# Patient Record
Sex: FEMALE | Race: WHITE | NOT HISPANIC OR LATINO | Employment: OTHER | URBAN - METROPOLITAN AREA
[De-identification: names, ages, dates, MRNs, and addresses within clinical notes are randomized per-mention and may not be internally consistent; named-entity substitution may affect disease eponyms.]

---

## 2017-01-02 ENCOUNTER — OFFICE VISIT (OUTPATIENT)
Dept: PALLIATIVE MEDICINE | Facility: CLINIC | Age: 62
End: 2017-01-02
Payer: COMMERCIAL

## 2017-01-02 DIAGNOSIS — G89.29 CHRONIC PAIN: Primary | ICD-10-CM

## 2017-01-02 DIAGNOSIS — M54.2 CERVICALGIA: ICD-10-CM

## 2017-01-02 DIAGNOSIS — M54.50 LUMBAGO: ICD-10-CM

## 2017-01-02 PROCEDURE — 97112 NEUROMUSCULAR REEDUCATION: CPT | Mod: GP | Performed by: PHYSICAL THERAPIST

## 2017-01-02 PROCEDURE — 97110 THERAPEUTIC EXERCISES: CPT | Mod: GP | Performed by: PHYSICAL THERAPIST

## 2017-01-02 NOTE — PROGRESS NOTES
"Patient Name: Brandi Stern      YOB: 1955     Medical Record Number: [id number]  Diagnosis:    Chronic pain  Cervicalgia  Lumbago  Visit Info Length of Visit: 45  Arrived: On Time   Exercise/Activity Education Instruction:  Postural Training/Anatomy  Pacing/Energy Conservation  Home Program  Self Care  Activity:  Therapeutic Exercise 25':  Aerobic Conditioning (*see \"Strength/Functional Actitivities Record for details of exercises performed)  Bike x 10'  [place] walk x 7' - cues for postural correction, soft heel strike, mindful walking  Stretching:  Upper Ext  bilateral, Lower Ext  Bilateral  Added stretches for KTC, thor ext  Postural Training:  standing, sitting    Neuro re-ed 15': ed on neutral spine  Diaphragm breathing ed in supine  Ed on sequence of 'breathe, stabilize, move' concept  Supine decompression  HR ed with relation to pain, nervous system  Self care ed   Notes: Tolerated session well.  TPI helped.  Slowly progressing toward goals.   Demonstrates/Verbalizes Technique: 3 (1= poor technique-difficulty performing exercises,significant cues required; 5= good technique-performs exercises without cues)  Body Awareness: 3 (1=low awareness; 5=high awareness)  Posture/Stability: 3 (1= poor posture, stability; 5= good posture, stability)   Motivational Level:   Ask questions, Eager to learn and Cooperative Participation:  Full   Patient Satisfaction:  satisfied   Response to Teaching:   cooperative  Factors that affect learning: None   Plan of Care  Cont PT to support reactivation and integration of self regulation pain management skills.   Therapist: Jeffrey Alanis PT                 Date: 1/2/2017                                                                                               "

## 2017-01-12 ENCOUNTER — RADIANT APPOINTMENT (OUTPATIENT)
Dept: GENERAL RADIOLOGY | Facility: CLINIC | Age: 62
End: 2017-01-12
Attending: FAMILY MEDICINE
Payer: COMMERCIAL

## 2017-01-12 ENCOUNTER — OFFICE VISIT (OUTPATIENT)
Dept: URGENT CARE | Facility: URGENT CARE | Age: 62
End: 2017-01-12
Payer: COMMERCIAL

## 2017-01-12 VITALS
WEIGHT: 231 LBS | DIASTOLIC BLOOD PRESSURE: 90 MMHG | BODY MASS INDEX: 39.63 KG/M2 | HEART RATE: 86 BPM | OXYGEN SATURATION: 97 % | TEMPERATURE: 98 F | SYSTOLIC BLOOD PRESSURE: 133 MMHG

## 2017-01-12 DIAGNOSIS — K21.9 GASTROESOPHAGEAL REFLUX DISEASE WITHOUT ESOPHAGITIS: Primary | ICD-10-CM
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
## 2017-01-12 DIAGNOSIS — S13.9XXA SPRAIN OF NECK, INITIAL ENCOUNTER: ICD-10-CM

## 2017-01-12 PROCEDURE — 99213 OFFICE O/P EST LOW 20 MIN: CPT | Performed by: FAMILY MEDICINE

## 2017-01-12 PROCEDURE — 72040 X-RAY EXAM NECK SPINE 2-3 VW: CPT

## 2017-01-12 RX ORDER — NICOTINE POLACRILEX 4 MG/1
20 GUM, CHEWING ORAL 2 TIMES DAILY
Qty: 180 TABLET | Refills: 1 | Status: SHIPPED | OUTPATIENT
Start: 2017-01-12 | End: 2017-01-24 | Stop reason: ALTCHOICE

## 2017-01-12 NOTE — TELEPHONE ENCOUNTER
OMEPRAZOLE 20 MG      Last Written Prescription Date: 7-29-16  Last Fill Quantity: 180,  # refills: PRN   Last Office Visit with [facility], P or [facility] prescribing provider: 12-9-16                                         Next 5 appointments (look out 90 days)     Jan 20, 2017  9:30 AM   Return Visit with Jeffrey Alanis, PT   [facility] ([facility])    [address] [address] [phone]

## 2017-01-13 NOTE — TELEPHONE ENCOUNTER
Prescription approved per [facility] Protocol.  GONZALES Brooke, BSN, RN
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
## 2017-01-20 ENCOUNTER — OFFICE VISIT (OUTPATIENT)
Dept: PALLIATIVE MEDICINE | Facility: CLINIC | Age: 62
End: 2017-01-20
Payer: COMMERCIAL

## 2017-01-20 DIAGNOSIS — G89.29 CHRONIC PAIN: Primary | ICD-10-CM

## 2017-01-20 DIAGNOSIS — M54.50 LUMBAGO: ICD-10-CM

## 2017-01-20 DIAGNOSIS — M54.2 CERVICALGIA: ICD-10-CM

## 2017-01-20 PROCEDURE — 97112 NEUROMUSCULAR REEDUCATION: CPT | Mod: GP | Performed by: PHYSICAL THERAPIST

## 2017-01-20 PROCEDURE — 97110 THERAPEUTIC EXERCISES: CPT | Mod: GP | Performed by: PHYSICAL THERAPIST

## 2017-01-24 ENCOUNTER — TELEPHONE (OUTPATIENT)
Dept: FAMILY MEDICINE | Facility: CLINIC | Age: 62
End: 2017-01-24

## 2017-01-24 DIAGNOSIS — K21.9 GASTROESOPHAGEAL REFLUX DISEASE WITHOUT ESOPHAGITIS: Primary | ICD-10-CM

## 2017-01-24 NOTE — TELEPHONE ENCOUNTER
Jameel called from [facility] pharmacy requesting ok to switch Omeprazole tabs to capsules?    Please advise

## 2017-02-03 ENCOUNTER — OFFICE VISIT (OUTPATIENT)
Dept: PALLIATIVE MEDICINE | Facility: CLINIC | Age: 62
End: 2017-02-03
Payer: COMMERCIAL

## 2017-02-03 VITALS
WEIGHT: 231 LBS | HEART RATE: 90 BPM | BODY MASS INDEX: 39.44 KG/M2 | SYSTOLIC BLOOD PRESSURE: 131 MMHG | DIASTOLIC BLOOD PRESSURE: 89 MMHG | RESPIRATION RATE: 18 BRPM | OXYGEN SATURATION: 97 % | HEIGHT: 64 IN

## 2017-02-03 DIAGNOSIS — M50.30 DDD (DEGENERATIVE DISC DISEASE), CERVICAL: ICD-10-CM

## 2017-02-03 DIAGNOSIS — F32.1 MODERATE SINGLE CURRENT EPISODE OF MAJOR DEPRESSIVE DISORDER (H): Primary | ICD-10-CM

## 2017-02-03 DIAGNOSIS — M51.369 DDD (DEGENERATIVE DISC DISEASE), LUMBAR: ICD-10-CM

## 2017-02-03 PROCEDURE — 99214 OFFICE O/P EST MOD 30 MIN: CPT | Performed by: NURSE PRACTITIONER

## 2017-02-03 RX ORDER — DICLOFENAC SODIUM 100 MG/1
100 TABLET, FILM COATED, EXTENDED RELEASE ORAL DAILY
Qty: 30 TABLET | Refills: 3 | Status: SHIPPED | OUTPATIENT
Start: 2017-02-03 | End: 2017-06-22

## 2017-02-03 ASSESSMENT — PAIN SCALES - GENERAL: PAINLEVEL: MILD PAIN (3)

## 2017-02-03 NOTE — PATIENT INSTRUCTIONS
After Visit Instructions:     Thank you for coming to [facility] for your care. It is my goal to partner with you to help you reach your optimal state of health.     I am recommending multidisciplinary care at this time.  The focus of care will be to continue gradual rehabilitation and pain management with medication adjustments as needed.    Continue daily self-care, identifying contributing factors, and monitoring variations in pain level. Continue to integrate self-care into your life.      1. Behavioral health will contact you about scheduling.   2. Schedule physical therapy visits  3. Schedule follow-up with ELISE Persaud NP-C in 4-8 weeks  4. Medication recommendations: No changes to current pain medication.     ELISE Zhu NP-C  [facility]    Contact information: [facility]    Please call if any side effects, questions, or concerns arise.    Nurse Triage line:  [phone]   Call this number with any questions or concerns. You may leave a detailed message anytime. Calls are typically returned Monday through Friday between 8 AM and 4:30 PM. We usually get back to you within 2 business days depending on the issue/request.    Scheduling number: [phone].  Call this number to schedule or change appointments.          Medication Refills Policy:    For non-narcotic medications, please your pharmacy directly to request a refill and the pharmacy will call the [facility] for authorization. Please allow 3-4 days for these refills.    For narcotic refills, call the nurse triage line and leave a message requesting your refill along with the name of the pharmacy that you use. Narcotic prescriptions will be mailed to your pharmacy or you may pick them up at the clinic.  Please call 7-10 days before your refill is due  The above policy allows adequate time so that you do not run out of medication.    No Show  - Late Cancellation - Late Arrival Policy  We believe regular attendance is key to your success in our program.    Any time you are unable to keep your appointment we ask that you call us at  least 24 hours in advance to let us know. This will allow us to offer the appointment time to another patient. The following is our policy for missed appointments. This also applies to appointments cancelled with less than 24 hours notice.    You will receive a letter after missing your 1st and 2nd appointments without contacting the clinic before your scheduled appointment time.     After missing 3 appointments without calling first, we will cancel all of your future appointments at [facility].    At that point, you will not be able to resume services unless approved by your care team  We understand that unforseen circumstances arise, however, out of respect for all concerned and to provide this appointment to another patient, this policy will be enforced.    Please note that most follow up appointments are 30 minutes long. If you arrive late, your provider may not be able to see you for the entire 30 minutes. Please also note that if you arrive more than 15 minutes for any appointment, it may be rescheduled.

## 2017-02-03 NOTE — NURSING NOTE
"Chief Complaint   Patient presents with     Pain       Initial /89 mmHg  Pulse 90  Resp 18  Ht 1.626 m (5' 4\")  Wt 104.781 kg (231 lb)  BMI 39.63 kg/m2  SpO2 97% Estimated body mass index is 39.63 kg/(m^2) as calculated from the following:    Height as of this encounter: 1.626 m (5' 4\").    Weight as of this encounter: 104.781 kg (231 lb).  BP completed using cuff size: regular    Estuardo Martines MA  [facility]      "
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
## 2017-02-03 NOTE — MR AVS SNAPSHOT
After Visit Summary   2/3/2017    Brandi Stern    MRN: [id number]           Patient Information     Date Of Birth          1955        Visit Information        Provider Department      2/3/2017 9:30 AM Gia Stroud APRN CNP [facility]        Today's Diagnoses     Moderate single current episode of major depressive disorder (H)    -  1       Care Instructions    After Visit Instructions:     Thank you for coming to [facility] for your care. It is my goal to partner with you to help you reach your optimal state of health.     I am recommending multidisciplinary care at this time.  The focus of care will be to continue gradual rehabilitation and pain management with medication adjustments as needed.    Continue daily self-care, identifying contributing factors, and monitoring variations in pain level. Continue to integrate self-care into your life.      1. Behavioral health will contact you about scheduling.   2. Schedule physical therapy visits  3. Schedule follow-up with ELISE Persaud NP-C in 4-8 weeks  4. Medication recommendations: No changes to current pain medication.     ELISE Zhu, NP-C  [facility]    Contact information: [facility]    Please call if any side effects, questions, or concerns arise.    Nurse Triage line:  [phone]   Call this number with any questions or concerns. You may leave a detailed message anytime. Calls are typically returned Monday through Friday between 8 AM and 4:30 PM. We usually get back to you within 2 business days depending on the issue/request.    Scheduling number: [phone].  Call this number to schedule or change appointments.          Medication Refills Policy:    For non-narcotic medications, please your pharmacy directly to request a refill and the pharmacy will call the [facility] for authorization. Please  allow 3-4 days for these refills.    For narcotic refills, call the nurse triage line and leave a message requesting your refill along with the name of the pharmacy that you use. Narcotic prescriptions will be mailed to your pharmacy or you may pick them up at the clinic.  Please call 7-10 days before your refill is due  The above policy allows adequate time so that you do not run out of medication.    No Show - Late Cancellation - Late Arrival Policy  We believe regular attendance is key to your success in our program.    Any time you are unable to keep your appointment we ask that you call us at  least 24 hours in advance to let us know. This will allow us to offer the appointment time to another patient. The following is our policy for missed appointments. This also applies to appointments cancelled with less than 24 hours notice.    You will receive a letter after missing your 1st and 2nd appointments without contacting the clinic before your scheduled appointment time.     After missing 3 appointments without calling first, we will cancel all of your future appointments at [facility].    At that point, you will not be able to resume services unless approved by your care team  We understand that unforseen circumstances arise, however, out of respect for all concerned and to provide this appointment to another patient, this policy will be enforced.    Please note that most follow up appointments are 30 minutes long. If you arrive late, your provider may not be able to see you for the entire 30 minutes. Please also note that if you arrive more than 15 minutes for any appointment, it may be rescheduled.                                   Follow-ups after your visit        Additional Services     MENTAL HEALTH REFERRAL       Your provider has referred you to: Behavioral Healthcare Providers Intake Scheduling [phone]   [email or web address]    All scheduling is subject to the client's  "specific insurance plan & benefits, provider/location availability, and provider clinical specialities.  Please arrive 15 minutes early for your first appointment and bring your completed paperwork.    Please be aware that coverage of these services is subject to the terms and limitations of your health insurance plan.  Call member services at your health plan with any benefit or coverage questions.                  Who to contact     If you have questions or need follow up information about today's clinic visit or your schedule please contact [facility] directly at [phone].  Normal or non-critical lab and imaging results will be communicated to you by Vixlohart, letter or phone within 4 business days after the clinic has received the results. If you do not hear from us within 7 days, please contact the clinic through 4tiitoot or phone. If you have a critical or abnormal lab result, we will notify you by phone as soon as possible.  Submit refill requests through Wibki or call your pharmacy and they will forward the refill request to us. Please allow 3 business days for your refill to be completed.          Additional Information About Your Visit        Wibki Information     W[employer]ki gives you secure access to your electronic health record. If you see a primary care provider, you can also send messages to your care team and make appointments. If you have questions, please call your primary care clinic.  If you do not have a primary care provider, please call [phone] and they will assist you.        Care EveryWhere ID     This is your Care EveryWhere ID. This could be used by other organizations to access your [facility] medical records  [id number]        Your Vitals Were     Pulse Respirations Height BMI (Body Mass Index) Pulse Oximetry       90 18 1.626 m (5' 4\") 39.63 kg/m2 97%        Blood Pressure from Last 3 Encounters:   02/03/17 131/89   01/12/17 133/90   12/28/16 141/90    " Weight from Last 3 Encounters:   02/03/17 104.781 kg (231 lb)   01/12/17 104.781 kg (231 lb)   12/19/16 105.688 kg (233 lb)              We Performed the Following     MENTAL HEALTH REFERRAL        Primary Care Provider Office Phone # Fax #    Cherie KARRI Brennan [phone]       [facility] [address] [place]  [facility] [address]        Thank you!     Thank you for choosing [facility]  for your care. Our goal is always to provide you with excellent care. Hearing back from our patients is one way we can continue to improve our services. Please take a few minutes to complete the written survey that you may receive in the mail after your visit with us. Thank you!             Your Updated Medication List - Protect others around you: Learn how to safely use, store and throw away your medicines at www.disposemymeds.org.          This list is accurate as of: 2/3/17  9:50 AM.  Always use your most recent med list.                   Brand Name Dispense Instructions for use    ACETAMINOPHEN EXTRA STRENGTH PO      None Entered       * albuterol (2.5 MG/3ML) 0.083% neb solution     3 mL    Take  by nebulization. take 3 mL by nebulization 4 times daily as needed       * albuterol 108 (90 BASE) MCG/ACT Inhaler   Generic drug:  albuterol     1 Inhaler    Inhale 1-2 puffs into the lungs every 6 hours as needed for shortness of breath / dyspnea.       atorvastatin 10 MG tablet    LIPITOR    90 tablet    Take 1 tablet (10 mg) by mouth daily       CALCIUM 500 PO      Take 1 tablet by mouth daily       Capsaicin 0.1 % Crea     1 Tube    Externally apply topically 3 times daily       CLARITIN 10 MG tablet   Generic drug:  loratadine     0    1 TAB PO QD (Once per day) as needed for ALLERGY SYMPTOMS       CO Q 10 PO      Take 200 mg by mouth daily       cyclobenzaprine 5 MG tablet    FLEXERIL    180 tablet    Take 1-2 tablets (5-10 mg) by mouth 3 times daily as needed for muscle spasms        diclofenac 1 % Gel topical gel    VOLTAREN    100 g    Apply 4 grams to knees or 2 grams to feet four times daily using enclosed dosing card.       diclofenac 100 MG 24 hr tablet    VOLTAREN-XR    30 tablet    Take 1 tablet (100 mg) by mouth daily       escitalopram 20 MG tablet    LEXAPRO    90 tablet    Take 1 tablet (20 mg) by mouth daily       fenofibrate 145 MG tablet     90 tablet    Take 1 tablet (145 mg) by mouth daily       ferrous sulfate 325 (65 FE) MG tablet    IRON    30 tablet    Take 1 tablet (325 mg) by mouth daily (with breakfast)       FIBER PO      Twice daily       ipratropium 0.02 % neb solution    ATROVENT    225 mL    Take 2.5 mLs (0.5 mg) by nebulization 4 times daily       ipratropium 17 MCG/ACT Inhaler    ATROVENT HFA    1 Inhaler    Inhale 2 puffs into the lungs 4 times daily as needed for wheezing       levothyroxine 75 MCG tablet    SYNTHROID    90 tablet    Take 1 tablet (75 mcg) by mouth every morning Overdue for labs       Lutein 10 MG Tabs      Take 10 mg by mouth daily       MELATONIN PO      Take 5 mg by mouth nightly as needed       MIRAPEX 0.125 MG tablet   Generic drug:  pramipexole      Take two tabs at dinner and one at hs       omeprazole 20 MG CR capsule    priLOSEC    180 capsule    Take 1 capsule (20 mg) by mouth 2 times daily       ranitidine 300 MG tablet    ZANTAC    60 tablet    Take 1 tablet (300 mg) by mouth 2 times daily       SYMBICORT 160-4.5 MCG/ACT Inhaler   Generic drug:  budesonide-formoterol      Inhale 1 puff into the lungs 2 times daily       triamcinolone 55 MCG/ACT nasal inhaler    NASACORT AQ    1 Inhaler    Inhale 2 sprays in both nostrils every day as needed       UNABLE TO FIND      MEDICATION NAME: Allergy shots       VITAMIN C PO      Take 1,000 mg by mouth daily       zolpidem 10 MG tablet    AMBIEN    30 tablet    Take 1 tablet (10 mg) by mouth nightly as needed for sleep       * Notice:  This list has 2 medication(s) that are the same as  other medications prescribed for you. Read the directions carefully, and ask your doctor or other care provider to review them with you.

## 2017-02-08 ENCOUNTER — OFFICE VISIT (OUTPATIENT)
Dept: PSYCHOLOGY | Facility: CLINIC | Age: 62
End: 2017-02-08
Attending: NURSE PRACTITIONER
Payer: COMMERCIAL

## 2017-02-08 ENCOUNTER — FCC EXTENDED DOCUMENTATION (OUTPATIENT)
Dept: PSYCHOLOGY | Facility: CLINIC | Age: 62
End: 2017-02-08

## 2017-02-08 DIAGNOSIS — F33.9 MAJOR DEPRESSIVE DISORDER, RECURRENT EPISODE WITH ANXIOUS DISTRESS (H): Primary | ICD-10-CM

## 2017-02-08 DIAGNOSIS — F33.9 MAJOR DEPRESSIVE DISORDER, RECURRENT (H): Primary | ICD-10-CM

## 2017-02-08 PROCEDURE — 90837 PSYTX W PT 60 MINUTES: CPT | Performed by: COUNSELOR

## 2017-02-08 ASSESSMENT — ANXIETY QUESTIONNAIRES
3. WORRYING TOO MUCH ABOUT DIFFERENT THINGS: NOT AT ALL
2. NOT BEING ABLE TO STOP OR CONTROL WORRYING: MORE THAN HALF THE DAYS
5. BEING SO RESTLESS THAT IT IS HARD TO SIT STILL: NOT AT ALL
IF YOU CHECKED OFF ANY PROBLEMS ON THIS QUESTIONNAIRE, HOW DIFFICULT HAVE THESE PROBLEMS MADE IT FOR YOU TO DO YOUR WORK, TAKE CARE OF THINGS AT HOME, OR GET ALONG WITH OTHER PEOPLE: SOMEWHAT DIFFICULT
1. FEELING NERVOUS, ANXIOUS, OR ON EDGE: SEVERAL DAYS
7. FEELING AFRAID AS IF SOMETHING AWFUL MIGHT HAPPEN: NOT AT ALL
GAD7 TOTAL SCORE: 6
6. BECOMING EASILY ANNOYED OR IRRITABLE: MORE THAN HALF THE DAYS

## 2017-02-08 ASSESSMENT — PATIENT HEALTH QUESTIONNAIRE - PHQ9: 5. POOR APPETITE OR OVEREATING: SEVERAL DAYS

## 2017-02-08 NOTE — PROGRESS NOTES
"               Progress Note - Initial Session    Client Name:  Brandi Stern Date: 2/8/2017         Service Type: Individual      Session Start Time: 2:00p  Session End Time: 2:55p      Session Length: 53 - 60      Session #: 1     Attendees: Client attended alone         Diagnostic Assessment in progress.  Unable to complete documentation at the conclusion of the first session due to addressing high scores on PHQ9 and assessing safety.       Mental Status Assessment:  Appearance:   Appropriate   Eye Contact:   Good   Psychomotor Behavior: Normal   Attitude:   Cooperative   Orientation:   All  Speech   Rate / Production: Normal    Volume:  Normal   Mood:    Normal Sad Tearful  Affect:    Appropriate   Thought Content:  Clear   Thought Form:  Coherent  Logical  Circumstantial  Insight:    Good       Safety Issues and Plan for Safety and Risk Management:  Client has had a history of suicidal ideation: passive thoughts of dealth, \"Is there pain in the afterlife?\", 1 suicide attempt: in college after breakup with boyfriend, was drinking and ingested pills, no hospitalization, would never really hurt self  Client denies current fears or concerns for personal safety.  Client reports the following current or recent suicidal ideation or behaviors: passive thoughts of dealth, \"Is there pain in the afterlife?\". Would never really hurt self.  Client denies current or recent homicidal ideation or behaviors.  Client denies current or recent self injurious behavior or ideation.  Client denies other safety concerns.  Client reports there are no firearms in the house.  A safety and risk management plan has not been developed at this time, however client was given the after-hours number / 911 should there be a change in any of these risk factors.      Diagnostic Criteria:  A) Recurrent episode(s) - symptoms have been present during the same 2-week period and represent a change from previous functioning 5 or more symptoms (required " for diagnosis)   - Depressed mood. Note: In children and adolescents, can be irritable mood.     - Diminished interest or pleasure in all, or almost all, activities.    - Increased sleep.    - Fatigue or loss of energy.    - Feelings of worthlessness or excessive guilt.    - Recurrent thoughts of death (not just fear of dying), recurrent suicidal ideation without a specific plan, or a suicide attempt or a specific plan for committing suicide.   B) The symptoms cause clinically significant distress or impairment in social, occupational, or other important areas of functioning  C) The episode is not attributable to the physiological effects of a substance or to another medical condition  D) The occurence of major depressive episode is not better explained by other thought / psychotic disorders  E) There has never been a manic episode or hypomanic episode        DSM5 Diagnoses: (Sustained by DSM5 Criteria Listed Above)  Diagnoses: 296.32 Major Depressive Disorder, Recurrent Episode, Moderate _  Psychosocial & Contextual Factors: Some financial stress, strained relationship with mother and some siblings, work stress, chronic pain  WHODAS 2.0 (12 item)            This questionnaire asks about difficulties due to health conditions. Health conditions  include  disease or illnesses, other health problems that may be short or long lasting,  injuries, mental health or emotional problems, and problems with alcohol or drugs.                     Think back over the past 30 days and answer these questions, thinking about how much  difficulty you had doing the following activities. For each question, please [place] only  one response.    S1 Standing for long periods such as 30 minutes? Severe =       4   S2 Taking care of household responsibilities? Moderate =   3   S3 Learning a new task, for example, learning how to get to a new place? None =         1   S4 How much of a problem do you have joining community activities (for  example, festivals, Confucianism or other activities) in the same way as anyone else can? Moderate =   3   S5 How much have you been emotionally affected by your health problems? Severe =       4     In the past 30 days, how much difficulty did you have in:   S6 Concentrating on doing something for ten minutes? Mild =           2   S7 Walking a long distance such as a kilometer (or equivalent)? Moderate =   3   S8 Washing your whole body? Moderate =   3   S9 Getting dressed? None =         1   S10 Dealing with people you do not know? None =         1   S11 Maintaining a friendship? None =         1   S12 Your day to day work? Moderate =   3     H1 Overall, in the past 30 days, how many days were these difficulties present? Record number of days: eveyday   H2 In the past 30 days, for how many days were you totally unable to carry out your usual activities or work because of any health condition? Record number of days: 4, on the weekends   H3 In the past 30 days, not counting the days that you were totally unable, for how many days did you cut back or reduce your usual activities or work because of any health condition? Record number of days: half, 15 days         Collateral Reports Completed:  Routed note to PCP      PLAN: (Homework, other):  Client stated that she may follow up for ongoing services with [facility].  Continue to assess and address depression symptoms, complete DA, and start treatment plan.      Ronda Lackey [facility]
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
## 2017-02-08 NOTE — Clinical Note
Hi, Patient completed first intake appointment for therapy. She also scheduled follow up appointment for next week. She meets criteria for major depressive disorder and we will be developing treatment plan within the next couple of sessions. Please message me with any questions or concerns.  Thank you, Ronda Lackey MA, [facility]

## 2017-02-08 NOTE — Clinical Note
Trev Crespo, Patient completed intake for therapy. She meets criteria for major depressive disorder, recurrent, with anxious distress. Please contact me with any questions or concerns.  Thank you, Ronda Lackey MA, [facility]

## 2017-02-09 ASSESSMENT — ANXIETY QUESTIONNAIRES: GAD7 TOTAL SCORE: 6

## 2017-02-09 ASSESSMENT — PATIENT HEALTH QUESTIONNAIRE - PHQ9: SUM OF ALL RESPONSES TO PHQ QUESTIONS 1-9: 19

## 2017-02-17 ENCOUNTER — OFFICE VISIT (OUTPATIENT)
Dept: PSYCHOLOGY | Facility: CLINIC | Age: 62
End: 2017-02-17
Payer: COMMERCIAL

## 2017-02-17 DIAGNOSIS — F33.9 MAJOR DEPRESSIVE DISORDER, RECURRENT EPISODE WITH ANXIOUS DISTRESS (H): Primary | ICD-10-CM

## 2017-02-17 PROCEDURE — 90791 PSYCH DIAGNOSTIC EVALUATION: CPT | Performed by: COUNSELOR

## 2017-02-17 NOTE — Clinical Note
Trev Crespo, Patient completed intake for therapy. She meets criteria for major depressive disorder, recurrent, with anxious distress. Please contact me with any questions or concerns.  Thank you, Ronda Lackey MA, [facility]
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
## 2017-02-17 NOTE — MR AVS SNAPSHOT
MRN:[id number]                      After Visit Summary   2/17/2017    Brandi Stern    MRN: [id number]           Visit Information        Provider Department      2/17/2017 2:30 PM Ronda Lackey [facility]      Your next 10 appointments already scheduled     Feb 21, 2017  1:45 PM CST   Office Visit with Cherie Brennan NP   [facility] ([facility])    [address] [place] [id number] [phone]           Bring a current list of meds and any records pertaining to this visit.  For Physicals, please bring immunization records and any forms needing to be filled out.  Please arrive 10 minutes early to complete paperwork.            Feb 24, 2017 11:00 AM CST   Return Visit with Ronda Lackey [facility] ([facility]  [address] [place], [place] [id number]  [facility] [id number] [phone]            Mar 03, 2017 11:00 AM CST   Return Visit with Ronda Lackey [facility] ([facility])    [facility]  [address] [place], [place] [id number]  [facility] [phone] [phone]              MyChart Information     [facility] gives you secure access to your electronic health record. If you see a primary care provider, you can also send messages to your care team and make appointments. If you have questions, please call your primary care clinic.  If you do not have a primary care provider, please call [phone] and they will assist you.        Care EveryWhere ID     This is your Care EveryWhere ID. This could be used by other organizations to access your [facility] medical records  [id number]

## 2017-02-21 ENCOUNTER — OFFICE VISIT (OUTPATIENT)
Dept: FAMILY MEDICINE | Facility: CLINIC | Age: 62
End: 2017-02-21
Payer: COMMERCIAL

## 2017-02-21 ENCOUNTER — ALLIED HEALTH/NURSE VISIT (OUTPATIENT)
Dept: NURSING | Facility: CLINIC | Age: 62
End: 2017-02-21
Payer: COMMERCIAL

## 2017-02-21 VITALS
TEMPERATURE: 99.5 F | BODY MASS INDEX: 38.93 KG/M2 | SYSTOLIC BLOOD PRESSURE: 118 MMHG | DIASTOLIC BLOOD PRESSURE: 82 MMHG | RESPIRATION RATE: 16 BRPM | WEIGHT: 228 LBS | OXYGEN SATURATION: 97 % | HEART RATE: 89 BPM | HEIGHT: 64 IN

## 2017-02-21 DIAGNOSIS — G47.00 INSOMNIA, UNSPECIFIED TYPE: Primary | ICD-10-CM

## 2017-02-21 DIAGNOSIS — M15.9 OSTEOARTHRITIS OF MULTIPLE JOINTS, UNSPECIFIED OSTEOARTHRITIS TYPE: ICD-10-CM

## 2017-02-21 DIAGNOSIS — F33.1 MAJOR DEPRESSIVE DISORDER, RECURRENT EPISODE, MODERATE (H): ICD-10-CM

## 2017-02-21 DIAGNOSIS — F33.41 DEPRESSION, MAJOR, RECURRENT, IN PARTIAL REMISSION (H): Primary | ICD-10-CM

## 2017-02-21 PROCEDURE — 99214 OFFICE O/P EST MOD 30 MIN: CPT | Performed by: NURSE PRACTITIONER

## 2017-02-21 PROCEDURE — 99207 ZZC NO CHARGE NURSE ONLY: CPT

## 2017-02-21 RX ORDER — TRAZODONE HYDROCHLORIDE 50 MG/1
50-100 TABLET, FILM COATED ORAL
Qty: 60 TABLET | Refills: 5 | Status: SHIPPED | OUTPATIENT
Start: 2017-02-21 | End: 2017-03-08

## 2017-02-21 NOTE — PROGRESS NOTES
"  SUBJECTIVE:                                                    Brandi Stern is a 61 year old female who presents to clinic today for the following health issues:      Medication Followup of escitalopram and ambien    Taking Medication as prescribed: yes    Side Effects:  none    Medication Helping Symptoms:  NO- pt would like to discuss both mediations   She has been having significant fatigue, decreased energy, and more depression.  She has started seeing a therapist.      She feels that Lexapro has been helpful, but not as much currently.  We did try adding Wellbutrin but this was stopped after 6 months when it was not effective.  She has also been on Cymbalta and Celexa in the past.    She stopped Ambien and would like to try a different medication for sleep.  She will not fall asleep until 1:00 and then is very fatigued during the day.  She did have some fogginess in the morning on Ambien, but also wonders if this is related to her fibromyalgia.    Currently her fibromyalgia is under better control.  Most of her current pain is due to OA and she is on Relafen which is helping.          Problem list and histories reviewed & adjusted, as indicated.  Additional history: as documented    Problem list, Medication list, Allergies, and Medical/Social/Surgical histories reviewed in [facility] and updated as appropriate.    ROS:  C: NEGATIVE for fever, chills, change in weight  E/M: NEGATIVE for ear, mouth and throat problems  R: NEGATIVE for significant cough or SOB  CV: NEGATIVE for chest pain, palpitations or peripheral edema  GI: NEGATIVE for nausea, abdominal pain, heartburn, or change in bowel habits  MUSCULOSKELETAL: see HPI  NEURO: NEGATIVE for weakness, dizziness or paresthesias  PSYCHIATRIC: see HPI    OBJECTIVE:                                                    /82  Pulse 89  Temp 99.5  F (37.5  C) (Oral)  Resp 16  Ht 5' 4\" (1.626 m)  Wt 228 lb (103.4 kg)  LMP  (LMP Unknown)  SpO2 97%  Breastfeeding? No  " BMI 39.14 kg/m2  Body mass index is 39.14 kg/(m^2).  GENERAL: healthy, alert and no distress  PSYCH: mentation appears normal, affect slightly flattened; PHQ-9 score of 19         ASSESSMENT/PLAN:                                                            1. Insomnia, unspecified type  Discussed treatment options.  We will try Trazodone and see if her sleep improves if this affects her depression.   - traZODone (DESYREL) 50 MG tablet; Take 1-2 tablets ( mg) by mouth nightly as needed for sleep  Dispense: 60 tablet; Refill: 5    2. Major depressive disorder, recurrent episode, moderate (H)  Will do Gene Sight testing and follow up once the report is available to discuss a change of medication.     3. Osteoarthritis of multiple joints, unspecified osteoarthritis type  Will do Gene Sight testing for analgesics.            Cherie Brennan, UZIEL  [facility]

## 2017-02-21 NOTE — NURSING NOTE
"Chief Complaint   Patient presents with     Recheck Medication       Initial BP (!) 132/92 (BP Location: Left arm, Patient Position: Chair, Cuff Size: Adult Large)  Pulse 89  Temp 99.5  F (37.5  C) (Oral)  Resp 16  Ht 5' 4\" (1.626 m)  Wt 228 lb (103.4 kg)  LMP  (LMP Unknown)  SpO2 97%  Breastfeeding? No  BMI 39.14 kg/m2 Estimated body mass index is 39.14 kg/(m^2) as calculated from the following:    Height as of this encounter: 5' 4\" (1.626 m).    Weight as of this encounter: 228 lb (103.4 kg).  Medication Reconciliation: complete     Augusta Carranza MA      "

## 2017-02-21 NOTE — MR AVS SNAPSHOT
After Visit Summary   2/21/2017    Brandi Stern    MRN: [id number]           Patient Information     Date Of Birth          1955        Visit Information        Provider Department      2/21/2017 2:30 PM HP RN/TRIAGE NURSE ONLY [facility]        Today's Diagnoses     Depression, major, recurrent, in partial remission (H)    -  1       Follow-ups after your visit        Your next 10 appointments already scheduled     Feb 24, 2017 11:00 AM CST   Return Visit with Ronda Lackey [facility] ([facility]  [address] [facility]  [facility] [id number] [phone]            Mar 03, 2017 11:00 AM CST   Return Visit with Ronda Lackey [facility] ([facility]  [address] [facility]  [facility] [id number] [phone]              Who to contact     If you have questions or need follow up information about today's clinic visit or your schedule please contact [facility] directly at [phone].  Normal or non-critical lab and imaging results will be communicated to you by CollegeFanzhart, letter or phone within 4 business days after the clinic has received the results. If you do not hear from us within 7 days, please contact the clinic through Inbiomotiont or phone. If you have a critical or abnormal lab result, we will notify you by phone as soon as possible.  Submit refill requests through [facility] ([facility]) or call your pharmacy and they will forward the refill request to us. Please allow 3 business days for your refill to be completed.          Additional Information About Your Visit        CollegeFanzharLumiant Information     [facility] ([facility]) gives you secure access to your electronic health record. If you see a primary care provider, you can also send messages to your care team and make appointments. If you have questions, please call your primary care clinic.   If you do not have a primary care provider, please call [phone] and they will assist you.        Care EveryWhere ID     This is your Care EveryWhere ID. This could be used by other organizations to access your [facility] medical records  [id number]        Your Vitals Were     Last Period                   (LMP Unknown)            Blood Pressure from Last 3 Encounters:   02/21/17 118/82   02/03/17 131/89   01/12/17 133/90    Weight from Last 3 Encounters:   02/21/17 228 lb (103.4 kg)   02/03/17 231 lb (104.8 kg)   01/12/17 231 lb (104.8 kg)              Today, you had the following     No orders found for display         Today's Medication Changes          These changes are accurate as of: 2/21/17  3:22 PM.  If you have any questions, ask your nurse or doctor.               Start taking these medicines.        Dose/Directions    traZODone 50 MG tablet   Commonly known as:  DESYREL   Used for:  Insomnia, unspecified type   Started by:  Cherie Brennan NP        Dose:   mg   Take 1-2 tablets ( mg) by mouth nightly as needed for sleep   Quantity:  60 tablet   Refills:  5            Where to get your medicines      These medications were sent to [facility]/pharmacy #[id number] - [place], [place] - [address] [facility]  [address], [place] [address]     Phone:  [phone]     traZODone 50 MG tablet                Primary Care Provider Office Phone # Fax #    Cherie Brennan [phone]       [facility] [address]  [facility] [address]        Thank you!     Thank you for choosing [facility]  for your care. Our goal is always to provide you with excellent care. Hearing back from our patients is one way we can continue to improve our services. Please take a few minutes to complete the written survey that you may receive in the mail after your visit with us. Thank you!             Your Updated Medication List - Protect others around you: Learn how to  safely use, store and throw away your medicines at www.disposemymeds.org.          This list is accurate as of: 2/21/17  3:22 PM.  Always use your most recent med list.                   Brand Name Dispense Instructions for use    ACETAMINOPHEN EXTRA STRENGTH PO      None Entered       * albuterol (2.5 MG/3ML) 0.083% neb solution     3 mL    Take  by nebulization. take 3 mL by nebulization 4 times daily as needed       * albuterol 108 (90 BASE) MCG/ACT Inhaler   Generic drug:  albuterol     1 Inhaler    Inhale 1-2 puffs into the lungs every 6 hours as needed for shortness of breath / dyspnea.       atorvastatin 10 MG tablet    LIPITOR    90 tablet    Take 1 tablet (10 mg) by mouth daily       CALCIUM 500 PO      Take 1 tablet by mouth daily       Capsaicin 0.1 % Crea     1 Tube    Externally apply topically 3 times daily       CLARITIN 10 MG tablet   Generic drug:  loratadine     0    1 TAB PO QD (Once per day) as needed for ALLERGY SYMPTOMS       CO Q 10 PO      Take 200 mg by mouth daily       cyclobenzaprine 5 MG tablet    FLEXERIL    180 tablet    Take 1-2 tablets (5-10 mg) by mouth 3 times daily as needed for muscle spasms       diclofenac 1 % Gel topical gel    VOLTAREN    100 g    Apply 4 grams to knees or 2 grams to feet four times daily using enclosed dosing card.       diclofenac 100 MG 24 hr tablet    VOLTAREN-XR    30 tablet    Take 1 tablet (100 mg) by mouth daily       escitalopram 20 MG tablet    LEXAPRO    90 tablet    Take 1 tablet (20 mg) by mouth daily       fenofibrate 145 MG tablet     90 tablet    Take 1 tablet (145 mg) by mouth daily       ferrous sulfate 325 (65 FE) MG tablet    IRON    30 tablet    Take 1 tablet (325 mg) by mouth daily (with breakfast)       FIBER PO      Twice daily       ipratropium 0.02 % neb solution    ATROVENT    225 mL    Take 2.5 mLs (0.5 mg) by nebulization 4 times daily       ipratropium 17 MCG/ACT Inhaler    ATROVENT HFA    1 Inhaler    Inhale 2 puffs into the  lungs 4 times daily as needed for wheezing       levothyroxine 75 MCG tablet    SYNTHROID    90 tablet    Take 1 tablet (75 mcg) by mouth every morning Overdue for labs       Lutein 10 MG Tabs      Take 10 mg by mouth daily       MELATONIN PO      Take 5 mg by mouth nightly as needed       MIRAPEX 0.125 MG tablet   Generic drug:  pramipexole      Take two tabs at dinner and one at hs       omeprazole 20 MG CR capsule    priLOSEC    180 capsule    Take 1 capsule (20 mg) by mouth 2 times daily       ranitidine 300 MG tablet    ZANTAC    60 tablet    Take 1 tablet (300 mg) by mouth 2 times daily       SYMBICORT 160-4.5 MCG/ACT Inhaler   Generic drug:  budesonide-formoterol      Inhale 1 puff into the lungs 2 times daily       traZODone 50 MG tablet    DESYREL    60 tablet    Take 1-2 tablets ( mg) by mouth nightly as needed for sleep       triamcinolone 55 MCG/ACT nasal inhaler    NASACORT AQ    1 Inhaler    Inhale 2 sprays in both nostrils every day as needed       UNABLE TO FIND      MEDICATION NAME: Allergy shots       VITAMIN C PO      Take 1,000 mg by mouth daily       zolpidem 10 MG tablet    AMBIEN    30 tablet    Take 1 tablet (10 mg) by mouth nightly as needed for sleep       * Notice:  This list has 2 medication(s) that are the same as other medications prescribed for you. Read the directions carefully, and ask your doctor or other care provider to review them with you.

## 2017-02-21 NOTE — NURSING NOTE
Genesight swab done and reviewed paper work.. endy gave consent and signed forms.  Unique Chaudhry RN

## 2017-02-21 NOTE — MR AVS SNAPSHOT
After Visit Summary   2/21/2017    Brandi Stern    MRN: [id number]           Patient Information     Date Of Birth          1955        Visit Information        Provider Department      2/21/2017 1:45 PM Cherie Brennan NP [facility]        Today's Diagnoses     Insomnia, unspecified type    -  1    Major depressive disorder, recurrent episode, moderate (H)        Osteoarthritis of multiple joints, unspecified osteoarthritis type           Follow-ups after your visit        Your next 10 appointments already scheduled     Feb 24, 2017 11:00 AM CST   Return Visit with Ronda Lackey [facility] ([facility]  [address]  [facility] [id number] [phone]            Mar 03, 2017 11:00 AM CST   Return Visit with Ronda Lackey [facility] ([facility]  [address]  [facility] [id number] [phone]              Who to contact     If you have questions or need follow up information about today's clinic visit or your schedule please contact [facility] directly at [phone].  Normal or non-critical lab and imaging results will be communicated to you by MyChart, letter or phone within 4 business days after the clinic has received the results. If you do not hear from us within 7 days, please contact the clinic through Cometahart or phone. If you have a critical or abnormal lab result, we will notify you by phone as soon as possible.  Submit refill requests through Germin[employer] or call your pharmacy and they will forward the refill request to us. Please allow 3 business days for your refill to be completed.          Additional Information About Your Visit        MyChart Information     [employer] gives you secure access to your electronic health record. If you see a primary care provider, you can also send  "messages to your care team and make appointments. If you have questions, please call your primary care clinic.  If you do not have a primary care provider, please call [phone] and they will assist you.        Care EveryWhere ID     This is your Care EveryWhere ID. This could be used by other organizations to access your [facility] medical records  [id number]        Your Vitals Were     Pulse Temperature Respirations Height Last Period Pulse Oximetry    89 99.5  F (37.5  C) (Oral) 16 5' 4\" (1.626 m) (LMP Unknown) 97%    Breastfeeding? BMI (Body Mass Index)                No 39.14 kg/m2           Blood Pressure from Last 3 Encounters:   02/21/17 118/82   02/03/17 131/89   01/12/17 133/90    Weight from Last 3 Encounters:   02/21/17 228 lb (103.4 kg)   02/03/17 231 lb (104.8 kg)   01/12/17 231 lb (104.8 kg)              Today, you had the following     No orders found for display         Today's Medication Changes          These changes are accurate as of: 2/21/17  3:18 PM.  If you have any questions, ask your nurse or doctor.               Start taking these medicines.        Dose/Directions    traZODone 50 MG tablet   Commonly known as:  DESYREL   Used for:  Insomnia, unspecified type   Started by:  Cherie Brennan NP        Dose:   mg   Take 1-2 tablets ( mg) by mouth nightly as needed for sleep   Quantity:  60 tablet   Refills:  5            Where to get your medicines      These medications were sent to [facility]/pharmacy #[id number] - [place], [place] - [address] [facility]  [address], [place] [address]     Phone:  [phone]     traZODone 50 MG tablet                Primary Care Provider Office Phone # Fax #    Cherie Brennan [phone]       [facility] [address] [place]  [facility] [address]        Thank you!     Thank you for choosing [facility]  for your care. Our goal is always to provide you with excellent care. Hearing back from our " patients is one way we can continue to improve our services. Please take a few minutes to complete the written survey that you may receive in the mail after your visit with us. Thank you!             Your Updated Medication List - Protect others around you: Learn how to safely use, store and throw away your medicines at www.disposemymeds.org.          This list is accurate as of: 2/21/17  3:18 PM.  Always use your most recent med list.                   Brand Name Dispense Instructions for use    ACETAMINOPHEN EXTRA STRENGTH PO      None Entered       * albuterol (2.5 MG/3ML) 0.083% neb solution     3 mL    Take  by nebulization. take 3 mL by nebulization 4 times daily as needed       * albuterol 108 (90 BASE) MCG/ACT Inhaler   Generic drug:  albuterol     1 Inhaler    Inhale 1-2 puffs into the lungs every 6 hours as needed for shortness of breath / dyspnea.       atorvastatin 10 MG tablet    LIPITOR    90 tablet    Take 1 tablet (10 mg) by mouth daily       CALCIUM 500 PO      Take 1 tablet by mouth daily       Capsaicin 0.1 % Crea     1 Tube    Externally apply topically 3 times daily       CLARITIN 10 MG tablet   Generic drug:  loratadine     0    1 TAB PO QD (Once per day) as needed for ALLERGY SYMPTOMS       CO Q 10 PO      Take 200 mg by mouth daily       cyclobenzaprine 5 MG tablet    FLEXERIL    180 tablet    Take 1-2 tablets (5-10 mg) by mouth 3 times daily as needed for muscle spasms       diclofenac 1 % Gel topical gel    VOLTAREN    100 g    Apply 4 grams to knees or 2 grams to feet four times daily using enclosed dosing card.       diclofenac 100 MG 24 hr tablet    VOLTAREN-XR    30 tablet    Take 1 tablet (100 mg) by mouth daily       escitalopram 20 MG tablet    LEXAPRO    90 tablet    Take 1 tablet (20 mg) by mouth daily       fenofibrate 145 MG tablet     90 tablet    Take 1 tablet (145 mg) by mouth daily       ferrous sulfate 325 (65 FE) MG tablet    IRON    30 tablet    Take 1 tablet (325 mg) by  mouth daily (with breakfast)       FIBER PO      Twice daily       ipratropium 0.02 % neb solution    ATROVENT    225 mL    Take 2.5 mLs (0.5 mg) by nebulization 4 times daily       ipratropium 17 MCG/ACT Inhaler    ATROVENT HFA    1 Inhaler    Inhale 2 puffs into the lungs 4 times daily as needed for wheezing       levothyroxine 75 MCG tablet    SYNTHROID    90 tablet    Take 1 tablet (75 mcg) by mouth every morning Overdue for labs       Lutein 10 MG Tabs      Take 10 mg by mouth daily       MELATONIN PO      Take 5 mg by mouth nightly as needed       MIRAPEX 0.125 MG tablet   Generic drug:  pramipexole      Take two tabs at dinner and one at hs       omeprazole 20 MG CR capsule    priLOSEC    180 capsule    Take 1 capsule (20 mg) by mouth 2 times daily       ranitidine 300 MG tablet    ZANTAC    60 tablet    Take 1 tablet (300 mg) by mouth 2 times daily       SYMBICORT 160-4.5 MCG/ACT Inhaler   Generic drug:  budesonide-formoterol      Inhale 1 puff into the lungs 2 times daily       traZODone 50 MG tablet    DESYREL    60 tablet    Take 1-2 tablets ( mg) by mouth nightly as needed for sleep       triamcinolone 55 MCG/ACT nasal inhaler    NASACORT AQ    1 Inhaler    Inhale 2 sprays in both nostrils every day as needed       UNABLE TO FIND      MEDICATION NAME: Allergy shots       VITAMIN C PO      Take 1,000 mg by mouth daily       zolpidem 10 MG tablet    AMBIEN    30 tablet    Take 1 tablet (10 mg) by mouth nightly as needed for sleep       * Notice:  This list has 2 medication(s) that are the same as other medications prescribed for you. Read the directions carefully, and ask your doctor or other care provider to review them with you.

## 2017-02-22 DIAGNOSIS — K21.9 GASTROESOPHAGEAL REFLUX DISEASE WITHOUT ESOPHAGITIS: ICD-10-CM

## 2017-02-22 NOTE — TELEPHONE ENCOUNTER
RANITIDINE 300 MG      Last Written Prescription Date: 1-28-16  Last Fill Quantity: 60,  # refills: PRN   Last Office Visit with [facility], P or [facility] prescribing provider: 2-21-17                                         Next 5 appointments (look out 90 days)     Feb 24, 2017 11:00 AM CST   Return Visit with Ronda Lackey [facility] ([facility]  [address] [place], [place] [address]  [facility] [address]   [phone]            Mar 03, 2017 11:00 AM CST   Return Visit with Ronda Lackey [facility] ([facility]  [address] [facility]  [facility] [address]   [phone]

## 2017-02-23 NOTE — TELEPHONE ENCOUNTER
it's popping up exceeds daily dosage  Significance: Exceeds Daily     Ordered daily dose: 600 mg (300 mg 2 TIMES DAILY)  Maximum daily dose: 450 mg  Exceeds maximum by: 34% (150 mg)

## 2017-02-24 ENCOUNTER — TRANSFERRED RECORDS (OUTPATIENT)
Dept: HEALTH INFORMATION MANAGEMENT | Facility: CLINIC | Age: 62
End: 2017-02-24

## 2017-02-24 ENCOUNTER — OFFICE VISIT (OUTPATIENT)
Dept: PSYCHOLOGY | Facility: CLINIC | Age: 62
End: 2017-02-24
Payer: COMMERCIAL

## 2017-02-24 DIAGNOSIS — F33.9 MAJOR DEPRESSIVE DISORDER, RECURRENT EPISODE WITH ANXIOUS DISTRESS (H): Primary | ICD-10-CM

## 2017-02-24 PROCEDURE — 90834 PSYTX W PT 45 MINUTES: CPT | Performed by: COUNSELOR

## 2017-02-24 NOTE — MR AVS SNAPSHOT
MRN:[id number]                      After Visit Summary   2/24/2017    Brandi Stern    MRN: [id number]           Visit Information        Provider Department      2/24/2017 11:00 AM [facility]onda Lackey [facility]      Your next 10 appointments already scheduled     Mar 03, 2017 11:00 AM CST   Return Visit with Ronda Lackey [facility] ([facility])    [place] [facility]  [address]  [address]  [facility] [id number] [phone]              MyChart Information     [employer] gives you secure access to your electronic health record. If you see a primary care provider, you can also send messages to your care team and make appointments. If you have questions, please call your primary care clinic.  If you do not have a primary care provider, please call [phone] and they will assist you.        Care EveryWhere ID     This is your Care EveryWhere ID. This could be used by other organizations to access your [facility] medical records  [id number]

## 2017-02-24 NOTE — PROGRESS NOTES
"                                           Progress Note    Client Name: rBandi Stern  Date: 2/24/2017         Service Type: Individual      Session Start Time: 11:00a  Session End Time: 11:45a      Session Length: 45 minutes     Session #: 3     Attendees: Client attended alone    Treatment Plan Last Reviewed: In progress  PHQ-9: 12     DATA      Progress Since Last Session (Related to Symptoms / Goals / Homework):   Symptoms: Stable, tired, but more energy than last week, depressed mood, little interest in doing things, troubling sleeping/staying asleep, trouble concentrating    Homework: None given      Episode of Care Goals: Minimal progress (treatment in progress) - CONTEMPLATION (Considering change and yet undecided); Intervened by assessing the negative and positive thinking (ambivalence) about behavior change     Current / Ongoing Stressors and Concerns:   Ongoing work stress; 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      Treatment Objective(s) Addressed in This Session:   In progress.     Intervention:   Motivational Interviewing: assessing ambivalence towards change and desire and readiness to change; identify treatment goals        ASSESSMENT: Current Emotional / Mental Status (status of significant symptoms):   Client has had a history of suicidal ideation: passive thoughts of dealth, \"Is there pain in the afterlife?\", 1 suicide attempt: in college after breakup with boyfriend,  was drinking and ingested pills, no hospitalization, would never really hurt self   Client denies current fears or concerns for personal " "safety.   Client reports the following current or recent suicidal ideation or behaviors: passive thoughts of dealth, \"Is there pain in the afterlife?\". Would never really hurt self.   Client denies current or recent homicidal ideation or behaviors.   Client denies current or recent self injurious behavior or ideation.   Client denies other safety concerns.   Client reports there are no firearms in the house.   A safety and risk management plan has not been developed at this time, however client was given the after-hours number / 911 should there be a change in any of  these risk factors.     Appearance:   Appropriate    Eye Contact:   Fair    Psychomotor Behavior: Normal    Attitude:   Cooperative    Orientation:   All   Speech    Rate / Production: Normal     Volume:  Normal    Mood:    Normal   Affect:    Appropriate  Restricted    Thought Content:  Clear    Thought Form:  Coherent  Goal Directed  Logical    Insight:    Fair      Medication Review:   Changes to psychiatric medications. Stopped Ambien. Will start trazodone.      Medication Compliance:   Yes     Changes in Health Issues:   None reported     Chemical Use Review:   Substance Use: Chemical use reviewed, no active concerns identified     Tobacco Use: No change in amount of tobacco use since last session; patient plans to quit next week     Collateral Reports Completed:   Not Applicable    PLAN: (Client Tasks / Therapist Tasks / Other)  Start to address issues identified on treatment plan.        Ronda Lackey [facility]                                                       ___________________________________________________________________    Treatment Plan    Client's Name: Brandi Stern  YOB: 1955    Date: 2/24/2017    DSM-V Diagnoses: 296.32 Major Depressive Disorder, Recurrent Episode, Moderate _ and With anxious distress  Psychosocial / Contextual Factors: Work stress, health concerns, family stress  WHODAS: 29    Referral / " Collaboration:  Referral to another professional/service is not indicated at this time.    Anticipated number of session or this episode of care: 12      MeasurableTreatment Goal(s) related to diagnosis / functional impairment(s)  Goal 1: Client will improve depressed mood as evidenced by decreased score on PHQ 9 from 19 (moderately severe) to score range of 10-14 (moderate).    I will know I've met my goal when I feel more accomplished, in control, house looks better, and I would not be smoking.      Objective #A (Client Action)    Client will set limits with work by saying no and taking less patients within the next month.  Status: New - Date: 2/24/2017     Intervention(s)  Therapist will role-play assertiveness and conflict management skills.  ...teach about healthy boundaries.    Objective #B  Client will organize/clean her home within 12 sessions.   Status: New - Date: 2/24/2017     Intervention(s)  Therapist will teach emotional regulation skills and developing routine..    Objective #C  Client will learn 3 new skills to cope with stress other smoking.   Status: New - Date: 2/24/2017     Intervention(s)  Therapist will assign homework of practicing skills at home.  ...teach self care, distraction, and distress tolerance skills.      Client has reviewed and agreed to the above plan.      ELISE Sheikh  February 24, 2017

## 2017-02-25 ASSESSMENT — PATIENT HEALTH QUESTIONNAIRE - PHQ9: SUM OF ALL RESPONSES TO PHQ QUESTIONS 1-9: 12

## 2017-03-03 ENCOUNTER — OFFICE VISIT (OUTPATIENT)
Dept: PSYCHOLOGY | Facility: CLINIC | Age: 62
End: 2017-03-03
Payer: COMMERCIAL

## 2017-03-03 DIAGNOSIS — F33.9 MAJOR DEPRESSIVE DISORDER, RECURRENT EPISODE WITH ANXIOUS DISTRESS (H): Primary | ICD-10-CM

## 2017-03-03 PROCEDURE — 90834 PSYTX W PT 45 MINUTES: CPT | Performed by: COUNSELOR

## 2017-03-03 NOTE — PROGRESS NOTES
"                                           Progress Note    Client Name: Brandi Stern  Date: 3/3/2017         Service Type: Individual      Session Start Time: 11:00a  Session End Time: 11:45a      Session Length: 45 minutes     Session #: 4     Attendees: Client attended alone    Treatment Plan Last Reviewed: 3/3/2017  PHQ-9: 12     DATA      Progress Since Last Session (Related to Symptoms / Goals / Homework):   Symptoms: Stable, tired, poor sleep, depressed mood, low motivation    Homework: None given      Episode of Care Goals: Minimal progress - CONTEMPLATION (Considering change and yet undecided); Intervened by assessing the negative and positive thinking (ambivalence) about behavior change     Current / Ongoing Stressors and Concerns:   Reported improvements at work in the past week and was able to set limits and not take on more clients. Reported \"things are coming along\". Client described she is in the process of planning change around the house, but has yet to set plans into actions. Expressed some financial concerns, but at the same time recently made expensive purchases and said she is able to manage financial priorities. When discrepancies in thoughts were reflected to client, she declined having financial concerns. Client was able to follow through with book club last week and identified feeling energized. She continued to make plans to travel or at least to leave the state, but has yet to finalize any details.        Treatment Objective(s) Addressed in This Session:     Client will set limits with work by saying no and taking less patients within the next month; client will organize/clean her home within 12 sessions.      Intervention:   Motivational Interviewing: assessing ambivalence towards change, evoking desire and readiness to change, pointing out discrepancies; CBT: prompting client to identify mental health needs and maladaptive behaviors that she would like to change        ASSESSMENT: " "Current Emotional / Mental Status (status of significant symptoms):   Client has had a history of suicidal ideation: passive thoughts of dealth, \"Is there pain in the afterlife?\", 1 suicide attempt: in college after breakup with boyfriend,  was drinking and ingested pills, no hospitalization, would never really hurt self   Client denies current fears or concerns for personal safety.   Client reports the following current or recent suicidal ideation or behaviors: passive thoughts of dealth, \"Is there pain in the afterlife?\". Would never really hurt self.   Client denies current or recent homicidal ideation or behaviors.   Client denies current or recent self injurious behavior or ideation.   Client denies other safety concerns.   Client reports there are no firearms in the house.   A safety and risk management plan has not been developed at this time, however client was given the after-hours number / 911 should there be a change in any of  these risk factors.     Appearance:   Appropriate    Eye Contact:   Fair    Psychomotor Behavior: Normal    Attitude:   Cooperative    Orientation:   All   Speech    Rate / Production: Normal     Volume:  Normal    Mood:    Normal   Affect:    Appropriate     Thought Content:  Clear    Thought Form:  Coherent  Circumstantial Tangential at times   Insight:    Fair      Medication Review:   Tried Trazodone, up for 3 days, poor sleep     Medication Compliance:   No - Trazodone not working as expected      Changes in Health Issues:   None reported     Chemical Use Review:   Substance Use: Chemical use reviewed, no active concerns identified     Tobacco Use: No change in amount of tobacco use since last session; patient plans to quit next week     Collateral Reports Completed:   Not Applicable    PLAN: (Client Tasks / Therapist Tasks / Other)  Start to address issues identified on treatment plan.        Ronda Lackey [facility]                                                     "   ___________________________________________________________________    Treatment Plan    Client's Name: Brandi Stern  YOB: 1955    Date: 2/24/2017    DSM-V Diagnoses: 296.32 Major Depressive Disorder, Recurrent Episode, Moderate _ and With anxious distress  Psychosocial / Contextual Factors: Work stress, health concerns, family stress  WHODAS: 29    Referral / Collaboration:  Referral to another professional/service is not indicated at this time.    Anticipated number of session or this episode of care: 12      MeasurableTreatment Goal(s) related to diagnosis / functional impairment(s)  Goal 1: Client will improve depressed mood as evidenced by decreased score on PHQ 9 from 19 (moderately severe) to score range of 10-14 (moderate).    I will know I've met my goal when I feel more accomplished, in control, house looks better, and I would not be smoking.      Objective #A (Client Action)    Client will set limits with work by saying no and taking less patients within the next month.  Status: New - Date: 2/24/2017     Intervention(s)  Therapist will role-play assertiveness and conflict management skills.  ...teach about healthy boundaries.    Objective #B  Client will organize/clean her home within 12 sessions.   Status: New - Date: 2/24/2017     Intervention(s)  Therapist will teach emotional regulation skills and developing routine.    Objective #C  Client will learn 3 new skills to cope with stress other smoking.   Status: New - Date: 2/24/2017     Intervention(s)  Therapist will assign homework of practicing skills at home.  ...teach self care, distraction, and distress tolerance skills.      Client has reviewed and agreed to the above plan.      ELISE Sheikh  February 24, 2017

## 2017-03-03 NOTE — Clinical Note
"Trev Crespo, Patient reported she tried taking trazodone and \"was up for 3 days\". I asked her to follow up with you at next appt about this.  Thank you, Ronda Lackey MA, [facility]"

## 2017-03-03 NOTE — MR AVS SNAPSHOT
MRN:[id number]                      After Visit Summary   3/3/2017    Brandi Stern    MRN: [id number]           Visit Information        Provider Department      3/3/2017 11:00 AM Ronda Lackey [facility] Generic      Your next 10 appointments already scheduled     Mar 08, 2017 12:45 PM CST   Office Visit with Cherie Brennan NP   [facility]    [facility] [facility] [id number] [phone]           Bring a current list of meds and any records pertaining to this visit.  For Physicals, please bring immunization records and any forms needing to be filled out.  Please arrive 10 minutes early to complete paperwork.            Mar 17, 2017 11:00 AM CDT   Return Visit with ELISE Sheikh   [facility] ([facility])    [facility]  [address]  [facility] [id number] [phone]              MyChart Information     [employer] gives you secure access to your electronic health record. If you see a primary care provider, you can also send messages to your care team and make appointments. If you have questions, please call your primary care clinic.  If you do not have a primary care provider, please call [phone] and they will assist you.        Care EveryWhere ID     This is your Care EveryWhere ID. This could be used by other organizations to access your [facility] medical records  [id number]

## 2017-03-08 ENCOUNTER — OFFICE VISIT (OUTPATIENT)
Dept: FAMILY MEDICINE | Facility: CLINIC | Age: 62
End: 2017-03-08
Payer: COMMERCIAL

## 2017-03-08 VITALS
HEART RATE: 88 BPM | WEIGHT: 225.38 LBS | RESPIRATION RATE: 18 BRPM | DIASTOLIC BLOOD PRESSURE: 82 MMHG | OXYGEN SATURATION: 96 % | SYSTOLIC BLOOD PRESSURE: 122 MMHG | TEMPERATURE: 99.1 F | BODY MASS INDEX: 38.69 KG/M2

## 2017-03-08 DIAGNOSIS — G47.00 INSOMNIA, UNSPECIFIED TYPE: ICD-10-CM

## 2017-03-08 DIAGNOSIS — R53.83 FATIGUE, UNSPECIFIED TYPE: ICD-10-CM

## 2017-03-08 DIAGNOSIS — F33.1 MAJOR DEPRESSIVE DISORDER, RECURRENT EPISODE, MODERATE (H): Primary | ICD-10-CM

## 2017-03-08 DIAGNOSIS — M15.9 OSTEOARTHRITIS OF MULTIPLE JOINTS, UNSPECIFIED OSTEOARTHRITIS TYPE: ICD-10-CM

## 2017-03-08 LAB — HGB BLD-MCNC: 15.4 G/DL (ref 11.7–15.7)

## 2017-03-08 PROCEDURE — 36415 COLL VENOUS BLD VENIPUNCTURE: CPT | Performed by: NURSE PRACTITIONER

## 2017-03-08 PROCEDURE — 85018 HEMOGLOBIN: CPT | Performed by: NURSE PRACTITIONER

## 2017-03-08 PROCEDURE — 99214 OFFICE O/P EST MOD 30 MIN: CPT | Performed by: NURSE PRACTITIONER

## 2017-03-08 RX ORDER — DESVENLAFAXINE 50 MG/1
50 TABLET, FILM COATED, EXTENDED RELEASE ORAL DAILY
Qty: 30 TABLET | Refills: 5 | Status: SHIPPED | OUTPATIENT
Start: 2017-03-08 | End: 2017-08-24

## 2017-03-08 NOTE — PROGRESS NOTES
SUBJECTIVE:  Brandi Stern, a 61 year old female scheduled an appointment to discuss the following issues:     Major depressive disorder, recurrent episode, moderate (H)  She had Gene Sight testing done and is here to review results.  She is currently on Lexapro and this is not working well enough for her depression.  She does feel weepy and has very low energy levels and some anhedonia.  She has tried Cymbalta and Celexa in the past.    Fatigue, unspecified type  Insomnia, unspecified type  She tried Trazodone recently which actually caused worsening insomnia.  She does do well on Ambien.    OA  She currently is taking Diclofenac for her joint pain.     Medical, social, surgical, and family histories reviewed.    ROS:  C: NEGATIVE for fever, chills  R: NEGATIVE for significant cough or SOB  CV: NEGATIVE for chest pain, palpitations   GI: NEGATIVE for nausea, abdominal pain, heartburn, or change in bowel habits  MUSCULOSKELETAL:see HPI  N: NEGATIVE for weakness, dizziness or paresthesias or headache  PSYCHIATRIC: see HPI    OBJECTIVE:  /82  Pulse 88  Temp 99.1  F (37.3  C) (Oral)  Resp 18  Wt 225 lb 6 oz (102.2 kg)  LMP  (LMP Unknown)  SpO2 96%  BMI 38.69 kg/m2  EXAM:  GENERAL APPEARANCE: healthy, alert and no distress  RESP: lungs clear to auscultation - no rales, rhonchi or wheezes  CV: regular rates and rhythm, normal S1 S2, no S3 or S4 and no murmur, click or rub -  PSYCH: mentation appears normal and affect flattened    ASSESSMENT/PLAN:  (F33.1) Major depressive disorder, recurrent episode, moderate (H)  (primary encounter diagnosis)  Comment:   Plan: desvenlafaxine succinate ER (PRISTIQ) 50 MG 24         hr tablet        Reviewed Gene Sight testing which shows that Pristiq or Fetzima have no gene-drug interactions.  Patient Instructions   Decrease Lexapro to 1/2 tablet daily and start Pristiq 1/2 tablet daily for one week, then stop Lexapro and increase Pristiq to one tablet daily.    Follow up in one  month.        (R53.83) Fatigue, unspecified type  Comment:   Plan: Hemoglobin        She would like to check her hemoglobin.  Fatigue is likely related to her depression     (G47.00) Insomnia, unspecified type  Comment:   Plan: Continue with Ambien.     Osteoarthritis  Plan: Will change to Naproxen based on Gene Sight results.

## 2017-03-08 NOTE — MR AVS SNAPSHOT
After Visit Summary   3/8/2017    Brandi Stern    MRN: [id number]           Patient Information     Date Of Birth          1955        Visit Information        Provider Department      3/8/2017 12:45 PM Cherie Brennan NP [facility]        Today's Diagnoses     Major depressive disorder, recurrent episode, moderate (H)    -  1    Fatigue, unspecified type          Care Instructions    Decrease Lexapro to 1/2 tablet daily and start Pristiq 1/2 tablet daily for one week, then stop Lexapro and increase Pristiq to one tablet daily.    Follow up in one month.         Follow-ups after your visit        Your next 10 appointments already scheduled     Mar 17, 2017 11:00 AM CDT   Return Visit with Ronda Lackey [facility] ([facility]    [facility]  [address]  [facility] [address] [phone]              Who to contact     If you have questions or need follow up information about today's clinic visit or your schedule please contact [facility] directly at [phone].  Normal or non-critical lab and imaging results will be communicated to you by Drop Messageshart, letter or phone within 4 business days after the clinic has received the results. If you do not hear from us within 7 days, please contact the clinic through [employer] Mart[employer] or phone. If you have a critical or abnormal lab result, we will notify you by phone as soon as possible.  Submit refill requests through Private.Me or call your pharmacy and they will forward the refill request to us. Please allow 3 business days for your refill to be completed.          Additional Information About Your Visit        MyChart Information     Private[employer] gives you secure access to your electronic health record. If you see a primary care provider, you can also send messages to your care team and make appointments. If you have questions, please call your primary care  clinic.  If you do not have a primary care provider, please call [phone] and they will assist you.        Care EveryWhere ID     This is your Care EveryWhere ID. This could be used by other organizations to access your [facility] medical records  [id number]        Your Vitals Were     Pulse Temperature Respirations Last Period Pulse Oximetry BMI (Body Mass Index)    88 99.1  F (37.3  C) (Oral) 18 (LMP Unknown) 96% 38.69 kg/m2       Blood Pressure from Last 3 Encounters:   03/08/17 122/82   02/21/17 118/82   02/03/17 131/89    Weight from Last 3 Encounters:   03/08/17 225 lb 6 oz (102.2 kg)   02/21/17 228 lb (103.4 kg)   02/03/17 231 lb (104.8 kg)              We Performed the Following     Hemoglobin          Today's Medication Changes          These changes are accurate as of: 3/8/17  1:26 PM.  If you have any questions, ask your nurse or doctor.               Start taking these medicines.        Dose/Directions    desvenlafaxine succinate ER 50 MG 24 hr tablet   Commonly known as:  PRISTIQ   Used for:  Major depressive disorder, recurrent episode, moderate (H)   Started by:  Cherie Brennan NP        Dose:  50 mg   Take 1 tablet (50 mg) by mouth daily   Quantity:  30 tablet   Refills:  5            Where to get your medicines      These medications were sent to [facility]pharm[facility] #[id number] - [place], [place] - [address] [facility]  [address], [place] [address]     Phone:  [phone]     desvenlafaxine succinate ER 50 MG 24 hr tablet                Primary Care Provider Office Phone # Fax #    Cherie Brennan [phone]       [facility] [address]  [facility] [address]        Thank you!     Thank you for choosing [facility]  for your care. Our goal is always to provide you with excellent care. Hearing back from our patients is one way we can continue to improve our services. Please take a few minutes to complete the written survey that you may  receive in the mail after your visit with us. Thank you!             Your Updated Medication List - Protect others around you: Learn how to safely use, store and throw away your medicines at www.disposemymeds.org.          This list is accurate as of: 3/8/17  1:26 PM.  Always use your most recent med list.                   Brand Name Dispense Instructions for use    ACETAMINOPHEN EXTRA STRENGTH PO      None Entered       * albuterol (2.5 MG/3ML) 0.083% neb solution     3 mL    Take  by nebulization. take 3 mL by nebulization 4 times daily as needed       * albuterol 108 (90 BASE) MCG/ACT Inhaler   Generic drug:  albuterol     1 Inhaler    Inhale 1-2 puffs into the lungs every 6 hours as needed for shortness of breath / dyspnea.       atorvastatin 10 MG tablet    LIPITOR    90 tablet    Take 1 tablet (10 mg) by mouth daily       CALCIUM 500 PO      Take 1 tablet by mouth daily       Capsaicin 0.1 % Crea     1 Tube    Externally apply topically 3 times daily       CLARITIN 10 MG tablet   Generic drug:  loratadine     0    1 TAB PO QD (Once per day) as needed for ALLERGY SYMPTOMS       CO Q 10 PO      Take 200 mg by mouth daily       cyclobenzaprine 5 MG tablet    FLEXERIL    180 tablet    Take 1-2 tablets (5-10 mg) by mouth 3 times daily as needed for muscle spasms       desvenlafaxine succinate ER 50 MG 24 hr tablet    PRISTIQ    30 tablet    Take 1 tablet (50 mg) by mouth daily       diclofenac 1 % Gel topical gel    VOLTAREN    100 g    Apply 4 grams to knees or 2 grams to feet four times daily using enclosed dosing card.       diclofenac 100 MG 24 hr tablet    VOLTAREN-XR    30 tablet    Take 1 tablet (100 mg) by mouth daily       escitalopram 20 MG tablet    LEXAPRO    90 tablet    Take 1 tablet (20 mg) by mouth daily       fenofibrate 145 MG tablet     90 tablet    Take 1 tablet (145 mg) by mouth daily       ferrous sulfate 325 (65 FE) MG tablet    IRON    30 tablet    Take 1 tablet (325 mg) by mouth daily  (with breakfast)       FIBER PO      Twice daily       ipratropium 0.02 % neb solution    ATROVENT    225 mL    Take 2.5 mLs (0.5 mg) by nebulization 4 times daily       ipratropium 17 MCG/ACT Inhaler    ATROVENT HFA    1 Inhaler    Inhale 2 puffs into the lungs 4 times daily as needed for wheezing       levothyroxine 75 MCG tablet    SYNTHROID    90 tablet    Take 1 tablet (75 mcg) by mouth every morning Overdue for labs       Lutein 10 MG Tabs      Take 10 mg by mouth daily       MELATONIN PO      Take 5 mg by mouth nightly as needed       MIRAPEX 0.125 MG tablet   Generic drug:  pramipexole      Take two tabs at dinner and one at hs       omeprazole 20 MG CR capsule    priLOSEC    180 capsule    Take 1 capsule (20 mg) by mouth 2 times daily       ranitidine 300 MG tablet    ZANTAC    60 tablet    Take 1 tablet (300 mg) by mouth 2 times daily       SYMBICORT 160-4.5 MCG/ACT Inhaler   Generic drug:  budesonide-formoterol      Inhale 1 puff into the lungs 2 times daily       triamcinolone 55 MCG/ACT nasal inhaler    NASACORT AQ    1 Inhaler    Inhale 2 sprays in both nostrils every day as needed       UNABLE TO FIND      MEDICATION NAME: Allergy shots       VITAMIN C PO      Take 1,000 mg by mouth daily       zolpidem 10 MG tablet    AMBIEN    30 tablet    Take 0.5-1 tablets (5-10 mg) by mouth nightly as needed for sleep       * Notice:  This list has 2 medication(s) that are the same as other medications prescribed for you. Read the directions carefully, and ask your doctor or other care provider to review them with you.

## 2017-03-08 NOTE — NURSING NOTE
"Chief Complaint   Patient presents with     Results       Initial /82  Pulse 88  Temp 99.1  F (37.3  C) (Oral)  Resp 18  Wt 225 lb 6 oz (102.2 kg)  LMP  (LMP Unknown)  SpO2 96%  BMI 38.69 kg/m2 Estimated body mass index is 38.69 kg/(m^2) as calculated from the following:    Height as of 2/21/17: 5' 4\" (1.626 m).    Weight as of this encounter: 225 lb 6 oz (102.2 kg).  Medication Reconciliation: complete     Qing Jackson MA    "

## 2017-03-08 NOTE — PROGRESS NOTES
"  SUBJECTIVE:                                                    Brandi Stern is a 61 year old female who presents to clinic today for the following health issues:      {Superlists:952025}    {additional problems for provider to add:716503}    Problem list and histories reviewed & adjusted, as indicated.  Additional history: {NONE - AS DOCUMENTED:459757::\"as documented\"}    {HIST REVIEW/ LINKS 2:678443}    Reviewed and updated as needed this visit by clinical staff  Tobacco  Allergies  Meds  Med Hx  Surg Hx  Fam Hx  Soc Hx      Reviewed and updated as needed this visit by Provider         {PROVIDER CHARTING PREFERENCE:344507}    "

## 2017-03-08 NOTE — PATIENT INSTRUCTIONS
Decrease Lexapro to 1/2 tablet daily and start Pristiq 1/2 tablet daily for one week, then stop Lexapro and increase Pristiq to one tablet daily.    Follow up in one month.

## 2017-03-09 ENCOUNTER — TELEPHONE (OUTPATIENT)
Dept: FAMILY MEDICINE | Facility: CLINIC | Age: 62
End: 2017-03-09

## 2017-03-09 ASSESSMENT — ASTHMA QUESTIONNAIRES: ACT_TOTALSCORE: 20

## 2017-03-09 NOTE — TELEPHONE ENCOUNTER
Generic therapeutic alternatives for Pristiq are available   Venlafaxine HCl ER   DULoxetine HCl   Escitalopram Oxalate   Sertraline HCl   BuPROPion HCl ER (SR)   BuPROPion HCl ER (XL)    Received PA request from [address]  Group #: [id number]  BIN: [id number]  PCN: [id number]  Cardholder ID: [id number]    I started PA using CMM for desvenlafaxine succinate ER (PRISTIQ) 50 MG 24 hr tablet  Take 1 tablet (50 mg) by mouth daily  Key: [id number]    [employer] is processing your PA request and will respond shortly with next steps. You are currently using the fastest method to process this prior authorization. Please do not fax or call [employer] to resubmit this request. To check for an update later, open this request again from your dashboard.  If you need assistance, please chat with CoverMyMeds or call us at [phone].      Qing Jackson MA

## 2017-03-14 NOTE — TELEPHONE ENCOUNTER
Received response with additional information.    PCP: Is the pt able to try formulary alternative Venlafaxine ER capsules? Please specify and explain.    Routing to PCP to advise. I also placed form that needs signature in PCP's signature folder in provider office.    Qing Jackson MA

## 2017-03-14 NOTE — TELEPHONE ENCOUNTER
Received PA response from [facility].    [We've reviewed the information that you provided in support of your patient's request to obtain Desvenlafaxine Succinate Er Tab Er 24 H. As we informed your patient, this request has been approved from 3/12/2017 until 3/14/2018.]    I placed approval letter in abstraction.    Qing Jackson MA

## 2017-03-17 ENCOUNTER — OFFICE VISIT (OUTPATIENT)
Dept: PSYCHOLOGY | Facility: CLINIC | Age: 62
End: 2017-03-17
Payer: COMMERCIAL

## 2017-03-17 DIAGNOSIS — F33.9 MAJOR DEPRESSIVE DISORDER, RECURRENT EPISODE WITH ANXIOUS DISTRESS (H): Primary | ICD-10-CM

## 2017-03-17 PROCEDURE — 90834 PSYTX W PT 45 MINUTES: CPT | Performed by: COUNSELOR

## 2017-03-17 NOTE — PROGRESS NOTES
Progress Note    Client Name: Brandi Stern  Date: 3/17/2017         Service Type: Individual      Session Start Time: 11:03a  Session End Time: 11:45a      Session Length: 42 minutes     Session #: 5     Attendees: Client attended alone    Treatment Plan Last Reviewed: 3/17/2017  PHQ-9: 12     DATA      Progress Since Last Session (Related to Symptoms / Goals / Homework):   Symptoms: Stable, tired everyday, poor sleep, depressed mood, low motivation, little interest in doing things, some feelings of guilt    Homework: None given      Episode of Care Goals: Minimal progress - CONTEMPLATION (Considering change and yet undecided); Intervened by assessing the negative and positive thinking (ambivalence) about behavior change     Current / Ongoing Stressors and Concerns:   Reported ongoing work stress with some difficult patient concerns, but expressed for the most part being be to manage and set limits with work and patients. Reported feeling very tired this week. She completed a gene study with PCP and will be trying new medications in the next couple of weeks. Acknowledged looking forward to see if medications will help improve mood and energy. Reported some progress - cleaning, got a new book, paid off some debt, but described difficulties in other areas of her life - continues to smoke, limited physical and social activities, travel plans are still incomplete. Despite these ongoing challenges, client expressed feeling satisfied with her progress. Reported some interpersonal issues with mother, but was able to identify a plan to set limits with mother and to rest at home instead.     Treatment Objective(s) Addressed in This Session:     Client will set limits with work by saying no and taking less patients within the next month; client will organize/clean her home within 12 sessions. Client will learn 3 new skills to cope with stress other smoking.  "     Intervention:   Motivational Interviewing: assessing ambivalence towards change, evoking desire and readiness to change, pointing out discrepancies; CBT: prompting client to identify mental health needs and maladaptive behaviors that she would like to change        ASSESSMENT: Current Emotional / Mental Status (status of significant symptoms):   Client has had a history of suicidal ideation: passive thoughts of dealth, \"Is there pain in the afterlife?\", 1 suicide attempt: in college after breakup with boyfriend,  was drinking and ingested pills, no hospitalization, would never really hurt self   Client denies current fears or concerns for personal safety.   Client reports the following current or recent suicidal ideation or behaviors: passive thoughts of dealth, \"Is there pain in the afterlife?\". Would never really hurt self.   Client denies current or recent homicidal ideation or behaviors.   Client denies current or recent self injurious behavior or ideation.   Client denies other safety concerns.   Client reports there are no firearms in the house.   A safety and risk management plan has not been developed at this time, however client was given the after-hours number / 911 should there be a change in any of  these risk factors.     Appearance:   Appropriate    Eye Contact:   Fair    Psychomotor Behavior: Normal    Attitude:   Cooperative    Orientation:   All   Speech    Rate / Production: Normal     Volume:  Normal    Mood:    Normal   Affect:    Appropriate     Thought Content:  Clear    Thought Form:  Coherent  Circumstantial Tangential at times   Insight:    Fair      Medication Review:   Will be trying new medications prescribed by PCP     Medication Compliance:   Yes     Changes in Health Issues:   None reported     Chemical Use Review:   Substance Use: Chemical use reviewed, no active concerns identified     Tobacco Use: Unable to quit, client is smoking cigarettes again     Collateral Reports " Completed:   Not Applicable    PLAN: (Client Tasks / Therapist Tasks / Other)  Client will set limits with work by saying no and taking less patients within the next month; client will organize/clean her home within 12 sessions. Client will learn 3 new skills to cope with stress other smoking. Therapist will continue to use motivational interviewing to evoke change talk and CBT strategies to engage client in proactive problem solving and to practice distress tolerance as it relates to work and interpersonal relationships.         Susanaizaiah Lackey, [facility]                                                       ___________________________________________________________________    Treatment Plan    Client's Name: Brandi Stern  YOB: 1955    Date: 2/24/2017    DSM-V Diagnoses: 296.32 Major Depressive Disorder, Recurrent Episode, Moderate _ and With anxious distress  Psychosocial / Contextual Factors: Work stress, health concerns, family stress  WHODAS: 29    Referral / Collaboration:  Referral to another professional/service is not indicated at this time.    Anticipated number of session or this episode of care: 12      MeasurableTreatment Goal(s) related to diagnosis / functional impairment(s)  Goal 1: Client will improve depressed mood as evidenced by decreased score on PHQ 9 from 19 (moderately severe) to score range of 10-14 (moderate).    I will know I've met my goal when I feel more accomplished, in control, house looks better, and I would not be smoking.      Objective #A (Client Action)    Client will set limits with work by saying no and taking less patients within the next month.  Status: New - Date: 2/24/2017     Intervention(s)  Therapist will role-play assertiveness and conflict management skills.  ...teach about healthy boundaries.    Objective #B  Client will organize/clean her home within 12 sessions.   Status: New - Date: 2/24/2017     Intervention(s)  Therapist will teach emotional regulation  skills and developing routine.    Objective #C  Client will learn 3 new skills to cope with stress other smoking.   Status: New - Date: 2/24/2017     Intervention(s)  Therapist will assign homework of practicing skills at home.  ...teach self care, distraction, and distress tolerance skills.      Client has reviewed and agreed to the above plan.      ELISE Sheikh  February 24, 2017

## 2017-03-17 NOTE — PROGRESS NOTES
"                                           Progress Note    Client Name: Brandi Stern  Date: 3/17/2017         Service Type: Individual      Session Start Time: 11:03a  Session End Time: 11:45a      Session Length: 42 minutes     Session #: 5     Attendees: Client attended alone    Treatment Plan Last Reviewed: 3/17/2017  PHQ-9: 12     DATA      Progress Since Last Session (Related to Symptoms / Goals / Homework):   Symptoms: Stable, tired, poor sleep, depressed mood, low motivation    Homework: None given      Episode of Care Goals: Minimal progress - CONTEMPLATION (Considering change and yet undecided); Intervened by assessing the negative and positive thinking (ambivalence) about behavior change     Current / Ongoing Stressors and Concerns:   Reported improvements at work in the past week and was able to set limits and not take on more clients. Reported \"things are coming along\". Client described she is in the process of planning change around the house, but has yet to set plans into actions. Expressed some financial concerns, but at the same time recently made expensive purchases and said she is able to manage financial priorities. When discrepancies in thoughts were reflected to client, she declined having financial concerns. Client was able to follow through with book club last week and identified feeling energized. She continued to make plans to travel or at least to leave the state, but has yet to finalize any details.        Treatment Objective(s) Addressed in This Session:     Client will set limits with work by saying no and taking less patients within the next month; client will organize/clean her home within 12 sessions.      Intervention:   Motivational Interviewing: assessing ambivalence towards change, evoking desire and readiness to change, pointing out discrepancies; CBT: prompting client to identify mental health needs and maladaptive behaviors that she would like to change        ASSESSMENT: " "Current Emotional / Mental Status (status of significant symptoms):   Client has had a history of suicidal ideation: passive thoughts of dealth, \"Is there pain in the afterlife?\", 1 suicide attempt: in college after breakup with boyfriend,  was drinking and ingested pills, no hospitalization, would never really hurt self   Client denies current fears or concerns for personal safety.   Client reports the following current or recent suicidal ideation or behaviors: passive thoughts of dealth, \"Is there pain in the afterlife?\". Would never really hurt self.   Client denies current or recent homicidal ideation or behaviors.   Client denies current or recent self injurious behavior or ideation.   Client denies other safety concerns.   Client reports there are no firearms in the house.   A safety and risk management plan has not been developed at this time, however client was given the after-hours number / 911 should there be a change in any of  these risk factors.     Appearance:   Appropriate    Eye Contact:   Fair    Psychomotor Behavior: Normal    Attitude:   Cooperative    Orientation:   All   Speech    Rate / Production: Normal     Volume:  Normal    Mood:    Normal   Affect:    Appropriate     Thought Content:  Clear    Thought Form:  Coherent  Circumstantial Tangential at times   Insight:    Fair      Medication Review:   Tried Trazodone, up for 3 days, poor sleep     Medication Compliance:   No - Trazodone not working as expected      Changes in Health Issues:   None reported     Chemical Use Review:   Substance Use: Chemical use reviewed, no active concerns identified     Tobacco Use: No change in amount of tobacco use since last session; patient plans to quit next week     Collateral Reports Completed:   Not Applicable    PLAN: (Client Tasks / Therapist Tasks / Other)  Start to address issues identified on treatment plan.        Ronda Lackey [facility]                                                     "   ___________________________________________________________________    Treatment Plan    Client's Name: Brandi Stern  YOB: 1955    Date: 2/24/2017    DSM-V Diagnoses: 296.32 Major Depressive Disorder, Recurrent Episode, Moderate _ and With anxious distress  Psychosocial / Contextual Factors: Work stress, health concerns, family stress  WHODAS: 29    Referral / Collaboration:  Referral to another professional/service is not indicated at this time.    Anticipated number of session or this episode of care: 12      MeasurableTreatment Goal(s) related to diagnosis / functional impairment(s)  Goal 1: Client will improve depressed mood as evidenced by decreased score on PHQ 9 from 19 (moderately severe) to score range of 10-14 (moderate).    I will know I've met my goal when I feel more accomplished, in control, house looks better, and I would not be smoking.      Objective #A (Client Action)    Client will set limits with work by saying no and taking less patients within the next month.  Status: New - Date: 2/24/2017     Intervention(s)  Therapist will role-play assertiveness and conflict management skills.  ...teach about healthy boundaries.    Objective #B  Client will organize/clean her home within 12 sessions.   Status: New - Date: 2/24/2017     Intervention(s)  Therapist will teach emotional regulation skills and developing routine.    Objective #C  Client will learn 3 new skills to cope with stress other smoking.   Status: New - Date: 2/24/2017     Intervention(s)  Therapist will assign homework of practicing skills at home.  ...teach self care, distraction, and distress tolerance skills.      Client has reviewed and agreed to the above plan.      ELISE Sheikh  February 24, 2017

## 2017-03-17 NOTE — MR AVS SNAPSHOT
MRN:[id number]                      After Visit Summary   3/17/2017    Brandi Stern    MRN: [id number]           Visit Information        Provider Department      3/17/2017 11:00 AM [facility]      Your next 10 appointments already scheduled     Apr 07, 2017 12:00 PM CDT   Return Visit with Ronda Lackey [facility] ([facility])    [facility]  [address]  [address]  [facility] [id number] [phone]              MyChart Information     [employer] gives you secure access to your electronic health record. If you see a primary care provider, you can also send messages to your care team and make appointments. If you have questions, please call your primary care clinic.  If you do not have a primary care provider, please call [phone] and they will assist you.        Care EveryWhere ID     This is your Care EveryWhere ID. This could be used by other organizations to access your [place] medical records  [id number]

## 2017-03-18 ASSESSMENT — PATIENT HEALTH QUESTIONNAIRE - PHQ9: SUM OF ALL RESPONSES TO PHQ QUESTIONS 1-9: 12

## 2017-03-27 ENCOUNTER — OFFICE VISIT (OUTPATIENT)
Dept: ORTHOPEDICS | Facility: CLINIC | Age: 62
End: 2017-03-27

## 2017-03-27 DIAGNOSIS — G56.21 ULNAR NEURITIS, RIGHT: Primary | ICD-10-CM

## 2017-03-27 DIAGNOSIS — G47.00 INSOMNIA, UNSPECIFIED: ICD-10-CM

## 2017-03-27 NOTE — LETTER
3/27/2017    RE: Brandi Stern  [address] [place] [address]      Subjective:   Brandi Stern is a right handed 61 year old female who is here for right scapula pain and right arm numbness and tingling x 1 month. She notes that anywhere from about 2-4 weeks ago she noticed that she was having some right palmar fourth and fifth digit paresthesias.  These were worse when she was working on a computer and worse when she was mousing.  She thought this was related to her right elbow and so she would be palpating her ulnar nerve at the cubital tunnel and would notice she would have occasional sharp pains radiating up and down her arm.  She then noticed after this onset of some right cervical discomfort with some discomfort into the right periscapular region.  She denies any other upper extremity symptoms.  She has had no trauma.       Background:   Date of injury: none       PAST MEDICAL, SOCIAL, SURGICAL AND FAMILY HISTORY: She  has a past medical history of Allergic rhinitis due to other allergen; Apneas, obstructive sleep; Chronic lymphocytic thyroiditis; COPD (chronic obstructive pulmonary disease) (H); Depressive disorder, not elsewhere classified; Disorder of bone and cartilage, unspecified; Esophageal reflux; Essential hypertension, benign; Generalized osteoarthrosis, unspecified site; HASHIMOTO'S THYROIDITIS (11/15/2004); HASHIMOTO'S THYROIDITIS; Headache above the eye region; Hiatal hernia; Insomnia, unspecified; Moderate persistent asthma; Nasal congestion; Pure hyperglyceridemia; Scoliosis; Snoring; and Tobacco use disorder. She also has no past medical history of Cerebral infarction (H); Complication of anesthesia; Congestive heart failure, unspecified; Coronary artery disease; CVA (cerebral infarction); Diabetes (H); Heart disease; History of blood transfusion; or Malignant hyperthermia.  She  has a past surgical history that includes Gall bladder removal (2011); arthroscopy knee rt/lt  (2/07); TOTAL KNEE ARTHROPLASTY; Esophagoscopy, gastroscopy, duodenoscopy (EGD), combined (8/5/2014); Colonoscopy (8/5/2014); Abdomen surgery; biopsy; and Cholecystectomy.  Her family history includes Allergies in her father; Arthritis in her father; Asthma in her paternal grandmother; Blood Disease in her father; C.A.D. in her maternal grandfather and maternal grandmother; CANCER in her father and paternal grandmother; CEREBROVASCULAR DISEASE in her maternal grandmother and paternal grandfather; Cancer - colorectal in her paternal grandmother; Cardiovascular in her maternal grandfather; Circulatory in her maternal grandfather; Coronary Artery Disease in her mother; DIABETES in her paternal grandmother; Eye Disorder in her mother; Genitourinary Problems in her father; Hearing Loss in her maternal grandmother; Hypertension in her mother; Lipids in her mother; Neurologic Disorder in her mother; OSTEOPOROSIS in her mother; Respiratory in her maternal grandmother; Thyroid Disease in her sister. There is no history of Breast Cancer.  She reports that she quit smoking about 9 years ago. Her smoking use included Cigarettes. She started smoking about 45 years ago. She has a 10.00 pack-year smoking history. She has never used smokeless tobacco. She reports that she drinks alcohol. She reports that she does not use illicit drugs.    ALLERGIES: She is allergic to animal dander; fluconazole; seasonal allergies; suture; and vistaril [hydroxyzine hcl].    CURRENT MEDICATIONS: She has a current medication list which includes the following prescription(s): desvenlafaxine succinate er, zolpidem, ranitidine, atorvastatin, diclofenac, omeprazole, symbicort, cyclobenzaprine, levothyroxine, melatonin, escitalopram, diclofenac, UNABLE TO FIND, capsaicin, ferrous sulfate, fenofibrate, ascorbic acid, calcium-magnesium-vitamin d, lutein, ipratropium, ipratropium, triamcinolone, albuterol, albuterol, coenzyme q10, mirapex, acetaminophen,  fiber, and claritin.     REVIEW OF SYSTEMS: 10 point review of systems is negative except as noted above.     Exam:   LMP  (LMP Unknown)      CONSTITUTIONIAL: healthy, alert and no distress  HEAD: Normocephalic. No masses, lesions, tenderness or abnormalities  GAIT: broad based  NEUROLOGIC: Non-focal  PSYCHIATRIC: affect normal/bright and mentation appears normal.  Right wrist:  She has no deformity and no swelling.  She has full range of motion of the right wrist.  She has a negative Tinel's over [place] and over the carpal tunnel.  Motor function of the median, ulnar and radial nerves is intact.  She does have decreased subjective sensation to light touch over the palmar aspect of the fourth and fifth digit on the right hand.   Right elbow:  She is nontender, has full range of motion and has negative Tinel's over the cubital tunnel.      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      PLAN:  We discussed modification of her computer-based activities.  She was given a wrist splint for use, particularly at night.  She will follow up on a p.r.n. basis.       Terrance Buckner MD

## 2017-03-27 NOTE — TELEPHONE ENCOUNTER
Controlled Substance Refill Request for ZOLPIDEM 10 MG  Problem List Complete:  No     PROVIDER TO CONSIDER COMPLETION OF PROBLEM LIST AND OVERVIEW/CONTROLLED SUBSTANCE AGREEMENT    Medication Detail      Disp Refills Start End HAILEE   zolpidem (AMBIEN) 10 MG tablet 30 tablet 0 2/28/2017  No   Sig: Take 0.5-1 tablets (5-10 mg) by mouth nightly as needed for sleep         [place] [facility] [place] [facility] [place]: 3-8-17    Future Office visit:   Next 5 appointments (look out 90 days)     Apr 07, 2017 12:00 PM CDT   Return Visit with Ronda Lackey [facility] ([facility])    [facility]  [address]  [facility] [id number] [phone]                  Controlled substance agreement on file: No.     Processing:  ?   checked in past 6 months?  No, route to RN

## 2017-03-27 NOTE — MR AVS SNAPSHOT
After Visit Summary   3/27/2017    Brandi Stern    MRN: [id number]           Patient Information     Date Of Birth          1955        Visit Information        Provider Department      3/27/2017 11:30 AM Terrance Buckner MD [facility]        Today's Diagnoses     Ulnar neuritis, right    -  1       Follow-ups after your visit        Your next 10 appointments already scheduled     Apr 07, 2017 12:00 PM CDT   Return Visit with Ronda Lackey [facility] ([facility])    [place] Professional Bldg  [address]  [facility] [id number] [phone]              Who to contact     Please call your clinic at [phone] to:    Ask questions about your health    Make or cancel appointments    Discuss your medicines    Learn about your test results    Speak to your doctor   If you have compliments or concerns about an experience at your clinic, or if you wish to file a complaint, please contact [facility] Patient Relations at [phone] or email us at [email or web address]         Additional Information About Your Visit        MyChart Information     [employer] gives you secure access to your electronic health record. If you see a primary care provider, you can also send messages to your care team and make appointments. If you have questions, please call your primary care clinic.  If you do not have a primary care provider, please call [phone] and they will assist you.      [employer] is an electronic gateway that provides easy, online access to your medical records. With [employer], you can request a clinic appointment, read your test results, renew a prescription or communicate with your care team.     To access your existing account, please contact your [facility] or call [phone] for assistance.        Care EveryWhere ID     This is your Care EveryWhere ID.  This could be used by other organizations to access your [place] medical records  [id number]        Your Vitals Were     Last Period                   (LMP Unknown)            Blood Pressure from Last 3 Encounters:   03/08/17 122/82   02/21/17 118/82   02/03/17 131/89    Weight from Last 3 Encounters:   03/08/17 225 lb 6 oz (102.2 kg)   02/21/17 228 lb (103.4 kg)   02/03/17 231 lb (104.8 kg)              Today, you had the following     No orders found for display       Primary Care Provider Office Phone # Fax #    Cherie Brennan [phone]       [facility] [address] [place]  [facility] [address]        Thank you!     Thank you for choosing [facility]  for your care. Our goal is always to provide you with excellent care. Hearing back from our patients is one way we can continue to improve our services. Please take a few minutes to complete the written survey that you may receive in the mail after your visit with us. Thank you!             Your Updated Medication List - Protect others around you: Learn how to safely use, store and throw away your medicines at www.disposemymeds.org.          This list is accurate as of: 3/27/17  3:06 PM.  Always use your most recent med list.                   Brand Name Dispense Instructions for use    ACETAMINOPHEN EXTRA STRENGTH PO      None Entered       * albuterol (2.5 MG/3ML) 0.083% neb solution     3 mL    Take  by nebulization. take 3 mL by nebulization 4 times daily as needed       * albuterol 108 (90 BASE) MCG/ACT Inhaler   Generic drug:  albuterol     1 Inhaler    Inhale 1-2 puffs into the lungs every 6 hours as needed for shortness of breath / dyspnea.       atorvastatin 10 MG tablet    LIPITOR    90 tablet    Take 1 tablet (10 mg) by mouth daily       CALCIUM 500 PO      Take 1 tablet by mouth daily       Capsaicin 0.1 % Crea     1 Tube    Externally apply topically 3 times daily       CLARITIN 10 MG tablet   Generic  drug:  loratadine     0    1 TAB PO QD (Once per day) as needed for ALLERGY SYMPTOMS       CO Q 10 PO      Take 200 mg by mouth daily       cyclobenzaprine 5 MG tablet    FLEXERIL    180 tablet    Take 1-2 tablets (5-10 mg) by mouth 3 times daily as needed for muscle spasms       desvenlafaxine succinate ER 50 MG 24 hr tablet    PRISTIQ    30 tablet    Take 1 tablet (50 mg) by mouth daily       diclofenac 1 % Gel topical gel    VOLTAREN    100 g    Apply 4 grams to knees or 2 grams to feet four times daily using enclosed dosing card.       diclofenac 100 MG 24 hr tablet    VOLTAREN-XR    30 tablet    Take 1 tablet (100 mg) by mouth daily       escitalopram 20 MG tablet    LEXAPRO    90 tablet    Take 1 tablet (20 mg) by mouth daily       fenofibrate 145 MG tablet     90 tablet    Take 1 tablet (145 mg) by mouth daily       ferrous sulfate 325 (65 FE) MG tablet    IRON    30 tablet    Take 1 tablet (325 mg) by mouth daily (with breakfast)       FIBER PO      Twice daily       ipratropium 0.02 % neb solution    ATROVENT    225 mL    Take 2.5 mLs (0.5 mg) by nebulization 4 times daily       ipratropium 17 MCG/ACT Inhaler    ATROVENT HFA    1 Inhaler    Inhale 2 puffs into the lungs 4 times daily as needed for wheezing       levothyroxine 75 MCG tablet    SYNTHROID    90 tablet    Take 1 tablet (75 mcg) by mouth every morning Overdue for labs       Lutein 10 MG Tabs      Take 10 mg by mouth daily       MELATONIN PO      Take 5 mg by mouth nightly as needed       MIRAPEX 0.125 MG tablet   Generic drug:  pramipexole      Take two tabs at dinner and one at hs       omeprazole 20 MG CR capsule    priLOSEC    180 capsule    Take 1 capsule (20 mg) by mouth 2 times daily       ranitidine 300 MG tablet    ZANTAC    60 tablet    Take 1 tablet (300 mg) by mouth 2 times daily       SYMBICORT 160-4.5 MCG/ACT Inhaler   Generic drug:  budesonide-formoterol      Inhale 1 puff into the lungs 2 times daily       triamcinolone 55 MCG/ACT  nasal inhaler    NASACORT AQ    1 Inhaler    Inhale 2 sprays in both nostrils every day as needed       UNABLE TO FIND      MEDICATION NAME: Allergy shots       VITAMIN C PO      Take 1,000 mg by mouth daily       zolpidem 10 MG tablet    AMBIEN    30 tablet    Take 0.5-1 tablets (5-10 mg) by mouth nightly as needed for sleep       * Notice:  This list has 2 medication(s) that are the same as other medications prescribed for you. Read the directions carefully, and ask your doctor or other care provider to review them with you.

## 2017-03-27 NOTE — PROGRESS NOTES
Subjective:   Brandi Stern is a right handed 61 year old female who is here for right scapula pain and right arm numbness and tingling x 1 month. She notes that anywhere from about 2-4 weeks ago she noticed that she was having some right palmar fourth and fifth digit paresthesias.  These were worse when she was working on a computer and worse when she was mousing.  She thought this was related to her right elbow and so she would be palpating her ulnar nerve at the cubital tunnel and would notice she would have occasional sharp pains radiating up and down her arm.  She then noticed after this onset of some right cervical discomfort with some discomfort into the right periscapular region.  She denies any other upper extremity symptoms.  She has had no trauma.       Background:   Date of injury: none       PAST MEDICAL, SOCIAL, SURGICAL AND FAMILY HISTORY: She  has a past medical history of Allergic rhinitis due to other allergen; Apneas, obstructive sleep; Chronic lymphocytic thyroiditis; COPD (chronic obstructive pulmonary disease) (H); Depressive disorder, not elsewhere classified; Disorder of bone and cartilage, unspecified; Esophageal reflux; Essential hypertension, benign; Generalized osteoarthrosis, unspecified site; HASHIMOTO'S THYROIDITIS (11/15/2004); HASHIMOTO'S THYROIDITIS; Headache above the eye region; Hiatal hernia; Insomnia, unspecified; Moderate persistent asthma; Nasal congestion; Pure hyperglyceridemia; Scoliosis; Snoring; and Tobacco use disorder. She also has no past medical history of Cerebral infarction (H); Complication of anesthesia; Congestive heart failure, unspecified; Coronary artery disease; CVA (cerebral infarction); Diabetes (H); Heart disease; History of blood transfusion; or Malignant hyperthermia.  She  has a past surgical history that includes Gall bladder removal (2011); arthroscopy knee rt/lt (2/07); TOTAL KNEE ARTHROPLASTY; Esophagoscopy, gastroscopy, duodenoscopy (EGD),  combined (8/5/2014); Colonoscopy (8/5/2014); Abdomen surgery; biopsy; and Cholecystectomy.  Her family history includes Allergies in her father; Arthritis in her father; Asthma in her paternal grandmother; Blood Disease in her father; C.A.D. in her maternal grandfather and maternal grandmother; CANCER in her father and paternal grandmother; CEREBROVASCULAR DISEASE in her maternal grandmother and paternal grandfather; Cancer - colorectal in her paternal grandmother; Cardiovascular in her maternal grandfather; Circulatory in her maternal grandfather; Coronary Artery Disease in her mother; DIABETES in her paternal grandmother; Eye Disorder in her mother; Genitourinary Problems in her father; Hearing Loss in her maternal grandmother; Hypertension in her mother; Lipids in her mother; Neurologic Disorder in her mother; OSTEOPOROSIS in her mother; Respiratory in her maternal grandmother; Thyroid Disease in her sister. There is no history of Breast Cancer.  She reports that she quit smoking about 9 years ago. Her smoking use included Cigarettes. She started smoking about 45 years ago. She has a 10.00 pack-year smoking history. She has never used smokeless tobacco. She reports that she drinks alcohol. She reports that she does not use illicit drugs.    ALLERGIES: She is allergic to animal dander; fluconazole; seasonal allergies; suture; and vistaril [hydroxyzine hcl].    CURRENT MEDICATIONS: She has a current medication list which includes the following prescription(s): desvenlafaxine succinate er, zolpidem, ranitidine, atorvastatin, diclofenac, omeprazole, symbicort, cyclobenzaprine, levothyroxine, melatonin, escitalopram, diclofenac, UNABLE TO FIND, capsaicin, ferrous sulfate, fenofibrate, ascorbic acid, calcium-magnesium-vitamin d, lutein, ipratropium, ipratropium, triamcinolone, albuterol, albuterol, coenzyme q10, mirapex, acetaminophen, fiber, and claritin.     REVIEW OF SYSTEMS: 10 point review of systems is negative  except as noted above.     Exam:   LMP  (LMP Unknown)      CONSTITUTIONIAL: healthy, alert and no distress  HEAD: Normocephalic. No masses, lesions, tenderness or abnormalities  GAIT: broad based  NEUROLOGIC: Non-focal  PSYCHIATRIC: affect normal/bright and mentation appears normal.  Right wrist:  She has no deformity and no swelling.  She has full range of motion of the right wrist.  She has a negative Tinel's over [place] and over the carpal tunnel.  Motor function of the median, ulnar and radial nerves is intact.  She does have decreased subjective sensation to light touch over the palmar aspect of the fourth and fifth digit on the right hand.   Right elbow:  She is nontender, has full range of motion and has negative Tinel's over the cubital tunnel.      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      PLAN:  We discussed modification of her computer-based activities.  She was given a wrist splint for use, particularly at night.  She will follow up on a p.r.n. basis.

## 2017-03-28 RX ORDER — ZOLPIDEM TARTRATE 10 MG/1
5-10 TABLET ORAL
Qty: 30 TABLET | Refills: 0 | Status: SHIPPED | OUTPATIENT
Start: 2017-03-28 | End: 2017-04-19

## 2017-03-28 NOTE — TELEPHONE ENCOUNTER
Refilled. Usually we do not recommend dose of 5mg nightly. It looks like she is taking more than that and not obvious in the note that this has been discussed.     Please fax.    Magdy Wood

## 2017-04-07 ENCOUNTER — OFFICE VISIT (OUTPATIENT)
Dept: PSYCHOLOGY | Facility: CLINIC | Age: 62
End: 2017-04-07
Payer: COMMERCIAL

## 2017-04-07 DIAGNOSIS — F33.1 MAJOR DEPRESSIVE DISORDER, RECURRENT EPISODE, MODERATE WITH ANXIOUS DISTRESS (H): Primary | ICD-10-CM

## 2017-04-07 PROCEDURE — 90834 PSYTX W PT 45 MINUTES: CPT | Performed by: COUNSELOR

## 2017-04-07 NOTE — PROGRESS NOTES
Progress Note    Client Name: Brandi Stern  Date: 4/7/2017         Service Type: Individual      Session Start Time: 12:03  Session End Time: 12:45p      Session Length: 42 minutes     Session #: 6     Attendees: Client attended alone    Treatment Plan Last Reviewed: 4/7/2017  PHQ-9: 7     DATA      Progress Since Last Session (Related to Symptoms / Goals / Homework):   Symptoms: Improved, some depressed mood, less tired, better sleep    Homework: None given      Episode of Care Goals: Minimal progress - CONTEMPLATION (Considering change and yet undecided); Intervened by assessing the negative and positive thinking (ambivalence) about behavior change     Current / Ongoing Stressors and Concerns:   Reported improved mood in the past week - contributed improvement to new medications, slower work flow, and being more productive with priorities. Reported social leisure plans with mom and sister over the weekend and identified feeling optimistic that things will go well. Reported a history of interpersonal difficulties with younger brother - feeling like he slights her - but reported being intentional about engaging with him minimally. Became very tearful as she shared that her father has been on her mind lately. Acknowledged that this time of the year is difficult for her. Was somewhat resistant to assert for support regarding feelings of father's death, but was insightful as she recognized feeling more stable and having more energy to focus on unresolved grief.      Treatment Objective(s) Addressed in This Session:     Client will set limits with work by saying no and taking less patients within the next month; client will organize/clean her home within 12 sessions. Client will learn 3 new skills to cope with stress other than smoking.      Intervention:   Motivational Interviewing: assessing ambivalence towards change, evoking desire and readiness to change, pointing out  "discrepancies; CBT: prompting client to identify mental health needs and maladaptive behaviors that she would like to change, teaching assertiveness skills and emotion identification       ASSESSMENT: Current Emotional / Mental Status (status of significant symptoms):   Client has had a history of suicidal ideation: passive thoughts of dealth, \"Is there pain in the afterlife?\", 1 suicide attempt: in college after breakup with boyfriend,  was drinking and ingested pills, no hospitalization, would never really hurt self   Client denies current fears or concerns for personal safety.   Client reports the following current or recent suicidal ideation or behaviors: passive thoughts of dealth, \"Is there pain in the afterlife?\". Would never really hurt self.   Client denies current or recent homicidal ideation or behaviors.   Client denies current or recent self injurious behavior or ideation.   Client denies other safety concerns.   Client reports there are no firearms in the house.   A safety and risk management plan has not been developed at this time, however client was given the after-hours number / 911 should there be a change in any of  these risk factors.     Appearance:   Appropriate    Eye Contact:   Fair    Psychomotor Behavior: Normal    Attitude:   Cooperative    Orientation:   All   Speech    Rate / Production: Normal     Volume:  Normal    Mood:    Normal Depressed Tearful   Affect:    Appropriate     Thought Content:  Clear    Thought Form:  Coherent  Circumstantial Tangential   Insight:    Fair      Medication Review:   Started new medications and experiencing improved mood      Medication Compliance:   Yes     Changes in Health Issues:   None reported     Chemical Use Review:   Substance Use: Chemical use reviewed, no active concerns identified     Tobacco Use: Unable to quit, client is smoking cigarettes again     Collateral Reports Completed:   Not Applicable    PLAN: (Client Tasks / Therapist Tasks / " Other)  Client will set limits with work by saying no and taking less patients within the next month; client will organize/clean her home within 12 sessions. Client will learn 3 new skills to cope with stress other smoking. Therapist will continue to use motivational interviewing to evoke change talk and CBT strategies to engage client in proactive problem solving, practice distress tolerance as it relates to work and interpersonal relationships, and teach emotion identification.         Ronda Lackey, [facility]                                                       ___________________________________________________________________    Treatment Plan    Client's Name: Brandi Stern  YOB: 1955    Date: 2/24/2017    DSM-V Diagnoses: 296.32 Major Depressive Disorder, Recurrent Episode, Moderate _ and With anxious distress  Psychosocial / Contextual Factors: Work stress, health concerns, family stress  WHODAS: 29    Referral / Collaboration:  Referral to another professional/service is not indicated at this time.    Anticipated number of session or this episode of care: 12      MeasurableTreatment Goal(s) related to diagnosis / functional impairment(s)  Goal 1: Client will improve depressed mood as evidenced by decreased score on PHQ 9 from 19 (moderately severe) to score range of 10-14 (moderate).    I will know I've met my goal when I feel more accomplished, in control, house looks better, and I would not be smoking.      Objective #A (Client Action)    Client will set limits with work by saying no and taking less patients within the next month.  Status: New - Date: 2/24/2017     Intervention(s)  Therapist will role-play assertiveness and conflict management skills.  ...teach about healthy boundaries.    Objective #B  Client will organize/clean her home within 12 sessions.   Status: New - Date: 2/24/2017     Intervention(s)  Therapist will teach emotional regulation skills and developing routine.    Objective  #C  Client will learn 3 new skills to cope with stress other than smoking.   Status: New - Date: 2/24/2017     Intervention(s)  Therapist will assign homework of practicing skills at home.  ...teach self care, distraction, and distress tolerance skills.      Client has reviewed and agreed to the above plan.      ELISE Sheikh  February 24, 2017

## 2017-04-07 NOTE — MR AVS SNAPSHOT
MRN:[id number]                      After Visit Summary   4/7/2017    Brandi Stern    MRN: [id number]           Visit Information        Provider Department      4/7/2017 12:00 PM Ronda Lackey [facility] Generic      Your next 10 appointments already scheduled     Apr 21, 2017  9:00 AM CDT   Return Visit with Ronda Lackey [facility] ([facility] Professional Bldg  [address]  [facility] [id number] [phone]            May 05, 2017 11:00 AM CDT   Return Visit with Ronda Lackey [facility] ([facility])    [place] [facility]ld[facility]  [address]  [facility] [id number] [phone]            May 19, 2017 11:00 AM CDT   Return Visit with Ronda Lackey [facility] ([facility])    [place] [facility]  [address] [facility]  [facility] [id number] [phone]              MyChart Information     [employer] gives you secure access to your electronic health record. If you see a primary care provider, you can also send messages to your care team and make appointments. If you have questions, please call your primary care clinic.  If you do not have a primary care provider, please call [phone] and they will assist you.        Care EveryWhere ID     This is your Care EveryWhere ID. This could be used by other organizations to access your [facility] medical records  [id number]

## 2017-04-08 ASSESSMENT — PATIENT HEALTH QUESTIONNAIRE - PHQ9: SUM OF ALL RESPONSES TO PHQ QUESTIONS 1-9: 7

## 2017-04-19 DIAGNOSIS — G47.00 INSOMNIA, UNSPECIFIED: ICD-10-CM

## 2017-04-19 RX ORDER — ZOLPIDEM TARTRATE 10 MG/1
TABLET ORAL
Qty: 30 TABLET | Refills: 5 | Status: SHIPPED | OUTPATIENT
Start: 2017-04-19 | End: 2017-10-22

## 2017-04-19 NOTE — TELEPHONE ENCOUNTER
Controlled Substance Refill Request for zolpidem (AMBIEN) 10 MG tablet  Problem List Complete:  Yes    Last Written Prescription Date:  3/28/2017  Last Fill Quantity: 30,   # refills: 0    Last Office Visit with [facility] primary care provider: 3/8/2017    Clinic visit frequency required: unspecified     Future Office visit:   Next 5 appointments (look out 90 days)     Apr 21, 2017  9:00 AM CDT   Return Visit with Ronda Lackey [facility] ([facility] Professional [facility]ld[facility]  [address] [place] [id number]   [phone]            May 05, 2017 11:00 AM CDT   Return Visit with Ronda Lackey [facility] ([facility]ldg  [address] [place] [id number]   [phone]            May 19, 2017 11:00 AM CDT   Return Visit with Ronda Lackey [facility] ([facility]ldg  [address] [place] [id number]   [phone]                  Controlled substance agreement on file: No.     Processing:  May be called or faxed   checked in past 6 months?  No, route to RN Chronic pain not on problem list [place]  review not required    Thank you,  Phoenix Aviles RN

## 2017-04-21 ENCOUNTER — OFFICE VISIT (OUTPATIENT)
Dept: PSYCHOLOGY | Facility: CLINIC | Age: 62
End: 2017-04-21
Payer: COMMERCIAL

## 2017-04-21 DIAGNOSIS — E78.1 PURE HYPERGLYCERIDEMIA: ICD-10-CM

## 2017-04-21 DIAGNOSIS — F33.1 MAJOR DEPRESSIVE DISORDER, RECURRENT EPISODE, MODERATE WITH ANXIOUS DISTRESS (H): Primary | ICD-10-CM

## 2017-04-21 PROCEDURE — 90834 PSYTX W PT 45 MINUTES: CPT | Performed by: COUNSELOR

## 2017-04-21 NOTE — TELEPHONE ENCOUNTER
Medication Detail      Disp Refills Start End HAILEE   fenofibrate 145 MG tablet 90 tablet 2 12/15/2015  No   Sig: Take 1 tablet (145 mg) by mouth daily        Last Office Visit with [facility], P or [facility] prescribing provider:  3-8-17   Future Office Visit:    Next 5 appointments (look out 90 days)     Apr 21, 2017  9:00 AM CDT   Return Visit with Ronda Lackey [facility] ([facility])    [place] Professional Bldg  [address]  [facility] [id number] [phone]            May 05, 2017 11:00 AM CDT   Return Visit with Ronda Lackey [facility] ([facility])    [place] [facility]  [address]  [facility] [id number] [phone]            May 19, 2017 11:00 AM CDT   Return Visit with Ronda Lackey [facility] ([facility] Professional Bldg  [address]  [facility] [phone]                  Cholesterol   Date Value Ref Range Status   09/13/2016 264 (H) <200 mg/dL Final     Comment:     Desirable:       <200 mg/dl     HDL Cholesterol   Date Value Ref Range Status   09/13/2016 36 (L) >49 mg/dL Final     LDL Cholesterol Calculated   Date Value Ref Range Status   09/13/2016  <100 mg/dL Final    Cannot estimate LDL when triglyceride exceeds 400 mg/dL     LDL Cholesterol Direct   Date Value Ref Range Status   09/13/2016 112 (H) <100 mg/dL Final     Comment:     Above desirable:  100-129 mg/dl   Borderline High:  130-159 mg/dL   High:             160-189 mg/dL   Very high:       >189 mg/dl       Triglycerides   Date Value Ref Range Status   09/13/2016 794 (H) <150 mg/dL Final     Comment:     Borderline high:  150-199 mg/dl   High:             200-499 mg/dl   Very high:       >499 mg/dl       Cholesterol/HDL Ratio   Date Value Ref Range Status   10/09/2015 2.3 0.0 - 5.0 Final     ALT   Date Value Ref Range Status   09/13/2016 26 0 - 50  U/L Final

## 2017-04-21 NOTE — PROGRESS NOTES
"                                           Progress Note    Client Name: Brandi Stern  Date: 2017         Service Type: Individual      Session Start Time: 9:07  Session End Time: 9:50p      Session Length: 43 minutes     Session #: 7     Attendees: Client attended alone    Treatment Plan Last Reviewed: 2017  PHQ-9: 8     DATA      Progress Since Last Session (Related to Symptoms / Goals / Homework):   Symptoms: Stable, some depressed mood, tiredness, trouble sleeping, some hopelessness    Homework: None given - at next appt, client will journal/note vulnerabilities of feeling emotional/sensitive/teary eyed and make a decision about whether or not she will quit smoking      Episode of Care Goals: Minimal progress - CONTEMPLATION (Considering change and yet undecided); Intervened by assessing the negative and positive thinking (ambivalence) about behavior change     Current / Ongoing Stressors and Concerns:   Reported feeling more emotional/sensitive/teary eyed in the past 2 weeks; reported \"buzz\" from medications has  down and she contributed depressed mood to that; client cried for the first time at last session re: father's death and wishing he was here - when this writer brought father up as a potential reason for emotionality, she was avoidant and went on to discuss more superficial stressors, such as needing to clean her house and to change her dogs' collars; client also expressed feeling like she has invited vulnerability into her life since college and has accepted it; reported continued interpersonal difficulties with mother and patients at work - acknowledged being able to set emotional limits with them at times and at other times, described feeling dumbfounded.     Treatment Objective(s) Addressed in This Session:     Client will set limits with work by saying no and taking less patients within the next month; client will organize/clean her home within 12 sessions; client will learn 3 new " "skills to cope with stress other than smoking.      Intervention:   Motivational Interviewing: assessing ambivalence towards change, evoking desire and readiness to change, pointing out discrepancies; CBT: prompting client to identify mental health needs and maladaptive behaviors that she would like to change, teaching assertiveness skills and emotion identification, teach self-reflection to identify mental health needs and to help with decision making and effective coping; Interpersonal therapy: prompt client to engage in emotional deepening to address core issues      ASSESSMENT: Current Emotional / Mental Status (status of significant symptoms):   Client has had a history of suicidal ideation: passive thoughts of dealth, \"Is there pain in the afterlife?\", 1 suicide attempt: in college after breakup with boyfriend,  was drinking and ingested pills, no hospitalization, would never really hurt self   Client denies current fears or concerns for personal safety.   Client reports the following current or recent suicidal ideation or behaviors: passive thoughts of dealth, \"Is there pain in the afterlife?\". Would never really hurt self.   Client denies current or recent homicidal ideation or behaviors.   Client denies current or recent self injurious behavior or ideation.   Client denies other safety concerns.   Client reports there are no firearms in the house.   A safety and risk management plan has not been developed at this time, however client was given the after-hours number / 911 should there be a change in any of  these risk factors.     Appearance:   Appropriate    Eye Contact:   Fair    Psychomotor Behavior: Normal    Attitude:   Cooperative    Orientation:   All   Speech    Rate / Production: Normal     Volume:  Normal    Mood:    Normal Depressed Tearful   Affect:    Appropriate     Thought Content:  Clear    Thought Form:  Coherent  Circumstantial Tangential   Insight:    Fair      Medication " Review:   Started new medications, medications seemed to be effective 2 weeks ago, this week client reported more emotionality      Medication Compliance:   Yes     Changes in Health Issues:   None reported     Chemical Use Review:   Substance Use: Chemical use reviewed, no active concerns identified     Tobacco Use: Unable to quit, client is smoking cigarettes again     Collateral Reports Completed:   Not Applicable    PLAN: (Client Tasks / Therapist Tasks / Other)  Client will set limits with work by saying no and taking less patients within the next month; client will organize/clean her home within 12 sessions. Client will learn 3 new skills to cope with stress other smoking or decide if she wants to quit or not quit smoking; client will work on noting and attending to vulnerabilities of feeling emotional/sensative/teary eyed.Therapist will continue to use motivational interviewing to evoke change talk and CBT strategies to engage client in proactive problem solving, practice distress tolerance as it relates to work and interpersonal relationships, and teach emotion identification.         Ronda Lackey [facility]                                                       ___________________________________________________________________    Treatment Plan    Client's Name: Brandi Stern  YOB: 1955    Date: 2/24/2017    DSM-V Diagnoses: 296.32 Major Depressive Disorder, Recurrent Episode, Moderate _ and With anxious distress  Psychosocial / Contextual Factors: Work stress, health concerns, family stress  WHODAS: 29    Referral / Collaboration:  Referral to another professional/service is not indicated at this time.    Anticipated number of session or this episode of care: 12      MeasurableTreatment Goal(s) related to diagnosis / functional impairment(s)  Goal 1: Client will improve depressed mood as evidenced by decreased score on PHQ 9 from 19 (moderately severe) to score range of 10-14 (moderate).    I will  know I've met my goal when I feel more accomplished, in control, house looks better, and I would not be smoking.      Objective #A (Client Action)    Client will set limits with work by saying no and taking less patients within the next month.  Status: New - Date: 2/24/2017     Intervention(s)  Therapist will role-play assertiveness and conflict management skills.  ...teach about healthy boundaries.    Objective #B  Client will organize/clean her home within 12 sessions.   Status: New - Date: 2/24/2017     Intervention(s)  Therapist will teach emotional regulation skills and developing routine.    Objective #C  Client will learn 3 new skills to cope with stress other than smoking.   Status: New - Date: 2/24/2017     Intervention(s)  Therapist will assign homework of practicing skills at home.  ...teach self care, distraction, and distress tolerance skills.      Client has reviewed and agreed to the above plan.      [facility]onda Lackey [facility]  February 24, 2017

## 2017-04-21 NOTE — TELEPHONE ENCOUNTER
Routing refill request to provider for review/approval because:  A break in medication, last filled 12/2015

## 2017-04-21 NOTE — MR AVS SNAPSHOT
MRN:[id number]                      After Visit Summary   4/21/2017    Brandi Stern    MRN: [id number]           Visit Information        Provider Department      4/21/2017 9:00 AM Ronda Lackey [facility] Generic      Your next 10 appointments already scheduled     May 05, 2017 11:00 AM CDT   Return Visit with Ronda Lackey [facility] ([facility]  [address]  [facility] [id number] [phone]            May 19, 2017 11:00 AM CDT   Return Visit with Ronda Lackey [facility] ([facility])    [place] [facility]  [address]  [facility] [id number] [phone]              [email or web address] Information     [employer] gives you secure access to your electronic health record. If you see a primary care provider, you can also send messages to your care team and make appointments. If you have questions, please call your primary care clinic.  If you do not have a primary care provider, please call [phone] and they will assist you.        Care EveryWhere ID     This is your Care EveryWhere ID. This could be used by other organizations to access your [place] medical records  [id number]

## 2017-04-22 ASSESSMENT — PATIENT HEALTH QUESTIONNAIRE - PHQ9: SUM OF ALL RESPONSES TO PHQ QUESTIONS 1-9: 8

## 2017-04-24 RX ORDER — FENOFIBRATE 145 MG/1
145 TABLET, COATED ORAL DAILY
Qty: 90 TABLET | Refills: 2 | Status: SHIPPED | OUTPATIENT
Start: 2017-04-24 | End: 2018-02-06

## 2017-05-05 ENCOUNTER — OFFICE VISIT (OUTPATIENT)
Dept: PSYCHOLOGY | Facility: CLINIC | Age: 62
End: 2017-05-05
Payer: COMMERCIAL

## 2017-05-05 DIAGNOSIS — F33.1 MAJOR DEPRESSIVE DISORDER, RECURRENT EPISODE, MODERATE WITH ANXIOUS DISTRESS (H): Primary | ICD-10-CM

## 2017-05-05 PROCEDURE — 90834 PSYTX W PT 45 MINUTES: CPT | Performed by: COUNSELOR

## 2017-05-05 NOTE — PROGRESS NOTES
"                                           Progress Note    Client Name: Brandi Stern  Date: 5/5/2017         Service Type: Individual      Session Start Time: 11:05a  Session End Time: 11:50a      Session Length: 45 minutes     Session #: 8     Attendees: Client attended alone    Treatment Plan Last Reviewed: 5/5/2017  PHQ-9: 8     DATA      Progress Since Last Session (Related to Symptoms / Goals / Homework):   Symptoms: Stable, some depressed mood, tiredness, trouble sleeping, , trouble concentrating    Homework: Completed - client made the decision to quit smoking; at next appt, client will organize paper for to increase feelings of productivity and to preapre for book club meeting      Episode of Care Goals: Minimal progress - PREPARATION (Decided to change - considering how); Intervened by negotiating a change plan and determining options / strategies for behavior change, identifying triggers, exploring social supports, and working towards setting a date to begin behavior change     Current / Ongoing Stressors and Concerns:   Reported making the decision to quit smoking again - is using nicotine patch and reported vivid, \"traumatizing\" dreams; was able to make sense of her dreams and connected with being independent and a survivor; expressed feeling sober to know that it will take 10-15 years to reverse the affects of smoking and also feeling like it is hard to fathom; reported feeling like medications are still not working, but expressed a willingness to keep trying them and made plans to follow up with PCP in one month; expressed feeling positive overall - brainstormed plans to continue working on her house, to continue with quit plan, and to continue decreasing her debt; also expressed enjoying semi-MC[employer]; acknowledged that living alone is hard for motivation, but dogs and increasing social engagement with friends give her structure; reported interpersonal difficulties with mothers this week again, " "but being able to regulation her emotions and to set limits with mother.      Treatment Objective(s) Addressed in This Session:     Client will set limits with work by saying no and taking less patients within the next month; client will organize/clean her home within 12 sessions; client will learn 3 new skills to cope with stress other than smoking.      Intervention:   Motivational Interviewing: continue to evoke desire and readiness to change, pointing out discrepancies; CBT: prompting client to identify mental health needs and maladaptive behaviors that she would like to change, teaching assertiveness skills and emotion identification and regulation, teach self-reflection to identify mental health needs and to help with decision making and effective coping; Interpersonal therapy: continue to prompt client to engage in emotional deepening to address core issues      ASSESSMENT: Current Emotional / Mental Status (status of significant symptoms):   Client has had a history of suicidal ideation: passive thoughts of dealth, \"Is there pain in the afterlife?\", 1 suicide attempt: in college after breakup with boyfriend,  was drinking and ingested pills, no hospitalization, would never really hurt self   Client denies current fears or concerns for personal safety.   Client reports the following current or recent suicidal ideation or behaviors: passive thoughts of dealth, \"Is there pain in the afterlife?\". Would never really hurt self.   Client denies current or recent homicidal ideation or behaviors.   Client denies current or recent self injurious behavior or ideation.   Client denies other safety concerns.   Client reports there are no firearms in the house.   A safety and risk management plan has not been developed at this time, however client was given the after-hours number / 911 should there be a change in any of  these risk factors.     Appearance:   Appropriate    Eye Contact:   Fair    Psychomotor " Behavior: Normal    Attitude:   Cooperative    Orientation:   All   Speech    Rate / Production: Normal     Volume:  Normal    Mood:    Normal    Affect:    Appropriate     Thought Content:  Clear    Thought Form:  Coherent  Circumstantial Tangential   Insight:    Fair      Medication Review:   No current change     Medication Compliance:   Yes     Changes in Health Issues:   None reported     Chemical Use Review:   Substance Use: Chemical use reviewed, no active concerns identified     Tobacco Use: Quit smoking cigarettes April 25, 2017     Collateral Reports Completed:   Not Applicable    PLAN: (Client Tasks / Therapist Tasks / Other)  Client will set limits with work by saying no and taking less patients within the next month; client will organize/clean her home within 12 sessions. Client will learn 3 new skills to cope with stress other smoking or decide if she wants to quit or not quit smoking; client will work on noting and attending to vulnerabilities of feeling emotional/sensative/teary eyed.Therapist will continue to use motivational interviewing to evoke change talk and CBT strategies to engage client in proactive problem solving, practice distress tolerance as it relates to work and interpersonal relationships, and teach emotion identification and regulation. Therapist will also continue to build on client's strengths and areas of her life where she feels she is making progress.         [facility]                                                       ___________________________________________________________________    Treatment Plan    Client's Name: Brandi Stern  YOB: 1955    Date: 2/24/2017    DSM-V Diagnoses: 296.32 Major Depressive Disorder, Recurrent Episode, Moderate _ and With anxious distress  Psychosocial / Contextual Factors: Work stress, health concerns, family stress  WHODAS: 29    Referral / Collaboration:  Referral to another professional/service is not indicated at  this time.    Anticipated number of session or this episode of care: 12      MeasurableTreatment Goal(s) related to diagnosis / functional impairment(s)  Goal 1: Client will improve depressed mood as evidenced by decreased score on PHQ 9 from 19 (moderately severe) to score range of 10-14 (moderate).    I will know I've met my goal when I feel more accomplished, in control, house looks better, and I would not be smoking.      Objective #A (Client Action)    Client will set limits with work by saying no and taking less patients within the next month.  Status: New - Date: 2/24/2017     Intervention(s)  Therapist will role-play assertiveness and conflict management skills.  ...teach about healthy boundaries.    Objective #B  Client will organize/clean her home within 12 sessions.   Status: New - Date: 2/24/2017     Intervention(s)  Therapist will teach emotional regulation skills and developing routine.    Objective #C  Client will learn 3 new skills to cope with stress other than smoking.   Status: New - Date: 2/24/2017     Intervention(s)  Therapist will assign homework of practicing skills at home.  ...teach self care, distraction, and distress tolerance skills.      Client has reviewed and agreed to the above plan.      Ronda Lackey, [facility]  February 24, 2017

## 2017-05-05 NOTE — MR AVS SNAPSHOT
MRN:[id number]                      After Visit Summary   5/5/2017    Brandi Stern    MRN: [id number]           Visit Information        Provider Department      5/5/2017 11:00 AM [facility]      Your next 10 appointments already scheduled     May 19, 2017 11:00 AM CDT   [facility]   [facility] ([facility])    [place] [facility]  [address]  [facility] [id number] [phone]              MyChart Information     [employer] gives you secure access to your electronic health record. If you see a primary care provider, you can also send messages to your care team and make appointments. If you have questions, please call your primary care clinic.  If you do not have a primary care provider, please call [phone] and they will assist you.        Care EveryWhere ID     This is your Care EveryWhere ID. This could be used by other organizations to access your [place] medical records  [id number]

## 2017-05-06 ASSESSMENT — PATIENT HEALTH QUESTIONNAIRE - PHQ9: SUM OF ALL RESPONSES TO PHQ QUESTIONS 1-9: 11

## 2017-05-19 ENCOUNTER — OFFICE VISIT (OUTPATIENT)
Dept: PSYCHOLOGY | Facility: CLINIC | Age: 62
End: 2017-05-19
Payer: COMMERCIAL

## 2017-05-19 DIAGNOSIS — F33.1 MAJOR DEPRESSIVE DISORDER, RECURRENT EPISODE, MODERATE WITH ANXIOUS DISTRESS (H): Primary | ICD-10-CM

## 2017-05-19 PROCEDURE — 90834 PSYTX W PT 45 MINUTES: CPT | Performed by: COUNSELOR

## 2017-05-19 NOTE — MR AVS SNAPSHOT
MRN:[id number]                      After Visit Summary   5/19/2017    Brandi Stern    MRN: [id number]           Visit Information        Provider Department      5/19/2017 11:00 AM Ronda Lackey [facility] Generic      Your next 10 appointments already scheduled     Jun 16, 2017 11:00 AM CDT   Return Visit with Ronda Lackey [facility] ([facility]ld[facility]  [address]  [facility] [phone]            Jul 14, 2017 11:00 AM CDT   Return Visit with Ronda Lackey [facility] ([facility])    [place] [facility]ld[facility]  [address]  [facility] [id number] [phone]              [employer] Information     [employer] gives you secure access to your electronic health record. If you see a primary care provider, you can also send messages to your care team and make appointments. If you have questions, please call your primary care clinic.  If you do not have a primary care provider, please call [phone] and they will assist you.        Care EveryWhere ID     This is your Care EveryWhere ID. This could be used by other organizations to access your [place] medical records  [id number]

## 2017-05-19 NOTE — PROGRESS NOTES
Progress Note    Client Name: Brandi Stern  Date: 5/19/2017         Service Type: Individual      Session Start Time: 11:05a  Session End Time: 11:45a      Session Length: 40 minutes     Session #: 9     Attendees: Client attended alone    Treatment Plan Last Reviewed: 5/19/2017  PHQ-9: 2     DATA      Progress Since Last Session (Related to Symptoms / Goals / Homework):   Symptoms: Improved, more motivation, some issues with sleep, some tiredness    Homework: Completed - client made the decision to quit smoking and organized paper to increase feelings of productivity and to preapre for book club meeting      Episode of Care Goals: Satisfactory progress - PREPARATION (Decided to change - considering how); Intervened by negotiating a change plan and determining options / strategies for behavior change, identifying triggers, exploring social supports, and working towards setting a date to begin behavior change     Current / Ongoing Stressors and Concerns:   Reported improved motivation, mood, and sleep; contributed this to medications, better self-control, and engagement in yoga to help with energy flow; reported trying to be intentional about not using TV/phone screens before bedtime; described continued social engagement and feeling good about it; also reported continued progress with quitting smoking and organizing her home; expressed feeling achieved; reported some concerns about mother's cognitive functioning - observed mother saying things that did not make sense; expressed a plan with talk with siblings about keeping a watchful eye over her; expressed feeling like she is making progress and asserted for fewer sessions; this writer agreed.      Treatment Objective(s) Addressed in This Session:     Client will organize/clean her home within 12 sessions; client will learn 3 new skills to cope with stress other than smoking.      Intervention:   Motivational  "Interviewing: continue to evoke desire and readiness to change, pointing out discrepancies; CBT: prompting client to identify mental health needs and maladaptive behaviors that she would like to change, teaching assertiveness skills and emotion identification and regulation, teach self-reflection to identify mental health needs and to help with decision making and effective coping, identify intentional behavior changes that are contributing to improved mood; Interpersonal therapy: continue to prompt client to engage in emotional deepening to address core issues      ASSESSMENT: Current Emotional / Mental Status (status of significant symptoms):   Client has had a history of suicidal ideation: passive thoughts of dealth, \"Is there pain in the afterlife?\", 1 suicide attempt: in college after breakup with boyfriend,  was drinking and ingested pills, no hospitalization, would never really hurt self   Client denies current fears or concerns for personal safety.   Client reports the following current or recent suicidal ideation or behaviors: passive thoughts of dealth, \"Is there pain in the afterlife?\". Would never really hurt self.   Client denies current or recent homicidal ideation or behaviors.   Client denies current or recent self injurious behavior or ideation.   Client denies other safety concerns.   Client reports there are no firearms in the house.   A safety and risk management plan has not been developed at this time, however client was given the after-hours number / 911 should there be a change in any of  these risk factors.     Appearance:   Appropriate    Eye Contact:   Fair    Psychomotor Behavior: Normal    Attitude:   Cooperative    Orientation:   All   Speech    Rate / Production: Normal     Volume:  Normal    Mood:    Normal    Affect:    Appropriate     Thought Content:  Clear    Thought Form:  Coherent  Circumstantial Tangential   Insight:    Fair      Medication Review:   No current " change     Medication Compliance:   Yes     Changes in Health Issues:   None reported     Chemical Use Review:   Substance Use: Chemical use reviewed, no active concerns identified     Tobacco Use: Quit smoking cigarettes April 25, 2017     Collateral Reports Completed:   Not Applicable    PLAN: (Client Tasks / Therapist Tasks / Other)  Client will set limits with work by saying no and taking less patients within the next month; client will organize/clean her home within 12 sessions. Client will learn 3 new skills to cope with stress other smoking or decide if she wants to quit or not quit smoking; client will work on noting and attending to vulnerabilities of feeling emotional/sensative/teary eyed.Therapist will continue to use motivational interviewing to evoke change talk and CBT strategies to engage client in proactive problem solving, practice distress tolerance as it relates to work and interpersonal relationships, and teach emotion identification and regulation. Therapist will also continue to build on client's strengths and areas of her life where she feels she is making progress.         Ronda Lackey, [facility]                                                       ___________________________________________________________________    Treatment Plan    Client's Name: Brandi Stern  YOB: 1955    Date: 2/24/2017    DSM-V Diagnoses: 296.32 Major Depressive Disorder, Recurrent Episode, Moderate _ and With anxious distress  Psychosocial / Contextual Factors: Work stress, health concerns, family stress  WHODAS: 29    Referral / Collaboration:  Referral to another professional/service is not indicated at this time.    Anticipated number of session or this episode of care: 12      MeasurableTreatment Goal(s) related to diagnosis / functional impairment(s)  Goal 1: Client will improve depressed mood as evidenced by decreased score on PHQ 9 from 19 (moderately severe) to score range of 10-14 (moderate).    I  will know I've met my goal when I feel more accomplished, in control, house looks better, and I would not be smoking.      Objective #A (Client Action)    Client will set limits with work by saying no and taking less patients within the next month.  Status: New - Date: 2/24/2017     Intervention(s)  Therapist will role-play assertiveness and conflict management skills.  ...teach about healthy boundaries.    Objective #B  Client will organize/clean her home within 12 sessions.   Status: New - Date: 2/24/2017     Intervention(s)  Therapist will teach emotional regulation skills and developing routine.    Objective #C  Client will learn 3 new skills to cope with stress other than smoking.   Status: New - Date: 2/24/2017     Intervention(s)  Therapist will assign homework of practicing skills at home.  ...teach self care, distraction, and distress tolerance skills.      Client has reviewed and agreed to the above plan.      Ronda Lackey [facility]  February 24, 2017

## 2017-05-20 ASSESSMENT — PATIENT HEALTH QUESTIONNAIRE - PHQ9: SUM OF ALL RESPONSES TO PHQ QUESTIONS 1-9: 2

## 2017-06-12 ENCOUNTER — MYC MEDICAL ADVICE (OUTPATIENT)
Dept: PALLIATIVE MEDICINE | Facility: CLINIC | Age: 62
End: 2017-06-12

## 2017-06-13 NOTE — TELEPHONE ENCOUNTER
Brandi Elkins,     If this is a new issue or significantly different than pain you have had in the past, it should be evaluated by primary care first. They can determine if it is something that should go to Neurology, if imaging should be done, etc.     ELISE Zhu, NP-C  [facility]

## 2017-06-13 NOTE — TELEPHONE ENCOUNTER
From: Brandi Stern      Created: 6/12/2017 6:24 PM          Hi. Having a lot of pain right arm  I had a brace right wrist for ulnar neuritis and that resolved. Have been having nerve pain for some time R elbow. Shooting pain and sometimes numbness elbow. Hurts like heck to bend arm. Also probably separate problem with pain going down R arm when I turn head just so. I saw PMR for this in past and wrist improved but the other problems have gotten worse. Much worse this week. Should I see a neurologist?   Thanks. Hope all computer systems are up for you all tomorrow

## 2017-06-15 ENCOUNTER — OFFICE VISIT (OUTPATIENT)
Dept: FAMILY MEDICINE | Facility: CLINIC | Age: 62
End: 2017-06-15
Payer: COMMERCIAL

## 2017-06-15 VITALS
SYSTOLIC BLOOD PRESSURE: 118 MMHG | OXYGEN SATURATION: 95 % | TEMPERATURE: 98.4 F | WEIGHT: 225.38 LBS | RESPIRATION RATE: 18 BRPM | BODY MASS INDEX: 38.69 KG/M2 | HEART RATE: 94 BPM | DIASTOLIC BLOOD PRESSURE: 84 MMHG

## 2017-06-15 DIAGNOSIS — M25.521 RIGHT ELBOW PAIN: Primary | ICD-10-CM

## 2017-06-15 PROCEDURE — 99213 OFFICE O/P EST LOW 20 MIN: CPT | Performed by: NURSE PRACTITIONER

## 2017-06-15 NOTE — PROGRESS NOTES
SUBJECTIVE:                                                    Brandi Stern is a 61 year old female who presents to clinic today for the following health issues:      Elbow problem/pain      Duration: a couple months. Pt saw Dr. Buckner a few months ago. Pt was having a lot of pain and numbness on right hand 4th finger. She was given hand splint that she wore at night and during the day. She did tell Dr. Buckner that she was having pain in her elbow. Right hand is fine now.     Elbow pain better today, worse on Monday and Tuesday.     Description  Location: pinched nerve in neck and pain that radiates down right upper arm and into elbow.     Intensity:  moderate    Accompanying signs and symptoms: numbness and tingling, no weakness    History  Previous similar problem: YES  Previous evaluation:  Yes,     Precipitating or alleviating factors:  Trauma or overuse: no   Aggravating factors include: bending arm to brush teeth, computer work, turning neck, specific yoga move.   Better when lying down.  Difficult to get comfortable with arm in any position when she is sitting.     Therapies tried and outcome: She had some left over Gabapentin from the past, she has been taking 300 mg at night. May be listed as an allergy, pt states she cannot tolerate Gabapentin at a higher dosage. Ace bandage at night, she has tried lidocaine patch, and she has tried icing but without effectiveness and elbow exercises (neural flossing)    Started 300 mg Gabapentin at HS a few days ago.    Wore a wrist brace for a couple of weeks for ulnar neuritis back in March, which helped with the hand pain that was her main symptom at that time.     She also has some right upper arm pain that is triggered by movement of her neck, which seems separate from her elbow pain.  She does have degenerative disc disease.             Problem list and histories reviewed & adjusted, as indicated.  Additional history: as documented        Reviewed and updated as  needed this visit by clinical staff       Reviewed and updated as needed this visit by Provider         ROS:  C: NEGATIVE for fever, chills, change in weight  R: NEGATIVE for significant cough or SOB  CV: NEGATIVE for chest pain, palpitations or peripheral edema  MUSCULOSKELETAL: see HPI  NEURO: see HPI    OBJECTIVE:                                                    /84  Pulse 94  Temp 98.4  F (36.9  C) (Oral)  Resp 18  Wt 225 lb 6 oz (102.2 kg)  LMP  (LMP Unknown)  SpO2 95%  BMI 38.69 kg/m2  Body mass index is 38.69 kg/(m^2).  GENERAL: healthy, alert and no distress  MS: mild tenderness proximal to the antecubital fossa  SKIN: no suspicious lesions or rashes  NEURO: Normal strength and tone, mentation intact and speech normal         ASSESSMENT/PLAN:                                                            1. Right elbow pain  Will have her follow up with Dr. Buckner.    Continue with ice, try Voltaren gel, start using wrist brace again.   - diclofenac (VOLTAREN) 1 % GEL topical gel; Apply 4 grams to knees or 2 grams to feet four times daily using enclosed dosing card.  Dispense: 100 g; Refill: 3        Cherie Brennan NP  [facility]

## 2017-06-15 NOTE — MR AVS SNAPSHOT
After Visit Summary   6/15/2017    Brandi Stern    MRN: [id number]           Patient Information     Date Of Birth          1955        Visit Information        Provider Department      6/15/2017 11:30 AM Cherie Brennan NP [facility]        Today's Diagnoses     Right elbow pain    -  1       Follow-ups after your visit        Your next 10 appointments already scheduled     Jun 16, 2017 11:00 AM CDT   Return Visit with Ronda Lackey [facility]  [address]  [facility] [id number] [phone]            Jul 14, 2017 11:00 AM CDT   Return Visit with Ronda Lackey [facility]  [address] [facility]  [facility] [id number] [phone]              Who to contact     If you have questions or need follow up information about today's clinic visit or your schedule please contact [facility] directly at [phone].  Normal or non-critical lab and imaging results will be communicated to you by Netformxhart, letter or phone within 4 business days after the clinic has received the results. If you do not hear from us within 7 days, please contact the clinic through [employer] or phone. If you have a critical or abnormal lab result, we will notify you by phone as soon as possible.  Submit refill requests through Simple[employer] or call your pharmacy and they will forward the refill request to us. Please allow 3 business days for your refill to be completed.          Additional Information About Your Visit        MyChart Information     [employer] gives you secure access to your electronic health record. If you see a primary care provider, you can also send messages to your care team and make appointments. If you have questions, please call your primary care clinic.  If you do not have a primary care  provider, please call [phone] and they will assist you.        Care EveryWhere ID     This is your Care EveryWhere ID. This could be used by other organizations to access your [facility] medical records  [id number]        Your Vitals Were     Pulse Temperature Respirations Last Period Pulse Oximetry BMI (Body Mass Index)    94 98.4  F (36.9  C) (Oral) 18 (LMP Unknown) 95% 38.69 kg/m2       Blood Pressure from Last 3 Encounters:   06/15/17 118/84   03/08/17 122/82   02/21/17 118/82    Weight from Last 3 Encounters:   06/15/17 225 lb 6 oz (102.2 kg)   03/08/17 225 lb 6 oz (102.2 kg)   02/21/17 228 lb (103.4 kg)              Today, you had the following     No orders found for display         Where to get your medicines      These medications were sent to [facility]/pharmacy #[id number] - [place], [place] - [address]  [address], [place] [address]     Phone:  [phone]     diclofenac 1 % Gel topical gel          Primary Care Provider Office Phone # Fax #    Cherie Brennan [phone]       [facility] [address] [place]  [facility] [address]        Thank you!     Thank you for choosing [facility]  for your care. Our goal is always to provide you with excellent care. Hearing back from our patients is one way we can continue to improve our services. Please take a few minutes to complete the written survey that you may receive in the mail after your visit with us. Thank you!             Your Updated Medication List - Protect others around you: Learn how to safely use, store and throw away your medicines at www.disposemymeds.org.          This list is accurate as of: 6/15/17  1:38 PM.  Always use your most recent med list.                   Brand Name Dispense Instructions for use    ACETAMINOPHEN EXTRA STRENGTH PO      None Entered       * albuterol (2.5 MG/3ML) 0.083% neb solution     3 mL    Take  by nebulization. take 3 mL by nebulization 4 times daily as  needed       * albuterol 108 (90 BASE) MCG/ACT Inhaler   Generic drug:  albuterol     1 Inhaler    Inhale 1-2 puffs into the lungs every 6 hours as needed for shortness of breath / dyspnea.       atorvastatin 10 MG tablet    LIPITOR    90 tablet    Take 1 tablet (10 mg) by mouth daily       CALCIUM 500 PO      Take 1 tablet by mouth daily       Capsaicin 0.1 % cream     1 Tube    Externally apply topically 3 times daily       CLARITIN 10 MG tablet   Generic drug:  loratadine     0    1 TAB PO QD (Once per day) as needed for ALLERGY SYMPTOMS       CO Q 10 PO      Take 200 mg by mouth daily       cyclobenzaprine 5 MG tablet    FLEXERIL    180 tablet    Take 1-2 tablets (5-10 mg) by mouth 3 times daily as needed for muscle spasms       desvenlafaxine succinate 50 MG 24 hr tablet    PRISTIQ    30 tablet    Take 1 tablet (50 mg) by mouth daily       diclofenac 1 % Gel topical gel    VOLTAREN    100 g    Apply 4 grams to knees or 2 grams to feet four times daily using enclosed dosing card.       diclofenac 100 MG 24 hr tablet    VOLTAREN-XR    30 tablet    Take 1 tablet (100 mg) by mouth daily       fenofibrate 145 MG tablet     90 tablet    Take 1 tablet (145 mg) by mouth daily       ferrous sulfate 325 (65 FE) MG tablet    IRON    30 tablet    Take 1 tablet (325 mg) by mouth daily (with breakfast)       FIBER PO      Twice daily       ipratropium 0.02 % neb solution    ATROVENT    225 mL    Take 2.5 mLs (0.5 mg) by nebulization 4 times daily       ipratropium 17 MCG/ACT Inhaler    ATROVENT HFA    1 Inhaler    Inhale 2 puffs into the lungs 4 times daily as needed for wheezing       levothyroxine 75 MCG tablet    SYNTHROID    90 tablet    Take 1 tablet (75 mcg) by mouth every morning Overdue for labs       Lutein 10 MG Tabs      Take 10 mg by mouth daily       MELATONIN PO      Take 5 mg by mouth nightly as needed       MIRAPEX 0.125 MG tablet   Generic drug:  pramipexole      Take two tabs at dinner and one at hs        omeprazole 20 MG CR capsule    priLOSEC    180 capsule    Take 1 capsule (20 mg) by mouth 2 times daily       ranitidine 300 MG tablet    ZANTAC    60 tablet    Take 1 tablet (300 mg) by mouth 2 times daily       SYMBICORT 160-4.5 MCG/ACT Inhaler   Generic drug:  budesonide-formoterol      Inhale 1 puff into the lungs 2 times daily       triamcinolone 55 MCG/ACT nasal inhaler    NASACORT AQ    1 Inhaler    Inhale 2 sprays in both nostrils every day as needed       UNABLE TO FIND      MEDICATION NAME: Allergy shots       VITAMIN C PO      Take 1,000 mg by mouth daily       zolpidem 10 MG tablet    AMBIEN    30 tablet    0.5-1 tablet nightly as needed       * Notice:  This list has 2 medication(s) that are the same as other medications prescribed for you. Read the directions carefully, and ask your doctor or other care provider to review them with you.

## 2017-06-15 NOTE — NURSING NOTE
"Chief Complaint   Patient presents with     Elbow Pain       Initial BP (!) 133/92  Pulse 94  Temp 98.4  F (36.9  C) (Oral)  Resp 18  Wt 225 lb 6 oz (102.2 kg)  LMP  (LMP Unknown)  SpO2 95%  BMI 38.69 kg/m2 Estimated body mass index is 38.69 kg/(m^2) as calculated from the following:    Height as of 2/21/17: 5' 4\" (1.626 m).    Weight as of this encounter: 225 lb 6 oz (102.2 kg).  Medication Reconciliation: complete     Qing Jackson MA      "

## 2017-06-16 ENCOUNTER — OFFICE VISIT (OUTPATIENT)
Dept: PSYCHOLOGY | Facility: CLINIC | Age: 62
End: 2017-06-16
Payer: COMMERCIAL

## 2017-06-16 DIAGNOSIS — F33.1 MAJOR DEPRESSIVE DISORDER, RECURRENT EPISODE, MODERATE WITH ANXIOUS DISTRESS (H): Primary | ICD-10-CM

## 2017-06-16 PROCEDURE — 90834 PSYTX W PT 45 MINUTES: CPT | Performed by: COUNSELOR

## 2017-06-16 NOTE — PROGRESS NOTES
"                                           Progress Note    Client Name: Brandi Stern  Date: 6/16/2017         Service Type: Individual      Session Start Time: 11:05a  Session End Time: 11:50a      Session Length: 45 minutes     Session #: 10     Attendees: Client attended alone    Treatment Plan Last Reviewed: 6/16/2017  PHQ-9: 6     DATA      Progress Since Last Session (Related to Symptoms / Goals / Homework):   Symptoms: Stable, little interest, depressed mood, trouble sleeping, tired, low energy, poor appetite, guilt    Homework: By next appt, client will continue to abstain from smoking      Episode of Care Goals: Satisfactory progress - PREPARATION (Decided to change - considering how); Intervened by negotiating a change plan and determining options / strategies for behavior change, identifying triggers, exploring social supports, and working towards setting a date to begin behavior change     Current / Ongoing Stressors and Concerns:  Reported increased depression and emotionality due to father's day coming up, father's birthday on 6/6, and interpersonal difficulties with mother; mother's health has been deteriorating and client reported struggling to support her as she can be difficult and emotionally draining; acknowledged needing to set limits with mother, but feeling sad and guilty when she does not talk with her; expressed feeling vulnerable and bad for herself and missing father because father understood her better; reported \"falling off the wagon\" and smoked 2 packs of cigarettes and drank wine due to stress from mother and reminders of father; despite increased depression and emotionality, client reported managing well, declined needing more frequent appts; reported walking the dog, reading, following through with medical appts for elbow/neck pain, doing yoga, and continuing work and income management.      Treatment Objective(s) Addressed in This Session:     Client will learn 3 new skills to " "cope with stress other than smoking.      Intervention:   Motivational Interviewing: continue to evoke desire and readiness to change, pointing out discrepancies; CBT: prompting client to identify mental health needs and maladaptive behaviors that she would like to change, teaching assertiveness skills and emotion identification and regulation, teach self-reflection to identify mental health needs and to help with decision making and effective coping, identify intentional behavior changes that are contributing to improved mood; DBT: teach and reinforce consistent boundary setting; Interpersonal therapy: continue to prompt client to engage in emotional deepening to address core issues      ASSESSMENT: Current Emotional / Mental Status (status of significant symptoms):   Client has had a history of suicidal ideation: passive thoughts of dealth, \"Is there pain in the afterlife?\", 1 suicide attempt: in college after breakup with boyfriend,  was drinking and ingested pills, no hospitalization, would never really hurt self   Client denies current fears or concerns for personal safety.   Client reports the following current or recent suicidal ideation or behaviors: passive thoughts of dealth, \"Is there pain in the afterlife?\". Would never really hurt self.   Client denies current or recent homicidal ideation or behaviors.   Client denies current or recent self injurious behavior or ideation.   Client denies other safety concerns.   Client reports there are no firearms in the house.   A safety and risk management plan has not been developed at this time, however client was given the after-hours number / 911 should there be a change in any of  these risk factors.     Appearance:   Appropriate    Eye Contact:   Fair    Psychomotor Behavior: Normal    Attitude:   Cooperative    Orientation:   All   Speech    Rate / Production: Normal     Volume:  Normal    Mood:    Depressed Sad Tearful   Affect:    Appropriate     Thought " Content:  Clear    Thought Form:  Coherent  Circumstantial    Insight:    Fair      Medication Review:   No current changes     Medication Compliance:   Yes     Changes in Health Issues:   None reported     Chemical Use Review:   Substance Use: Chemical use reviewed, no active concerns identified     Tobacco Use: Smoked 2 pack of cigarettes, expressed plans to continue quitting      Collateral Reports Completed:   Not Applicable    PLAN: (Client Tasks / Therapist Tasks / Other)  Client will set limits with work by saying no and taking less patients within the next month; client will organize/clean her home within 12 sessions. Client will learn 3 new skills to cope with stress other smoking or decide if she wants to quit or not quit smoking; client will work on noting and attending to vulnerabilities of feeling emotional/sensative/teary eyed.Therapist will continue to use motivational interviewing to evoke change talk and CBT strategies to engage client in proactive problem solving, practice distress tolerance as it relates to work and interpersonal relationships, and teach emotion identification and regulation. Continue to reinforce boudanry setting. Therapist will also continue to build on client's strengths and areas of her life where she feels she is making progress.         Ronda Lackey [facility]                                                       ___________________________________________________________________    Treatment Plan    Client's Name: Brandi Stern  YOB: 1955    Date: 2/24/2017    DSM-V Diagnoses: 296.32 Major Depressive Disorder, Recurrent Episode, Moderate _ and With anxious distress  Psychosocial / Contextual Factors: Work stress, health concerns, family stress  WHODAS: 29    Referral / Collaboration:  Referral to another professional/service is not indicated at this time.    Anticipated number of session or this episode of care: 12      MeasurableTreatment Goal(s) related to  diagnosis / functional impairment(s)  Goal 1: Client will improve depressed mood as evidenced by decreased score on PHQ 9 from 19 (moderately severe) to score range of 10-14 (moderate).    I will know I've met my goal when I feel more accomplished, in control, house looks better, and I would not be smoking.      Objective #A (Client Action)    Client will set limits with work by saying no and taking less patients within the next month.  Status: New - Date: 2/24/2017     Intervention(s)  Therapist will role-play assertiveness and conflict management skills.  ...teach about healthy boundaries.    Objective #B  Client will organize/clean her home within 12 sessions.   Status: New - Date: 2/24/2017     Intervention(s)  Therapist will teach emotional regulation skills and developing routine.    Objective #C  Client will learn 3 new skills to cope with stress other than smoking.   Status: New - Date: 2/24/2017     Intervention(s)  Therapist will assign homework of practicing skills at home.  ...teach self care, distraction, and distress tolerance skills.      Client has reviewed and agreed to the above plan.      Ronda Lackey [facility]  February 24, 2017

## 2017-06-16 NOTE — MR AVS SNAPSHOT
MRN:[id number]                      After Visit Summary   6/16/2017    Brandi Stern    MRN: [id number]           Visit Information        Provider Department      6/16/2017 11:00 AM Ronda Lackey [facility]      Your next 10 appointments already scheduled     Jun 26, 2017  1:15 PM CDT   (Arrive by 1:00 PM)   Return Visit with Terrance Buckner MD   [facility] ([facility])    [address] [facility]  [address] [facility] [id number] [phone]            Jul 14, 2017 11:00 AM CDT   Return Visit with Ronda Lackey [facility] ([facility])    [facility]  [address] [id number] [phone]              MyChart Information     [employer] gives you secure access to your electronic health record. If you see a primary care provider, you can also send messages to your care team and make appointments. If you have questions, please call your primary care clinic.  If you do not have a primary care provider, please call [phone] and they will assist you.        Care EveryWhere ID     This is your Care EveryWhere ID. This could be used by other organizations to access your [place] medical records  [id number]

## 2017-06-17 ASSESSMENT — PATIENT HEALTH QUESTIONNAIRE - PHQ9: SUM OF ALL RESPONSES TO PHQ QUESTIONS 1-9: 6

## 2017-06-22 DIAGNOSIS — M51.369 DDD (DEGENERATIVE DISC DISEASE), LUMBAR: ICD-10-CM

## 2017-06-22 DIAGNOSIS — M50.30 DDD (DEGENERATIVE DISC DISEASE), CERVICAL: ICD-10-CM

## 2017-06-22 RX ORDER — DICLOFENAC SODIUM 100 MG/1
100 TABLET, FILM COATED, EXTENDED RELEASE ORAL DAILY
Qty: 30 TABLET | Refills: 3 | Status: SHIPPED | OUTPATIENT
Start: 2017-06-22 | End: 2017-11-01

## 2017-06-22 NOTE — TELEPHONE ENCOUNTER
Received fax request from [facility] pharmacy requesting refill(s) for diclofenac (VOLTAREN-XR) 100 MG 24 hr tablet    Last refilled on 5/22/17    Pt last seen on 2/3/17  Next appt scheduled for none    Estuardo Martines MA  [facility]      Will facilitate refill.

## 2017-06-26 ENCOUNTER — OFFICE VISIT (OUTPATIENT)
Dept: ORTHOPEDICS | Facility: CLINIC | Age: 62
End: 2017-06-26

## 2017-06-26 VITALS
WEIGHT: 225 LBS | HEIGHT: 64 IN | DIASTOLIC BLOOD PRESSURE: 84 MMHG | BODY MASS INDEX: 38.41 KG/M2 | SYSTOLIC BLOOD PRESSURE: 118 MMHG

## 2017-06-26 DIAGNOSIS — M79.601 RIGHT ARM PAIN: Primary | ICD-10-CM

## 2017-06-26 DIAGNOSIS — M54.10 RADICULITIS: ICD-10-CM

## 2017-06-26 NOTE — MR AVS SNAPSHOT
After Visit Summary   6/26/2017    Brandi Stern    MRN: [id number]           Patient Information     Date Of Birth          1955        Visit Information        Provider Department      6/26/2017 1:15 PM Terrance Buckner MD [facility]        Today's Diagnoses     Right arm pain    -  1    Radiculitis           Follow-ups after your visit        Your next 10 appointments already scheduled     Jul 14, 2017 11:00 AM CDT   Return Visit with Ronda Lackey [facility] ([facility])    [facility]  [address]  [facility] [id number] [phone]            Jul 20, 2017  3:15 PM CDT   (Arrive by 3:00 PM)   EMG with Pavan Quinn MD   [facility] ([facility])    [address] [place] [address]  [address] [facility] [address] [phone]           Do not use lotions or creams on the area to be tested. If you are on blood thinners (Warfarin, Coumadin, Lovenox, etc), please contact your primary care physician to check if it is safe to stop them 3 days prior to testing. If you have anxiety, please check with your referring physician to obtain anti-anxiety medication for the procedure.            Jul 24, 2017  2:00 PM CDT   (Arrive by 1:45 PM)   Return Visit with Terrance Buckner MD   [facility] ([facility])    [address] [place] [address]  [address] [facility] [address] [phone]              Future tests that were ordered for you today     Open Future Orders        Priority Expected Expires Ordered    EMG ([facility]) Routine  7/26/2017 6/26/2017            Who to contact     Please call your clinic at [phone] to:    Ask questions about your health    Make or cancel appointments    Discuss your medicines    Learn about your test results    Speak to your doctor   If you have compliments or concerns about an experience at your  "clinic, or if you wish to file a complaint, please contact [facility] Patient Relations at [phone] or email us at [email or web address]         Additional Information About Your Visit        [employer] Information     [employer] gives you secure access to your electronic health record. If you see a primary care provider, you can also send messages to your care team and make appointments. If you have questions, please call your primary care clinic.  If you do not have a primary care provider, please call [phone] and they will assist you.      [employer] is an electronic gateway that provides easy, online access to your medical records. With [employer], you can request a clinic appointment, read your test results, renew a prescription or communicate with your care team.     To access your existing account, please contact your [facility] Clinic or call [phone] for assistance.        Care EveryWhere ID     This is your Care EveryWhere ID. This could be used by other organizations to access your [place] medical records  [id number]        Your Vitals Were     Height Last Period BMI (Body Mass Index)             5' 4\" (1.626 m) (LMP Unknown) 38.62 kg/m2          Blood Pressure from Last 3 Encounters:   06/26/17 118/84   06/15/17 118/84   03/08/17 122/82    Weight from Last 3 Encounters:   06/26/17 225 lb (102.1 kg)   06/15/17 225 lb 6 oz (102.2 kg)   03/08/17 225 lb 6 oz (102.2 kg)               Primary Care Provider Office Phone # Fax #    Cherie Brennan [phone]       [facility] [address] [facility]  [facility] [address]        Equal Access to Services     LORNE HALEY AH: Hadii aad ku hadasho Soomaali, waaxda luqadaha, qaybta kaalmada adeegyada, adrián coonn hakeem michael lagisele perkins. So [facility] [phone].    ATENCIÓN: Si habla español, tiene a rizvi disposición servicios gratuitos de asistencia lingüística. Llame al " [phone].    We comply with applicable federal civil rights laws and [place] laws. We do not discriminate on the basis of race, color, national origin, age, disability sex, sexual orientation or gender identity.            Thank you!     Thank you for choosing [facility]  for your care. Our goal is always to provide you with excellent care. Hearing back from our patients is one way we can continue to improve our services. Please take a few minutes to complete the written survey that you may receive in the mail after your visit with us. Thank you!             Your Updated Medication List - Protect others around you: Learn how to safely use, store and throw away your medicines at www.disposemymeds.org.          This list is accurate as of: 6/26/17  1:39 PM.  Always use your most recent med list.                   Brand Name Dispense Instructions for use Diagnosis    ACETAMINOPHEN EXTRA STRENGTH PO      None Entered        * albuterol (2.5 MG/3ML) 0.083% neb solution     3 mL    Take  by nebulization. take 3 mL by nebulization 4 times daily as needed    Moderate persistent asthma       * albuterol 108 (90 BASE) MCG/ACT Inhaler   Generic drug:  albuterol     1 Inhaler    Inhale 1-2 puffs into the lungs every 6 hours as needed for shortness of breath / dyspnea.    Moderate persistent asthma with exacerbation       atorvastatin 10 MG tablet    LIPITOR    90 tablet    Take 1 tablet (10 mg) by mouth daily    Hyperlipidemia LDL goal <130       CALCIUM 500 PO      Take 1 tablet by mouth daily        Capsaicin 0.1 % cream     1 Tube    Externally apply topically 3 times daily    Primary osteoarthritis involving multiple joints       CLARITIN 10 MG tablet   Generic drug:  loratadine     0    1 TAB PO QD (Once per day) as needed for ALLERGY SYMPTOMS    Allergic rhinitis due to other allergen       CO Q 10 PO      Take 200 mg by mouth daily        cyclobenzaprine 5 MG tablet    FLEXERIL    180 tablet    Take  1-2 tablets (5-10 mg) by mouth 3 times daily as needed for muscle spasms    Spasm of back muscles       desvenlafaxine succinate 50 MG 24 hr tablet    PRISTIQ    30 tablet    Take 1 tablet (50 mg) by mouth daily    Major depressive disorder, recurrent episode, moderate (H)       diclofenac 1 % Gel topical gel    VOLTAREN    100 g    Apply 4 grams to knees or 2 grams to feet four times daily using enclosed dosing card.    Right elbow pain       diclofenac 100 MG 24 hr tablet    VOLTAREN-XR    30 tablet    Take 1 tablet (100 mg) by mouth daily    DDD (degenerative disc disease), cervical, DDD (degenerative disc disease), lumbar       fenofibrate 145 MG tablet     90 tablet    Take 1 tablet (145 mg) by mouth daily    Pure hyperglyceridemia       ferrous sulfate 325 (65 FE) MG tablet    IRON    30 tablet    Take 1 tablet (325 mg) by mouth daily (with breakfast)    Chronic fatigue       FIBER PO      Twice daily        ipratropium 0.02 % neb solution    ATROVENT    225 mL    Take 2.5 mLs (0.5 mg) by nebulization 4 times daily    Asthma exacerbation       ipratropium 17 MCG/ACT Inhaler    ATROVENT HFA    1 Inhaler    Inhale 2 puffs into the lungs 4 times daily as needed for wheezing    Bronchitis, acute, with bronchospasm       levothyroxine 75 MCG tablet    SYNTHROID    90 tablet    Take 1 tablet (75 mcg) by mouth every morning Overdue for labs    Chronic lymphocytic thyroiditis       Lutein 10 MG Tabs      Take 10 mg by mouth daily        MELATONIN PO      Take 5 mg by mouth nightly as needed        MIRAPEX 0.125 MG tablet   Generic drug:  pramipexole      Take two tabs at dinner and one at hs        omeprazole 20 MG CR capsule    priLOSEC    180 capsule    Take 1 capsule (20 mg) by mouth 2 times daily    Gastroesophageal reflux disease without esophagitis       ranitidine 300 MG tablet    ZANTAC    60 tablet    Take 1 tablet (300 mg) by mouth 2 times daily    Gastroesophageal reflux disease without esophagitis        SYMBICORT 160-4.5 MCG/ACT Inhaler   Generic drug:  budesonide-formoterol      Inhale 1 puff into the lungs 2 times daily    Moderate persistent asthma without complication       triamcinolone 55 MCG/ACT nasal inhaler    NASACORT AQ    1 Inhaler    Inhale 2 sprays in both nostrils every day as needed    Allergic rhinitis due to other allergen       UNABLE TO FIND      MEDICATION NAME: Allergy shots        VITAMIN C PO      Take 1,000 mg by mouth daily        zolpidem 10 MG tablet    AMBIEN    30 tablet    0.5-1 tablet nightly as needed    Insomnia, unspecified       * Notice:  This list has 2 medication(s) that are the same as other medications prescribed for you. Read the directions carefully, and ask your doctor or other care provider to review them with you.

## 2017-06-26 NOTE — PROGRESS NOTES
Subjective:   Brandi Stern is a 61 year old female who follows up with right arm and elbow pain. She notes that she has had complete resolution of her right fourth and fifth digit symptoms with use of the wrist splint at night.  The concern primarily was ulnar neuritis secondary to intermittent entrapment of Guyon canal.  She notes now that she is having more difficulties in the medial elbow on the right.  She describes this as both burning and paresthesias which can radiate up and down the arm.  She denies any history of overuse.  She states that when this occurs she has difficulty finding a comfortable position and oftentimes ends up letting her arm hang to her side but also notes that if she bends her neck to the left this can also potentially provide some relief.  She denies any motor symptoms.       Date of injury: None  Date last seen: 3/27/2017  Following Therapeutic Plan: Yes, splint  Pain: Worsening  Function: Worsening  Interval History:      PAST MEDICAL, SOCIAL, SURGICAL AND FAMILY HISTORY: She  has a past medical history of Allergic rhinitis due to other allergen; Apneas, obstructive sleep; Chronic lymphocytic thyroiditis; COPD (chronic obstructive pulmonary disease) (H); Depressive disorder, not elsewhere classified; Disorder of bone and cartilage, unspecified; Esophageal reflux; Essential hypertension, benign; Generalized osteoarthrosis, unspecified site; HASHIMOTO'S THYROIDITIS (11/15/2004); HASHIMOTO'S THYROIDITIS; Headache above the eye region; Hiatal hernia; Insomnia, unspecified; Moderate persistent asthma; Nasal congestion; Pure hyperglyceridemia; Scoliosis; Snoring; and Tobacco use disorder. She also has no past medical history of Cerebral infarction (H); Complication of anesthesia; Congestive heart failure, unspecified; Coronary artery disease; CVA (cerebral infarction); Diabetes (H); Heart disease; History of blood transfusion; or Malignant hyperthermia.  She  has a past surgical history  that includes Gall bladder removal (2011); arthroscopy knee rt/lt (2/07); TOTAL KNEE ARTHROPLASTY; Esophagoscopy, gastroscopy, duodenoscopy (EGD), combined (8/5/2014); Colonoscopy (8/5/2014); Abdomen surgery; biopsy; and Cholecystectomy.  Her family history includes Allergies in her father; Arthritis in her father; Asthma in her paternal grandmother; Blood Disease in her father; C.A.D. in her maternal grandfather and maternal grandmother; CANCER in her father and paternal grandmother; CEREBROVASCULAR DISEASE in her maternal grandmother and paternal grandfather; Cancer - colorectal in her paternal grandmother; Cardiovascular in her maternal grandfather; Circulatory in her maternal grandfather; Coronary Artery Disease in her mother; DIABETES in her paternal grandmother; Eye Disorder in her mother; Genitourinary Problems in her father; Hearing Loss in her maternal grandmother; Hypertension in her mother; Lipids in her mother; Neurologic Disorder in her mother; OSTEOPOROSIS in her mother; Respiratory in her maternal grandmother; Thyroid Disease in her sister. There is no history of Breast Cancer.  She reports that she quit smoking about 9 years ago. Her smoking use included Cigarettes. She started smoking about 45 years ago. She has a 10.00 pack-year smoking history. She has never used smokeless tobacco. She reports that she drinks alcohol. She reports that she does not use illicit drugs.    ALLERGIES: She is allergic to animal dander; fluconazole; liquid adhesive; seasonal allergies; suture; and vistaril [hydroxyzine hcl].    CURRENT MEDICATIONS: She has a current medication list which includes the following prescription(s): diclofenac, diclofenac, fenofibrate, zolpidem, desvenlafaxine succinate, ranitidine, atorvastatin, omeprazole, symbicort, cyclobenzaprine, levothyroxine, melatonin, UNABLE TO FIND, capsaicin, ferrous sulfate, ascorbic acid, calcium-magnesium-vitamin d, lutein, ipratropium, ipratropium,  "triamcinolone, albuterol, albuterol, coenzyme q10, mirapex, acetaminophen, fiber, and claritin.     REVIEW OF SYSTEMS: 12 point review of systems is negative except as noted above.     Exam:   /84  Ht 5' 4\" (1.626 m)  Wt 225 lb (102.1 kg)  LMP  (LMP Unknown)  BMI 38.62 kg/m2      CONSTITUTIONAL: healthy, alert and no distress  HEAD: Normocephalic. No masses, lesions, tenderness or abnormalities  SKIN: no suspicious lesions or rashes  GAIT: normal  NEUROLOGIC: Non-focal, Normal muscle tone and strength, reflexes normal, sensation grossly normal.  PSYCHIATRIC: affect normal/bright and mentation appears normal.     CERVICAL SPINE:  She has functional range of motion of the cervical spine but limited extension.  She has negative Spurling maneuver bilaterally.   RIGHT ELBOW:  She has full range of motion with no pain or discomfort with full flexion, extension, pronation or supination.  She is nontender over the medial epicondyle and the antecubital fossa.  She is nontender over the cubital tunnel.      ASSESSMENT:  Brandi is a 61-year-old female with right upper extremity symptoms.  She had a prior entrapment of the ulnar nerve at Guyon canal which was present clinically and this has resolved with her wrist splint but now it is unclear if she is having principally cervical radiculitis or ulnar nerve entrapment of the cubital tunnel.  The next step will be to proceed with electrodiagnostics to try and differentiate this.  If this appears to be most consistent with cervical entrapment, given the changes on her MRI of the cervical spine from 1 year ago, we would probably need to repeat her cervical MRI.  She will follow up with me pending this.      Thank you for allowing me to participate in her care.         "

## 2017-06-26 NOTE — LETTER
6/26/2017      RE: Brandi Stern  [address] [place] [address]        Subjective:   Brandi Stern is a 61 year old female who follows up with right arm and elbow pain. She notes that she has had complete resolution of her right fourth and fifth digit symptoms with use of the wrist splint at night.  The concern primarily was ulnar neuritis secondary to intermittent entrapment of Guyon canal.  She notes now that she is having more difficulties in the medial elbow on the right.  She describes this as both burning and paresthesias which can radiate up and down the arm.  She denies any history of overuse.  She states that when this occurs she has difficulty finding a comfortable position and oftentimes ends up letting her arm hang to her side but also notes that if she bends her neck to the left this can also potentially provide some relief.  She denies any motor symptoms.       Date of injury: None  Date last seen: 3/27/2017  Following Therapeutic Plan: Yes, splint  Pain: Worsening  Function: Worsening  Interval History:      PAST MEDICAL, SOCIAL, SURGICAL AND FAMILY HISTORY: She  has a past medical history of Allergic rhinitis due to other allergen; Apneas, obstructive sleep; Chronic lymphocytic thyroiditis; COPD (chronic obstructive pulmonary disease) (H); Depressive disorder, not elsewhere classified; Disorder of bone and cartilage, unspecified; Esophageal reflux; Essential hypertension, benign; Generalized osteoarthrosis, unspecified site; HASHIMOTO'S THYROIDITIS (11/15/2004); HASHIMOTO'S THYROIDITIS; Headache above the eye region; Hiatal hernia; Insomnia, unspecified; Moderate persistent asthma; Nasal congestion; Pure hyperglyceridemia; Scoliosis; Snoring; and Tobacco use disorder. She also has no past medical history of Cerebral infarction (H); Complication of anesthesia; Congestive heart failure, unspecified; Coronary artery disease; CVA (cerebral infarction); Diabetes (H); Heart disease;  History of blood transfusion; or Malignant hyperthermia.  She  has a past surgical history that includes Gall bladder removal (2011); arthroscopy knee rt/lt (2/07); TOTAL KNEE ARTHROPLASTY; Esophagoscopy, gastroscopy, duodenoscopy (EGD), combined (8/5/2014); Colonoscopy (8/5/2014); Abdomen surgery; biopsy; and Cholecystectomy.  Her family history includes Allergies in her father; Arthritis in her father; Asthma in her paternal grandmother; Blood Disease in her father; C.A.D. in her maternal grandfather and maternal grandmother; CANCER in her father and paternal grandmother; CEREBROVASCULAR DISEASE in her maternal grandmother and paternal grandfather; Cancer - colorectal in her paternal grandmother; Cardiovascular in her maternal grandfather; Circulatory in her maternal grandfather; Coronary Artery Disease in her mother; DIABETES in her paternal grandmother; Eye Disorder in her mother; Genitourinary Problems in her father; Hearing Loss in her maternal grandmother; Hypertension in her mother; Lipids in her mother; Neurologic Disorder in her mother; OSTEOPOROSIS in her mother; Respiratory in her maternal grandmother; Thyroid Disease in her sister. There is no history of Breast Cancer.  She reports that she quit smoking about 9 years ago. Her smoking use included Cigarettes. She started smoking about 45 years ago. She has a 10.00 pack-year smoking history. She has never used smokeless tobacco. She reports that she drinks alcohol. She reports that she does not use illicit drugs.    ALLERGIES: She is allergic to animal dander; fluconazole; liquid adhesive; seasonal allergies; suture; and vistaril [hydroxyzine hcl].    CURRENT MEDICATIONS: She has a current medication list which includes the following prescription(s): diclofenac, diclofenac, fenofibrate, zolpidem, desvenlafaxine succinate, ranitidine, atorvastatin, omeprazole, symbicort, cyclobenzaprine, levothyroxine, melatonin, UNABLE TO FIND, capsaicin, ferrous sulfate,  "ascorbic acid, calcium-magnesium-vitamin d, lutein, ipratropium, ipratropium, triamcinolone, albuterol, albuterol, coenzyme q10, mirapex, acetaminophen, fiber, and claritin.     REVIEW OF SYSTEMS: 12 point review of systems is negative except as noted above.     Exam:   /84  Ht 5' 4\" (1.626 m)  Wt 225 lb (102.1 kg)  LMP  (LMP Unknown)  BMI 38.62 kg/m2      CONSTITUTIONAL: healthy, alert and no distress  HEAD: Normocephalic. No masses, lesions, tenderness or abnormalities  SKIN: no suspicious lesions or rashes  GAIT: normal  NEUROLOGIC: Non-focal, Normal muscle tone and strength, reflexes normal, sensation grossly normal.  PSYCHIATRIC: affect normal/bright and mentation appears normal.     CERVICAL SPINE:  She has functional range of motion of the cervical spine but limited extension.  She has negative Spurling maneuver bilaterally.   RIGHT ELBOW:  She has full range of motion with no pain or discomfort with full flexion, extension, pronation or supination.  She is nontender over the medial epicondyle and the antecubital fossa.  She is nontender over the cubital tunnel.      ASSESSMENT:  Brandi is a 61-year-old female with right upper extremity symptoms.  She had a prior entrapment of the ulnar nerve at Guyon canal which was present clinically and this has resolved with her wrist splint but now it is unclear if she is having principally cervical radiculitis or ulnar nerve entrapment of the cubital tunnel.  The next step will be to proceed with electrodiagnostics to try and differentiate this.  If this appears to be most consistent with cervical entrapment, given the changes on her MRI of the cervical spine from 1 year ago, we would probably need to repeat her cervical MRI.  She will follow up with me pending this.      Thank you for allowing me to participate in her care.           Terrance Buckner MD    "

## 2017-07-07 ENCOUNTER — OFFICE VISIT (OUTPATIENT)
Dept: URGENT CARE | Facility: URGENT CARE | Age: 62
End: 2017-07-07
Payer: COMMERCIAL

## 2017-07-07 VITALS
SYSTOLIC BLOOD PRESSURE: 104 MMHG | BODY MASS INDEX: 38.41 KG/M2 | DIASTOLIC BLOOD PRESSURE: 70 MMHG | TEMPERATURE: 98.5 F | OXYGEN SATURATION: 96 % | WEIGHT: 225 LBS | RESPIRATION RATE: 20 BRPM | HEART RATE: 95 BPM | HEIGHT: 64 IN

## 2017-07-07 DIAGNOSIS — J20.9 ACUTE BRONCHITIS WITH SYMPTOMS > 10 DAYS: Primary | ICD-10-CM

## 2017-07-07 DIAGNOSIS — J45.41 MODERATE PERSISTENT ASTHMA WITH EXACERBATION: ICD-10-CM

## 2017-07-07 PROCEDURE — 99214 OFFICE O/P EST MOD 30 MIN: CPT | Performed by: NURSE PRACTITIONER

## 2017-07-07 RX ORDER — PREDNISONE 20 MG/1
40 TABLET ORAL DAILY
Qty: 10 TABLET | Refills: 0 | Status: SHIPPED | OUTPATIENT
Start: 2017-07-07 | End: 2017-07-12

## 2017-07-07 RX ORDER — ALBUTEROL SULFATE 90 UG/1
1-2 AEROSOL, METERED RESPIRATORY (INHALATION) EVERY 6 HOURS PRN
Qty: 1 INHALER | Refills: 1 | Status: SHIPPED | OUTPATIENT
Start: 2017-07-07

## 2017-07-07 ASSESSMENT — ENCOUNTER SYMPTOMS
EYES NEGATIVE: 1
SHORTNESS OF BREATH: 1
COUGH: 1
MYALGIAS: 1
WHEEZING: 1
CHILLS: 1
DIZZINESS: 0
HEADACHES: 1
FOCAL WEAKNESS: 0
SENSORY CHANGE: 0
SPUTUM PRODUCTION: 1
FEVER: 1

## 2017-07-07 NOTE — MR AVS SNAPSHOT
After Visit Summary   7/7/2017    Brandi Stern    MRN: [id number]           Patient Information     Date Of Birth          1955        Visit Information        Provider Department      7/7/2017 5:10 PM Liliana Segura APRN CNP [facility]        Today's Diagnoses     Acute bronchitis with symptoms > 10 days    -  1    Moderate persistent asthma with exacerbation          Care Instructions      Bronchitis, Antibiotic Treatment (Adult)    Bronchitis is an infection of the air passages (bronchial tubes) in your lungs. It often occurs when you have a cold. This illness is contagious during the first few days and is spread through the air by coughing and sneezing, or by direct contact (touching the sick person and then touching your own eyes, nose, or mouth).  Symptoms of bronchitis include cough with mucus (phlegm) and low-grade fever. Bronchitis usually lasts 7 to 14 days. Mild cases can be treated with simple home remedies. More severe infection is treated with an antibiotic.  Home care  Follow these guidelines when caring for yourself at home:    If your symptoms are severe, rest at home for the first 2 to 3 days. When you go back to your usual activities, don't let yourself get too tired.    Do not smoke. Also avoid being exposed to secondhand smoke.    You may use over-the-counter medicines to control fever or pain, unless another medicine was prescribed. (Note: If you have chronic liver or kidney disease or have ever had a stomach ulcer or gastrointestinal bleeding, talk with your healthcare provider before using these medicines. Also talk to your provider if you are taking medicine to prevent blood clots.) Aspirin should never be given to anyone younger than 18 years of age who is ill with a viral infection or fever. It may cause severe liver or brain damage.    Your appetite may be poor, so a light diet is fine. Avoid dehydration by drinking 6 to 8 glasses of  fluids per day (such as water, soft drinks, sports drinks, juices, tea, or soup). Extra fluids will help loosen secretions in the nose and lungs.    Over-the-counter cough, cold, and sore-throat medicines will not shorten the length of the illness, but they may be helpful to reduce symptoms. (Note: Do not use decongestants if you have high blood pressure.)    Finish all antibiotic medicine. Do this even if you are feeling better after only a few days.  Follow-up care  Follow up with your healthcare provider, or as advised. If you had an X-ray or ECG (electrocardiogram), a specialist will review it. You will be notified of any new findings that may affect your care.  Note: If you are age 65 or older, or if you have a chronic lung disease or condition that affects your immune system, or you smoke, talk to your healthcare provider about having pneumococcal vaccinations and a yearly influenza vaccination (flu shot).  When to seek medical advice  Call your healthcare provider right away if any of these occur:    Fever of 100.4 F (38 C) or higher    Coughing up increased amounts of colored sputum    Weakness, drowsiness, headache, facial pain, ear pain, or a stiff neck  Call 911, or get immediate medical care  Contact emergency services right away if any of these occur.    Coughing up blood    Worsening weakness, drowsiness, headache, or stiff neck    Trouble breathing, wheezing, or pain with breathing  Date Last Reviewed: 9/13/2015 2000-2017 [employer]. [address] [place] [address]. All rights reserved. This information is not intended as a substitute for professional medical care. Always follow your healthcare professional's instructions.                Follow-ups after your visit        Follow-up notes from your care team     Return if symptoms worsen or fail to improve, for Recheck with primary care provider.      Your next 10 appointments already scheduled     Jul 14, 2017 11:00 AM CDT    Return Visit with Ronda Lackey [facility] ([facility])    [facility]  [address]  [facility] [id number] [phone]            Jul 20, 2017  3:15 PM CDT   (Arrive by 3:00 PM)   EMG with aPvan Quinn MD   [facility] ([facility] [place])    [address]  [address] [facility] [address] [phone]           Do not use lotions or creams on the area to be tested. If you are on blood thinners (Warfarin, Coumadin, Lovenox, etc), please contact your primary care physician to check if it is safe to stop them 3 days prior to testing. If you have anxiety, please check with your referring physician to obtain anti-anxiety medication for the procedure.            Jul 24, 2017  2:00 PM CDT   (Arrive by 1:45 PM)   Return Visit with Terrance Buckner MD   [facility] ([facility])    [address] [place] [address]  [address] [facility] [address] [phone]              Who to contact     If you have questions or need follow up information about today's clinic visit or your schedule please contact [facility] directly at [phone].  Normal or non-critical lab and imaging results will be communicated to you by MyChart, letter or phone within 4 business days after the clinic has received the results. If you do not hear from us within 7 days, please contact the clinic through MyChart or phone. If you have a critical or abnormal lab result, we will notify you by phone as soon as possible.  Submit refill requests through [employer] or call your pharmacy and they will forward the refill request to us. Please allow 3 business days for your refill to be completed.          Additional Information About Your Visit        [employer] Information     [employer] gives you secure access to your electronic health record. If you see a primary care provider, you can  "also send messages to your care team and make appointments. If you have questions, please call your primary care clinic.  If you do not have a primary care provider, please call [phone] and they will assist you.        Care EveryWhere ID     This is your Care EveryWhere ID. This could be used by other organizations to access your [facility] medical records  [id number]        Your Vitals Were     Pulse Temperature Respirations Height Last Period Pulse Oximetry    95 98.5  F (36.9  C) (Oral) 20 5' 4\" (1.626 m) (LMP Unknown) 96%    BMI (Body Mass Index)                   38.62 kg/m2            Blood Pressure from Last 3 Encounters:   07/07/17 104/70   06/26/17 118/84   06/15/17 118/84    Weight from Last 3 Encounters:   07/07/17 225 lb (102.1 kg)   06/26/17 225 lb (102.1 kg)   06/15/17 225 lb 6 oz (102.2 kg)              Today, you had the following     No orders found for display         Today's Medication Changes          These changes are accurate as of: 7/7/17  6:03 PM.  If you have any questions, ask your nurse or doctor.               Start taking these medicines.        Dose/Directions    amoxicillin-clavulanate 875-125 MG per tablet   Commonly known as:  AUGMENTIN   Used for:  Acute bronchitis with symptoms > 10 days   Started by:  Liliana Segura APRN CNP        Dose:  1 tablet   Take 1 tablet by mouth 2 times daily   Quantity:  20 tablet   Refills:  0       predniSONE 20 MG tablet   Commonly known as:  DELTASONE   Used for:  Acute bronchitis with symptoms > 10 days   Started by:  Liliana Segura APRN CNP        Dose:  40 mg   Take 2 tablets (40 mg) by mouth daily for 5 days   Quantity:  10 tablet   Refills:  0            Where to get your medicines      These medications were sent to [facility]/pharmacy #[id number] - [place] - [address]  [address] [place] [address]     Phone:  [phone]     albuterol 108 (90 BASE) MCG/ACT Inhaler    amoxicillin-clavulanate 875-125 MG per " tablet    predniSONE 20 MG tablet                Primary Care Provider Office Phone # Fax #    Cherie Brennan, [phone]       [facility] [address] [place]  [facility] [address]        Equal Access to Services     LORNE HALEY : Hadii georgie ku hadjosephineo Soomaali, waaxda luqadaha, qaybta kaalmada adeegyada, waxay idiin hayaan adejuan luis kianmolly perkins. So [facility] [phone].    ATENCIÓN: Si habla español, tiene a rizvi disposición servicios gratuitos de asistencia lingüística. Llame al [phone].    We comply with applicable federal civil rights laws and [place] laws. We do not discriminate on the basis of race, color, national origin, age, disability sex, sexual orientation or gender identity.            Thank you!     Thank you for choosing [facility]  for your care. Our goal is always to provide you with excellent care. Hearing back from our patients is one way we can continue to improve our services. Please take a few minutes to complete the written survey that you may receive in the mail after your visit with us. Thank you!             Your Updated Medication List - Protect others around you: Learn how to safely use, store and throw away your medicines at www.disposemymeds.org.          This list is accurate as of: 7/7/17  6:03 PM.  Always use your most recent med list.                   Brand Name Dispense Instructions for use Diagnosis    ACETAMINOPHEN EXTRA STRENGTH PO      None Entered        * albuterol (2.5 MG/3ML) 0.083% neb solution     3 mL    Take  by nebulization. take 3 mL by nebulization 4 times daily as needed    Moderate persistent asthma       * albuterol 108 (90 BASE) MCG/ACT Inhaler    albuterol    1 Inhaler    Inhale 1-2 puffs into the lungs every 6 hours as needed for shortness of breath / dyspnea    Moderate persistent asthma with exacerbation       amoxicillin-clavulanate 875-125 MG per tablet    AUGMENTIN    20 tablet    Take 1 tablet  by mouth 2 times daily    Acute bronchitis with symptoms > 10 days       atorvastatin 10 MG tablet    LIPITOR    90 tablet    Take 1 tablet (10 mg) by mouth daily    Hyperlipidemia LDL goal <130       CALCIUM 500 PO      Take 1 tablet by mouth daily        Capsaicin 0.1 % cream     1 Tube    Externally apply topically 3 times daily    Primary osteoarthritis involving multiple joints       CLARITIN 10 MG tablet   Generic drug:  loratadine     0    1 TAB PO QD (Once per day) as needed for ALLERGY SYMPTOMS    Allergic rhinitis due to other allergen       CO Q 10 PO      Take 200 mg by mouth daily        cyclobenzaprine 5 MG tablet    FLEXERIL    180 tablet    Take 1-2 tablets (5-10 mg) by mouth 3 times daily as needed for muscle spasms    Spasm of back muscles       desvenlafaxine succinate 50 MG 24 hr tablet    PRISTIQ    30 tablet    Take 1 tablet (50 mg) by mouth daily    Major depressive disorder, recurrent episode, moderate (H)       diclofenac 1 % Gel topical gel    VOLTAREN    100 g    Apply 4 grams to knees or 2 grams to feet four times daily using enclosed dosing card.    Right elbow pain       diclofenac 100 MG 24 hr tablet    VOLTAREN-XR    30 tablet    Take 1 tablet (100 mg) by mouth daily    DDD (degenerative disc disease), cervical, DDD (degenerative disc disease), lumbar       fenofibrate 145 MG tablet     90 tablet    Take 1 tablet (145 mg) by mouth daily    Pure hyperglyceridemia       ferrous sulfate 325 (65 FE) MG tablet    IRON    30 tablet    Take 1 tablet (325 mg) by mouth daily (with breakfast)    Chronic fatigue       FIBER PO      Twice daily        ipratropium 0.02 % neb solution    ATROVENT    225 mL    Take 2.5 mLs (0.5 mg) by nebulization 4 times daily    Asthma exacerbation       ipratropium 17 MCG/ACT Inhaler    ATROVENT HFA    1 Inhaler    Inhale 2 puffs into the lungs 4 times daily as needed for wheezing    Bronchitis, acute, with bronchospasm       levothyroxine 75 MCG tablet     SYNTHROID    90 tablet    Take 1 tablet (75 mcg) by mouth every morning Overdue for labs    Chronic lymphocytic thyroiditis       Lutein 10 MG Tabs      Take 10 mg by mouth daily        MELATONIN PO      Take 5 mg by mouth nightly as needed        MIRAPEX 0.125 MG tablet   Generic drug:  pramipexole      Take two tabs at dinner and one at hs        omeprazole 20 MG CR capsule    priLOSEC    180 capsule    Take 1 capsule (20 mg) by mouth 2 times daily    Gastroesophageal reflux disease without esophagitis       predniSONE 20 MG tablet    DELTASONE    10 tablet    Take 2 tablets (40 mg) by mouth daily for 5 days    Acute bronchitis with symptoms > 10 days       ranitidine 300 MG tablet    ZANTAC    60 tablet    Take 1 tablet (300 mg) by mouth 2 times daily    Gastroesophageal reflux disease without esophagitis       SYMBICORT 160-4.5 MCG/ACT Inhaler   Generic drug:  budesonide-formoterol      Inhale 1 puff into the lungs 2 times daily    Moderate persistent asthma without complication       triamcinolone 55 MCG/ACT nasal inhaler    NASACORT AQ    1 Inhaler    Inhale 2 sprays in both nostrils every day as needed    Allergic rhinitis due to other allergen       UNABLE TO FIND      MEDICATION NAME: Allergy shots        VITAMIN C PO      Take 1,000 mg by mouth daily        zolpidem 10 MG tablet    AMBIEN    30 tablet    0.5-1 tablet nightly as needed    Insomnia, unspecified       * Notice:  This list has 2 medication(s) that are the same as other medications prescribed for you. Read the directions carefully, and ask your doctor or other care provider to review them with you.

## 2017-07-07 NOTE — PROGRESS NOTES
HPI  Brandi Stern 61 year old presents with a 3 week hx of URI that is now worsening. She endorses productive cough with green sputum, low-grade fever, exhaustion and mild exacerbation of her asthma symptoms. She has been performing self care and nebs but feels she has developed a bacterial component to the illness.     Past Medical History:   Diagnosis Date     Allergic rhinitis due to other allergen      Apneas, obstructive sleep      Chronic lymphocytic thyroiditis      COPD (chronic obstructive pulmonary disease) (H)      Depressive disorder, not elsewhere classified      Disorder of bone and cartilage, unspecified      Esophageal reflux      Essential hypertension, benign      Generalized osteoarthrosis, unspecified site     knees     HASHIMOTO'S THYROIDITIS 11/15/2004     HASHIMOTO'S THYROIDITIS      Headache above the eye region      Hiatal hernia      Insomnia, unspecified      Moderate persistent asthma      Nasal congestion      Pure hyperglyceridemia      Scoliosis      Snoring      Tobacco use disorder        Past Surgical History:   Procedure Laterality Date     ABDOMEN SURGERY      GB out     ARTHROSCOPY KNEE RT/LT  2/07    left knee     BIOPSY      Colon     C TOTAL KNEE ARTHROPLASTY      bilateral     CHOLECYSTECTOMY       COLONOSCOPY  8/5/2014    Procedure: COLONOSCOPY;  Surgeon: Mau De La Fuente MD;  Location:  GI     ESOPHAGOSCOPY, GASTROSCOPY, DUODENOSCOPY (EGD), COMBINED  8/5/2014    Procedure: COMBINED ESOPHAGOSCOPY, GASTROSCOPY, DUODENOSCOPY (EGD), BIOPSY SINGLE OR MULTIPLE;  Surgeon: Mau De La Fuente MD;  Location:  GI     Gall bladder removal  2011       Patient Active Problem List   Diagnosis     ALLERGIC RHINITIS      GERD     HASHIMOTO'S THYROIDITIS     Disorder of bone and cartilage     HYPERTRIGLYCERIDEMIA     Moderate persistent asthma     Insomnia     Osteoarthritis of multiple joints     Obstructive sleep apnea syndrome     Restless leg syndrome     Mild major  depression (H)     Hypertension goal BP (blood pressure) < 140/90     Hyperlipidemia LDL goal <130     Abnormal liver function     Advanced directives, counseling/discussion     Hiatal hernia     Patellar tendonitis     Patellofemoral arthritis, right     Vitamin D deficiency     Anemia     Iron deficiency anemia     Fibromyalgia     Headache above the eye region     Psychological factors affecting medical condition     Depression, major, recurrent, in partial remission (H)     Mixed hyperlipidemia     Major depressive disorder, recurrent episode, moderate (H)     Major depressive disorder, recurrent episode, moderate with anxious distress (H)     Allergies   Allergen Reactions     Animal Dander      Fluconazole      rash     Liquid Adhesive      Transdermal patch adhesive. Specifically to nicotine patch; not allergic to nicotine.      Seasonal Allergies      Suture      Non-healing suture line     Vistaril [Hydroxyzine Hcl]      Urinary retention     Current Outpatient Prescriptions   Medication     predniSONE (DELTASONE) 20 MG tablet     amoxicillin-clavulanate (AUGMENTIN) 875-125 MG per tablet     albuterol (ALBUTEROL) 108 (90 BASE) MCG/ACT Inhaler     diclofenac (VOLTAREN-XR) 100 MG 24 hr tablet     diclofenac (VOLTAREN) 1 % GEL topical gel     fenofibrate 145 MG tablet     desvenlafaxine succinate ER (PRISTIQ) 50 MG 24 hr tablet     ranitidine (ZANTAC) 300 MG tablet     atorvastatin (LIPITOR) 10 MG tablet     omeprazole (PRILOSEC) 20 MG CR capsule     SYMBICORT 160-4.5 MCG/ACT Inhaler     levothyroxine (SYNTHROID) 75 MCG tablet     MELATONIN PO     Capsaicin 0.1 % CREA     ferrous sulfate (IRON) 325 (65 FE) MG tablet     Ascorbic Acid (VITAMIN C PO)     Calcium-Magnesium-Vitamin D (CALCIUM 500 PO)     Lutein 10 MG TABS     ipratropium (ATROVENT HFA) 17 MCG/ACT inhaler     triamcinolone (NASACORT AQ) 55 MCG/ACT nasal inhaler     albuterol (2.5 MG/3ML) 0.083% nebulizer solution     Coenzyme Q10 (CO Q 10 PO)      "MIRAPEX 0.125 MG OR TABS     ACETAMINOPHEN EXTRA STRENGTH OR     FIBER OR     CLARITIN 10 MG OR TABS     zolpidem (AMBIEN) 10 MG tablet     cyclobenzaprine (FLEXERIL) 5 MG tablet     UNABLE TO FIND     ipratropium (ATROVENT) 0.02 % nebulizer solution     [DISCONTINUED] albuterol (PROAIR HFA) 108 (90 BASE) MCG/ACT inhaler     No current facility-administered medications for this visit.          Review of Systems   Constitutional: Positive for chills, fever and malaise/fatigue.   HENT: Positive for congestion.    Eyes: Negative.    Respiratory: Positive for cough, sputum production, shortness of breath and wheezing.    Genitourinary: Negative.    Musculoskeletal: Positive for myalgias.   Skin: Negative.    Neurological: Positive for headaches. Negative for dizziness, sensory change and focal weakness.   Endo/Heme/Allergies: Negative.          Physical Exam   Constitutional: She is well-developed, well-nourished, and in no distress. No distress.   Blood pressure 104/70, pulse 95, temperature 98.5  F (36.9  C), temperature source Oral, resp. rate 20, height 5' 4\" (1.626 m), weight 225 lb (102.1 kg), SpO2 96 %, not currently breastfeeding. No acute distress. No Active wheezing or respiratory distress.    HENT:   Head: Normocephalic.   Right Ear: External ear normal.   Left Ear: External ear normal.   Nose: Mucosal edema and rhinorrhea present.   Mouth/Throat: Oropharynx is clear and moist.   Eyes: Conjunctivae are normal.   Neck: Normal range of motion.   Cardiovascular: Normal rate, regular rhythm and normal heart sounds.    Pulmonary/Chest: Effort normal. No respiratory distress. She has wheezes. She has no rales. She exhibits no tenderness.   Abdominal: There is no tenderness.   Musculoskeletal: Normal range of motion.   Lymphadenopathy:     She has no cervical adenopathy.   Neurological: She is alert.   Skin: Skin is warm and dry.   Nursing note and vitals reviewed.    Assessment:  1. Acute bronchitis with symptoms > " 10 days    2. Moderate persistent asthma with exacerbation        Plan:  Orders Placed This Encounter     predniSONE (DELTASONE) 20 MG tablet     amoxicillin-clavulanate (AUGMENTIN) 875-125 MG per tablet     albuterol (ALBUTEROL) 108 (90 BASE) MCG/ACT Inhaler       Instructions regarding self-care of patient with bronchitis reviewed.   Written instructions provided in after visit summary and reviewed.  Patient instructed to see primary care provider for persistent symptoms.   If symptoms are emergent patient has been instructed to go to the closest emergency room for evaluation and treatment.   Please contact pharmacy for medication questions.  Patient instructed to take medications as directed on package.    Tylenol 1-2 tabs po q4h prn    Liliana Segura, APRN, CNP

## 2017-07-07 NOTE — NURSING NOTE
"Chief Complaint   Patient presents with     Urgent Care     URI     sick x 4 weeks, coughing up yellow green, blowing brown. Asthma flare up.        Initial /70  Pulse 95  Temp 98.5  F (36.9  C) (Oral)  Resp 20  Ht 5' 4\" (1.626 m)  Wt 225 lb (102.1 kg)  LMP  (LMP Unknown)  SpO2 96%  BMI 38.62 kg/m2 Estimated body mass index is 38.62 kg/(m^2) as calculated from the following:    Height as of this encounter: 5' 4\" (1.626 m).    Weight as of this encounter: 225 lb (102.1 kg).  Medication Reconciliation: complete  "

## 2017-07-07 NOTE — PATIENT INSTRUCTIONS
Bronchitis, Antibiotic Treatment (Adult)    Bronchitis is an infection of the air passages (bronchial tubes) in your lungs. It often occurs when you have a cold. This illness is contagious during the first few days and is spread through the air by coughing and sneezing, or by direct contact (touching the sick person and then touching your own eyes, nose, or mouth).  Symptoms of bronchitis include cough with mucus (phlegm) and low-grade fever. Bronchitis usually lasts 7 to 14 days. Mild cases can be treated with simple home remedies. More severe infection is treated with an antibiotic.  Home care  Follow these guidelines when caring for yourself at home:    If your symptoms are severe, rest at home for the first 2 to 3 days. When you go back to your usual activities, don't let yourself get too tired.    Do not smoke. Also avoid being exposed to secondhand smoke.    You may use over-the-counter medicines to control fever or pain, unless another medicine was prescribed. (Note: If you have chronic liver or kidney disease or have ever had a stomach ulcer or gastrointestinal bleeding, talk with your healthcare provider before using these medicines. Also talk to your provider if you are taking medicine to prevent blood clots.) Aspirin should never be given to anyone younger than 18 years of age who is ill with a viral infection or fever. It may cause severe liver or brain damage.    Your appetite may be poor, so a light diet is fine. Avoid dehydration by drinking 6 to 8 glasses of fluids per day (such as water, soft drinks, sports drinks, juices, tea, or soup). Extra fluids will help loosen secretions in the nose and lungs.    Over-the-counter cough, cold, and sore-throat medicines will not shorten the length of the illness, but they may be helpful to reduce symptoms. (Note: Do not use decongestants if you have high blood pressure.)    Finish all antibiotic medicine. Do this even if you are feeling better after only a  few days.  Follow-up care  Follow up with your healthcare provider, or as advised. If you had an X-ray or ECG (electrocardiogram), a specialist will review it. You will be notified of any new findings that may affect your care.  Note: If you are age 65 or older, or if you have a chronic lung disease or condition that affects your immune system, or you smoke, talk to your healthcare provider about having pneumococcal vaccinations and a yearly influenza vaccination (flu shot).  When to seek medical advice  Call your healthcare provider right away if any of these occur:    Fever of 100.4 F (38 C) or higher    Coughing up increased amounts of colored sputum    Weakness, drowsiness, headache, facial pain, ear pain, or a stiff neck  Call 911, or get immediate medical care  Contact emergency services right away if any of these occur.    Coughing up blood    Worsening weakness, drowsiness, headache, or stiff neck    Trouble breathing, wheezing, or pain with breathing  Date Last Reviewed: 9/13/2015 2000-2017 [employer] [address] [place] [address], [place] [address]. All rights reserved. This information is not intended as a substitute for professional medical care. Always follow your healthcare professional's instructions.

## 2017-07-10 ENCOUNTER — TELEPHONE (OUTPATIENT)
Dept: ORTHOPEDICS | Facility: CLINIC | Age: 62
End: 2017-07-10

## 2017-07-10 DIAGNOSIS — M79.601 RIGHT ARM PAIN: Primary | ICD-10-CM

## 2017-07-10 NOTE — TELEPHONE ENCOUNTER
Left voicemail for patient, informing her that a new order has been placed to extend the expiration date. She can now call back to schedule.

## 2017-07-10 NOTE — TELEPHONE ENCOUNTER
----- Message from Giselle Irby sent at 7/10/2017 11:42 AM CDT -----  Regarding: Need order extended past July 27th  Contact: [phone]  Pt is wanting to reschedule her EMG and the order needs to be extended past 07/27/2017.  Please call the pt back at this number [phone] when the order has been placed so that she can reschedule.   ThanksGiselle ctr  Please DO NOT send message and or reply back to sender. Call center Representatives DO NOT respond to Messages

## 2017-07-20 ENCOUNTER — TELEPHONE (OUTPATIENT)
Dept: ORTHOPEDICS | Facility: CLINIC | Age: 62
End: 2017-07-20

## 2017-07-20 DIAGNOSIS — M54.10 RADICULOPATHY, UNSPECIFIED SPINAL REGION: Primary | ICD-10-CM

## 2017-07-20 RX ORDER — GABAPENTIN 300 MG/1
CAPSULE ORAL
Qty: 30 CAPSULE | Refills: 0 | Status: SHIPPED | OUTPATIENT
Start: 2017-07-20 | End: 2017-08-24

## 2017-07-20 NOTE — TELEPHONE ENCOUNTER
Returned call to patient who complains of increased pain in the arm and scapula. She states that she has been taking Gabapentin to help her sleep at night and is requesting that Dr. Buckner fills this script as she is almost out.   Script filled and sent to [facility] on [facility] in [place].

## 2017-07-20 NOTE — TELEPHONE ENCOUNTER
Sports Medicine pt of Dr. Buckner last seen 6/26/2017 calls today c/o increased pain in her (R) arm. Will forward to Sports Med staff.

## 2017-07-20 NOTE — TELEPHONE ENCOUNTER
----- Message from Roopa Jurado RN sent at 7/20/2017  3:35 PM CDT -----  Regarding: Dr. Buckner pt with arm pain  Contact: [phone]  Brandi Loera calls today c/o increased (R) arm pain. She states she has EMG scheduled for next week. Please call pt when possible at [phone].  Thank you,  Roopa minor

## 2017-07-27 ENCOUNTER — OFFICE VISIT (OUTPATIENT)
Dept: NEUROLOGY | Facility: CLINIC | Age: 62
End: 2017-07-27

## 2017-07-27 DIAGNOSIS — M79.601 RIGHT ARM PAIN: ICD-10-CM

## 2017-07-27 DIAGNOSIS — M54.12 CERVICAL RADICULOPATHY: Primary | ICD-10-CM

## 2017-07-27 NOTE — MR AVS SNAPSHOT
After Visit Summary   7/27/2017    Brandi Stern    MRN: [id number]           Patient Information     Date Of Birth          1955        Visit Information        Provider Department      7/27/2017 9:15 AM William Christy MD [facility]        Today's Diagnoses     Cervical radiculopathy    -  1    Right arm pain           Follow-ups after your visit        Your next 10 appointments already scheduled     Aug 01, 2017  9:00 AM CDT   (Arrive by 8:45 AM)   Return Visit with Terrance Buckner MD   [facility] ([facility] [place])    [address] [place] [address]  [address] [facility] [address] [phone]              Who to contact     Please call your clinic at [phone] to:    Ask questions about your health    Make or cancel appointments    Discuss your medicines    Learn about your test results    Speak to your doctor   If you have compliments or concerns about an experience at your clinic, or if you wish to file a complaint, please contact [facility] Patient Relations at [phone] or email us at [email or web address]         Additional Information About Your Visit        MyChart Information     [employer] gives you secure access to your electronic health record. If you see a primary care provider, you can also send messages to your care team and make appointments. If you have questions, please call your primary care clinic.  If you do not have a primary care provider, please call [phone] and they will assist you.      [employer]oca is an electronic gateway that provides easy, online access to your medical records. With [employer], you can request a clinic appointment, read your test results, renew a prescription or communicate with your care team.     To access your existing account, please contact your [facility] or call [phone] for  assistance.        Care EveryWhere ID     This is your Care EveryWhere ID. This could be used by other organizations to access your [facility] medical records  [id number]        Your Vitals Were     Last Period                   (LMP Unknown)            Blood Pressure from Last 3 Encounters:   07/07/17 104/70   06/26/17 118/84   06/15/17 118/84    Weight from Last 3 Encounters:   07/07/17 225 lb (102.1 kg)   06/26/17 225 lb (102.1 kg)   06/15/17 225 lb 6 oz (102.2 kg)              We Performed the Following     EMG ([facility])     NCS Motor w or w/o F-Wave, 1 or 2 (20341)     Needle EMG - 1 Extremity  (5 or more muscles w/ 3or more nerves or 4 or more spinal levels)        Primary Care Provider Office Phone # Fax #    Cherie ZENG UZIEL Brennan [phone]       [facility] [address] [facility] [address]        Equal Access to Services     LORNE HALEY : Hadii aad ku hadasho Soomaali, waaxda luqadaha, qaybta kaalmada adeegyada, waxay idiin hayaan jamarcuseg alec rodriguez . So [facility] [place] [phone].    ATENCIÓN: Si habla español, tiene a rizvi disposición servicios gratuitos de asistencia lingüística. [facility] [phone].    We comply with applicable federal civil rights laws and [place] laws. We do not discriminate on the basis of race, color, national origin, age, disability sex, sexual orientation or gender identity.            Thank you!     Thank you for choosing [facility]  for your care. Our goal is always to provide you with excellent care. Hearing back from our patients is one way we can continue to improve our services. Please take a few minutes to complete the written survey that you may receive in the mail after your visit with us. Thank you!             Your Updated Medication List - Protect others around you: Learn how to safely use, store and throw away your medicines at www.disposemymeds.org.          This list is accurate  as of: 7/27/17 11:59 PM.  Always use your most recent med list.                   Brand Name Dispense Instructions for use Diagnosis    ACETAMINOPHEN EXTRA STRENGTH PO      None Entered        * albuterol (2.5 MG/3ML) 0.083% neb solution     3 mL    Take  by nebulization. take 3 mL by nebulization 4 times daily as needed    Moderate persistent asthma       * albuterol 108 (90 BASE) MCG/ACT Inhaler    albuterol    1 Inhaler    Inhale 1-2 puffs into the lungs every 6 hours as needed for shortness of breath / dyspnea    Moderate persistent asthma with exacerbation       amoxicillin-clavulanate 875-125 MG per tablet    AUGMENTIN    20 tablet    Take 1 tablet by mouth 2 times daily    Acute bronchitis with symptoms > 10 days       atorvastatin 10 MG tablet    LIPITOR    90 tablet    Take 1 tablet (10 mg) by mouth daily    Hyperlipidemia LDL goal <130       CALCIUM 500 PO      Take 1 tablet by mouth daily        Capsaicin 0.1 % cream     1 Tube    Externally apply topically 3 times daily    Primary osteoarthritis involving multiple joints       CLARITIN 10 MG tablet   Generic drug:  loratadine     0    1 TAB PO QD (Once per day) as needed for ALLERGY SYMPTOMS    Allergic rhinitis due to other allergen       CO Q 10 PO      Take 200 mg by mouth daily        cyclobenzaprine 5 MG tablet    FLEXERIL    180 tablet    Take 1-2 tablets (5-10 mg) by mouth 3 times daily as needed for muscle spasms    Spasm of back muscles       desvenlafaxine succinate 50 MG 24 hr tablet    PRISTIQ    30 tablet    Take 1 tablet (50 mg) by mouth daily    Major depressive disorder, recurrent episode, moderate (H)       diclofenac 1 % Gel topical gel    VOLTAREN    100 g    Apply 4 grams to knees or 2 grams to feet four times daily using enclosed dosing card.    Right elbow pain       diclofenac 100 MG 24 hr tablet    VOLTAREN-XR    30 tablet    Take 1 tablet (100 mg) by mouth daily    DDD (degenerative disc disease), cervical, DDD (degenerative disc  disease), lumbar       fenofibrate 145 MG tablet     90 tablet    Take 1 tablet (145 mg) by mouth daily    Pure hyperglyceridemia       ferrous sulfate 325 (65 FE) MG tablet    IRON    30 tablet    Take 1 tablet (325 mg) by mouth daily (with breakfast)    Chronic fatigue       FIBER PO      Twice daily        gabapentin 300 MG capsule    NEURONTIN    30 capsule    Take 1 tablet (300 mg) every night.    Radiculopathy, unspecified spinal region       ipratropium 0.02 % neb solution    ATROVENT    225 mL    Take 2.5 mLs (0.5 mg) by nebulization 4 times daily    Asthma exacerbation       ipratropium 17 MCG/ACT Inhaler    ATROVENT HFA    1 Inhaler    Inhale 2 puffs into the lungs 4 times daily as needed for wheezing    Bronchitis, acute, with bronchospasm       levothyroxine 75 MCG tablet    SYNTHROID    90 tablet    Take 1 tablet (75 mcg) by mouth every morning Overdue for labs    Chronic lymphocytic thyroiditis       Lutein 10 MG Tabs      Take 10 mg by mouth daily        MELATONIN PO      Take 5 mg by mouth nightly as needed        MIRAPEX 0.125 MG tablet   Generic drug:  pramipexole      Take two tabs at dinner and one at hs        omeprazole 20 MG CR capsule    priLOSEC    180 capsule    Take 1 capsule (20 mg) by mouth 2 times daily    Gastroesophageal reflux disease without esophagitis       ranitidine 300 MG tablet    ZANTAC    60 tablet    Take 1 tablet (300 mg) by mouth 2 times daily    Gastroesophageal reflux disease without esophagitis       SYMBICORT 160-4.5 MCG/ACT Inhaler   Generic drug:  budesonide-formoterol      Inhale 1 puff into the lungs 2 times daily    Moderate persistent asthma without complication       triamcinolone 55 MCG/ACT nasal inhaler    NASACORT AQ    1 Inhaler    Inhale 2 sprays in both nostrils every day as needed    Allergic rhinitis due to other allergen       UNABLE TO FIND      MEDICATION NAME: Allergy shots        VITAMIN C PO      Take 1,000 mg by mouth daily        zolpidem 10 MG  tablet    AMBIEN    30 tablet    0.5-1 tablet nightly as needed    Insomnia, unspecified       * Notice:  This list has 2 medication(s) that are the same as other medications prescribed for you. Read the directions carefully, and ask your doctor or other care provider to review them with you.

## 2017-07-27 NOTE — LETTER
2017       RE: Edwardo Mallory  [address] [place] [address]     Dear Colleague,    Thank you for referring your patient, Edwardo Mallory, to the [facility] at [facility]. Please see a copy of my visit note below.    RE: EDWARDO MALLORY : 1955   MRN: [id number] YUSEF: 2017     REFERRING:   Terrance Buckner MD     RIGHT UPPER EXTREMITY EMG EXAMINATION    RIGHT UPPER EXTREMITY      CONDUCTION   VELOCITIES STIM. LATENCY  MS AMPLITUDE   DISTANCE  CM   NCV NORMAL   Ulnar Nerve Above Elbow 8.1  9 mV     Record / ADM      32 AE-W 55 M/sec >50    F-Wave:27.1 msec Below Elbow 5.8  9 mV          20 BE-W 57 M/sec >50    Wrist 2.3  9 mV                 Sensory Median Nerve Wrist 3.1  50 micro               Sensory Ulnar Wrist 2.3  30 micro               NEEDLE ELECTRODE EXAMINATION (EMG)   MUSCLE     POS. POT.     FIBS.     FASCIC. MOTOR UNIT ACTION POTENTIALS   1st Dorsal Interosseous 0 0 0 Few increased size; decreased number   Pronator Teres 0 0 0 Few increased size   Extensor Digitorum Communis 0 0 0 Normal   Extensor Indicis 0 0 0 Few increased size   Biceps 0 0 0 Normal     Triceps   0   0   0 Several increased size; decrease number   Deltoid 0 0 0 Normal     CLINICAL NOTE:   Right upper extremity testing is requested for evaluation of right arm pain.  The patient reports pain radiating down the internal aspect of the right arm into the ulnar aspect of the right hand.  She can provoke this pain by her head and neck posture.  She also has pain around the elbow.  Clinical concerns are right ulnar neuropathy and cervical radiculopathy.    REPORT:     Right ulnar motor conduction velocity studies within normal limits.    Right ulnar sensory response is normal.     Right ulnar F wave latency is within normal limits.     Right median sensory response is normal.    Needle electrode examination demonstrates  some chronic neurogenic changes in right C7 and right C8 innervated muscles.      IMPRESSION:  1. Chronic changes of right C7 and C8 radiculopathies.  2. No findings of right ulnar neuropathy.      William Christy MD  [facility]  Department of Neurology    cc:  MD Cherie Melendrez, NP

## 2017-07-28 NOTE — PROGRESS NOTES
RE: EDWARDO MALLORY : 1955   MRN: [id number] YUSEF: 2017     REFERRING:   Terrance Buckner MD     RIGHT UPPER EXTREMITY EMG EXAMINATION    RIGHT UPPER EXTREMITY      CONDUCTION   VELOCITIES STIM. LATENCY  MS AMPLITUDE   DISTANCE  CM   NCV NORMAL   Ulnar Nerve Above Elbow 8.1  9 mV     Record / ADM      32 AE-W 55 M/sec >50    F-Wave:27.1 msec Below Elbow 5.8  9 mV          20 BE-W 57 M/sec >50    Wrist 2.3  9 mV                 Sensory Median Nerve Wrist 3.1  50 micro               Sensory Ulnar Wrist 2.3  30 micro               NEEDLE ELECTRODE EXAMINATION (EMG)   MUSCLE     POS. POT.     FIBS.     FASCIC. MOTOR UNIT ACTION POTENTIALS   1st Dorsal Interosseous 0 0 0 Few increased size; decreased number   Pronator Teres 0 0 0 Few increased size   Extensor Digitorum Communis 0 0 0 Normal   Extensor Indicis 0 0 0 Few increased size   Biceps 0 0 0 Normal     Triceps   0   0   0 Several increased size; decrease number   Deltoid 0 0 0 Normal     CLINICAL NOTE:   Right upper extremity testing is requested for evaluation of right arm pain.  The patient reports pain radiating down the internal aspect of the right arm into the ulnar aspect of the right hand.  She can provoke this pain by her head and neck posture.  She also has pain around the elbow.  Clinical concerns are right ulnar neuropathy and cervical radiculopathy.    REPORT:     Right ulnar motor conduction velocity studies within normal limits.    Right ulnar sensory response is normal.     Right ulnar F wave latency is within normal limits.     Right median sensory response is normal.    Needle electrode examination demonstrates some chronic neurogenic changes in right C7 and right C8 innervated muscles.      IMPRESSION:  1. Chronic changes of right C7 and C8 radiculopathies.  2. No findings of right ulnar neuropathy.      William Christy MD  [facility]  Department of Neurology    cc:  MD Cherie Melendrez  Bottom, NP

## 2017-08-01 ENCOUNTER — OFFICE VISIT (OUTPATIENT)
Dept: ORTHOPEDICS | Facility: CLINIC | Age: 62
End: 2017-08-01

## 2017-08-01 VITALS — WEIGHT: 225 LBS | RESPIRATION RATE: 16 BRPM | HEIGHT: 64 IN | BODY MASS INDEX: 38.41 KG/M2

## 2017-08-01 DIAGNOSIS — M54.12 CERVICAL RADICULOPATHY AT C8: Primary | ICD-10-CM

## 2017-08-01 NOTE — LETTER
8/1/2017      RE: Brandi Stern  [address] [place] [address]        Subjective:   Brandi Stern is a 61 year old female who is here following up on cervical radiculopathy. She's had onset of right arm symptoms since approximately February 27, 2017. Her right-sided ulnar symptoms of the hand improved with a wrist splint and this was consistent with some entrapment at Guyon's canal. With continued medial right elbow pain and we proceed with an EMG that demonstrates chronic right sided C7 and C8 radiculopathy. She has known changes on her MRI from slightly over a year ago. She describes an a.c. hot pain that radiates down into the medial elbow. She has difficulty getting comfortable but does note that she can be comfortable in a lying down position. She is taking gabapentin and tolerating 200 mg in the evening and 300 mg q.h.s. She's having no significant fatigue or disturbances with fall.    Date of injury:  NA  Date last seen: 7/20/2017  Following Therapeutic Plan: Yes EMG on 7/27/17  Pain: Worsening  Function: Unchanged  Interval History:      PAST MEDICAL, SOCIAL, SURGICAL AND FAMILY HISTORY: She  has a past medical history of Allergic rhinitis due to other allergen; Apneas, obstructive sleep; Chronic lymphocytic thyroiditis; COPD (chronic obstructive pulmonary disease) (H); Depressive disorder, not elsewhere classified; Disorder of bone and cartilage, unspecified; Esophageal reflux; Essential hypertension, benign; Generalized osteoarthrosis, unspecified site; HASHIMOTO'S THYROIDITIS (11/15/2004); HASHIMOTO'S THYROIDITIS; Headache above the eye region; Hiatal hernia; Insomnia, unspecified; Moderate persistent asthma; Nasal congestion; Pure hyperglyceridemia; Scoliosis; Snoring; and Tobacco use disorder. She also has no past medical history of Cerebral infarction (H); Complication of anesthesia; Congestive heart failure, unspecified; Coronary artery disease; CVA (cerebral infarction); Diabetes  (H); Heart disease; History of blood transfusion; or Malignant hyperthermia.  She  has a past surgical history that includes Gall bladder removal (2011); arthroscopy knee rt/lt (2/07); TOTAL KNEE ARTHROPLASTY; Esophagoscopy, gastroscopy, duodenoscopy (EGD), combined (8/5/2014); Colonoscopy (8/5/2014); Abdomen surgery; biopsy; and Cholecystectomy.  Her family history includes Allergies in her father; Arthritis in her father; Asthma in her paternal grandmother; Blood Disease in her father; C.A.D. in her maternal grandfather and maternal grandmother; CANCER in her father and paternal grandmother; CEREBROVASCULAR DISEASE in her maternal grandmother and paternal grandfather; Cancer - colorectal in her paternal grandmother; Cardiovascular in her maternal grandfather; Circulatory in her maternal grandfather; Coronary Artery Disease in her mother; DIABETES in her paternal grandmother; Eye Disorder in her mother; Genitourinary Problems in her father; Hearing Loss in her maternal grandmother; Hypertension in her mother; Lipids in her mother; Neurologic Disorder in her mother; OSTEOPOROSIS in her mother; Respiratory in her maternal grandmother; Thyroid Disease in her sister. There is no history of Breast Cancer.  She reports that she quit smoking about 10 years ago. Her smoking use included Cigarettes. She started smoking about 45 years ago. She has a 10.00 pack-year smoking history. She has never used smokeless tobacco. She reports that she drinks alcohol. She reports that she does not use illicit drugs.      ALLERGIES: She is allergic to animal dander; fluconazole; liquid adhesive; seasonal allergies; suture; and vistaril [hydroxyzine hcl].    CURRENT MEDICATIONS: She has a current medication list which includes the following prescription(s): gabapentin, amoxicillin-clavulanate, albuterol, diclofenac, diclofenac, fenofibrate, desvenlafaxine succinate, ranitidine, atorvastatin, omeprazole, symbicort, levothyroxine, melatonin,  "UNABLE TO FIND, capsaicin, ferrous sulfate, ascorbic acid, calcium-magnesium-vitamin d, lutein, ipratropium, ipratropium, triamcinolone, albuterol, coenzyme q10, mirapex, acetaminophen, fiber, claritin, zolpidem, and cyclobenzaprine.     REVIEW OF SYSTEMS: 12 point review of systems is negative except as noted above.     Exam:   Resp 16  Ht 5' 4\" (1.626 m)  Wt 225 lb (102.1 kg)  LMP  (LMP Unknown)  BMI 38.62 kg/m2      GAIT: normal  NEUROLOGIC: Non-focal, Normal muscle tone and strength, reflexes normal, sensation grossly normal (cervical nerve root exam)  PSYCHIATRIC: affect normal/bright and mentation appears normal.       Assessment/Plan:   (M54.12) Cervical radiculopathy at C8  (primary encounter diagnosis)  Comment: Will proceed with an MRI of the cervical spine. I suspect change in her C8 either at the level of the disc or the neuroforamina. We discussed increasing gabapentin to tid dosing. Also discussed a cervical epidural steroid injection and Brandi would like to proceed with a right transforaminal MEGHAN if indicated based upon her MRI. We will follow up by telephone after her MRI.    I spent 30 minutes in face to face time with the patient and greater than 25 minutes in counseling and coordination of care.    Terrance Buckner MD  "

## 2017-08-01 NOTE — MR AVS SNAPSHOT
After Visit Summary   8/1/2017    Brandi Stern    MRN: [id number]           Patient Information     Date Of Birth          1955        Visit Information        Provider Department      8/1/2017 9:00 AM Terrance Buckner MD [facility]        Today's Diagnoses     Cervical radiculopathy at C8    -  1       Follow-ups after your visit        Future tests that were ordered for you today     Open Future Orders        Priority Expected Expires Ordered    MR Cervical Spine w/o & w Contrast Routine  8/1/2018 8/1/2017            Who to contact     Please call your clinic at [phone] to:    Ask questions about your health    Make or cancel appointments    Discuss your medicines    Learn about your test results    Speak to your doctor   If you have compliments or concerns about an experience at your clinic, or if you wish to file a complaint, please contact [facility] Patient Relations at [phone] or email us at [email or web address]         Additional Information About Your Visit        MyChart Information     [employer] gives you secure access to your electronic health record. If you see a primary care provider, you can also send messages to your care team and make appointments. If you have questions, please call your primary care clinic.  If you do not have a primary care provider, please call [phone] and they will assist you.      [employer] is an electronic gateway that provides easy, online access to your medical records. With [employer], you can request a clinic appointment, read your test results, renew a prescription or communicate with your care team.     To access your existing account, please contact your [facility] or call [phone] for assistance.        Care EveryWhere ID     This is your Care EveryWhere ID. This could be used by other organizations to access your [facility]  "records  [id number]        Your Vitals Were     Respirations Height Last Period BMI (Body Mass Index)          16 5' 4\" (1.626 m) (LMP Unknown) 38.62 kg/m2         Blood Pressure from Last 3 Encounters:   07/07/17 104/70   06/26/17 118/84   06/15/17 118/84    Weight from Last 3 Encounters:   08/01/17 225 lb (102.1 kg)   07/07/17 225 lb (102.1 kg)   06/26/17 225 lb (102.1 kg)               Primary Care Provider Office Phone # Fax #    Cherie ZENG UZIEL Brennan [phone]       [facility] [address] [facility] [address]        Equal Access to Services     LORNE HALEY : Keyur holt Sohéctor, waaxda luqadaha, qaybta kaalmada adeegyada, adrián rodriguez . So [facility] [phone].    ATENCIÓN: Si habla español, tiene a rizvi disposición servicios gratuitos de asistencia lingüística. LlACMC [facility] [phone].    We comply with applicable federal civil rights laws and [place] laws. We do not discriminate on the basis of race, color, national origin, age, disability sex, sexual orientation or gender identity.            Thank you!     Thank you for choosing [facility]  for your care. Our goal is always to provide you with excellent care. Hearing back from our patients is one way we can continue to improve our services. Please take a few minutes to complete the written survey that you may receive in the mail after your visit with us. Thank you!             Your Updated Medication List - Protect others around you: Learn how to safely use, store and throw away your medicines at www.disposemymeds.org.          This list is accurate as of: 8/1/17  9:55 AM.  Always use your most recent med list.                   Brand Name Dispense Instructions for use Diagnosis    ACETAMINOPHEN EXTRA STRENGTH PO      None Entered        * albuterol (2.5 MG/3ML) 0.083% neb solution     3 mL    Take  by nebulization. take 3 mL by nebulization 4 times daily as needed    " Moderate persistent asthma       * albuterol 108 (90 BASE) MCG/ACT Inhaler    albuterol    1 Inhaler    Inhale 1-2 puffs into the lungs every 6 hours as needed for shortness of breath / dyspnea    Moderate persistent asthma with exacerbation       amoxicillin-clavulanate 875-125 MG per tablet    AUGMENTIN    20 tablet    Take 1 tablet by mouth 2 times daily    Acute bronchitis with symptoms > 10 days       atorvastatin 10 MG tablet    LIPITOR    90 tablet    Take 1 tablet (10 mg) by mouth daily    Hyperlipidemia LDL goal <130       CALCIUM 500 PO      Take 1 tablet by mouth daily        Capsaicin 0.1 % cream     1 Tube    Externally apply topically 3 times daily    Primary osteoarthritis involving multiple joints       CLARITIN 10 MG tablet   Generic drug:  loratadine     0    1 TAB PO QD (Once per day) as needed for ALLERGY SYMPTOMS    Allergic rhinitis due to other allergen       CO Q 10 PO      Take 200 mg by mouth daily        cyclobenzaprine 5 MG tablet    FLEXERIL    180 tablet    Take 1-2 tablets (5-10 mg) by mouth 3 times daily as needed for muscle spasms    Spasm of back muscles       desvenlafaxine succinate 50 MG 24 hr tablet    PRISTIQ    30 tablet    Take 1 tablet (50 mg) by mouth daily    Major depressive disorder, recurrent episode, moderate (H)       diclofenac 1 % Gel topical gel    VOLTAREN    100 g    Apply 4 grams to knees or 2 grams to feet four times daily using enclosed dosing card.    Right elbow pain       diclofenac 100 MG 24 hr tablet    VOLTAREN-XR    30 tablet    Take 1 tablet (100 mg) by mouth daily    DDD (degenerative disc disease), cervical, DDD (degenerative disc disease), lumbar       fenofibrate 145 MG tablet     90 tablet    Take 1 tablet (145 mg) by mouth daily    Pure hyperglyceridemia       ferrous sulfate 325 (65 FE) MG tablet    IRON    30 tablet    Take 1 tablet (325 mg) by mouth daily (with breakfast)    Chronic fatigue       FIBER PO      Twice daily        gabapentin 300  MG capsule    NEURONTIN    30 capsule    Take 1 tablet (300 mg) every night.    Radiculopathy, unspecified spinal region       ipratropium 0.02 % neb solution    ATROVENT    225 mL    Take 2.5 mLs (0.5 mg) by nebulization 4 times daily    Asthma exacerbation       ipratropium 17 MCG/ACT Inhaler    ATROVENT HFA    1 Inhaler    Inhale 2 puffs into the lungs 4 times daily as needed for wheezing    Bronchitis, acute, with bronchospasm       levothyroxine 75 MCG tablet    SYNTHROID    90 tablet    Take 1 tablet (75 mcg) by mouth every morning Overdue for labs    Chronic lymphocytic thyroiditis       Lutein 10 MG Tabs      Take 10 mg by mouth daily        MELATONIN PO      Take 5 mg by mouth nightly as needed        MIRAPEX 0.125 MG tablet   Generic drug:  pramipexole      Take two tabs at dinner and one at hs        omeprazole 20 MG CR capsule    priLOSEC    180 capsule    Take 1 capsule (20 mg) by mouth 2 times daily    Gastroesophageal reflux disease without esophagitis       ranitidine 300 MG tablet    ZANTAC    60 tablet    Take 1 tablet (300 mg) by mouth 2 times daily    Gastroesophageal reflux disease without esophagitis       SYMBICORT 160-4.5 MCG/ACT Inhaler   Generic drug:  budesonide-formoterol      Inhale 1 puff into the lungs 2 times daily    Moderate persistent asthma without complication       triamcinolone 55 MCG/ACT nasal inhaler    NASACORT AQ    1 Inhaler    Inhale 2 sprays in both nostrils every day as needed    Allergic rhinitis due to other allergen       UNABLE TO FIND      MEDICATION NAME: Allergy shots        VITAMIN C PO      Take 1,000 mg by mouth daily        zolpidem 10 MG tablet    AMBIEN    30 tablet    0.5-1 tablet nightly as needed    Insomnia, unspecified       * Notice:  This list has 2 medication(s) that are the same as other medications prescribed for you. Read the directions carefully, and ask your doctor or other care provider to review them with you.

## 2017-08-01 NOTE — PROGRESS NOTES
Subjective:   Brandi Stern is a 61 year old female who is here following up on cervical radiculopathy. She's had onset of right arm symptoms since approximately February 27, 2017. Her right-sided ulnar symptoms of the hand improved with a wrist splint and this was consistent with some entrapment at Guyon's canal. With continued medial right elbow pain and we proceed with an EMG that demonstrates chronic right sided C7 and C8 radiculopathy. She has known changes on her MRI from slightly over a year ago. She describes an a.c. hot pain that radiates down into the medial elbow. She has difficulty getting comfortable but does note that she can be comfortable in a lying down position. She is taking gabapentin and tolerating 200 mg in the evening and 300 mg q.h.s. She's having no significant fatigue or disturbances with fall.    Date of injury:  NA  Date last seen: 7/20/2017  Following Therapeutic Plan: Yes EMG on 7/27/17  Pain: Worsening  Function: Unchanged  Interval History:      PAST MEDICAL, SOCIAL, SURGICAL AND FAMILY HISTORY: She  has a past medical history of Allergic rhinitis due to other allergen; Apneas, obstructive sleep; Chronic lymphocytic thyroiditis; COPD (chronic obstructive pulmonary disease) (H); Depressive disorder, not elsewhere classified; Disorder of bone and cartilage, unspecified; Esophageal reflux; Essential hypertension, benign; Generalized osteoarthrosis, unspecified site; HASHIMOTO'S THYROIDITIS (11/15/2004); HASHIMOTO'S THYROIDITIS; Headache above the eye region; Hiatal hernia; Insomnia, unspecified; Moderate persistent asthma; Nasal congestion; Pure hyperglyceridemia; Scoliosis; Snoring; and Tobacco use disorder. She also has no past medical history of Cerebral infarction (H); Complication of anesthesia; Congestive heart failure, unspecified; Coronary artery disease; CVA (cerebral infarction); Diabetes (H); Heart disease; History of blood transfusion; or Malignant hyperthermia.  She  has a  past surgical history that includes Gall bladder removal (2011); arthroscopy knee rt/lt (2/07); TOTAL KNEE ARTHROPLASTY; Esophagoscopy, gastroscopy, duodenoscopy (EGD), combined (8/5/2014); Colonoscopy (8/5/2014); Abdomen surgery; biopsy; and Cholecystectomy.  Her family history includes Allergies in her father; Arthritis in her father; Asthma in her paternal grandmother; Blood Disease in her father; C.A.D. in her maternal grandfather and maternal grandmother; CANCER in her father and paternal grandmother; CEREBROVASCULAR DISEASE in her maternal grandmother and paternal grandfather; Cancer - colorectal in her paternal grandmother; Cardiovascular in her maternal grandfather; Circulatory in her maternal grandfather; Coronary Artery Disease in her mother; DIABETES in her paternal grandmother; Eye Disorder in her mother; Genitourinary Problems in her father; Hearing Loss in her maternal grandmother; Hypertension in her mother; Lipids in her mother; Neurologic Disorder in her mother; OSTEOPOROSIS in her mother; Respiratory in her maternal grandmother; Thyroid Disease in her sister. There is no history of Breast Cancer.  She reports that she quit smoking about 10 years ago. Her smoking use included Cigarettes. She started smoking about 45 years ago. She has a 10.00 pack-year smoking history. She has never used smokeless tobacco. She reports that she drinks alcohol. She reports that she does not use illicit drugs.      ALLERGIES: She is allergic to animal dander; fluconazole; liquid adhesive; seasonal allergies; suture; and vistaril [hydroxyzine hcl].    CURRENT MEDICATIONS: She has a current medication list which includes the following prescription(s): gabapentin, amoxicillin-clavulanate, albuterol, diclofenac, diclofenac, fenofibrate, desvenlafaxine succinate, ranitidine, atorvastatin, omeprazole, symbicort, levothyroxine, melatonin, UNABLE TO FIND, capsaicin, ferrous sulfate, ascorbic acid, calcium-magnesium-vitamin d,  "lutein, ipratropium, ipratropium, triamcinolone, albuterol, coenzyme q10, mirapex, acetaminophen, fiber, claritin, zolpidem, and cyclobenzaprine.     REVIEW OF SYSTEMS: 12 point review of systems is negative except as noted above.     Exam:   Resp 16  Ht 5' 4\" (1.626 m)  Wt 225 lb (102.1 kg)  LMP  (LMP Unknown)  BMI 38.62 kg/m2      GAIT: normal  NEUROLOGIC: Non-focal, Normal muscle tone and strength, reflexes normal, sensation grossly normal (cervical nerve root exam)  PSYCHIATRIC: affect normal/bright and mentation appears normal.       Assessment/Plan:   (M54.12) Cervical radiculopathy at C8  (primary encounter diagnosis)  Comment: Will proceed with an MRI of the cervical spine. I suspect change in her C8 either at the level of the disc or the neuroforamina. We discussed increasing gabapentin to tid dosing. Also discussed a cervical epidural steroid injection and Brandi would like to proceed with a right transforaminal MEGHAN if indicated based upon her MRI. We will follow up by telephone after her MRI.    I spent 30 minutes in face to face time with the patient and greater than 25 minutes in counseling and coordination of care.      "

## 2017-08-20 DIAGNOSIS — E78.5 HYPERLIPIDEMIA LDL GOAL <130: ICD-10-CM

## 2017-08-21 RX ORDER — ATORVASTATIN CALCIUM 10 MG/1
TABLET, FILM COATED ORAL
Qty: 90 TABLET | Refills: 0 | Status: SHIPPED | OUTPATIENT
Start: 2017-08-21 | End: 2017-09-26

## 2017-08-21 NOTE — TELEPHONE ENCOUNTER
Will refill for one months juan ramon.  Needs fasting  labs complete before future refills.      atorvastatin (LIPITOR) 10 MG tablet  Last Written Prescription Date: 2/22/17  Last Fill Quantity: 90, # refills: 1  Last Office Visit with [facility], [facility] or [facility] prescribing provider: 6/15/17       Lab Results   Component Value Date    CHOL 264 09/13/2016     Lab Results   Component Value Date    HDL 36 09/13/2016     Lab Results   Component Value Date    LDL  09/13/2016     Cannot estimate LDL when triglyceride exceeds 400 mg/dL     09/13/2016     Lab Results   Component Value Date    TRIG 794 09/13/2016     Lab Results   Component Value Date    CHOLHDLRATIO 2.3 10/09/2015       Kermit Carrillo RN

## 2017-08-24 ENCOUNTER — OFFICE VISIT (OUTPATIENT)
Dept: FAMILY MEDICINE | Facility: CLINIC | Age: 62
End: 2017-08-24
Payer: COMMERCIAL

## 2017-08-24 VITALS
RESPIRATION RATE: 20 BRPM | HEART RATE: 91 BPM | TEMPERATURE: 99.3 F | DIASTOLIC BLOOD PRESSURE: 83 MMHG | SYSTOLIC BLOOD PRESSURE: 115 MMHG | WEIGHT: 228 LBS | OXYGEN SATURATION: 95 % | BODY MASS INDEX: 39.14 KG/M2

## 2017-08-24 DIAGNOSIS — F33.1 MAJOR DEPRESSIVE DISORDER, RECURRENT EPISODE, MODERATE (H): Primary | ICD-10-CM

## 2017-08-24 DIAGNOSIS — J01.00 ACUTE NON-RECURRENT MAXILLARY SINUSITIS: ICD-10-CM

## 2017-08-24 PROCEDURE — 99214 OFFICE O/P EST MOD 30 MIN: CPT | Performed by: NURSE PRACTITIONER

## 2017-08-24 RX ORDER — CEFDINIR 300 MG/1
300 CAPSULE ORAL 2 TIMES DAILY
Qty: 20 CAPSULE | Refills: 0 | Status: SHIPPED | OUTPATIENT
Start: 2017-08-24 | End: 2017-09-08

## 2017-08-24 NOTE — PROGRESS NOTES
SUBJECTIVE:   Brandi Stern is a 62 year old female who presents to clinic today for the following health issues:  Provider please address medication reconciliation discrepancies--Pt stopped taking Gabapentin   Bronchitis treated at the beginning of the summer    Depression Followup    Status since last visit: Worse; Discuss changing or adding depression medications.    Pristiq seemed to help for a couple months    See PHQ-9 for current symptoms.  Other associated symptoms: None    Complicating factors:   Significant life event:  No, fatigued, chronic pain and fibromyalgia     Current substance abuse:  None  Anxiety or Panic symptoms:  No    PHQ-9  English  PHQ-9   Any Language      Amount of exercise or physical activity: trying to walk; arthritis in joints, suhail in lower back and neck. scheduled for steroid injection in C-7 in Septemebr    Problems taking medications regularly: No    Medication side effects: none  Diet: regular (no restrictions)    Pristiq seemed to help initially, but not as much now.  Chronic pain is affecting her mood.  She is being seen at the pain clinic.  She has been on Celexa, Lexapro, Cymbalta and Wellbutrin in the past.  [employer] testing - Pristiq has no interactions.     Left frontal sinus pressure for 2 months. Augmentin helped with bronchitis symptoms.  No fevers.        Problem list and histories reviewed & adjusted, as indicated.  Additional history: as documented        Reviewed and updated as needed this visit by clinical staff     Reviewed and updated as needed this visit by Provider         ROS:  C: NEGATIVE for fever, chills, change in weight  E/M: see HPI  R: NEGATIVE for significant cough or SOB  CV: NEGATIVE for chest pain, palpitations or peripheral edema  MUSCULOSKELETAL: see HPI  NEURO: NEGATIVE for weakness, dizziness or paresthesias  PSYCHIATRIC: see HPI    OBJECTIVE:     /83  Pulse 91  Temp 99.3  F (37.4  C) (Oral)  Resp 20  Wt 228 lb (103.4 kg)  LMP   (LMP Unknown)  SpO2 95%  BMI 39.14 kg/m2  Body mass index is 39.14 kg/(m^2).  GENERAL: healthy, alert and no distress  HENT: normal cephalic/atraumatic, ear canals and TM's normal, nose and mouth without ulcers or lesions, oropharynx clear and oral mucous membranes moist  NECK: no adenopathy, no asymmetry, masses, or scars and thyroid normal to palpation  RESP: lungs clear to auscultation - no rales, rhonchi or wheezes  CV: regular rate and rhythm, normal S1 S2, no S3 or S4, no murmur, click or rub, no peripheral edema and peripheral pulses strong  PSYCH: mentation appears normal, affect flattened        ASSESSMENT/PLAN:             1. Major depressive disorder, recurrent episode, moderate (H)  Worsening  Will increase Pristiq to 100 mg.  If PA is needed, please see note above for what else has been tried.  Consider adding low dose Wellbutrin in future if needed.  Follow up in one month.   - desvenlafaxine fumarate 100 MG 24 hr tablet; Take 1 tablet (100 mg) by mouth daily  Dispense: 30 tablet; Refill: PRN    2. Acute non-recurrent maxillary sinusitis  Discussed symptomatic treatment measures.   - cefdinir (OMNICEF) 300 MG capsule; Take 1 capsule (300 mg) by mouth 2 times daily  Dispense: 20 capsule; Refill: 0        Cherie Brennan NP  [facility]

## 2017-08-24 NOTE — NURSING NOTE
"Chief Complaint   Patient presents with     Depression       Initial /83  Pulse 91  Temp 99.3  F (37.4  C) (Oral)  Resp 20  Wt 228 lb (103.4 kg)  LMP  (LMP Unknown)  SpO2 95%  BMI 39.14 kg/m2 Estimated body mass index is 39.14 kg/(m^2) as calculated from the following:    Height as of 8/1/17: 5' 4\" (1.626 m).    Weight as of this encounter: 228 lb (103.4 kg).  Medication Reconciliation: complete     Qing Jackson MA      "

## 2017-08-24 NOTE — MR AVS SNAPSHOT
After Visit Summary   8/24/2017    Brandi Stern    MRN: [id number]           Patient Information     Date Of Birth          1955        Visit Information        Provider Department      8/24/2017 3:15 PM Cherie Brennan NP [facility]        Today's Diagnoses     Major depressive disorder, recurrent episode, moderate (H)    -  1    Acute non-recurrent maxillary sinusitis           Follow-ups after your visit        Your next 10 appointments already scheduled     Sep 06, 2017  2:00 PM CDT   XR CERVIC/THORACIC TRANSFORAMINAL INJECTION with UCXR3,  IMAGING NURSE,  NEURO RAD   [facility] ([facility])    [address]  [facility] [address] [phone]           For nerve root injection, please send or bring copies of any MRIs or other scans you have had.  Bring a list of your current medicines to your exam. (Include vitamins, minerals and over-the-counter medicines.) Leave your valuables at home.  Plan to have someone drive you home afterward.  Stop taking the following medicines (but talk to your doctor first):   If you take blood thinners, you may need to stop taking them a few days before treatment. Talk to your doctor before stopping these medicines.Stop taking Coumadin (warfarin) 3 days before treatment. Restart the day after treatment.   If you take Plavix, Ticlid, Pletal or Persantine, please ask your doctor if you should stop these medicines. You may need extra tests on the morning of your scan.   If you take Xarelto (Rivaroxaban), you may need to stop taking it 24 hours before treatment. Talk to your doctor before stopping this medicine. Restart the day after treatment.  You may take your other medicines as normal.  Stop all food and drink (including water) 3 hours before your test or treatment.  Please tell the doctor:   If you are allergic to X-ray dye (contrast fluid).   If you may be pregnant.   Injections take about 30 to 45 minutes. Most people spend up to 2 hours in the clinic or hospital.  Please call the Imaging Department at your exam site with any questions.            Sep 26, 2017  1:45 PM CDT   [facility]    [address] [facility] [address] [phone]           Bring a current list of meds and any records pertaining to this visit. For Physicals, please bring immunization records and any forms needing to be filled out. Please arrive 10 minutes early to complete paperwork.              Who to contact     If you have questions or need follow up information about today's clinic visit or your schedule please contact [facility] directly at [phone].  Normal or non-critical lab and imaging results will be communicated to you by [employer], letter or phone within 4 business days after the clinic has received the results. If you do not hear from us within 7 days, please contact the clinic through [employer] or phone. If you have a critical or abnormal lab result, we will notify you by phone as soon as possible.  Submit refill requests through [employer] or call your pharmacy and they will forward the refill request to us. Please allow 3 business days for your refill to be completed.          Additional Information About Your Visit        S B E Information     [employer] gives you secure access to your electronic health record. If you see a primary care provider, you can also send messages to your care team and make appointments. If you have questions, please call your primary care clinic.  If you do not have a primary care provider, please call [phone] and they will assist you.        Care EveryWhere ID     This is your Care EveryWhere ID. This could be used by other organizations to access your [facility] medical records  [id number]        Your Vitals Were     Pulse Temperature  Respirations Last Period Pulse Oximetry BMI (Body Mass Index)    91 99.3  F (37.4  C) (Oral) 20 (LMP Unknown) 95% 39.14 kg/m2       Blood Pressure from Last 3 Encounters:   08/24/17 115/83   07/07/17 104/70   06/26/17 118/84    Weight from Last 3 Encounters:   08/24/17 228 lb (103.4 kg)   08/01/17 225 lb (102.1 kg)   07/07/17 225 lb (102.1 kg)              Today, you had the following     No orders found for display         Today's Medication Changes          These changes are accurate as of: 8/24/17  6:22 PM.  If you have any questions, ask your nurse or doctor.               Start taking these medicines.        Dose/Directions    cefdinir 300 MG capsule   Commonly known as:  OMNICEF   Used for:  Acute non-recurrent maxillary sinusitis   Started by:  Cherie Brennan NP        Dose:  300 mg   Take 1 capsule (300 mg) by mouth 2 times daily   Quantity:  20 capsule   Refills:  0       desvenlafaxine fumarate 100 MG 24 hr tablet   Used for:  Major depressive disorder, recurrent episode, moderate (H)   Replaces:  desvenlafaxine succinate 50 MG 24 hr tablet   Started by:  Cherie Brennan NP        Dose:  100 mg   Take 1 tablet (100 mg) by mouth daily   Quantity:  30 tablet   Refills:  PRN         Stop taking these medicines if you haven't already. Please contact your care team if you have questions.     desvenlafaxine succinate 50 MG 24 hr tablet   Commonly known as:  PRISTIQ   Replaced by:  desvenlafaxine fumarate 100 MG 24 hr tablet   Stopped by:  Cherie Brennan NP                Where to get your medicines      These medications were sent to [facility]/pharmacy #[id number] - [place], [place] - [address] [facility]  [address], [place] [address]     Phone:  [phone]     cefdinir 300 MG capsule    desvenlafaxine fumarate 100 MG 24 hr tablet                Primary Care Provider Office Phone # Fax #    Cherie Brennan [phone] [address] [facility] [address]        Equal Access to  Services     [facility]: Hadii georgie cowan erondiane Freddieali, waaxda luqadaha, qaybta kaalmada kala, adrián rodriguez . [facility] [phone].    ATENCIÓN: Si qila jazmin, tiene a rizvi disposición servicios gratuitos de asistencia lingüística. Llame al [phone].    We comply with applicable federal civil rights laws and [place] laws. We do not discriminate on the basis of race, color, national origin, age, disability sex, sexual orientation or gender identity.            Thank you!     Thank you for choosing [facility]  for your care. Our goal is always to provide you with excellent care. Hearing back from our patients is one way we can continue to improve our services. Please take a few minutes to complete the written survey that you may receive in the mail after your visit with us. Thank you!             Your Updated Medication List - Protect others around you: Learn how to safely use, store and throw away your medicines at www.disposemymeds.org.          This list is accurate as of: 8/24/17  6:22 PM.  Always use your most recent med list.                   Brand Name Dispense Instructions for use Diagnosis    ACETAMINOPHEN EXTRA STRENGTH PO      None Entered        * albuterol (2.5 MG/3ML) 0.083% neb solution     3 mL    Take  by nebulization. take 3 mL by nebulization 4 times daily as needed    Moderate persistent asthma       * albuterol 108 (90 BASE) MCG/ACT Inhaler    PROAIR HFA    1 Inhaler    Inhale 1-2 puffs into the lungs every 6 hours as needed for shortness of breath / dyspnea    Moderate persistent asthma with exacerbation       atorvastatin 10 MG tablet    LIPITOR    90 tablet    TAKE 1 TABLET (10 MG) BY MOUTH DAILY    Hyperlipidemia LDL goal <130       CALCIUM 500 PO      Take 1 tablet by mouth daily        cefdinir 300 MG capsule    OMNICEF    20 capsule    Take 1 capsule (300 mg) by mouth 2 times daily    Acute non-recurrent maxillary sinusitis        CLARITIN 10 MG tablet   Generic drug:  loratadine     0    1 TAB PO QD (Once per day) as needed for ALLERGY SYMPTOMS    Allergic rhinitis due to other allergen       CO Q 10 PO      Take 200 mg by mouth daily        cyclobenzaprine 5 MG tablet    FLEXERIL    180 tablet    Take 1-2 tablets (5-10 mg) by mouth 3 times daily as needed for muscle spasms    Spasm of back muscles       desvenlafaxine fumarate 100 MG 24 hr tablet     30 tablet    Take 1 tablet (100 mg) by mouth daily    Major depressive disorder, recurrent episode, moderate (H)       diclofenac 1 % Gel topical gel    VOLTAREN    100 g    Apply 4 grams to knees or 2 grams to feet four times daily using enclosed dosing card.    Right elbow pain       diclofenac 100 MG 24 hr tablet    VOLTAREN-XR    30 tablet    Take 1 tablet (100 mg) by mouth daily    DDD (degenerative disc disease), cervical, DDD (degenerative disc disease), lumbar       fenofibrate 145 MG tablet     90 tablet    Take 1 tablet (145 mg) by mouth daily    Pure hyperglyceridemia       ferrous sulfate 325 (65 FE) MG tablet    IRON    30 tablet    Take 1 tablet (325 mg) by mouth daily (with breakfast)    Chronic fatigue       FIBER PO      Twice daily        levothyroxine 75 MCG tablet    SYNTHROID    90 tablet    Take 1 tablet (75 mcg) by mouth every morning Overdue for labs    Chronic lymphocytic thyroiditis       Lutein 10 MG Tabs      Take 10 mg by mouth daily        MELATONIN PO      Take 5 mg by mouth nightly as needed        MIRAPEX 0.125 MG tablet   Generic drug:  pramipexole      Take two tabs at dinner and one at hs        omeprazole 20 MG CR capsule    priLOSEC    180 capsule    Take 1 capsule (20 mg) by mouth 2 times daily    Gastroesophageal reflux disease without esophagitis       ranitidine 300 MG tablet    ZANTAC    60 tablet    Take 1 tablet (300 mg) by mouth 2 times daily    Gastroesophageal reflux disease without esophagitis       SYMBICORT 160-4.5 MCG/ACT Inhaler   Generic  drug:  budesonide-formoterol      Inhale 1 puff into the lungs 2 times daily    Moderate persistent asthma without complication       triamcinolone 55 MCG/ACT nasal inhaler    NASACORT AQ    1 Inhaler    Inhale 2 sprays in both nostrils every day as needed    Allergic rhinitis due to other allergen       UNABLE TO FIND      MEDICATION NAME: Allergy shots        VITAMIN C PO      Take 1,000 mg by mouth daily        zolpidem 10 MG tablet    AMBIEN    30 tablet    0.5-1 tablet nightly as needed    Insomnia, unspecified       * Notice:  This list has 2 medication(s) that are the same as other medications prescribed for you. Read the directions carefully, and ask your doctor or other care provider to review them with you.

## 2017-08-25 ASSESSMENT — ASTHMA QUESTIONNAIRES: ACT_TOTALSCORE: 17

## 2017-09-01 ENCOUNTER — MYC MEDICAL ADVICE (OUTPATIENT)
Dept: FAMILY MEDICINE | Facility: CLINIC | Age: 62
End: 2017-09-01

## 2017-09-01 NOTE — TELEPHONE ENCOUNTER
See other message sent 9/1/2017// closing encounter  Hi. I just got off [facility]. They will fill the Pristiq 100 mg tabs today. I am sorry if you tried the number in my previous message. [facility] gave me the wrong number for the prior auth line. No further action needed on the clinics part.   Thank you

## 2017-09-02 DIAGNOSIS — F33.1 MAJOR DEPRESSIVE DISORDER, RECURRENT EPISODE, MODERATE (H): ICD-10-CM

## 2017-09-02 NOTE — TELEPHONE ENCOUNTER
Medication Detail      Disp Refills Start End HAILEE   desvenlafaxine fumarate 100 MG 24 hr tablet 30 tablet PRN 8/24/2017  --   Sig: Take 1 tablet (100 mg) by mouth daily   Class: E-Prescribe   Route: Oral   Order: [id number]       Last Office Visit with [facility], [facility] or [facility] prescribing provider: 8/24/2017   Next 5 appointments (look out 90 days)     Sep 26, 2017  1:45 PM CDT   [facility]    [address] [address] [phone]                   BP Readings from Last 3 Encounters:   08/24/17 115/83   07/07/17 104/70   06/26/17 118/84     Pulse: (for Fetzima)  Creatinine   Date Value Ref Range Status   09/13/2016 0.67 0.52 - 1.04 mg/dL Final   ]    Last PHQ-9 score on record=   PHQ-9 SCORE 6/16/2017   Total Score -   Total Score MyChart -   Total Score 6   Some encounter information is confidential and restricted. Go to Review Flowsheets activity to see all data.

## 2017-09-05 RX ORDER — DESVENLAFAXINE 50 MG/1
TABLET, FILM COATED, EXTENDED RELEASE ORAL
Qty: 0.1 TABLET | Refills: 4 | OUTPATIENT
Start: 2017-09-05

## 2017-09-08 ENCOUNTER — OFFICE VISIT (OUTPATIENT)
Dept: FAMILY MEDICINE | Facility: CLINIC | Age: 62
End: 2017-09-08
Payer: COMMERCIAL

## 2017-09-08 VITALS
TEMPERATURE: 98.1 F | DIASTOLIC BLOOD PRESSURE: 89 MMHG | HEART RATE: 87 BPM | OXYGEN SATURATION: 97 % | RESPIRATION RATE: 16 BRPM | WEIGHT: 229 LBS | SYSTOLIC BLOOD PRESSURE: 138 MMHG | BODY MASS INDEX: 39.31 KG/M2

## 2017-09-08 DIAGNOSIS — E06.3 CHRONIC LYMPHOCYTIC THYROIDITIS: ICD-10-CM

## 2017-09-08 DIAGNOSIS — E55.9 VITAMIN D DEFICIENCY: ICD-10-CM

## 2017-09-08 DIAGNOSIS — R51.9 UNILATERAL HEADACHE: Primary | ICD-10-CM

## 2017-09-08 DIAGNOSIS — I10 HYPERTENSION GOAL BP (BLOOD PRESSURE) < 140/90: ICD-10-CM

## 2017-09-08 DIAGNOSIS — D50.9 IRON DEFICIENCY ANEMIA, UNSPECIFIED IRON DEFICIENCY ANEMIA TYPE: ICD-10-CM

## 2017-09-08 DIAGNOSIS — F32.0 MILD MAJOR DEPRESSION (H): ICD-10-CM

## 2017-09-08 LAB
CRP SERPL-MCNC: <2.9 MG/L (ref 0–8)
ERYTHROCYTE [DISTWIDTH] IN BLOOD BY AUTOMATED COUNT: 11.4 % (ref 10–15)
ERYTHROCYTE [SEDIMENTATION RATE] IN BLOOD BY WESTERGREN METHOD: 12 MM/H (ref 0–30)
HCT VFR BLD AUTO: 42.2 % (ref 35–47)
HGB BLD-MCNC: 14.1 G/DL (ref 11.7–15.7)
MCH RBC QN AUTO: 33.3 PG (ref 26.5–33)
MCHC RBC AUTO-ENTMCNC: 33.4 G/DL (ref 31.5–36.5)
MCV RBC AUTO: 100 FL (ref 78–100)
PLATELET # BLD AUTO: 284 10E9/L (ref 150–450)
RBC # BLD AUTO: 4.23 10E12/L (ref 3.8–5.2)
WBC # BLD AUTO: 4 10E9/L (ref 4–11)

## 2017-09-08 PROCEDURE — 99214 OFFICE O/P EST MOD 30 MIN: CPT | Performed by: FAMILY MEDICINE

## 2017-09-08 PROCEDURE — 82728 ASSAY OF FERRITIN: CPT | Performed by: FAMILY MEDICINE

## 2017-09-08 PROCEDURE — 80053 COMPREHEN METABOLIC PANEL: CPT | Performed by: FAMILY MEDICINE

## 2017-09-08 PROCEDURE — 82306 VITAMIN D 25 HYDROXY: CPT | Performed by: FAMILY MEDICINE

## 2017-09-08 PROCEDURE — 86140 C-REACTIVE PROTEIN: CPT | Performed by: FAMILY MEDICINE

## 2017-09-08 PROCEDURE — 84443 ASSAY THYROID STIM HORMONE: CPT | Performed by: FAMILY MEDICINE

## 2017-09-08 PROCEDURE — 85652 RBC SED RATE AUTOMATED: CPT | Performed by: FAMILY MEDICINE

## 2017-09-08 PROCEDURE — 36415 COLL VENOUS BLD VENIPUNCTURE: CPT | Performed by: FAMILY MEDICINE

## 2017-09-08 PROCEDURE — 85027 COMPLETE CBC AUTOMATED: CPT | Performed by: FAMILY MEDICINE

## 2017-09-08 NOTE — PROGRESS NOTES
SUBJECTIVE:   Brandi Stern is a 62 year old female who presents to clinic today for the following health issues:      RESPIRATORY SYMPTOMS      Duration: months     Description  fatigue/malaise and sinus pressure on the left side.    Severity: moderate    Accompanying signs and symptoms: pressure on left face. When patient lays down she feels she is cloudy.  Balance is off.     History (predisposing factors):  none    Precipitating or alleviating factors: None    Therapies tried and outcome:  Antibiotic, but not helping.    Twice this summer she was prescribed antibiotics for possible infection but the symptoms did not change.     2: due fro thyroid recheck. No thyroid symptoms.     3: vit d def-due for recheck.     The pain she gets is about her left maxillary sinus. Nonradiating. No jaw claudication. No visual symptoms. No other neuro symptoms wit this nor nausea or vomiting nor photophobia.     She does use a CPAP machine. No fever or sore throat.    med hx, family hx, and soc hx reviewed and updated     No other cv, resp nor neuro symptoms. Her depression symptoms are controlled    PHQ-9 score:    PHQ-9 SCORE 6/16/2017   Total Score -   Total Score MyChart -   Total Score 6   Some encounter information is confidential and restricted. Go to Review Flowsheets activity to see all data.       OBJECTIVE: /89 (BP Location: Right arm, Patient Position: Chair, Cuff Size: Adult Large)  Pulse 87  Temp 98.1  F (36.7  C) (Oral)  Resp 16  Wt 229 lb (103.9 kg)  LMP  (LMP Unknown)  SpO2 97%  BMI 39.31 kg/m2 Exam:  GENERAL APPEARANCE: healthy, alert and no distress  EYES: Eyes grossly normal to inspection  HENT: ear canals and TM's normal and nose and mouth without ulcers or lesions  NECK: no adenopathy, no asymmetry, masses, or scars and thyroid normal to palpation  RESP: lungs clear to auscultation - no rales, rhonchi or wheezes  CV: regular rates and rhythm, normal S1 S2, no S3 or S4 and no murmur, click or  rub -  ABDOMEN:  soft, nontender, no HSM or masses and bowel sounds normal  SKIN: no suspicious lesions or rashes  NEURO: NEURO: Normal strength and tone, sensory exam grossly normal, mentation intact and speech normal. Cranial nerve exam negative. Coordination normal. Romberg negative. Aron-halpike normal.   PSYCH: mentation appears normal and affect normal/bright         ICD-10-CM    1. Unilateral headache R51 CBC with platelets     ESR: Erythrocyte sedimentation rate     CRP, inflammation   2. Vitamin D deficiency E55.9 Vitamin D Deficiency   3. Mild major depression (H) F32.0    4. Hypertension goal BP (blood pressure) < 140/90 I10 Comprehensive metabolic panel   5. HASHIMOTO'S THYROIDITIS E06.3 TSH   6. Iron deficiency anemia, unspecified iron deficiency anemia type D50.9 CBC with platelets     Ferritin    headache doubtfully sinus infection given no better with 2 courses of antibiotics. Will consider CT of the sinuses v MRI brain for these new headaches. Change dosage based on results of TSH. Depression controlled. Labs pending for anemia. And vit d. htn borderline.

## 2017-09-08 NOTE — NURSING NOTE
"Chief Complaint   Patient presents with     URI     Headache       Initial /89 (BP Location: Right arm, Patient Position: Chair, Cuff Size: Adult Large)  Pulse 87  Temp 98.1  F (36.7  C) (Oral)  Resp 16  Wt 229 lb (103.9 kg)  LMP  (LMP Unknown)  SpO2 97%  BMI 39.31 kg/m2 Estimated body mass index is 39.31 kg/(m^2) as calculated from the following:    Height as of 8/1/17: 5' 4\" (1.626 m).    Weight as of this encounter: 229 lb (103.9 kg).  Medication Reconciliation: complete     Adelso Carranza MA      "

## 2017-09-08 NOTE — MR AVS SNAPSHOT
After Visit Summary   9/8/2017    Brandi Stern    MRN: [id number]           Patient Information     Date Of Birth          1955        Visit Information        Provider Department      9/8/2017 2:00 PM Magdy Wood MD [facility]        Today's Diagnoses     Unilateral headache    -  1    Vitamin D deficiency        Mild major depression (H)        Hypertension goal BP (blood pressure) < 140/90        HASHIMOTO'S THYROIDITIS        Iron deficiency anemia, unspecified iron deficiency anemia type           Follow-ups after your visit        Your next 10 appointments already scheduled     Sep 26, 2017  1:45 PM CDT   Office Visit with Cherie Brennan NP   [facility] ([facility])    [address] [place] [address] [phone]           Bring a current list of meds and any records pertaining to this visit. For Physicals, please bring immunization records and any forms needing to be filled out. Please arrive 10 minutes early to complete paperwork.              Who to contact     If you have questions or need follow up information about today's clinic visit or your schedule please contact [facility] directly at [phone].  Normal or non-critical lab and imaging results will be communicated to you by MyChart, letter or phone within 4 business days after the clinic has received the results. If you do not hear from us within 7 days, please contact the clinic through [employer] or phone. If you have a critical or abnormal lab result, we will notify you by phone as soon as possible.  Submit refill requests through Ark[employer] or call your pharmacy and they will forward the refill request to us. Please allow 3 business days for your refill to be completed.          Additional Information About Your Visit        MyChart Information     [employer] gives you secure access to your electronic health record. If you see a  primary care provider, you can also send messages to your care team and make appointments. If you have questions, please call your primary care clinic.  If you do not have a primary care provider, please call [phone] and they will assist you.        Care EveryWhere ID     This is your Care EveryWhere ID. This could be used by other organizations to access your [place] medical records  [id number]        Your Vitals Were     Pulse Temperature Respirations Last Period Pulse Oximetry BMI (Body Mass Index)    87 98.1  F (36.7  C) (Oral) 16 (LMP Unknown) 97% 39.31 kg/m2       Blood Pressure from Last 3 Encounters:   09/08/17 138/89   08/24/17 115/83   07/07/17 104/70    Weight from Last 3 Encounters:   09/08/17 229 lb (103.9 kg)   08/24/17 228 lb (103.4 kg)   08/01/17 225 lb (102.1 kg)              We Performed the Following     CBC with platelets     Comprehensive metabolic panel     CRP, inflammation     ESR: Erythrocyte sedimentation rate     Ferritin     TSH     Vitamin D Deficiency          Today's Medication Changes          These changes are accurate as of: 9/8/17 11:59 PM.  If you have any questions, ask your nurse or doctor.               Stop taking these medicines if you haven't already. Please contact your care team if you have questions.     cefdinir 300 MG capsule   Commonly known as:  OMNICEF   Stopped by:  Magdy Wood MD                    Primary Care Provider Office Phone # Fax #    Cherie KARRI Brennan [phone] [address] [facility] [address]        Equal Access to Services     [facility] AH: Hadii georgie ku hadasho Soomaali, waaxda luqadaha, qaybta kaalmada adeegyacharles, adrián rodriguez . So [facility] [phone].    ATENCIÓN: Si habla español, tiene a rizvi disposición servicios gratuitos de asistencia lingüística. Llame al [phone].    We comply with applicable federal civil rights laws and [place] laws. We do not discriminate on the  basis of race, color, national origin, age, disability sex, sexual orientation or gender identity.            Thank you!     Thank you for choosing [facility]  for your care. Our goal is always to provide you with excellent care. Hearing back from our patients is one way we can continue to improve our services. Please take a few minutes to complete the written survey that you may receive in the mail after your visit with us. Thank you!             Your Updated Medication List - Protect others around you: Learn how to safely use, store and throw away your medicines at www.disposemymeds.org.          This list is accurate as of: 9/8/17 11:59 PM.  Always use your most recent med list.                   Brand Name Dispense Instructions for use Diagnosis    ACETAMINOPHEN EXTRA STRENGTH PO      None Entered        * albuterol (2.5 MG/3ML) 0.083% neb solution     3 mL    Take  by nebulization. take 3 mL by nebulization 4 times daily as needed    Moderate persistent asthma       * albuterol 108 (90 BASE) MCG/ACT Inhaler    PROAIR HFA    1 Inhaler    Inhale 1-2 puffs into the lungs every 6 hours as needed for shortness of breath / dyspnea    Moderate persistent asthma with exacerbation       atorvastatin 10 MG tablet    LIPITOR    90 tablet    TAKE 1 TABLET (10 MG) BY MOUTH DAILY    Hyperlipidemia LDL goal <130       CALCIUM 500 PO      Take 1 tablet by mouth daily        CLARITIN 10 MG tablet   Generic drug:  loratadine     0    1 TAB PO QD (Once per day) as needed for ALLERGY SYMPTOMS    Allergic rhinitis due to other allergen       CO Q 10 PO      Take 200 mg by mouth daily        cyclobenzaprine 5 MG tablet    FLEXERIL    180 tablet    Take 1-2 tablets (5-10 mg) by mouth 3 times daily as needed for muscle spasms    Spasm of back muscles       desvenlafaxine fumarate 100 MG 24 hr tablet     30 tablet    Take 1 tablet (100 mg) by mouth daily    Major depressive disorder, recurrent episode, moderate (H)        diclofenac 1 % Gel topical gel    VOLTAREN    100 g    Apply 4 grams to knees or 2 grams to feet four times daily using enclosed dosing card.    Right elbow pain       diclofenac 100 MG 24 hr tablet    VOLTAREN-XR    30 tablet    Take 1 tablet (100 mg) by mouth daily    DDD (degenerative disc disease), cervical, DDD (degenerative disc disease), lumbar       fenofibrate 145 MG tablet     90 tablet    Take 1 tablet (145 mg) by mouth daily    Pure hyperglyceridemia       ferrous sulfate 325 (65 FE) MG tablet    IRON    30 tablet    Take 1 tablet (325 mg) by mouth daily (with breakfast)    Chronic fatigue       FIBER PO      Twice daily        levothyroxine 75 MCG tablet    SYNTHROID    90 tablet    Take 1 tablet (75 mcg) by mouth every morning Overdue for labs    Chronic lymphocytic thyroiditis       Lutein 10 MG Tabs      Take 10 mg by mouth daily        MELATONIN PO      Take 5 mg by mouth nightly as needed        MIRAPEX 0.125 MG tablet   Generic drug:  pramipexole      Take two tabs at dinner and one at hs        omeprazole 20 MG CR capsule    priLOSEC    180 capsule    Take 1 capsule (20 mg) by mouth 2 times daily    Gastroesophageal reflux disease without esophagitis       ranitidine 300 MG tablet    ZANTAC    60 tablet    Take 1 tablet (300 mg) by mouth 2 times daily    Gastroesophageal reflux disease without esophagitis       SYMBICORT 160-4.5 MCG/ACT Inhaler   Generic drug:  budesonide-formoterol      Inhale 1 puff into the lungs 2 times daily    Moderate persistent asthma without complication       triamcinolone 55 MCG/ACT nasal inhaler    NASACORT AQ    1 Inhaler    Inhale 2 sprays in both nostrils every day as needed    Allergic rhinitis due to other allergen       UNABLE TO FIND      MEDICATION NAME: Allergy shots        VITAMIN C PO      Take 1,000 mg by mouth daily        zolpidem 10 MG tablet    AMBIEN    30 tablet    0.5-1 tablet nightly as needed    Insomnia, unspecified       * Notice:  This  list has 2 medication(s) that are the same as other medications prescribed for you. Read the directions carefully, and ask your doctor or other care provider to review them with you.

## 2017-09-09 LAB
ALBUMIN SERPL-MCNC: 3.7 G/DL (ref 3.4–5)
ALP SERPL-CCNC: 48 U/L (ref 40–150)
ALT SERPL W P-5'-P-CCNC: 22 U/L (ref 0–50)
ANION GAP SERPL CALCULATED.3IONS-SCNC: 5 MMOL/L (ref 3–14)
AST SERPL W P-5'-P-CCNC: 21 U/L (ref 0–45)
BILIRUB SERPL-MCNC: 0.3 MG/DL (ref 0.2–1.3)
BUN SERPL-MCNC: 19 MG/DL (ref 7–30)
CALCIUM SERPL-MCNC: 9.1 MG/DL (ref 8.5–10.1)
CHLORIDE SERPL-SCNC: 106 MMOL/L (ref 94–109)
CO2 SERPL-SCNC: 28 MMOL/L (ref 20–32)
CREAT SERPL-MCNC: 0.84 MG/DL (ref 0.52–1.04)
FERRITIN SERPL-MCNC: 31 NG/ML (ref 8–252)
GFR SERPL CREATININE-BSD FRML MDRD: 69 ML/MIN/1.7M2
GLUCOSE SERPL-MCNC: 83 MG/DL (ref 70–99)
POTASSIUM SERPL-SCNC: 4.4 MMOL/L (ref 3.4–5.3)
PROT SERPL-MCNC: 7.1 G/DL (ref 6.8–8.8)
SODIUM SERPL-SCNC: 139 MMOL/L (ref 133–144)
TSH SERPL DL<=0.005 MIU/L-ACNC: 1.69 MU/L (ref 0.4–4)

## 2017-09-11 LAB — DEPRECATED CALCIDIOL+CALCIFEROL SERPL-MC: 45 UG/L (ref 20–75)

## 2017-09-13 ENCOUNTER — MYC MEDICAL ADVICE (OUTPATIENT)
Dept: FAMILY MEDICINE | Facility: CLINIC | Age: 62
End: 2017-09-13

## 2017-09-13 DIAGNOSIS — R51.9 NONINTRACTABLE EPISODIC HEADACHE, UNSPECIFIED HEADACHE TYPE: Primary | ICD-10-CM

## 2017-09-14 NOTE — TELEPHONE ENCOUNTER
"Dr. Wood saw  patient recently for some Headaches and the notes stated:    \"Will consider CT of the sinuses v MRI brain for these new headaches\".    Now patient is calling and asking about getting an MRI.      Cold these orders be placed?    Unique Chaudhry RN    "

## 2017-09-25 DIAGNOSIS — K21.9 GASTROESOPHAGEAL REFLUX DISEASE WITHOUT ESOPHAGITIS: ICD-10-CM

## 2017-09-25 NOTE — TELEPHONE ENCOUNTER
Prescription approved per [facility] Refill Protocol.  Unique Chaudhry RN

## 2017-09-26 ENCOUNTER — OFFICE VISIT (OUTPATIENT)
Dept: FAMILY MEDICINE | Facility: CLINIC | Age: 62
End: 2017-09-26
Payer: COMMERCIAL

## 2017-09-26 VITALS
TEMPERATURE: 99 F | SYSTOLIC BLOOD PRESSURE: 118 MMHG | DIASTOLIC BLOOD PRESSURE: 82 MMHG | OXYGEN SATURATION: 97 % | HEART RATE: 92 BPM | BODY MASS INDEX: 39.14 KG/M2 | WEIGHT: 228 LBS | RESPIRATION RATE: 18 BRPM

## 2017-09-26 DIAGNOSIS — E06.3 CHRONIC LYMPHOCYTIC THYROIDITIS: ICD-10-CM

## 2017-09-26 DIAGNOSIS — F33.1 MAJOR DEPRESSIVE DISORDER, RECURRENT EPISODE, MODERATE (H): Primary | ICD-10-CM

## 2017-09-26 DIAGNOSIS — R03.0 ELEVATED BLOOD PRESSURE READING WITHOUT DIAGNOSIS OF HYPERTENSION: ICD-10-CM

## 2017-09-26 DIAGNOSIS — E78.5 HYPERLIPIDEMIA LDL GOAL <130: ICD-10-CM

## 2017-09-26 PROCEDURE — 99214 OFFICE O/P EST MOD 30 MIN: CPT | Performed by: NURSE PRACTITIONER

## 2017-09-26 RX ORDER — ATORVASTATIN CALCIUM 10 MG/1
TABLET, FILM COATED ORAL
Qty: 90 TABLET | Status: SHIPPED | OUTPATIENT
Start: 2017-09-26 | End: 2018-03-09

## 2017-09-26 RX ORDER — BUPROPION HYDROCHLORIDE 150 MG/1
150 TABLET ORAL EVERY MORNING
Qty: 30 TABLET | Refills: 1 | Status: SHIPPED | OUTPATIENT
Start: 2017-09-26 | End: 2017-10-31

## 2017-09-26 RX ORDER — LEVOTHYROXINE SODIUM 75 UG/1
75 TABLET ORAL EVERY MORNING
Qty: 90 TABLET | Refills: 3 | Status: SHIPPED | OUTPATIENT
Start: 2017-09-26 | End: 2018-10-04

## 2017-09-26 NOTE — NURSING NOTE
"Chief Complaint   Patient presents with     Depression     Sinus Problem     Medication Question     Discuss iron dosage       Initial /90  Pulse 92  Temp 99  F (37.2  C) (Tympanic)  Resp 18  Wt 228 lb (103.4 kg)  LMP  (LMP Unknown)  SpO2 97%  BMI 39.14 kg/m2 Estimated body mass index is 39.14 kg/(m^2) as calculated from the following:    Height as of 8/1/17: 5' 4\" (1.626 m).    Weight as of this encounter: 228 lb (103.4 kg).  Medication Reconciliation: complete     Qing Jackson MA      "

## 2017-09-26 NOTE — PROGRESS NOTES
"  SUBJECTIVE:   Brandi Stern is a 62 year old female who presents to clinic today for the following health issues:      Depression Followup    Status since last visit: Worsened: last week pt notes she had 2 social activites to participate in and she states \"she didn't care\", wanted to stay home and read. Still working part time. Having decreased energy level     See PHQ-9 for current symptoms.  Other associated symptoms: None    Complicating factors:   Significant life event: None    Current substance abuse:  None  Anxiety or Panic symptoms:  No    Her dose of Pristiq was increased to 100 mg a month ago and she has not noticed any improvement in her depression.  She is still feeling fatigued, lack of energy, decreased motivation.      Sinus Problem Follow up    Review and discuss CT MAXILLOFACIAL W/O CONTRAST.           Problem list and histories reviewed & adjusted, as indicated.  Additional history: as documented        Reviewed and updated as needed this visit by clinical staff     Reviewed and updated as needed this visit by Provider         ROS:  C: NEGATIVE for fever, chills, change in weight  E/M: NEGATIVE for ear, mouth and throat problems  R: NEGATIVE for significant cough or SOB  CV: NEGATIVE for chest pain, palpitations or peripheral edema  GI: NEGATIVE for nausea, abdominal pain, heartburn, or change in bowel habits  PSYCHIATRIC: see HPI    OBJECTIVE:     /82  Pulse 92  Temp 99  F (37.2  C) (Tympanic)  Resp 18  Wt 228 lb (103.4 kg)  LMP  (LMP Unknown)  SpO2 97%  BMI 39.14 kg/m2  Body mass index is 39.14 kg/(m^2).  GENERAL: healthy, alert and no distress  PSYCH: mentation appears normal, affect flattened        ASSESSMENT/PLAN:             1. Major depressive disorder, recurrent episode, moderate (H)  Exacerbation, no improvement  Will add Wellbutrin  mg to her Pristiq.  Discussed the use and indication of this medication as well as potential side effects.   Follow up in one month. "   - buPROPion (WELLBUTRIN XL) 150 MG 24 hr tablet; Take 1 tablet (150 mg) by mouth every morning  Dispense: 30 tablet; Refill: 1    2. Elevated blood pressure reading without diagnosis of hypertension  Recheck is normal.  Will continue to monitor.     3. Hyperlipidemia LDL goal <130  Refills given.   - atorvastatin (LIPITOR) 10 MG tablet; TAKE 1 TABLET (10 MG) BY MOUTH DAILY  Dispense: 90 tablet; Refill: PRN    4. HASHIMOTO'S THYROIDITIS  Recent TSH was normal.   Refills given.   - levothyroxine (SYNTHROID) 75 MCG tablet; Take 1 tablet (75 mcg) by mouth every morning Overdue for labs  Dispense: 90 tablet; Refill: 3        Cherie Brennan NP  [facility]

## 2017-09-26 NOTE — MR AVS SNAPSHOT
After Visit Summary   9/26/2017    Brandi Stern    MRN: [id number]           Patient Information     Date Of Birth          1955        Visit Information        Provider Department      9/26/2017 1:45 PM Cherie Brennan NP [facility]        Today's Diagnoses     Major depressive disorder, recurrent episode, moderate (H)    -  1    Elevated blood pressure reading without diagnosis of hypertension        Hyperlipidemia LDL goal <130        HASHIMOTO'S THYROIDITIS           Follow-ups after your visit        Your next 10 appointments already scheduled     Oct 31, 2017  1:00 PM CDT   Office Visit with Cherie Brennan NP   [facility] ([facility])    [address] [facility] [address] [phone]           Bring a current list of meds and any records pertaining to this visit. For Physicals, please bring immunization records and any forms needing to be filled out. Please arrive 10 minutes early to complete paperwork.              Who to contact     If you have questions or need follow up information about today's clinic visit or your schedule please contact [facility] directly at [phone].  Normal or non-critical lab and imaging results will be communicated to you by MyChart, letter or phone within 4 business days after the clinic has received the results. If you do not hear from us within 7 days, please contact the clinic through [employer] or phone. If you have a critical or abnormal lab result, we will notify you by phone as soon as possible.  Submit refill requests through WHATT or call your pharmacy and they will forward the refill request to us. Please allow 3 business days for your refill to be completed.          Additional Information About Your Visit        MyChart Information     WHATT gives you secure access to your electronic health record. If you see a primary care provider, you can also  send messages to your care team and make appointments. If you have questions, please call your primary care clinic.  If you do not have a primary care provider, please call [phone] and they will assist you.        Care EveryWhere ID     This is your Care EveryWhere ID. This could be used by other organizations to access your [place] medical records  [id number]        Your Vitals Were     Pulse Temperature Respirations Last Period Pulse Oximetry BMI (Body Mass Index)    92 99  F (37.2  C) (Tympanic) 18 (LMP Unknown) 97% 39.14 kg/m2       Blood Pressure from Last 3 Encounters:   09/26/17 118/82   09/08/17 138/89   08/24/17 115/83    Weight from Last 3 Encounters:   09/26/17 228 lb (103.4 kg)   09/08/17 229 lb (103.9 kg)   08/24/17 228 lb (103.4 kg)              Today, you had the following     No orders found for display         Today's Medication Changes          These changes are accurate as of: 9/26/17  2:45 PM.  If you have any questions, ask your nurse or doctor.               Start taking these medicines.        Dose/Directions    buPROPion 150 MG 24 hr tablet   Commonly known as:  WELLBUTRIN XL   Used for:  Major depressive disorder, recurrent episode, moderate (H)   Started by:  Cherie Brennan NP        Dose:  150 mg   Take 1 tablet (150 mg) by mouth every morning   Quantity:  30 tablet   Refills:  1         These medicines have changed or have updated prescriptions.        Dose/Directions    atorvastatin 10 MG tablet   Commonly known as:  LIPITOR   This may have changed:  See the new instructions.   Used for:  Hyperlipidemia LDL goal <130   Changed by:  Cherie Brennan NP        TAKE 1 TABLET (10 MG) BY MOUTH DAILY   Quantity:  90 tablet   Refills:  PRN            Where to get your medicines      These medications were sent to [facility]/pharmacy #[id number] - [place], [place] - [address] [facility]  [address] [place] [address]     Phone:  [phone]     atorvastatin 10 MG tablet    buPROPion 150  MG 24 hr tablet    levothyroxine 75 MCG tablet                Primary Care Provider Office Phone # Fax #    Cherie Brennan, [phone] [address]  [facility] [address]        Equal Access to Services     LORNE HALEY : Hadii aad ku hadjosephineo Soomaali, waaxda luqadaha, qaybta kaalmada adeegyada, waxart idiin haycharlyn adejuan luis michael jennifer perkins. So [facility] [phone].    ATENCIÓN: Si habla español, tiene a rizvi disposición servicios gratuitos de asistencia lingüística. Llame al [phone].    We comply with applicable federal civil rights laws and [place] laws. We do not discriminate on the basis of race, color, national origin, age, disability sex, sexual orientation or gender identity.            Thank you!     Thank you for choosing [facility]  for your care. Our goal is always to provide you with excellent care. Hearing back from our patients is one way we can continue to improve our services. Please take a few minutes to complete the written survey that you may receive in the mail after your visit with us. Thank you!             Your Updated Medication List - Protect others around you: Learn how to safely use, store and throw away your medicines at www.disposemymeds.org.          This list is accurate as of: 9/26/17  2:45 PM.  Always use your most recent med list.                   Brand Name Dispense Instructions for use Diagnosis    ACETAMINOPHEN EXTRA STRENGTH PO      Pt reports taking 650 MG arthritis        * albuterol (2.5 MG/3ML) 0.083% neb solution     3 mL    Take  by nebulization. take 3 mL by nebulization 4 times daily as needed    Moderate persistent asthma       * albuterol 108 (90 BASE) MCG/ACT Inhaler    PROAIR HFA    1 Inhaler    Inhale 1-2 puffs into the lungs every 6 hours as needed for shortness of breath / dyspnea    Moderate persistent asthma with exacerbation       atorvastatin 10 MG tablet    LIPITOR    90 tablet    TAKE 1 TABLET (10 MG) BY MOUTH  DAILY    Hyperlipidemia LDL goal <130       buPROPion 150 MG 24 hr tablet    WELLBUTRIN XL    30 tablet    Take 1 tablet (150 mg) by mouth every morning    Major depressive disorder, recurrent episode, moderate (H)       CALCIUM 500 PO      Take 1 tablet by mouth daily        CLARITIN 10 MG tablet   Generic drug:  loratadine     0    1 TAB PO QD (Once per day) as needed for ALLERGY SYMPTOMS    Allergic rhinitis due to other allergen       CO Q 10 PO      Take 200 mg by mouth daily        cyclobenzaprine 5 MG tablet    FLEXERIL    180 tablet    Take 1-2 tablets (5-10 mg) by mouth 3 times daily as needed for muscle spasms    Spasm of back muscles       desvenlafaxine fumarate 100 MG 24 hr tablet     30 tablet    Take 1 tablet (100 mg) by mouth daily    Major depressive disorder, recurrent episode, moderate (H)       diclofenac 1 % Gel topical gel    VOLTAREN    100 g    Apply 4 grams to knees or 2 grams to feet four times daily using enclosed dosing card.    Right elbow pain       diclofenac 100 MG 24 hr tablet    VOLTAREN-XR    30 tablet    Take 1 tablet (100 mg) by mouth daily    DDD (degenerative disc disease), cervical, DDD (degenerative disc disease), lumbar       fenofibrate 145 MG tablet     90 tablet    Take 1 tablet (145 mg) by mouth daily    Pure hyperglyceridemia       ferrous sulfate 325 (65 FE) MG tablet    IRON    30 tablet    Take 1 tablet (325 mg) by mouth daily (with breakfast)    Chronic fatigue       FIBER PO      Twice daily        levothyroxine 75 MCG tablet    SYNTHROID    90 tablet    Take 1 tablet (75 mcg) by mouth every morning Overdue for labs    Chronic lymphocytic thyroiditis       Lutein 10 MG Tabs      Take 10 mg by mouth daily        MELATONIN PO      Take 5 mg by mouth nightly as needed        MIRAPEX 0.125 MG tablet   Generic drug:  pramipexole      Take two tabs at dinner and one at hs        omeprazole 20 MG CR capsule    priLOSEC    180 capsule    Take 1 capsule (20 mg) by mouth 2  times daily    Gastroesophageal reflux disease without esophagitis       ranitidine 300 MG tablet    ZANTAC    60 tablet    Take 1 tablet (300 mg) by mouth 2 times daily    Gastroesophageal reflux disease without esophagitis       SYMBICORT 160-4.5 MCG/ACT Inhaler   Generic drug:  budesonide-formoterol      Inhale 1 puff into the lungs 2 times daily    Moderate persistent asthma without complication       triamcinolone 55 MCG/ACT nasal inhaler    NASACORT AQ    1 Inhaler    Inhale 2 sprays in both nostrils every day as needed    Allergic rhinitis due to other allergen       UNABLE TO FIND      MEDICATION NAME: Allergy shots        VITAMIN C PO      Take 1,000 mg by mouth daily        zolpidem 10 MG tablet    AMBIEN    30 tablet    0.5-1 tablet nightly as needed    Insomnia, unspecified       * Notice:  This list has 2 medication(s) that are the same as other medications prescribed for you. Read the directions carefully, and ask your doctor or other care provider to review them with you.

## 2017-10-07 DIAGNOSIS — E06.3 CHRONIC LYMPHOCYTIC THYROIDITIS: ICD-10-CM

## 2017-10-09 RX ORDER — LEVOTHYROXINE SODIUM 75 UG/1
TABLET ORAL
Qty: 0.01 TABLET | Refills: 0 | OUTPATIENT
Start: 2017-10-09

## 2017-10-09 NOTE — TELEPHONE ENCOUNTER
levothyroxine (SYNTHROID) 75 MCG tablet     Last Written Prescription Date: 9/26/2017  Last Quantity: 90, # refills: 3  Last Office Visit with [facility] or [facility] prescribing provider: n/a   Next 5 appointments (look out 90 days)     Oct 31, 2017  1:00 PM CDT   Office Visit with Cherie Brennan NP   [facility] ([facility])    [address] [place] [id number] [phone]                   TSH   Date Value Ref Range Status   09/08/2017 1.69 0.40 - 4.00 mU/L Final     Duplicate    Refused Prescriptions:                       Disp   Refills    levothyroxine (SYNTHROID/LEVOTHROID) 75 MC*0.01 t*0        Sig: TAKE 1 TABLET (75 MCG) BY MOUTH EVERY MORNING.           OVERDUE FOR LABS  Refused By: THAO GOEL  Reason for Refusal: Duplicate      Closing encounter - no further actions needed at this time    Thao Goel RN

## 2017-10-10 ENCOUNTER — RADIANT APPOINTMENT (OUTPATIENT)
Dept: MAMMOGRAPHY | Facility: CLINIC | Age: 62
End: 2017-10-10
Payer: COMMERCIAL

## 2017-10-10 DIAGNOSIS — Z12.31 VISIT FOR SCREENING MAMMOGRAM: ICD-10-CM

## 2017-10-10 PROCEDURE — G0202 SCR MAMMO BI INCL CAD: HCPCS

## 2017-10-20 ENCOUNTER — OFFICE VISIT (OUTPATIENT)
Dept: PSYCHOLOGY | Facility: CLINIC | Age: 62
End: 2017-10-20
Payer: COMMERCIAL

## 2017-10-20 DIAGNOSIS — F33.1 MAJOR DEPRESSIVE DISORDER, RECURRENT EPISODE, MODERATE WITH ANXIOUS DISTRESS (H): Primary | ICD-10-CM

## 2017-10-20 PROCEDURE — 90834 PSYTX W PT 45 MINUTES: CPT | Performed by: COUNSELOR

## 2017-10-20 NOTE — MR AVS SNAPSHOT
MRN:[id number]                      After Visit Summary   10/20/2017    Brandi Stern    MRN: [id number]           Visit Information        Provider Department      10/20/2017 2:00 PM Ronda Lackey [facility] Generic      Your next 10 appointments already scheduled     Oct 31, 2017  1:00 PM CDT   Office Visit with Cherie Brennan NP   [facility]    [address] [place] [id number] [phone]           Bring a current list of meds and any records pertaining to this visit. For Physicals, please bring immunization records and any forms needing to be filled out. Please arrive 10 minutes early to complete paperwork.            Nov 10, 2017  2:00 PM CST   Return Visit with Ronda Lackey [facility] ([place] [address] [place] [phone]            Dec 01, 2017  2:00 PM CST   Return Visit with Ronda Lackey [facility] ([place] [address] [place] [id number] [phone]              MyChart Information     [employer] gives you secure access to your electronic health record. If you see a primary care provider, you can also send messages to your care team and make appointments. If you have questions, please call your primary care clinic.  If you do not have a primary care provider, please call [phone] and they will assist you.        Care EveryWhere ID     This is your Care EveryWhere ID. This could be used by other organizations to access your [place] medical records  [id number]        Equal Access to Services     LORNE HALEY : Keyur Zee, raquel whitfield, adrián shane. So [facility] [phone].    ATENCIÓN: Si kristy espjed, nichole cross rizvi  disposición servicios gratuitos de asistencia lingüística. Llame al [phone].    We comply with applicable federal civil rights laws and [place] laws. We do not discriminate on the basis of race, color, national origin, age, disability, sex, sexual orientation, or gender identity.

## 2017-10-20 NOTE — PROGRESS NOTES
"                                           Progress Note    Client Name: Brandi Stern  Date: 10/20/2017         Service Type: Individual      Session Start Time: 11:05a  Session End Time: 11:50a      Session Length: 45 minutes     Session #: 11     Attendees: Client attended alone    Treatment Plan Last Reviewed: 10/20/2017  PHQ-9: 6     DATA      Progress Since Last Session (Related to Symptoms / Goals / Homework):   Symptoms: Stable, little interest, depressed mood, tired, low energy, poor appetite    Homework: Client will complete research on medicare to reduce financial stress.       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; has since then added new medication and is feeling better; reported being more productive at home and is receiving some support from brother; reported variable success with quitting smoking, but feeling hopeful; expressed a desire to cut back on drinking wine as well since she usually smokes after drinking wine; struggled to identify triggers for depressed mood, \"I can't think of anything that contributed to my mood\"; also seemed avoidant talking about causes/triggers; expressed continued distress about political climate, but also acknowledged needing to distance from political debate, especially with family members.      Treatment Objective(s) Addressed in This Session:     Client will learn 3 new skills to cope with stress other than smoking. Client will organize/clean her home within 12 sessions.      Intervention:   Motivational Interviewing: continue to evoke desire and " "readiness to change, pointing out discrepancies; CBT: prompting client to identify mental health needs and maladaptive behaviors that she would like to change, teaching assertiveness skills and emotion identification and regulation, teach self-reflection to identify mental health needs and to help with decision making and effective coping, identify intentional behavior changes that are contributing to improved mood, teach identifying triggers or warning signs for increasing depression; DBT: teach and reinforce consistent boundary setting; Interpersonal therapy: continue to prompt client to engage in emotional deepening to address core issues      ASSESSMENT: Current Emotional / Mental Status (status of significant symptoms):   Client has had a history of suicidal ideation: passive thoughts of dealth, \"Is there pain in the afterlife?\", 1 suicide attempt: in college after breakup with boyfriend,  was drinking and ingested pills, no hospitalization, would never really hurt self   Client denies current fears or concerns for personal safety.   Client reports the following current or recent suicidal ideation or behaviors: passive thoughts of dealth, \"Is there pain in the afterlife?\". Would never really hurt self.   Client denies current or recent homicidal ideation or behaviors.   Client denies current or recent self injurious behavior or ideation.   Client denies other safety concerns.   Client reports there are no firearms in the house.   A safety and risk management plan has not been developed at this time, however client was given the after-hours number / 911 should there be a change in any of  these risk factors.     Appearance:   Appropriate    Eye Contact:   Fair    Psychomotor Behavior: Normal    Attitude:   Cooperative    Orientation:   All   Speech    Rate / Production: Normal     Volume:  Normal    Mood:    \"Better\"   Affect:    Appropriate     Thought Content:  Clear    Thought Form:  Coherent  Circumstantial " Tangential    Insight:    Limited      Medication Review:   Added Wellbutrin      Medication Compliance:   Yes     Changes in Health Issues:   None reported     Chemical Use Review:   Substance Use: Chemical use reviewed, no active concerns identified     Tobacco Use: Smoked 2 pack of cigarettes, expressed plans to continue quitting      Collateral Reports Completed:   Not Applicable    PLAN: (Client Tasks / Therapist Tasks / Other)  Client will set limits with work by saying no and taking less patients within the next month; client will organize/clean her home within 12 sessions. Client will learn 3 new skills to cope with stress other smoking or decide if she wants to quit or not quit smoking; client will work on noting and attending to vulnerabilities of feeling emotional/sensative/teary eyed.Therapist will continue to use motivational interviewing to evoke change talk and CBT strategies to engage client in proactive problem solving, practice distress tolerance as it relates to work and interpersonal relationships, and teach emotion identification and regulation. Continue to reinforce boudanry setting. Therapist will also continue to build on client's strengths and areas of her life where she feels she is making progress. Teach and reinforce identifying warning signs for increasing depression.         Ronda Lackey [facility]                                                       ___________________________________________________________________    Treatment Plan    Client's Name: Brandi Stern  YOB: 1955    Date: 10/20/2017    DSM-V Diagnoses: 296.32 Major Depressive Disorder, Recurrent Episode, Moderate _ and With anxious distress  Psychosocial / Contextual Factors: Work stress, health concerns, family stress  WHODAS: 29    Referral / Collaboration:  Referral to another professional/service is not indicated at this time.    Anticipated number of session or this episode of care:  12      MeasurableTreatment Goal(s) related to diagnosis / functional impairment(s)  Goal 1: Client will improve depressed mood as evidenced by decreased score on PHQ 9 from 19 (moderately severe) to score range of 10-14 (moderate).    I will know I've met my goal when I feel more accomplished, in control, house looks better, and I would not be smoking.      Objective #A (Client Action)    Client will set limits with work by saying no and taking less patients within the next month.  Status: Continued - Date: 10/20/2017     Intervention(s)  Therapist will role-play assertiveness and conflict management skills.  ...teach about healthy boundaries.    Objective #B  Client will organize/clean her home within 12 sessions.   Status: Continued - Date: 10/20/2017     Intervention(s)  Therapist will teach emotional regulation skills and developing routine.    Objective #C  Client will learn 3 new skills to cope with stress other than smoking.   Status: Continued - Date: 10/20/2017     Intervention(s)  Therapist will assign homework of practicing skills at home.  ...teach self care, distraction, and distress tolerance skills.      Client has reviewed and agreed to the above plan.      ELISE Sheikh  October 20, 2017

## 2017-10-22 DIAGNOSIS — G47.00 INSOMNIA, UNSPECIFIED TYPE: Primary | ICD-10-CM

## 2017-10-23 RX ORDER — ZOLPIDEM TARTRATE 10 MG/1
TABLET ORAL
Qty: 30 TABLET | Refills: 5 | Status: SHIPPED | OUTPATIENT
Start: 2017-10-23 | End: 2018-06-06

## 2017-10-23 NOTE — TELEPHONE ENCOUNTER
Controlled Substance Refill Request for zolpidem (AMBIEN) 10 MG tablet  Problem List Complete:  Yes    Last Written Prescription Date:  4/19/2017  Last Fill Quantity: 30,   # refills: 5    Last Office Visit with [facility] primary care provider: 9/26/2017    Clinic visit frequency required: unspecified     Future Office visit:   Next 5 appointments (look out 90 days)     Oct 31, 2017  1:00 PM CDT   Office Visit with Cherie Brennan NP   [facility] ([address] [address]   [phone]            Nov 10, 2017  2:00 PM CST   Return Visit with Ronda Lackey [facility] ([address] [address]   [phone]            Dec 01, 2017  2:00 PM CST   Return Visit with Ronda Lackey [facility] ([place] [address] [address]   [phone]                  Controlled substance agreement on file: No.     Processing:  may be called or faxed     checked in past 6 months?  No, route to RN no chronic pain    Thank you,  Phoenix Aviles RN

## 2017-10-24 NOTE — TELEPHONE ENCOUNTER
I faxed Rx for Zolpidem (Ambien) 10 MG tablet to [facility]  Start date: 10/23/2017  Fax #: [phone]    Qing Jackson MA

## 2017-10-31 ENCOUNTER — OFFICE VISIT (OUTPATIENT)
Dept: FAMILY MEDICINE | Facility: CLINIC | Age: 62
End: 2017-10-31
Payer: COMMERCIAL

## 2017-10-31 VITALS
HEART RATE: 80 BPM | TEMPERATURE: 99.4 F | BODY MASS INDEX: 39.14 KG/M2 | SYSTOLIC BLOOD PRESSURE: 124 MMHG | DIASTOLIC BLOOD PRESSURE: 84 MMHG | RESPIRATION RATE: 18 BRPM | WEIGHT: 228 LBS | OXYGEN SATURATION: 96 %

## 2017-10-31 DIAGNOSIS — F33.1 MAJOR DEPRESSIVE DISORDER, RECURRENT EPISODE, MODERATE (H): Primary | ICD-10-CM

## 2017-10-31 DIAGNOSIS — E66.01 MORBID OBESITY (H): ICD-10-CM

## 2017-10-31 PROCEDURE — 99214 OFFICE O/P EST MOD 30 MIN: CPT | Performed by: NURSE PRACTITIONER

## 2017-10-31 RX ORDER — BUPROPION HYDROCHLORIDE 300 MG/1
300 TABLET ORAL EVERY MORNING
Qty: 90 TABLET | Status: SHIPPED | OUTPATIENT
Start: 2017-10-31 | End: 2018-02-12

## 2017-10-31 ASSESSMENT — ANXIETY QUESTIONNAIRES
2. NOT BEING ABLE TO STOP OR CONTROL WORRYING: NOT AT ALL
3. WORRYING TOO MUCH ABOUT DIFFERENT THINGS: NOT AT ALL
7. FEELING AFRAID AS IF SOMETHING AWFUL MIGHT HAPPEN: NOT AT ALL
1. FEELING NERVOUS, ANXIOUS, OR ON EDGE: NOT AT ALL
GAD7 TOTAL SCORE: 0
6. BECOMING EASILY ANNOYED OR IRRITABLE: NOT AT ALL
5. BEING SO RESTLESS THAT IT IS HARD TO SIT STILL: NOT AT ALL

## 2017-10-31 ASSESSMENT — PATIENT HEALTH QUESTIONNAIRE - PHQ9
SUM OF ALL RESPONSES TO PHQ QUESTIONS 1-9: 9
5. POOR APPETITE OR OVEREATING: NOT AT ALL

## 2017-10-31 NOTE — PROGRESS NOTES
SUBJECTIVE:   Brandi Stern is a 62 year old female who presents to clinic today for the following health issues:      Depression Followup    Status since last visit: Better, improved    See PHQ-9 for current symptoms.  Other associated symptoms: still sleeping a lot    Complicating factors:   Significant life event:  No   Current substance abuse:  None  Anxiety or Panic symptoms:  No    PHQ-9 Score and MyChart F/U Questions 5/5/2017 5/19/2017 6/16/2017   Total Score 11 2 6   Q9: Suicide Ideation Several days Not at all Not at all   Some encounter information is confidential and restricted. Go to Review Flowsheets activity to see all data.       PHQ-9  English  PHQ-9   Any Language  Suicide Assessment Five-step Evaluation and Treatment (SAFE-T)      Amount of exercise or physical activity: walking the dogs a couple times per week    Problems taking medications regularly: No    Medication side effects: none    Diet: regular (no restrictions)      CT showed narrowing in jaw. When congestion appears pt will feel pressure  X-ray at dental office showed Sinus is sitting right on nerve        At her last visit a month ago, we added Wellbutrin  mg to her Pristiq.   She feels this has been helpful, not feeling as fatigued, but still having some fatigue and low mood at times.  She denies any SI.         Problem list and histories reviewed & adjusted, as indicated.  Additional history: as documented        Reviewed and updated as needed this visit by clinical staff     Reviewed and updated as needed this visit by Provider         ROS:  C: NEGATIVE for fever, chills, change in weight  E/M: NEGATIVE for ear, mouth and throat problems  R: NEGATIVE for significant cough or SOB  CV: NEGATIVE for chest pain, palpitations or peripheral edema  MUSCULOSKELETAL: chronic joint pain  NEURO: NEGATIVE for weakness, dizziness or paresthesias  PSYCHIATRIC: see HPI    OBJECTIVE:     /90  Pulse 80  Temp 99.4  F (37.4  C)  (Oral)  Resp 18  Wt 228 lb (103.4 kg)  LMP  (LMP Unknown)  SpO2 96%  BMI 39.14 kg/m2  Body mass index is 39.14 kg/(m^2).  GENERAL: healthy, alert and no distress  PSYCH: mentation appears normal, affect normal; PHQ-9 score of 9        ASSESSMENT/PLAN:             1. Major depressive disorder, recurrent episode, moderate (H)  Partial response  Increase Wellbutrin to 300 mg and follow up on MyChart in 3-4 weeks.    - buPROPion (WELLBUTRIN XL) 300 MG 24 hr tablet; Take 1 tablet (300 mg) by mouth every morning  Dispense: 90 tablet; Refill: PRN    2. BMI > 35  Discussed diet and exercise.           Cherie Brennan, UZIEL  [facility]

## 2017-10-31 NOTE — MR AVS SNAPSHOT
After Visit Summary   10/31/2017    Brandi Stern    MRN: [id number]           Patient Information     Date Of Birth          1955        Visit Information        Provider Department      10/31/2017 1:00 PM Cherie Brennan NP [facility]        Today's Diagnoses     Major depressive disorder, recurrent episode, moderate (H)    -  1    BMI > 35           Follow-ups after your visit        Your next 10 appointments already scheduled     Nov 10, 2017  2:00 PM CST   Return Visit with Ronda Lackey [address] [id number] [phone]            Dec 01, 2017  2:00 PM CST   Return Visit with Ronda Lackey [address] [id number] [phone]              Who to contact     If you have questions or need follow up information about today's clinic visit or your schedule please contact [facility] directly at [phone].  Normal or non-critical lab and imaging results will be communicated to you by [employer], letter or phone within 4 business days after the clinic has received the results. If you do not hear from us within 7 days, please contact the clinic through [employer] or phone. If you have a critical or abnormal lab result, we will notify you by phone as soon as possible.  Submit refill requests through [employer] or call your pharmacy and they will forward the refill request to us. Please allow 3 business days for your refill to be completed.          Additional Information About Your Visit        [employer] Information     [employer] gives you secure access to your electronic health record. If you see a primary care provider, you can also send messages to your care team and make appointments. If you have questions, please call your primary  care clinic.  If you do not have a primary care provider, please call [phone] and they will assist you.        Care EveryWhere ID     This is your Care EveryWhere ID. This could be used by other organizations to access your [facility] medical records  [id number]        Your Vitals Were     Pulse Temperature Respirations Last Period Pulse Oximetry BMI (Body Mass Index)    80 99.4  F (37.4  C) (Oral) 18 (LMP Unknown) 96% 39.14 kg/m2       Blood Pressure from Last 3 Encounters:   10/31/17 124/84   09/26/17 118/82   09/08/17 138/89    Weight from Last 3 Encounters:   10/31/17 228 lb (103.4 kg)   09/26/17 228 lb (103.4 kg)   09/08/17 229 lb (103.9 kg)              Today, you had the following     No orders found for display         Today's Medication Changes          These changes are accurate as of: 10/31/17  1:42 PM.  If you have any questions, ask your nurse or doctor.               These medicines have changed or have updated prescriptions.        Dose/Directions    buPROPion 300 MG 24 hr tablet   Commonly known as:  WELLBUTRIN XL   This may have changed:    - medication strength  - how much to take   Used for:  Major depressive disorder, recurrent episode, moderate (H)   Changed by:  Cherie Brennan NP        Dose:  300 mg   Take 1 tablet (300 mg) by mouth every morning   Quantity:  90 tablet   Refills:  PRN            Where to get your medicines      These medications were sent to [facility]/pharmacy #[id number] - [place], [place] - [address] [facility]  [address], [place] [address]     Phone:  [phone]     buPROPion 300 MG 24 hr tablet                Primary Care Provider Office Phone # Fax #    Cherie Brennan [phone] [address] [facility] [address]        Equal Access to Services     [facility] SUDHA AH: Keyur littlejohno Sohéctor, waaxda luqadaha, qaybta kaalmada adeegyada, waxay carmelo perkins. So [facility] [phone].    ATENCIÓN: Si nichole curran  a rizvi disposición servicios gratuitos de asistencia lingüística. Alia kruger [phone].    We comply with applicable federal civil rights laws and [place] laws. We do not discriminate on the basis of race, color, national origin, age, disability, sex, sexual orientation, or gender identity.            Thank you!     Thank you for choosing [facility]  for your care. Our goal is always to provide you with excellent care. Hearing back from our patients is one way we can continue to improve our services. Please take a few minutes to complete the written survey that you may receive in the mail after your visit with us. Thank you!             Your Updated Medication List - Protect others around you: Learn how to safely use, store and throw away your medicines at www.disposemymeds.org.          This list is accurate as of: 10/31/17  1:42 PM.  Always use your most recent med list.                   Brand Name Dispense Instructions for use Diagnosis    ACETAMINOPHEN EXTRA STRENGTH PO      Pt reports taking 650 MG arthritis        * albuterol (2.5 MG/3ML) 0.083% neb solution     3 mL    Take  by nebulization. take 3 mL by nebulization 4 times daily as needed    Moderate persistent asthma       * albuterol 108 (90 BASE) MCG/ACT Inhaler    PROAIR HFA    1 Inhaler    Inhale 1-2 puffs into the lungs every 6 hours as needed for shortness of breath / dyspnea    Moderate persistent asthma with exacerbation       atorvastatin 10 MG tablet    LIPITOR    90 tablet    TAKE 1 TABLET (10 MG) BY MOUTH DAILY    Hyperlipidemia LDL goal <130       buPROPion 300 MG 24 hr tablet    WELLBUTRIN XL    90 tablet    Take 1 tablet (300 mg) by mouth every morning    Major depressive disorder, recurrent episode, moderate (H)       CALCIUM 500 PO      Take 1 tablet by mouth daily        CLARITIN 10 MG tablet   Generic drug:  loratadine     0    1 TAB PO QD (Once per day) as needed for ALLERGY SYMPTOMS    Allergic rhinitis due to  other allergen       CO Q 10 PO      Take 200 mg by mouth daily        cyclobenzaprine 5 MG tablet    FLEXERIL    180 tablet    Take 1-2 tablets (5-10 mg) by mouth 3 times daily as needed for muscle spasms    Spasm of back muscles       desvenlafaxine fumarate 100 MG 24 hr tablet     30 tablet    Take 1 tablet (100 mg) by mouth daily    Major depressive disorder, recurrent episode, moderate (H)       diclofenac 1 % Gel topical gel    VOLTAREN    100 g    Apply 4 grams to knees or 2 grams to feet four times daily using enclosed dosing card.    Right elbow pain       diclofenac 100 MG 24 hr tablet    VOLTAREN-XR    30 tablet    Take 1 tablet (100 mg) by mouth daily    DDD (degenerative disc disease), cervical, DDD (degenerative disc disease), lumbar       fenofibrate 145 MG tablet     90 tablet    Take 1 tablet (145 mg) by mouth daily    Pure hyperglyceridemia       ferrous sulfate 325 (65 FE) MG tablet    IRON    30 tablet    Take 1 tablet (325 mg) by mouth daily (with breakfast)    Chronic fatigue       FIBER PO      Twice daily        levothyroxine 75 MCG tablet    SYNTHROID    90 tablet    Take 1 tablet (75 mcg) by mouth every morning Overdue for labs    Chronic lymphocytic thyroiditis       Lutein 10 MG Tabs      Take 10 mg by mouth daily        MELATONIN PO      Take 5 mg by mouth nightly as needed        MIRAPEX 0.125 MG tablet   Generic drug:  pramipexole      Take two tabs at dinner and one at hs        omeprazole 20 MG CR capsule    priLOSEC    180 capsule    Take 1 capsule (20 mg) by mouth 2 times daily    Gastroesophageal reflux disease without esophagitis       ranitidine 300 MG tablet    ZANTAC    60 tablet    Take 1 tablet (300 mg) by mouth 2 times daily    Gastroesophageal reflux disease without esophagitis       SYMBICORT 160-4.5 MCG/ACT Inhaler   Generic drug:  budesonide-formoterol      Inhale 1 puff into the lungs 2 times daily    Moderate persistent asthma without complication       triamcinolone  55 MCG/ACT nasal inhaler    NASACORT AQ    1 Inhaler    Inhale 2 sprays in both nostrils every day as needed    Allergic rhinitis due to other allergen       UNABLE TO FIND      MEDICATION NAME: Allergy shots        VITAMIN C PO      Take 1,000 mg by mouth daily        zolpidem 10 MG tablet    AMBIEN    30 tablet    TAKE 1/2 TO 1 TABLET BY MOUTH NIGHTLY AS NEEDED    Insomnia, unspecified type       * Notice:  This list has 2 medication(s) that are the same as other medications prescribed for you. Read the directions carefully, and ask your doctor or other care provider to review them with you.

## 2017-11-01 DIAGNOSIS — M50.30 DDD (DEGENERATIVE DISC DISEASE), CERVICAL: ICD-10-CM

## 2017-11-01 DIAGNOSIS — M51.369 DDD (DEGENERATIVE DISC DISEASE), LUMBAR: ICD-10-CM

## 2017-11-01 ASSESSMENT — ANXIETY QUESTIONNAIRES: GAD7 TOTAL SCORE: 0

## 2017-11-01 NOTE — TELEPHONE ENCOUNTER
Received fax request from [facility] pharmacy [facility][id number] requesting refill(s) for diclofenac (VOLTAREN-XR) 100 MG 24 hr tablet    Last refilled on 9/29/17    Pt last seen on 2/3/17  Next appt scheduled for n/a    Will facilitate refill.

## 2017-11-02 RX ORDER — DICLOFENAC SODIUM 100 MG/1
100 TABLET, FILM COATED, EXTENDED RELEASE ORAL DAILY
Qty: 30 TABLET | Refills: 3 | Status: SHIPPED | OUTPATIENT
Start: 2017-11-02 | End: 2018-03-21

## 2017-11-10 ENCOUNTER — OFFICE VISIT (OUTPATIENT)
Dept: PSYCHOLOGY | Facility: CLINIC | Age: 62
End: 2017-11-10
Payer: COMMERCIAL

## 2017-11-10 DIAGNOSIS — F33.1 MAJOR DEPRESSIVE DISORDER, RECURRENT EPISODE, MODERATE WITH ANXIOUS DISTRESS (H): Primary | ICD-10-CM

## 2017-11-10 PROCEDURE — 90834 PSYTX W PT 45 MINUTES: CPT | Performed by: COUNSELOR

## 2017-11-10 ASSESSMENT — PATIENT HEALTH QUESTIONNAIRE - PHQ9: SUM OF ALL RESPONSES TO PHQ QUESTIONS 1-9: 8
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
## 2017-11-10 NOTE — MR AVS SNAPSHOT
MRN:[id number]                      After Visit Summary   11/10/2017    Brandi Stern    MRN: [id number]           Visit Information        Provider Department      11/10/2017 2:00 PM [facility] Generic      Your next 10 appointments already scheduled     Dec 01, 2017  2:00 PM CST   [facility]   [facility] ([facility])    [facility]  [address]  [place] [id number] [phone]              MyChart Information     [employer] gives you secure access to your electronic health record. If you see a primary care provider, you can also send messages to your care team and make appointments. If you have questions, please call your primary care clinic.  If you do not have a primary care provider, please call [phone] and they will assist you.        Care EveryWhere ID     This is your Care EveryWhere ID. This could be used by other organizations to access your [place] medical records  [id number]        Equal Access to Services     LORNE HALEY : Hadii georgie cowan hadasho Sofarooqali, waaxda luqadaha, qaybta kaalmada adeegyada, adrián perkins. So [facility] [phone].    ATENCIÓN: Si habla español, tiene a rizvi disposición servicios gratuitos de asistencia lingüística. Llame al [phone].    We comply with applicable federal civil rights laws and [place] laws. We do not discriminate on the basis of race, color, national origin, age, disability, sex, sexual orientation, or gender identity.

## 2017-11-30 ENCOUNTER — TRANSFERRED RECORDS (OUTPATIENT)
Dept: HEALTH INFORMATION MANAGEMENT | Facility: CLINIC | Age: 62
End: 2017-11-30

## 2017-12-01 ENCOUNTER — OFFICE VISIT (OUTPATIENT)
Dept: PSYCHOLOGY | Facility: CLINIC | Age: 62
End: 2017-12-01
Payer: COMMERCIAL

## 2017-12-01 DIAGNOSIS — F33.1 MAJOR DEPRESSIVE DISORDER, RECURRENT EPISODE, MODERATE WITH ANXIOUS DISTRESS (H): Primary | ICD-10-CM

## 2017-12-01 PROCEDURE — 90834 PSYTX W PT 45 MINUTES: CPT | Performed by: COUNSELOR

## 2017-12-01 NOTE — PROGRESS NOTES
Progress Note    Client Name: Brandi Stern  Date: 12/1/2017         Service Type: Individual      Session Start Time: 2:20p  Session End Time: 3:00p      Session Length: 40 minutes     Session #: 13     Attendees: Client attended alone    Treatment Plan Last Reviewed: 12/1/2017  PHQ-9: 8     DATA      Progress Since Last Session (Related to Symptoms / Goals / Homework):   Symptoms: Stable, improved mood, tired, low energy, low motivation    Homework: By next appt, client will practice limiting media consumption.      Episode of Care Goals: Satisfactory progress - PREPARATION (Decided to change - considering how); Intervened by negotiating a change plan and determining options / strategies for behavior change, identifying triggers, exploring social supports, and working towards setting a date to begin behavior change     Current / Ongoing Stressors and Concerns:  Reported continued improved mood and continued low energy - however, she feels more productive this week and has been social; continues to work on minimizing tobacco and alcohol use - had 1 day where she smoked and drank wine, but was able to refocus on quitting the following day; described working on radical acceptance and keeping tings in perspective as it relates to politics although she also expressed a need to minimize news consumption - actively went into phone during session to turn off news notifications; reported some health anxiety re: to shortness of breath, but being skillful as she focused on the here and now of current health status; 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      Treatment Objective(s) Addressed in This Session:     Client will learn 3 new skills to cope with stress other than smoking. Client will set limits with work by saying no and taking less patients within the next month.     Intervention:   Motivational  "Interviewing: continue to evoke desire and readiness to change, pointing out discrepancies, support self-efficacy; CBT: prompting client to identify mental health needs and maladaptive behaviors that she would like to change, teaching assertiveness skills and emotion identification and regulation, teach self-reflection to identify mental health needs and to help with decision making and effective coping, identify intentional behavior changes that are contributing to improved mood, teach identifying triggers or warning signs for increasing depression; DBT: teach and reinforce emotion regulation, introduce radical acceptance; Interpersonal therapy: continue to prompt client to engage in emotional deepening to address core issues      ASSESSMENT: Current Emotional / Mental Status (status of significant symptoms):   Client has had a history of suicidal ideation: passive thoughts of dealth, \"Is there pain in the afterlife?\", 1 suicide attempt: in college after breakup with boyfriend,  was drinking and ingested pills, no hospitalization, would never really hurt self   Client denies current fears or concerns for personal safety.   Client reports the following current or recent suicidal ideation or behaviors: passive thoughts of dealth, \"Is there pain in the afterlife?\". Would never really hurt self.   Client denies current or recent homicidal ideation or behaviors.   Client denies current or recent self injurious behavior or ideation.   Client denies other safety concerns.   Client reports there are no firearms in the house.   A safety and risk management plan has not been developed at this time, however client was given the after-hours number / 911 should there be a change in any of  these risk factors.     Appearance:   Appropriate    Eye Contact:   Fair    Psychomotor Behavior: Normal    Attitude:   Cooperative    Orientation:   All   Speech    Rate / Production: Normal     Volume:  Normal    Mood:    Normal " "\"Tired\"   Affect:    Appropriate     Thought Content:  Clear    Thought Form:  Coherent  Circumstantial Tangential    Insight:    Fair      Medication Review:   No change      Medication Compliance:   Yes     Changes in Health Issues:   None reported     Chemical Use Review:   Substance Use: Chemical use reviewed, no active concerns identified     Tobacco Use: 1 pk last week, but working on quitting again     Collateral Reports Completed:   Not Applicable    PLAN: (Client Tasks / Therapist Tasks / Other)  Client will set limits with work by saying no and taking less patients within the next month; client will organize/clean her home within 12 sessions. Client will learn 3 new skills to cope with stress other smoking or decide if she wants to quit or not quit smoking; client will work on noting and attending to vulnerabilities of feeling emotional/sensative/teary eyed.Therapist will continue to use motivational interviewing to evoke change talk and CBT strategies to engage client in proactive problem solving, practice distress tolerance as it relates to work and interpersonal relationships, and teach emotion identification and regulation. Continue to reinforce boudanry setting. Therapist will also continue to build on client's strengths and areas of her life where she feels she is making progress. Teach and reinforce identifying warning signs for increasing depression and engagement in maladaptive behaviors. Reinforce emotion regulation and positive reframing/perspective taking.         Ronda Lackey [facility]                                                       ___________________________________________________________________    Treatment Plan    Client's Name: Brandi Stern  YOB: 1955    Date: 10/20/2017    DSM-V Diagnoses: 296.32 Major Depressive Disorder, Recurrent Episode, Moderate _ and With anxious distress  Psychosocial / Contextual Factors: Work stress, health concerns, family stress  WHODAS: " 29    Referral / Collaboration:  Referral to another professional/service is not indicated at this time.    Anticipated number of session or this episode of care: 12      MeasurableTreatment Goal(s) related to diagnosis / functional impairment(s)  Goal 1: Client will improve depressed mood as evidenced by decreased score on PHQ 9 from 19 (moderately severe) to score range of 10-14 (moderate).    I will know I've met my goal when I feel more accomplished, in control, house looks better, and I would not be smoking.      Objective #A (Client Action)    Client will set limits with work by saying no and taking less patients within the next month.  Status: Continued - Date: 10/20/2017     Intervention(s)  Therapist will role-play assertiveness and conflict management skills.  ...teach about healthy boundaries.    Objective #B  Client will organize/clean her home within 12 sessions.   Status: Continued - Date: 10/20/2017     Intervention(s)  Therapist will teach emotional regulation skills and developing routine.    Objective #C  Client will learn 3 new skills to cope with stress other than smoking.   Status: Continued - Date: 10/20/2017     Intervention(s)  Therapist will assign homework of practicing skills at home.  ...teach self care, distraction, and distress tolerance skills.      Client has reviewed and agreed to the above plan.      ELISE Sheikh  October 20, 2017

## 2017-12-01 NOTE — MR AVS SNAPSHOT
MRN:[id number]                      After Visit Summary   12/1/2017    Brandi Stern    MRN: [id number]           Visit Information        Provider Department      12/1/2017 2:00 PM Darya Ronda [facility] Generic      Your next 10 appointments already scheduled     Dec 15, 2017  2:00 PM CST   Return Visit with Ronda Lackey [facility] ([facility])    [facility]  [address]  [place] [place] [facility] [id number] [phone]            Jan 05, 2018  2:30 PM CST   Return Visit with Ronda Lackey [facility] ([facility])    [facility]  [address]  [facility] [id number] [phone]            Jan 19, 2018  2:30 PM CST   Return Visit with Ronda Lackey [facility] ([facility])    [facility]  [address] [address] [facility]  [facility] [id number] [phone]              MyChart Information     [employer] gives you secure access to your electronic health record. If you see a primary care provider, you can also send messages to your care team and make appointments. If you have questions, please call your primary care clinic.  If you do not have a primary care provider, please call [phone] and they will assist you.        Care EveryWhere ID     This is your Care EveryWhere ID. This could be used by other organizations to access your [place] medical records  [id number]        Equal Access to Services     LORNE HALEY AH: Hadii georgie littlejohno Sohéctor, waaxda luqadaha, qaybta kaalmada adeegyada, waxay carmelo bosch jamarcusjuan luis perkins. So [facility] [phone].    ATENCIÓN: Si habla español, tiene a rizvi disposición servicios gratuitos de asistencia lingüística. Llame al [phone].    We comply with applicable federal civil rights laws and [place] laws. We do not discriminate on the basis of race,  color, national origin, age, disability, sex, sexual orientation, or gender identity.

## 2017-12-12 ENCOUNTER — TRANSFERRED RECORDS (OUTPATIENT)
Dept: HEALTH INFORMATION MANAGEMENT | Facility: CLINIC | Age: 62
End: 2017-12-12

## 2017-12-14 ENCOUNTER — OFFICE VISIT (OUTPATIENT)
Dept: FAMILY MEDICINE | Facility: CLINIC | Age: 62
End: 2017-12-14
Payer: COMMERCIAL

## 2017-12-14 VITALS
RESPIRATION RATE: 18 BRPM | DIASTOLIC BLOOD PRESSURE: 76 MMHG | OXYGEN SATURATION: 94 % | SYSTOLIC BLOOD PRESSURE: 119 MMHG | TEMPERATURE: 97.7 F | BODY MASS INDEX: 39.16 KG/M2 | WEIGHT: 228.13 LBS | HEART RATE: 92 BPM

## 2017-12-14 DIAGNOSIS — R06.02 SHORTNESS OF BREATH: Primary | ICD-10-CM

## 2017-12-14 LAB
ALBUMIN SERPL-MCNC: 3.4 G/DL (ref 3.4–5)
ALP SERPL-CCNC: 55 U/L (ref 40–150)
ALT SERPL W P-5'-P-CCNC: 18 U/L (ref 0–50)
ANION GAP SERPL CALCULATED.3IONS-SCNC: 5 MMOL/L (ref 3–14)
AST SERPL W P-5'-P-CCNC: 16 U/L (ref 0–45)
BILIRUB SERPL-MCNC: 0.4 MG/DL (ref 0.2–1.3)
BUN SERPL-MCNC: 15 MG/DL (ref 7–30)
CALCIUM SERPL-MCNC: 8.9 MG/DL (ref 8.5–10.1)
CHLORIDE SERPL-SCNC: 109 MMOL/L (ref 94–109)
CO2 SERPL-SCNC: 25 MMOL/L (ref 20–32)
CREAT SERPL-MCNC: 0.95 MG/DL (ref 0.52–1.04)
ERYTHROCYTE [DISTWIDTH] IN BLOOD BY AUTOMATED COUNT: 12.3 % (ref 10–15)
GFR SERPL CREATININE-BSD FRML MDRD: 59 ML/MIN/1.7M2
GLUCOSE SERPL-MCNC: 100 MG/DL (ref 70–99)
HCT VFR BLD AUTO: 43.7 % (ref 35–47)
HGB BLD-MCNC: 14.2 G/DL (ref 11.7–15.7)
MCH RBC QN AUTO: 31.4 PG (ref 26.5–33)
MCHC RBC AUTO-ENTMCNC: 32.5 G/DL (ref 31.5–36.5)
MCV RBC AUTO: 97 FL (ref 78–100)
NT-PROBNP SERPL-MCNC: 53 PG/ML (ref 0–125)
PLATELET # BLD AUTO: 272 10E9/L (ref 150–450)
POTASSIUM SERPL-SCNC: 4.2 MMOL/L (ref 3.4–5.3)
PROT SERPL-MCNC: 7 G/DL (ref 6.8–8.8)
RBC # BLD AUTO: 4.52 10E12/L (ref 3.8–5.2)
SODIUM SERPL-SCNC: 139 MMOL/L (ref 133–144)
WBC # BLD AUTO: 3.9 10E9/L (ref 4–11)

## 2017-12-14 PROCEDURE — 36415 COLL VENOUS BLD VENIPUNCTURE: CPT | Performed by: NURSE PRACTITIONER

## 2017-12-14 PROCEDURE — 83880 ASSAY OF NATRIURETIC PEPTIDE: CPT | Performed by: NURSE PRACTITIONER

## 2017-12-14 PROCEDURE — 80053 COMPREHEN METABOLIC PANEL: CPT | Performed by: NURSE PRACTITIONER

## 2017-12-14 PROCEDURE — 85027 COMPLETE CBC AUTOMATED: CPT | Performed by: NURSE PRACTITIONER

## 2017-12-14 PROCEDURE — 99214 OFFICE O/P EST MOD 30 MIN: CPT | Performed by: NURSE PRACTITIONER

## 2017-12-14 PROCEDURE — 93000 ELECTROCARDIOGRAM COMPLETE: CPT | Performed by: NURSE PRACTITIONER

## 2017-12-14 NOTE — PROGRESS NOTES
SUBJECTIVE:   Brandi Stern is a 62 year old female who presents to clinic today for the following health issues:    Pt has become increasingly SOB with exertion  Allergy specialist notes that pt's PFT's are normal and asthma is under control  Related to cardiac or hiatal hernia    She has been noticing intermittent dyspnea with exertion since summer.  Symptoms have worsened in the past 2 weeks.  She will have shortness of breath with climbing stairs but now also with walking on level surfaces.    She denies any chest pain, cough, ankle edema, leg pain.  She does have some migratory aches in her arms, likely related to fibromyalgia.    She does have more belching, GERD, history of hiatal hernia.    She did follow up with sleep medicine, CPAP going well.   Allergist said her asthma was well-controlled, PFTs normal, recommended evaluation for cardiac causes.       Her depression is much better controlled.  She is currently on Pristiq and Wellbutrin.               Problem list and histories reviewed & adjusted, as indicated.  Additional history: as documented        Reviewed and updated as needed this visit by clinical staff       Reviewed and updated as needed this visit by Provider         ROS:  C: NEGATIVE for fever, chills, change in weight  E/M: NEGATIVE for ear, mouth and throat problems  RESP:see HPI  CV: NEGATIVE for chest pain, palpitations or peripheral edema  GI: see HPI  MUSCULOSKELETAL: see HPI  NEURO: NEGATIVE for weakness, dizziness or paresthesias  ENDOCRINE: NEGATIVE for temperature intolerance, skin/hair changes  PSYCHIATRIC: NEGATIVE for changes in mood or affect    OBJECTIVE:     /76  Pulse 92  Temp 97.7  F (36.5  C) (Axillary)  Resp 18  Wt 228 lb 2 oz (103.5 kg)  LMP  (LMP Unknown)  SpO2 94%  BMI 39.16 kg/m2  Body mass index is 39.16 kg/(m^2).  GENERAL: healthy, alert and no distress  NECK: no adenopathy, no asymmetry, masses, or scars and thyroid normal to palpation  RESP: lungs  clear to auscultation - no rales, rhonchi or wheezes  CV: regular rate and rhythm, normal S1 S2, no S3 or S4, no murmur, click or rub, no peripheral edema and peripheral pulses strong  ABDOMEN: soft, nontender, no hepatosplenomegaly, no masses and bowel sounds normal  PSYCH: mentation appears normal, affect normal/bright        ASSESSMENT/PLAN:             1. Shortness of breath  EKG is non-diagnostic.   Will refer for a stress echo.  Will check the following labs.   If evaluation is normal, will refer to GI.   - DEPRESSION ACTION PLAN (DAP)  - Asthma Action Plan (AAP)  - EKG 12-lead complete w/read - Clinics  - Exercise Stress Echocardiogram; Future  - CBC with platelets  - BNP-N terminal pro  - Comprehensive metabolic panel (BMP + Alb, Alk Phos, ALT, AST, Total. Bili, TP)        Cherie Brennan NP  [facility]

## 2017-12-14 NOTE — LETTER
My Asthma Action Plan  Name: Brandi Stern   YOB: 1955  Date: 12/14/2017   My doctor: Cherie Brennan NP   My clinic: [facility]        My Control Medicine: Budesonide + formoterol (Symbicort) -  160/4.5 mcg PRN  My Rescue Medicine: Albuterol (Proair/Ventolin/Proventil) inhaler PRN      albuterol (2.5 MG/3ML) 0.083% nebulizer solution My Asthma Severity: moderate persistent  Avoid your asthma triggers:                GREEN ZONE   Good Control    I feel good    No cough or wheeze    Can work, sleep and play without asthma symptoms       Take your asthma control medicine every day.     1. If exercise triggers your asthma, take your rescue medication    15 minutes before exercise or sports, and    During exercise if you have asthma symptoms  2. Spacer to use with inhaler: If you have a spacer, make sure to use it with your inhaler             YELLOW ZONE Getting Worse  I have ANY of these:    I do not feel good    Cough or wheeze    Chest feels tight    Wake up at night   1. Keep taking your Green Zone medications  2. Start taking your rescue medicine:    every 20 minutes for up to 1 hour. Then every 4 hours for 24-48 hours.  3. If you stay in the Yellow Zone for more than 12-24 hours, contact your doctor.  4. If you do not return to the Green Zone in 12-24 hours or you get worse, start taking your oral steroid medicine if prescribed by your provider.           RED ZONE Medical Alert - Get Help  I have ANY of these:    I feel awful    Medicine is not helping    Breathing getting harder    Trouble walking or talking    Nose opens wide to breathe       1. Take your rescue medicine NOW  2. If your provider has prescribed an oral steroid medicine, start taking it NOW  3. Call your doctor NOW  4. If you are still in the Red Zone after 20 minutes and you have not reached your doctor:    Take your rescue medicine again and    Call 911 or go to the emergency room right away    See your  regular doctor within 2 weeks of an Emergency Room or Urgent Care visit for follow-up treatment.        Electronically signed by: Qing Jackson, December 14, 2017    Annual Reminders:  Meet with Asthma Educator,  Flu Shot in the Fall, consider Pneumonia Vaccination for patients with asthma (aged 19 and older).    Pharmacy:    [facility]  [facility] [place] [address]  [id number] - [place], [place] - [address]  [facility]                    Asthma Triggers  How To Control Things That Make Your Asthma Worse    Triggers are things that make your asthma worse.  Look at the list below to help you find your triggers and what you can do about them.  You can help prevent asthma flare-ups by staying away from your triggers.      Trigger                                                          What you can do   Cigarette Smoke  Tobacco smoke can make asthma worse. Do not allow smoking in your home, car or around you.  Be sure no one smokes at a child s day care or school.  If you smoke, ask your health care provider for ways to help you quit.  Ask family members to quit too.  Ask your health care provider for a referral to Quit Plan to help you quit smoking, or call 8-914-438-PLAN.     Colds, Flu, Bronchitis  These are common triggers of asthma. Wash your hands often.  Don t touch your eyes, nose or mouth.  Get a flu shot every year.     Dust Mites  These are tiny bugs that live in cloth or carpet. They are too small to see. Wash sheets and blankets in hot water every week.   Encase pillows and mattress in dust mite proof covers.  Avoid having carpet if you can. If you have carpet, vacuum weekly.   Use a dust mask and HEPA vacuum.   Pollen and Outdoor Mold  Some people are allergic to trees, grass, or weed pollen, or molds. Try to keep your windows closed.  Limit time out doors when pollen count is high.   Ask you health care provider about taking medicine during allergy season.     Animal  Dander  Some people are allergic to skin flakes, urine or saliva from pets with fur or feathers. Keep pets with fur or feathers out of your home.    If you can t keep the pet outdoors, then keep the pet out of your bedroom.  Keep the bedroom door closed.  Keep pets off cloth furniture and away from stuffed toys.     Mice, Rats, and Cockroaches  Some people are allergic to the waste from these pests.   Cover food and garbage.  Clean up spills and food crumbs.  Store grease in the refrigerator.   Keep food out of the bedroom.   Indoor Mold  This can be a trigger if your home has high moisture. Fix leaking faucets, pipes, or other sources of water.   Clean moldy surfaces.  Dehumidify basement if it is damp and smelly.   Smoke, Strong Odors, and Sprays  These can reduce air quality. Stay away from strong odors and sprays, such as perfume, powder, hair spray, paints, smoke incense, paint, cleaning products, candles and new carpet.   Exercise or Sports  Some people with asthma have this trigger. Be active!  Ask your doctor about taking medicine before sports or exercise to prevent symptoms.    Warm up for 5-10 minutes before and after sports or exercise.     Other Triggers of Asthma  Cold air:  Cover your nose and mouth with a scarf.  Sometimes laughing or crying can be a trigger.  Some medicines and food can trigger asthma.

## 2017-12-14 NOTE — MR AVS SNAPSHOT
After Visit Summary   12/14/2017    Brandi Stern    MRN: [id number]           Patient Information     Date Of Birth          1955        Visit Information        Provider Department      12/14/2017 11:00 AM Cherie Brennan NP [facility]        Today's Diagnoses     Shortness of breath    -  1       Follow-ups after your visit        Your next 10 appointments already scheduled     Dec 15, 2017  2:00 PM CST   Return Visit with Ronda Lackey [facility]  [address] [place], [place] [address]  [facility] [address]   [phone]            Jan 05, 2018  2:30 PM CST   Return Visit with Ronda Lackey [facility]  [address]  [facility] [address]   [phone]            Jan 19, 2018  2:30 PM CST   Return Visit with Ronda Lackey [facility]  [address] [place], [place] [address]  [facility] [address]   [phone]              Future tests that were ordered for you today     Open Future Orders        Priority Expected Expires Ordered    Exercise Stress Echocardiogram Routine  12/14/2018 12/14/2017            Who to contact     If you have questions or need follow up information about today's clinic visit or your schedule please contact [facility] directly at [phone].  Normal or non-critical lab and imaging results will be communicated to you by MyChart, letter or phone within 4 business days after the clinic has received the results. If you do not hear from us within 7 days, please contact the clinic through MyChart or phone. If you have a critical or abnormal lab result, we will notify you by phone as soon as possible.  Submit refill requests through [employer] or call your pharmacy and they will forward the refill request to us.  Please allow 3 business days for your refill to be completed.          Additional Information About Your Visit        MyChart Information     [employer] gives you secure access to your electronic health record. If you see a primary care provider, you can also send messages to your care team and make appointments. If you have questions, please call your primary care clinic.  If you do not have a primary care provider, please call [phone] and they will assist you.        Care EveryWhere ID     This is your Care EveryWhere ID. This could be used by other organizations to access your [place] medical records  [id number]        Your Vitals Were     Pulse Temperature Respirations Last Period Pulse Oximetry BMI (Body Mass Index)    92 97.7  F (36.5  C) (Axillary) 18 (LMP Unknown) 94% 39.16 kg/m2       Blood Pressure from Last 3 Encounters:   12/14/17 119/76   10/31/17 124/84   09/26/17 118/82    Weight from Last 3 Encounters:   12/14/17 228 lb 2 oz (103.5 kg)   10/31/17 228 lb (103.4 kg)   09/26/17 228 lb (103.4 kg)              We Performed the Following     Asthma Action Plan (AAP)     BNP-N terminal pro     CBC with platelets     Comprehensive metabolic panel (BMP + Alb, Alk Phos, ALT, AST, Total. Bili, TP)     DEPRESSION ACTION PLAN (DAP)     EKG 12-lead complete w/read - Clinics        Primary Care Provider Office Phone # Fax #    Cherie KARRI Brennan [phone] [address] [facility] [address]        Equal Access to Services     FREDA HALEY : Hadii aad ku hadasho Soomaali, waaxda luqadaha, qaybta kaalmada adeegyada, adrián idiin hayaan hakeem rodriguez . So [facility] [phone].    ATENCIÓN: Si habla español, tiene a rizvi disposición servicios gratuitos de asistencia lingüística. Llame al [phone].    We comply with applicable federal civil rights laws and [place] laws. We do not discriminate on the basis of race, color, national origin, age, disability, sex, sexual orientation,  or gender identity.            Thank you!     Thank you for choosing [facility]  for your care. Our goal is always to provide you with excellent care. Hearing back from our patients is one way we can continue to improve our services. Please take a few minutes to complete the written survey that you may receive in the mail after your visit with us. Thank you!             Your Updated Medication List - Protect others around you: Learn how to safely use, store and throw away your medicines at www.disposemymeds.org.          This list is accurate as of: 12/14/17 11:56 AM.  Always use your most recent med list.                   Brand Name Dispense Instructions for use Diagnosis    ACETAMINOPHEN EXTRA STRENGTH PO      Pt reports taking 650 MG arthritis        * albuterol (2.5 MG/3ML) 0.083% neb solution     3 mL    Take  by nebulization. take 3 mL by nebulization 4 times daily as needed    Moderate persistent asthma       * albuterol 108 (90 BASE) MCG/ACT Inhaler    PROAIR HFA    1 Inhaler    Inhale 1-2 puffs into the lungs every 6 hours as needed for shortness of breath / dyspnea    Moderate persistent asthma with exacerbation       atorvastatin 10 MG tablet    LIPITOR    90 tablet    TAKE 1 TABLET (10 MG) BY MOUTH DAILY    Hyperlipidemia LDL goal <130       buPROPion 300 MG 24 hr tablet    WELLBUTRIN XL    90 tablet    Take 1 tablet (300 mg) by mouth every morning    Major depressive disorder, recurrent episode, moderate (H)       CALCIUM 500 PO      Take 1 tablet by mouth daily        CLARITIN 10 MG tablet   Generic drug:  loratadine     0    1 TAB PO QD (Once per day) as needed for ALLERGY SYMPTOMS    Allergic rhinitis due to other allergen       CO Q 10 PO      Take 200 mg by mouth daily        cyclobenzaprine 5 MG tablet    FLEXERIL    180 tablet    Take 1-2 tablets (5-10 mg) by mouth 3 times daily as needed for muscle spasms    Spasm of back muscles       desvenlafaxine fumarate 100 MG 24 hr  tablet     30 tablet    Take 1 tablet (100 mg) by mouth daily    Major depressive disorder, recurrent episode, moderate (H)       diclofenac 1 % Gel topical gel    VOLTAREN    100 g    Apply 4 grams to knees or 2 grams to feet four times daily using enclosed dosing card.    Right elbow pain       diclofenac 100 MG 24 hr tablet    VOLTAREN-XR    30 tablet    Take 1 tablet (100 mg) by mouth daily    DDD (degenerative disc disease), cervical, DDD (degenerative disc disease), lumbar       fenofibrate 145 MG tablet     90 tablet    Take 1 tablet (145 mg) by mouth daily    Pure hyperglyceridemia       ferrous sulfate 325 (65 FE) MG tablet    IRON    30 tablet    Take 1 tablet (325 mg) by mouth daily (with breakfast)    Chronic fatigue       FIBER PO      Twice daily        levothyroxine 75 MCG tablet    SYNTHROID    90 tablet    Take 1 tablet (75 mcg) by mouth every morning Overdue for labs    Chronic lymphocytic thyroiditis       Lutein 10 MG Tabs      Take 10 mg by mouth daily        MELATONIN PO      Take 5 mg by mouth nightly as needed        MIRAPEX 0.125 MG tablet   Generic drug:  pramipexole      Take two tabs at dinner and one at hs        omeprazole 20 MG CR capsule    priLOSEC    180 capsule    Take 1 capsule (20 mg) by mouth 2 times daily    Gastroesophageal reflux disease without esophagitis       ranitidine 300 MG tablet    ZANTAC    60 tablet    Take 1 tablet (300 mg) by mouth 2 times daily    Gastroesophageal reflux disease without esophagitis       SYMBICORT 160-4.5 MCG/ACT Inhaler   Generic drug:  budesonide-formoterol      Inhale 1 puff into the lungs 2 times daily    Moderate persistent asthma without complication       triamcinolone 55 MCG/ACT nasal inhaler    NASACORT AQ    1 Inhaler    Inhale 2 sprays in both nostrils every day as needed    Allergic rhinitis due to other allergen       UNABLE TO FIND      MEDICATION NAME: Allergy shots        VITAMIN C PO      Take 1,000 mg by mouth daily         zolpidem 10 MG tablet    AMBIEN    30 tablet    TAKE 1/2 TO 1 TABLET BY MOUTH NIGHTLY AS NEEDED    Insomnia, unspecified type       * Notice:  This list has 2 medication(s) that are the same as other medications prescribed for you. Read the directions carefully, and ask your doctor or other care provider to review them with you.

## 2017-12-14 NOTE — LETTER
My Depression Action Plan  Name: Brandi Stern   Date of Birth 1955  Date: 12/14/2017    My doctor: Cherie Brennan   My clinic: [address] [id number] [phone]          GREEN    ZONE   Good Control    What it looks like:     Things are going generally well. You have normal up s and down s. You may even feel depressed from time to time, but bad moods usually last less than a day.   What you need to do:  1. Continue to care for yourself (see self care plan)  2. Check your depression survival kit and update it as needed  3. Follow your physician s recommendations including any medication.  4. Do not stop taking medication unless you consult with your physician first.           YELLOW         ZONE Getting Worse    What it looks like:     Depression is starting to interfere with your life.     It may be hard to get out of bed; you may be starting to isolate yourself from others.    Symptoms of depression are starting to last most all day and this has happened for several days.     You may have suicidal thoughts but they are not constant.   What you need to do:     1. Call your care team, your response to treatment will improve if you keep your care team informed of your progress. Yellow periods are signs an adjustment may need to be made.     2. Continue your self-care, even if you have to fake it!    3. Talk to someone in your support network    4. Open up your depression survival kit           RED    ZONE Medical Alert - Get Help    What it looks like:     Depression is seriously interfering with your life.     You may experience these or other symptoms: You can t get out of bed most days, can t work or engage in other necessary activities, you have trouble taking care of basic hygiene, or basic responsibilities, thoughts of suicide or death that will not go away, self-injurious behavior.     What you need to do:  1. Call your care team and  request a same-day appointment. If they are not available (weekends or after hours) call your local crisis line, emergency room or 911.      Electronically signed by: Qing Jackson, December 14, 2017    Depression Self Care Plan / Survival Kit    Self-Care for Depression  Here s the deal. Your body and mind are really not as separate as most people think.  What you do and think affects how you feel and how you feel influences what you do and think. This means if you do things that people who feel good do, it will help you feel better.  Sometimes this is all it takes.  There is also a place for medication and therapy depending on how severe your depression is, so be sure to consult with your medical provider and/ or Behavioral Health Consultant if your symptoms are worsening or not improving.     In order to better manage my stress, I will:    Exercise  Get some form of exercise, every day. This will help reduce pain and release endorphins, the  feel good  chemicals in your brain. This is almost as good as taking antidepressants!  This is not the same as joining a gym and then never going! (they count on that by the way ) It can be as simple as just going for a walk or doing some gardening, anything that will get you moving.      Hygiene   Maintain good hygiene (Get out of bed in the morning, Make your bed, Brush your teeth, Take a shower, and Get dressed like you were going to work, even if you are unemployed).  If your clothes don't fit try to get ones that do.    Diet  I will strive to eat foods that are good for me, drink plenty of water, and avoid excessive sugar, caffeine, alcohol, and other mood-altering substances.  Some foods that are helpful in depression are: complex carbohydrates, B vitamins, flaxseed, fish or fish oil, fresh fruits and vegetables.    Psychotherapy  I agree to participate in Individual Therapy (if recommended).    Medication  If prescribed medications, I agree to take them.  Missing doses  can result in serious side effects.  I understand that drinking alcohol, or other illicit drug use, may cause potential side effects.  I will not stop my medication abruptly without first discussing it with my provider.    Staying Connected With Others  I will stay in touch with my friends, family members, and my primary care provider/team.    Use your imagination  Be creative.  We all have a creative side; it doesn t matter if it s oil painting, sand castles, or mud pies! This will also kick up the endorphins.    Witness Beauty  (AKA stop and smell the roses) Take a look outside, even in mid-winter. Notice colors, textures. Watch the squirrels and birds.     Service to others  Be of service to others.  There is always someone else in need.  By helping others we can  get out of ourselves  and remember the really important things.  This also provides opportunities for practicing all the other parts of the program.    Humor  Laugh and be silly!  Adjust your TV habits for less news and crime-drama and more comedy.    Control your stress  Try breathing deep, massage therapy, biofeedback, and meditation. Find time to relax each day.     My support system    Clinic Contact:  Phone number:    Contact 1:  Phone number:    Contact 2:  Phone number:    Faith/:  Phone number:    Therapist:  Phone number:    Local crisis center:    Phone number:    Other community support:  Phone number:

## 2017-12-15 ENCOUNTER — OFFICE VISIT (OUTPATIENT)
Dept: PSYCHOLOGY | Facility: CLINIC | Age: 62
End: 2017-12-15
Payer: COMMERCIAL

## 2017-12-15 DIAGNOSIS — F33.1 MAJOR DEPRESSIVE DISORDER, RECURRENT EPISODE, MODERATE WITH ANXIOUS DISTRESS (H): Primary | ICD-10-CM

## 2017-12-15 PROCEDURE — 90834 PSYTX W PT 45 MINUTES: CPT | Performed by: COUNSELOR

## 2017-12-15 ASSESSMENT — PATIENT HEALTH QUESTIONNAIRE - PHQ9: SUM OF ALL RESPONSES TO PHQ QUESTIONS 1-9: 7

## 2017-12-15 NOTE — MR AVS SNAPSHOT
MRN:[id number]                      After Visit Summary   12/15/2017    Brandi Stern    MRN: [id number]           Visit Information        Provider Department      12/15/2017 2:00 PM Ronda Lackey [facility]      Your next 10 appointments already scheduled     Jan 05, 2018  2:30 PM CST   Return Visit with Ronda Lackey [facility]    [facility]  [address]  [facility] [id number] [phone]            Jan 19, 2018  2:30 PM CST   Return Visit with Ronda Lackey [facility]    [facility]  [address]  [facility] [id number] [phone]              MyChart Information     [employer] gives you secure access to your electronic health record. If you see a primary care provider, you can also send messages to your care team and make appointments. If you have questions, please call your primary care clinic.  If you do not have a primary care provider, please call [phone] and they will assist you.        Care EveryWhere ID     This is your Care EveryWhere ID. This could be used by other organizations to access your [place] medical records  [id number]        Equal Access to Services     LORNE HALEY AH: Keyur holt Sohéctor, wahelenada lukassiadaha, qaybta kaalmada adestone, adrián perkins. So [facility] [phone].    ATENCIÓN: Si habla español, tiene a rizvi disposición servicios gratuitos de asistencia lingüística. Llame al [phone].    We comply with applicable federal civil rights laws and [place] laws. We do not discriminate on the basis of race, color, national origin, age, disability, sex, sexual orientation, or gender identity.

## 2017-12-15 NOTE — PROGRESS NOTES
"                                           Progress Note    Client Name: Brandi Stern  Date: 12/15/2017         Service Type: Individual      Session Start Time: 2:00p  Session End Time: 2:45p      Session Length: 45 minutes     Session #: 14     Attendees: Client attended alone    Treatment Plan Last Reviewed: 12/15/2017  PHQ-9: 8     DATA      Progress Since Last Session (Related to Symptoms / Goals / Homework):   Symptoms: Stable, some depressed mood, tired, low energy, low motivation, overeating, feeling bad about herself, poor sleep    Homework: COMPLETED - client will practice limiting media consumption. By next session, client will       Episode of Care Goals: Some progress - ACTION (Actively working towards change); Intervened by reinforcing change plan / affirming steps taken     Current / Ongoing Stressors and Concerns:  Reported continued fatigue and maladaptive behaviors (indulging more than she should, avoiding, escaping...); contributed fatigue to health conditions, but acknowledged low willpower as well; reported poor/inconsistent sleep routine; expressed a need to change/challenge her own thoughts and behaviors that prevent her from doing the things she wants (\"if I want to travel, I really need to work on my health\"); was passive/avoidant at times as she changed topics or went on loosely related tangents.     Treatment Objective(s) Addressed in This Session:     Client will learn 3 new skills to cope with stress other than smoking.      Intervention:   Motivational Interviewing: continue to evoke desire and readiness to change, pointing out discrepancies, support self-efficacy; CBT: prompting client to identify mental health needs and maladaptive behaviors that she would like to change, teaching assertiveness skills and emotion identification and regulation, teach self-reflection to identify mental health needs and to help with decision making and effective coping, identify intentional behavior " "changes that are contributing to improved mood, teach identifying triggers or warning signs for increasing depression, teach about energy activation (20 second strategy); DBT: teach and reinforce emotion regulation, introduce radical acceptance; Interpersonal therapy: continue to prompt client to engage in emotional deepening to address core issues      ASSESSMENT: Current Emotional / Mental Status (status of significant symptoms):   Client has had a history of suicidal ideation: passive thoughts of dealth, \"Is there pain in the afterlife?\", 1 suicide attempt: in college after breakup with boyfriend,  was drinking and ingested pills, no hospitalization, would never really hurt self   Client denies current fears or concerns for personal safety.   Client reports the following current or recent suicidal ideation or behaviors: passive thoughts of dealth, \"Is there pain in the afterlife?\". Would never really hurt self.   Client denies current or recent homicidal ideation or behaviors.   Client denies current or recent self injurious behavior or ideation.   Client denies other safety concerns.   Client reports there are no firearms in the house.   A safety and risk management plan has not been developed at this time, however client was given the after-hours number / 911 should there be a change in any of  these risk factors.     Appearance:   Appropriate    Eye Contact:   Fair    Psychomotor Behavior: Normal    Attitude:   Cooperative    Orientation:   All   Speech    Rate / Production: Normal     Volume:  Normal    Mood:    Normal \"Fatigue\"   Affect:    Appropriate     Thought Content:  Clear    Thought Form:  Coherent  Circumstantial Tangential    Insight:    Fair-Limited      Medication Review:   No change      Medication Compliance:   Yes     Changes in Health Issues:   None reported     Chemical Use Review:   Substance Use: Chemical use reviewed, no active concerns identified     Tobacco Use: 1 pk last week, but " working on quitting again     Collateral Reports Completed:   Not Applicable    PLAN: (Client Tasks / Therapist Tasks / Other)  Client will set limits with work by saying no and taking less patients within the next month; client will organize/clean her home within 12 sessions. Client will learn 3 new skills to cope with stress other smoking or decide if she wants to quit or not quit smoking; client will work on noting and attending to vulnerabilities of feeling emotional/sensative/teary eyed.Therapist will continue to use motivational interviewing to evoke change talk and CBT strategies to engage client in proactive problem solving, practice distress tolerance as it relates to work and interpersonal relationships, and teach emotion identification and regulation. Continue to reinforce boudanry setting. Therapist will also continue to build on client's strengths and areas of her life where she feels she is making progress. Teach and reinforce energy activation.         Ronda Lackey [facility]                                                       ___________________________________________________________________    Treatment Plan    Client's Name: Brandi Stern  YOB: 1955    Date: 10/20/2017    DSM-V Diagnoses: 296.32 Major Depressive Disorder, Recurrent Episode, Moderate _ and With anxious distress  Psychosocial / Contextual Factors: Work stress, health concerns, family stress  WHODAS: 29    Referral / Collaboration:  Referral to another professional/service is not indicated at this time.    Anticipated number of session or this episode of care: 12      MeasurableTreatment Goal(s) related to diagnosis / functional impairment(s)  Goal 1: Client will improve depressed mood as evidenced by decreased score on PHQ 9 from 19 (moderately severe) to score range of 10-14 (moderate).    I will know I've met my goal when I feel more accomplished, in control, house looks better, and I would not be smoking.       Objective #A (Client Action)    Client will set limits with work by saying no and taking less patients within the next month.  Status: Continued - Date: 10/20/2017     Intervention(s)  Therapist will role-play assertiveness and conflict management skills.  ...teach about healthy boundaries.    Objective #B  Client will organize/clean her home within 12 sessions.   Status: Continued - Date: 10/20/2017     Intervention(s)  Therapist will teach emotional regulation skills and developing routine.    Objective #C  Client will learn 3 new skills to cope with stress other than smoking.   Status: Continued - Date: 10/20/2017     Intervention(s)  Therapist will assign homework of practicing skills at home.  ...teach self care, distraction, and distress tolerance skills.      Client has reviewed and agreed to the above plan.      ELISE Sheikh  October 20, 2017

## 2017-12-21 DIAGNOSIS — K21.9 GASTROESOPHAGEAL REFLUX DISEASE WITHOUT ESOPHAGITIS: ICD-10-CM

## 2017-12-22 NOTE — TELEPHONE ENCOUNTER
Requested Prescriptions   Pending Prescriptions Disp Refills     omeprazole (PRILOSEC) 20 MG CR capsule [Pharmacy Med Name: OMEPRAZOLE DR 20 MG CAPSULE]  Last Written Prescription Date:  1/24/17  Last Fill Quantity: 180 capsule,  # refills: 1   Last Office Visit with [facility], [facility] or [facility] prescribing provider:  12/14/17   Future Office Visit:    Next 5 appointments (look out 90 days)     Jan 05, 2018  2:30 PM CST   Return Visit with Ronda Lackey [address] [address]   [phone]            Jan 19, 2018  2:30 PM CST   Return Visit with Ronda Lackey [address] [address]   [phone]                180 capsule 0     Sig: TAKE 1 TABLET (20 MG) BY MOUTH 2 TIMES DAILY TAKE 30-60 MINUTES BEFORE A MEAL.    PPI Protocol Passed    12/21/2017  1:10 AM       Passed - Not on Clopidogrel (unless Pantoprazole ordered)       Passed - No diagnosis of osteoporosis on record       Passed - Recent or future visit with authorizing provider's specialty    Patient had office visit in the last year or has a visit in the next 30 days with authorizing provider.  See chart review.          Passed - Patient is age 18 or older       Passed - No active pregnacy on record       Passed - No positive pregnancy test in past 12 months

## 2018-01-05 ENCOUNTER — OFFICE VISIT (OUTPATIENT)
Dept: PSYCHOLOGY | Facility: CLINIC | Age: 63
End: 2018-01-05
Payer: COMMERCIAL

## 2018-01-05 DIAGNOSIS — F33.0 MAJOR DEPRESSIVE DISORDER, RECURRENT EPISODE, MILD WITH ANXIOUS DISTRESS (H): Primary | ICD-10-CM

## 2018-01-05 PROCEDURE — 90834 PSYTX W PT 45 MINUTES: CPT | Performed by: COUNSELOR

## 2018-01-05 ASSESSMENT — PATIENT HEALTH QUESTIONNAIRE - PHQ9: SUM OF ALL RESPONSES TO PHQ QUESTIONS 1-9: 2

## 2018-01-05 NOTE — MR AVS SNAPSHOT
MRN:[id number]                      After Visit Summary   1/5/2018    Brandi Stern    MRN: [id number]           Visit Information        Provider Department      1/5/2018 2:30 PM Ronda Lackey [facility]      Your next 10 appointments already scheduled     Jan 08, 2018 10:00 AM CST   Ech Stress Test with JONO ALMEIDA STRESS ROOM   [facility] ([facility])    [address]  3rd Floor  [facility] [id number] [phone]           1. Please bring or wear a comfortable two-piece outfit and walking shoes. 2. Stop eating 3 hours before the test. You may drink water or juice. 3. Stop all caffeine 12 hours before the test. This includes coffee, tea, soda pop, chocolate and certain medicines (such as Anacin and Excederin). Also avoid decaf coffee and tea, as these contain small amounts of caffeine. 4. No alcohol, smoking or use of other tobacco products for 12 hours before the test. 5. Refer to your provider instructions to see if you need to stop any medications (such as beta-blockers or nitrates) for this test. 6. For patients with diabetes: - If you take insulin, call your diabetes care team. Ask if you should take a   dose the morning of your test. - If you take diabetes medicine by mouth, dont take it on the morning of your test. Bring it with you to take after the test. (If you have questions, call your diabetes care team) 7. When you arrive, please tell us if: - You have diabetes. - You have taken Viagra, Cialis or Levitra in the past 48 hours. 8. For any questions that cannot be answered, please contact the ordering physician            Jan 19, 2018  2:30 PM CST   Return Visit with Ronda Lackey [facility] ([facility])    [place] [address] [place], [place] [id number]  [facility] [id number] [phone]              MyChart Information     [facility] gives you secure access  to your electronic health record. If you see a primary care provider, you can also send messages to your care team and make appointments. If you have questions, please call your primary care clinic.  If you do not have a primary care provider, please call [phone] and they will assist you.        Care EveryWhere ID     This is your Care EveryWhere ID. This could be used by other organizations to access your [facility] medical records  [id number]        Equal Access to Services     LORNE HALEY : Keyur Zee, raquel whitfield, erasto baileyaljada armendariz, adrián perkins. So [facility] [phone].    ATENCIÓN: Si habla dwightañol, tiene a rizvi disposición servicios gratuitos de asistencia lingüística. Llame al [phone].    We comply with applicable federal civil rights laws and [place] laws. We do not discriminate on the basis of race, color, national origin, age, disability, sex, sexual orientation, or gender identity.
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
## 2018-01-06 NOTE — TELEPHONE ENCOUNTER
I faxed Rx for Zolpidem (Ambien) 10 MG tablet to [facility]-[address] [place] [address]  Fax #: [phone]  Start date: 04/19/2017    Qing Jackson MA     Handoff

## 2018-01-08 ENCOUNTER — RADIANT APPOINTMENT (OUTPATIENT)
Dept: CARDIOLOGY | Facility: CLINIC | Age: 63
End: 2018-01-08
Attending: NURSE PRACTITIONER
Payer: COMMERCIAL

## 2018-01-08 DIAGNOSIS — R06.02 SHORTNESS OF BREATH: ICD-10-CM

## 2018-01-08 RX ADMIN — Medication 5 ML: at 10:50

## 2018-01-19 ENCOUNTER — OFFICE VISIT (OUTPATIENT)
Dept: PSYCHOLOGY | Facility: CLINIC | Age: 63
End: 2018-01-19
Payer: COMMERCIAL

## 2018-01-19 DIAGNOSIS — F33.0 MAJOR DEPRESSIVE DISORDER, RECURRENT EPISODE, MILD WITH ANXIOUS DISTRESS (H): Primary | ICD-10-CM

## 2018-01-19 PROCEDURE — 90834 PSYTX W PT 45 MINUTES: CPT | Performed by: COUNSELOR

## 2018-01-19 ASSESSMENT — PATIENT HEALTH QUESTIONNAIRE - PHQ9: SUM OF ALL RESPONSES TO PHQ QUESTIONS 1-9: 2

## 2018-01-19 NOTE — MR AVS SNAPSHOT
MRN:[id number]                      After Visit Summary   1/19/2018    Brandi Stern    MRN: [id number]           Visit Information        Provider Department      1/19/2018 2:30 PM Ronda Lackey [facility]      Your next 10 appointments already scheduled     Feb 01, 2018  8:00 AM CST   (Arrive by 7:45 AM)   New Patient Visit with Antony Sheehan MD   [facility] ([facility])    [address] [place] [address]  4th [facility] [id number] [phone]            Feb 05, 2018  1:00 PM CST   Return Visit with Ronda Lackey [facility] ([facility]  [address] [place] [phone]            Feb 19, 2018  1:00 PM CST   Return Visit with Ronda Lackey [facility] ([place] [address] [place] [phone]              MyChart Information     [employer] gives you secure access to your electronic health record. If you see a primary care provider, you can also send messages to your care team and make appointments. If you have questions, please call your primary care clinic.  If you do not have a primary care provider, please call [phone] and they will assist you.        Care EveryWhere ID     This is your Care EveryWhere ID. This could be used by other organizations to access your [place] medical records  [id number]        Equal Access to Services     LORNE HALEY : Hadii aad ku hadasho Soomaali, waaxda luqadaha, qaybta kaalmada adeegyada, waxart carmelo perkins. So [facility] [phone].    ATENCIÓN: Si habla español, tiene a rizvi disposición servicios gratuitos de asistencia lingüística. Llame al [phone].    We comply with applicable federal civil rights laws and [place] laws. We do not discriminate  on the basis of race, color, national origin, age, disability, sex, sexual orientation, or gender identity.
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
## 2018-01-29 ENCOUNTER — CARE COORDINATION (OUTPATIENT)
Dept: SURGERY | Facility: CLINIC | Age: 63
End: 2018-01-29

## 2018-01-29 NOTE — PROGRESS NOTES
Called and spoke to patient. She has severe GERD, and has chronic cough due to reflux from Hiatal hernia.     She would like to discuss surgical vs non-surgical options. Would like to discuss with surgeon if weight loss is an option.

## 2018-02-01 ENCOUNTER — ALLIED HEALTH/NURSE VISIT (OUTPATIENT)
Dept: SURGERY | Facility: CLINIC | Age: 63
End: 2018-02-01
Payer: COMMERCIAL

## 2018-02-01 ENCOUNTER — OFFICE VISIT (OUTPATIENT)
Dept: SURGERY | Facility: CLINIC | Age: 63
End: 2018-02-01
Payer: COMMERCIAL

## 2018-02-01 VITALS
SYSTOLIC BLOOD PRESSURE: 118 MMHG | WEIGHT: 226.9 LBS | HEIGHT: 65 IN | TEMPERATURE: 98.7 F | DIASTOLIC BLOOD PRESSURE: 88 MMHG | BODY MASS INDEX: 37.8 KG/M2 | OXYGEN SATURATION: 95 % | HEART RATE: 89 BPM

## 2018-02-01 DIAGNOSIS — E66.01 MORBID OBESITY (H): Primary | ICD-10-CM

## 2018-02-01 RX ORDER — ESCITALOPRAM OXALATE 20 MG/1
20 TABLET ORAL
COMMUNITY
Start: 2015-09-03 | End: 2018-02-01

## 2018-02-01 ASSESSMENT — ENCOUNTER SYMPTOMS
MUSCLE CRAMPS: 0
HYPERTENSION: 0
VOMITING: 0
POLYPHAGIA: 0
CONSTIPATION: 0
NAIL CHANGES: 0
HOT FLASHES: 0
NUMBNESS: 0
COUGH DISTURBING SLEEP: 0
DIARRHEA: 0
NERVOUS/ANXIOUS: 0
DISTURBANCES IN COORDINATION: 0
DECREASED CONCENTRATION: 0
DOUBLE VISION: 0
HEMOPTYSIS: 0
FATIGUE: 0
EXTREMITY NUMBNESS: 0
NIGHT SWEATS: 0
DEPRESSION: 0
POOR WOUND HEALING: 0
SKIN CHANGES: 0
MYALGIAS: 1
BLOATING: 0
BREAST PAIN: 0
PARALYSIS: 0
SORE THROAT: 1
WEIGHT LOSS: 0
TROUBLE SWALLOWING: 0
TINGLING: 0
EYE IRRITATION: 0
TACHYCARDIA: 0
RECTAL PAIN: 0
NECK MASS: 0
HOARSE VOICE: 1
SEIZURES: 0
LEG SWELLING: 0
SMELL DISTURBANCE: 0
SLEEP DISTURBANCES DUE TO BREATHING: 0
JOINT SWELLING: 1
EYE REDNESS: 0
POSTURAL DYSPNEA: 0
FEVER: 0
STIFFNESS: 1
HEMATURIA: 0
DIZZINESS: 0
EYE WATERING: 0
POLYDIPSIA: 0
DYSPNEA ON EXERTION: 1
CLAUDICATION: 0
JAUNDICE: 0
BACK PAIN: 1
WEIGHT GAIN: 0
DYSURIA: 0
ORTHOPNEA: 0
MEMORY LOSS: 0
WHEEZING: 1
TASTE DISTURBANCE: 1
ARTHRALGIAS: 1
DIFFICULTY URINATING: 0
HYPOTENSION: 0
BLOOD IN STOOL: 0
COUGH: 1
LIGHT-HEADEDNESS: 0
BRUISES/BLEEDS EASILY: 0
SPEECH CHANGE: 0
TREMORS: 0
ALTERED TEMPERATURE REGULATION: 0
RECTAL BLEEDING: 0
PANIC: 0
SINUS PAIN: 1
LEG PAIN: 0
NAUSEA: 0
FLANK PAIN: 0
SINUS CONGESTION: 1
BOWEL INCONTINENCE: 0
ABDOMINAL PAIN: 0
HEADACHES: 0
SNORES LOUDLY: 1
NECK PAIN: 1
INCREASED ENERGY: 0
INSOMNIA: 0
SHORTNESS OF BREATH: 1
SWOLLEN GLANDS: 0
DECREASED APPETITE: 0
CHILLS: 0
SPUTUM PRODUCTION: 1
MUSCLE WEAKNESS: 0
WEAKNESS: 0
BREAST MASS: 0
HALLUCINATIONS: 0
EYE PAIN: 0
SYNCOPE: 0
DECREASED LIBIDO: 0
HEARTBURN: 0
EXERCISE INTOLERANCE: 0
LOSS OF CONSCIOUSNESS: 0
PALPITATIONS: 0

## 2018-02-01 ASSESSMENT — PAIN SCALES - GENERAL: PAINLEVEL: NO PAIN (0)

## 2018-02-01 NOTE — PATIENT INSTRUCTIONS
"Nutrition Goals  1) Start prepping meals ahead of time  2) Weight loss    Healthy Recipe Resources:  \"The Volumetrics Eating Plan\" by Trinidad Husain, Ph.D.  www.BitWine.com  www.hungrybasestonegirl.Backupify  www.oldPiqniqpt.org  Dash Diet Recipes [facility]    *Protein Shake Criteria: no more than 250 Calories, at least 20 grams of protein, and less than 10 grams of sugar     Meal Replacement Shake Options:   [facility] Meal Replacement (250 Calories, 35 g protein)   Premier Protein (160 Calories, 30 g protein)  Slim Fast Advanced Nutrition (180 Calories, 20 g protein)  Muscle Milk, lactose-free, 17 oz bottle (210 Calories, 30 g protein)  Integrated Supplements, no artificial sugars (110 Calories, 20 g protein)  Quest Protein Bars (190 Calories, 20 g protein)  No Cow Protein Bar, gluten, dairy, and soy free (200 Calories, 20 g protein)    Frozen Meal Replacements  Healthy Choice  Lean Cuisine  Atkins Meals  Smart     Norma Pimentel, ADDY, LD    If you need to schedule or reschedule with a dietitian please call [phone].  "

## 2018-02-01 NOTE — MR AVS SNAPSHOT
"              After Visit Summary   2/1/2018    Brandi Stern    MRN: [id number]           Patient Information     Date Of Birth          1955        Visit Information        Provider Department      2/1/2018 8:30 AM Norma Pimentel RD [facility]        Care Instructions    Nutrition Goals  1) Start prepping meals ahead of time  2) Weight loss    Healthy Recipe Resources:  \"The Volumetrics Eating Plan\" by Trinidad Husain, Ph.D.  www.[employer].com  www.hungryKnoticegirl.[employer]  www.Aurin Biotechpt.org  Dash Diet Recipes [facility]    *Protein Shake Criteria: no more than 250 Calories, at least 20 grams of protein, and less than 10 grams of sugar     Meal Replacement Shake Options:   [facility] Meal Replacement (250 Calories, 35 g protein)   Premier Protein (160 Calories, 30 g protein)  Slim Fast Advanced Nutrition (180 Calories, 20 g protein)  Muscle Milk, lactose-free, 17 oz bottle (210 Calories, 30 g protein)  Integrated Supplements, no artificial sugars (110 Calories, 20 g protein)  Quest Protein Bars (190 Calories, 20 g protein)  No Cow Protein Bar, gluten, dairy, and soy free (200 Calories, 20 g protein)    Frozen Meal Replacements  Healthy Choice  Lean Cuisine  Atkins Meals  Smart     Norma Pimentel RD, LD    If you need to schedule or reschedule with a dietitian please call [phone].          Follow-ups after your visit        Your next 10 appointments already scheduled     Feb 05, 2018  1:00 PM CST   Return Visit with Ronda Lackey [facility] ([place] [address] [address] [id number] [phone]            Feb 13, 2018 10:30 AM CST   (Arrive by 10:15 AM)   New Patient Visit with MD KARRI Weathers [facility] ([facility])    [address] [place] [address] [phone]            Feb 19, 2018  1:00 PM CST   Return " Visit with Ronda Lackey [facility] ([facility])    [facility]  [address]  [facility] [address] [phone]              Who to contact     Please call your clinic at [phone] to:    Ask questions about your health    Make or cancel appointments    Discuss your medicines    Learn about your test results    Speak to your doctor   If you have compliments or concerns about an experience at your clinic, or if you wish to file a complaint, please contact [facility] Patient Relations at [phone] or email us at [email or web address]         Additional Information About Your Visit        FunangaharClctin Information     Limonetik gives you secure access to your electronic health record. If you see a primary care provider, you can also send messages to your care team and make appointments. If you have questions, please call your primary care clinic.  If you do not have a primary care provider, please call [phone] and they will assist you.      Limonetik is an electronic gateway that provides easy, online access to your medical records. With Limonetik, you can request a clinic appointment, read your test results, renew a prescription or communicate with your care team.     To access your existing account, please contact your [facility] or call [phone] for assistance.        Care EveryWhere ID     This is your Care EveryWhere ID. This could be used by other organizations to access your [facility] medical records  [id number]        Your Vitals Were     Last Period                   (LMP Unknown)            Blood Pressure from Last 3 Encounters:   02/01/18 118/88   12/14/17 119/76   10/31/17 124/84    Weight from Last 3 Encounters:   02/01/18 102.9 kg (226 lb 14.4 oz)   12/14/17 103.5 kg (228 lb 2 oz)   10/31/17 103.4 kg (228 lb)              Today, you had the following     No orders found  for display         Today's Medication Changes          These changes are accurate as of 2/1/18  9:43 AM.  If you have any questions, ask your nurse or doctor.               These medicines have changed or have updated prescriptions.        Dose/Directions    omeprazole 20 MG CR capsule   Commonly known as:  priLOSEC   This may have changed:  Another medication with the same name was removed. Continue taking this medication, and follow the directions you see here.   Used for:  Gastroesophageal reflux disease without esophagitis   Changed by:  Antony Sheehan MD        Dose:  20 mg   Take 1 capsule (20 mg) by mouth 2 times daily   Quantity:  180 capsule   Refills:  1         Stop taking these medicines if you haven't already. Please contact your care team if you have questions.     escitalopram 20 MG tablet   Commonly known as:  LEXAPRO   Stopped by:  Antony Sheehan MD           ferrous sulfate 325 (65 FE) MG tablet   Commonly known as:  IRON   Stopped by:  Antony Sheehan MD           FIBER PO   Stopped by:  Antony Sheehan MD           MELATONIN PO   Stopped by:  Antony Sheehan MD                    Primary Care Provider Office Phone # Fax #    Cherie ZENG UZIEL Brennan [phone] [address] [facility] [address]        Equal Access to Services     FREDA HALEY AH: Hadii aad ku hadasho Soomaali, waaxda luqadaha, qaybta kaalmada adeegyada, waxay vernellin haycharlyn hakeem rodriguez . So [facility] [phone].    ATENCIÓN: Si habla español, tiene a rizvi disposición servicios gratuitos de asistencia lingüística. Llame al [phone].    We comply with applicable federal civil rights laws and [place] laws. We do not discriminate on the basis of race, color, national origin, age, disability, sex, sexual orientation, or gender identity.            Thank you!     Thank you for choosing [facility]  for your care. Our goal is always to provide you with  excellent care. Hearing back from our patients is one way we can continue to improve our services. Please take a few minutes to complete the written survey that you may receive in the mail after your visit with us. Thank you!             Your Updated Medication List - Protect others around you: Learn how to safely use, store and throw away your medicines at www.disposemymeds.org.          This list is accurate as of 2/1/18  9:43 AM.  Always use your most recent med list.                   Brand Name Dispense Instructions for use Diagnosis    ACETAMINOPHEN EXTRA STRENGTH PO      Pt reports taking 650 MG arthritis        * albuterol (2.5 MG/3ML) 0.083% neb solution     3 mL    Take  by nebulization. take 3 mL by nebulization 4 times daily as needed    Moderate persistent asthma       * albuterol 108 (90 BASE) MCG/ACT Inhaler    PROAIR HFA    1 Inhaler    Inhale 1-2 puffs into the lungs every 6 hours as needed for shortness of breath / dyspnea    Moderate persistent asthma with exacerbation       atorvastatin 10 MG tablet    LIPITOR    90 tablet    TAKE 1 TABLET (10 MG) BY MOUTH DAILY    Hyperlipidemia LDL goal <130       buPROPion 300 MG 24 hr tablet    WELLBUTRIN XL    90 tablet    Take 1 tablet (300 mg) by mouth every morning    Major depressive disorder, recurrent episode, moderate (H)       CALCIUM 500 PO      Take 1 tablet by mouth daily        CLARITIN 10 MG tablet   Generic drug:  loratadine     0    1 TAB PO QD (Once per day) as needed for ALLERGY SYMPTOMS    Allergic rhinitis due to other allergen       CO Q 10 PO      Take 200 mg by mouth daily        cyclobenzaprine 5 MG tablet    FLEXERIL    180 tablet    Take 1-2 tablets (5-10 mg) by mouth 3 times daily as needed for muscle spasms    Spasm of back muscles       desvenlafaxine fumarate 100 MG 24 hr tablet     30 tablet    Take 1 tablet (100 mg) by mouth daily    Major depressive disorder, recurrent episode, moderate (H)       diclofenac 1 % Gel topical gel     VOLTAREN    100 g    Apply 4 grams to knees or 2 grams to feet four times daily using enclosed dosing card.    Right elbow pain       diclofenac 100 MG 24 hr tablet    VOLTAREN-XR    30 tablet    Take 1 tablet (100 mg) by mouth daily    DDD (degenerative disc disease), cervical, DDD (degenerative disc disease), lumbar       fenofibrate 145 MG tablet     90 tablet    Take 1 tablet (145 mg) by mouth daily    Pure hyperglyceridemia       levothyroxine 75 MCG tablet    SYNTHROID    90 tablet    Take 1 tablet (75 mcg) by mouth every morning Overdue for labs    Chronic lymphocytic thyroiditis       Lutein 10 MG Tabs      Take 10 mg by mouth daily        MIRAPEX 0.125 MG tablet   Generic drug:  pramipexole      Take two tabs at dinner and one at hs        omeprazole 20 MG CR capsule    priLOSEC    180 capsule    Take 1 capsule (20 mg) by mouth 2 times daily    Gastroesophageal reflux disease without esophagitis       ranitidine 300 MG tablet    ZANTAC    60 tablet    Take 1 tablet (300 mg) by mouth 2 times daily    Gastroesophageal reflux disease without esophagitis       SYMBICORT 160-4.5 MCG/ACT Inhaler   Generic drug:  budesonide-formoterol      Inhale 1 puff into the lungs 2 times daily    Moderate persistent asthma without complication       triamcinolone 55 MCG/ACT nasal inhaler    NASACORT AQ    1 Inhaler    Inhale 2 sprays in both nostrils every day as needed    Allergic rhinitis due to other allergen       UNABLE TO FIND      MEDICATION NAME: Allergy shots        VITAMIN C PO      Take 1,000 mg by mouth daily        zolpidem 10 MG tablet    AMBIEN    30 tablet    TAKE 1/2 TO 1 TABLET BY MOUTH NIGHTLY AS NEEDED    Insomnia, unspecified type       * Notice:  This list has 2 medication(s) that are the same as other medications prescribed for you. Read the directions carefully, and ask your doctor or other care provider to review them with you.

## 2018-02-01 NOTE — PATIENT INSTRUCTIONS
It was a pleasure meeting with you today.     Thank you for allowing us the privilege of caring for you. We hope we provided you with the excellent service you deserve.     Please let us know if there is anything else we can do for you so that we can be sure you are leaving completely satisfied with your care experience.      You saw Dr Sheehan today.     Instructions per today's visit:     Please schedule clinic appointment with the Medical Weight Management Team today or call [phone], option #1.      To schedule appointments with our team, please call [phone] option #1    Please call during clinic hours Monday through Friday 8:00a - 4:00p if you have questions or you can contact us via [email or web address] at anytime.      Nurses: [phone] Option # 3 for nurse advice line.  Fax: [phone]  Surgery Scheduler: [phone]    Please call the hospital at [phone] to speak with our on call MDs if you have urgent needs after hours, during weekends, or holidays.

## 2018-02-01 NOTE — PROGRESS NOTES
"Brandi Stern is a 62 year old female presents today for new weight management nutrition consultation.  Patient referred by Dr Sheehan due to history of GERD and obesity(2/1/18).    Estimated body mass index is 37.76 kg/(m^2) as calculated from the following:    Height as of an earlier encounter on 2/1/18: 1.651 m (5' 5\").    Weight as of an earlier encounter on 2/1/18: 102.9 kg (226 lb 14.4 oz).    Patient reported weight gain over the past 10 years due to knee replacements and a more sedentary lifestyle due to chronic pain.    Nutrition history  See MD note for details.  Breakfast- two cups of coffee with milk(2%)  Lunch- pasta, chicken and cheese quesadilla   Dinner- chocolate croissant, cheese and crackers, smith  Snacks- ice cream up to 2 times per week  Patient reported she eats what is easy due to fibromyalgia/chronic pain/depression    Nutrition Prescription  GERD diet (per MD)    Nutrition Diagnosis  Food and nutrition related knowledge deficit r/t lack of prior exposure to nutrition education for GERD aeb pt reporting history of consuming high fat foods, alcohol, and fast eating pace.    Nutrition Intervention  Materials/education provided on GERD diet with portion control and healthy food choices (GERD nutrition therapy handout from nutrition care manual), the plate method, meal and snack planning and websites, sample meal plans    Patient Understanding: good  Expected Compliance: fair/good    Nutrition Goals  1) Start prepping meals ahead of time  2) Increase vegetables, reduced fat milk  3) Weight loss    Healthy Recipe Resources:  \"The Volumetrics Eating Plan\" by Trinidad Husain, Ph.D.  www.[employer]vanza.com  www.hungryAVI Web Solutions Pvt[employer].girl.com  www.oldwayspt.org  Dash Diet Recipes [facility]    *Protein Shake Criteria: no more than 250 Calories, at least 20 grams of protein, and less than 10 grams of sugar     Meal Replacement Shake Options:   [facility] Meal Replacement (250 Calories, 35 g protein)   Premier Protein " (160 Calories, 30 g protein)  Slim Fast Advanced Nutrition (180 Calories, 20 g protein)  Muscle Milk, lactose-free, 17 oz bottle (210 Calories, 30 g protein)  Integrated Supplements, no artificial sugars (110 Calories, 20 g protein)  Quest Protein Bars (190 Calories, 20 g protein)  No Cow Protein Bar, gluten, dairy, and soy free (200 Calories, 20 g protein)    Frozen Meal Replacements  Healthy Choice  Lean Cuisine  Atkins Meals  Smart Ones    Follow-Up:  PRN    Time spent with patient: 30 minutes.  Norma Pimentel, RD, LD

## 2018-02-01 NOTE — NURSING NOTE
"Chief Complaint   Patient presents with     Consult     Consult for hiatal hernia       Vitals:    02/01/18 0816   BP: 118/88   Pulse: 89   Temp: 98.7  F (37.1  C)   TempSrc: Oral   SpO2: 95%   Weight: 226 lb 14.4 oz   Height: 5' 5\"       Body mass index is 37.76 kg/(m^2).  Antonina Huerta CMA                       "

## 2018-02-01 NOTE — MR AVS SNAPSHOT
"                  MRN:[id number]                      After Visit Summary   2/1/2018    Brandi Stern    MRN: [id number]           Visit Information        Provider Department      2/1/2018 8:30 AM Norma Pimentel RD  Health Surgical Weight Management        Your next 10 appointments already scheduled     Feb 05, 2018  1:00 PM CST   Return Visit with [facility] ([facility]  [address]  [facility] [id number] [phone]            Feb 19, 2018  1:00 PM CST   Return Visit with Ronda [facility] ([facility]  [address]  [facility] [id number] [phone]              Care Instructions    Nutrition Goals  1) Start prepping meals ahead of time  2) Weight loss    Healthy Recipe Resources:  \"The Volumetrics Eating Plan\" by Trinidad Husain, Ph.D.  www.Highcon.com  [email or web address]  www.PlayScapept.org  Dash Diet Recipes [facility]    *Protein Shake Criteria: no more than 250 Calories, at least 20 grams of protein, and less than 10 grams of sugar     Meal Replacement Shake Options:   [facility] Meal Replacement (250 Calories, 35 g protein)   Premier Protein (160 Calories, 30 g protein)  Slim Fast Advanced Nutrition (180 Calories, 20 g protein)  Muscle Milk, lactose-free, 17 oz bottle (210 Calories, 30 g protein)  Integrated Supplements, no artificial sugars (110 Calories, 20 g protein)  Quest Protein Bars (190 Calories, 20 g protein)  No Cow Protein Bar, gluten, dairy, and soy free (200 Calories, 20 g protein)    Frozen Meal Replacements  Healthy Choice  Lean Cuisine  Atkins Meals  Smart     Norma Pimentel RD, LD    If you need to schedule or reschedule with a dietitian please call [phone].         [email or web address] Information     [email or web address] gives you secure access to your electronic health record. If you see a primary care provider, you " can also send messages to your care team and make appointments. If you have questions, please call your primary care clinic.  If you do not have a primary care provider, please call [phone] and they will assist you.      [employer] is an electronic gateway that provides easy, online access to your medical records. With [employer], you can request a clinic appointment, read your test results, renew a prescription or communicate with your care team.     To access your existing account, please contact your [facility] or call [phone] for assistance.        Care EveryWhere ID     This is your Care EveryWhere ID. This could be used by other organizations to access your [place] medical records  [id number]        Equal Access to Services     LORNE HALEY : Keyur Zee, raquel whitfield, erasto armendariz, adrián perkins. So [facility] [place] [facility] [phone].    ATENCIÓN: Si habla español, tiene a rizvi disposición servicios gratuitos de asistencia lingüística. Llame al [phone].    We comply with applicable federal civil rights laws and [place] laws. We do not discriminate on the basis of race, color, national origin, age, disability, sex, sexual orientation, or gender identity.

## 2018-02-01 NOTE — LETTER
2018       RE: Brandi Stern  [address] [place] [address]     Dear Colleague,    Thank you for referring your patient, Brandi Stern, to the [facility] at [facility]. Please see a copy of my visit note below.        New Medical Weight Management Consult    PATIENT:  Brandi Stern  MRN:         [id number]  :         1955  YUSEF:         2018    Dear Cherie Brennan,    I had the pleasure of seeing your patient, Brandi Stern. Ms Stern has had a hiatal hernia for years now. She has been managed medically with zantac and omeprazole. Unfortunately, she developed exercise intolerance, SOB, chest pressure and cough about 6 months ago prompting pulmonary and cardiac workups which have been negative. She presents today questioning if hiatal hernia is cause of symptoms. Reflux is controlled, no dysphagia, no odynophagia. Last EGD was in , no yuen's esophagus. Ph probe done in  had a DeMeester score of over 60.    Full intake/assessment done to determine barriers to weight loss success and develop a treatment plan.  Brandi Stern is a 62 year old female interested in treatment of medical problems associated with weight.  Her weight today is 226 lbs 14.4 oz, Body mass index is 37.76 kg/(m^2)., and she has the following co-morbidities:    Wt Readings from Last 4 Encounters:   18 102.9 kg (226 lb 14.4 oz)   17 103.5 kg (228 lb 2 oz)   10/31/17 103.4 kg (228 lb)   17 103.4 kg (228 lb)     ROS  ROS negative except her usual joint and muscle pains and as stated in HPI above.     PAST MEDICAL HISTORY:  Past Medical History:   Diagnosis Date     Allergic rhinitis due to other allergen      Apneas, obstructive sleep      Chronic lymphocytic thyroiditis      COPD (chronic obstructive pulmonary disease) (H)      Depressive disorder, not elsewhere classified      Disorder of bone and cartilage, unspecified       Esophageal reflux      Essential hypertension, benign      Generalized osteoarthrosis, unspecified site     knees     HASHIMOTO'S THYROIDITIS 11/15/2004     HASHIMOTO'S THYROIDITIS      Headache above the eye region      Hiatal hernia      Insomnia, unspecified      Moderate persistent asthma      Nasal congestion      Pure hyperglyceridemia      Scoliosis      Snoring      Tobacco use disorder      MEDICATIONS:   Current Outpatient Prescriptions   Medication Sig Dispense Refill     diclofenac (VOLTAREN-XR) 100 MG 24 hr tablet Take 1 tablet (100 mg) by mouth daily 30 tablet 3     buPROPion (WELLBUTRIN XL) 300 MG 24 hr tablet Take 1 tablet (300 mg) by mouth every morning 90 tablet PRN     zolpidem (AMBIEN) 10 MG tablet TAKE 1/2 TO 1 TABLET BY MOUTH NIGHTLY AS NEEDED 30 tablet 5     atorvastatin (LIPITOR) 10 MG tablet TAKE 1 TABLET (10 MG) BY MOUTH DAILY 90 tablet PRN     levothyroxine (SYNTHROID) 75 MCG tablet Take 1 tablet (75 mcg) by mouth every morning Overdue for labs 90 tablet 3     desvenlafaxine fumarate 100 MG 24 hr tablet Take 1 tablet (100 mg) by mouth daily 30 tablet PRN     albuterol (ALBUTEROL) 108 (90 BASE) MCG/ACT Inhaler Inhale 1-2 puffs into the lungs every 6 hours as needed for shortness of breath / dyspnea 1 Inhaler 1     diclofenac (VOLTAREN) 1 % GEL topical gel Apply 4 grams to knees or 2 grams to feet four times daily using enclosed dosing card. 100 g 3     fenofibrate 145 MG tablet Take 1 tablet (145 mg) by mouth daily 90 tablet 2     ranitidine (ZANTAC) 300 MG tablet Take 1 tablet (300 mg) by mouth 2 times daily 60 tablet prn     omeprazole (PRILOSEC) 20 MG CR capsule Take 1 capsule (20 mg) by mouth 2 times daily 180 capsule 1     SYMBICORT 160-4.5 MCG/ACT Inhaler Inhale 1 puff into the lungs 2 times daily       cyclobenzaprine (FLEXERIL) 5 MG tablet Take 1-2 tablets (5-10 mg) by mouth 3 times daily as needed for muscle spasms 180 tablet 1     UNABLE TO FIND MEDICATION NAME: Allergy shots    "    Ascorbic Acid (VITAMIN C PO) Take 1,000 mg by mouth daily       Calcium-Magnesium-Vitamin D (CALCIUM 500 PO) Take 1 tablet by mouth daily       Lutein 10 MG TABS Take 10 mg by mouth daily       triamcinolone (NASACORT AQ) 55 MCG/ACT nasal inhaler Inhale 2 sprays in both nostrils every day as needed 1 Inhaler 7     albuterol (2.5 MG/3ML) 0.083% nebulizer solution Take  by nebulization. take 3 mL by nebulization 4 times daily as needed 3 mL 12     Coenzyme Q10 (CO Q 10 PO) Take 200 mg by mouth daily        MIRAPEX 0.125 MG OR TABS Take two tabs at dinner and one at hs       ACETAMINOPHEN EXTRA STRENGTH OR Pt reports taking 650 MG arthritis       CLARITIN 10 MG OR TABS 1 TAB PO QD (Once per day) as needed for ALLERGY SYMPTOMS 0 0     ALLERGIES:   Allergies   Allergen Reactions     Animal Dander      Fluconazole      rash     Liquid Adhesive      Transdermal patch adhesive. Specifically to nicotine patch; not allergic to nicotine.      Seasonal Allergies      Suture      Non-healing suture line     Vistaril [Hydroxyzine Hcl]      Urinary retention     PHYSICAL EXAM:  /88  Pulse 89  Temp 98.7  F (37.1  C) (Oral)  Ht 1.651 m (5' 5\")  Wt 102.9 kg (226 lb 14.4 oz)  LMP  (LMP Unknown)  SpO2 95%  BMI 37.76 kg/m2   A & O x 3  HEENT: NCAT, mucous membranes moist  Respirations unlabored  Location of obesity: Mixed Obesity, mostly central     ASSESSMENT:  Brandi is a patient with what seems to be foregut related symptoms of chest pressure. Medical mgmt seems to be adequate for her in regards to control of GERD. Importantly, cardiac and pulmonary issues have been ruled out which is encouraging. I do however think weight loss will significantly improve symptoms. For this reason, she has been referred to medical weight management and has been seen by the dietician today. She can come back and see me in about 6 months or earlier if issues arise. She will also need an EGD, but that could be scheduled at a later time. "     Her ability to lose weight is impacted by physical impairment and lack of education on nutrition and dietary needs.    PLAN:    Decrease portion sizes  Dietician visit of education    Programmatic/Healthy Living  Medication:  The patient will begin medication in pursuit of improved medical status as influenced by body weight. There is a mutual understanding of the goals and risks of this therapy. The patient is in agreement. She will be educated on dosage regimen and possible side effects at her visit.    RTC:    6 months.    Sincerely,    Antony Sheehan MD  Surgery  [phone] (hospital )  [phone] (clinic nurses)                    New Medical Weight Management Consult    PATIENT:  Brandi Stern  MRN:         [id number]  :         1955  YUSEF:         2018    Dear Ms. Brennan,    I had the pleasure of seeing your patient, Brandi Stern.      Brandi came to clinic today to discuss her problems with shortness of breath on exertion possibly related to hiatal hernia.    Brandi has had evaluation previously by the GI team at [facility].    She has a known hiatal hernia, medium-size.    She has a history of reflux/heartburn, but this is controlled with ppi and H2 blocker.    She has some occasional dysphagia/belching.    Her problems are associated with excess weight and she has a diagnosis of class II obesity.    She reports readiness in clinic today to address these issues and is open to discussing weight management options with our medical weight management team.      Ms Barros full intake/assessment done to determine barriers to weight loss success and develop a treatment plan.  Brandi Stern is a 62 year old female interested in treatment of medical problems associated with weight.  Her weight today is 226 lbs 14.4 oz, Body mass index is 37.76 kg/(m^2).      Wt Readings from Last 4 Encounters:   18 102.9 kg (226 lb 14.4 oz)   17 103.5 kg (228 lb 2 oz)   10/31/17 103.4 kg  (228 lb)   09/26/17 103.4 kg (228 lb)       Review of Systems     Constitutional:  Negative for fever, chills, weight loss, weight gain, fatigue, decreased appetite, night sweats, recent stressors, height gain, height loss, post-operative complications, incisional pain, hallucinations, increased energy, hyperactivity and confused.   HENT:  Positive for hoarse voice, sore throat, taste disturbance, dry mouth, sinus pain and sinus congestion. Negative for ear pain, hearing loss, tinnitus, nosebleeds, trouble swallowing, mouth sores, ear discharge, tooth pain, gum tenderness, smell disturbance, hearing aid, bleeding gums and neck mass.    Eyes:  Negative for double vision, pain, redness, eye pain, decreased vision, eye watering, eye bulging, eye dryness, flashing lights, spots, floaters, strabismus, tunnel vision, jaundice and eye irritation.   Respiratory:   Positive for cough, sputum production, shortness of breath, wheezing, snores loudly and dyspnea on exertion. Negative for hemoptysis, sleep disturbances due to breathing, cough disturbing sleep and postural dyspnea.    Cardiovascular:  Positive for dyspnea on exertion. Negative for chest pain, palpitations, orthopnea, claudication, leg swelling, fingers/toes turn blue, hypertension, hypotension, syncope, history of heart murmur, chest pain on exertion, chest pain at rest, pacemaker, few scattered varicosities, leg pain, sleep disturbances due to breathing, tachycardia, light-headedness, exercise intolerance and edema.   Gastrointestinal:  Negative for heartburn, nausea, vomiting, abdominal pain, diarrhea, constipation, blood in stool, melena, rectal pain, bloating, hemorrhoids, bowel incontinence, jaundice, rectal bleeding, coffee ground emesis and change in stool.   Genitourinary:  Negative for bladder incontinence, dysuria, urgency, hematuria, flank pain, vaginal discharge, difficulty urinating, genital sores, dyspareunia, decreased libido, nocturia, voiding  less frequently, arousal difficulty, abnormal vaginal bleeding, excessive menstruation, menstrual changes, hot flashes, vaginal dryness and postmenopausal bleeding.   Musculoskeletal:  Positive for myalgias, back pain, joint swelling, arthralgias, stiffness, neck pain and bone pain. Negative for muscle cramps, muscle weakness and fracture.   Skin:  Negative for nail changes, itching, poor wound healing, rash, hair changes, skin changes, acne, warts, poor wound healing, scarring, flaky skin, Raynaud's phenomenon, sensitivity to sunlight and skin thickening.   Neurological:  Negative for dizziness, tingling, tremors, speech change, seizures, loss of consciousness, weakness, light-headedness, numbness, headaches, disturbances in coordination, extremity numbness, memory loss, difficulty walking and paralysis.   Endo/Heme:  Negative for anemia, swollen glands and bruises/bleeds easily.   Psychiatric/Behavioral:  Negative for depression, hallucinations, memory loss, decreased concentration, mood swings and panic attacks.    Breast:  Negative for breast discharge, breast mass, breast pain and nipple retraction.   Endocrine:  Negative for altered temperature regulation, polyphagia, polydipsia, unwanted hair growth and change in facial hair.      PAST MEDICAL HISTORY:  Past Medical History:   Diagnosis Date     Allergic rhinitis due to other allergen      Apneas, obstructive sleep      Chronic lymphocytic thyroiditis      COPD (chronic obstructive pulmonary disease) (H)      Depressive disorder, not elsewhere classified      Disorder of bone and cartilage, unspecified      Esophageal reflux      Essential hypertension, benign      Generalized osteoarthrosis, unspecified site     knees     HASHIMOTO'S THYROIDITIS 11/15/2004     HASHIMOTO'S THYROIDITIS      Headache above the eye region      Hiatal hernia      Insomnia, unspecified      Moderate persistent asthma      Nasal congestion      Pure hyperglyceridemia      Scoliosis       Snoring      Tobacco use disorder        No flowsheet data found.    No flowsheet data found.    No flowsheet data found.    MEDICATIONS:   Current Outpatient Prescriptions   Medication Sig Dispense Refill     diclofenac (VOLTAREN-XR) 100 MG 24 hr tablet Take 1 tablet (100 mg) by mouth daily 30 tablet 3     buPROPion (WELLBUTRIN XL) 300 MG 24 hr tablet Take 1 tablet (300 mg) by mouth every morning 90 tablet PRN     zolpidem (AMBIEN) 10 MG tablet TAKE 1/2 TO 1 TABLET BY MOUTH NIGHTLY AS NEEDED 30 tablet 5     atorvastatin (LIPITOR) 10 MG tablet TAKE 1 TABLET (10 MG) BY MOUTH DAILY 90 tablet PRN     levothyroxine (SYNTHROID) 75 MCG tablet Take 1 tablet (75 mcg) by mouth every morning Overdue for labs 90 tablet 3     desvenlafaxine fumarate 100 MG 24 hr tablet Take 1 tablet (100 mg) by mouth daily 30 tablet PRN     albuterol (ALBUTEROL) 108 (90 BASE) MCG/ACT Inhaler Inhale 1-2 puffs into the lungs every 6 hours as needed for shortness of breath / dyspnea 1 Inhaler 1     diclofenac (VOLTAREN) 1 % GEL topical gel Apply 4 grams to knees or 2 grams to feet four times daily using enclosed dosing card. 100 g 3     fenofibrate 145 MG tablet Take 1 tablet (145 mg) by mouth daily 90 tablet 2     ranitidine (ZANTAC) 300 MG tablet Take 1 tablet (300 mg) by mouth 2 times daily 60 tablet prn     omeprazole (PRILOSEC) 20 MG CR capsule Take 1 capsule (20 mg) by mouth 2 times daily 180 capsule 1     SYMBICORT 160-4.5 MCG/ACT Inhaler Inhale 1 puff into the lungs 2 times daily       cyclobenzaprine (FLEXERIL) 5 MG tablet Take 1-2 tablets (5-10 mg) by mouth 3 times daily as needed for muscle spasms 180 tablet 1     UNABLE TO FIND MEDICATION NAME: Allergy shots       Ascorbic Acid (VITAMIN C PO) Take 1,000 mg by mouth daily       Calcium-Magnesium-Vitamin D (CALCIUM 500 PO) Take 1 tablet by mouth daily       Lutein 10 MG TABS Take 10 mg by mouth daily       triamcinolone (NASACORT AQ) 55 MCG/ACT nasal inhaler Inhale 2 sprays in both  "nostrils every day as needed 1 Inhaler 7     albuterol (2.5 MG/3ML) 0.083% nebulizer solution Take  by nebulization. take 3 mL by nebulization 4 times daily as needed 3 mL 12     Coenzyme Q10 (CO Q 10 PO) Take 200 mg by mouth daily        MIRAPEX 0.125 MG OR TABS Take two tabs at dinner and one at hs       ACETAMINOPHEN EXTRA STRENGTH OR Pt reports taking 650 MG arthritis       CLARITIN 10 MG OR TABS 1 TAB PO QD (Once per day) as needed for ALLERGY SYMPTOMS 0 0       ALLERGIES:   Allergies   Allergen Reactions     Animal Dander      Fluconazole      rash     Liquid Adhesive      Transdermal patch adhesive. Specifically to nicotine patch; not allergic to nicotine.      Seasonal Allergies      Suture      Non-healing suture line     Vistaril [Hydroxyzine Hcl]      Urinary retention       PHYSICAL EXAM:  /88  Pulse 89  Temp 98.7  F (37.1  C) (Oral)  Ht 1.651 m (5' 5\")  Wt 102.9 kg (226 lb 14.4 oz)  LMP  (LMP Unknown)  SpO2 95%  BMI 37.76 kg/m2   A & O x 3  HEENT: NCAT, mucous membranes moist  Respirations unlabored  Location of obesity: Central Obesity    ASSESSMENT:  Brandi is a patient with mature onset class II obesity with significant element of familial/genetic influence and with current health consequences. She does need aggressive weight loss plan due to hiatal hernia.    Improvement of symptoms from and treatment of hiatal hernia both start with weight loss.  Brandi understands this.          PLAN:    Adult medical weight management.        Orders Placed This Encounter   Procedures     BARIATRIC ADULT REFERRAL     RTC:    prn.    Sincerely,    Antony Sheehan MD      "

## 2018-02-01 NOTE — PROGRESS NOTES
New Medical Weight Management Consult    PATIENT:  Brandi Stern  MRN:         [id number]  :         1955  YUSEF:         2018    Dear Ms. Brennan,    I had the pleasure of seeing your patient, Brandi Stern.      Brandi came to clinic today to discuss her problems with shortness of breath on exertion possibly related to hiatal hernia.    Brandi has had evaluation previously by the GI team at [facility].    She has a known hiatal hernia, medium-size.    She has a history of reflux/heartburn, but this is controlled with ppi and H2 blocker.    She has some occasional dysphagia/belching.    Her problems are associated with excess weight and she has a diagnosis of class II obesity.    She reports readiness in clinic today to address these issues and is open to discussing weight management options with our medical weight management team.      Ms Barros full intake/assessment done to determine barriers to weight loss success and develop a treatment plan.  Brandi Stern is a 62 year old female interested in treatment of medical problems associated with weight.  Her weight today is 226 lbs 14.4 oz, Body mass index is 37.76 kg/(m^2).      Wt Readings from Last 4 Encounters:   18 102.9 kg (226 lb 14.4 oz)   17 103.5 kg (228 lb 2 oz)   10/31/17 103.4 kg (228 lb)   17 103.4 kg (228 lb)       Review of Systems     Constitutional:  Negative for fever, chills, weight loss, weight gain, fatigue, decreased appetite, night sweats, recent stressors, height gain, height loss, post-operative complications, incisional pain, hallucinations, increased energy, hyperactivity and confused.   HENT:  Positive for hoarse voice, sore throat, taste disturbance, dry mouth, sinus pain and sinus congestion. Negative for ear pain, hearing loss, tinnitus, nosebleeds, trouble swallowing, mouth sores, ear discharge, tooth pain, gum tenderness, smell disturbance, hearing aid, bleeding gums and neck mass.    Eyes:  Negative  for double vision, pain, redness, eye pain, decreased vision, eye watering, eye bulging, eye dryness, flashing lights, spots, floaters, strabismus, tunnel vision, jaundice and eye irritation.   Respiratory:   Positive for cough, sputum production, shortness of breath, wheezing, snores loudly and dyspnea on exertion. Negative for hemoptysis, sleep disturbances due to breathing, cough disturbing sleep and postural dyspnea.    Cardiovascular:  Positive for dyspnea on exertion. Negative for chest pain, palpitations, orthopnea, claudication, leg swelling, fingers/toes turn blue, hypertension, hypotension, syncope, history of heart murmur, chest pain on exertion, chest pain at rest, pacemaker, few scattered varicosities, leg pain, sleep disturbances due to breathing, tachycardia, light-headedness, exercise intolerance and edema.   Gastrointestinal:  Negative for heartburn, nausea, vomiting, abdominal pain, diarrhea, constipation, blood in stool, melena, rectal pain, bloating, hemorrhoids, bowel incontinence, jaundice, rectal bleeding, coffee ground emesis and change in stool.   Genitourinary:  Negative for bladder incontinence, dysuria, urgency, hematuria, flank pain, vaginal discharge, difficulty urinating, genital sores, dyspareunia, decreased libido, nocturia, voiding less frequently, arousal difficulty, abnormal vaginal bleeding, excessive menstruation, menstrual changes, hot flashes, vaginal dryness and postmenopausal bleeding.   Musculoskeletal:  Positive for myalgias, back pain, joint swelling, arthralgias, stiffness, neck pain and bone pain. Negative for muscle cramps, muscle weakness and fracture.   Skin:  Negative for nail changes, itching, poor wound healing, rash, hair changes, skin changes, acne, warts, poor wound healing, scarring, flaky skin, Raynaud's phenomenon, sensitivity to sunlight and skin thickening.   Neurological:  Negative for dizziness, tingling, tremors, speech change, seizures, loss of  consciousness, weakness, light-headedness, numbness, headaches, disturbances in coordination, extremity numbness, memory loss, difficulty walking and paralysis.   Endo/Heme:  Negative for anemia, swollen glands and bruises/bleeds easily.   Psychiatric/Behavioral:  Negative for depression, hallucinations, memory loss, decreased concentration, mood swings and panic attacks.    Breast:  Negative for breast discharge, breast mass, breast pain and nipple retraction.   Endocrine:  Negative for altered temperature regulation, polyphagia, polydipsia, unwanted hair growth and change in facial hair.      PAST MEDICAL HISTORY:  Past Medical History:   Diagnosis Date     Allergic rhinitis due to other allergen      Apneas, obstructive sleep      Chronic lymphocytic thyroiditis      COPD (chronic obstructive pulmonary disease) (H)      Depressive disorder, not elsewhere classified      Disorder of bone and cartilage, unspecified      Esophageal reflux      Essential hypertension, benign      Generalized osteoarthrosis, unspecified site     knees     HASHIMOTO'S THYROIDITIS 11/15/2004     HASHIMOTO'S THYROIDITIS      Headache above the eye region      Hiatal hernia      Insomnia, unspecified      Moderate persistent asthma      Nasal congestion      Pure hyperglyceridemia      Scoliosis      Snoring      Tobacco use disorder        No flowsheet data found.    No flowsheet data found.    No flowsheet data found.    MEDICATIONS:   Current Outpatient Prescriptions   Medication Sig Dispense Refill     diclofenac (VOLTAREN-XR) 100 MG 24 hr tablet Take 1 tablet (100 mg) by mouth daily 30 tablet 3     buPROPion (WELLBUTRIN XL) 300 MG 24 hr tablet Take 1 tablet (300 mg) by mouth every morning 90 tablet PRN     zolpidem (AMBIEN) 10 MG tablet TAKE 1/2 TO 1 TABLET BY MOUTH NIGHTLY AS NEEDED 30 tablet 5     atorvastatin (LIPITOR) 10 MG tablet TAKE 1 TABLET (10 MG) BY MOUTH DAILY 90 tablet PRN     levothyroxine (SYNTHROID) 75 MCG tablet Take  1 tablet (75 mcg) by mouth every morning Overdue for labs 90 tablet 3     desvenlafaxine fumarate 100 MG 24 hr tablet Take 1 tablet (100 mg) by mouth daily 30 tablet PRN     albuterol (ALBUTEROL) 108 (90 BASE) MCG/ACT Inhaler Inhale 1-2 puffs into the lungs every 6 hours as needed for shortness of breath / dyspnea 1 Inhaler 1     diclofenac (VOLTAREN) 1 % GEL topical gel Apply 4 grams to knees or 2 grams to feet four times daily using enclosed dosing card. 100 g 3     fenofibrate 145 MG tablet Take 1 tablet (145 mg) by mouth daily 90 tablet 2     ranitidine (ZANTAC) 300 MG tablet Take 1 tablet (300 mg) by mouth 2 times daily 60 tablet prn     omeprazole (PRILOSEC) 20 MG CR capsule Take 1 capsule (20 mg) by mouth 2 times daily 180 capsule 1     SYMBICORT 160-4.5 MCG/ACT Inhaler Inhale 1 puff into the lungs 2 times daily       cyclobenzaprine (FLEXERIL) 5 MG tablet Take 1-2 tablets (5-10 mg) by mouth 3 times daily as needed for muscle spasms 180 tablet 1     UNABLE TO FIND MEDICATION NAME: Allergy shots       Ascorbic Acid (VITAMIN C PO) Take 1,000 mg by mouth daily       Calcium-Magnesium-Vitamin D (CALCIUM 500 PO) Take 1 tablet by mouth daily       Lutein 10 MG TABS Take 10 mg by mouth daily       triamcinolone (NASACORT AQ) 55 MCG/ACT nasal inhaler Inhale 2 sprays in both nostrils every day as needed 1 Inhaler 7     albuterol (2.5 MG/3ML) 0.083% nebulizer solution Take  by nebulization. take 3 mL by nebulization 4 times daily as needed 3 mL 12     Coenzyme Q10 (CO Q 10 PO) Take 200 mg by mouth daily        MIRAPEX 0.125 MG OR TABS Take two tabs at dinner and one at hs       ACETAMINOPHEN EXTRA STRENGTH OR Pt reports taking 650 MG arthritis       CLARITIN 10 MG OR TABS 1 TAB PO QD (Once per day) as needed for ALLERGY SYMPTOMS 0 0       ALLERGIES:   Allergies   Allergen Reactions     Animal Dander      Fluconazole      rash     Liquid Adhesive      Transdermal patch adhesive. Specifically to nicotine patch; not  "allergic to nicotine.      Seasonal Allergies      Suture      Non-healing suture line     Vistaril [Hydroxyzine Hcl]      Urinary retention       PHYSICAL EXAM:  /88  Pulse 89  Temp 98.7  F (37.1  C) (Oral)  Ht 1.651 m (5' 5\")  Wt 102.9 kg (226 lb 14.4 oz)  LMP  (LMP Unknown)  SpO2 95%  BMI 37.76 kg/m2   A & O x 3  HEENT: NCAT, mucous membranes moist  Respirations unlabored  Location of obesity: Central Obesity    ASSESSMENT:  Brandi is a patient with mature onset class II obesity with significant element of familial/genetic influence and with current health consequences. She does need aggressive weight loss plan due to hiatal hernia.    Improvement of symptoms from and treatment of hiatal hernia both start with weight loss.  Brandi understands this.          PLAN:    Adult medical weight management.        Orders Placed This Encounter   Procedures     BARIATRIC ADULT REFERRAL       RTC:    prn.    Sincerely,    Antony Sheehan MD  Surgery  [phone] (hospital )  [phone] (clinic nurses)                "

## 2018-02-01 NOTE — MR AVS SNAPSHOT
After Visit Summary   2/1/2018    Brandi Stern    MRN: [id number]           Patient Information     Date Of Birth          1955        Visit Information        Provider Department      2/1/2018 8:00 AM Antony Sheehan MD [facility]        Today's Diagnoses     Morbid obesity (H)    -  1      Care Instructions    It was a pleasure meeting with you today.     Thank you for allowing us the privilege of caring for you. We hope we provided you with the excellent service you deserve.     Please let us know if there is anything else we can do for you so that we can be sure you are leaving completely satisfied with your care experience.      You saw Dr Sheehan today.     Instructions per today's visit:     Please schedule clinic appointment with the Medical Weight Management Team today or call [phone], option #1.      To schedule appointments with our team, please call [phone] option #1    Please call during clinic hours Monday through Friday 8:00a - 4:00p if you have questions or you can contact us via [facility] at anytime.      Nurses: [phone] Option # 3 for nurse advice line.  Fax: [phone]  Surgery Scheduler: [phone]    Please call the hospital at [phone] to speak with our on call MDs if you have urgent needs after hours, during weekends, or holidays.              Follow-ups after your visit        Additional Services     BARIATRIC ADULT REFERRAL       Your provider has referred you to: [facility]: Medical Weight Loss Clinic [facility] [phone]. [email or web address]    Please schedule with Non-surgical weight management team    Please be aware that coverage of these services is subject to the terms and limitations of your health insurance plan.  Call member services at your health plan with any benefit or coverage questions.      Please bring the following with you to your appointment:       (1) List of current medications   (2) This referral request   (3) Any documents/labs given to you for this referral                  Your next 10 appointments already scheduled     Feb 05, 2018  1:00 PM CST   Return Visit with Ronda Lackey [facility]  [address]  [facility] [id number] [phone]            Feb 19, 2018  1:00 PM CST   Return Visit with Susanaizaiah Darya [facility]  [address]  [facility] [id number] [phone]              Who to contact     Please call your clinic at [phone] to:    Ask questions about your health    Make or cancel appointments    Discuss your medicines    Learn about your test results    Speak to your doctor   If you have compliments or concerns about an experience at your clinic, or if you wish to file a complaint, please contact [facility] Patient Relations at [phone] or email us at [email or web address]         Additional Information About Your Visit        [employer] Information     [employer] gives you secure access to your electronic health record. If you see a primary care provider, you can also send messages to your care team and make appointments. If you have questions, please call your primary care clinic.  If you do not have a primary care provider, please call [phone] and they will assist you.      [employer] is an electronic gateway that provides easy, online access to your medical records. With [employer], you can request a clinic appointment, read your test results, renew a prescription or communicate with your care team.     To access your existing account, please contact your [facility] or call [phone] for assistance.        Care EveryWhere ID     This is your Care EveryWhere ID. This could be used by  "other organizations to access your [place] medical records  [id number]        Your Vitals Were     Pulse Temperature Height Last Period Pulse Oximetry BMI (Body Mass Index)    89 98.7  F (37.1  C) (Oral) 1.651 m (5' 5\") (LMP Unknown) 95% 37.76 kg/m2       Blood Pressure from Last 3 Encounters:   02/01/18 118/88   12/14/17 119/76   10/31/17 124/84    Weight from Last 3 Encounters:   02/01/18 102.9 kg (226 lb 14.4 oz)   12/14/17 103.5 kg (228 lb 2 oz)   10/31/17 103.4 kg (228 lb)              We Performed the Following     BARIATRIC ADULT REFERRAL          Today's Medication Changes          These changes are accurate as of 2/1/18  9:33 AM.  If you have any questions, ask your nurse or doctor.               These medicines have changed or have updated prescriptions.        Dose/Directions    omeprazole 20 MG CR capsule   Commonly known as:  priLOSEC   This may have changed:  Another medication with the same name was removed. Continue taking this medication, and follow the directions you see here.   Used for:  Gastroesophageal reflux disease without esophagitis   Changed by:  Antony Sheehan MD        Dose:  20 mg   Take 1 capsule (20 mg) by mouth 2 times daily   Quantity:  180 capsule   Refills:  1         Stop taking these medicines if you haven't already. Please contact your care team if you have questions.     escitalopram 20 MG tablet   Commonly known as:  LEXAPRO   Stopped by:  Antony Sheehan MD           ferrous sulfate 325 (65 FE) MG tablet   Commonly known as:  IRON   Stopped by:  Antony Sheehan MD           FIBER PO   Stopped by:  Antony Sheehan MD           MELATONIN PO   Stopped by:  Antony Sheehan MD                    Primary Care Provider Office Phone # Fax #    Cherie Brennan [phone] [address] [facility] [address]        Equal Access to Services     OLRNE HALEY AH: Hadii georgie ku hadasho Soomaali, waaxda luqadaha, qaybta kaalmada " adrián armendarizjuan luis shaveraan ah. Leanne [facility] [phone].    ATENCIÓN: Si qila jazmin, tiene a rizvi disposición servicios gratuitos de asistencia lingüística. Alia al [phone].    We comply with applicable federal civil rights laws and [place] laws. We do not discriminate on the basis of race, color, national origin, age, disability, sex, sexual orientation, or gender identity.            Thank you!     Thank you for choosing [facility]  for your care. Our goal is always to provide you with excellent care. Hearing back from our patients is one way we can continue to improve our services. Please take a few minutes to complete the written survey that you may receive in the mail after your visit with us. Thank you!             Your Updated Medication List - Protect others around you: Learn how to safely use, store and throw away your medicines at www.disposemymeds.org.          This list is accurate as of 2/1/18  9:33 AM.  Always use your most recent med list.                   Brand Name Dispense Instructions for use Diagnosis    ACETAMINOPHEN EXTRA STRENGTH PO      Pt reports taking 650 MG arthritis        * albuterol (2.5 MG/3ML) 0.083% neb solution     3 mL    Take  by nebulization. take 3 mL by nebulization 4 times daily as needed    Moderate persistent asthma       * albuterol 108 (90 BASE) MCG/ACT Inhaler    PROAIR HFA    1 Inhaler    Inhale 1-2 puffs into the lungs every 6 hours as needed for shortness of breath / dyspnea    Moderate persistent asthma with exacerbation       atorvastatin 10 MG tablet    LIPITOR    90 tablet    TAKE 1 TABLET (10 MG) BY MOUTH DAILY    Hyperlipidemia LDL goal <130       buPROPion 300 MG 24 hr tablet    WELLBUTRIN XL    90 tablet    Take 1 tablet (300 mg) by mouth every morning    Major depressive disorder, recurrent episode, moderate (H)       CALCIUM 500 PO      Take 1 tablet by mouth daily        CLARITIN 10 MG tablet   Generic drug:   loratadine     0    1 TAB PO QD (Once per day) as needed for ALLERGY SYMPTOMS    Allergic rhinitis due to other allergen       CO Q 10 PO      Take 200 mg by mouth daily        cyclobenzaprine 5 MG tablet    FLEXERIL    180 tablet    Take 1-2 tablets (5-10 mg) by mouth 3 times daily as needed for muscle spasms    Spasm of back muscles       desvenlafaxine fumarate 100 MG 24 hr tablet     30 tablet    Take 1 tablet (100 mg) by mouth daily    Major depressive disorder, recurrent episode, moderate (H)       diclofenac 1 % Gel topical gel    VOLTAREN    100 g    Apply 4 grams to knees or 2 grams to feet four times daily using enclosed dosing card.    Right elbow pain       diclofenac 100 MG 24 hr tablet    VOLTAREN-XR    30 tablet    Take 1 tablet (100 mg) by mouth daily    DDD (degenerative disc disease), cervical, DDD (degenerative disc disease), lumbar       fenofibrate 145 MG tablet     90 tablet    Take 1 tablet (145 mg) by mouth daily    Pure hyperglyceridemia       levothyroxine 75 MCG tablet    SYNTHROID    90 tablet    Take 1 tablet (75 mcg) by mouth every morning Overdue for labs    Chronic lymphocytic thyroiditis       Lutein 10 MG Tabs      Take 10 mg by mouth daily        MIRAPEX 0.125 MG tablet   Generic drug:  pramipexole      Take two tabs at dinner and one at hs        omeprazole 20 MG CR capsule    priLOSEC    180 capsule    Take 1 capsule (20 mg) by mouth 2 times daily    Gastroesophageal reflux disease without esophagitis       ranitidine 300 MG tablet    ZANTAC    60 tablet    Take 1 tablet (300 mg) by mouth 2 times daily    Gastroesophageal reflux disease without esophagitis       SYMBICORT 160-4.5 MCG/ACT Inhaler   Generic drug:  budesonide-formoterol      Inhale 1 puff into the lungs 2 times daily    Moderate persistent asthma without complication       triamcinolone 55 MCG/ACT nasal inhaler    NASACORT AQ    1 Inhaler    Inhale 2 sprays in both nostrils every day as needed    Allergic rhinitis  due to other allergen       UNABLE TO FIND      MEDICATION NAME: Allergy shots        VITAMIN C PO      Take 1,000 mg by mouth daily        zolpidem 10 MG tablet    AMBIEN    30 tablet    TAKE 1/2 TO 1 TABLET BY MOUTH NIGHTLY AS NEEDED    Insomnia, unspecified type       * Notice:  This list has 2 medication(s) that are the same as other medications prescribed for you. Read the directions carefully, and ask your doctor or other care provider to review them with you.

## 2018-02-05 ENCOUNTER — OFFICE VISIT (OUTPATIENT)
Dept: PSYCHOLOGY | Facility: CLINIC | Age: 63
End: 2018-02-05
Payer: COMMERCIAL

## 2018-02-05 DIAGNOSIS — F33.0 MAJOR DEPRESSIVE DISORDER, RECURRENT EPISODE, MILD WITH ANXIOUS DISTRESS (H): Primary | ICD-10-CM

## 2018-02-05 PROCEDURE — 90834 PSYTX W PT 45 MINUTES: CPT | Performed by: COUNSELOR

## 2018-02-05 NOTE — PROGRESS NOTES
Progress Note    Client Name: Brandi Stern  Date: 2/5/2018         Service Type: Individual      Session Start Time: 1:05p  Session End Time: 1:45p      Session Length: 40 minutes     Session #: 17     Attendees: Client attended alone    Treatment Plan Last Reviewed: 2/5/2018  PHQ-9: 4       DATA      Progress Since Last Session (Related to Symptoms / Goals / Homework):   Symptoms: Stable, see Epic for PHQ 9 update    Homework: Partially completed - client will continue with not smoking. By next appt, client will engage in some behavior change to address diet/health concerns and paint hallway.      Episode of Care Goals: Some progress - PREPARATION (Decided to change - considering how); Intervened by negotiating a change plan and determining options / strategies for behavior change, identifying triggers, exploring social supports, and working towards setting a date to begin behavior change     Current / Ongoing Stressors and Concerns:  Ongoing passivity and ambivalence about change re: housework, finances, health, but expressed feeling more positive overall; reported learning that she enjoys her alone time and feels she can balance self-care/leisure with priorities; ongoing changes with mother, but in the past week able to put things into perspective; reported a few slip ups with her attempt to quit smoking, but got back on track with some stretches of not smoking.        Treatment Objective(s) Addressed in This Session:     Client will learn 3 new skills to cope with stress other than smoking. Client will organize/clean her home within 12 sessions.      Intervention:   Motivational Interviewing: continue to evoke desire and readiness to change, pointing out discrepancies, support self-efficacy; CBT: prompting client to identify mental health needs and maladaptive behaviors that she would like to change, teaching assertiveness skills and emotion identification and  "regulation, teach self-reflection to identify mental health needs and to help with decision making and effective coping, identify intentional behavior changes that are contributing to improved mood, teach identifying triggers or warning signs for increasing depression, teach about energy activation (20 second strategy); DBT: teach and reinforce emotion regulation, reinforce radical acceptance; Interpersonal therapy: continue to prompt client to engage in emotional deepening to address core issues        ASSESSMENT: Current Emotional / Mental Status (status of significant symptoms):   Client has had a history of suicidal ideation: passive thoughts of dealth, \"Is there pain in the afterlife?\", 1 suicide attempt: in college after breakup with boyfriend,  was drinking and ingested pills, no hospitalization, would never really hurt self   Client denies current fears or concerns for personal safety.   Client reports the following current or recent suicidal ideation or behaviors: passive thoughts of dealth, \"Is there pain in the afterlife?\". Would never really hurt self.   Client denies current or recent homicidal ideation or behaviors.   Client denies current or recent self injurious behavior or ideation.   Client denies other safety concerns.   Client reports there are no firearms in the house.   A safety and risk management plan has not been developed at this time, however client was given the after-hours number / 911 should there be a change in any of  these risk factors.     Appearance:   Appropriate    Eye Contact:   Fair    Psychomotor Behavior: Normal    Attitude:   Cooperative    Orientation:   All   Speech    Rate / Production: Normal     Volume:  Normal    Mood:    Normal    Affect:    Appropriate     Thought Content:  Clear    Thought Form:  Coherent  Circumstantial Tangential    Insight:    Fair     Medication Review:   No change      Medication Compliance:   Yes     Changes in Health Issues:   None " reported     Chemical Use Review:   Substance Use: Chemical use reviewed, no active concerns identified     Tobacco Use: Some slip ups and some stretches of not smoking     Collateral Reports Completed:   Not Applicable      PLAN: (Client Tasks / Therapist Tasks / Other)  Client will set limits with work by saying no and taking less patients within the next month; client will organize/clean her home within 12 sessions. Client will learn 3 new skills to cope with stress other smoking or decide if she wants to quit or not quit smoking; client will work on noting and attending to vulnerabilities of feeling emotional/sensative/teary eyed.Therapist will continue to use motivational interviewing to evoke change talk and CBT strategies to engage client in proactive problem solving, practice distress tolerance as it relates to work and interpersonal relationships, and teach emotion identification and regulation. Continue to reinforce boudanry setting. Therapist will also continue to build on client's strengths and areas of her life where she feels she is making progress. Teach and reinforce energy activation.         Ronda Lackey, [facility]                                                       ___________________________________________________________________    Treatment Plan    Client's Name: Brandi Stern  YOB: 1955    Date: 10/20/2017    DSM-V Diagnoses: UPDATE 296.31 (F33.0) Major Depressive Disorder, Recurrent Episode, Mild _ and With anxious distress  Psychosocial / Contextual Factors: Work stress, health concerns, family stress  WHODAS: 29    Referral / Collaboration:  Referral to another professional/service is not indicated at this time.    Anticipated number of session or this episode of care: 12      MeasurableTreatment Goal(s) related to diagnosis / functional impairment(s)  Goal 1: Client will improve depressed mood as evidenced by decreased score on PHQ 9 from 19 (moderately severe) to score range  of 10-14 (moderate).    I will know I've met my goal when I feel more accomplished, in control, house looks better, and I would not be smoking.      Objective #A (Client Action)    Client will set limits with work by saying no and taking less patients within the next month.  Status: Continued - Date: 10/20/2017     Intervention(s)  Therapist will role-play assertiveness and conflict management skills.  ...teach about healthy boundaries.    Objective #B  Client will organize/clean her home within 12 sessions.   Status: Continued - Date: 10/20/2017     Intervention(s)  Therapist will teach emotional regulation skills and developing routine.    Objective #C  Client will learn 3 new skills to cope with stress other than smoking.   Status: Continued - Date: 10/20/2017     Intervention(s)  Therapist will assign homework of practicing skills at home.  ...teach self care, distraction, and distress tolerance skills.      Client has reviewed and agreed to the above plan.      Ronda Lackey [facility]  October 20, 2017

## 2018-02-05 NOTE — MR AVS SNAPSHOT
MRN:[id number]                      After Visit Summary   2/5/2018    Brandi Stern    MRN: [id number]           Visit Information        Provider Department      2/5/2018 1:00 PM Ronda Lackey [facility]      Your next 10 appointments already scheduled     Feb 13, 2018 10:30 AM CST   (Arrive by 10:15 AM)   New Patient Visit with Robbie Flores MD   [facility] ([facility] [place])    [address] [facility] [id number] [phone]            Feb 19, 2018  1:00 PM CST   Return Visit with Ronda Lackey [facility] ([facility])    [facility]  [address] [id number] [phone]              MyChart Information     [employer] gives you secure access to your electronic health record. If you see a primary care provider, you can also send messages to your care team and make appointments. If you have questions, please call your primary care clinic.  If you do not have a primary care provider, please call [phone] and they will assist you.        Care EveryWhere ID     This is your Care EveryWhere ID. This could be used by other organizations to access your [place] medical records  [id number]        Equal Access to Services     LORNE HALEY : Keyur holt Sohéctor, waaxda luqadaha, qaybta kaalmada adejuan luisyada, adrián perkins. So [facility] [phone].    ATENCIÓN: Si habla español, tiene a rizvi disposición servicios gratuitos de asistencia lingüística. Llame al [phone].    We comply with applicable federal civil rights laws and [place] laws. We do not discriminate on the basis of race, color, national origin, age, disability, sex, sexual orientation, or gender identity.

## 2018-02-06 ASSESSMENT — PATIENT HEALTH QUESTIONNAIRE - PHQ9: SUM OF ALL RESPONSES TO PHQ QUESTIONS 1-9: 4

## 2018-02-12 ENCOUNTER — MYC MEDICAL ADVICE (OUTPATIENT)
Dept: FAMILY MEDICINE | Facility: CLINIC | Age: 63
End: 2018-02-12

## 2018-02-12 DIAGNOSIS — F33.1 MAJOR DEPRESSIVE DISORDER, RECURRENT EPISODE, MODERATE (H): ICD-10-CM

## 2018-02-12 RX ORDER — BUPROPION HYDROCHLORIDE 150 MG/1
150 TABLET ORAL EVERY MORNING
Qty: 14 TABLET | Refills: 0 | Status: SHIPPED | OUTPATIENT
Start: 2018-02-12 | End: 2018-03-21 | Stop reason: SINTOL

## 2018-02-19 ENCOUNTER — OFFICE VISIT (OUTPATIENT)
Dept: PSYCHOLOGY | Facility: CLINIC | Age: 63
End: 2018-02-19
Payer: COMMERCIAL

## 2018-02-19 DIAGNOSIS — F33.0 MAJOR DEPRESSIVE DISORDER, RECURRENT EPISODE, MILD WITH ANXIOUS DISTRESS (H): Primary | ICD-10-CM

## 2018-02-19 PROCEDURE — 90834 PSYTX W PT 45 MINUTES: CPT | Performed by: COUNSELOR

## 2018-02-19 NOTE — MR AVS SNAPSHOT
MRN:[id number]                      After Visit Summary   2/19/2018    Brandi Stern    MRN: [id number]           Visit Information        Provider Department      2/19/2018 1:00 PM Ronda Lackey [facility]      Your next 10 appointments already scheduled     Mar 05, 2018  2:30 PM CST   Return Visit with Ronda Lackey [facility] ([facility])    [facility]  [address]  [facility] [id number] [phone]            Mar 19, 2018  1:00 PM CDT   Return Visit with Ronda Lackey [facility] ([facility])    [facility]  [address]  [address]  [facility] [phone]              MyChart Information     [employer] gives you secure access to your electronic health record. If you see a primary care provider, you can also send messages to your care team and make appointments. If you have questions, please call your primary care clinic.  If you do not have a primary care provider, please call [phone] and they will assist you.        Care EveryWhere ID     This is your Care EveryWhere ID. This could be used by other organizations to access your [place] medical records  [id number]        Equal Access to Services     LORNE HALEY AH: Keyur holt Sohéctor, wahelenada lukassiadaha, qaybta kaalmada adestone, adrián perkins. So [facility] [phone].    ATENCIÓN: Si habla español, tiene a rizvi disposición servicios gratuitos de asistencia lingüística. Llame al [phone].    We comply with applicable federal civil rights laws and [place] laws. We do not discriminate on the basis of race, color, national origin, age, disability, sex, sexual orientation, or gender identity.

## 2018-02-19 NOTE — PROGRESS NOTES
Progress Note    Client Name: Brandi Stern  Date: 2/19/2018         Service Type: Individual      Session Start Time: 1:00p  Session End Time: 1:45p      Session Length: 45 minutes     Session #: 18     Attendees: Client attended alone    Treatment Plan Last Reviewed: 2/19/2018  PHQ-9: 4       DATA      Progress Since Last Session (Related to Symptoms / Goals / Homework):   Symptoms: Stable, see Epic for PHQ 9 update    Homework: Not completed and continued - client will engage in some behavior change to address diet/health concerns and paint hallway. She will also ask sister for help with painting.      Episode of Care Goals: Some progress - PREPARATION (Decided to change - considering how); Intervened by negotiating a change plan and determining options / strategies for behavior change, identifying triggers, exploring social supports, and working towards setting a date to begin behavior change     Current / Ongoing Stressors and Concerns:  Reported experiencing malaise and multiple stressors (work, mom) in the past 2 weeks and being unproductive as a result; acknowledged she can be self-indulgent at times and struggles to problem solving for herself; also contributed slow progress to chronic pain - not sure if she wants to live for another 25 years because pain is difficult; role played with client (if you had a patient who was struggling with your issues, how would you support them) and she expressed wanting to highlight her strengths, let herself know that she is intelligent, and challenge her self-indulgence; inquired if perhaps she does not have self-respect; reported slip up with buying pot and smoking again.      Treatment Objective(s) Addressed in This Session:     Client will learn 3 new skills to cope with stress other than smoking. Client will organize/clean her home within 12 sessions.      Intervention:   Motivational Interviewing: continue to evoke desire  "and readiness to change, pointing out discrepancies, support self-efficacy; CBT: identify mental health needs and maladaptive behaviors that she would like to change, teach about internal locus of control, teach assertiveness skills and emotion identification and regulation, teach self-reflection to identify mental health needs and to help with decision making and effective coping, identify intentional behavior changes that are contributing to improved mood, teach identifying triggers or warning signs for increasing depression, reinforce information on energy activation (20 second strategy); DBT: teach and reinforce emotion regulation, reinforce radical acceptance; Interpersonal therapy: continue to prompt client to engage in emotional deepening to address core issues, role play for insight development         ASSESSMENT: Current Emotional / Mental Status (status of significant symptoms):   Client has had a history of suicidal ideation: passive thoughts of dealth, \"Is there pain in the afterlife?\", 1 suicide attempt: in college after breakup with boyfriend,  was drinking and ingested pills, no hospitalization, would never really hurt self   Client denies current fears or concerns for personal safety.   Client reports the following current or recent suicidal ideation or behaviors: passive thoughts of dealth, \"Is there pain in the afterlife?\". Would never really hurt self.   Client denies current or recent homicidal ideation or behaviors.   Client denies current or recent self injurious behavior or ideation.   Client denies other safety concerns.   Client reports there are no firearms in the house.   A safety and risk management plan has not been developed at this time, however client was given the after-hours number / 911 should there be a change in any of  these risk factors.     Appearance:   Appropriate    Eye Contact:   Fair    Psychomotor Behavior: Normal    Attitude:   Cooperative "    Orientation:   All   Speech    Rate / Production: Normal     Volume:  Normal    Mood:    Depressed  Teary eyed    Affect:    Appropriate  Restricted   Thought Content:  Clear    Thought Form:  Coherent  Circumstantial Tangential    Insight:    Fair     Medication Review:   See Epic for updates     Medication Compliance:   Yes     Changes in Health Issues:   None reported     Chemical Use Review:   Substance Use: Chemical use reviewed, no active concerns identified     Tobacco Use: Some slip ups in the past 2 weeks.      Collateral Reports Completed:   Not Applicable      PLAN: (Client Tasks / Therapist Tasks / Other)  Client will set limits with work by saying no and taking less patients within the next month; client will organize/clean her home within 12 sessions. Client will learn 3 new skills to cope with stress other smoking or decide if she wants to quit or not quit smoking; client will work on noting and attending to vulnerabilities of feeling emotional/sensative/teary eyed.Therapist will continue to use motivational interviewing to evoke change talk and CBT strategies to engage client in proactive problem solving, practice distress tolerance as it relates to work and interpersonal relationships, and teach emotion identification and regulation. Continue to reinforce boudanry setting. Therapist will also continue to build on client's strengths and areas of her life where she feels she is making progress. Reinforce energy activation and challenging maladaptive behaviors.          Ronda Lackey [facility]                                                       ___________________________________________________________________    Treatment Plan    Client's Name: Brandi Stern  YOB: 1955    Date: 10/20/2017    DSM-V Diagnoses: UPDATE 296.31 (F33.0) Major Depressive Disorder, Recurrent Episode, Mild _ and With anxious distress  Psychosocial / Contextual Factors: Work stress, health concerns, family  stress  WHODAS: 29    Referral / Collaboration:  Referral to another professional/service is not indicated at this time.    Anticipated number of session or this episode of care: 12      MeasurableTreatment Goal(s) related to diagnosis / functional impairment(s)  Goal 1: Client will improve depressed mood as evidenced by decreased score on PHQ 9 from 19 (moderately severe) to score range of 10-14 (moderate).    I will know I've met my goal when I feel more accomplished, in control, house looks better, and I would not be smoking.      Objective #A (Client Action)    Client will set limits with work by saying no and taking less patients within the next month.  Status: Continued - Date: 10/20/2017     Intervention(s)  Therapist will role-play assertiveness and conflict management skills.  ...teach about healthy boundaries.    Objective #B  Client will organize/clean her home within 12 sessions.   Status: Continued - Date: 10/20/2017     Intervention(s)  Therapist will teach emotional regulation skills and developing routine.    Objective #C  Client will learn 3 new skills to cope with stress other than smoking.   Status: Continued - Date: 10/20/2017     Intervention(s)  Therapist will assign homework of practicing skills at home.  ...teach self care, distraction, and distress tolerance skills.      Client has reviewed and agreed to the above plan.      ELISE Sheikh  October 20, 2017

## 2018-02-20 ENCOUNTER — OFFICE VISIT (OUTPATIENT)
Dept: ENDOCRINOLOGY | Facility: CLINIC | Age: 63
End: 2018-02-20
Payer: COMMERCIAL

## 2018-02-20 VITALS
SYSTOLIC BLOOD PRESSURE: 120 MMHG | OXYGEN SATURATION: 93 % | HEIGHT: 65 IN | HEART RATE: 95 BPM | BODY MASS INDEX: 37.87 KG/M2 | DIASTOLIC BLOOD PRESSURE: 89 MMHG | WEIGHT: 227.3 LBS

## 2018-02-20 RX ORDER — NALTREXONE HYDROCHLORIDE 50 MG/1
TABLET, FILM COATED ORAL
Qty: 60 TABLET | Refills: 2 | Status: SHIPPED | OUTPATIENT
Start: 2018-02-20 | End: 2018-03-21 | Stop reason: SINTOL

## 2018-02-20 ASSESSMENT — ENCOUNTER SYMPTOMS
INCREASED ENERGY: 1
SINUS PAIN: 1
SMELL DISTURBANCE: 0
NECK PAIN: 1
ALTERED TEMPERATURE REGULATION: 1
MYALGIAS: 1
ORTHOPNEA: 0
NECK MASS: 0
WEIGHT GAIN: 0
WHEEZING: 1
DYSPNEA ON EXERTION: 1
HALLUCINATIONS: 0
CHILLS: 0
SLEEP DISTURBANCES DUE TO BREATHING: 0
TROUBLE SWALLOWING: 0
STIFFNESS: 1
FATIGUE: 1
SINUS CONGESTION: 1
SORE THROAT: 0
SYNCOPE: 0
LIGHT-HEADEDNESS: 0
HYPERTENSION: 0
HOARSE VOICE: 0
COUGH DISTURBING SLEEP: 0
HEMOPTYSIS: 0
MUSCLE WEAKNESS: 0
DECREASED APPETITE: 0
JOINT SWELLING: 0
EXERCISE INTOLERANCE: 0
POLYPHAGIA: 0
COUGH: 1
NIGHT SWEATS: 0
BACK PAIN: 1
LEG PAIN: 1
SPUTUM PRODUCTION: 1
WEIGHT LOSS: 0
SNORES LOUDLY: 0
MUSCLE CRAMPS: 0
HYPOTENSION: 0
SHORTNESS OF BREATH: 1
POLYDIPSIA: 0
ARTHRALGIAS: 1
PALPITATIONS: 0
FEVER: 0
TASTE DISTURBANCE: 1
POSTURAL DYSPNEA: 0
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
## 2018-02-20 NOTE — PATIENT INSTRUCTIONS
- please try naltrexone 25 mg daily and try to up to 50 mg daily before the worst craving  - see Nuris Ramírez in 2 months and see me in 4 months    If you have any questions, please do not hesitate to call Weight management clinic at [phone] or [phone].    Sincerely,    Robbie Flores MD  Endocrinology
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
## 2018-02-20 NOTE — NURSING NOTE
"Chief Complaint   Patient presents with     Weight Problem     NMWM       Vitals:    02/20/18 1102   BP: 120/89   Pulse: 95   SpO2: 93%   Weight: 227 lb 4.8 oz   Height: 5' 5\"       Body mass index is 37.82 kg/(m^2).  Antonina Huerta CMA                          "

## 2018-02-20 NOTE — MR AVS SNAPSHOT
After Visit Summary   2/20/2018    Brandi Stern    MRN: [id number]           Patient Information     Date Of Birth          1955        Visit Information        Provider Department      2/20/2018 10:30 AM Robbie Flores MD [facility]        Today's Diagnoses     Class 2 obesity due to excess calories with serious comorbidity and body mass index (BMI) of 37.0 to 37.9 in adult    -  1      Care Instructions    - please try naltrexone 25 mg daily and try to up to 50 mg daily before the worst craving  - see Nuris Ramírez in 2 months and see me in 4 months    If you have any questions, please do not hesitate to call Weight management clinic at [phone] or [phone].    Sincerely,    Robbie Flores MD  Endocrinology            Follow-ups after your visit        Your next 10 appointments already scheduled     Mar 05, 2018  2:30 PM CST   Return Visit with Ronda Lackey [facility] ([facility])    [address] [address]   [phone]            Mar 19, 2018  1:00 PM CDT   Return Visit with Ronda Lackey [facility] ([place] [address] [address] [id number] [phone]            Apr 20, 2018 10:30 AM CDT   (Arrive by 10:15 AM)   Return Weight Management Visit with ERIN Rushing [facility] ([facility])    [address] [place] [id number] [phone]            Jun 26, 2018 11:15 AM CDT   (Arrive by 11:00 AM)   Return Visit with MD KARRI Weathers [facility] Management ([facility])    [address] [place] [id number] [phone]              Who to contact     Please call your clinic at [phone] to:    Ask questions  "about your health    Make or cancel appointments    Discuss your medicines    Learn about your test results    Speak to your doctor            Additional Information About Your Visit        Channel [employer] Information     [facility] gives you secure access to your electronic health record. If you see a primary care provider, you can also send messages to your care team and make appointments. If you have questions, please call your primary care clinic.  If you do not have a primary care provider, please call [phone] and they will assist you.      [facility] is an electronic gateway that provides easy, online access to your medical records. With [facility], you can request a clinic appointment, read your test results, renew a prescription or communicate with your care team.     To access your existing account, please contact your [facility] or call [phone] for assistance.        Care EveryWhere ID     This is your Care EveryWhere ID. This could be used by other organizations to access your [facility] medical records  [id number]        Your Vitals Were     Pulse Height Last Period Pulse Oximetry BMI (Body Mass Index)       95 1.651 m (5' 5\") (LMP Unknown) 93% 37.82 kg/m2        Blood Pressure from Last 3 Encounters:   02/20/18 120/89   02/01/18 118/88   12/14/17 119/76    Weight from Last 3 Encounters:   02/20/18 103.1 kg (227 lb 4.8 oz)   02/01/18 102.9 kg (226 lb 14.4 oz)   12/14/17 103.5 kg (228 lb 2 oz)              Today, you had the following     No orders found for display         Today's Medication Changes          These changes are accurate as of 2/20/18  4:41 PM.  If you have any questions, ask your nurse or doctor.               Start taking these medicines.        Dose/Directions    naltrexone 50 MG tablet   Commonly known as:  DEPADE;REVIA   Used for:  Class 2 obesity due to excess calories with serious comorbidity and body mass index (BMI) of 37.0 to 37.9 in adult   Started by:  " Robbie Flores MD        Take 1/2 tablet.  Time it one to two hours prior to worst cravings.  Then increase to one full tablet as instructed.   Quantity:  60 tablet   Refills:  2            Where to get your medicines      These medications were sent to [facility]/pharmacy #[id number] - [place], [place] - [address] [facility]  [address], [place] [address]     Phone:  [phone]     naltrexone 50 MG tablet                Primary Care Provider Office Phone # Fax #    Cherie KARRI Brennan [phone] [address] [place]  [facility] [address]        Equal Access to Services     [facility]: Hadii aad ku hadasho Sohéctor, waaxda luqadaha, qaybta kaalmada adeegyada, adrián rodriguez . So [facility] [phone].    ATENCIÓN: Si habla español, tiene a rizvi disposición servicios gratuitos de asistencia lingüística. [facility] [phone].    We comply with applicable federal civil rights laws and [place] laws. We do not discriminate on the basis of race, color, national origin, age, disability, sex, sexual orientation, or gender identity.            Thank you!     Thank you for choosing [facility] MEDICAL WEIGHT MANAGEMENT  for your care. Our goal is always to provide you with excellent care. Hearing back from our patients is one way we can continue to improve our services. Please take a few minutes to complete the written survey that you may receive in the mail after your visit with us. Thank you!             Your Updated Medication List - Protect others around you: Learn how to safely use, store and throw away your medicines at www.disposemymeds.org.          This list is accurate as of 2/20/18  4:41 PM.  Always use your most recent med list.                   Brand Name Dispense Instructions for use Diagnosis    ACETAMINOPHEN EXTRA STRENGTH PO      Pt reports taking 650 MG arthritis        * albuterol (2.5 MG/3ML) 0.083% neb solution     3 mL    Take  by nebulization. take 3 mL by  nebulization 4 times daily as needed    Moderate persistent asthma       * albuterol 108 (90 BASE) MCG/ACT Inhaler    PROAIR HFA    1 Inhaler    Inhale 1-2 puffs into the lungs every 6 hours as needed for shortness of breath / dyspnea    Moderate persistent asthma with exacerbation       atorvastatin 10 MG tablet    LIPITOR    90 tablet    TAKE 1 TABLET (10 MG) BY MOUTH DAILY    Hyperlipidemia LDL goal <130       buPROPion 150 MG 24 hr tablet    WELLBUTRIN XL    14 tablet    Take 1 tablet (150 mg) by mouth every morning    Major depressive disorder, recurrent episode, moderate (H)       CALCIUM 500 PO      Take 1 tablet by mouth daily        CLARITIN 10 MG tablet   Generic drug:  loratadine     0    1 TAB PO QD (Once per day) as needed for ALLERGY SYMPTOMS    Allergic rhinitis due to other allergen       CO Q 10 PO      Take 200 mg by mouth daily        cyclobenzaprine 5 MG tablet    FLEXERIL    180 tablet    Take 1-2 tablets (5-10 mg) by mouth 3 times daily as needed for muscle spasms    Spasm of back muscles       desvenlafaxine fumarate 100 MG 24 hr tablet     30 tablet    Take 1 tablet (100 mg) by mouth daily    Major depressive disorder, recurrent episode, moderate (H)       diclofenac 1 % Gel topical gel    VOLTAREN    100 g    Apply 4 grams to knees or 2 grams to feet four times daily using enclosed dosing card.    Right elbow pain       diclofenac 100 MG 24 hr tablet    VOLTAREN-XR    30 tablet    Take 1 tablet (100 mg) by mouth daily    DDD (degenerative disc disease), cervical, DDD (degenerative disc disease), lumbar       fenofibrate 145 MG tablet     90 tablet    TAKE 1 TABLET BY MOUTH DAILY    Pure hyperglyceridemia       levothyroxine 75 MCG tablet    SYNTHROID    90 tablet    Take 1 tablet (75 mcg) by mouth every morning Overdue for labs    Chronic lymphocytic thyroiditis       Lutein 10 MG Tabs      Take 10 mg by mouth daily        MIRAPEX 0.125 MG tablet   Generic drug:  pramipexole      Take two  tabs at dinner and one at hs        naltrexone 50 MG tablet    DEPADE;REVIA    60 tablet    Take 1/2 tablet.  Time it one to two hours prior to worst cravings.  Then increase to one full tablet as instructed.    Class 2 obesity due to excess calories with serious comorbidity and body mass index (BMI) of 37.0 to 37.9 in adult       omeprazole 20 MG CR capsule    priLOSEC    180 capsule    Take 1 capsule (20 mg) by mouth 2 times daily    Gastroesophageal reflux disease without esophagitis       ranitidine 300 MG tablet    ZANTAC    60 tablet    Take 1 tablet (300 mg) by mouth 2 times daily    Gastroesophageal reflux disease without esophagitis       SYMBICORT 160-4.5 MCG/ACT Inhaler   Generic drug:  budesonide-formoterol      Inhale 1 puff into the lungs 2 times daily    Moderate persistent asthma without complication       triamcinolone 55 MCG/ACT nasal inhaler    NASACORT AQ    1 Inhaler    Inhale 2 sprays in both nostrils every day as needed    Allergic rhinitis due to other allergen       UNABLE TO FIND      MEDICATION NAME: Allergy shots        VITAMIN C PO      Take 1,000 mg by mouth daily        zolpidem 10 MG tablet    AMBIEN    30 tablet    TAKE 1/2 TO 1 TABLET BY MOUTH NIGHTLY AS NEEDED    Insomnia, unspecified type       * Notice:  This list has 2 medication(s) that are the same as other medications prescribed for you. Read the directions carefully, and ask your doctor or other care provider to review them with you.

## 2018-03-03 ENCOUNTER — HEALTH MAINTENANCE LETTER (OUTPATIENT)
Age: 63
End: 2018-03-03

## 2018-03-14 ENCOUNTER — MYC MEDICAL ADVICE (OUTPATIENT)
Dept: FAMILY MEDICINE | Facility: CLINIC | Age: 63
End: 2018-03-14

## 2018-03-21 ENCOUNTER — TELEPHONE (OUTPATIENT)
Dept: PALLIATIVE MEDICINE | Facility: CLINIC | Age: 63
End: 2018-03-21

## 2018-03-21 ENCOUNTER — OFFICE VISIT (OUTPATIENT)
Dept: FAMILY MEDICINE | Facility: CLINIC | Age: 63
End: 2018-03-21
Payer: COMMERCIAL

## 2018-03-21 VITALS
DIASTOLIC BLOOD PRESSURE: 84 MMHG | TEMPERATURE: 98.9 F | RESPIRATION RATE: 20 BRPM | WEIGHT: 225 LBS | HEART RATE: 100 BPM | SYSTOLIC BLOOD PRESSURE: 132 MMHG | OXYGEN SATURATION: 97 % | BODY MASS INDEX: 37.49 KG/M2 | HEIGHT: 65 IN

## 2018-03-21 DIAGNOSIS — M50.30 DDD (DEGENERATIVE DISC DISEASE), CERVICAL: ICD-10-CM

## 2018-03-21 DIAGNOSIS — M51.369 DDD (DEGENERATIVE DISC DISEASE), LUMBAR: ICD-10-CM

## 2018-03-21 DIAGNOSIS — R53.81 MALAISE: ICD-10-CM

## 2018-03-21 DIAGNOSIS — F33.41 DEPRESSION, MAJOR, RECURRENT, IN PARTIAL REMISSION (H): Primary | ICD-10-CM

## 2018-03-21 DIAGNOSIS — E78.5 HYPERLIPIDEMIA LDL GOAL <130: ICD-10-CM

## 2018-03-21 LAB
CHOLEST SERPL-MCNC: 180 MG/DL
HDLC SERPL-MCNC: 49 MG/DL
LDLC SERPL CALC-MCNC: 76 MG/DL
NONHDLC SERPL-MCNC: 131 MG/DL
TRIGL SERPL-MCNC: 275 MG/DL

## 2018-03-21 PROCEDURE — 80061 LIPID PANEL: CPT | Performed by: NURSE PRACTITIONER

## 2018-03-21 PROCEDURE — 99213 OFFICE O/P EST LOW 20 MIN: CPT | Performed by: NURSE PRACTITIONER

## 2018-03-21 PROCEDURE — 36415 COLL VENOUS BLD VENIPUNCTURE: CPT | Performed by: NURSE PRACTITIONER

## 2018-03-21 RX ORDER — DICLOFENAC SODIUM 100 MG/1
100 TABLET, FILM COATED, EXTENDED RELEASE ORAL DAILY
Qty: 30 TABLET | Refills: 3 | Status: CANCELLED | OUTPATIENT
Start: 2018-03-21

## 2018-03-21 NOTE — PROGRESS NOTES
"  SUBJECTIVE:   Brandi Stern is a 62 year old female who presents to clinic today for the following health issues:      Review medications. Discuss ssri's.     Wellbutrin made her feel drowsy and felt \"awful\".  She was also on Naltrexone (for appetite suppression) at the same time and had a lot of malaise.  She stopped Wellbutrin 2 weeks ago. Just recently stopped Naltrexone.   Starting to feel better.  She had been wondering if she should change Pristiq and try a different SSRI.  She has been on Celexa, Lexapro, Cymbalta in the past (in addition to Pristiq and Wellbutrin).  She did have Genesight testing a year ago.     She feels her mood is good, her main complaint was the additional fatigue and malaise in the afternoon.         Problem list and histories reviewed & adjusted, as indicated.  Additional history: as documented        Reviewed and updated as needed this visit by clinical staff  Allergies       Reviewed and updated as needed this visit by Provider         ROS:  CONSTITUTIONAL: NEGATIVE for fever, chills, change in weight  ENT/MOUTH: NEGATIVE for ear, mouth and throat problems  RESP: NEGATIVE for significant cough or SOB  CV: NEGATIVE for chest pain, palpitations or peripheral edema  MUSCULOSKELETAL: chronic   PSYCHIATRIC: see HPI    OBJECTIVE:     /84  Pulse 100  Temp 98.9  F (37.2  C) (Oral)  Resp 20  Ht 5' 5\" (1.651 m)  Wt 225 lb (102.1 kg)  LMP  (LMP Unknown)  SpO2 97%  BMI 37.44 kg/m2  Body mass index is 37.44 kg/(m^2).  GENERAL: healthy, alert and no distress  PSYCH: mentation appears normal, affect normal        ASSESSMENT/PLAN:             1. Depression, major, recurrent, in partial remission (H)  Reviewed GeneSight results again.  Discussed options such changing Pristiq to Viibryd.  Since she is feeling better, will continue to monitor and follow up if needed.     2. Malaise  See above.           Cherie Brennan NP  [facility]    "

## 2018-03-21 NOTE — NURSING NOTE
"Chief Complaint   Patient presents with     Recheck Medication       Initial /84  Pulse 100  Temp 98.9  F (37.2  C) (Oral)  Resp 20  Ht 5' 5\" (1.651 m)  Wt 225 lb (102.1 kg)  LMP  (LMP Unknown)  SpO2 97%  BMI 37.44 kg/m2 Estimated body mass index is 37.44 kg/(m^2) as calculated from the following:    Height as of this encounter: 5' 5\" (1.651 m).    Weight as of this encounter: 225 lb (102.1 kg).  Medication Reconciliation: complete     Surinder Beaver MA       "

## 2018-03-21 NOTE — TELEPHONE ENCOUNTER
Please contact patient and have her request this refill from a medical provider that she is seeing regularly, or she can schedule a f/u with me. I have not seen her for over a year so I will not sign off on any refills.     Thank you,     ELISE Zhu, NP-C  [facility]

## 2018-03-21 NOTE — TELEPHONE ENCOUNTER
Pending Prescriptions:                       Disp   Refills    diclofenac (VOLTAREN-XR) 100 MG 24 hr tab*30 tab*3            Sig: Take 1 tablet (100 mg) by mouth daily    Last refill: 11/2/17   Last visit: 2/3/17  Next appointment: None in file.      Stephen Leal MA

## 2018-03-21 NOTE — TELEPHONE ENCOUNTER
[facility] pharmacy calling, they state that they have sent 2 refill requests for this patient. The patient is needing diclofenac  MG tabs.      [facility] phone number [phone]

## 2018-03-21 NOTE — TELEPHONE ENCOUNTER
The below information is provided to the patient.    LILY FraserN, RN  Care Coordinator  [facility]

## 2018-03-21 NOTE — MR AVS SNAPSHOT
After Visit Summary   3/21/2018    Brandi Stern    MRN: [id number]           Patient Information     Date Of Birth          1955        Visit Information        Provider Department      3/21/2018 3:00 PM Cherie Brennan NP [facility]        Today's Diagnoses     Depression, major, recurrent, in partial remission (H)    -  1    Malaise           Follow-ups after your visit        Your next 10 appointments already scheduled     Apr 20, 2018 10:30 AM CDT   (Arrive by 10:15 AM)   Return Weight Management Visit with Nuris Ramírez PA-C   [facility] ([facility])    [address] [place] [id number] [phone]            Jun 26, 2018 11:15 AM CDT   (Arrive by 11:00 AM)   Return Visit with Robbie Flores MD   [facility] ([facility])    [address] [place] [id number] [phone]              Who to contact     If you have questions or need follow up information about today's clinic visit or your schedule please contact [facility] directly at [phone].  Normal or non-critical lab and imaging results will be communicated to you by MyChart, letter or phone within 4 business days after the clinic has received the results. If you do not hear from us within 7 days, please contact the clinic through [employer] or phone. If you have a critical or abnormal lab result, we will notify you by phone as soon as possible.  Submit refill requests through [employer] or call your pharmacy and they will forward the refill request to us. Please allow 3 business days for your refill to be completed.          Additional Information About Your Visit        MyChart Information     [employer] gives you secure access to your electronic health record. If you see a primary care provider, you can also send messages to your  "care team and make appointments. If you have questions, please call your primary care clinic.  If you do not have a primary care provider, please call [phone] and they will assist you.        Care EveryWhere ID     This is your Care EveryWhere ID. This could be used by other organizations to access your [place] medical records  [id number]        Your Vitals Were     Pulse Temperature Respirations Height Last Period Pulse Oximetry    100 98.9  F (37.2  C) (Oral) 20 5' 5\" (1.651 m) (LMP Unknown) 97%    BMI (Body Mass Index)                   37.44 kg/m2            Blood Pressure from Last 3 Encounters:   03/21/18 132/84   02/20/18 120/89   02/01/18 118/88    Weight from Last 3 Encounters:   03/21/18 225 lb (102.1 kg)   02/20/18 227 lb 4.8 oz (103.1 kg)   02/01/18 226 lb 14.4 oz (102.9 kg)              Today, you had the following     No orders found for display         Today's Medication Changes          These changes are accurate as of 3/21/18  5:38 PM.  If you have any questions, ask your nurse or doctor.               Stop taking these medicines if you haven't already. Please contact your care team if you have questions.     buPROPion 150 MG 24 hr tablet   Commonly known as:  WELLBUTRIN XL   Stopped by:  Cherie Brennan NP           diclofenac 1 % Gel topical gel   Commonly known as:  VOLTAREN   Stopped by:  Cherie Brennan NP           diclofenac 100 MG 24 hr tablet   Commonly known as:  VOLTAREN-XR   Stopped by:  Cherie Brennan NP           naltrexone 50 MG tablet   Commonly known as:  DEPADE;REVIA   Stopped by:  Cherie Brennan NP                    Primary Care Provider Office Phone # Fax #    Cherie Brennan [phone] [address] [facility] [address]        Equal Access to Services     FREDA HALEY : Keyur holt Sohéctor, waaxda luqadaha, qaybta kaalmada hakeemyacharles, adrián perkins. [facility] [phone].    ATENCIÓN: " Si kristy wu, tiene a rizvi disposición servicios gratuitos de asistencia lingüística. Alia kruger [phone].    We comply with applicable federal civil rights laws and [place] laws. We do not discriminate on the basis of race, color, national origin, age, disability, sex, sexual orientation, or gender identity.            Thank you!     Thank you for choosing [facility]  for your care. Our goal is always to provide you with excellent care. Hearing back from our patients is one way we can continue to improve our services. Please take a few minutes to complete the written survey that you may receive in the mail after your visit with us. Thank you!             Your Updated Medication List - Protect others around you: Learn how to safely use, store and throw away your medicines at www.disposemymeds.org.          This list is accurate as of 3/21/18  5:38 PM.  Always use your most recent med list.                   Brand Name Dispense Instructions for use Diagnosis    ACETAMINOPHEN EXTRA STRENGTH PO      Pt reports taking 650 MG arthritis        * albuterol (2.5 MG/3ML) 0.083% neb solution     3 mL    Take  by nebulization. take 3 mL by nebulization 4 times daily as needed    Moderate persistent asthma       * albuterol 108 (90 BASE) MCG/ACT Inhaler    PROAIR HFA    1 Inhaler    Inhale 1-2 puffs into the lungs every 6 hours as needed for shortness of breath / dyspnea    Moderate persistent asthma with exacerbation       atorvastatin 10 MG tablet    LIPITOR    90 tablet    TAKE 1 TABLET (10 MG) BY MOUTH DAILY    Hyperlipidemia LDL goal <130       CALCIUM 500 PO      Take 1 tablet by mouth daily        CLARITIN 10 MG tablet   Generic drug:  loratadine     0    1 TAB PO QD (Once per day) as needed for ALLERGY SYMPTOMS    Allergic rhinitis due to other allergen       CO Q 10 PO      Take 200 mg by mouth daily        cyclobenzaprine 5 MG tablet    FLEXERIL    180 tablet    Take 1-2 tablets (5-10 mg) by  mouth 3 times daily as needed for muscle spasms    Spasm of back muscles       desvenlafaxine fumarate 100 MG 24 hr tablet     30 tablet    Take 1 tablet (100 mg) by mouth daily    Major depressive disorder, recurrent episode, moderate (H)       fenofibrate 145 MG tablet     90 tablet    TAKE 1 TABLET BY MOUTH DAILY    Pure hyperglyceridemia       levothyroxine 75 MCG tablet    SYNTHROID    90 tablet    Take 1 tablet (75 mcg) by mouth every morning Overdue for labs    Chronic lymphocytic thyroiditis       Lutein 10 MG Tabs      Take 10 mg by mouth daily        MIRAPEX 0.125 MG tablet   Generic drug:  pramipexole      Take two tabs at dinner and one at hs        omeprazole 20 MG CR capsule    priLOSEC    180 capsule    Take 1 capsule (20 mg) by mouth 2 times daily    Gastroesophageal reflux disease without esophagitis       ranitidine 300 MG tablet    ZANTAC    60 tablet    Take 1 tablet (300 mg) by mouth 2 times daily    Gastroesophageal reflux disease without esophagitis       SYMBICORT 160-4.5 MCG/ACT Inhaler   Generic drug:  budesonide-formoterol      Inhale 1 puff into the lungs 2 times daily    Moderate persistent asthma without complication       triamcinolone 55 MCG/ACT nasal inhaler    NASACORT AQ    1 Inhaler    Inhale 2 sprays in both nostrils every day as needed    Allergic rhinitis due to other allergen       UNABLE TO FIND      MEDICATION NAME: Allergy shots        VITAMIN C PO      Take 1,000 mg by mouth daily        zolpidem 10 MG tablet    AMBIEN    30 tablet    TAKE 1/2 TO 1 TABLET BY MOUTH NIGHTLY AS NEEDED    Insomnia, unspecified type       * Notice:  This list has 2 medication(s) that are the same as other medications prescribed for you. Read the directions carefully, and ask your doctor or other care provider to review them with you.

## 2018-03-22 ASSESSMENT — ASTHMA QUESTIONNAIRES: ACT_TOTALSCORE: 22

## 2018-03-29 DIAGNOSIS — K21.9 GASTROESOPHAGEAL REFLUX DISEASE WITHOUT ESOPHAGITIS: ICD-10-CM

## 2018-03-29 NOTE — TELEPHONE ENCOUNTER
"Last Written Prescription Date:  2/23/17  Last Fill Quantity: 60,  # refills: prn   Last office visit: No previous visit found with prescribing provider: 3/21/18 with Lori Brennan CNP  Future Office Visit:unknown  Requested Prescriptions   Pending Prescriptions Disp Refills     ranitidine (ZANTAC) 300 MG tablet [Pharmacy Med Name: RANITIDINE 300 MG TABLET] 60 tablet 11     Sig: TAKE 1 TABLET (300 MG) BY MOUTH 2 TIMES DAILY    H2 Blockers Protocol Passed    3/29/2018  2:06 AM       Passed - Patient is age 12 or older       Passed - Recent (12 mo) or future (30 days) visit within the authorizing provider's specialty    Patient had office visit in the last 12 months or has a visit in the next 30 days with authorizing provider or within the authorizing provider's specialty.  See \"Patient Info\" tab in inbasket, or \"Choose Columns\" in Meds & Orders section of the refill encounter.            daniel barillas        "

## 2018-03-30 ENCOUNTER — TELEPHONE (OUTPATIENT)
Dept: FAMILY MEDICINE | Facility: CLINIC | Age: 63
End: 2018-03-30

## 2018-03-30 NOTE — TELEPHONE ENCOUNTER
Prior Authorization Retail Medication Request    Medication/Dose: desvenlafaxine fumarate 100 MG 24 hr tablet  ICD code (if different than what is on RX):  Previously Tried and Failed:  Rationale:    Insurance Name:    Insurance ID:      Pharmacy Information (if different than what is on RX)  Name:  Phone:    Please include previous medications tried and failed.  Please ask insurance for medications on formulary.

## 2018-04-02 NOTE — TELEPHONE ENCOUNTER
Central Prior Authorization Team   Phone: [phone]    PA Initiation    Medication: desvenlafaxine fumarate 100 MG 24 hr tablet, rec 3-29-18  Insurance Company: [employer] - Phone [phone] Fax [phone]  Pharmacy Filling the Rx: [id number] - [place], [place] - [address]  Filling Pharmacy Phone: [phone]  Filling Pharmacy Fax:    Start Date: 4/2/2018

## 2018-04-04 NOTE — TELEPHONE ENCOUNTER
When PA approval was called in to pharmacy they stated that they have been filling medication as desvenlafaxine succinate.  PA was done for desvenlafaxine fumarate as that what was requested and listed in patient's chart.  Pharmacy checked hardcopy of rx and stated that it has been getting filled incorrectly for the succinate since Sept as the hardcopy stated fumarate also. They were going to call and talk with patient. If patient wants to stay with medication they have been getting they will need a new script and that will also need a PA.       Prior Authorization Approval    Authorization Effective Date: 3/3/2018  Authorization Expiration Date: 4/2/2019  Medication: desvenlafaxine fumarate 100 MG 24 hr tablet, rec 3-29-18  Approved Dose/Quantity:    Reference #:     Insurance Company: [employer] - Phone [phone] Fax [phone]  Expected CoPay:       CoPay Card Available:      Foundation Assistance Needed:    Which Pharmacy is filling the prescription (Not needed for infusion/clinic administered): [id number] - [place], [place] - [address]  Pharmacy Notified: Yes  Patient Notified: Yes

## 2018-04-10 ENCOUNTER — TELEPHONE (OUTPATIENT)
Dept: FAMILY MEDICINE | Facility: CLINIC | Age: 63
End: 2018-04-10

## 2018-04-10 ENCOUNTER — OFFICE VISIT (OUTPATIENT)
Dept: FAMILY MEDICINE | Facility: CLINIC | Age: 63
End: 2018-04-10
Payer: COMMERCIAL

## 2018-04-10 VITALS
DIASTOLIC BLOOD PRESSURE: 81 MMHG | TEMPERATURE: 98.5 F | HEART RATE: 87 BPM | RESPIRATION RATE: 16 BRPM | WEIGHT: 220.8 LBS | OXYGEN SATURATION: 98 % | SYSTOLIC BLOOD PRESSURE: 110 MMHG | HEIGHT: 65 IN | BODY MASS INDEX: 36.79 KG/M2

## 2018-04-10 DIAGNOSIS — Z00.00 ENCOUNTER FOR ROUTINE ADULT HEALTH EXAMINATION WITHOUT ABNORMAL FINDINGS: Primary | ICD-10-CM

## 2018-04-10 PROCEDURE — G0145 SCR C/V CYTO,THINLAYER,RESCR: HCPCS | Performed by: NURSE PRACTITIONER

## 2018-04-10 PROCEDURE — 87624 HPV HI-RISK TYP POOLED RSLT: CPT | Performed by: NURSE PRACTITIONER

## 2018-04-10 PROCEDURE — 99396 PREV VISIT EST AGE 40-64: CPT | Performed by: NURSE PRACTITIONER

## 2018-04-10 NOTE — MR AVS SNAPSHOT
After Visit Summary   4/10/2018    Brandi Stern    MRN: [id number]           Patient Information     Date Of Birth          1955        Visit Information        Provider Department      4/10/2018 11:30 AM Cherie Brennan NP [facility]        Today's Diagnoses     Encounter for routine adult health examination without abnormal findings    -  1      Care Instructions      Preventive Health Recommendations  Female Ages 50 - 64    Yearly exam: See your health care provider every year in order to  o Review health changes.   o Discuss preventive care.    o Review your medicines if your doctor has prescribed any.      Get a Pap test every three years (unless you have an abnormal result and your provider advises testing more often).    If you get Pap tests with HPV test, you only need to test every 5 years, unless you have an abnormal result.     You do not need a Pap test if your uterus was removed (hysterectomy) and you have not had cancer.    You should be tested each year for STDs (sexually transmitted diseases) if you're at risk.     Have a mammogram every 1 to 2 years.    Have a colonoscopy at age 50, or have a yearly FIT test (stool test). These exams screen for colon cancer.      Have a cholesterol test every 5 years, or more often if advised.    Have a diabetes test (fasting glucose) every three years. If you are at risk for diabetes, you should have this test more often.     If you are at risk for osteoporosis (brittle bone disease), think about having a bone density scan (DEXA).    Shots: Get a flu shot each year. Get a tetanus shot every 10 years.    Nutrition:     Eat at least 5 servings of fruits and vegetables each day.    Eat whole-grain bread, whole-wheat pasta and brown rice instead of white grains and rice.    Talk to your provider about Calcium and Vitamin D.     Lifestyle    Exercise at least 150 minutes a week (30 minutes a day, 5 days a week). This will  help you control your weight and prevent disease.    Limit alcohol to one drink per day.    No smoking.     Wear sunscreen to prevent skin cancer.     See your dentist every six months for an exam and cleaning.    See your eye doctor every 1 to 2 years.            Follow-ups after your visit        Your next 10 appointments already scheduled     Apr 16, 2018 11:00 AM CDT   [facility] with Ronda Lackey [facility] ([facility])    [facility]  [address]  [facility] [place] [address]   [phone]            Apr 20, 2018 10:30 AM CDT   (Arrive by 10:15 AM)   Return Weight Management Visit with Nuris Ramírez PA-C   [facility] ([facility])    [address] [place] [id number] [phone]            Jun 26, 2018 11:15 AM CDT   (Arrive by 11:00 AM)   Return Visit with Robbie Flores MD   [facility])    [address] [place] [id number] [phone]              Who to contact     If you have questions or need follow up information about today's clinic visit or your schedule please contact [facility] directly at [phone].  Normal or non-critical lab and imaging results will be communicated to you by MyChart, letter or phone within 4 business days after the clinic has received the results. If you do not hear from us within 7 days, please contact the clinic through [employer] or phone. If you have a critical or abnormal lab result, we will notify you by phone as soon as possible.  Submit refill requests through [employer] or call your pharmacy and they will forward the refill request to us. Please allow 3 business days for your refill to be completed.          Additional Information About Your Visit        MyChart Information     [employer]  "gives you secure access to your electronic health record. If you see a primary care provider, you can also send messages to your care team and make appointments. If you have questions, please call your primary care clinic.  If you do not have a primary care provider, please call [phone] and they will assist you.        Care EveryWhere ID     This is your Care EveryWhere ID. This could be used by other organizations to access your [place] medical records  [id number]        Your Vitals Were     Pulse Temperature Respirations Height Last Period Pulse Oximetry    87 98.5  F (36.9  C) (Oral) 16 5' 5\" (1.651 m) (LMP Unknown) 98%    BMI (Body Mass Index)                   36.74 kg/m2            Blood Pressure from Last 3 Encounters:   04/10/18 110/81   03/21/18 132/84   02/20/18 120/89    Weight from Last 3 Encounters:   04/10/18 220 lb 12.8 oz (100.2 kg)   03/21/18 225 lb (102.1 kg)   02/20/18 227 lb 4.8 oz (103.1 kg)              We Performed the Following     HPV High Risk Types DNA Cervical     Pap imaged thin layer screen with HPV - recommended age 30 - 65 years (select HPV order below)        Primary Care Provider Office Phone # Fax #    Cherie Brennan [phone] [address] [facility] [address]        Equal Access to Services     LORNE HALEY : Hadii georgie ku hadasho Soomaali, waaxda luqadaha, qaybta kaalmada adeegyada, adrián rodriguez . So [facility] [phone].    ATENCIÓN: Si habla español, tiene a rizvi disposición servicios gratuitos de asistencia lingüística. Llame al [phone].    We comply with applicable federal civil rights laws and [place] laws. We do not discriminate on the basis of race, color, national origin, age, disability, sex, sexual orientation, or gender identity.            Thank you!     Thank you for choosing [facility]  for your care. Our goal is always to provide you with excellent care. Hearing back from our " patients is one way we can continue to improve our services. Please take a few minutes to complete the written survey that you may receive in the mail after your visit with us. Thank you!             Your Updated Medication List - Protect others around you: Learn how to safely use, store and throw away your medicines at www.disposemymeds.org.          This list is accurate as of 4/10/18 12:11 PM.  Always use your most recent med list.                   Brand Name Dispense Instructions for use Diagnosis    ACETAMINOPHEN EXTRA STRENGTH PO      Pt reports taking 650 MG arthritis        * albuterol (2.5 MG/3ML) 0.083% neb solution     3 mL    Take  by nebulization. take 3 mL by nebulization 4 times daily as needed    Moderate persistent asthma       * albuterol 108 (90 BASE) MCG/ACT Inhaler    PROAIR HFA    1 Inhaler    Inhale 1-2 puffs into the lungs every 6 hours as needed for shortness of breath / dyspnea    Moderate persistent asthma with exacerbation       atorvastatin 10 MG tablet    LIPITOR    90 tablet    TAKE 1 TABLET (10 MG) BY MOUTH DAILY    Hyperlipidemia LDL goal <130       CALCIUM 500 PO      Take 1 tablet by mouth daily        CLARITIN 10 MG tablet   Generic drug:  loratadine     0    1 TAB PO QD (Once per day) as needed for ALLERGY SYMPTOMS    Allergic rhinitis due to other allergen       CO Q 10 PO      Take 200 mg by mouth daily        cyclobenzaprine 5 MG tablet    FLEXERIL    180 tablet    Take 1-2 tablets (5-10 mg) by mouth 3 times daily as needed for muscle spasms    Spasm of back muscles       desvenlafaxine fumarate 100 MG 24 hr tablet     30 tablet    Take 1 tablet (100 mg) by mouth daily    Major depressive disorder, recurrent episode, moderate (H)       fenofibrate 145 MG tablet     90 tablet    TAKE 1 TABLET BY MOUTH DAILY    Pure hyperglyceridemia       levothyroxine 75 MCG tablet    SYNTHROID    90 tablet    Take 1 tablet (75 mcg) by mouth every morning Overdue for labs    Chronic  lymphocytic thyroiditis       Lutein 10 MG Tabs      Take 10 mg by mouth daily        MIRAPEX 0.125 MG tablet   Generic drug:  pramipexole      Take two tabs at dinner and one at hs        omeprazole 20 MG CR capsule    priLOSEC    180 capsule    Take 1 capsule (20 mg) by mouth 2 times daily    Gastroesophageal reflux disease without esophagitis       ranitidine 300 MG tablet    ZANTAC    60 tablet    TAKE 1 TABLET (300 MG) BY MOUTH 2 TIMES DAILY    Gastroesophageal reflux disease without esophagitis       SYMBICORT 160-4.5 MCG/ACT Inhaler   Generic drug:  budesonide-formoterol      Inhale 1 puff into the lungs 2 times daily    Moderate persistent asthma without complication       triamcinolone 55 MCG/ACT nasal inhaler    NASACORT AQ    1 Inhaler    Inhale 2 sprays in both nostrils every day as needed    Allergic rhinitis due to other allergen       UNABLE TO FIND      MEDICATION NAME: Allergy shots        VITAMIN C PO      Take 1,000 mg by mouth daily        zolpidem 10 MG tablet    AMBIEN    30 tablet    TAKE 1/2 TO 1 TABLET BY MOUTH NIGHTLY AS NEEDED    Insomnia, unspecified type       * Notice:  This list has 2 medication(s) that are the same as other medications prescribed for you. Read the directions carefully, and ask your doctor or other care provider to review them with you.

## 2018-04-10 NOTE — PROGRESS NOTES
SUBJECTIVE:   CC: Brandi Stern is an 62 year old woman who presents for preventive health visit.     Physical   Annual:     Getting at least 3 servings of Calcium per day::  NO    Bi-annual eye exam::  Yes    Dental care twice a year::  Yes    Sleep apnea or symptoms of sleep apnea::  Sleep apnea    Diet::  Regular (no restrictions)    Taking medications regularly::  Yes    Additional concerns today::  No                    Today's PHQ-2 Score:   PHQ-2 ( 1999 [employer]) 4/10/2018   Q1: Little interest or pleasure in doing things 1   Q2: Feeling down, depressed or hopeless 1   PHQ-2 Score 2   Q1: Little interest or pleasure in doing things Several days   Q2: Feeling down, depressed or hopeless Several days   PHQ-2 Score 2       Abuse: Current or Past(Physical, Sexual or Emotional)- No  Do you feel safe in your environment - Yes    Social History   Substance Use Topics     Smoking status: Current Every Day Smoker     Packs/day: 0.50     Years: 20.00     Types: Cigarettes     Start date: 10/1/1971     Last attempt to quit: 8/1/2007     Smokeless tobacco: Never Used     Alcohol use 0.0 oz/week     0 Standard drinks or equivalent per week      Comment: 2 glasses of wine per week     Alcohol Use 4/10/2018   If you drink alcohol do you typically have greater than 3 drinks per day OR greater than 7 drinks per week? No       Reviewed orders with patient.  Reviewed health maintenance and updated orders accordingly - Yes          Pertinent mammograms are reviewed under the imaging tab.  History of abnormal Pap smear: NO - age 30-65 PAP every 5 years with negative HPV co-testing recommended    Reviewed and updated as needed this visit by clinical staff  Tobacco  Allergies  Meds  Med Hx  Surg Hx  Fam Hx  Soc Hx        Reviewed and updated as needed this visit by Provider            Review of Systems  C: NEGATIVE for fever, chills, change in weight  I: NEGATIVE for worrisome rashes, moles or lesions  E: NEGATIVE for  "vision changes or irritation  ENT: NEGATIVE for ear, mouth and throat problems  R: NEGATIVE for significant cough or SOB  B: NEGATIVE for masses, tenderness or discharge  CV: NEGATIVE for chest pain, palpitations or peripheral edema  GI: NEGATIVE for nausea, abdominal pain, heartburn, or change in bowel habits  : NEGATIVE for unusual urinary or vaginal symptoms. No vaginal bleeding.  MUSCULOSKELETAL:chronic arthritis joint pain  N: NEGATIVE for weakness, dizziness or paresthesias  P: NEGATIVE for changes in mood or affect      OBJECTIVE:   /81  Pulse 87  Temp 98.5  F (36.9  C) (Oral)  Resp 16  Ht 5' 5\" (1.651 m)  Wt 220 lb 12.8 oz (100.2 kg)  LMP  (LMP Unknown)  SpO2 98%  BMI 36.74 kg/m2  Physical Exam  GENERAL: healthy, alert and no distress  EYES: Eyes grossly normal to inspection, PERRL and conjunctivae and sclerae normal  HENT: ear canals and TM's normal, nose and mouth without ulcers or lesions  NECK: no adenopathy, no asymmetry, masses, or scars and thyroid normal to palpation  RESP: lungs clear to auscultation - no rales, rhonchi or wheezes  BREAST: normal without masses, tenderness or nipple discharge and no palpable axillary masses or adenopathy  CV: regular rate and rhythm, normal S1 S2, no S3 or S4, no murmur, click or rub, no peripheral edema and peripheral pulses strong  ABDOMEN: soft, nontender, no hepatosplenomegaly, no masses and bowel sounds normal   (female): normal female external genitalia, normal urethral meatus, vaginal mucosa pink, moist, well rugated, and normal cervix/adnexa/uterus without masses or discharge  MS: no gross musculoskeletal defects noted, no edema  SKIN: no suspicious lesions or rashes  NEURO: Normal strength and tone, mentation intact and speech normal  PSYCH: mentation appears normal, affect normal/bright    ASSESSMENT/PLAN:   1. Encounter for routine adult health examination without abnormal findings    - Pap imaged thin layer screen with HPV - recommended " "age 30 - 65 years (select HPV order below)  - HPV High Risk Types DNA Cervical    COUNSELING:  Reviewed preventive health counseling, as reflected in patient instructions         reports that she has been smoking Cigarettes.  She started smoking about 46 years ago. She has a 10.00 pack-year smoking history. She has never used smokeless tobacco.    Estimated body mass index is 36.74 kg/(m^2) as calculated from the following:    Height as of this encounter: 5' 5\" (1.651 m).    Weight as of this encounter: 220 lb 12.8 oz (100.2 kg).   Weight management plan: Discussed healthy diet and exercise guidelines and patient will follow up in 12 months in clinic to re-evaluate.    Counseling Resources:  ATP IV Guidelines  Pooled Cohorts Equation Calculator  Breast Cancer Risk Calculator  FRAX Risk Assessment  ICSI Preventive Guidelines  Dietary Guidelines for Americans, 2010  USDA's MyPlate  ASA Prophylaxis  Lung CA Screening    Cherie Brennan NP  [facility]  Answers for HPI/ROS submitted by the patient on 4/10/2018   PHQ-2 Score: 2    "

## 2018-04-11 NOTE — TELEPHONE ENCOUNTER
Nolvia from [facility] calling back.    Is it OK to continue filling the succinate?  If yes, the med will need a PA.    The old PA was for desvenlafaxine fumarate.  We need an answer to give the pharmacy.    NEHA Torres

## 2018-04-11 NOTE — TELEPHONE ENCOUNTER
I don't think it will matter therapeutically.   Please fill whichever is approved.   Or, please do PA if needed.

## 2018-04-12 ENCOUNTER — MYC MEDICAL ADVICE (OUTPATIENT)
Dept: FAMILY MEDICINE | Facility: CLINIC | Age: 63
End: 2018-04-12

## 2018-04-12 DIAGNOSIS — F33.1 MAJOR DEPRESSIVE DISORDER, RECURRENT EPISODE, MODERATE (H): ICD-10-CM

## 2018-04-12 NOTE — TELEPHONE ENCOUNTER
There is a previous message related to this request.  Dr. Lilly is agreeing to do a PA if needed for the sofya succinate.  Attempted to call pharmacy but not open yet. Will try later.  Unique Chaudhry RN

## 2018-04-13 ENCOUNTER — TELEPHONE (OUTPATIENT)
Dept: FAMILY MEDICINE | Facility: CLINIC | Age: 63
End: 2018-04-13

## 2018-04-13 ENCOUNTER — MYC MEDICAL ADVICE (OUTPATIENT)
Dept: FAMILY MEDICINE | Facility: CLINIC | Age: 63
End: 2018-04-13

## 2018-04-13 LAB
COPATH REPORT: NORMAL
PAP: NORMAL

## 2018-04-13 RX ORDER — DESVENLAFAXINE 50 MG/1
50 TABLET, FILM COATED, EXTENDED RELEASE ORAL DAILY
Qty: 30 TABLET | Refills: 5 | Status: SHIPPED | OUTPATIENT
Start: 2018-04-13 | End: 2019-01-21

## 2018-04-13 NOTE — TELEPHONE ENCOUNTER
Prior Authorization Retail Medication Request    Medication/Dose: desvenlafaxine succinate (PRISTIQ) 50 MG 24 hr tablet  ICD code (if different than what is on RX):  Previously Tried and Failed:  Rationale:    Insurance Name:    Insurance ID:      Pharmacy Information (if different than what is on RX)  Name:  Phone:    Please include previous medications tried and failed.  Please ask insurance for medications on formulary.

## 2018-04-16 ENCOUNTER — PSYCHE (OUTPATIENT)
Dept: PSYCHOLOGY | Facility: CLINIC | Age: 63
End: 2018-04-16
Payer: COMMERCIAL

## 2018-04-16 DIAGNOSIS — F33.0 MAJOR DEPRESSIVE DISORDER, RECURRENT EPISODE, MILD WITH ANXIOUS DISTRESS (H): Primary | ICD-10-CM

## 2018-04-16 LAB
FINAL DIAGNOSIS: NORMAL
HPV HR 12 DNA CVX QL NAA+PROBE: NEGATIVE
HPV16 DNA SPEC QL NAA+PROBE: NEGATIVE
HPV18 DNA SPEC QL NAA+PROBE: NEGATIVE
SPECIMEN DESCRIPTION: NORMAL
SPECIMEN SOURCE CVX/VAG CYTO: NORMAL

## 2018-04-16 PROCEDURE — 90791 PSYCH DIAGNOSTIC EVALUATION: CPT | Performed by: COUNSELOR

## 2018-04-16 ASSESSMENT — ANXIETY QUESTIONNAIRES
1. FEELING NERVOUS, ANXIOUS, OR ON EDGE: SEVERAL DAYS
6. BECOMING EASILY ANNOYED OR IRRITABLE: SEVERAL DAYS
7. FEELING AFRAID AS IF SOMETHING AWFUL MIGHT HAPPEN: SEVERAL DAYS
5. BEING SO RESTLESS THAT IT IS HARD TO SIT STILL: NOT AT ALL
GAD7 TOTAL SCORE: 5
3. WORRYING TOO MUCH ABOUT DIFFERENT THINGS: NOT AT ALL
IF YOU CHECKED OFF ANY PROBLEMS ON THIS QUESTIONNAIRE, HOW DIFFICULT HAVE THESE PROBLEMS MADE IT FOR YOU TO DO YOUR WORK, TAKE CARE OF THINGS AT HOME, OR GET ALONG WITH OTHER PEOPLE: VERY DIFFICULT
2. NOT BEING ABLE TO STOP OR CONTROL WORRYING: SEVERAL DAYS

## 2018-04-16 ASSESSMENT — PATIENT HEALTH QUESTIONNAIRE - PHQ9: 5. POOR APPETITE OR OVEREATING: SEVERAL DAYS
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
## 2018-04-16 NOTE — MR AVS SNAPSHOT
MRN:[id number]                      After Visit Summary   4/16/2018    Brandi Stern    MRN: [id number]           Visit Information        Provider Department      4/16/2018 11:00 AM Ronda Lackey [facility] Generic      Your next 10 appointments already scheduled     Apr 20, 2018 10:30 AM CDT   (Arrive by 10:15 AM)   Return Weight Management Visit with Nuris Ramírez PA-C   [facility] ([facility])    [address] [place] [id number]   [phone]            Apr 30, 2018 10:00 AM CDT   Return Visit with Ronda Lackey [facility] ([facility])    [address] [address]   [phone]            May 14, 2018 10:00 AM CDT   Return Visit with Ronda Lackey [facility] ([address] [address]   [phone]            Jun 26, 2018 11:15 AM CDT   (Arrive by 11:00 AM)   Return Visit with Robbie Flores MD   [facility] ([facility] [place])    [address] [place] [id number]   [phone]              MyChart Information     [employer] gives you secure access to your electronic health record. If you see a primary care provider, you can also send messages to your care team and make appointments. If you have questions, please call your primary care clinic.  If you do not have a primary care provider, please call [phone] and they will assist you.        Care EveryWhere ID     This is your Care EveryWhere ID. This could be used by other organizations to access your [place] medical records  [id number]        Equal Access to Services     LORNE HALEY : raquel Nova qaybta kaalmada adeegyada,  adrián shine'aan ah. So [facility] [phone].    ATENCIÓN: Si habla español, tiene a rizvi disposición servicios gratuitos de asistencia lingüística. Llame al [phone].    We comply with applicable federal civil rights laws and [place] laws. We do not discriminate on the basis of race, color, national origin, age, disability, sex, sexual orientation, or gender identity.

## 2018-04-17 ASSESSMENT — ANXIETY QUESTIONNAIRES: GAD7 TOTAL SCORE: 5

## 2018-04-17 ASSESSMENT — PATIENT HEALTH QUESTIONNAIRE - PHQ9: SUM OF ALL RESPONSES TO PHQ QUESTIONS 1-9: 5

## 2018-04-20 ENCOUNTER — OFFICE VISIT (OUTPATIENT)
Dept: ENDOCRINOLOGY | Facility: CLINIC | Age: 63
End: 2018-04-20
Payer: COMMERCIAL

## 2018-04-20 VITALS
HEIGHT: 65 IN | BODY MASS INDEX: 36.84 KG/M2 | SYSTOLIC BLOOD PRESSURE: 132 MMHG | WEIGHT: 221.1 LBS | OXYGEN SATURATION: 96 % | DIASTOLIC BLOOD PRESSURE: 90 MMHG | HEART RATE: 96 BPM

## 2018-04-20 DIAGNOSIS — E66.812 CLASS 2 OBESITY WITH BODY MASS INDEX (BMI) OF 36.0 TO 36.9 IN ADULT, UNSPECIFIED OBESITY TYPE, UNSPECIFIED WHETHER SERIOUS COMORBIDITY PRESENT: Primary | ICD-10-CM

## 2018-04-20 RX ORDER — NALTREXONE HYDROCHLORIDE 50 MG/1
TABLET, FILM COATED ORAL
Refills: 2 | COMMUNITY
Start: 2018-02-20 | End: 2018-04-20

## 2018-04-20 RX ORDER — NALTREXONE HYDROCHLORIDE 50 MG/1
50 TABLET, FILM COATED ORAL DAILY
Qty: 30 TABLET | Refills: 3 | Status: SHIPPED | OUTPATIENT
Start: 2018-04-20 | End: 2019-01-21

## 2018-04-20 ASSESSMENT — ENCOUNTER SYMPTOMS
BACK PAIN: 1
FATIGUE: 1
ARTHRALGIAS: 1
MYALGIAS: 1
TASTE DISTURBANCE: 0
NECK MASS: 0
INSOMNIA: 1
INCREASED ENERGY: 0
WEIGHT LOSS: 1
TROUBLE SWALLOWING: 0
WEIGHT GAIN: 0
FEVER: 0
DECREASED APPETITE: 0
DECREASED CONCENTRATION: 1
JOINT SWELLING: 0
STIFFNESS: 1
POLYPHAGIA: 0
PANIC: 0
SMELL DISTURBANCE: 0
NIGHT SWEATS: 1
NERVOUS/ANXIOUS: 0
SORE THROAT: 0
SINUS PAIN: 1
NECK PAIN: 1
MUSCLE CRAMPS: 0
HOARSE VOICE: 0
POLYDIPSIA: 0
SINUS CONGESTION: 0
ALTERED TEMPERATURE REGULATION: 1
HALLUCINATIONS: 0
MUSCLE WEAKNESS: 0
DEPRESSION: 0
CHILLS: 0

## 2018-04-20 NOTE — PROGRESS NOTES
"Return Medical Weight Management Note     Brandi Stern  MRN:  [id number]  :  1955  YUSEF:  2018    Dear Cherie Brennan,    I had the pleasure of seeing your patient Brandi Stern.  She is a 62 year old female who I am continuing to see for treatment of obesity related to:       2018   I have the following co-morbidities associated with obesity: Sleep Apnea, GERD (Reflux), Asthma, Weight Bearing Joint Pain       INTERVAL HISTORY:  Consult with Dr Avalos : \"She is referred for medical weight management to help with weight loss to help with GERD symptoms and hiatal hernia. She said that she gradually gained weight due to eating habit and lack of focus on diet and exercise.      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 Her eating habit is irregular. She usually eats on the go from fast food or convenience store. She mainly eats in the evening. She said that she eats what tastes good.\"    She started naltrexone and was already on bupropion. She stopped the bupropion after not feeling well on it but she is still taking naltrexone.  She takes 1/2 tab 1-2 times per day and feels it is helping to make some food changes. She has lost 6 lbs since 2 months ago.    She has increased her vegetables and protein.  She is drinking more tea instead of coffee with creamers.     CURRENT WEIGHT:   221 lbs 1.6 oz    Wt Readings from Last 4 Encounters:   18 221 lb 1.6 oz   04/10/18 220 lb 12.8 oz   18 225 lb   18 227 lb 4.8 oz       Height:  5' 5\"  Body Mass Index:  Body mass index is 36.79 kg/(m^2).  Vitals:  /90  Pulse 96  Ht 5' 5\"  Wt 221 lb 1.6 oz  LMP  (LMP Unknown)  SpO2 96%  BMI 36.79 kg/m2      Initial consult weight was 227 on 18.  Weight change since last seen on 2018 is up 1 pounds.   Total gain is 1 pounds.    Diet and Activity Changes Since Last Visit Reviewed With Patient 2018   I have made the following changes to my " diet since my last visit: 1%milk  no ice cream  less cndy  smller sncks   With regards to my diet, I am still struggling with: hunger   For breakfast, I typically eat: coffee cream  cereal   For lunch, I typically eat: sandwich tea   For supper, I typically eat: fruit  diet frozen entree   For snack(s), I typically eat: cheese  chips   I have made the following changes to my activity/exercise since my last visit: walk  little more but back pain slows me down  also afraid of fllbg o ice   With regards to my activity/exercise, I am still struggling with: making time to wlk       ROS    MEDICATIONS:   Current Outpatient Prescriptions   Medication     ACETAMINOPHEN EXTRA STRENGTH OR     albuterol (2.5 MG/3ML) 0.083% nebulizer solution     albuterol (ALBUTEROL) 108 (90 BASE) MCG/ACT Inhaler     Ascorbic Acid (VITAMIN C PO)     atorvastatin (LIPITOR) 10 MG tablet     Calcium-Magnesium-Vitamin D (CALCIUM 500 PO)     CLARITIN 10 MG OR TABS     Coenzyme Q10 (CO Q 10 PO)     cyclobenzaprine (FLEXERIL) 5 MG tablet     desvenlafaxine fumarate 100 MG 24 hr tablet     desvenlafaxine succinate (PRISTIQ) 50 MG 24 hr tablet     fenofibrate 145 MG tablet     levothyroxine (SYNTHROID) 75 MCG tablet     Lutein 10 MG TABS     MIRAPEX 0.125 MG OR TABS     naltrexone (DEPADE;REVIA) 50 MG tablet     omeprazole (PRILOSEC) 20 MG CR capsule     ranitidine (ZANTAC) 300 MG tablet     SYMBICORT 160-4.5 MCG/ACT Inhaler     triamcinolone (NASACORT AQ) 55 MCG/ACT nasal inhaler     UNABLE TO FIND     zolpidem (AMBIEN) 10 MG tablet     No current facility-administered medications for this visit.        Weight Loss Medication History Reviewed With Patient 4/20/2018   Which weight loss medications are you currently taking on a regular basis?  Naltrexone   Are you having any side effects from the weight loss medication that we have prescribed you? Yes   If you are having side effects please describe: mlaise some days        ASSESSMENT:     62 y.o.  Female here for MW follow up.  She is tolerating naltrexone and has lost 6 lbs.        PLAN:     Continue naltrexone    FOLLOW-UP:      Dr Avalos in June      Time: 15 min spent on evaluation, management, counseling, education, & motivational interviewing with greater than 50 % of the total time was spent on counseling and coordinating care    Sincerely,    Nuris Ramírez PA-C

## 2018-04-20 NOTE — LETTER
"2018       RE: Brandi Stern  [address] [place] [address]     Dear Colleague,    Thank you for referring your patient, Brandi Stern, to the [facility] MEDICAL WEIGHT MANAGEMENT at [facility]. Please see a copy of my visit note below.    Return Medical Weight Management Note     Brandi Stern  MRN:  [id number]  :  1955  YUSEF:  2018    Dear Cherie Brennan,    I had the pleasure of seeing your patient Brandi Stern.  She is a 62 year old female who I am continuing to see for treatment of obesity related to:       2018   I have the following co-morbidities associated with obesity: Sleep Apnea, GERD (Reflux), Asthma, Weight Bearing Joint Pain       INTERVAL HISTORY:  Consult with Dr Avalos : \"She is referred for medical weight management to help with weight loss to help with GERD symptoms and hiatal hernia. She said that she gradually gained weight due to eating habit and lack of focus on diet and exercise.      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 Her eating habit is irregular. She usually eats on the go from fast food or convenience store. She mainly eats in the evening. She said that she eats what tastes good.\"    She started naltrexone and was already on bupropion. She stopped the bupropion after not feeling well on it but she is still taking naltrexone.  She takes 1/2 tab 1-2 times per day and feels it is helping to make some food changes. She has lost 6 lbs since 2 months ago.    She has increased her vegetables and protein.  She is drinking more tea instead of coffee with creamers.     CURRENT WEIGHT:   221 lbs 1.6 oz    Wt Readings from Last 4 Encounters:   18 221 lb 1.6 oz   04/10/18 220 lb 12.8 oz   18 225 lb   18 227 lb 4.8 oz       Height:  5' 5\"  Body Mass Index:  Body mass index is 36.79 kg/(m^2).  Vitals:  /90  Pulse 96  Ht 5' 5\"  Wt 221 lb 1.6 oz  LMP  " (LMP Unknown)  SpO2 96%  BMI 36.79 kg/m2      Initial consult weight was 227 on 2/20/18.  Weight change since last seen on 2/20/2018 is up 1 pounds.   Total gain is 1 pounds.    Diet and Activity Changes Since Last Visit Reviewed With Patient 4/20/2018   I have made the following changes to my diet since my last visit: 1%milk  no ice cream  less cndy  smller sncks   With regards to my diet, I am still struggling with: hunger   For breakfast, I typically eat: coffee cream  cereal   For lunch, I typically eat: sandwich tea   For supper, I typically eat: fruit  diet frozen entree   For snack(s), I typically eat: cheese  chips   I have made the following changes to my activity/exercise since my last visit: walk  little more but back pain slows me down  also afraid of fllbg o ice   With regards to my activity/exercise, I am still struggling with: making time to wlk       ROS    MEDICATIONS:   Current Outpatient Prescriptions   Medication     ACETAMINOPHEN EXTRA STRENGTH OR     albuterol (2.5 MG/3ML) 0.083% nebulizer solution     albuterol (ALBUTEROL) 108 (90 BASE) MCG/ACT Inhaler     Ascorbic Acid (VITAMIN C PO)     atorvastatin (LIPITOR) 10 MG tablet     Calcium-Magnesium-Vitamin D (CALCIUM 500 PO)     CLARITIN 10 MG OR TABS     Coenzyme Q10 (CO Q 10 PO)     cyclobenzaprine (FLEXERIL) 5 MG tablet     desvenlafaxine fumarate 100 MG 24 hr tablet     desvenlafaxine succinate (PRISTIQ) 50 MG 24 hr tablet     fenofibrate 145 MG tablet     levothyroxine (SYNTHROID) 75 MCG tablet     Lutein 10 MG TABS     MIRAPEX 0.125 MG OR TABS     naltrexone (DEPADE;REVIA) 50 MG tablet     omeprazole (PRILOSEC) 20 MG CR capsule     ranitidine (ZANTAC) 300 MG tablet     SYMBICORT 160-4.5 MCG/ACT Inhaler     triamcinolone (NASACORT AQ) 55 MCG/ACT nasal inhaler     UNABLE TO FIND     zolpidem (AMBIEN) 10 MG tablet     No current facility-administered medications for this visit.        Weight Loss Medication History Reviewed With Patient  4/20/2018   Which weight loss medications are you currently taking on a regular basis?  Naltrexone   Are you having any side effects from the weight loss medication that we have prescribed you? Yes   If you are having side effects please describe: mlaise some days        ASSESSMENT:     62 y.o. Female here for [facility] follow up.  She is tolerating naltrexone and has lost 6 lbs.        PLAN:     Continue naltrexone    FOLLOW-UP:      Dr Avalos in June      Time: 15 min spent on evaluation, management, counseling, education, & motivational interviewing with greater than 50 % of the total time was spent on counseling and coordinating care    Sincerely,    Nuris Ramírez PA-C

## 2018-04-20 NOTE — NURSING NOTE
"  Chief Complaint   Patient presents with     Weight Problem     RMWM     Vitals:    04/20/18 1044   BP: 132/90   Pulse: 96   SpO2: 96%   Weight: 221 lb 1.6 oz   Height: 5' 5\"     Body mass index is 36.79 kg/(m^2).  Antonina Huerta CMA    "

## 2018-04-20 NOTE — MR AVS SNAPSHOT
After Visit Summary   4/20/2018    Brandi Stern    MRN: [id number]           Patient Information     Date Of Birth          1955        Visit Information        Provider Department      4/20/2018 10:30 AM Nuris Ramírez PA-C [facility] Medical Weight Management        Today's Diagnoses     Class 2 obesity with body mass index (BMI) of 36.0 to 36.9 in adult, unspecified obesity type, unspecified whether serious comorbidity present    -  1       Follow-ups after your visit        Your next 10 appointments already scheduled     Apr 30, 2018 10:00 AM CDT   Return Visit with Ronda Lackey [address] [id number] [phone]            May 14, 2018 10:00 AM CDT   Return Visit with Ronda Lackey [place] [address] [place] [id number] [phone]            Jun 26, 2018 11:15 AM CDT   (Arrive by 11:00 AM)   Return Visit with MD KARRI Weathers [facility] Medical Weight Management ([facility] [place])    [address] [address] [phone]              Who to contact     Please call your clinic at [phone] to:    Ask questions about your health    Make or cancel appointments    Discuss your medicines    Learn about your test results    Speak to your doctor            Additional Information About Your Visit        "[employer] Information     [employer] gives you secure access to your electronic health record. If you see a primary care provider, you can also send messages to your care team and make appointments. If you have questions, please call your primary care clinic.  If you do not have a primary care provider, please call [phone] and they will assist you.      [employer] is an electronic gateway that provides easy,  "online access to your medical records. With [employer], you can request a clinic appointment, read your test results, renew a prescription or communicate with your care team.     To access your existing account, please contact your [facility] or call [phone] for assistance.        Care EveryWhere ID     This is your Care EveryWhere ID. This could be used by other organizations to access your [facility] medical records  [id number]        Your Vitals Were     Pulse Height Last Period Pulse Oximetry BMI (Body Mass Index)       96 5' 5\" (LMP Unknown) 96% 36.79 kg/m2        Blood Pressure from Last 3 Encounters:   04/20/18 132/90   04/10/18 110/81   03/21/18 132/84    Weight from Last 3 Encounters:   04/20/18 221 lb 1.6 oz   04/10/18 220 lb 12.8 oz   03/21/18 225 lb              Today, you had the following     No orders found for display         Today's Medication Changes          These changes are accurate as of 4/20/18 11:00 AM.  If you have any questions, ask your nurse or doctor.               These medicines have changed or have updated prescriptions.        Dose/Directions    naltrexone 50 MG tablet   Commonly known as:  DEPADE;REVIA   This may have changed:  See the new instructions.   Used for:  Class 2 obesity with body mass index (BMI) of 36.0 to 36.9 in adult, unspecified obesity type, unspecified whether serious comorbidity present   Changed by:  Nuris Ramírez PA-C        Dose:  50 mg   Take 1 tablet (50 mg) by mouth daily   Quantity:  30 tablet   Refills:  3            Where to get your medicines      These medications were sent to [facility]/pharmacy #[id number] - [place], [place] - [address] [facility]  [address], [place] [address]     Phone:  [phone]     naltrexone 50 MG tablet                Primary Care Provider Office Phone # Fax #    Cherie Brennan [phone] [address] [facility] [address]        Equal Access to Services     " LORNE [facility]: Hadii aad ku jonathon Zee, waaxda luqadaha, qaybta kaalmada adestone, adrián carmelo shaggylizandro rickettsjuan luis michael jennifer . So [facility] [phone].    ATENCIÓN: Si habla español, tiene a rizvi disposición servicios gratuitos de asistencia lingüística. Llame al [phone].    We comply with applicable federal civil rights laws and [place] laws. We do not discriminate on the basis of race, color, national origin, age, disability, sex, sexual orientation, or gender identity.            Thank you!     Thank you for choosing [facility] WEIGHT MANAGEMENT  for your care. Our goal is always to provide you with excellent care. Hearing back from our patients is one way we can continue to improve our services. Please take a few minutes to complete the written survey that you may receive in the mail after your visit with us. Thank you!             Your Updated Medication List - Protect others around you: Learn how to safely use, store and throw away your medicines at www.disposemymeds.org.          This list is accurate as of 4/20/18 11:00 AM.  Always use your most recent med list.                   Brand Name Dispense Instructions for use Diagnosis    ACETAMINOPHEN EXTRA STRENGTH PO      Pt reports taking 650 MG arthritis        * albuterol (2.5 MG/3ML) 0.083% neb solution     3 mL    Take  by nebulization. take 3 mL by nebulization 4 times daily as needed    Moderate persistent asthma       * albuterol 108 (90 Base) MCG/ACT Inhaler    PROAIR HFA    1 Inhaler    Inhale 1-2 puffs into the lungs every 6 hours as needed for shortness of breath / dyspnea    Moderate persistent asthma with exacerbation       atorvastatin 10 MG tablet    LIPITOR    90 tablet    TAKE 1 TABLET (10 MG) BY MOUTH DAILY    Hyperlipidemia LDL goal <130       CALCIUM 500 PO      Take 1 tablet by mouth daily        CLARITIN 10 MG tablet   Generic drug:  loratadine     0    1 TAB PO QD (Once per day) as needed for ALLERGY SYMPTOMS    Allergic  rhinitis due to other allergen       CO Q 10 PO      Take 200 mg by mouth daily        cyclobenzaprine 5 MG tablet    FLEXERIL    180 tablet    Take 1-2 tablets (5-10 mg) by mouth 3 times daily as needed for muscle spasms    Spasm of back muscles       desvenlafaxine fumarate 100 MG 24 hr tablet     30 tablet    Take 1 tablet (100 mg) by mouth daily    Major depressive disorder, recurrent episode, moderate (H)       desvenlafaxine succinate 50 MG 24 hr tablet    PRISTIQ    30 tablet    Take 1 tablet (50 mg) by mouth daily    Major depressive disorder, recurrent episode, moderate (H)       fenofibrate 145 MG tablet     90 tablet    TAKE 1 TABLET BY MOUTH DAILY    Pure hyperglyceridemia       levothyroxine 75 MCG tablet    SYNTHROID    90 tablet    Take 1 tablet (75 mcg) by mouth every morning Overdue for labs    Chronic lymphocytic thyroiditis       Lutein 10 MG Tabs      Take 10 mg by mouth daily        MIRAPEX 0.125 MG tablet   Generic drug:  pramipexole      Take two tabs at dinner and one at hs        naltrexone 50 MG tablet    DEPADE;REVIA    30 tablet    Take 1 tablet (50 mg) by mouth daily    Class 2 obesity with body mass index (BMI) of 36.0 to 36.9 in adult, unspecified obesity type, unspecified whether serious comorbidity present       omeprazole 20 MG CR capsule    priLOSEC    180 capsule    Take 1 capsule (20 mg) by mouth 2 times daily    Gastroesophageal reflux disease without esophagitis       ranitidine 300 MG tablet    ZANTAC    60 tablet    TAKE 1 TABLET (300 MG) BY MOUTH 2 TIMES DAILY    Gastroesophageal reflux disease without esophagitis       SYMBICORT 160-4.5 MCG/ACT Inhaler   Generic drug:  budesonide-formoterol      Inhale 1 puff into the lungs 2 times daily    Moderate persistent asthma without complication       triamcinolone 55 MCG/ACT nasal inhaler    NASACORT AQ    1 Inhaler    Inhale 2 sprays in both nostrils every day as needed    Allergic rhinitis due to other allergen       UNABLE TO  FIND      MEDICATION NAME: Allergy shots        VITAMIN C PO      Take 1,000 mg by mouth daily        zolpidem 10 MG tablet    AMBIEN    30 tablet    TAKE 1/2 TO 1 TABLET BY MOUTH NIGHTLY AS NEEDED    Insomnia, unspecified type       * Notice:  This list has 2 medication(s) that are the same as other medications prescribed for you. Read the directions carefully, and ask your doctor or other care provider to review them with you.

## 2018-04-30 ENCOUNTER — OFFICE VISIT (OUTPATIENT)
Dept: PSYCHOLOGY | Facility: CLINIC | Age: 63
End: 2018-04-30
Payer: COMMERCIAL

## 2018-04-30 DIAGNOSIS — F33.0 MAJOR DEPRESSIVE DISORDER, RECURRENT EPISODE, MILD WITH ANXIOUS DISTRESS (H): Primary | ICD-10-CM

## 2018-04-30 PROCEDURE — 90834 PSYTX W PT 45 MINUTES: CPT | Performed by: COUNSELOR

## 2018-04-30 NOTE — MR AVS SNAPSHOT
MRN:[id number]                      After Visit Summary   4/30/2018    Brandi Stern    MRN: [id number]           Visit Information        Provider Department      4/30/2018 10:00 AM Ronda Lackey [facility] Generic      Your next 10 appointments already scheduled     May 14, 2018 10:00 AM CDT   [facility]   [facility] ([facility]    [facility]  [address]  [facility] [id number] [phone]            Jun 26, 2018 11:15 AM CDT   (Arrive by 11:00 AM)   Return Visit with Robbie Flores MD   [facility] Medical Weight Management ([facility])    [address] [facility]  [address] [facility] [address] [phone]              MyChart Information     [employer] gives you secure access to your electronic health record. If you see a primary care provider, you can also send messages to your care team and make appointments. If you have questions, please call your primary care clinic.  If you do not have a primary care provider, please call [phone] and they will assist you.        Care EveryWhere ID     This is your Care EveryWhere ID. This could be used by other organizations to access your [facility] medical records  [id number]        Equal Access to Services     LORNE HALEY : Keyur holt Sohéctor, waaxda luqadaha, qaybta kaalmada adeegyada, adrián perkins. So [facility] [phone].    ATENCIÓN: Si habla español, tiene a rizvi disposición servicios gratuitos de asistencia lingüística. Llame al [phone].    We comply with applicable federal civil rights laws and [place] laws. We do not discriminate on the basis of race, color, national origin, age, disability, sex, sexual orientation, or gender identity.

## 2018-04-30 NOTE — PROGRESS NOTES
"                                           Progress Note    Client Name: Brandi Stern  Date: 4/30/2018         Service Type: Individual      Session Start Time: 10:10a  Session End Time: 10:50a      Session Length: 40 minutes     Session #: 20     Attendees: Client attended alone    Treatment Plan Last Reviewed: 4/30/2018  PHQ-9: 5 - but very difficult        DATA      Progress Since Last Session (Related to Symptoms / Goals / Homework):   Symptoms: Stable, see Epic for PHQ 9 update    Homework: Client will stop buying pots to save for vacation and keep wine out of the house.      Episode of Care Goals: Some progress - ACTION (Actively working towards change); Intervened by reinforcing change plan / affirming steps taken     Current / Ongoing Stressors and Concerns:  Reported feeling good overall - energy continues to be low, but mood and productivity have been good; reported 2 potential out of country vacations, but acknowledged trips are not financially realistic at this time - identified a plan to stop buying pots to save money; reported plans to meet with old work friend - described friend to be \"odd\" and \"strange\", but stated she believes he has a good heart and wants to spend time with him; identified feelings of loneliness at times, desiring romantic companionship, but ambivalent about pursuing it as she is pretty content with herself overall.      Treatment Objective(s) Addressed in This Session:     Client will learn 3 new skills to cope with stress other than smoking. Client will organize/clean her home within 12 sessions.      Intervention:   Motivational Interviewing: continue to evoke desire and readiness to change, pointing out discrepancies, support self-efficacy; CBT: identify mental health needs and maladaptive behaviors that she would like to change, teach about internal locus of control, teach assertiveness skills and emotion identification and regulation, teach self-reflection to identify " "mental health needs and to help with decision making and effective coping, identify intentional behavior changes that are contributing to improved mood, teach identifying triggers or warning signs for increasing depression, reinforce information on energy activation (20 second strategy); DBT: teach and reinforce emotion regulation, reinforce radical acceptance; Interpersonal therapy: continue to prompt client to engage in emotional deepening to address core issues, role play for insight development         ASSESSMENT: Current Emotional / Mental Status (status of significant symptoms):   Client has had a history of suicidal ideation: passive thoughts of dealth, \"Is there pain in the afterlife?\", 1 suicide attempt: in college after breakup with boyfriend,  was drinking and ingested pills, no hospitalization, would never really hurt self   Client denies current fears or concerns for personal safety.   Client reports the following current or recent suicidal ideation or behaviors: passive thoughts of dealth, \"Is there pain in the afterlife?\". Would never really hurt self.   Client denies current or recent homicidal ideation or behaviors.   Client denies current or recent self injurious behavior or ideation.   Client denies other safety concerns.   Client reports there are no firearms in the house.   A safety and risk management plan has not been developed at this time, however client was given the after-hours number / 911 should there be a change in any of  these risk factors.     Appearance:   Appropriate    Eye Contact:   Fair    Psychomotor Behavior: Normal    Attitude:   Cooperative    Orientation:   All   Speech    Rate / Production: Normal     Volume:  Normal    Mood:    \"Good\"   Affect:    Appropriate  Restricted at times   Thought Content:  Clear    Thought Form:  Coherent  Circumstantial Tangential    Insight:    Fair     Medication Review:   See Epic for updates     Medication Compliance:   Yes     Changes in " Health Issues:   None reported     Chemical Use Review:   Substance Use: Chemical use reviewed, no active concerns identified     Tobacco Use: Some slip ups in the past 2 weeks.      Collateral Reports Completed:   Not Applicable      PLAN: (Client Tasks / Therapist Tasks / Other)  Client will set limits with work by saying no and taking less patients within the next month; client will organize/clean her home within 12 sessions. Client will learn 3 new skills to cope with stress other smoking or decide if she wants to quit or not quit smoking; client will work on noting and attending to vulnerabilities of feeling emotional/sensative/teary eyed.Therapist will continue to use motivational interviewing to evoke change talk and CBT strategies to engage client in proactive problem solving, practice distress tolerance as it relates to work and interpersonal relationships, and teach emotion identification and regulation. Continue to reinforce boudanry setting. Therapist will also continue to build on client's strengths and areas of her life where she feels she is making progress. Reinforce energy activation and challenging maladaptive behaviors.          Ronda Lackey [facility]                                                       ___________________________________________________________________    Treatment Plan    Client's Name: Brandi Stern  YOB: 1955    Date: 10/20/2017    DSM-V Diagnoses: 296.31 (F33.0) Major Depressive Disorder, Recurrent Episode, Mild _ and With anxious distress  Psychosocial / Contextual Factors: Work stress, health concerns, family stress  WHODAS: 29    Referral / Collaboration:  Referral to another professional/service is not indicated at this time.    Anticipated number of session or this episode of care: 12      MeasurableTreatment Goal(s) related to diagnosis / functional impairment(s)  Goal 1: Client will improve depressed mood as evidenced by decreased score on PHQ 9 from 19  (moderately severe) to score range of 10-14 (moderate).    I will know I've met my goal when I feel more accomplished, in control, house looks better, and I would not be smoking.      Objective #A (Client Action)    Client will set limits with work by saying no and taking less patients within the next month.  Status: Continued - Date: 10/20/2017     Intervention(s)  Therapist will role-play assertiveness and conflict management skills.  ...teach about healthy boundaries.    Objective #B  Client will organize/clean her home within 12 sessions.   Status: Continued - Date: 10/20/2017     Intervention(s)  Therapist will teach emotional regulation skills and developing routine.    Objective #C  Client will learn 3 new skills to cope with stress other than smoking.   Status: Continued - Date: 10/20/2017     Intervention(s)  Therapist will assign homework of practicing skills at home.  ...teach self care, distraction, and distress tolerance skills.    Objective #D (Client Action)                                    Monitor risk factors associated with hx of SI at every session.   Status: New - Date: 4/30/2018      Intervention(s)  Therapist will assign homework of effective help seeking behaviors...  ...role-play communication skills to secure support and teach about identifying warning signs for risks.      Client has reviewed and agreed to the above plan.      ELISE Sheikh  October 20, 2017

## 2018-05-01 ASSESSMENT — PATIENT HEALTH QUESTIONNAIRE - PHQ9: SUM OF ALL RESPONSES TO PHQ QUESTIONS 1-9: 5

## 2018-05-02 ENCOUNTER — MYC MEDICAL ADVICE (OUTPATIENT)
Dept: FAMILY MEDICINE | Facility: CLINIC | Age: 63
End: 2018-05-02

## 2018-05-02 DIAGNOSIS — G25.81 RESTLESS LEG SYNDROME: Primary | ICD-10-CM

## 2018-05-02 RX ORDER — PRAMIPEXOLE DIHYDROCHLORIDE 0.12 MG/1
TABLET ORAL
Qty: 270 TABLET | Status: SHIPPED | OUTPATIENT
Start: 2018-05-02 | End: 2019-04-17

## 2018-05-02 NOTE — TELEPHONE ENCOUNTER
Routing refill request to provider for review/approval because:  Drug not active on patient's medication list  Medication is reported/historical

## 2018-05-02 NOTE — TELEPHONE ENCOUNTER
Sent mychart - looks like med was previously historical - discussed and educated on communication with pharmacy about making a request to a provider of choice    Closing encounter - no further actions needed at this time    Phoenix Aviles RN

## 2018-05-04 ENCOUNTER — MYC MEDICAL ADVICE (OUTPATIENT)
Dept: FAMILY MEDICINE | Facility: CLINIC | Age: 63
End: 2018-05-04

## 2018-05-04 DIAGNOSIS — M79.18 MYOFASCIAL PAIN: ICD-10-CM

## 2018-05-14 ENCOUNTER — OFFICE VISIT (OUTPATIENT)
Dept: PSYCHOLOGY | Facility: CLINIC | Age: 63
End: 2018-05-14
Payer: COMMERCIAL

## 2018-05-14 DIAGNOSIS — F33.0 MAJOR DEPRESSIVE DISORDER, RECURRENT EPISODE, MILD WITH ANXIOUS DISTRESS (H): Primary | ICD-10-CM

## 2018-05-14 DIAGNOSIS — E78.1 PURE HYPERGLYCERIDEMIA: ICD-10-CM

## 2018-05-14 PROCEDURE — 90834 PSYTX W PT 45 MINUTES: CPT | Performed by: COUNSELOR

## 2018-05-14 NOTE — MR AVS SNAPSHOT
MRN:[id number]                      After Visit Summary   5/14/2018    Brandi Stern    MRN: [id number]           Visit Information        Provider Department      5/14/2018 10:00 AM Ronda Lackey [facility]      Your next 10 appointments already scheduled     May 29, 2018 12:00 PM CDT   Return Visit with Susanaizaiah Darya [facility] ([facility][1])    [facility]  [address] [facility]  [facility] [address]   [phone]            Jun 12, 2018 12:00 PM CDT   Return Visit with Susanaizaiah Darya [facility] ([facility][1])    [facility]  [address] [facility]  [facility] [id number] [phone]            Jun 26, 2018 11:15 AM CDT   (Arrive by 11:00 AM)   Return Visit with Robbie Flores MD   [facility] ([facility])    [address] [place] [id number] [phone]              MyChart Information     [employer] gives you secure access to your electronic health record. If you see a primary care provider, you can also send messages to your care team and make appointments. If you have questions, please call your primary care clinic.  If you do not have a primary care provider, please call [phone] and they will assist you.        Care EveryWhere ID     This is your Care EveryWhere ID. This could be used by other organizations to access your [place] medical records  [id number]        Equal Access to Services     LORNE HALEY : Hadii aad ku hadasho Soomaali, waaxda luqadaha, qaybta kaalmada adeegyada, adrián perkins. So [facility] [phone].    ATENCIÓN: Si habla español, tiene a rizvi disposición servicios gratuitos de asistencia lingüística. Llame al [phone].    We comply with applicable federal civil rights laws and [place] laws. We do not  discriminate on the basis of race, color, national origin, age, disability, sex, sexual orientation, or gender identity.

## 2018-05-14 NOTE — PROGRESS NOTES
"                                           Progress Note    Client Name: Brandi Stern  Date: 5/14/2018         Service Type: Individual      Session Start Time: 10:05a  Session End Time: 10:45a      Session Length: 40 minutes     Session #: 21     Attendees: Client attended alone    Treatment Plan Last Reviewed: 5/14/2018  PHQ-9:  6 - but very difficult        DATA      Progress Since Last Session (Related to Symptoms / Goals / Homework):   Symptoms: Stable, see Epic for PHQ 9 update    Homework: Client will stop buying pots to save for vacation and keep wine out of the house - completed. Client will work to stop smoking by next appt.      Episode of Care Goals: Some progress - ACTION (Actively working towards change); Intervened by reinforcing change plan / affirming steps taken     Current / Ongoing Stressors and Concerns:  Continued stable with low energy; reported some difficulties with family over mother's day weekend - mother being \"overbearing\", not open to change and health support and therefore being irritable, brother talking over her (one time she had to leave gathering to calm herself down before reengaging with brother); expressed wanting to be more kind towards mother and acknowledged needing to practice more distress tolerance with family; followed through with friend outings - realizing how \"corky\" and unthoughtful some friends can be, but wanting to sustain relationship with them as they support her in other ways.      Treatment Objective(s) Addressed in This Session:     Client will learn 3 new skills to cope with stress other than smoking. Client will organize/clean her home within 12 sessions. Monitor risk factors associated with hx of SI at every session.      Intervention:   Motivational Interviewing: continue to evoke desire and readiness to change, pointing out discrepancies, support self-efficacy; CBT: identify mental health needs and maladaptive behaviors that she would like to change, " "teach about internal locus of control, teach assertiveness skills and emotion identification and regulation, teach self-reflection to identify mental health needs and to help with decision making and effective coping, identify intentional behavior changes that are contributing to improved mood, teach identifying triggers or warning signs for increasing depression, reinforce information on energy activation (20 second strategy); DBT: teach and reinforce emotion regulation, reinforce radical acceptance; Interpersonal therapy: continue to prompt client to engage in emotional deepening to address core issues, role play for insight development         ASSESSMENT: Current Emotional / Mental Status (status of significant symptoms):   Client has had a history of suicidal ideation: passive thoughts of dealth, \"Is there pain in the afterlife?\", 1 suicide attempt: in college after breakup with boyfriend,  was drinking and ingested pills, no hospitalization, would never really hurt self   Client denies current fears or concerns for personal safety.   Client reports the following current or recent suicidal ideation or behaviors: passive thoughts of dealth, \"Is there pain in the afterlife?\". Would never really hurt self.   Client denies current or recent homicidal ideation or behaviors.   Client denies current or recent self injurious behavior or ideation.   Client denies other safety concerns.   Client reports there are no firearms in the house.   A safety and risk management plan has not been developed at this time, however client was given the after-hours number / 911 should there be a change in any of  these risk factors.     Appearance:   Appropriate    Eye Contact:   Fair    Psychomotor Behavior: Normal    Attitude:   Cooperative    Orientation:   All   Speech    Rate / Production: Normal     Volume:  Normal    Mood:    \"Good\" Fatigue    Affect:    Appropriate  Restricted at times   Thought Content:  Clear    Thought " Form:  Coherent  Circumstantial Tangential    Insight:    Fair     Medication Review:   See Epic for updates     Medication Compliance:   Yes     Changes in Health Issues:   None reported     Chemical Use Review:   Substance Use: Chemical use reviewed, no active concerns identified     Tobacco Use: Some slip ups in the past 2 weeks.      Collateral Reports Completed:   Not Applicable      PLAN: (Client Tasks / Therapist Tasks / Other)  Client will set limits with work by saying no and taking less patients within the next month; client will organize/clean her home within 12 sessions. Client will learn 3 new skills to cope with stress other smoking or decide if she wants to quit or not quit smoking; client will work on noting and attending to vulnerabilities of feeling emotional/sensative/teary eyed.Therapist will continue to use motivational interviewing to evoke change talk and CBT strategies to engage client in proactive problem solving, practice distress tolerance as it relates to work and interpersonal relationships, and teach emotion identification and regulation. Continue to reinforce boudanry setting. Therapist will also continue to build on client's strengths and areas of her life where she feels she is making progress. Reinforce energy activation and challenging maladaptive behaviors.          Ronda Lackey [facility]                                                       ___________________________________________________________________    Treatment Plan    Client's Name: Brandi Stern  YOB: 1955    Date: 10/20/2017    DSM-V Diagnoses: 296.31 (F33.0) Major Depressive Disorder, Recurrent Episode, Mild _ and With anxious distress  Psychosocial / Contextual Factors: Work stress, health concerns, family stress  WHODAS: 29    Referral / Collaboration:  Referral to another professional/service is not indicated at this time.    Anticipated number of session or this episode of care:  12      MeasurableTreatment Goal(s) related to diagnosis / functional impairment(s)  Goal 1: Client will improve depressed mood as evidenced by decreased score on PHQ 9 from 19 (moderately severe) to score range of 10-14 (moderate).    I will know I've met my goal when I feel more accomplished, in control, house looks better, and I would not be smoking.      Objective #A (Client Action)    Client will set limits with work by saying no and taking less patients within the next month.  Status: Continued - Date: 10/20/2017     Intervention(s)  Therapist will role-play assertiveness and conflict management skills.  ...teach about healthy boundaries.    Objective #B  Client will organize/clean her home within 12 sessions.   Status: Continued - Date: 10/20/2017     Intervention(s)  Therapist will teach emotional regulation skills and developing routine.    Objective #C  Client will learn 3 new skills to cope with stress other than smoking.   Status: Continued - Date: 10/20/2017     Intervention(s)  Therapist will assign homework of practicing skills at home.  ...teach self care, distraction, and distress tolerance skills.    Objective #D (Client Action)                                    Monitor risk factors associated with hx of SI at every session.   Status: New - Date: 4/30/2018      Intervention(s)  Therapist will assign homework of effective help seeking behaviors...  ...role-play communication skills to secure support and teach about identifying warning signs for risks.      Client has reviewed and agreed to the above plan.      ELISE Sheikh  October 20, 2017

## 2018-05-15 ASSESSMENT — PATIENT HEALTH QUESTIONNAIRE - PHQ9: SUM OF ALL RESPONSES TO PHQ QUESTIONS 1-9: 6

## 2018-05-15 NOTE — TELEPHONE ENCOUNTER
"Requested Prescriptions   Pending Prescriptions Disp Refills     fenofibrate 145 MG tablet [Pharmacy Med Name: FENOFIBRATE 145 MG TABLET]  Last Written Prescription Date:  2/12/2018  Last Fill Quantity: 90 tablet,  # refills: 0   Last Office Visit: 4/10/2018  Future Office Visit:    Next 5 appointments (look out 90 days)     May 29, 2018 12:00 PM CDT   Return Visit with Ronda Lackey [facility]  [address] [facility]  [facility] [address]   [phone]            Jun 12, 2018 12:00 PM CDT   Return Visit with Ronda Lackey [facility]  [address] [facility] [facility]  [facility] [address]   [phone]                90 tablet 0     Sig: TAKE 1 TABLET BY MOUTH DAILY    Fibrates Passed    5/14/2018  1:28 AM       Passed - Lipid panel on file in past 12 months    Recent Labs   Lab Test  03/21/18   1139   10/09/15   0850   CHOL  180   < >  169   TRIG  275*   < >  115   HDL  49*   < >  75   LDL  76   < >  71   NHDL  131*   < >   --    VLDL   --    --   23   CHOLHDLRATIO   --    --   2.3    < > = values in this interval not displayed.          Passed - No abnormal creatine kinase in past 12 months    No lab results found.          Passed - Recent (12 mo) or future (30 days) visit within the authorizing provider's specialty    Patient had office visit in the last 12 months or has a visit in the next 30 days with authorizing provider or within the authorizing provider's specialty.  See \"Patient Info\" tab in inbasket, or \"Choose Columns\" in Meds & Orders section of the refill encounter.           Passed - Patient is age 18 or older       Passed - No active pregnancy on record       Passed - No positive pregnancy test in past 12 months          "

## 2018-05-16 DIAGNOSIS — F33.1 MAJOR DEPRESSIVE DISORDER, RECURRENT EPISODE, MODERATE (H): ICD-10-CM

## 2018-05-16 NOTE — TELEPHONE ENCOUNTER
Requested Prescriptions   Pending Prescriptions Disp Refills     desvenlafaxine succinate (PRISTIQ) 100 MG 24 hr tablet [Pharmacy Med Name: DESVENLAFAXINE SUC  MG]  Last Written Prescription Date:  4/13/2018  Last Fill Quantity: 30 tablet,  # refills: 5   Last Office Visit: 4/10/2018   Future Office Visit:    Next 5 appointments (look out 90 days)     May 29, 2018 12:00 PM CDT   Return Visit with Ronda Lackey [facility] ([address] [address]   [phone]            Jun 12, 2018 12:00 PM CDT   Return Visit with Ronda Lackey [facility] ([place] [address] [address]   [phone]                  30 tablet 6     Sig: TAKE 1 TABLET (100 MG) BY MOUTH DAILY    Serotonin-Norepinephrine Reuptake Inhibitors  Failed    5/16/2018  1:59 PM  PHQ-9 SCORE 4/16/2018 4/30/2018 5/14/2018   Total Score - - -   Total Score MyChart - - -   Total Score 5 5 6   Some encounter information is confidential and restricted. Go to Review Flowsheets activity to see all data.     FILEMON-7 SCORE 2/8/2017 10/31/2017 4/16/2018   Total Score - - -   Total Score 6 0 5   Some encounter information is confidential and restricted. Go to Review Flowsheets activity to see all data.                Failed - Blood pressure under 140/90 in past 12 months    BP Readings from Last 3 Encounters:   04/20/18 132/90   04/10/18 110/81   03/21/18 132/84                Passed - PHQ-9 score of less than 5 in past 6 months    Please review last PHQ-9 score.          Passed - Patient is age 18 or older       Passed - No active pregnancy on record       Passed - Normal serum creatinine on file in past 12 months    Recent Labs   Lab Test  12/14/17   1202   CR  0.95            Passed - No positive pregnancy test in past 12 months       Passed - Recent (6 mo) or future  "(30 days) visit within the authorizing provider's specialty    Patient had office visit in the last 6 months or has a visit in the next 30 days with authorizing provider or within the authorizing provider's specialty.  See \"Patient Info\" tab in inbasket, or \"Choose Columns\" in Meds & Orders section of the refill encounter.              "

## 2018-05-17 RX ORDER — FENOFIBRATE 145 MG/1
TABLET, COATED ORAL
Qty: 90 TABLET | Refills: 0 | Status: SHIPPED | OUTPATIENT
Start: 2018-05-17 | End: 2018-08-19

## 2018-05-18 ENCOUNTER — TRANSFERRED RECORDS (OUTPATIENT)
Dept: HEALTH INFORMATION MANAGEMENT | Facility: CLINIC | Age: 63
End: 2018-05-18

## 2018-05-19 DIAGNOSIS — E78.1 PURE HYPERGLYCERIDEMIA: ICD-10-CM

## 2018-05-20 NOTE — TELEPHONE ENCOUNTER
"Requested Prescriptions   Pending Prescriptions Disp Refills     fenofibrate 145 MG tablet [Pharmacy Med Name: FENOFIBRATE 145 MG TABLET]      Last Written Prescription Date:  5/17/2018  Last Fill Quantity: 90 tablets   ,  # refills: 0   Last Office Visit:  4/10/2018   Future Office Visit:    Next 5 appointments (look out 90 days)     May 29, 2018 12:00 PM CDT   Return Visit with Ronda Lackey [facility]  [address] [place], [place] [id number]  [employer] [address]   [phone]            Jun 12, 2018 12:00 PM CDT   Return Visit with Ronda Lackey [facility]  [address] [facility]  [employer] [facility] [address]   [phone]                  90 tablet 0     Sig: TAKE 1 TABLET BY MOUTH DAILY    Fibrates Passed    5/19/2018 12:38 PM       Passed - Lipid panel on file in past 12 months    Recent Labs   Lab Test  03/21/18   1139   10/09/15   0850   CHOL  180   < >  169   TRIG  275*   < >  115   HDL  49*   < >  75   LDL  76   < >  71   NHDL  131*   < >   --    VLDL   --    --   23   CHOLHDLRATIO   --    --   2.3    < > = values in this interval not displayed.          Passed - No abnormal creatine kinase in past 12 months    No lab results found.          Passed - Recent (12 mo) or future (30 days) visit within the authorizing provider's specialty    Patient had office visit in the last 12 months or has a visit in the next 30 days with authorizing provider or within the authorizing provider's specialty.  See \"Patient Info\" tab in inbasket, or \"Choose Columns\" in Meds & Orders section of the refill encounter.           Passed - Patient is age 18 or older       Passed - No active pregnancy on record       Passed - No positive pregnancy test in past 12 months          "

## 2018-05-21 RX ORDER — FENOFIBRATE 145 MG/1
TABLET, COATED ORAL
Qty: 0.01 TABLET | Refills: 0 | OUTPATIENT
Start: 2018-05-21

## 2018-05-21 NOTE — TELEPHONE ENCOUNTER
Duplicate    Refused Prescriptions:                       Disp   Refills    fenofibrate 145 MG tablet [Pharmacy Med Na*0.01 t*0        Sig: TAKE 1 TABLET BY MOUTH DAILY  Refused By: THAO GOEL  Reason for Refusal: Duplicate      Closing encounter - no further actions needed at this time    Thao Goel RN

## 2018-05-21 NOTE — TELEPHONE ENCOUNTER
PHQ 9 > 4  This was just sent on 4/13/18  Patient also has Desvenlafaxine Fumarate 100 mg on her med list  Is she to be taking both?  The Desvenlafaxine succinate 50 mg comes up as needing a PA

## 2018-05-22 RX ORDER — DESVENLAFAXINE 100 MG/1
TABLET, EXTENDED RELEASE ORAL
Qty: 0.01 TABLET | Refills: 0 | OUTPATIENT
Start: 2018-05-22

## 2018-05-22 NOTE — TELEPHONE ENCOUNTER
Duplicate    Refused Prescriptions:                       Disp   Refills    desvenlafaxine succinate (PRISTIQ) 100 MG *0.01 t*0        Sig: TAKE 1 TABLET (100 MG) BY MOUTH DAILY  Refused By: THAO GOEL  Reason for Refusal: Duplicate      Closing encounter - no further actions needed at this time    Thao Goel RN

## 2018-05-29 ENCOUNTER — OFFICE VISIT (OUTPATIENT)
Dept: PSYCHOLOGY | Facility: CLINIC | Age: 63
End: 2018-05-29
Payer: COMMERCIAL

## 2018-05-29 DIAGNOSIS — F33.0 MAJOR DEPRESSIVE DISORDER, RECURRENT EPISODE, MILD WITH ANXIOUS DISTRESS (H): Primary | ICD-10-CM

## 2018-05-29 DIAGNOSIS — K21.9 GASTROESOPHAGEAL REFLUX DISEASE WITHOUT ESOPHAGITIS: ICD-10-CM

## 2018-05-29 PROCEDURE — 90834 PSYTX W PT 45 MINUTES: CPT | Performed by: COUNSELOR

## 2018-05-29 NOTE — TELEPHONE ENCOUNTER
"Requested Prescriptions   Pending Prescriptions Disp Refills     omeprazole (PRILOSEC) 20 MG CR capsule [Pharmacy Med Name: OMEPRAZOLE DR 20 MG CAPSULE] 180 capsule 0     Sig: TAKE 1 TABLET (20 MG) BY MOUTH 2 TIMES DAILY TAKE 30-60 MINUTES BEFORE A MEAL.    PPI Protocol Passed    5/29/2018  1:29 AM       Passed - Not on Clopidogrel (unless Pantoprazole ordered)       Passed - No diagnosis of osteoporosis on record       Passed - Recent (12 mo) or future (30 days) visit within the authorizing provider's specialty    Patient had office visit in the last 12 months or has a visit in the next 30 days with authorizing provider or within the authorizing provider's specialty.  See \"Patient Info\" tab in inbasket, or \"Choose Columns\" in Meds & Orders section of the refill encounter.           Passed - Patient is age 18 or older       Passed - No active pregnacy on record       Passed - No positive pregnancy test in past 12 months        Refills through 7/24 refill too soon    Refused Prescriptions:                       Disp   Refills    omeprazole (PRILOSEC) 20 MG CR capsule [Ph*0.01 c*0        Sig: TAKE 1 TABLET (20 MG) BY MOUTH 2 TIMES DAILY TAKE           30-60 MINUTES BEFORE A MEAL.  Refused By: THAO GOEL  Reason for Refusal: Patient has requested refill too soon      Closing encounter - no further actions needed at this time    Thao Goel RN    "

## 2018-05-29 NOTE — PROGRESS NOTES
Progress Note    Client Name: Brandi Stern  Date: 5/29/2018         Service Type: Individual      Session Start Time: 12:05p  Session End Time: 12:45p      Session Length: 40 minutes     Session #: 22     Attendees: Client attended alone    Treatment Plan Last Reviewed: 5/29/2018  PHQ-9:  6        DATA      Progress Since Last Session (Related to Symptoms / Goals / Homework):   Symptoms: Stable, see Epic for PHQ 9 update     Homework: Not completed - client will work to stop smoking. By next appt, client will practice patience for mother.      Episode of Care Goals: Some progress - ACTION (Actively working towards change); Intervened by reinforcing change plan / affirming steps taken     Current / Ongoing Stressors and Concerns:  Described having a stressful weekend with mother experiencing a fall and having to be hospitalized; was tearful as she shared frustration towards mother being resistant; reported trying to have compassion and empathy towards mothers, but at times being too tired; reported continued smoking and acknowledged that perhaps she is not motivation to change smoking at this time; reported missing father over Memorial weekend and channeling him when she cares for mother.        Treatment Objective(s) Addressed in This Session:     Client will learn 3 new skills to cope with stress other than smoking. Client will organize/clean her home within 12 sessions. Monitor risk factors associated with hx of SI at every session.      Intervention:   Motivational Interviewing: continue to evoke desire and readiness to change, pointing out discrepancies, support self-efficacy; CBT: identify mental health needs and maladaptive behaviors that she would like to change, teach about internal locus of control, teach assertiveness skills and emotion identification and regulation, teach self-reflection to identify mental health needs and to help with decision making and  "effective coping, identify intentional behavior changes that are contributing to improved mood, teach identifying triggers or warning signs for increasing depression, reinforce information on energy activation (20 second strategy); DBT: teach and reinforce emotion regulation, reinforce radical acceptance; Interpersonal therapy: continue to prompt client to engage in emotional deepening to address core issues, role play for insight development         ASSESSMENT: Current Emotional / Mental Status (status of significant symptoms):   Client has had a history of suicidal ideation: passive thoughts of dealth, \"Is there pain in the afterlife?\", 1 suicide attempt: in college after breakup with boyfriend,  was drinking and ingested pills, no hospitalization, would never really hurt self   Client denies current fears or concerns for personal safety.   Client reports the following current or recent suicidal ideation or behaviors: passive thoughts of dealth, \"Is there pain in the afterlife?\". Would never really hurt self.   Client denies current or recent homicidal ideation or behaviors.   Client denies current or recent self injurious behavior or ideation.   Client denies other safety concerns.   Client reports there are no firearms in the house.   A safety and risk management plan has not been developed at this time, however client was given the after-hours number / 911 should there be a change in any of  these risk factors.     Appearance:   Appropriate    Eye Contact:   Fair    Psychomotor Behavior: Normal    Attitude:   Cooperative    Orientation:   All   Speech    Rate / Production: Normal     Volume:  Normal    Mood:    \"Good  Tired\"  Sad  Tearful   Affect:    Appropriate  Restricted at times   Thought Content:  Clear    Thought Form:  Coherent  Circumstantial Tangential    Insight:    Fair     Medication Review:   See Epic for updates     Medication Compliance:   Yes     Changes in Health Issues:   None " reported     Chemical Use Review:   Substance Use: Chemical use reviewed, no active concerns identified     Tobacco Use: Some slip ups in the past 2 weeks.      Collateral Reports Completed:   Not Applicable      PLAN: (Client Tasks / Therapist Tasks / Other)  Client will set limits with work by saying no and taking less patients within the next month; client will organize/clean her home within 12 sessions. Client will learn 3 new skills to cope with stress other smoking or decide if she wants to quit or not quit smoking; client will work on noting and attending to vulnerabilities of feeling emotional/sensative/teary eyed.Therapist will continue to use motivational interviewing to evoke change talk and CBT strategies to engage client in proactive problem solving, practice distress tolerance as it relates to work and interpersonal relationships, and teach emotion identification and regulation. Continue to reinforce boundary setting. Therapist will also continue to build on client's strengths and areas of her life where she feels she is making progress. Reinforce energy activation and challenging maladaptive behaviors.          Ronda Lackey, [facility]                                                       ___________________________________________________________________    Treatment Plan    Client's Name: Brandi Stern  YOB: 1955    Date: 10/20/2017    DSM-V Diagnoses: 296.31 (F33.0) Major Depressive Disorder, Recurrent Episode, Mild _ and With anxious distress  Psychosocial / Contextual Factors: Work stress, health concerns, family stress  WHODAS: 29    Referral / Collaboration:  Referral to another professional/service is not indicated at this time.    Anticipated number of session or this episode of care: 12      MeasurableTreatment Goal(s) related to diagnosis / functional impairment(s)  Goal 1: Client will improve depressed mood as evidenced by decreased score on PHQ 9 from 19 (moderately severe) to  score range of 10-14 (moderate).    I will know I've met my goal when I feel more accomplished, in control, house looks better, and I would not be smoking.      Objective #A (Client Action)    Client will set limits with work by saying no and taking less patients within the next month.  Status: Continued - Date: 10/20/2017     Intervention(s)  Therapist will role-play assertiveness and conflict management skills.  ...teach about healthy boundaries.    Objective #B  Client will organize/clean her home within 12 sessions.   Status: Continued - Date: 10/20/2017     Intervention(s)  Therapist will teach emotional regulation skills and developing routine.    Objective #C  Client will learn 3 new skills to cope with stress other than smoking.   Status: Continued - Date: 10/20/2017     Intervention(s)  Therapist will assign homework of practicing skills at home.  ...teach self care, distraction, and distress tolerance skills.    Objective #D (Client Action)                                    Monitor risk factors associated with hx of SI at every session.   Status: New - Date: 4/30/2018      Intervention(s)  Therapist will assign homework of effective help seeking behaviors...  ...role-play communication skills to secure support and teach about identifying warning signs for risks.      Client has reviewed and agreed to the above plan.      ELISE Sheikh  October 20, 2017

## 2018-05-29 NOTE — MR AVS SNAPSHOT
MRN:[id number]                      After Visit Summary   5/29/2018    Brandi Stern    MRN: [id number]           Visit Information        Provider Department      5/29/2018 12:00 PM Ronda Lackey [facility]      Your next 10 appointments already scheduled     Jun 12, 2018 12:00 PM CDT   [facility]   [facility] ([facility])    [facility]  [address]  [address]  [facility] [id number] [phone]            Jun 26, 2018 11:15 AM CDT   (Arrive by 11:00 AM)   Return Visit with Robbie Flores MD   [facility] ([facility])    [address]  [address] [facility] [place] [address] [phone]              MyChart Information     [facility] gives you secure access to your electronic health record. If you see a primary care provider, you can also send messages to your care team and make appointments. If you have questions, please call your primary care clinic.  If you do not have a primary care provider, please call [phone] and they will assist you.        Care EveryWhere ID     This is your Care EveryWhere ID. This could be used by other organizations to access your [facility] medical records  [id number]        Equal Access to Services     LRONE HALEY : Keyur holt Sohéctor, waaxda luqadaha, qaybta kaalmada adeegyada, adrián perkins. So [facility] [phone].    ATENCIÓN: Si habla español, tiene a rizvi disposición servicios gratuitos de asistencia lingüística. Llame al [phone].    We comply with applicable federal civil rights laws and [place] laws. We do not discriminate on the basis of race, color, national origin, age, disability, sex, sexual orientation, or gender identity.

## 2018-06-03 DIAGNOSIS — K21.9 GASTROESOPHAGEAL REFLUX DISEASE WITHOUT ESOPHAGITIS: ICD-10-CM

## 2018-06-04 NOTE — TELEPHONE ENCOUNTER
"Requested Prescriptions   Pending Prescriptions Disp Refills     omeprazole (PRILOSEC) 20 MG CR capsule [Pharmacy Med Name: OMEPRAZOLE DR 20 MG CAPSULE]  Last Written Prescription Date:  1/24/2017  Last Fill Quantity: 180 capsule,  # refills: 1   Last Office Visit: 4/10/2018   Future Office Visit:    Next 5 appointments (look out 90 days)     Jun 12, 2018 12:00 PM CDT   Return Visit with Ronda Lackey [facility] ([address] [place] [id number]  [facility] [id number] [phone]                180 capsule 0     Sig: TAKE 1 TABLET (20 MG) BY MOUTH 2 TIMES DAILY TAKE 30-60 MINUTES BEFORE A MEAL.    PPI Protocol Passed    6/3/2018  3:26 PM       Passed - Not on Clopidogrel (unless Pantoprazole ordered)       Passed - No diagnosis of osteoporosis on record       Passed - Recent (12 mo) or future (30 days) visit within the authorizing provider's specialty    Patient had office visit in the last 12 months or has a visit in the next 30 days with authorizing provider or within the authorizing provider's specialty.  See \"Patient Info\" tab in inbasket, or \"Choose Columns\" in Meds & Orders section of the refill encounter.           Passed - Patient is age 18 or older       Passed - No active pregnacy on record       Passed - No positive pregnancy test in past 12 months          "

## 2018-06-05 ENCOUNTER — MYC MEDICAL ADVICE (OUTPATIENT)
Dept: FAMILY MEDICINE | Facility: CLINIC | Age: 63
End: 2018-06-05

## 2018-06-05 NOTE — TELEPHONE ENCOUNTER
Routing to provider - Anu -     S: Patient HTN update - are you wanting her to come into clinic for a nurse BP check?      B/A: last office visit 4/10/18    BP Readings from Last 6 Encounters:   04/20/18 132/90   04/10/18 110/81   03/21/18 132/84   02/20/18 120/89   02/01/18 118/88   12/14/17 119/76     5/8 - mychart - staff asked patient to come into clinic to recheck slightly elevated BP    R: please review and advise as appropriate    Thank you,  Phoenix Aviles RN

## 2018-06-05 NOTE — TELEPHONE ENCOUNTER
Prescription approved per [facility] Refill Protocol.    Signed Prescriptions:                        Disp   Refills    omeprazole (PRILOSEC) 20 MG CR capsule     180 ca*3        Sig: TAKE 1 TABLET (20 MG) BY MOUTH 2 TIMES DAILY TAKE           30-60 MINUTES BEFORE A MEAL.  Authorizing Provider: BENITO OMER  Ordering User: THAO GOEL      Closing encounter - no further actions needed at this time    Thao Goel RN

## 2018-06-06 DIAGNOSIS — G47.00 INSOMNIA, UNSPECIFIED TYPE: ICD-10-CM

## 2018-06-07 DIAGNOSIS — E78.5 HYPERLIPIDEMIA LDL GOAL <130: ICD-10-CM

## 2018-06-07 RX ORDER — ZOLPIDEM TARTRATE 10 MG/1
TABLET ORAL
Qty: 30 TABLET | Refills: 5 | Status: SHIPPED | OUTPATIENT
Start: 2018-06-07 | End: 2018-12-29

## 2018-06-07 NOTE — TELEPHONE ENCOUNTER
"Requested Prescriptions   Pending Prescriptions Disp Refills     atorvastatin (LIPITOR) 10 MG tablet [Pharmacy Med Name: ATORVASTATIN 10 MG TABLET]  Last Written Prescription Date:  3-12-18  Last Fill Quantity: 90 tab,  # refills: 0   Last office visit: 4/10/2018 with prescribing provider:  Caro Brennan    Future Office Visit:   Next 5 appointments (look out 90 days)     Jun 12, 2018 12:00 PM CDT   Return Visit with Ronda Lackey [facility] ([facility]  [id number]  [facility] [id number] [phone]               90 tablet 0     Sig: TAKE 1 TABLET (10 MG) BY MOUTH DAILY    Statins Protocol Passed    6/7/2018  3:52 AM       Passed - LDL on file in past 12 months    Recent Labs   Lab Test  03/21/18   1139   LDL  76          Passed - No abnormal creatine kinase in past 12 months    No lab results found.          Passed - Recent (12 mo) or future (30 days) visit within the authorizing provider's specialty    Patient had office visit in the last 12 months or has a visit in the next 30 days with authorizing provider or within the authorizing provider's specialty.  See \"Patient Info\" tab in inbasket, or \"Choose Columns\" in Meds & Orders section of the refill encounter.           Passed - Patient is age 18 or older       Passed - No active pregnancy on record       Passed - No positive pregnancy test in past 12 months          "

## 2018-06-07 NOTE — TELEPHONE ENCOUNTER
ambien refill request-  Last office visit with you 4/18  Last refill 10/23/17 #30 with 5 refills     Thanks!     Maryjo Lambert RN

## 2018-06-08 NOTE — TELEPHONE ENCOUNTER
Faxed Rx for  AMBIEN 10 mg tablet to [facility] pharmacy. Fax:[phone]      Blessing Jones MA

## 2018-06-11 ENCOUNTER — MYC REFILL (OUTPATIENT)
Dept: FAMILY MEDICINE | Facility: CLINIC | Age: 63
End: 2018-06-11

## 2018-06-11 DIAGNOSIS — E78.5 HYPERLIPIDEMIA LDL GOAL <130: ICD-10-CM

## 2018-06-11 RX ORDER — ATORVASTATIN CALCIUM 10 MG/1
TABLET, FILM COATED ORAL
Qty: 90 TABLET | Refills: 0 | OUTPATIENT
Start: 2018-06-11

## 2018-06-11 RX ORDER — ATORVASTATIN CALCIUM 10 MG/1
10 TABLET, FILM COATED ORAL DAILY
Qty: 90 TABLET | Refills: 2 | Status: SHIPPED | OUTPATIENT
Start: 2018-06-11 | End: 2019-03-12

## 2018-06-11 NOTE — TELEPHONE ENCOUNTER
Message from [employer]:  Original authorizing provider: Cherie Brennan NP    Brandi Stern would like a refill of the following medications:  atorvastatin (LIPITOR) 10 MG tablet [Cherie Brennan NP]    Preferred pharmacy: [id number] - [place], [place] - [address]    Comment:      Medication renewals requested in this message routed to other providers:  naltrexone (DEPADE;REVIA) 50 MG tablet [Nuris Ramírez PA-C]

## 2018-06-12 ENCOUNTER — OFFICE VISIT (OUTPATIENT)
Dept: PSYCHOLOGY | Facility: CLINIC | Age: 63
End: 2018-06-12
Payer: COMMERCIAL

## 2018-06-12 ENCOUNTER — OFFICE VISIT (OUTPATIENT)
Dept: FAMILY MEDICINE | Facility: CLINIC | Age: 63
End: 2018-06-12
Payer: COMMERCIAL

## 2018-06-12 VITALS
DIASTOLIC BLOOD PRESSURE: 82 MMHG | RESPIRATION RATE: 16 BRPM | OXYGEN SATURATION: 96 % | HEART RATE: 89 BPM | TEMPERATURE: 98 F | WEIGHT: 217 LBS | SYSTOLIC BLOOD PRESSURE: 122 MMHG | BODY MASS INDEX: 36.11 KG/M2

## 2018-06-12 DIAGNOSIS — E78.5 HYPERLIPIDEMIA LDL GOAL <130: ICD-10-CM

## 2018-06-12 DIAGNOSIS — I10 HYPERTENSION GOAL BP (BLOOD PRESSURE) < 140/90: Primary | ICD-10-CM

## 2018-06-12 DIAGNOSIS — M50.30 DDD (DEGENERATIVE DISC DISEASE), CERVICAL: ICD-10-CM

## 2018-06-12 DIAGNOSIS — M51.369 DDD (DEGENERATIVE DISC DISEASE), LUMBAR: ICD-10-CM

## 2018-06-12 DIAGNOSIS — F33.0 MAJOR DEPRESSIVE DISORDER, RECURRENT EPISODE, MILD WITH ANXIOUS DISTRESS (H): Primary | ICD-10-CM

## 2018-06-12 PROCEDURE — 99214 OFFICE O/P EST MOD 30 MIN: CPT | Performed by: NURSE PRACTITIONER

## 2018-06-12 PROCEDURE — 90834 PSYTX W PT 45 MINUTES: CPT | Performed by: COUNSELOR

## 2018-06-12 PROCEDURE — 99207 C PAF COMPLETED  NO CHARGE: CPT | Performed by: NURSE PRACTITIONER

## 2018-06-12 RX ORDER — DICLOFENAC SODIUM 100 MG/1
100 TABLET, FILM COATED, EXTENDED RELEASE ORAL DAILY
Qty: 90 TABLET | Refills: 3 | Status: SHIPPED | OUTPATIENT
Start: 2018-06-12 | End: 2018-08-29

## 2018-06-12 RX ORDER — LISINOPRIL 10 MG/1
10 TABLET ORAL DAILY
Qty: 90 TABLET | Status: SHIPPED | OUTPATIENT
Start: 2018-06-12 | End: 2019-04-17

## 2018-06-12 NOTE — PROGRESS NOTES
SUBJECTIVE:   Brandi Stern is a 62 year old female who presents to clinic today for the following health issues:      Pt is concern with blood pressure. Pt checked at home it was 122/89.  Patient  is concerned because of last week was 159/103.    She was on Lisinopril back in 2007, but lost some weight and blood pressure normalized and she has not been on any medication for her blood pressure in over 10 years.     Her back pain has been worse again recently, especially in the morning.  She did get relief from a facet joint injection 2 years ago and was wanting this ordered again, but this morning her pain is better, so she would like to retry Voltaren first.   Her gfr was slightly decreased in April (59) but otherwise has been normal.  Voltaren did not exacerbate her GERD in the past.           Problem list and histories reviewed & adjusted, as indicated.  Additional history: as documented        Reviewed and updated as needed this visit by clinical staff  Tobacco  Allergies  Med Hx  Surg Hx  Fam Hx  Soc Hx      Reviewed and updated as needed this visit by Provider         ROS:  CONSTITUTIONAL: NEGATIVE for fever, chills, change in weight  ENT/MOUTH: NEGATIVE for ear, mouth and throat problems  RESP: NEGATIVE for significant cough or SOB  CV: NEGATIVE for chest pain, palpitations or peripheral edema  GI: NEGATIVE for nausea, abdominal pain, heartburn, or change in bowel habits  MUSCULOSKELETAL: see HPI  NEURO: NEGATIVE for weakness, dizziness or paresthesias  PSYCHIATRIC: NEGATIVE for changes in mood or affect    OBJECTIVE:     /82 (BP Location: Right arm, Patient Position: Chair, Cuff Size: Adult Small)  Pulse 89  Temp 98  F (36.7  C) (Oral)  Resp 16  Wt 217 lb (98.4 kg)  LMP  (LMP Unknown)  SpO2 96%  BMI 36.11 kg/m2  Body mass index is 36.11 kg/(m^2).  GENERAL: healthy, alert and no distress  RESP: lungs clear to auscultation - no rales, rhonchi or wheezes  CV: regular rate and rhythm, normal  S1 S2, no S3 or S4, no murmur, click or rub, no peripheral edema and peripheral pulses strong  PSYCH: mentation appears normal, affect normal/bright        ASSESSMENT/PLAN:             1. Hypertension goal BP (blood pressure) < 140/90  Will start Lisinopril and check labs and blood pressure in 2 weeks.  - lisinopril (PRINIVIL/ZESTRIL) 10 MG tablet; Take 1 tablet (10 mg) by mouth daily  Dispense: 90 tablet; Refill: PRN  - Basic metabolic panel; Future    2. DDD (degenerative disc disease), cervical  Retry Madan, but discussed that if gfr is still decreased when rechecked in 2 weeks, will have to stop.  If this is not helpful, she will let me know through MyChart and will order an injection through the pain clinic.   - diclofenac (VOLTAREN-XR) 100 MG 24 hr tablet; Take 1 tablet (100 mg) by mouth daily  Dispense: 90 tablet; Refill: 3    3. DDD (degenerative disc disease), lumbar  See above.   - diclofenac (VOLTAREN-XR) 100 MG 24 hr tablet; Take 1 tablet (100 mg) by mouth daily  Dispense: 90 tablet; Refill: 3        Cherie Brennan NP  [facility]

## 2018-06-12 NOTE — TELEPHONE ENCOUNTER
"Requested Prescriptions   Pending Prescriptions Disp Refills     atorvastatin (LIPITOR) 10 MG tablet [Pharmacy Med Name: ATORVASTATIN 10 MG TABLET]  Last Written Prescription Date:  6-11-18  Last Fill Quantity: 90 tab,  # refills: 2   Last office visit: 4/10/2018 with prescribing provider:  Caro Brennan    Future Office Visit:   Next 5 appointments (look out 90 days)     Jun 12, 2018  4:00 PM CDT   Garimat Short with Cherie Brennan NP   [facility] ([address] [address]   [phone]            Jun 25, 2018  2:30 PM CDT   Return Visit with Ronda Lackey [facility] ([facility])    [place] [address] [address]   [phone]            Jul 11, 2018 11:00 AM CDT   Return Visit with Ronda Lackey [facility] ([place] [address] [place] [address]   [phone]            Jul 23, 2018  1:00 PM CDT   Return Visit with Ronda Lackey [facility] ([facility])    [place] [address] [place] [address]   [phone]               90 tablet 0     Sig: TAKE 1 TABLET (10 MG) BY MOUTH DAILY    Statins Protocol Passed    6/12/2018 11:54 AM       Passed - LDL on file in past 12 months    Recent Labs   Lab Test  03/21/18   1139   LDL  76          Passed - No abnormal creatine kinase in past 12 months    No lab results found.          Passed - Recent (12 mo) or future (30 days) visit within the authorizing provider's specialty    Patient had office visit in the last 12 months or has a visit in the next 30 days with authorizing provider or within the authorizing provider's specialty.  See \"Patient Info\" tab in inbasket, or \"Choose Columns\" in Meds & Orders section of the refill encounter.           Passed - Patient is " age 18 or older       Passed - No active pregnancy on record       Passed - No positive pregnancy test in past 12 months

## 2018-06-12 NOTE — PROGRESS NOTES
Progress Note    Client Name: Brandi Stern  Date: 6/12/2018         Service Type: Individual      Session Start Time: 12:05p  Session End Time: 12:45p      Session Length: 40 minutes     Session #: 24     Attendees: Client attended alone    Treatment Plan Last Reviewed: 6/12/2018  PHQ-9:  4        DATA      Progress Since Last Session (Related to Symptoms / Goals / Homework):   Symptoms: Stable, see Epic for PHQ 9 update     Homework: Partially completed and continued - client will practice patience for mother. Client will also address health concerns to improve efforts for behavior change.       Episode of Care Goals: Some progress - ACTION (Actively working towards change); Intervened by reinforcing change plan / affirming steps taken     Current / Ongoing Stressors and Concerns:  Reported ongoing low energy and difficulty following through, but feeling good overall; expressed a desire to end therapy end of July due to insurance change and feeling satisfied with her progress; ongoing issues with mother, wanting to practice patience with mother and acknowledged that she needs to be creative with how she approaches conflict with mother; continued to experience barriers to health goals, but having a plan in place to start addressing health limitations/concerns with PCP.     Treatment Objective(s) Addressed in This Session:     Client will learn 3 new skills to cope with stress other than smoking. Client will organize/clean her home within 12 sessions. Monitor risk factors associated with hx of SI at every session.      Intervention:   Motivational Interviewing: continue to evoke desire and readiness to change, pointing out discrepancies, support self-efficacy; CBT: identify mental health needs and maladaptive behaviors that she would like to change, teach about internal locus of control, teach assertiveness skills and emotion identification and regulation, teach  "self-reflection to identify mental health needs and to help with decision making and effective coping, identify intentional behavior changes that are contributing to improved mood, teach identifying triggers or warning signs for increasing depression, reinforce information on energy activation (20 second strategy); DBT: teach and reinforce emotion regulation, reinforce radical acceptance; Interpersonal therapy: continue to prompt client to engage in emotional deepening to address core issues, role play for insight development         ASSESSMENT: Current Emotional / Mental Status (status of significant symptoms):   Client has had a history of suicidal ideation: passive thoughts of dealth, \"Is there pain in the afterlife?\", 1 suicide attempt: in college after breakup with boyfriend,  was drinking and ingested pills, no hospitalization, would never really hurt self   Client denies current fears or concerns for personal safety.   Client reports the following current or recent suicidal ideation or behaviors: passive thoughts of dealth, \"Is there pain in the afterlife?\". Would never really hurt self.   Client denies current or recent homicidal ideation or behaviors.   Client denies current or recent self injurious behavior or ideation.   Client denies other safety concerns.   Client reports there are no firearms in the house.   A safety and risk management plan has not been developed at this time, however client was given the after-hours number / 911 should there be a change in any of  these risk factors.     Appearance:   Appropriate    Eye Contact:   Fair    Psychomotor Behavior: Normal    Attitude:   Cooperative    Orientation:   All   Speech    Rate / Production: Normal     Volume:  Normal    Mood:    \"Good  Tired\"     Affect:    Appropriate     Thought Content:  Clear    Thought Form:  Coherent  Circumstantial Tangential    Insight:    Fair     Medication Review:   See Epic for updates     Medication " Compliance:   Yes     Changes in Health Issues:   None reported     Chemical Use Review:   Substance Use: Chemical use reviewed, no active concerns identified     Tobacco Use: Some slip ups in the past 2 weeks.      Collateral Reports Completed:   Not Applicable      PLAN: (Client Tasks / Therapist Tasks / Other)  Client will set limits with work by saying no and taking less patients within the next month; client will organize/clean her home within 12 sessions. Client will learn 3 new skills to cope with stress other smoking or decide if she wants to quit or not quit smoking; client will work on noting and attending to vulnerabilities of feeling emotional/sensative/teary eyed.Therapist will continue to use motivational interviewing to evoke change talk and CBT strategies to engage client in proactive problem solving, practice distress tolerance as it relates to work and interpersonal relationships, and teach emotion identification and regulation. Continue to reinforce boundary setting. Therapist will also continue to build on client's strengths and areas of her life where she feels she is making progress. Reinforce energy activation and challenging maladaptive behaviors.          [facility]                                                       ___________________________________________________________________    Treatment Plan    Client's Name: Brandi Stern  YOB: 1955    Date: 6/12/2018    DSM-V Diagnoses: 296.31 (F33.0) Major Depressive Disorder, Recurrent Episode, Mild _ and With anxious distress  Psychosocial / Contextual Factors: Work stress, health concerns, family stress  WHODAS: 29    Referral / Collaboration:  Referral to another professional/service is not indicated at this time.    Anticipated number of session or this episode of care: 12      MeasurableTreatment Goal(s) related to diagnosis / functional impairment(s)  Goal 1: Client will improve depressed mood as evidenced by  decreased score on PHQ 9 from 19 (moderately severe) to score range of 10-14 (moderate).    I will know I've met my goal when I feel more accomplished, in control, house looks better, and I would not be smoking.      Objective #A (Client Action)    Client will set limits with work by saying no and taking less patients within the next month.  Status: Continued - Date: 6/12/2018    Intervention(s)  Therapist will role-play assertiveness and conflict management skills.  ...teach about healthy boundaries.    Objective #B  Client will organize/clean her home within 12 sessions.   Status: Continued - Date: 6/12/2018    Intervention(s)  Therapist will teach emotional regulation skills and developing routine.    Objective #C  Client will learn 3 new skills to cope with stress other than smoking.   Status: Continued - Date: 6/12/2018    Intervention(s)  Therapist will assign homework of practicing skills at home.  ...teach self care, distraction, and distress tolerance skills.    Objective #D (Client Action)                                    Monitor risk factors associated with hx of SI at every session.   Status: Continued - Date: 6/12/2018      Intervention(s)  Therapist will assign homework of effective help seeking behaviors...  ...role-play communication skills to secure support and teach about identifying warning signs for risks.      Client has reviewed and agreed to the above plan.      ELISE Sheikh  6/12/2018

## 2018-06-12 NOTE — MR AVS SNAPSHOT
MRN:[id number]                      After Visit Summary   6/12/2018    Brandi Stern    MRN: [id number]           Visit Information        Provider Department      6/12/2018 12:00 PM Ronda Lackey [facility]      Your next 10 appointments already scheduled     Jun 25, 2018  2:30 PM CDT   Return Visit with Ronda Lackey [facility] ([facility])    [facility]  [address] [facility]  [facility] [address]   [phone]            Jun 26, 2018 11:15 AM CDT   (Arrive by 11:00 AM)   Return Visit with Robbie Flores MD   [facility] ([facility])    [address] [place] [id number] [phone]            Jul 11, 2018 11:00 AM CDT   Return Visit with Ronda Lackey [facility] ([facility])    [facility]  [address] [place], [place] [id number]  [facility] [id number] [phone]            Jul 23, 2018  1:00 PM CDT   Return Visit with Ronda Lackey [facility] ([facility])    [facility]  [address]  [facility] [id number] [phone]              MyChart Information     [facility] gives you secure access to your electronic health record. If you see a primary care provider, you can also send messages to your care team and make appointments. If you have questions, please call your primary care clinic.  If you do not have a primary care provider, please call [phone] and they will assist you.        Care EveryWhere ID     This is your Care EveryWhere ID. This could be used by other organizations to access your [place] medical records  [id number]        Equal Access to Services     LORNE HALEY : Keyur Zee, raquel whitfield, adrián shane. So [facility]  [phone].    ATENCIÓN: Si habla español, tiene a rizvi disposición servicios gratuitos de asistencia lingüística. Llame al [phone].    We comply with applicable federal civil rights laws and [place] laws. We do not discriminate on the basis of race, color, national origin, age, disability, sex, sexual orientation, or gender identity.

## 2018-06-12 NOTE — MR AVS SNAPSHOT
After Visit Summary   6/12/2018    Brandi Stern    MRN: [id number]           Patient Information     Date Of Birth          1955        Visit Information        Provider Department      6/12/2018 4:00 PM Cherie Brennan NP [facility]        Today's Diagnoses     Hypertension goal BP (blood pressure) < 140/90    -  1    DDD (degenerative disc disease), cervical        DDD (degenerative disc disease), lumbar           Follow-ups after your visit        Your next 10 appointments already scheduled     Jun 25, 2018  2:30 PM CDT   Return Visit with Ronda Lackey [facility] ([facility]  [address] [facility]  [facility] [address]   [phone]            Jun 26, 2018 11:15 AM CDT   (Arrive by 11:00 AM)   Return Visit with Robbie Flores MD   [facility] ([facility])    [address] [place] [id number] [phone]            Jul 11, 2018 11:00 AM CDT   Return Visit with Ronda Lackey [facility] ([facility]  [address]  [facility] [id number] [phone]            Jul 23, 2018  1:00 PM CDT   Return Visit with Ronda Lackey [facility] ([facility]  [address]  [facility] [id number] [phone]              Future tests that were ordered for you today     Open Future Orders        Priority Expected Expires Ordered    Basic metabolic panel Routine 6/26/2018 6/12/2019 6/12/2018            Who to contact     If you have questions or need follow up information about today's clinic visit or your schedule please contact [facility] directly at [phone].  Normal or non-critical lab and imaging results will be communicated to you by MyChart, letter or  phone within 4 business days after the clinic has received the results. If you do not hear from us within 7 days, please contact the clinic through My[facility] or phone. If you have a critical or abnormal lab result, we will notify you by phone as soon as possible.  Submit refill requests through My[employer] or call your pharmacy and they will forward the refill request to us. Please allow 3 business days for your refill to be completed.          Additional Information About Your Visit        Myxer Information     [facility] gives you secure access to your electronic health record. If you see a primary care provider, you can also send messages to your care team and make appointments. If you have questions, please call your primary care clinic.  If you do not have a primary care provider, please call [phone] and they will assist you.        Care EveryWhere ID     This is your Care EveryWhere ID. This could be used by other organizations to access your [place] medical records  [id number]        Your Vitals Were     Pulse Temperature Respirations Last Period Pulse Oximetry BMI (Body Mass Index)    89 98  F (36.7  C) (Oral) 16 (LMP Unknown) 96% 36.11 kg/m2       Blood Pressure from Last 3 Encounters:   06/12/18 122/82   04/20/18 132/90   04/10/18 110/81    Weight from Last 3 Encounters:   06/12/18 217 lb (98.4 kg)   04/20/18 221 lb 1.6 oz (100.3 kg)   04/10/18 220 lb 12.8 oz (100.2 kg)                 Today's Medication Changes          These changes are accurate as of 6/12/18  5:49 PM.  If you have any questions, ask your nurse or doctor.               Start taking these medicines.        Dose/Directions    diclofenac 100 MG 24 hr tablet   Commonly known as:  VOLTAREN-XR   Used for:  DDD (degenerative disc disease), cervical, DDD (degenerative disc disease), lumbar   Started by:  Cherie Brennan NP        Dose:  100 mg   Take 1 tablet (100 mg) by mouth daily   Quantity:  90 tablet   Refills:  3       lisinopril 10  MG tablet   Commonly known as:  PRINIVIL/ZESTRIL   Used for:  Hypertension goal BP (blood pressure) < 140/90   Started by:  Cherie Brennan NP        Dose:  10 mg   Take 1 tablet (10 mg) by mouth daily   Quantity:  90 tablet   Refills:  PRN            Where to get your medicines      These medications were sent to [facility]/pharmacy #[id number] - [place], [place] - [address] [facility]  [address], [place] [address]     Phone:  [phone]     diclofenac 100 MG 24 hr tablet    lisinopril 10 MG tablet                Primary Care Provider Office Phone # Fax #    Cherie Brennan [phone] [address] [place] [address]  [facility] [address]        Equal Access to Services     LORNE HALEY : Hadii aad ku hadasho Soomaali, waaxda luqadaha, qaybta kaalmada adeegyada, adrián rodriguez . So [facility] [phone].    ATENCIÓN: Si habla español, tiene a rizvi disposición servicios gratuitos de asistencia lingüística. [facility] [phone].    We comply with applicable federal civil rights laws and [place] laws. We do not discriminate on the basis of race, color, national origin, age, disability, sex, sexual orientation, or gender identity.            Thank you!     Thank you for choosing [facility]  for your care. Our goal is always to provide you with excellent care. Hearing back from our patients is one way we can continue to improve our services. Please take a few minutes to complete the written survey that you may receive in the mail after your visit with us. Thank you!             Your Updated Medication List - Protect others around you: Learn how to safely use, store and throw away your medicines at www.disposemymeds.org.          This list is accurate as of 6/12/18  5:49 PM.  Always use your most recent med list.                   Brand Name Dispense Instructions for use Diagnosis    ACETAMINOPHEN EXTRA STRENGTH PO      Pt reports taking 650 MG arthritis        *  albuterol (2.5 MG/3ML) 0.083% neb solution     3 mL    Take  by nebulization. take 3 mL by nebulization 4 times daily as needed    Moderate persistent asthma       * albuterol 108 (90 Base) MCG/ACT Inhaler    PROAIR HFA    1 Inhaler    Inhale 1-2 puffs into the lungs every 6 hours as needed for shortness of breath / dyspnea    Moderate persistent asthma with exacerbation       atorvastatin 10 MG tablet    LIPITOR    90 tablet    Take 1 tablet (10 mg) by mouth daily    Hyperlipidemia LDL goal <130       CALCIUM 500 PO      Take 1 tablet by mouth daily        CLARITIN 10 MG tablet   Generic drug:  loratadine     0    1 TAB PO QD (Once per day) as needed for ALLERGY SYMPTOMS    Allergic rhinitis due to other allergen       CO Q 10 PO      Take 200 mg by mouth daily        cyclobenzaprine 5 MG tablet    FLEXERIL    180 tablet    Take 1-2 tablets (5-10 mg) by mouth 3 times daily as needed for muscle spasms    Spasm of back muscles       * desvenlafaxine succinate 50 MG 24 hr tablet    PRISTIQ    30 tablet    Take 1 tablet (50 mg) by mouth daily    Major depressive disorder, recurrent episode, moderate (H)       * desvenlafaxine succinate 100 MG 24 hr tablet    PRISTIQ    90 tablet    Take 1 tablet (100 mg) by mouth daily    Major depressive disorder, recurrent episode, moderate (H)       diclofenac 1 % Gel topical gel    VOLTAREN    100 g    Apply 4 grams to knees or 2 grams to feet four times daily using enclosed dosing card.    Myofascial pain       diclofenac 100 MG 24 hr tablet    VOLTAREN-XR    90 tablet    Take 1 tablet (100 mg) by mouth daily    DDD (degenerative disc disease), cervical, DDD (degenerative disc disease), lumbar       fenofibrate 145 MG tablet     90 tablet    TAKE 1 TABLET BY MOUTH DAILY    Pure hyperglyceridemia       levothyroxine 75 MCG tablet    SYNTHROID    90 tablet    Take 1 tablet (75 mcg) by mouth every morning Overdue for labs    Chronic lymphocytic thyroiditis       lisinopril 10 MG  tablet    PRINIVIL/ZESTRIL    90 tablet    Take 1 tablet (10 mg) by mouth daily    Hypertension goal BP (blood pressure) < 140/90       Lutein 10 MG Tabs      Take 10 mg by mouth daily        * MIRAPEX 0.125 MG tablet   Generic drug:  pramipexole      Take two tabs at dinner and one at hs        * pramipexole 0.125 MG tablet    MIRAPEX    270 tablet    Take two tabs at dinner and one at hs    Restless leg syndrome       naltrexone 50 MG tablet    DEPADE;REVIA    30 tablet    Take 1 tablet (50 mg) by mouth daily    Class 2 obesity with body mass index (BMI) of 36.0 to 36.9 in adult, unspecified obesity type, unspecified whether serious comorbidity present       * omeprazole 20 MG CR capsule    priLOSEC    180 capsule    Take 1 capsule (20 mg) by mouth 2 times daily    Gastroesophageal reflux disease without esophagitis       * omeprazole 20 MG CR capsule    priLOSEC    180 capsule    TAKE 1 TABLET (20 MG) BY MOUTH 2 TIMES DAILY TAKE 30-60 MINUTES BEFORE A MEAL.    Gastroesophageal reflux disease without esophagitis       ranitidine 300 MG tablet    ZANTAC    60 tablet    TAKE 1 TABLET (300 MG) BY MOUTH 2 TIMES DAILY    Gastroesophageal reflux disease without esophagitis       SYMBICORT 160-4.5 MCG/ACT Inhaler   Generic drug:  budesonide-formoterol      Inhale 1 puff into the lungs 2 times daily    Moderate persistent asthma without complication       triamcinolone 55 MCG/ACT nasal inhaler    NASACORT AQ    1 Inhaler    Inhale 2 sprays in both nostrils every day as needed    Allergic rhinitis due to other allergen       UNABLE TO FIND      MEDICATION NAME: Allergy shots        VITAMIN C PO      Take 1,000 mg by mouth daily        zolpidem 10 MG tablet    AMBIEN    30 tablet    TAKE 1/2 TO 1 TABLET BY MOUTH NIGHTLY AS NEEDED    Insomnia, unspecified type       * Notice:  This list has 8 medication(s) that are the same as other medications prescribed for you. Read the directions carefully, and ask your doctor or other  care provider to review them with you.

## 2018-06-13 RX ORDER — ATORVASTATIN CALCIUM 10 MG/1
TABLET, FILM COATED ORAL
Qty: 0.01 TABLET | Refills: 0 | OUTPATIENT
Start: 2018-06-13

## 2018-06-13 ASSESSMENT — PATIENT HEALTH QUESTIONNAIRE - PHQ9: SUM OF ALL RESPONSES TO PHQ QUESTIONS 1-9: 4

## 2018-06-14 NOTE — TELEPHONE ENCOUNTER
Duplicate    Refused Prescriptions:                       Disp   Refills    atorvastatin (LIPITOR) 10 MG tablet [Pharm*0.01 t*0        Sig: TAKE 1 TABLET (10 MG) BY MOUTH DAILY  Refused By: THAO GOEL  Reason for Refusal: Duplicate      Closing encounter - no further actions needed at this time    Thao Goel RN

## 2018-06-23 DIAGNOSIS — K21.9 GASTROESOPHAGEAL REFLUX DISEASE WITHOUT ESOPHAGITIS: ICD-10-CM

## 2018-06-25 ENCOUNTER — OFFICE VISIT (OUTPATIENT)
Dept: PSYCHOLOGY | Facility: CLINIC | Age: 63
End: 2018-06-25
Payer: COMMERCIAL

## 2018-06-25 DIAGNOSIS — F33.0 MAJOR DEPRESSIVE DISORDER, RECURRENT EPISODE, MILD WITH ANXIOUS DISTRESS (H): Primary | ICD-10-CM

## 2018-06-25 PROCEDURE — 90834 PSYTX W PT 45 MINUTES: CPT | Performed by: COUNSELOR

## 2018-06-25 ASSESSMENT — ANXIETY QUESTIONNAIRES
1. FEELING NERVOUS, ANXIOUS, OR ON EDGE: NOT AT ALL
2. NOT BEING ABLE TO STOP OR CONTROL WORRYING: NOT AT ALL
6. BECOMING EASILY ANNOYED OR IRRITABLE: NOT AT ALL
7. FEELING AFRAID AS IF SOMETHING AWFUL MIGHT HAPPEN: NOT AT ALL
IF YOU CHECKED OFF ANY PROBLEMS ON THIS QUESTIONNAIRE, HOW DIFFICULT HAVE THESE PROBLEMS MADE IT FOR YOU TO DO YOUR WORK, TAKE CARE OF THINGS AT HOME, OR GET ALONG WITH OTHER PEOPLE: NOT DIFFICULT AT ALL
3. WORRYING TOO MUCH ABOUT DIFFERENT THINGS: NOT AT ALL
GAD7 TOTAL SCORE: 0
5. BEING SO RESTLESS THAT IT IS HARD TO SIT STILL: NOT AT ALL

## 2018-06-25 ASSESSMENT — PATIENT HEALTH QUESTIONNAIRE - PHQ9: 5. POOR APPETITE OR OVEREATING: NOT AT ALL

## 2018-06-25 NOTE — PROGRESS NOTES
Progress Note    Client Name: Brandi Stern  Date: 6/25/2018         Service Type: Individual      Session Start Time: 2:35p  Session End Time: 3:15p      Session Length: 40 minutes     Session #: 25     Attendees: Client attended alone    Treatment Plan Last Reviewed: 6/25/2018  PHQ-9: 5        DATA      Progress Since Last Session (Related to Symptoms / Goals / Homework):   Symptoms: Stable, see Epic for PHQ 9 update     Homework: Partially completed and continued - client will also address health concerns to improve efforts for behavior change (will be cutting back on wine and smoking).       Episode of Care Goals: Some progress - ACTION (Actively working towards change); Intervened by reinforcing change plan / affirming steps taken     Current / Ongoing Stressors and Concerns:  Reported ongoing variable energy and interpersonal difficulties with mother; reported trying to put some strategies in place to address energy (moving even though her energy is low), but having mixed results - at the same time is able to acknowledge some progress with productivity/housework goals; was tearful and expressed some hurt feelings during session re: mother; reported feeling like her mother was never there for her when she needed, although she does acknowledge that they have love for one another; reported trying to take in the positives despite ongoing interpersonal conflicts (e.g., mother is the only one who calls her at the end of the day).      Treatment Objective(s) Addressed in This Session:     Client will learn 3 new skills to cope with stress other than smoking. Client will organize/clean her home within 12 sessions. Monitor risk factors associated with hx of SI at every session.      Intervention:   Motivational Interviewing: continue to evoke desire and readiness to change, pointing out discrepancies, support self-efficacy; CBT: identify mental health needs and maladaptive  "behaviors that she would like to change, teach about internal locus of control, teach assertiveness skills and emotion identification and regulation, teach self-reflection to identify mental health needs and to help with decision making and effective coping, identify intentional behavior changes that are contributing to improved mood, teach identifying triggers or warning signs for increasing depression, reinforce information on energy activation (20 second strategy); DBT: teach and reinforce emotion regulation, reinforce radical acceptance; Interpersonal therapy: continue to prompt client to engage in emotional deepening to address core issues, role play for insight development         ASSESSMENT: Current Emotional / Mental Status (status of significant symptoms):   Client has had a history of suicidal ideation: passive thoughts of dealth, \"Is there pain in the afterlife?\", 1 suicide attempt: in college after breakup with boyfriend,  was drinking and ingested pills, no hospitalization, would never really hurt self   Client denies current fears or concerns for personal safety.   Client reports the following current or recent suicidal ideation or behaviors: passive thoughts of dealth, \"Is there pain in the afterlife?\". Would never really hurt self.   Client denies current or recent homicidal ideation or behaviors.   Client denies current or recent self injurious behavior or ideation.   Client denies other safety concerns.   Client reports there are no firearms in the house.   A safety and risk management plan has not been developed at this time, however client was given the after-hours number / 911 should there be a change in any of  these risk factors.     Appearance:   Appropriate    Eye Contact:   Fair    Psychomotor Behavior: Normal    Attitude:   Cooperative    Orientation:   All   Speech    Rate / Production: Normal     Volume:  Normal    Mood:    \"Good  Some energy\" Sad Tearful     Affect:    Appropriate  "    Thought Content:  Clear Rumination    Thought Form:  Coherent  Circumstantial Tangential    Insight:    Fair     Medication Review:   See Epic for updates     Medication Compliance:   Yes     Changes in Health Issues:   None reported     Chemical Use Review:   Substance Use: Chemical use reviewed, no active concerns identified     Tobacco Use: Some slip ups in the past 2 weeks.      Collateral Reports Completed:   Not Applicable      PLAN: (Client Tasks / Therapist Tasks / Other)  Client will set limits with work by saying no and taking less patients within the next month; client will organize/clean her home within 12 sessions. Client will learn 3 new skills to cope with stress other smoking or decide if she wants to quit or not quit smoking; client will work on noting and attending to vulnerabilities of feeling emotional/sensative/teary eyed.Therapist will continue to use motivational interviewing to evoke change talk and CBT strategies to engage client in proactive problem solving, practice distress tolerance as it relates to work and interpersonal relationships, and teach emotion identification and regulation. Continue to reinforce boundary setting. Therapist will also continue to build on client's strengths and areas of her life where she feels she is making progress. Reinforce energy activation and challenging maladaptive behaviors. Plan for upcoming d/c/transfer.         ELISE Sheikh                                                       ___________________________________________________________________    Treatment Plan    Client's Name: Brandi Stern  YOB: 1955    Date: 6/12/2018    DSM-V Diagnoses: 296.31 (F33.0) Major Depressive Disorder, Recurrent Episode, Mild _ and With anxious distress  Psychosocial / Contextual Factors: Work stress, health concerns, family stress  WHODAS: 29    Referral / Collaboration:  Referral to another professional/service is not indicated at this  time.    Anticipated number of session or this episode of care: 12      MeasurableTreatment Goal(s) related to diagnosis / functional impairment(s)  Goal 1: Client will improve depressed mood as evidenced by decreased score on PHQ 9 from 19 (moderately severe) to score range of 10-14 (moderate).    I will know I've met my goal when I feel more accomplished, in control, house looks better, and I would not be smoking.      Objective #A (Client Action)    Client will set limits with work by saying no and taking less patients within the next month.  Status: Continued - Date: 6/12/2018    Intervention(s)  Therapist will role-play assertiveness and conflict management skills.  ...teach about healthy boundaries.    Objective #B  Client will organize/clean her home within 12 sessions.   Status: Continued - Date: 6/12/2018    Intervention(s)  Therapist will teach emotional regulation skills and developing routine.    Objective #C  Client will learn 3 new skills to cope with stress other than smoking.   Status: Continued - Date: 6/12/2018    Intervention(s)  Therapist will assign homework of practicing skills at home.  ...teach self care, distraction, and distress tolerance skills.    Objective #D (Client Action)                                    Monitor risk factors associated with hx of SI at every session.   Status: Continued - Date: 6/12/2018      Intervention(s)  Therapist will assign homework of effective help seeking behaviors...  ...role-play communication skills to secure support and teach about identifying warning signs for risks.      Client has reviewed and agreed to the above plan.      [facility]  6/12/2018

## 2018-06-25 NOTE — TELEPHONE ENCOUNTER
"Requested Prescriptions   Pending Prescriptions Disp Refills     omeprazole (PRILOSEC) 20 MG CR capsule [Pharmacy Med Name: OMEPRAZOLE DR 20 MG CAPSULE]  Last Written Prescription Date:  6-5-18  Last Fill Quantity: 180 cap,  # refills: 3   Last office visit: 6-12-18 with prescribing provider:  Caro Brennan    Future Office Visit:   Next 5 appointments (look out 90 days)     Jul 11, 2018 11:00 AM CDT   Return Visit with Ronda Lackey [address] [address]   [phone]            Jul 23, 2018  1:00 PM CDT   Return Visit with Ronda Lackey [address] [address]   [phone]               180 capsule 0     Sig: TAKE 1 TABLET (20 MG) BY MOUTH 2 TIMES DAILY TAKE 30-60 MINUTES BEFORE A MEAL.    PPI Protocol Passed    6/23/2018  4:30 PM       Passed - Not on Clopidogrel (unless Pantoprazole ordered)       Passed - No diagnosis of osteoporosis on record       Passed - Recent (12 mo) or future (30 days) visit within the authorizing provider's specialty    Patient had office visit in the last 12 months or has a visit in the next 30 days with authorizing provider or within the authorizing provider's specialty.  See \"Patient Info\" tab in inbasket, or \"Choose Columns\" in Meds & Orders section of the refill encounter.           Passed - Patient is age 18 or older       Passed - No active pregnacy on record       Passed - No positive pregnancy test in past 12 months          "

## 2018-06-25 NOTE — MR AVS SNAPSHOT
MRN:[id number]                      After Visit Summary   6/25/2018    Brandi Stern    MRN: [id number]           Visit Information        Provider Department      6/25/2018 2:30 PM Darya Ronda [facility]      Your next 10 appointments already scheduled     Jul 11, 2018 11:00 AM CDT   Return Visit with Ronda Lackey [facility] ([facility])    [facility]  [address]  [address]  [facility] [id number] [phone]            Jul 23, 2018  1:00 PM CDT   Return Visit with Ronda Lackey [facility] ([facility])    [facility]  [address]  [address]  [facility] [id number] [phone]              MyChart Information     [employer] gives you secure access to your electronic health record. If you see a primary care provider, you can also send messages to your care team and make appointments. If you have questions, please call your primary care clinic.  If you do not have a primary care provider, please call [phone] and they will assist you.        Care EveryWhere ID     This is your Care EveryWhere ID. This could be used by other organizations to access your [place] medical records  [id number]        Equal Access to Services     LORNE HALEY AH: Keyur Zee, wahelenada nahid, qaybta kaalmada kala, adrián perkins. So [facility] [phone].    ATENCIÓN: Si habla español, tiene a rizvi disposición servicios gratuitos de asistencia lingüística. Lljacy al [phone].    We comply with applicable federal civil rights laws and [place] laws. We do not discriminate on the basis of race, color, national origin, age, disability, sex, sexual orientation, or gender identity.

## 2018-06-26 ASSESSMENT — PATIENT HEALTH QUESTIONNAIRE - PHQ9: SUM OF ALL RESPONSES TO PHQ QUESTIONS 1-9: 5

## 2018-06-26 ASSESSMENT — ANXIETY QUESTIONNAIRES: GAD7 TOTAL SCORE: 0

## 2018-07-11 ENCOUNTER — OFFICE VISIT (OUTPATIENT)
Dept: PSYCHOLOGY | Facility: CLINIC | Age: 63
End: 2018-07-11
Payer: COMMERCIAL

## 2018-07-11 DIAGNOSIS — F33.0 MAJOR DEPRESSIVE DISORDER, RECURRENT EPISODE, MILD WITH ANXIOUS DISTRESS (H): Primary | ICD-10-CM

## 2018-07-11 PROCEDURE — 90834 PSYTX W PT 45 MINUTES: CPT | Performed by: COUNSELOR

## 2018-07-11 NOTE — MR AVS SNAPSHOT
MRN:[id number]                      After Visit Summary   7/11/2018    Brandi Stern    MRN: [id number]           Visit Information        Provider Department      7/11/2018 11:00 AM [facility]      Your next 10 appointments already scheduled     Jul 23, 2018  1:00 PM CDT   [facility]   [facility]    [facility]  [address] [facility]  [facility] [place] [id number] [phone]              MyChart Information     [employer] gives you secure access to your electronic health record. If you see a primary care provider, you can also send messages to your care team and make appointments. If you have questions, please call your primary care clinic.  If you do not have a primary care provider, please call [phone] and they will assist you.        Care EveryWhere ID     This is your Care EveryWhere ID. This could be used by other organizations to access your [place] medical records  [id number]        Equal Access to Services     LORNE HALEY : Hadii aad ku hadasho Sohéctor, waaxda luqadaha, qaybta kaalmada adeegyacharles, adrián perkins. So [facility] [phone].    ATENCIÓN: Si habla español, tiene a rizvi disposición servicios gratuitos de asistencia lingüística. Llame al [phone].    We comply with applicable federal civil rights laws and [place] laws. We do not discriminate on the basis of race, color, national origin, age, disability, sex, sexual orientation, or gender identity.

## 2018-07-11 NOTE — PROGRESS NOTES
"                                           Progress Note    Client Name: Brandi Stern  Date: 7/11/2018         Service Type: Individual      Session Start Time: 11:05a  Session End Time: 11:45a      Session Length: 40 minutes     Session #: 26     Attendees: Client attended alone    Treatment Plan Last Reviewed: 7/11/2018  PHQ-9: 4        DATA      Progress Since Last Session (Related to Symptoms / Goals / Homework):   Symptoms: Stable, see Epic for PHQ 9 update     Homework: Partially completed and continued - client will also address health concerns to improve efforts for behavior change (will be cutting back on wine and smoking). By next appt, client will complete worksheet reflection on ending therapy.       Episode of Care Goals: Some progress - ACTION (Actively working towards change); Intervened by reinforcing change plan / affirming steps taken     Current / Ongoing Stressors and Concerns:  Reported ongoing variable energy and interpersonal difficulties with mother; reported long standing issues with mother and coming to realize how strained relationship is now that mother is elderly and not willing to follow with health recommendations and seem to be struggling with independent, community based living per client report; acknowledged mother is lonely and that she needs to be more patient - \"I know I will miss her when she is no longer here\"; expressed feeling like she is making some progress and learning to accept/live with fatigue and declined transferred to new therapist after next appt.      Treatment Objective(s) Addressed in This Session:     Client will learn 3 new skills to cope with stress other than smoking. Client will organize/clean her home within 12 sessions. Monitor risk factors associated with hx of SI at every session.      Intervention:   Motivational Interviewing: continue to evoke desire and readiness to change, pointing out discrepancies, support self-efficacy; CBT: identify mental " "health needs and maladaptive behaviors that she would like to change, teach about internal locus of control, teach assertiveness skills and emotion identification and regulation, teach self-reflection to identify mental health needs and to help with decision making and effective coping, identify intentional behavior changes that are contributing to improved mood, teach identifying triggers or warning signs for increasing depression, reinforce information on energy activation (20 second strategy); DBT: teach and reinforce emotion regulation, reinforce radical acceptance; Interpersonal therapy: continue to prompt client to engage in emotional deepening to address core issues, role play for insight development         ASSESSMENT: Current Emotional / Mental Status (status of significant symptoms):   Client has had a history of suicidal ideation: passive thoughts of dealth, \"Is there pain in the afterlife?\", 1 suicide attempt: in college after breakup with boyfriend,  was drinking and ingested pills, no hospitalization, would never really hurt self   Client denies current fears or concerns for personal safety.   Client reports the following current or recent suicidal ideation or behaviors: passive thoughts of dealth, \"Is there pain in the afterlife?\". Would never really hurt self.   Client denies current or recent homicidal ideation or behaviors.   Client denies current or recent self injurious behavior or ideation.   Client denies other safety concerns.   Client reports there are no firearms in the house.   A safety and risk management plan has not been developed at this time, however client was given the after-hours number / 911 should there be a change in any of  these risk factors.     Appearance:   Appropriate    Eye Contact:   Fair    Psychomotor Behavior: Normal    Attitude:   Cooperative    Orientation:   All   Speech    Rate / Production: Normal     Volume:  Normal    Mood:    \"Tired\" Sad Tearful  "    Affect:    Appropriate     Thought Content:  Clear Rumination    Thought Form:  Coherent  Circumstantial Tangential    Insight:    Fair     Medication Review:   See Epic for updates     Medication Compliance:   Yes     Changes in Health Issues:   None reported     Chemical Use Review:   Substance Use: Chemical use reviewed, no active concerns identified     Tobacco Use: Continued use      Collateral Reports Completed:   Not Applicable      PLAN: (Client Tasks / Therapist Tasks / Other)  Client will set limits with work by saying no and taking less patients within the next month; client will organize/clean her home within 12 sessions. Client will learn 3 new skills to cope with stress other smoking or decide if she wants to quit or not quit smoking; client will work on noting and attending to vulnerabilities of feeling emotional/sensative/teary eyed.Therapist will continue to use motivational interviewing to evoke change talk and CBT strategies to engage client in proactive problem solving, practice distress tolerance as it relates to work and interpersonal relationships, and teach emotion identification and regulation. Continue to reinforce boundary setting. Therapist will also continue to build on client's strengths and areas of her life where she feels she is making progress. Reinforce energy activation and challenging maladaptive behaviors. Plan for upcoming d/c.         ELISE Sheikh                                                       ___________________________________________________________________    Treatment Plan    Client's Name: Brandi Stern  YOB: 1955    Date: 6/12/2018    DSM-V Diagnoses: 296.31 (F33.0) Major Depressive Disorder, Recurrent Episode, Mild _ and With anxious distress  Psychosocial / Contextual Factors: Work stress, health concerns, family stress  WHODAS: 29    Referral / Collaboration:  Referral to another professional/service is not indicated at this  time.    Anticipated number of session or this episode of care: 12      MeasurableTreatment Goal(s) related to diagnosis / functional impairment(s)  Goal 1: Client will improve depressed mood as evidenced by decreased score on PHQ 9 from 19 (moderately severe) to score range of 10-14 (moderate).    I will know I've met my goal when I feel more accomplished, in control, house looks better, and I would not be smoking.      Objective #A (Client Action)    Client will set limits with work by saying no and taking less patients within the next month.  Status: Continued - Date: 6/12/2018    Intervention(s)  Therapist will role-play assertiveness and conflict management skills.  ...teach about healthy boundaries.    Objective #B  Client will organize/clean her home within 12 sessions.   Status: Continued - Date: 6/12/2018    Intervention(s)  Therapist will teach emotional regulation skills and developing routine.    Objective #C  Client will learn 3 new skills to cope with stress other than smoking.   Status: Continued - Date: 6/12/2018    Intervention(s)  Therapist will assign homework of practicing skills at home.  ...teach self care, distraction, and distress tolerance skills.    Objective #D (Client Action)                                    Monitor risk factors associated with hx of SI at every session.   Status: Continued - Date: 6/12/2018      Intervention(s)  Therapist will assign homework of effective help seeking behaviors.  ...role-play communication skills to secure support and teach about identifying warning signs for risks.      Client has reviewed and agreed to the above plan.      ELISE Sheikh  6/12/2018

## 2018-07-12 ASSESSMENT — PATIENT HEALTH QUESTIONNAIRE - PHQ9: SUM OF ALL RESPONSES TO PHQ QUESTIONS 1-9: 4

## 2018-07-25 ENCOUNTER — TELEPHONE (OUTPATIENT)
Dept: FAMILY MEDICINE | Facility: CLINIC | Age: 63
End: 2018-07-25

## 2018-07-25 NOTE — TELEPHONE ENCOUNTER
Prior Authorization Not Needed per Insurance    Medication: desvenlafaxine succinate No PA needed   Insurance Company: [employer] - Phone [phone] Fax [phone]  Expected CoPay:      Pharmacy Filling the Rx: [id number] - [place], [place] - [address] [place] [address]  Pharmacy Notified: Yes  Patient Notified: Yes    Per call to pharmacy this was resolved, no PA needed.  Already has been dispensed.

## 2018-07-25 NOTE — TELEPHONE ENCOUNTER
Reason for Call:  Call back to pharmacy    Detailed comments: Pharmacy called and wants a Prior Authorization and a diagnosis/ stability and if this pt has been on this medication desvenlafaxine succinate for a while. Phone number [phone] Ref # [id number]. Please Advise thank you    Phone Number Patient can be reached at: Other phone number:  [phone] Ref # [id number]    Best Time: anytime    Can we leave a detailed message on this number? YES    Call taken on 7/25/2018 at 12:16 PM by Radha Torres

## 2018-07-26 ENCOUNTER — OFFICE VISIT (OUTPATIENT)
Dept: PSYCHOLOGY | Facility: CLINIC | Age: 63
End: 2018-07-26
Payer: COMMERCIAL

## 2018-07-26 DIAGNOSIS — F33.0 MAJOR DEPRESSIVE DISORDER, RECURRENT EPISODE, MILD WITH ANXIOUS DISTRESS (H): Primary | ICD-10-CM

## 2018-07-26 PROCEDURE — 90834 PSYTX W PT 45 MINUTES: CPT | Performed by: COUNSELOR

## 2018-07-26 NOTE — MR AVS SNAPSHOT
MRN:[id number]                      After Visit Summary   7/26/2018    Brandi Stern    MRN: [id number]           Visit Information        Provider Department      7/26/2018 9:00 AM Ronda Lackey [facility]CHAR[facility]      MyChart Information     MyChart gives you secure access to your electronic health record. If you see a primary care provider, you can also send messages to your care team and make appointments. If you have questions, please call your primary care clinic.  If you do not have a primary care provider, please call [phone] and they will assist you.        Care EveryWhere ID     This is your Care EveryWhere ID. This could be used by other organizations to access your [place] medical records  [id number]        Equal Access to Services     LORNE HALEY : Keyur Zee, waviri whitfield, erasto armendariz, adrián perkins. So [facility] [phone].    ATENCIÓN: Si habla español, tiene a rizvi disposición servicios gratuitos de asistencia lingüística. Llame al [phone].    We comply with applicable federal civil rights laws and [place] laws. We do not discriminate on the basis of race, color, national origin, age, disability, sex, sexual orientation, or gender identity.

## 2018-07-26 NOTE — PROGRESS NOTES
"                     Discharge Summary  Multiple Sessions    Client Name: Brandi Stern MRN#: [id number] YOB: 1955    Discharge Date:   July 26, 2018      Service Type: Individual      Session Start Time: 9:00a  Session End Time: 9:50a      Session Length: 45 - 50     Session #: 27     Attendees: Client attended alone    Focus of Treatment Objective(s):  Client's presenting concerns included: Depressed mood, low motivation, fatigue, some hopelessness   Stage of Change at time of Discharge: ACTION (Actively working towards change)    Medication Adherence:  Yes    Chemical Use:  Alcohol and tobacco     Assessment: Current Emotional / Mental Status (status of significant symptoms):    Risk status (Self / Other harm or suicidal ideation)  Client has had a history of suicidal ideation: passive thoughts of dealth, \"Is there pain in the afterlife?\", 1 suicide attempt: in college after breakup with boyfriend,              was drinking and ingested pills, no hospitalization, would never really hurt self                         Client denies current fears or concerns for personal safety.                         Client reports the following current or recent suicidal ideation or behaviors: passive thoughts of dealth, \"Is there pain in the afterlife?\". Would never really hurt self.                         Client denies current or recent homicidal ideation or behaviors.                         Client denies current or recent self injurious behavior or ideation.                         Client denies other safety concerns.                         Client reports there are no firearms in the house.                         A safety and risk management plan has not been developed at this time, however client was given the after-hours number / 911 should there be a change in any of these risk factors.    Appearance:   Appropriate   Eye Contact:   Good   Psychomotor Behavior: Normal   Attitude:   Cooperative " "  Orientation:   All  Speech   Rate / Production: Normal    Volume:  Normal   Mood:    \"Good\"  Sad  Tearful   Affect:    Appropriate  Mood congruent    Thought Content:  Clear   Thought Form:  Coherent  Logical  Circumstantial  Insight:   Good     DSM5 Diagnoses: (Sustained by DSM5 Criteria Listed Above)  DSM-V Diagnoses: 296.31 (F33.0) Major Depressive Disorder, Recurrent Episode, Mild _ and With anxious distress  Psychosocial / Contextual Factors: Work stress, health concerns, family stress  WHODAS: 29    Reason for Discharge:  Insurance: Client will transition to Medicare and current therapist is not a Medicare provider.    Aftercare Plan:  Client may resume counseling services at any time in the future by calling the [facility] Intake Office, [phone].      ELISE Sheikh    "

## 2018-07-31 NOTE — TELEPHONE ENCOUNTER
Received fax on 7/26/2018 from [facility] requesting additional information for Desvenlafaxine ER Tablet ER 24 HR.    I completed form as it needs to be faxed by 7/31/2018 11:30 AM. Made copy for abstraction.    Qing Jackson MA

## 2018-08-02 DIAGNOSIS — K21.9 GASTROESOPHAGEAL REFLUX DISEASE WITHOUT ESOPHAGITIS: ICD-10-CM

## 2018-08-03 NOTE — TELEPHONE ENCOUNTER
"Requested Prescriptions   Pending Prescriptions Disp Refills     omeprazole (PRILOSEC) 20 MG CR capsule [Pharmacy Med Name: OMEPRAZOLE DR 20 MG CAPSULE]  Last Written Prescription Date:  6-5-18  Last Fill Quantity: 180 tab,  # refills: 3   Last office visit: 6-12-18 found with prescribing provider:  Caro Brennan    Future Office Visit:    180 capsule 0     Sig: TAKE 1 TABLET (20 MG) BY MOUTH 2 TIMES DAILY TAKE 30-60 MINUTES BEFORE A MEAL.    PPI Protocol Passed    8/2/2018  9:37 AM       Passed - Not on Clopidogrel (unless Pantoprazole ordered)       Passed - No diagnosis of osteoporosis on record       Passed - Recent (12 mo) or future (30 days) visit within the authorizing provider's specialty    Patient had office visit in the last 12 months or has a visit in the next 30 days with authorizing provider or within the authorizing provider's specialty.  See \"Patient Info\" tab in inbasket, or \"Choose Columns\" in Meds & Orders section of the refill encounter.           Passed - Patient is age 18 or older       Passed - No active pregnacy on record       Passed - No positive pregnancy test in past 12 months          "

## 2018-08-06 DIAGNOSIS — I10 HYPERTENSION GOAL BP (BLOOD PRESSURE) < 140/90: ICD-10-CM

## 2018-08-06 DIAGNOSIS — R79.89 ELEVATED SERUM CREATININE: Primary | ICD-10-CM

## 2018-08-06 PROCEDURE — 80048 BASIC METABOLIC PNL TOTAL CA: CPT | Performed by: NURSE PRACTITIONER

## 2018-08-06 PROCEDURE — 36415 COLL VENOUS BLD VENIPUNCTURE: CPT | Performed by: NURSE PRACTITIONER

## 2018-08-07 LAB
ANION GAP SERPL CALCULATED.3IONS-SCNC: 6 MMOL/L (ref 3–14)
BUN SERPL-MCNC: 27 MG/DL (ref 7–30)
CALCIUM SERPL-MCNC: 8.7 MG/DL (ref 8.5–10.1)
CHLORIDE SERPL-SCNC: 107 MMOL/L (ref 94–109)
CO2 SERPL-SCNC: 27 MMOL/L (ref 20–32)
CREAT SERPL-MCNC: 1.06 MG/DL (ref 0.52–1.04)
GFR SERPL CREATININE-BSD FRML MDRD: 52 ML/MIN/1.7M2
GLUCOSE SERPL-MCNC: 117 MG/DL (ref 70–99)
POTASSIUM SERPL-SCNC: 4.1 MMOL/L (ref 3.4–5.3)
SODIUM SERPL-SCNC: 140 MMOL/L (ref 133–144)

## 2018-08-13 ENCOUNTER — MYC MEDICAL ADVICE (OUTPATIENT)
Dept: FAMILY MEDICINE | Facility: CLINIC | Age: 63
End: 2018-08-13

## 2018-08-13 ENCOUNTER — TELEPHONE (OUTPATIENT)
Dept: PALLIATIVE MEDICINE | Facility: CLINIC | Age: 63
End: 2018-08-13

## 2018-08-13 DIAGNOSIS — M79.7 FIBROMYALGIA: ICD-10-CM

## 2018-08-13 DIAGNOSIS — M15.9 OSTEOARTHRITIS OF MULTIPLE JOINTS, UNSPECIFIED OSTEOARTHRITIS TYPE: Primary | ICD-10-CM

## 2018-08-13 NOTE — TELEPHONE ENCOUNTER
Left voicemail for patient to schedule new evaluation.         Jennifer VELÁZQUEZ    [facility]

## 2018-08-14 NOTE — TELEPHONE ENCOUNTER
[facility] Referral      1. Confirmed address with patient? Yes  2. Confirmed phone number with patient? Yes  3. Confirmed referring provider? Yes  4. Is the PCP the same as the referring provider? Yes  5. Has the patient been to any previous pain clinics? [facility]-  (If yes, send JUAN[facility] with welcome letter)  6. Which insurance are we to bill for this appointment?  Medicare/[employer]    7. Informed pt of cancellation (48 hour) policy? Yes    REGARDING OPIOID MEDICATIONS: We will always address appropriateness of opioid pain medications, but we generally will not automatically take on a prescribing role. When we do take on prescribing of opioids for chronic pain, it is in collaboration with the referring physician for an intermediate period of time (months), with an expectation that the primary physician or provider will assume the prescribing role if medications are effective at stable doses with demonstrated compliance. Therefore, please do not assume that your prescribing responsibilities end on the day of pain clinic consultation.  7. Informed pt of prescribing policy? Yes      8. Referring Provider: Cherie Brennan

## 2018-08-19 DIAGNOSIS — E78.1 PURE HYPERGLYCERIDEMIA: ICD-10-CM

## 2018-08-21 NOTE — TELEPHONE ENCOUNTER
"Requested Prescriptions   Pending Prescriptions Disp Refills     fenofibrate 145 MG tablet [Pharmacy Med Name: FENOFIBRATE 145 MG TABLET]  Last Written Prescription Date:  05/17/2018  Last Fill Quantity: 90,  # refills: 0   Last office visit: 06/12/2018:  KAN    Future Office Visit:     90 tablet 0     Sig: TAKE 1 TABLET BY MOUTH EVERY DAY    Fibrates Passed    8/19/2018  1:28 AM       Passed - Lipid panel on file in past 12 months    Recent Labs   Lab Test  03/21/18   1139   10/09/15   0850   CHOL  180   < >  169   TRIG  275*   < >  115   HDL  49*   < >  75   LDL  76   < >  71   NHDL  131*   < >   --    VLDL   --    --   23   CHOLHDLRATIO   --    --   2.3    < > = values in this interval not displayed.              Passed - No abnormal creatine kinase in past 12 months    No lab results found.            Passed - Recent (12 mo) or future (30 days) visit within the authorizing provider's specialty    Patient had office visit in the last 12 months or has a visit in the next 30 days with authorizing provider or within the authorizing provider's specialty.  See \"Patient Info\" tab in inbasket, or \"Choose Columns\" in Meds & Orders section of the refill encounter.           Passed - Patient is age 18 or older       Passed - No active pregnancy on record       Passed - No positive pregnancy test in past 12 months          "

## 2018-08-22 RX ORDER — FENOFIBRATE 145 MG/1
TABLET, COATED ORAL
Qty: 90 TABLET | Refills: 3 | Status: SHIPPED | OUTPATIENT
Start: 2018-08-22 | End: 2019-04-17

## 2018-08-22 NOTE — TELEPHONE ENCOUNTER
Prescription approved per [facility], [facility] or [facility] refill protocol.  Maryjo Mathew RN - Triage  [facility]

## 2018-08-29 ENCOUNTER — OFFICE VISIT (OUTPATIENT)
Dept: PALLIATIVE MEDICINE | Facility: CLINIC | Age: 63
End: 2018-08-29
Attending: NURSE PRACTITIONER
Payer: MEDICARE

## 2018-08-29 VITALS
HEART RATE: 91 BPM | SYSTOLIC BLOOD PRESSURE: 124 MMHG | BODY MASS INDEX: 36.28 KG/M2 | WEIGHT: 218 LBS | OXYGEN SATURATION: 98 % | DIASTOLIC BLOOD PRESSURE: 80 MMHG | RESPIRATION RATE: 16 BRPM

## 2018-08-29 DIAGNOSIS — M15.0 PRIMARY OSTEOARTHRITIS INVOLVING MULTIPLE JOINTS: Primary | ICD-10-CM

## 2018-08-29 DIAGNOSIS — M48.02 SPINAL STENOSIS IN CERVICAL REGION: ICD-10-CM

## 2018-08-29 DIAGNOSIS — M25.552 HIP PAIN, LEFT: ICD-10-CM

## 2018-08-29 DIAGNOSIS — G89.29 CHRONIC MIDLINE LOW BACK PAIN WITHOUT SCIATICA: ICD-10-CM

## 2018-08-29 DIAGNOSIS — M54.50 CHRONIC MIDLINE LOW BACK PAIN WITHOUT SCIATICA: ICD-10-CM

## 2018-08-29 DIAGNOSIS — M47.812 CERVICAL SPONDYLOSIS WITHOUT MYELOPATHY: ICD-10-CM

## 2018-08-29 PROCEDURE — 99215 OFFICE O/P EST HI 40 MIN: CPT | Performed by: NURSE PRACTITIONER

## 2018-08-29 RX ORDER — GABAPENTIN 100 MG/1
CAPSULE ORAL
Qty: 150 CAPSULE | Refills: 1 | Status: SHIPPED | OUTPATIENT
Start: 2018-08-29 | End: 2018-10-31

## 2018-08-29 ASSESSMENT — PAIN SCALES - GENERAL: PAINLEVEL: MODERATE PAIN (5)

## 2018-08-29 NOTE — PROGRESS NOTES
Brandi Stern is a 63 year old female     This patient is being seen at the request of her primary care provider FUNMILAYO Hedrick, for evaluation of her pain issues and recommendations for management, with specific emphasis on:     Reason for Referral: Evaluation for comprehensive services.    Do you have any specific questions for the pain specialist? No  Are there any red flags that may impact the assessment or management of the patient? None  What is your diagnosis for the patient's pain? OA, fibromyalgia    Primary Care Provider is Cherie Brennan    The current opiate pain medications are being prescribed by: N/A    Please see the [facility] health questionnaire which the patient completed and reviewed with me in detail    CHIEF COMPLAINT:  Increase in neck and back pain. Bilateral Hip pain     HISTORY OF PRESENT ILLNESS:  Brandi Stern is a 63 year old female with history of neck pain, low back pain and hip pain       Pain Information:   Onset/Progression:  Pain started years ago. A couple of years ago started having increased pain in right arm and neck. Got better on it's own. Has started to return and has occasional right hand spasms. Also had left sided neck pain. Low back pain has been flared lately. Her dog was injured and had to carried for several days which flared her low back pain. Feels like she has bursitis on the left hip. Knee pain is bad when walking on uneven ground. Has been seeing therapist for about a year to work on emotions r/t pain and retired. 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    Pain quality: Aching, Shooting and spasms     Pain timing: Intermittent, goes away when lying down.     Pain rating: intensity ranges from 6/10 to 9/10, and averages 6/10 on a 0-10 scale.   Aggravating factors include: Lifting, sitting, standing,    Relieving factors include: laying down     Past Pain Treatments:    Pain Clinic:   Yes: previously seen here. Was this writers  patient until 2/3/17.    PT: Yes: H    Psychologist: Yes: two visits with Dr Esqueda. [facility]   Relaxation techniques/biofeedback: No   Chiropractor: No   Acupuncture: Yes: ~12 sessions in the past   Pharmacotherapy:     Opioids: Yes      Non-opioids:  Yes    TENs Unit:No   Injections: Yes   Self-care:   Yes    Surgeries related to pain: No     CURRENT RELEVANT PAIN MEDICATIONS:   Tylenol arthritis strength 650 mg, 4-6 tablets daily  Capsaicin cream  Voltaren gel  Gabapentin 100-300 at HS  Flexeril 5mg10 mg at HS  Ambien 10 mg 1/2-1 tablet at HS     Patient is using the medication as prescribed:  YES  Is your medication helpful?               YES   Medication side effects? no side effect        Previous Pain Relevant Medications: (H--helped; HI--Helped initially; [facility]--Somewhat helpful; NH--No help; W--worse; SE--side effects; ?--Unsure if helpful)   NOTE: This medication information taken from patient's intake form, not medical records.                         Opiates: Hydrocodone: H, hydromorphone: H, given postoperatively, morphine, H: given postoperatively and in ED, tramadol:NH                        NSAIDS: Celebrex: H, ibuprofen:[facility], Relafen:NH                        Muscle Relaxants: Cyclobenzaprine:SE sedation                        Anti-migraine mediations: None                        Anti-depressants: Citalopram: NH, bupropion: Just started, too soon to tell                        Sleep aids: Ambien: H for sleep                        Anti-convulsants: Gabapentin: SE, sedation, pregabalin: NH                        Topicals: Diclofenac gel:NH                        Other medications not covered above: none     Any illicit drug use: Denies  Any use of prescriptions other than how they were prescribed:jessica  [place] Board of Pharmacy Data Base Reviewed:    YES; No concern for abuse or misuse of controlled medications based on this report.      SELF CARE:   How often do you practice SELF-CARE (relaxing,  stretching, pacing, monitoring posture, taking mini-breaks) in a typical day:  Few times daily    THE 4 As OF OPIOID MAINTENANCE ANALGESIA    Analgesia: Is pain relief clinically significant? N/A   Activity: Is patient functional and able to perform Activities of Daily Living? N/A   Adverse effects: Is patient free from adverse side effects from opiates? N/A   Adherence to Rx protocol: Is patient adhering to Controlled Substance Agreement and taking medications ONLY as ordered? N/A    Is Narcan prescribed for opiate use >50 MME daily? N/A    [place] Board of Pharmacy Data Base Reviewed:    YES; No concern for abuse or misuse of controlled medications based on this report.       PAST MEDICAL HISTORY:   Past Medical History:   Diagnosis Date     Allergic rhinitis due to other allergen      Apneas, obstructive sleep      Chronic lymphocytic thyroiditis      COPD (chronic obstructive pulmonary disease) (H)      Depressive disorder      Depressive disorder, not elsewhere classified      Disorder of bone and cartilage, unspecified      Esophageal reflux      Essential hypertension, benign      Generalized osteoarthrosis, unspecified site     knees     HASHIMOTO'S THYROIDITIS 11/15/2004     HASHIMOTO'S THYROIDITIS      HASHIMOTO'S THYROIDITIS      Headache above the eye region      Headache above the eye region      Hiatal hernia      Insomnia, unspecified      Moderate persistent asthma      Nasal congestion      Pure hyperglyceridemia      Scoliosis      Snoring      Tobacco use disorder          CURRENT FAMILY/SOCIAL SITUATION:  Living situation: [facility], alone  Support system: sister, friends  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     FAMILY MEDICAL HISTORY:  Chronic pain: Yes Sister has LBP  Family history of headaches:  No     HEALTH & LIFESTYLE PRACTICES:  Social History     Social History     Marital status: Single     Spouse name: N/A      Number of children: N/A     Years of education: N/A     Social History Main Topics     Smoking status: Former Smoker     Packs/day: 0.50     Years: 20.00     Types: Cigarettes     Start date: 10/1/1971     Quit date: 8/1/2007     Smokeless tobacco: Never Used     Alcohol use 0.0 oz/week     0 Standard drinks or equivalent per week      Comment: 2 glasses of wine per week     Drug use: No     Sexual activity: No     Other Topics Concern     Parent/Sibling W/ Cabg, Mi Or Angioplasty Before 65f 55m? No     Social History Narrative    Dairy/d 2 servings/d.    Caffeine 1 -2servings/d    Exercise 1 x week walking,problems with knees    Sunscreen used - Yes    Seatbelts used - Yes    Working smoke/CO detectors in the home - Yes    Guns stored in the home - No    Self Breast Exams - Yes-occ    Self Testicular Exam - NA    Eye Exam up to date - no,does every 2yrs    Dental Exam up to date - Yes  Q 6 months    Pap Smear up to date - no    Mammogram up to date -had done today    PSA up to date - NA    Dexa Scan up to date - Yes- 2008    Flex Sig / Colonoscopy up to date - colonoscopy in 2003,repeat in 10yrs    Immunizations up to date - Yes Td 2008    Abuse: Current or Past(Physical, Sexual or Emotional)- No    Do you feel safe in your environment - Yes       ALLERGIES:  Allergies   Allergen Reactions     Animal Dander      Fluconazole      rash     Liquid Adhesive      Transdermal patch adhesive. Specifically to nicotine patch; not allergic to nicotine.      Nsaids      Seasonal Allergies      Suture      Non-healing suture line     Vistaril [Hydroxyzine Hcl]      Urinary retention       MEDICATIONS:  Current Outpatient Prescriptions   Medication Sig Dispense Refill     ACETAMINOPHEN EXTRA STRENGTH OR Pt reports taking 650 MG arthritis       albuterol (2.5 MG/3ML) 0.083% nebulizer solution Take  by nebulization. take 3 mL by nebulization 4 times daily as needed 3 mL 12     albuterol (ALBUTEROL) 108 (90 BASE) MCG/ACT  Inhaler Inhale 1-2 puffs into the lungs every 6 hours as needed for shortness of breath / dyspnea 1 Inhaler 1     Ascorbic Acid (VITAMIN C PO) Take 1,000 mg by mouth daily       atorvastatin (LIPITOR) 10 MG tablet Take 1 tablet (10 mg) by mouth daily 90 tablet 2     Calcium-Magnesium-Vitamin D (CALCIUM 500 PO) Take 1 tablet by mouth daily       CLARITIN 10 MG OR TABS 1 TAB PO QD (Once per day) as needed for ALLERGY SYMPTOMS 0 0     Coenzyme Q10 (CO Q 10 PO) Take 200 mg by mouth daily        cyclobenzaprine (FLEXERIL) 5 MG tablet Take 1-2 tablets (5-10 mg) by mouth 3 times daily as needed for muscle spasms 180 tablet 1     desvenlafaxine succinate (PRISTIQ) 100 MG 24 hr tablet Take 1 tablet (100 mg) by mouth daily 90 tablet 3     desvenlafaxine succinate (PRISTIQ) 50 MG 24 hr tablet Take 1 tablet (50 mg) by mouth daily 30 tablet 5     fenofibrate 145 MG tablet TAKE 1 TABLET BY MOUTH EVERY DAY 90 tablet 3     levothyroxine (SYNTHROID) 75 MCG tablet Take 1 tablet (75 mcg) by mouth every morning Overdue for labs 90 tablet 3     lisinopril (PRINIVIL/ZESTRIL) 10 MG tablet Take 1 tablet (10 mg) by mouth daily 90 tablet PRN     Lutein 10 MG TABS Take 10 mg by mouth daily       MIRAPEX 0.125 MG OR TABS Take two tabs at dinner and one at hs       naltrexone (DEPADE;REVIA) 50 MG tablet Take 1 tablet (50 mg) by mouth daily 30 tablet 3     omeprazole (PRILOSEC) 20 MG CR capsule TAKE 1 TABLET (20 MG) BY MOUTH 2 TIMES DAILY TAKE 30-60 MINUTES BEFORE A MEAL. 180 capsule 3     omeprazole (PRILOSEC) 20 MG CR capsule Take 1 capsule (20 mg) by mouth 2 times daily 180 capsule 1     pramipexole (MIRAPEX) 0.125 MG tablet Take two tabs at dinner and one at hs 270 tablet PRN     ranitidine (ZANTAC) 300 MG tablet TAKE 1 TABLET (300 MG) BY MOUTH 2 TIMES DAILY 60 tablet 5     SYMBICORT 160-4.5 MCG/ACT Inhaler Inhale 1 puff into the lungs 2 times daily       triamcinolone (NASACORT AQ) 55 MCG/ACT nasal inhaler Inhale 2 sprays in both nostrils  "every day as needed 1 Inhaler 7     UNABLE TO FIND MEDICATION NAME: Allergy shots       zolpidem (AMBIEN) 10 MG tablet TAKE 1/2 TO 1 TABLET BY MOUTH NIGHTLY AS NEEDED 30 tablet 5     diclofenac (VOLTAREN) 1 % GEL topical gel Apply 4 grams to knees or 2 grams to feet four times daily using enclosed dosing card. (Patient not taking: Reported on 8/29/2018) 100 g 3     diclofenac (VOLTAREN-XR) 100 MG 24 hr tablet Take 1 tablet (100 mg) by mouth daily (Patient not taking: Reported on 8/29/2018) 90 tablet 3     PHQ-9: Patient Score: 7, \"very difficult\"   Oswestry Disability Index: Patient score: 24%    REVIEW OF SYSTEMS:   Constitutional:  Malaise  Eyes/Head: Negative  Ears/Nose/Throat: Negative  Allergy/Immune: Allergies  Skin:Negative  Hematologic/Lymphatic/Immunologic:Negative  Respiratory: Wheezing, has asthma  Cardiovascular: High blood pressure  Gastrointestinal: Negative  Endocrine: Negative  Musculoskeletal: Arthritis, Back pain, Joint pain, Neck pain and Stiffness  Urinary:  Negative   Any bowel or bladder incontinence: Denies   Neurologic: Negative  Psychiatric: Depression    PHYSICAL EXAM    /80 (BP Location: Right arm, Patient Position: Chair, Cuff Size: Adult Large)  Pulse 91  Resp 16  Wt 98.9 kg (218 lb)  LMP  (LMP Unknown)  SpO2 98%  BMI 36.28 kg/m2     Appearance:     A&O. Patient is appropriate.   Patient is in NAD.   Patient is well groomed and appears stated age.     HEENT:   Normocephalic, atraumatic, sclera, conjunctiva and pharynx normal. Pupils are equal, round. Hearing is adequate for exam .  Neck: Supple. No deformities or adenopathy  Cardiovascular:  No edema or JVD appreciated. Peripheral pulses are palpable, no edema on bilateral lower extremities.   Skin:  No rashes, erythema, breakdowns, lesions to exposed skin.   Hematologic:  No bruises, petechiae or ecchymosis to exposed areas.  Musculoskeletal:  Posture upright, shoulders and pelvis are leveled.   Deltoid: R: 5/5 L: " 5/5  Biceps: R: 5/5 L: 5/5  Triceps: R: 5/5 L: 5/5  Intrinsic hand:R: 5/5 L: 5/5  Hip flexion: R: 5/5 L: 5/5  Knee ext: R: 5/5 L: 5/5  Knee flex: R: 5/5 L: 5/5  Dorsiflexion: R: 5/5 L: 5/5  Plantarflexion:R: 5/5 L: 5/5    Gait pattern:  Unable to walk on the heels and toes. . Patient has antalgic gait favoring the left side.     Neurological:   Deep Tendon Reflex exam:   Biceps:     R:  2/4   L: 2/4   Brachioradialis   R:  2/4   L: 2/4:   Triceps:  R:  2/4   L: 2/4   Patella:  R:  2/4   L: 2/4   Achilles:  R:  2/4   L: 2/4    Saddle anesthesia: Denies    Sensory exam:   Light touch: normal bilateral upper and lower extremities    Vibration: normal in LE, L>R in upper extremites   No allodynia, dysesthesia, or hyperalgesia.    Cervical spine:   Flex:  10 degrees, painful    Ext: 20 degrees, painful    Rotation to right: 20 degrees, painful    Rotation to left: 20 degrees, painful    Tenderness in the cervical spine at midline. No   Tenderness in the cervical paraspinal muscles. No  Thoracic spine:    Kyphosis. Yes   Tenderness in the thoracic spine at midline. No   Tenderness in the thoracic paraspinal muscles. No  Lumbar/Sacral spine:   Forward Flexion:  90 degrees, painful    Ext: 30 degrees, painful    Rotation/ext to right: painful    Rotation/ext to left: painful    Lordosis. Yes   Tenderness in the lumbar spine at midline. No   Tenderness in the lumbar paraspinal muscles.No   Straight leg exam:    Right: negative     Left:  negative   Edmundo/Rigoberto's test:      Right: negative     Left:  negative   Passive internal rotation:     Right: negative     Left: positive    Active external rotation:      Right: negative     Left: negative   Tenderness over SI joint:      Right: negative     Left:  negative   Tenderness over piriformis:     Right: negative     Left:  negative   Tenderness over Trochanteric Bursa:     Right: mildly postiive    Left: positive    Psychiatric:  mentation appears normal., affect and mood  normal    DIRE Score for ongoing opioid management is calculated as follows:    Diagnosis = 2 pts (slowly progressive; moderate pain/objective findings)    Intractability = 2 pts (most treatments tried; patient not fully engaged/barriers)    Risk        Psych = 3 pts (no significant personality dysfunction/mental illness; good communication with clinic)         Chem Hlth = 2 pts (use of medications to cope with stress; chemical dependency in remission) (?)       Reliability = 3 pts (highly reliable with meds, appointments, treatments)       Social = 3 pts (supportive family/close relationships; involved in work/school; no isolation)       (Psych + Chem hlth + Reliability + Social) = 15    Efficacy = 2 pts (moderate benefit/function; low med dose; too early/not tried meds)    DIRE Score = 17        7-13: likely NOT suitable candidate for long-term opioid analgesia       14-21: may be a suitable candidate for long-term opioid analgesia      Previous Diagnostic Tests:   Imaging Studies:   MR CERVICAL SPINE W/O CONTRAST 8/4/2017 9:20 AM  Provided History: Chronic changes of right C7 and C8 radiculopathies.  Comparison: Cervical spine radiograph 1/12/2017, MR cervical spine  2/8/2016  Technique: Sagittal T1-weighted, sagittal T2-weighted, sagittal STIR,  sagittal diffusion weighted, axial T2-weighted, and axial T2* gradient  echo images of the cervical spine were obtained without intravenous  contrast.  Findings:Images are mildly degraded by motion artifact.   Approximately 2 mm degenerative retrolisthesis of C4 on C5.  Straightening of the normal cervical lordosis. Normal cord signal. No  abnormal vertebral marrow edema. No cord signal abnormality.  Multilevel disc degeneration, uncinate spurring and facet arthropathy.  Paraspinous soft tissues are unremarkable.  The findings on a level by level basis are as follows:  C2-3: No spinal canal or neural foraminal stenosis.  C3-4: Disc bulge. Uncinate spurring and facet  arthropathy. Moderate  left and mild/moderate right neural foraminal stenosis. No significant  spinal canal stenosis.  C4-5:  Eccentric left disc osteophyte complex with flattening of the  ventral aspect of the cord. Bilateral uncinate spurring and facet  hypertrophy. Ligament flavum thickening. Moderate spinal canal  bilateral neural foraminal stenosis.  C5-6:  Disc osteophyte complex. Bilateral facet hypertrophy and  uncinate spurring. Ligamentum flavum thickening. Moderate spinal canal  and bilateral neural foraminal stenosis.  C6-7:  Dorsal osteophytic spurring. Uncinate spurring and facet  hypertrophy. Moderate spinal canal stenosis. Mild/moderate bilateral  neural foraminal stenosis.  C7-T1:  Disc osteophyte complex. Facet hypertrophy and uncinate  spurring. Moderate right and mild left neural foraminal stenosis. No  significant spinal canal stenosis.  T1-T2: Disc bulge with superimposed right foraminal protrusion.  Moderate right neural foraminal stenosis.  T2-T3: Right foraminal disc protrusion. Mild right neural foraminal  Stenosis.  Impression:   Multilevel cervical spondylosis with degenerative retrolisthesis of C4  on C5. Moderate spinal canal stenosis C4-C7 and moderate bilateral  neural foraminal stenosis C4-C6 and on the right C7-T2. No cord signal  abnormality.    MR LUMBAR SPINE W/O CONTRAST, 4/5/2016 3:55 PM.  History: low back pain, lumbago with sciatica, right side.  Comparison: plain film 6/29/2012.  Technique: Sagittal T1-weighted, T2-weighted, STIR, diffusion and ADC,  and axial T2-weighted T2*-weighted images of the lumbar spine were  obtained without intravenous contrast.  Findings:   There are 5 lumbar-type vertebrae assumed for the purposes of this  dictation. The tip of the conus medullaris is at L1. The normal lumbar  lordotic curvature is mildly exaggerated. There is subtle  retrolisthesis L2 on L3. Extensive multilevel degenerative change with  multilevel disc space narrowing and loss  of normal T2 signal. Type I  Modic degenerative endplate signal at L2-L3. Type II Modic  degenerative endplate signal at several other levels, most prominently  at L1-L2 and L3-L4. Small T12 inferior endplate hemangioma. No acute  fracture or dislocation.  On a level by level basis:  L1-2:   Posterior disc osteophyte complex. Mild spinal canal  narrowing. No significant neural foraminal narrowing. Bilateral facet  arthropathy with a small right facet effusion.  L2-3:   Posterior disc osteophyte complex. Mild to moderate narrowing  of the thecal sac, particularly in the transverse dimension secondary  to epidural fat. No significant neural foraminal narrowing. Facet  arthropathy.  L3-4:   Posterior disc osteophyte complex with small central disc  extrusion and fissuring. Prominent epidural fat with moderate to  severe narrowing of the thecal sac and effacement of the CSF signal.  Mild left neural foraminal narrowing. Degenerative facet hypertrophy.  L4-5:   Posterior disc osteophyte complex. Prominent epidural fat with  moderate narrowing of the thecal sac. Mild bilateral neural foraminal  narrowing. Facet arthropathy.  L5-S1:  Posterior disc osteophyte complex. Mildly prominent epidural  fat with multiple moderate narrowing of the thecal sac. Mild to  moderate bilateral neural foraminal narrowing. Facet arthropathy.  Atrophy of the posterior paraspinal musculature. No focal fluid  collections.  Impression:  Extensive multilevel degenerative changes in the lumbar  spine with multilevel posterior disc osteophyte complexes and  prominent epidural fat resulting in moderate constriction of the  thecal sac at L3-L4. The spinal canal itself however is not narrowed.    Exam: Single frontal view of both hips and a frog-leg lateral view of  the left hip dated 9/4/2018.  COMPARISON: 6/29/2012.  CLINICAL HISTORY: Hip pain.  FINDINGS: Single frontal view of both hips and a frog-leg lateral view  of the left hip was obtained.  Degenerative disc disease in the  visualized lower lumbar spine. No femoral head collapse. No displaced  fractures. Mild joint space narrowing with minimal spurring at the  femoral head and neck junction.  IMPRESSION: Mild degenerative changes in both hips, as above.    ASSESSMENT:   1.  Cervical spondylosis and stenosis  2.  Lumbar facet arthropathy, DDD, DJD  3.  Bilateral hip osteoarthritis, bilateral hip bursitis L>R  4.  Chronic depression    Brandi Elkins returns to this clinic as a new patient after ~18 month absence. She notes increased neck, low back and hip pain L>R. On exam left hip bursa is tender to palpation.   We will proceed with X-rays to determine bursa vs hip joint. She would like to return to the multidisciplinary pain management program and will have therapy assessments. Will increase gabapentin with low dose during the day as tolerated.     PLAN:    Diagnosis reviewed, treatment option addressed, and risk/benefits discussed.  Self-care instructions given.  I am recommending a multidisciplinary treatment plan to help this patient better manage pain.         1. Schedule pain psychology assessment  2. Schedule physical therapy assessment  3. Schedule follow-up with ELISE Persaud, NP-C in 4 weeks  4. Imaging: Left hip x-rays:   [facility] Please call to schedule: [phone] or [phone]   5. Medication recommendations:   1. Gabapentin 100 mg in am and midday, 300 mg at HS    TIME SPENT:   A total of 60  minutes was spent on the patient today, greater than 50% of that time was spent on face to face counseling and care coordination regarding diagnoses and treatment options as mentioned above.    I would like to thank ELISE Hedrick for allowing me to participate in the management of this patient.     ELISE Zhu, NP-C  [facility]    Disclaimer: This note consists of symbols derived from keyboarding, dictation and/or voice recognition  software. As a result, there may be errors in the script that have gone undetected. Please consider this when interpreting information found in this chart.

## 2018-08-29 NOTE — PATIENT INSTRUCTIONS
After Visit Instructions:     Thank you for coming to [facility] for your care. It is my goal to partner with you to help you reach your optimal state of health.     I am recommending multidisciplinary care at this time.  The focus of care will be to continue gradual rehabilitation and pain management with medication adjustments as needed.    Continue daily self-care, identifying contributing factors, and monitoring variations in pain level. Continue to integrate self-care into your life.      1. Schedule pain psychology assessment  2. Schedule physical therapy assessment  3. Schedule follow-up with ELISE Persaud NP-C in 4 weeks  4. Imaging: Left hip x-rays:   [facility] Please call to schedule: [phone] or [phone]   5. Medication recommendations:   1. Gabapentin 100 mg in am and midday, 300 mg at HS      GiaELISE Lyn NP-C  [facility]    Contact information: [facility]    Please call if any side effects, questions, or concerns arise.    Nurse Triage line:  [phone]   Call this number with any questions or concerns. You may leave a detailed message anytime. Calls are typically returned Monday through Friday between 8 AM and 4:30 PM. We usually get back to you within 2 business days depending on the issue/request.    Scheduling number: [phone].  Call this number to schedule or change appointments.          Medication Refills Policy:    For non-narcotic medications, please your pharmacy directly to request a refill and the pharmacy will call the [facility] Management [facility] for authorization. Please allow 3-4 days for these refills.    For narcotic refills, call the nurse triage line and leave a message requesting your refill along with the name of the pharmacy that you use. Narcotic prescriptions will be mailed to your pharmacy or you may pick them up at the clinic.  Please call 7-10 days  before your refill is due  The above policy allows adequate time so that you do not run out of medication.    No Show - Late Cancellation - Late Arrival Policy  We believe regular attendance is key to your success in our program.    Any time you are unable to keep your appointment we ask that you call us at  least 24 hours in advance to let us know. This will allow us to offer the appointment time to another patient. The following is our policy for missed appointments. This also applies to appointments cancelled with less than 24 hours notice.    You will receive a letter after missing your 1st and 2nd appointments without contacting the clinic before your scheduled appointment time.     After missing 3 appointments without calling first, we will cancel all of your future appointments at [facility].    At that point, you will not be able to resume services unless approved by your care team  We understand that unforseen circumstances arise, however, out of respect for all concerned and to provide this appointment to another patient, this policy will be enforced.    Please note that most follow up appointments are 30 minutes long. If you arrive late, your provider may not be able to see you for the entire 30 minutes. Please also note that if you arrive more than 15 minutes for any appointment, it may be rescheduled.

## 2018-08-29 NOTE — MR AVS SNAPSHOT
After Visit Summary   8/29/2018    Brandi Stern    MRN: [id number]           Patient Information     Date Of Birth          1955        Visit Information        Provider Department      8/29/2018 11:00 AM Gai Stroud APRN CNP [facility]        Today's Diagnoses     Primary osteoarthritis involving multiple joints    -  1    Cervical spondylosis without myelopathy        Spinal stenosis in cervical region        Chronic midline low back pain without sciatica        Hip pain, left          Care Instructions    After Visit Instructions:     Thank you for coming to [facility] for your care. It is my goal to partner with you to help you reach your optimal state of health.     I am recommending multidisciplinary care at this time.  The focus of care will be to continue gradual rehabilitation and pain management with medication adjustments as needed.    Continue daily self-care, identifying contributing factors, and monitoring variations in pain level. Continue to integrate self-care into your life.      1. Schedule pain psychology assessment  2. Schedule physical therapy assessment  3. Schedule follow-up with ELISE Persaud NP-C in 4 weeks  4. Imaging: Left hip x-rays:   [facility] Please call to schedule: [phone] or [phone]   5. Medication recommendations:   1. Gabapentin 100 mg in am and midday, 300 mg at HS      Gia M. ELISE Stroud NP-C  [facility]    Contact information: [facility]    Please call if any side effects, questions, or concerns arise.    Nurse Triage line:  [phone]   Call this number with any questions or concerns. You may leave a detailed message anytime. Calls are typically returned Monday through Friday between 8 AM and 4:30 PM. We usually get back to you within 2 business days depending on the issue/request.    Scheduling  number: [phone].  Call this number to schedule or change appointments.          Medication Refills Policy:    For non-narcotic medications, please your pharmacy directly to request a refill and the pharmacy will call the [facility] for authorization. Please allow 3-4 days for these refills.    For narcotic refills, call the nurse triage line and leave a message requesting your refill along with the name of the pharmacy that you use. Narcotic prescriptions will be mailed to your pharmacy or you may pick them up at the clinic.  Please call 7-10 days before your refill is due  The above policy allows adequate time so that you do not run out of medication.    No Show - Late Cancellation - Late Arrival Policy  We believe regular attendance is key to your success in our program.    Any time you are unable to keep your appointment we ask that you call us at  least 24 hours in advance to let us know. This will allow us to offer the appointment time to another patient. The following is our policy for missed appointments. This also applies to appointments cancelled with less than 24 hours notice.    You will receive a letter after missing your 1st and 2nd appointments without contacting the clinic before your scheduled appointment time.     After missing 3 appointments without calling first, we will cancel all of your future appointments at [facility].    At that point, you will not be able to resume services unless approved by your care team  We understand that unforseen circumstances arise, however, out of respect for all concerned and to provide this appointment to another patient, this policy will be enforced.    Please note that most follow up appointments are 30 minutes long. If you arrive late, your provider may not be able to see you for the entire 30 minutes. Please also note that if you arrive more than 15 minutes for any appointment, it may be rescheduled.                                      Follow-ups after your visit        Additional Services     [facility] PT, HAND, AND CHIROPRACTIC REFERRAL       **This order will print in the [facility] Scheduling Office**    Physical Therapy, Hand Therapy and Chiropractic Care are available through:    *[facility]  *[facility]  *[facility]    Call one number to schedule at any of the above locations: [phone].    Your provider has referred you to: Physical Therapy at [facility] or [facility]    Indication/Reason for Referral: Low Back Pain, neck pain, Bilateral knee pain, left hip pain   Onset of Illness: many years  Therapy Orders: Evaluate and Treat  Special Programs: None  Special Request: None    Maria Luz Braga      Additional Comments for the Therapist or Chiropractor: Highest priorities are left neck and left hip     Please be aware that coverage of these services is subject to the terms and limitations of your health insurance plan.  Call member services at your health plan with any benefit or coverage questions.      Please bring the following to your appointment:    *Your personal calendar for scheduling future appointments  *Comfortable clothing            PAIN PHD EVAL/TREAT/FOLLOW UP                 Future tests that were ordered for you today     Open Future Orders        Priority Expected Expires Ordered    XR Hip Left 2-3 Views Routine 8/29/2018 8/29/2019 8/29/2018            Who to contact     If you have questions or need follow up information about today's clinic visit or your schedule please contact [facility] directly at [phone].  Normal or non-critical lab and imaging results will be communicated to you by MyChart, letter or phone within 4 business days after the clinic has received the results. If you do not hear from us within 7 days, please contact the clinic through MyChart or phone. If you have a critical or abnormal lab result, we will notify you by phone as  soon as possible.  Submit refill requests through [employer] or call your pharmacy and they will forward the refill request to us. Please allow 3 business days for your refill to be completed.          Additional Information About Your Visit        [email or web address] Information     [employer] gives you secure access to your electronic health record. If you see a primary care provider, you can also send messages to your care team and make appointments. If you have questions, please call your primary care clinic.  If you do not have a primary care provider, please call [phone] and they will assist you.        Care EveryWhere ID     This is your Care EveryWhere ID. This could be used by other organizations to access your [facility] medical records  [id number]        Your Vitals Were     Pulse Respirations Last Period Pulse Oximetry BMI (Body Mass Index)       91 16 (LMP Unknown) 98% 36.28 kg/m2        Blood Pressure from Last 3 Encounters:   08/29/18 124/80   06/12/18 122/82   04/20/18 132/90    Weight from Last 3 Encounters:   08/29/18 98.9 kg (218 lb)   06/12/18 98.4 kg (217 lb)   04/20/18 100.3 kg (221 lb 1.6 oz)              We Performed the Following     [facility] PT, HAND, AND CHIROPRACTIC REFERRAL     PAIN PHD EVAL/TREAT/FOLLOW UP          Today's Medication Changes          These changes are accurate as of 8/29/18 11:38 AM.  If you have any questions, ask your nurse or doctor.               Start taking these medicines.        Dose/Directions    gabapentin 100 MG capsule   Commonly known as:  NEURONTIN   Used for:  Chronic midline low back pain without sciatica, Spinal stenosis in cervical region, Cervical spondylosis without myelopathy, Primary osteoarthritis involving multiple joints        100 mg capsule BID and 300 mg at HS   Quantity:  150 capsule   Refills:  1            Where to get your medicines      These medications were sent to [facility]/pharmacy #[id number] - [place] - [address]  [address] [place] [facility]  [address]     Phone:  [phone]     gabapentin 100 MG capsule                Primary Care Provider Office Phone # Fax #    Cherie BrennanUZIEL [phone] [address]  [facility] [address]        Equal Access to Services     LORNE HALEY : Hadii aad ku hadasho Soomaali, waaxda luqadaha, qaybta kaalmada adeegyada, waxay vernellin hayaan hakeem clayissacmolly rodriguez . So [facility] [phone].    ATENCIÓN: Si habla español, tiene a rizvi disposición servicios gratuitos de asistencia lingüística. [facility] [phone].    We comply with applicable federal civil rights laws and [place] laws. We do not discriminate on the basis of race, color, national origin, age, disability, sex, sexual orientation, or gender identity.            Thank you!     Thank you for choosing [facility]  for your care. Our goal is always to provide you with excellent care. Hearing back from our patients is one way we can continue to improve our services. Please take a few minutes to complete the written survey that you may receive in the mail after your visit with us. Thank you!             Your Updated Medication List - Protect others around you: Learn how to safely use, store and throw away your medicines at www.disposemymeds.org.          This list is accurate as of 8/29/18 11:38 AM.  Always use your most recent med list.                   Brand Name Dispense Instructions for use Diagnosis    ACETAMINOPHEN EXTRA STRENGTH PO      Pt reports taking 650 MG arthritis        * albuterol (2.5 MG/3ML) 0.083% neb solution     3 mL    Take  by nebulization. take 3 mL by nebulization 4 times daily as needed    Moderate persistent asthma       * albuterol 108 (90 Base) MCG/ACT inhaler    PROAIR HFA    1 Inhaler    Inhale 1-2 puffs into the lungs every 6 hours as needed for shortness of breath / dyspnea    Moderate persistent asthma with exacerbation       atorvastatin 10 MG tablet    LIPITOR    90 tablet    Take 1  tablet (10 mg) by mouth daily    Hyperlipidemia LDL goal <130       CALCIUM 500 PO      Take 1 tablet by mouth daily        CLARITIN 10 MG tablet   Generic drug:  loratadine     0    1 TAB PO QD (Once per day) as needed for ALLERGY SYMPTOMS    Allergic rhinitis due to other allergen       CO Q 10 PO      Take 200 mg by mouth daily        cyclobenzaprine 5 MG tablet    FLEXERIL    180 tablet    Take 1-2 tablets (5-10 mg) by mouth 3 times daily as needed for muscle spasms    Spasm of back muscles       * desvenlafaxine succinate 50 MG 24 hr tablet    PRISTIQ    30 tablet    Take 1 tablet (50 mg) by mouth daily    Major depressive disorder, recurrent episode, moderate (H)       * desvenlafaxine succinate 100 MG 24 hr tablet    PRISTIQ    90 tablet    Take 1 tablet (100 mg) by mouth daily    Major depressive disorder, recurrent episode, moderate (H)       fenofibrate 145 MG tablet     90 tablet    TAKE 1 TABLET BY MOUTH EVERY DAY    Pure hyperglyceridemia       gabapentin 100 MG capsule    NEURONTIN    150 capsule    100 mg capsule BID and 300 mg at HS    Chronic midline low back pain without sciatica, Spinal stenosis in cervical region, Cervical spondylosis without myelopathy, Primary osteoarthritis involving multiple joints       levothyroxine 75 MCG tablet    SYNTHROID    90 tablet    Take 1 tablet (75 mcg) by mouth every morning Overdue for labs    Chronic lymphocytic thyroiditis       lisinopril 10 MG tablet    PRINIVIL/ZESTRIL    90 tablet    Take 1 tablet (10 mg) by mouth daily    Hypertension goal BP (blood pressure) < 140/90       Lutein 10 MG Tabs      Take 10 mg by mouth daily        * MIRAPEX 0.125 MG tablet   Generic drug:  pramipexole      Take two tabs at dinner and one at hs        * pramipexole 0.125 MG tablet    MIRAPEX    270 tablet    Take two tabs at dinner and one at hs    Restless leg syndrome       naltrexone 50 MG tablet    DEPADE;REVIA    30 tablet    Take 1 tablet (50 mg) by mouth daily     Class 2 obesity with body mass index (BMI) of 36.0 to 36.9 in adult, unspecified obesity type, unspecified whether serious comorbidity present       * omeprazole 20 MG CR capsule    priLOSEC    180 capsule    Take 1 capsule (20 mg) by mouth 2 times daily    Gastroesophageal reflux disease without esophagitis       * omeprazole 20 MG CR capsule    priLOSEC    180 capsule    TAKE 1 TABLET (20 MG) BY MOUTH 2 TIMES DAILY TAKE 30-60 MINUTES BEFORE A MEAL.    Gastroesophageal reflux disease without esophagitis       ranitidine 300 MG tablet    ZANTAC    60 tablet    TAKE 1 TABLET (300 MG) BY MOUTH 2 TIMES DAILY    Gastroesophageal reflux disease without esophagitis       SYMBICORT 160-4.5 MCG/ACT Inhaler   Generic drug:  budesonide-formoterol      Inhale 1 puff into the lungs 2 times daily    Moderate persistent asthma without complication       triamcinolone 55 MCG/ACT nasal inhaler    NASACORT AQ    1 Inhaler    Inhale 2 sprays in both nostrils every day as needed    Allergic rhinitis due to other allergen       UNABLE TO FIND      MEDICATION NAME: Allergy shots        VITAMIN C PO      Take 1,000 mg by mouth daily        zolpidem 10 MG tablet    AMBIEN    30 tablet    TAKE 1/2 TO 1 TABLET BY MOUTH NIGHTLY AS NEEDED    Insomnia, unspecified type       * Notice:  This list has 8 medication(s) that are the same as other medications prescribed for you. Read the directions carefully, and ask your doctor or other care provider to review them with you.

## 2018-08-31 DIAGNOSIS — R79.89 ELEVATED SERUM CREATININE: ICD-10-CM

## 2018-08-31 DIAGNOSIS — I10 HYPERTENSION GOAL BP (BLOOD PRESSURE) < 140/90: ICD-10-CM

## 2018-08-31 PROCEDURE — 80048 BASIC METABOLIC PNL TOTAL CA: CPT | Performed by: NURSE PRACTITIONER

## 2018-08-31 PROCEDURE — 36415 COLL VENOUS BLD VENIPUNCTURE: CPT | Performed by: NURSE PRACTITIONER

## 2018-09-01 LAB
ANION GAP SERPL CALCULATED.3IONS-SCNC: 10 MMOL/L (ref 3–14)
BUN SERPL-MCNC: 14 MG/DL (ref 7–30)
CALCIUM SERPL-MCNC: 8.9 MG/DL (ref 8.5–10.1)
CHLORIDE SERPL-SCNC: 105 MMOL/L (ref 94–109)
CO2 SERPL-SCNC: 23 MMOL/L (ref 20–32)
CREAT SERPL-MCNC: 0.8 MG/DL (ref 0.52–1.04)
GFR SERPL CREATININE-BSD FRML MDRD: 72 ML/MIN/1.7M2
GLUCOSE SERPL-MCNC: 96 MG/DL (ref 70–99)
POTASSIUM SERPL-SCNC: 3.9 MMOL/L (ref 3.4–5.3)
SODIUM SERPL-SCNC: 138 MMOL/L (ref 133–144)

## 2018-09-04 ENCOUNTER — HOSPITAL ENCOUNTER (OUTPATIENT)
Dept: GENERAL RADIOLOGY | Facility: CLINIC | Age: 63
Discharge: HOME | End: 2018-09-04
Attending: NURSE PRACTITIONER | Admitting: NURSE PRACTITIONER
Payer: MEDICARE

## 2018-09-04 ENCOUNTER — OFFICE VISIT (OUTPATIENT)
Dept: PALLIATIVE MEDICINE | Facility: CLINIC | Age: 63
End: 2018-09-04
Attending: NURSE PRACTITIONER
Payer: MEDICARE

## 2018-09-04 DIAGNOSIS — G89.29 CHRONIC PAIN: Primary | ICD-10-CM

## 2018-09-04 DIAGNOSIS — M25.552 HIP PAIN, LEFT: ICD-10-CM

## 2018-09-04 PROCEDURE — G8978 MOBILITY CURRENT STATUS: HCPCS | Mod: CK | Performed by: PHYSICAL THERAPIST

## 2018-09-04 PROCEDURE — 97162 PT EVAL MOD COMPLEX 30 MIN: CPT | Mod: GP | Performed by: PHYSICAL THERAPIST

## 2018-09-04 PROCEDURE — G8979 MOBILITY GOAL STATUS: HCPCS | Mod: CJ | Performed by: PHYSICAL THERAPIST

## 2018-09-04 PROCEDURE — 73502 X-RAY EXAM HIP UNI 2-3 VIEWS: CPT

## 2018-09-04 PROCEDURE — 97535 SELF CARE MNGMENT TRAINING: CPT | Mod: GP | Performed by: PHYSICAL THERAPIST

## 2018-09-04 NOTE — PROGRESS NOTES
"PHYSICAL THERAPY INITIAL EVALUATION and PLAN OF CARE    Patient Name: Brandi Stern     : 1955    MRN: [id number]   Pain Management Provider:   Rigoberto Kaminski PhD (eval scheduled for 10/24/18), Gia Stroud, NP and Caterina Lau, PT      SUBJECTIVE:  PRESENTATION AND ETIOLOGY  Chief Complaint: Widespread pain (worst in bilateral knees, left hip, left LBP > bilateral neck pain), decreased ROM, decreased strength and endurance and decreased flexibility limiting the ability to perform activities of daily living.      Onset / Etiology: 20+ years    Pattern Since Onset: Worsened    Pertinent Medical  / Surgical History: Epic Snapshot Reviewed:  Contributing factors to the severity / complexity of the patient's chief complaint include: refer to provider's notes in EPIC - note FMS, depression, OA of multiple joints     Symptom Pattern: varies - \"lying down at night is awful\"    Pain: Frequency: Constant           Intensity: Best 2-3/10, Worst 8/10, ADP 4-7/10    LEVEL OF FUNCTION AT START OF CARE  Current Aggrevating Activities / Functional Limitations:   Walking tolerance: can go a couple blks but needs to pause and stretch or sit intermittently throughout the walk - at half blk LBP is flared  Standing tolerance: static: NA, dynamic few minutes currently - d/t hip pain  Sitting tolerance: no great limits - when neck was flared then it was more limited  Disturbed sleep: pain wakes at times - but generally sleeps well - doesn't feel rested upon waking - but indicates that hasn't been normal for a while  Transitions: in/out of bed can be \"tricky\", off the floor is challenging  Household Activity: unable to keep up with household tasks   Work limitations: no limits  Recreational limitations: can't do recreational activities like she used to - has been over 10 yrs - hike, canoe, etc; Hasn't been able to travel in a few years; socailly isolated  Other: up/down stairs; can't help as much with care for her " "mother    Prior Functional Level: Gradual onset of limitations.      Home or Community Barriers: None    Patient's selected goals for physical therapy: function more comfortably and feel more confident    CURRENT / PREVIOUS INTERVENTION(S):     Relieving Activities:  - Self Care: Reach out to friends, [employer] and reading, unable to take NSAIDs, has tried heat but doesn't do much so doesn't use  - Current HEP: Stretching: nothing formal but moves in ways that feel helpful, Aerobic none lately,  - Relaxation Practice: mindful breathing sometimes; has learned some \"Yoga things\" but doesn't use    Previous / Current therapies for current chief complaint: PT: has had lots over the years - has been helpful mostly ; did see PT here at pain clinic back in 2016/7    DEMOGRAPHICS  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    Knowledge/understanding of the role of stress/MH on pain experience:  Has good knowledge    ===============================================================  OBJECTIVE:  POSTURE/MOBILITY:  Observation: Pleasant and engaged.  Tearful throughout session.  Moves b/w leaning forward over her LEs and sitting back during seated portion of interview.  Posture demonstrates FHP and rounded and elevated shoulders with left shoulder sitting higher than right.    Static and Dynamic: Transitions indpendently but with moderate effort    GAIT, " LOCOMOTION, and BALANCE:  Gait and Locomotion: Antalgic: favoring left LE; no AD.      RANGE OF MOTION:   Active: CROM and trunk Grossly WFL.    MUSCLE PERFORMANCE:   Strength: Weakness in gluts, lower traps; No core engagement observed to support posture.   Flexibility: Tightness noted in bilateral UT, QLs, lumbar paraspinals, HS, gastrocs    Pain behaviors: None      ===============================================================  Today's Treatment:  Initial evaluation  Self Care/Home Managment Training:  (10 min)  Symptom Control: 1) consider regular use of modalities (ie. Daily) 2) consider trial of hiking pole or SEC and only walking one dog at a time for walking dogs for better tolerance of walking her dogs/going for a short walk.      Educated on pain PT philosophy of 1) HEP instruction 2) posture /kinesthetic awareness education and 3) self care/self regulation.   ===============================================================  ASSESSMENT:    Patient requires PT intervention for the following impairments: Limited knowledge of condition and / or self care - inability to control symptoms, Impaired posture / muscle imbalance, Muscle flexibility limitations, Muscle strength and endurance limitations, Pain and Deconditioning    Anticipated Goals and Expected Outcomes:  STG (attempt to meet in 8-12 weeks):  Pt will report daily HEP/stretching program.  Pt will report two forms of mini breaks taken each day as a self regulation tool.    Pt will report two pacing strategies used to better manage daily activity.  Pt will demonstrate how to find a neutral position in sitting, standing and with ambulation.   Pt will report increased ease with all transitional movements.  Pt will report greater ease walking 2 blks and require few to any rest breaks within that distance.      Rehab potential for achieving goals: good-fair.  Prognosis will depend on concurrent addressing of psychosocial contributing factors.    ===============================================================  PLAN:   Patient will benefit from skilled physical therapy consisting of:   -neuromuscular reeducation for improved kinesthetic sense/body awareness and posture as well as education in ANS regulation techniques ;   -education in self care / home management training to include instruction in: daily symptom control techniques and flare management;   -therapeutic activities to achieve improved functional performance in daily activities through use of self care including pacing and energy conservation, good body mechanics and restorative positioning;   -therapeutic exercise to develop: strength, endurance, flexibility and core stability through HEP instruction.    Assessment will be ongoing with changes in treatment as indicated.  Benefits/risks/alternatives to treatment have been reviewed and the patient has been instructed to contact this office if they have any questions or concerns.  This plan of care has been discussed with the patient and the patient is in agreement.     Frequency / Duration:  Patient will be seen for a total of 12 visits; at a frequency of QW x 6, QOW x 4, 1x/mo x 2.    Total Visit Time: 45  minutes            Caterina Sid            PT                              Date:  9/4/2018    GCodes based on subjective report and objective measures.    :   : CJ    GCode visit 1/10     GCode DX: Chronic pain    =====================================================  **  Referring Provider Certification: Referring Provider reviewing certifies that the above treatment / plan of care is required and authorized, and that the patient's plan will be reviewed every ninety (90) days **.   ======================================================         PRESENT: NA    MULTIDISCIPLINARY PATIENT / FAMILY EDUCATION RECORD  Department:  Physical Therapy    Readiness to Learn: Ability to understand verbal instructions, Ability to  understand written instructions, Knowledge of educational needs / treatment plan  Specific Barriers to Learning: None  Referrals: None  Learning Needs: Pain management education to improve daily activity tolerance.   Who: Patient  How: Demonstration, Verbal instructions, Written instructions  Response: Appropriate verbal response, Asked questions, Demonstrated ability, Verbalized recall / understanding

## 2018-09-04 NOTE — MR AVS SNAPSHOT
After Visit Summary   9/4/2018    Brandi Stern    MRN: [id number]           Patient Information     Date Of Birth          1955        Visit Information        Provider Department      9/4/2018 11:45 AM Caterina Lau, PT [facility]        Today's Diagnoses     Chronic pain    -  1       Follow-ups after your visit        Your next 10 appointments already scheduled     Sep 24, 2018 11:30 AM CDT   Return Visit with Caterina Lau PT   [facility] ([facility]    [address]  [address]  [facility] [id number] [phone]            Oct 02, 2018 10:30 AM CDT   Return Visit with ELISE Howard CNP   [facility] ([facility])    [address]  [address]  [facility] [id number] [phone]            Oct 15, 2018 11:30 AM CDT   Return Visit with Caterina Lua PT   [facility] ([facility])    [address]  [address]  [facility] [id number] [phone]            Oct 24, 2018  9:00 AM CDT   New Visit with Rigoberto Enciso, PhD [facility] ([facility])    [address]  [address]  [facility] [id number] [phone]              Who to contact     If you have questions or need follow up information about today's clinic visit or your schedule please contact [facility] [place] directly at [phone].  Normal or non-critical lab and imaging results will be communicated to you by MyChart, letter or phone within 4 business days after the clinic has received the results. If you do not hear from us within 7 days, please contact the clinic through [employer] or phone. If you have a critical or abnormal lab result, we will notify you by phone as soon as possible.  Submit refill requests through [employer] or call your pharmacy and they will forward the refill request to us. Please allow 3 business days  for your refill to be completed.          Additional Information About Your Visit        MyChart Information     [employer] gives you secure access to your electronic health record. If you see a primary care provider, you can also send messages to your care team and make appointments. If you have questions, please call your primary care clinic.  If you do not have a primary care provider, please call [phone] and they will assist you.        Care EveryWhere ID     This is your Care EveryWhere ID. This could be used by other organizations to access your [place] medical records  [id number]        Your Vitals Were     Last Period                   (LMP Unknown)            Blood Pressure from Last 3 Encounters:   09/19/18 121/78   09/14/18 104/68   09/13/18 117/78    Weight from Last 3 Encounters:   09/19/18 96.2 kg (212 lb)   09/13/18 98 kg (216 lb)   08/29/18 98.9 kg (218 lb)              We Performed the Following     C MOBILITY CURRENT STATUS     C MOBILITY GOAL STATUS     HC PT EVAL, MODERATE COMPLEXITY     SELF CARE MNGMENT TRAINING        Primary Care Provider Office Phone # Fax #    Cherie Brennan [phone]       [address] [facility] [address]        Equal Access to Services     LORNE HALEY : Hadii aad ku hadasho Soomaali, waaxda luqadaha, qaybta kaalmada adeegyada, adrián perkins. So [facility] [phone].    ATENCIÓN: Si habla español, tiene a rizvi disposición servicios gratuitos de asistencia lingüística. Llame al [phone].    We comply with applicable federal civil rights laws and [place] laws. We do not discriminate on the basis of race, color, national origin, age, disability, sex, sexual orientation, or gender identity.            Thank you!     Thank you for choosing [facility]  for your care. Our goal is always to provide you with excellent care. Hearing back from our patients is one way we can continue to improve our  services. Please take a few minutes to complete the written survey that you may receive in the mail after your visit with us. Thank you!             Your Updated Medication List - Protect others around you: Learn how to safely use, store and throw away your medicines at www.disposemymeds.org.          This list is accurate as of 9/4/18 11:59 PM.  Always use your most recent med list.                   Brand Name Dispense Instructions for use Diagnosis    ACETAMINOPHEN EXTRA STRENGTH PO      Pt reports taking 650 MG arthritis        albuterol 108 (90 Base) MCG/ACT inhaler    PROAIR HFA    1 Inhaler    Inhale 1-2 puffs into the lungs every 6 hours as needed for shortness of breath / dyspnea    Moderate persistent asthma with exacerbation       atorvastatin 10 MG tablet    LIPITOR    90 tablet    Take 1 tablet (10 mg) by mouth daily    Hyperlipidemia LDL goal <130       CALCIUM 500 PO      Take 1 tablet by mouth daily        CLARITIN 10 MG tablet   Generic drug:  loratadine     0    1 TAB PO QD (Once per day) as needed for ALLERGY SYMPTOMS    Allergic rhinitis due to other allergen       CO Q 10 PO      Take 200 mg by mouth daily        cyclobenzaprine 5 MG tablet    FLEXERIL    180 tablet    Take 1-2 tablets (5-10 mg) by mouth 3 times daily as needed for muscle spasms    Spasm of back muscles       * desvenlafaxine succinate 50 MG 24 hr tablet    PRISTIQ    30 tablet    Take 1 tablet (50 mg) by mouth daily    Major depressive disorder, recurrent episode, moderate (H)       * desvenlafaxine succinate 100 MG 24 hr tablet    PRISTIQ    90 tablet    Take 1 tablet (100 mg) by mouth daily    Major depressive disorder, recurrent episode, moderate (H)       fenofibrate 145 MG tablet     90 tablet    TAKE 1 TABLET BY MOUTH EVERY DAY    Pure hyperglyceridemia       gabapentin 100 MG capsule    NEURONTIN    150 capsule    100 mg capsule BID and 300 mg at HS    Chronic midline low back pain without sciatica, Spinal stenosis in  cervical region, Cervical spondylosis without myelopathy, Primary osteoarthritis involving multiple joints       levothyroxine 75 MCG tablet    SYNTHROID    90 tablet    Take 1 tablet (75 mcg) by mouth every morning Overdue for labs    Chronic lymphocytic thyroiditis       lisinopril 10 MG tablet    PRINIVIL/ZESTRIL    90 tablet    Take 1 tablet (10 mg) by mouth daily    Hypertension goal BP (blood pressure) < 140/90       Lutein 10 MG Tabs      Take 10 mg by mouth daily        * MIRAPEX 0.125 MG tablet   Generic drug:  pramipexole      Take two tabs at dinner and one at hs        * pramipexole 0.125 MG tablet    MIRAPEX    270 tablet    Take two tabs at dinner and one at hs    Restless leg syndrome       naltrexone 50 MG tablet    DEPADE;REVIA    30 tablet    Take 1 tablet (50 mg) by mouth daily    Class 2 obesity with body mass index (BMI) of 36.0 to 36.9 in adult, unspecified obesity type, unspecified whether serious comorbidity present       * omeprazole 20 MG CR capsule    priLOSEC    180 capsule    Take 1 capsule (20 mg) by mouth 2 times daily    Gastroesophageal reflux disease without esophagitis       * omeprazole 20 MG CR capsule    priLOSEC    180 capsule    TAKE 1 TABLET (20 MG) BY MOUTH 2 TIMES DAILY TAKE 30-60 MINUTES BEFORE A MEAL.    Gastroesophageal reflux disease without esophagitis       ranitidine 300 MG tablet    ZANTAC    60 tablet    TAKE 1 TABLET (300 MG) BY MOUTH 2 TIMES DAILY    Gastroesophageal reflux disease without esophagitis       SYMBICORT 160-4.5 MCG/ACT Inhaler   Generic drug:  budesonide-formoterol      Inhale 1 puff into the lungs 2 times daily    Moderate persistent asthma without complication       triamcinolone 55 MCG/ACT nasal inhaler    NASACORT AQ    1 Inhaler    Inhale 2 sprays in both nostrils every day as needed    Allergic rhinitis due to other allergen       UNABLE TO FIND      MEDICATION NAME: Allergy shots        VITAMIN C PO      Take 1,000 mg by mouth daily         zolpidem 10 MG tablet    AMBIEN    30 tablet    TAKE 1/2 TO 1 TABLET BY MOUTH NIGHTLY AS NEEDED    Insomnia, unspecified type       * Notice:  This list has 6 medication(s) that are the same as other medications prescribed for you. Read the directions carefully, and ask your doctor or other care provider to review them with you.

## 2018-09-07 ENCOUNTER — MYC MEDICAL ADVICE (OUTPATIENT)
Dept: PALLIATIVE MEDICINE | Facility: CLINIC | Age: 63
End: 2018-09-07

## 2018-09-07 DIAGNOSIS — M15.0 PRIMARY OSTEOARTHRITIS INVOLVING MULTIPLE JOINTS: Primary | ICD-10-CM

## 2018-09-07 NOTE — PROGRESS NOTES
Test results were as expected with no cause for concern. Will discuss further at the next appointment.     ELISE Zhu, NP-C   [facility]

## 2018-09-10 ENCOUNTER — OFFICE VISIT (OUTPATIENT)
Dept: PALLIATIVE MEDICINE | Facility: CLINIC | Age: 63
End: 2018-09-10
Payer: MEDICARE

## 2018-09-10 DIAGNOSIS — G89.29 CHRONIC PAIN: Primary | ICD-10-CM

## 2018-09-10 PROCEDURE — 97110 THERAPEUTIC EXERCISES: CPT | Mod: GP | Performed by: PHYSICAL THERAPIST

## 2018-09-10 PROCEDURE — 97112 NEUROMUSCULAR REEDUCATION: CPT | Mod: GP | Performed by: PHYSICAL THERAPIST

## 2018-09-10 NOTE — MR AVS SNAPSHOT
After Visit Summary   9/10/2018    Brandi Stern    MRN: [id number]           Patient Information     Date Of Birth          1955        Visit Information        Provider Department      9/10/2018 11:30 AM Caterina Lau, PT [facility]        Today's Diagnoses     Chronic pain    -  1       Follow-ups after your visit        Your next 10 appointments already scheduled     Sep 24, 2018 11:30 AM CDT   Return Visit with Caterina Lau PT   [facility] ([facility])    [address]  [address]  [facility] [id number] [phone]            Oct 02, 2018 10:30 AM CDT   Return Visit with ELISE Howard CNP   [facility] ([facility])    [address]  [address]  [facility] [id number] [phone]            Oct 15, 2018 11:30 AM CDT   Return Visit with Caterina Lau PT   [facility] ([facility])    [address]  [address]  [facility] [id number] [phone]            Oct 24, 2018  9:00 AM CDT   New Visit with Rigoberto Enciso, PhD [facility] ([facility])    [address]  [address]  [facility] [address] [phone]              Who to contact     If you have questions or need follow up information about today's clinic visit or your schedule please contact [facility] [place] [facility] directly at [phone].  Normal or non-critical lab and imaging results will be communicated to you by MyChart, letter or phone within 4 business days after the clinic has received the results. If you do not hear from us within 7 days, please contact the clinic through [employer] or phone. If you have a critical or abnormal lab result, we will notify you by phone as soon as possible.  Submit refill requests through [employer] or call your pharmacy and they will forward the refill request to us. Please allow 3 business  days for your refill to be completed.          Additional Information About Your Visit        MyChart Information     [employer] gives you secure access to your electronic health record. If you see a primary care provider, you can also send messages to your care team and make appointments. If you have questions, please call your primary care clinic.  If you do not have a primary care provider, please call [phone] and they will assist you.        Care EveryWhere ID     This is your Care EveryWhere ID. This could be used by other organizations to access your [facility] medical records  [id number]        Your Vitals Were     Last Period                   (LMP Unknown)            Blood Pressure from Last 3 Encounters:   09/19/18 121/78   09/14/18 104/68   09/13/18 117/78    Weight from Last 3 Encounters:   09/19/18 96.2 kg (212 lb)   09/13/18 98 kg (216 lb)   08/29/18 98.9 kg (218 lb)              We Performed the Following     NEUROMUSCULAR RE-EDUCATION     THERAPEUTIC EXERCISES        Primary Care Provider Office Phone # Fax #    Cherie Brennan [phone]       [address] [facility] [address]        Equal Access to Services     [facility] : Hadii aad ku hadasho Soomaali, waaxda luqadaha, qaybta kaalmada adeegyada, adrián coonn hakeem rodriguez . So [facility] [phone].    ATENCIÓN: Si habla español, tiene a rizvi disposición servicios gratuitos de asistencia lingüística. Llame al [phone].    We comply with applicable federal civil rights laws and [place] laws. We do not discriminate on the basis of race, color, national origin, age, disability, sex, sexual orientation, or gender identity.            Thank you!     Thank you for choosing [facility]  for your care. Our goal is always to provide you with excellent care. Hearing back from our patients is one way we can continue to improve our services. Please take a few minutes to complete the written  survey that you may receive in the mail after your visit with us. Thank you!             Your Updated Medication List - Protect others around you: Learn how to safely use, store and throw away your medicines at www.disposemymeds.org.          This list is accurate as of 9/10/18 11:59 PM.  Always use your most recent med list.                   Brand Name Dispense Instructions for use Diagnosis    ACETAMINOPHEN EXTRA STRENGTH PO      Pt reports taking 650 MG arthritis        albuterol 108 (90 Base) MCG/ACT inhaler    PROAIR HFA    1 Inhaler    Inhale 1-2 puffs into the lungs every 6 hours as needed for shortness of breath / dyspnea    Moderate persistent asthma with exacerbation       atorvastatin 10 MG tablet    LIPITOR    90 tablet    Take 1 tablet (10 mg) by mouth daily    Hyperlipidemia LDL goal <130       CALCIUM 500 PO      Take 1 tablet by mouth daily        CLARITIN 10 MG tablet   Generic drug:  loratadine     0    1 TAB PO QD (Once per day) as needed for ALLERGY SYMPTOMS    Allergic rhinitis due to other allergen       CO Q 10 PO      Take 200 mg by mouth daily        cyclobenzaprine 5 MG tablet    FLEXERIL    180 tablet    Take 1-2 tablets (5-10 mg) by mouth 3 times daily as needed for muscle spasms    Spasm of back muscles       * desvenlafaxine succinate 50 MG 24 hr tablet    PRISTIQ    30 tablet    Take 1 tablet (50 mg) by mouth daily    Major depressive disorder, recurrent episode, moderate (H)       * desvenlafaxine succinate 100 MG 24 hr tablet    PRISTIQ    90 tablet    Take 1 tablet (100 mg) by mouth daily    Major depressive disorder, recurrent episode, moderate (H)       fenofibrate 145 MG tablet     90 tablet    TAKE 1 TABLET BY MOUTH EVERY DAY    Pure hyperglyceridemia       gabapentin 100 MG capsule    NEURONTIN    150 capsule    100 mg capsule BID and 300 mg at HS    Chronic midline low back pain without sciatica, Spinal stenosis in cervical region, Cervical spondylosis without myelopathy,  Primary osteoarthritis involving multiple joints       levothyroxine 75 MCG tablet    SYNTHROID    90 tablet    Take 1 tablet (75 mcg) by mouth every morning Overdue for labs    Chronic lymphocytic thyroiditis       lisinopril 10 MG tablet    PRINIVIL/ZESTRIL    90 tablet    Take 1 tablet (10 mg) by mouth daily    Hypertension goal BP (blood pressure) < 140/90       Lutein 10 MG Tabs      Take 10 mg by mouth daily        * MIRAPEX 0.125 MG tablet   Generic drug:  pramipexole      Take two tabs at dinner and one at hs        * pramipexole 0.125 MG tablet    MIRAPEX    270 tablet    Take two tabs at dinner and one at hs    Restless leg syndrome       naltrexone 50 MG tablet    DEPADE;REVIA    30 tablet    Take 1 tablet (50 mg) by mouth daily    Class 2 obesity with body mass index (BMI) of 36.0 to 36.9 in adult, unspecified obesity type, unspecified whether serious comorbidity present       * omeprazole 20 MG CR capsule    priLOSEC    180 capsule    Take 1 capsule (20 mg) by mouth 2 times daily    Gastroesophageal reflux disease without esophagitis       * omeprazole 20 MG CR capsule    priLOSEC    180 capsule    TAKE 1 TABLET (20 MG) BY MOUTH 2 TIMES DAILY TAKE 30-60 MINUTES BEFORE A MEAL.    Gastroesophageal reflux disease without esophagitis       ranitidine 300 MG tablet    ZANTAC    60 tablet    TAKE 1 TABLET (300 MG) BY MOUTH 2 TIMES DAILY    Gastroesophageal reflux disease without esophagitis       SYMBICORT 160-4.5 MCG/ACT Inhaler   Generic drug:  budesonide-formoterol      Inhale 1 puff into the lungs 2 times daily    Moderate persistent asthma without complication       triamcinolone 55 MCG/ACT nasal inhaler    NASACORT AQ    1 Inhaler    Inhale 2 sprays in both nostrils every day as needed    Allergic rhinitis due to other allergen       UNABLE TO FIND      MEDICATION NAME: Allergy shots        VITAMIN C PO      Take 1,000 mg by mouth daily        zolpidem 10 MG tablet    AMBIEN    30 tablet    TAKE 1/2 TO  1 TABLET BY MOUTH NIGHTLY AS NEEDED    Insomnia, unspecified type       * Notice:  This list has 6 medication(s) that are the same as other medications prescribed for you. Read the directions carefully, and ask your doctor or other care provider to review them with you.

## 2018-09-10 NOTE — PROGRESS NOTES
"Patient Name: Brandi Stern      YOB: 1955     Medical Record Number: [id number]  Diagnosis: Chronic pain  Visit Info Length of Visit: 45 min  Arrived: On Time  Therapeutic Procedures/Exercises: 15 min; Therapeutic Activities: NA; Neuromuscular Re-education: 30 min;    Exercise/Activity Education Instruction:  Postural Training/Anatomy  Activity:  Core Stabilization - instructed in \"zipper\" (TA) abdominal set - set reminder cues to practice off and on during the day; also be mindful of activating with transitional movements.    Aerobic Conditioning - Recumbent bike x 4 min, no resistance, seat #11, one lap hallway walk with use of SEC.     Postural Training:  Neutral postures in sitting and with ambulation - coached and practiced each.  Instructed in posture habit reversal - set reminder cues to move in/out of old to new neutral posture habit off and on throughout the day - practice for sitting. With ambulation: establish mindful walks paths - one at home and one when in the community to practice walking with mindful awareness of neutral posture.         Notes: Initial PT f/u since PT eval on 9/4/18  Reports she is generally the same as at PT eval.  Feeling a bit happier today - went to a wedding last night and danced.  Felt good.  Today feels a little sore but not too bad - tolerable and \"worth it\".    Will be having an injection for her hip soon.      GCode visit 2/10     Demonstrates/Verbalizes Technique: 3, 4 (1= poor technique-difficulty performing exercises,significant cues required; 5= good technique-performs exercises without cues)  Body Awareness: 3 (1=low awareness; 5=high awareness)  Posture/Stability: 3 (1= poor posture, stability; 5= good posture, stability)   Motivational Level:   Cooperative Participation:  Full   Patient Satisfaction:  satisfied   Response to Teaching:   demonstrated ability and verbalized, recall/understanding  Factors that affect learning: None   Plan of Care  Cont " PT to support reactivation and integration of self regulation pain management skills. (next visit consider:  Mini breaks, body scan, ROM/stretching)   Therapist: Caterina Lau PT                 Date: 9/10/2018

## 2018-09-13 ENCOUNTER — OFFICE VISIT (OUTPATIENT)
Dept: URGENT CARE | Facility: URGENT CARE | Age: 63
End: 2018-09-13
Payer: MEDICARE

## 2018-09-13 VITALS
TEMPERATURE: 99 F | BODY MASS INDEX: 35.94 KG/M2 | OXYGEN SATURATION: 96 % | WEIGHT: 216 LBS | HEART RATE: 89 BPM | SYSTOLIC BLOOD PRESSURE: 117 MMHG | DIASTOLIC BLOOD PRESSURE: 78 MMHG

## 2018-09-13 DIAGNOSIS — J20.9 ACUTE BRONCHITIS, UNSPECIFIED ORGANISM: ICD-10-CM

## 2018-09-13 DIAGNOSIS — J45.40 MODERATE PERSISTENT ASTHMA WITHOUT COMPLICATION: Primary | ICD-10-CM

## 2018-09-13 PROCEDURE — 99214 OFFICE O/P EST MOD 30 MIN: CPT | Performed by: FAMILY MEDICINE

## 2018-09-13 RX ORDER — BENZONATATE 200 MG/1
100-200 CAPSULE ORAL 3 TIMES DAILY PRN
Qty: 21 CAPSULE | Refills: 1 | Status: SHIPPED | OUTPATIENT
Start: 2018-09-13 | End: 2019-01-21

## 2018-09-13 RX ORDER — ALBUTEROL SULFATE 0.83 MG/ML
SOLUTION RESPIRATORY (INHALATION)
Qty: 3 ML | Refills: 12 | Status: SHIPPED | OUTPATIENT
Start: 2018-09-13 | End: 2020-09-24

## 2018-09-13 NOTE — MR AVS SNAPSHOT
After Visit Summary   9/13/2018    Brandi Stern    MRN: [id number]           Patient Information     Date Of Birth          1955        Visit Information        Provider Department      9/13/2018 5:00 PM Leobardo Zeng MD [facility]        Today's Diagnoses     Moderate persistent asthma without complication    -  1    Acute bronchitis, unspecified organism          Care Instructions    Take tessalon for cough. Okay to combine with robitussin/delsym    Use the albuterol inhaler or neb as needed for shortness of breath or wheezing.     Return in 1 week if no improvement in symptoms          Follow-ups after your visit        Your next 10 appointments already scheduled     Sep 14, 2018  9:30 AM CDT   PROCEDURE with Annelise Morales MD   [facility] ([facility])    [address]  [address]  [facility] [id number] [phone]            Sep 24, 2018 11:30 AM CDT   Return Visit with Caterina Lau PT   [facility] ([facility])    [address]  [address]  [facility] [phone] [phone]            Oct 02, 2018 10:30 AM CDT   Return Visit with ELISE Howard CNP   [facility] ([facility])    [address]  [facility] [address] [phone]            Oct 15, 2018 11:30 AM CDT   Return Visit with Caterina Lau PT   [facility] ([facility])    [address]  [facility] [phone] [phone]            Oct 24, 2018  9:00 AM CDT   New Visit with Rigoberto Enciso, PhD [facility] ([facility])    [address]  [address]  [facility] [phone] [phone]              Who to contact     If you have questions or need follow up information about today's clinic visit or your schedule please contact [facility] directly at [phone].  Normal or non-critical lab and imaging results will be communicated to you by [employer], letter or phone within 4 business days after the clinic has received the results. If you do not hear from us within 7 days, please contact the clinic through [employer] or phone. If you have a critical or abnormal lab result, we will notify you by phone as soon as possible.  Submit refill requests through [employer] or call your pharmacy and they will forward the refill request to us. Please allow 3 business days for your refill to be completed.          Additional Information About Your Visit        [employer] Information     [employer] gives you secure access to your electronic health record. If you see a primary care provider, you can also send messages to your care team and make appointments. If you have questions, please call your primary care clinic.  If you do not have a primary care provider, please call [phone] and they will assist you.        Care EveryWhere ID     This is your Care EveryWhere ID. This could be used by other organizations to access your [facility] medical records  [id number]        Your Vitals Were     Pulse Temperature Last Period Pulse Oximetry BMI (Body Mass Index)       89 99  F (37.2  C) (Oral) (LMP Unknown) 96% 35.94 kg/m2        Blood Pressure from Last 3 Encounters:   09/13/18 117/78   08/29/18 124/80   06/12/18 122/82    Weight from Last 3 Encounters:   09/13/18 216 lb (98 kg)   08/29/18 218 lb (98.9 kg)   06/12/18 217 lb (98.4 kg)              Today, you had the following     No orders found for display         Today's Medication Changes          These changes are accurate as of 9/13/18  5:47 PM.  If you have any questions, ask your nurse or doctor.               Start taking these medicines.        Dose/Directions    benzonatate 200 MG capsule   Commonly known as:  TESSALON   Used for:  Acute bronchitis, unspecified organism   Started by:  Leobardo Zeng MD         Dose:  100-200 mg   Take 0.5-1 capsules (100-200 mg) by mouth 3 times daily as needed for cough   Quantity:  21 capsule   Refills:  1         These medicines have changed or have updated prescriptions.        Dose/Directions    * albuterol 108 (90 Base) MCG/ACT inhaler   Commonly known as:  PROAIR HFA   This may have changed:  Another medication with the same name was changed. Make sure you understand how and when to take each.   Used for:  Moderate persistent asthma with exacerbation   Changed by:  Leobardo Zeng MD        Dose:  1-2 puff   Inhale 1-2 puffs into the lungs every 6 hours as needed for shortness of breath / dyspnea   Quantity:  1 Inhaler   Refills:  1       * albuterol (2.5 MG/3ML) 0.083% neb solution   This may have changed:    - how to take this  - additional instructions   Used for:  Moderate persistent asthma without complication   Changed by:  Leobardo Zeng MD        Use every 4 hours as needed for wheezing and shortness of breath   Quantity:  3 mL   Refills:  12       * Notice:  This list has 2 medication(s) that are the same as other medications prescribed for you. Read the directions carefully, and ask your doctor or other care provider to review them with you.         Where to get your medicines      These medications were sent to [facility]/pharmacy #[id number] - [place], [place] - [address] [facility]  [address], [place] [address]     Phone:  [phone]     albuterol (2.5 MG/3ML) 0.083% neb solution    benzonatate 200 MG capsule                Primary Care Provider Office Phone # Fax #    Cherie Brennan [phone] [address] [facility] [address]        Equal Access to Services     [facility] : Hadii georgie holt Sohéctor, waaxda luqandrew, qaybta kaaljada armendariz, adrián perkins. So [facility] [phone].    ATENCIÓN: Si habla español, tiene a rizvi disposición servicios gratuitos de asistencia lingüística. Llame al  [phone].    We comply with applicable federal civil rights laws and [place] laws. We do not discriminate on the basis of race, color, national origin, age, disability, sex, sexual orientation, or gender identity.            Thank you!     Thank you for choosing [facility]  for your care. Our goal is always to provide you with excellent care. Hearing back from our patients is one way we can continue to improve our services. Please take a few minutes to complete the written survey that you may receive in the mail after your visit with us. Thank you!             Your Updated Medication List - Protect others around you: Learn how to safely use, store and throw away your medicines at www.disposemymeds.org.          This list is accurate as of 9/13/18  5:47 PM.  Always use your most recent med list.                   Brand Name Dispense Instructions for use Diagnosis    ACETAMINOPHEN EXTRA STRENGTH PO      Pt reports taking 650 MG arthritis        * albuterol 108 (90 Base) MCG/ACT inhaler    PROAIR HFA    1 Inhaler    Inhale 1-2 puffs into the lungs every 6 hours as needed for shortness of breath / dyspnea    Moderate persistent asthma with exacerbation       * albuterol (2.5 MG/3ML) 0.083% neb solution     3 mL    Use every 4 hours as needed for wheezing and shortness of breath    Moderate persistent asthma without complication       atorvastatin 10 MG tablet    LIPITOR    90 tablet    Take 1 tablet (10 mg) by mouth daily    Hyperlipidemia LDL goal <130       benzonatate 200 MG capsule    TESSALON    21 capsule    Take 0.5-1 capsules (100-200 mg) by mouth 3 times daily as needed for cough    Acute bronchitis, unspecified organism       CALCIUM 500 PO      Take 1 tablet by mouth daily        CLARITIN 10 MG tablet   Generic drug:  loratadine     0    1 TAB PO QD (Once per day) as needed for ALLERGY SYMPTOMS    Allergic rhinitis due to other allergen       CO Q 10 PO      Take 200 mg by mouth  daily        cyclobenzaprine 5 MG tablet    FLEXERIL    180 tablet    Take 1-2 tablets (5-10 mg) by mouth 3 times daily as needed for muscle spasms    Spasm of back muscles       * desvenlafaxine succinate 50 MG 24 hr tablet    PRISTIQ    30 tablet    Take 1 tablet (50 mg) by mouth daily    Major depressive disorder, recurrent episode, moderate (H)       * desvenlafaxine succinate 100 MG 24 hr tablet    PRISTIQ    90 tablet    Take 1 tablet (100 mg) by mouth daily    Major depressive disorder, recurrent episode, moderate (H)       fenofibrate 145 MG tablet     90 tablet    TAKE 1 TABLET BY MOUTH EVERY DAY    Pure hyperglyceridemia       gabapentin 100 MG capsule    NEURONTIN    150 capsule    100 mg capsule BID and 300 mg at HS    Chronic midline low back pain without sciatica, Spinal stenosis in cervical region, Cervical spondylosis without myelopathy, Primary osteoarthritis involving multiple joints       levothyroxine 75 MCG tablet    SYNTHROID    90 tablet    Take 1 tablet (75 mcg) by mouth every morning Overdue for labs    Chronic lymphocytic thyroiditis       lisinopril 10 MG tablet    PRINIVIL/ZESTRIL    90 tablet    Take 1 tablet (10 mg) by mouth daily    Hypertension goal BP (blood pressure) < 140/90       Lutein 10 MG Tabs      Take 10 mg by mouth daily        * MIRAPEX 0.125 MG tablet   Generic drug:  pramipexole      Take two tabs at dinner and one at hs        * pramipexole 0.125 MG tablet    MIRAPEX    270 tablet    Take two tabs at dinner and one at hs    Restless leg syndrome       naltrexone 50 MG tablet    DEPADE;REVIA    30 tablet    Take 1 tablet (50 mg) by mouth daily    Class 2 obesity with body mass index (BMI) of 36.0 to 36.9 in adult, unspecified obesity type, unspecified whether serious comorbidity present       * omeprazole 20 MG CR capsule    priLOSEC    180 capsule    Take 1 capsule (20 mg) by mouth 2 times daily    Gastroesophageal reflux disease without esophagitis       * omeprazole  20 MG CR capsule    priLOSEC    180 capsule    TAKE 1 TABLET (20 MG) BY MOUTH 2 TIMES DAILY TAKE 30-60 MINUTES BEFORE A MEAL.    Gastroesophageal reflux disease without esophagitis       ranitidine 300 MG tablet    ZANTAC    60 tablet    TAKE 1 TABLET (300 MG) BY MOUTH 2 TIMES DAILY    Gastroesophageal reflux disease without esophagitis       SYMBICORT 160-4.5 MCG/ACT Inhaler   Generic drug:  budesonide-formoterol      Inhale 1 puff into the lungs 2 times daily    Moderate persistent asthma without complication       triamcinolone 55 MCG/ACT nasal inhaler    NASACORT AQ    1 Inhaler    Inhale 2 sprays in both nostrils every day as needed    Allergic rhinitis due to other allergen       UNABLE TO FIND      MEDICATION NAME: Allergy shots        VITAMIN C PO      Take 1,000 mg by mouth daily        zolpidem 10 MG tablet    AMBIEN    30 tablet    TAKE 1/2 TO 1 TABLET BY MOUTH NIGHTLY AS NEEDED    Insomnia, unspecified type       * Notice:  This list has 8 medication(s) that are the same as other medications prescribed for you. Read the directions carefully, and ask your doctor or other care provider to review them with you.

## 2018-09-13 NOTE — PATIENT INSTRUCTIONS
Take tessalon for cough. Okay to combine with robitussin/delsym    Use the albuterol inhaler or neb as needed for shortness of breath or wheezing.     Return in 1 week if no improvement in symptoms

## 2018-09-13 NOTE — PROGRESS NOTES
Subjective:   Brandi Stern is a 63 year old female who presents for   Chief Complaint   Patient presents with     Urgent Care     Pt in clinic to have eval for cough and wheezing since 8/16/2018.     Respiratory Problems     Sick for about a month maybe 3 weeks with cough and sputum. No fevers. Has sinus pressure with coughing in the frontal area. No tooth pain. Symptoms maybe got better but now having more colored sputum as of yesterday. Using albuterol inhaler but out of nebs.   Is using the symbicort as prescribed.   Denies nausea/vomiting. Some weakness and fatigue. No diaphoresis - uncomfortability with coughing and her hiatal hernia  Using cough drops.     PMHX/PSHX/MEDS/ALLERGIES/SHX/FHX reviewed in Epic.    Patient Active Problem List    Diagnosis Date Noted     Chronic pain 09/10/2018     Priority: Medium     Major depressive disorder, recurrent episode, mild with anxious distress (H) 01/05/2018     Priority: Medium     BMI > 35 10/31/2017     Priority: Medium     Major depressive disorder, recurrent episode, moderate with anxious distress (H) 04/21/2017     Priority: Medium     Major depressive disorder, recurrent episode, moderate (H) 02/21/2017     Priority: Medium     Mixed hyperlipidemia 07/19/2016     Priority: Medium     Psychological factors affecting medical condition 04/08/2016     Priority: Medium     Depression, major, recurrent, in partial remission (H) 04/08/2016     Priority: Medium     Headache above the eye region 02/01/2016     Priority: Medium     Fibromyalgia 09/09/2014     Priority: Medium     Iron deficiency anemia 07/17/2014     Priority: Medium     Problem list name updated by automated process. Provider to review       Anemia 07/14/2014     Priority: Medium     Vitamin D deficiency 07/03/2013     Priority: Medium     Imo Update utility       Patellar tendonitis 01/23/2013     Priority: Medium     Patellofemoral arthritis, right 01/23/2013     Priority: Medium     IMO update  changed this record. Please review for accuracy       Hiatal hernia      Priority: Medium     Large, unable to do surgery  GI suggested Dexilant BID and Zantac PRN       Advanced directives, counseling/discussion 10/17/2012     Priority: Medium     Advance Directive received and scanned. Click on Code in the patient header to view. 10/17/2012          Abnormal liver function 04/13/2012     Priority: Medium     Hypertension goal BP (blood pressure) < 140/90 02/25/2011     Priority: Medium     Per provider       Hyperlipidemia LDL goal <130 02/25/2011     Priority: Medium     Restless leg syndrome 10/06/2009     Priority: Medium     Mild major depression (H) 10/06/2009     Priority: Medium     Obstructive sleep apnea syndrome      Priority: Medium     (Problem list name updated by automated process. Provider to review and confirm.)  Diagnosis updated by automated process. Provider to review and confirm.       Osteoarthritis of multiple joints      Priority: Medium     knees       Insomnia 08/05/2005     Priority: Medium     Problem list name updated by automated process. Provider to review       Moderate persistent asthma 02/20/2005     Priority: Medium     ALLERGIC RHINITIS  11/15/2004     Priority: Medium     GERD 11/15/2004     Priority: Medium     HASHIMOTO'S THYROIDITIS 11/15/2004     Priority: Medium     Also thyroid nodule  Has seen endocrinology       Disorder of bone and cartilage 11/15/2004     Priority: Medium     Problem list name updated by automated process. Provider to review       HYPERTRIGLYCERIDEMIA 11/15/2004     Priority: Medium       Current Outpatient Prescriptions   Medication     ACETAMINOPHEN EXTRA STRENGTH OR     albuterol (2.5 MG/3ML) 0.083% neb solution     albuterol (ALBUTEROL) 108 (90 BASE) MCG/ACT Inhaler     Ascorbic Acid (VITAMIN C PO)     atorvastatin (LIPITOR) 10 MG tablet     benzonatate (TESSALON) 200 MG capsule     Calcium-Magnesium-Vitamin D (CALCIUM 500 PO)     CLARITIN 10 MG OR  TABS     Coenzyme Q10 (CO Q 10 PO)     cyclobenzaprine (FLEXERIL) 5 MG tablet     desvenlafaxine succinate (PRISTIQ) 100 MG 24 hr tablet     desvenlafaxine succinate (PRISTIQ) 50 MG 24 hr tablet     fenofibrate 145 MG tablet     gabapentin (NEURONTIN) 100 MG capsule     levothyroxine (SYNTHROID) 75 MCG tablet     lisinopril (PRINIVIL/ZESTRIL) 10 MG tablet     Lutein 10 MG TABS     MIRAPEX 0.125 MG OR TABS     naltrexone (DEPADE;REVIA) 50 MG tablet     omeprazole (PRILOSEC) 20 MG CR capsule     omeprazole (PRILOSEC) 20 MG CR capsule     pramipexole (MIRAPEX) 0.125 MG tablet     ranitidine (ZANTAC) 300 MG tablet     SYMBICORT 160-4.5 MCG/ACT Inhaler     triamcinolone (NASACORT AQ) 55 MCG/ACT nasal inhaler     UNABLE TO FIND     zolpidem (AMBIEN) 10 MG tablet     [DISCONTINUED] albuterol (2.5 MG/3ML) 0.083% nebulizer solution     No current facility-administered medications for this visit.      ROS:  As above per HPI    Objective:   /78  Pulse 89  Temp 99  F (37.2  C) (Oral)  Wt 216 lb (98 kg)  LMP  (LMP Unknown)  SpO2 96%  BMI 35.94 kg/m2, Body mass index is 35.94 kg/(m^2).  Gen:  NAD, well-nourished, sitting in chair comfortably  HEENT: EOMI, sclera anicteric, Head normocephalic, ; nares patent; moist mucous membranes  Neck: trachea midline, no thyromegaly  CV:  Hemodynamically stable, RRR  Pulm:  no increased work of breathing , CTAB, no wheezes/rales/rhonchi   ABD: soft, non-distended  Extrem: no cyanosis, edema or clubbing  Skin: no obvious rashes or abnormalities  Psych: Euthymic, linear thoughts, normal rate of speech    Assessment & Plan:   Brandi Stern, 63 year old female who presents with:    Moderate persistent asthma without complication  Refill of albuterol given. Regarding O2 saturation she is about at her baseline. Afebrile. Here there is no evidence of obstruction or wheezing so steroids were not prescribed.   - albuterol (2.5 MG/3ML) 0.083% neb solution  Dispense: 3 mL; Refill:  12    Acute bronchitis, unspecified organism  Appears uncomplicated but with hx this is likely the diagnosis. Defer antibiotic therapy. If no improvement in symptoms in week discussed returning for evaluation.   - benzonatate (TESSALON) 200 MG capsule  Dispense: 21 capsule; Refill: 1      Leobardo Zeng MD   [facility]     Options for treatment and/or follow-up care were reviewed with the patient. Brandi Stern and/or legal guardian was engaged and actively involved in the decision making process. Patient/guardian verbalized understanding of the options discussed and was satisfied with the final plan.

## 2018-09-14 ENCOUNTER — OFFICE VISIT (OUTPATIENT)
Dept: PALLIATIVE MEDICINE | Facility: CLINIC | Age: 63
End: 2018-09-14
Attending: NURSE PRACTITIONER
Payer: MEDICARE

## 2018-09-14 VITALS
SYSTOLIC BLOOD PRESSURE: 104 MMHG | DIASTOLIC BLOOD PRESSURE: 68 MMHG | OXYGEN SATURATION: 98 % | HEART RATE: 78 BPM | RESPIRATION RATE: 16 BRPM

## 2018-09-14 DIAGNOSIS — M70.60 TROCHANTERIC BURSITIS, UNSPECIFIED LATERALITY: Primary | ICD-10-CM

## 2018-09-14 PROCEDURE — 20610 DRAIN/INJ JOINT/BURSA W/O US: CPT | Mod: LT | Performed by: PSYCHIATRY & NEUROLOGY

## 2018-09-14 ASSESSMENT — PAIN SCALES - GENERAL: PAINLEVEL: SEVERE PAIN (6)

## 2018-09-14 NOTE — MR AVS SNAPSHOT
After Visit Summary   9/14/2018    Brandi Stern    MRN: [id number]           Patient Information     Date Of Birth          1955        Visit Information        Provider Department      9/14/2018 9:30 AM Annelise Morales MD [facility]        Care Instructions    [facility]   Post Procedure Instructions    Today you had: bursa injection      Medications used:  lidocaine   bupivicaine   kenolog         Go to the emergency room if you develop any shortness of breath    Monitor the injection sites for signs and symptoms of infection-fever, chills, redness, swelling, warmth, or drainage to areas.    You may have soreness at injection sites for up to 24 hours.    You may apply ice to the painful areas to help minimize the discomfort of the needle pokes.    Do not apply heat to sites for at least 12 hours.    You may use anti-inflammatory medications or Tylenol for pain control if necessary    Pain Clinic phone number during work hours Monday-Friday:  [phone]    After hours provider line: [phone]                Follow-ups after your visit        Your next 10 appointments already scheduled     Sep 24, 2018 11:30 AM CDT   Return Visit with Caterina Lau, PT   [facility] ([facility])    [address]  [address]  [facility] [address]   [phone]            Oct 02, 2018 10:30 AM CDT   Return Visit with ELISE Howard CNP   [facility] ([facility])    [address]  [address]  [facility] [address]   [phone]            Oct 15, 2018 11:30 AM CDT   Return Visit with Caterina Lau, PT   [facility] ([facility])    [address]  [address]  [facility] [address]   [phone]            Oct 24, 2018  9:00 AM CDT   New Visit with Rigoberto Encsio, PhD [facility] ([facility])     [address]  [address]  [facility] [address] [phone]              Who to contact     If you have questions or need follow up information about today's clinic visit or your schedule please contact [facility] directly at [phone].  Normal or non-critical lab and imaging results will be communicated to you by MyChart, letter or phone within 4 business days after the clinic has received the results. If you do not hear from us within 7 days, please contact the clinic through [employer] or phone. If you have a critical or abnormal lab result, we will notify you by phone as soon as possible.  Submit refill requests through [employer] or call your pharmacy and they will forward the refill request to us. Please allow 3 business days for your refill to be completed.          Additional Information About Your Visit        [employer] Information     [employer] gives you secure access to your electronic health record. If you see a primary care provider, you can also send messages to your care team and make appointments. If you have questions, please call your primary care clinic.  If you do not have a primary care provider, please call [phone] and they will assist you.        Care EveryWhere ID     This is your Care EveryWhere ID. This could be used by other organizations to access your [facility] medical records  [id number]        Your Vitals Were     Pulse Respirations Last Period Pulse Oximetry          78 16 (LMP Unknown) 98%         Blood Pressure from Last 3 Encounters:   09/14/18 104/68   09/13/18 117/78   08/29/18 124/80    Weight from Last 3 Encounters:   09/13/18 98 kg (216 lb)   08/29/18 98.9 kg (218 lb)   06/12/18 98.4 kg (217 lb)              Today, you had the following     No orders found for display       Primary Care Provider Office Phone # Fax #    Cherie Brennan [phone]       [address] [facility] [address]        Equal Access to Services      LORNE HALEY : Hadii aad ku jonathon Zee, waaxda luqadaha, qaybta kaalmada adestone, adrián carmelo shaggylizandro clayissacmolly rodriguez . So [facility] [phone].    ATENCIÓN: Si habla español, tiene a rizvi disposición servicios gratuitos de asistencia lingüística. Llame al [phone].    We comply with applicable federal civil rights laws and [place] laws. We do not discriminate on the basis of race, color, national origin, age, disability, sex, sexual orientation, or gender identity.            Thank you!     Thank you for choosing [facility]  for your care. Our goal is always to provide you with excellent care. Hearing back from our patients is one way we can continue to improve our services. Please take a few minutes to complete the written survey that you may receive in the mail after your visit with us. Thank you!             Your Updated Medication List - Protect others around you: Learn how to safely use, store and throw away your medicines at www.disposemymeds.org.          This list is accurate as of 9/14/18  9:39 AM.  Always use your most recent med list.                   Brand Name Dispense Instructions for use Diagnosis    ACETAMINOPHEN EXTRA STRENGTH PO      Pt reports taking 650 MG arthritis        * albuterol 108 (90 Base) MCG/ACT inhaler    PROAIR HFA    1 Inhaler    Inhale 1-2 puffs into the lungs every 6 hours as needed for shortness of breath / dyspnea    Moderate persistent asthma with exacerbation       * albuterol (2.5 MG/3ML) 0.083% neb solution     3 mL    Use every 4 hours as needed for wheezing and shortness of breath    Moderate persistent asthma without complication       atorvastatin 10 MG tablet    LIPITOR    90 tablet    Take 1 tablet (10 mg) by mouth daily    Hyperlipidemia LDL goal <130       benzonatate 200 MG capsule    TESSALON    21 capsule    Take 0.5-1 capsules (100-200 mg) by mouth 3 times daily as needed for cough    Acute bronchitis, unspecified organism        CALCIUM 500 PO      Take 1 tablet by mouth daily        CLARITIN 10 MG tablet   Generic drug:  loratadine     0    1 TAB PO QD (Once per day) as needed for ALLERGY SYMPTOMS    Allergic rhinitis due to other allergen       CO Q 10 PO      Take 200 mg by mouth daily        cyclobenzaprine 5 MG tablet    FLEXERIL    180 tablet    Take 1-2 tablets (5-10 mg) by mouth 3 times daily as needed for muscle spasms    Spasm of back muscles       * desvenlafaxine succinate 50 MG 24 hr tablet    PRISTIQ    30 tablet    Take 1 tablet (50 mg) by mouth daily    Major depressive disorder, recurrent episode, moderate (H)       * desvenlafaxine succinate 100 MG 24 hr tablet    PRISTIQ    90 tablet    Take 1 tablet (100 mg) by mouth daily    Major depressive disorder, recurrent episode, moderate (H)       fenofibrate 145 MG tablet     90 tablet    TAKE 1 TABLET BY MOUTH EVERY DAY    Pure hyperglyceridemia       gabapentin 100 MG capsule    NEURONTIN    150 capsule    100 mg capsule BID and 300 mg at HS    Chronic midline low back pain without sciatica, Spinal stenosis in cervical region, Cervical spondylosis without myelopathy, Primary osteoarthritis involving multiple joints       levothyroxine 75 MCG tablet    SYNTHROID    90 tablet    Take 1 tablet (75 mcg) by mouth every morning Overdue for labs    Chronic lymphocytic thyroiditis       lisinopril 10 MG tablet    PRINIVIL/ZESTRIL    90 tablet    Take 1 tablet (10 mg) by mouth daily    Hypertension goal BP (blood pressure) < 140/90       Lutein 10 MG Tabs      Take 10 mg by mouth daily        * MIRAPEX 0.125 MG tablet   Generic drug:  pramipexole      Take two tabs at dinner and one at hs        * pramipexole 0.125 MG tablet    MIRAPEX    270 tablet    Take two tabs at dinner and one at hs    Restless leg syndrome       naltrexone 50 MG tablet    DEPADE;REVIA    30 tablet    Take 1 tablet (50 mg) by mouth daily    Class 2 obesity with body mass index (BMI) of 36.0 to 36.9 in adult,  unspecified obesity type, unspecified whether serious comorbidity present       * omeprazole 20 MG CR capsule    priLOSEC    180 capsule    Take 1 capsule (20 mg) by mouth 2 times daily    Gastroesophageal reflux disease without esophagitis       * omeprazole 20 MG CR capsule    priLOSEC    180 capsule    TAKE 1 TABLET (20 MG) BY MOUTH 2 TIMES DAILY TAKE 30-60 MINUTES BEFORE A MEAL.    Gastroesophageal reflux disease without esophagitis       ranitidine 300 MG tablet    ZANTAC    60 tablet    TAKE 1 TABLET (300 MG) BY MOUTH 2 TIMES DAILY    Gastroesophageal reflux disease without esophagitis       SYMBICORT 160-4.5 MCG/ACT Inhaler   Generic drug:  budesonide-formoterol      Inhale 1 puff into the lungs 2 times daily    Moderate persistent asthma without complication       triamcinolone 55 MCG/ACT nasal inhaler    NASACORT AQ    1 Inhaler    Inhale 2 sprays in both nostrils every day as needed    Allergic rhinitis due to other allergen       UNABLE TO FIND      MEDICATION NAME: Allergy shots        VITAMIN C PO      Take 1,000 mg by mouth daily        zolpidem 10 MG tablet    AMBIEN    30 tablet    TAKE 1/2 TO 1 TABLET BY MOUTH NIGHTLY AS NEEDED    Insomnia, unspecified type       * Notice:  This list has 8 medication(s) that are the same as other medications prescribed for you. Read the directions carefully, and ask your doctor or other care provider to review them with you.

## 2018-09-14 NOTE — PROGRESS NOTES
Pre procedure Diagnosis: trochanteric bursitis  Post procedure Diagnosis: Same  Procedure performed: left trochanteric bursa injection  Anesthesia: none  Complications: none  Operators: Nolvia Morales MD     Indications:   Brandi Stern is a 63 year old female was sent by Gia Stroud NP for trochanteric bursa injection.  They have a history of hip pain.  Exam shows L> R trochanteric bursa tenderness and they have tried conservative treatment including medication, PT    Options/alternatives, benefits and risks were discussed with the patient including bleeding, infection, tissue trauma including tendons and muscles, and weakness.  Questions were answered to her satisfaction and she agrees to proceed. Voluntary informed consent was obtained and signed.     Vitals were reviewed: Yes  Allergies were reviewed:  Yes   Medications were reviewed:  Yes   Pre-procedure pain score: 6/10    Procedure:  After getting informed consent, patient was brought into the procedure suite and was placed in a prone position on the procedure table.   A Pause for the Cause was performed.  Patient was prepped and draped in sterile fashion.     The area over the left trochanter was palpated, and the tender area was identified, corresponding to the area of the trochanteric bursa.  The area was cleaned with Chloroprep. A 25G 3.5 inch needle was inserted, aiming towards the trochanter.  When bone was encountered, the needle was slightly drawn.  A solution containing local anesthesic and steroid was injected.  The needle was removed.  Hemostasis was achieved. Bandaids were placed as appropriate.    In total, 3ml of 0.5% bupivacaine, 3ml of 1% lidocaine, and 20mg of kenalog was injected.    Hemostasis was achieved, the area was cleaned, and bandaids were placed when appropriate.  The patient tolerated the procedure well.    Post-procedure pain score: 6/10  Follow-up includes:   -f/u with referring provider    Nolvia Morales MD  [facility]  Management

## 2018-09-19 ENCOUNTER — OFFICE VISIT (OUTPATIENT)
Dept: FAMILY MEDICINE | Facility: CLINIC | Age: 63
End: 2018-09-19
Payer: MEDICARE

## 2018-09-19 VITALS
OXYGEN SATURATION: 93 % | WEIGHT: 212 LBS | DIASTOLIC BLOOD PRESSURE: 78 MMHG | TEMPERATURE: 97 F | HEART RATE: 94 BPM | BODY MASS INDEX: 35.28 KG/M2 | SYSTOLIC BLOOD PRESSURE: 121 MMHG | RESPIRATION RATE: 24 BRPM

## 2018-09-19 DIAGNOSIS — J20.9 ACUTE BRONCHITIS WITH SYMPTOMS > 10 DAYS: Primary | ICD-10-CM

## 2018-09-19 PROCEDURE — 99213 OFFICE O/P EST LOW 20 MIN: CPT | Performed by: NURSE PRACTITIONER

## 2018-09-19 NOTE — MR AVS SNAPSHOT
After Visit Summary   9/19/2018    Brandi Stern    MRN: [id number]           Patient Information     Date Of Birth          1955        Visit Information        Provider Department      9/19/2018 3:00 PM Kimberly Molina APRN [facility]        Today's Diagnoses     Acute bronchitis with symptoms > 10 days    -  1      Care Instructions      Bronchitis, Antibiotic Treatment (Adult)    Bronchitis is an infection of the air passages (bronchial tubes) in your lungs. It often occurs when you have a cold. This illness is contagious during the first few days and is spread through the air by coughing and sneezing, or by direct contact (touching the sick person and then touching your own eyes, nose, or mouth).  Symptoms of bronchitis include cough with mucus (phlegm) and low-grade fever. Bronchitis usually lasts 7 to 14 days. Mild cases can be treated with simple home remedies. More severe infection is treated with an antibiotic.  Home care  Follow these guidelines when caring for yourself at home:    If your symptoms are severe, rest at home for the first 2 to 3 days. When you go back to your usual activities, don't let yourself get too tired.    Do not smoke. Also avoid being exposed to secondhand smoke.    You may use over-the-counter medicines to control fever or pain, unless another medicine was prescribed. If you have chronic liver or kidney disease or have ever had a stomach ulcer or gastrointestinal bleeding, talk with your healthcare provider before using these medicines. Also talk to your provider if you are taking medicine to prevent blood clots. Aspirin should never be given to anyone younger than 18 years of age who is ill with a viral infection or fever. It may cause severe liver or brain damage.    Your appetite may be poor, so a light diet is fine. Avoid dehydration by drinking 6 to 8 glasses of fluids per day (such as water, soft drinks, sports drinks,  juices, tea, or soup). Extra fluids will help loosen secretions in the nose and lungs.    Over-the-counter cough, cold, and sore-throat medicines will not shorten the length of the illness, but they may be helpful to reduce symptoms. (Note: Do not use decongestants if you have high blood pressure.)    Finish all antibiotic medicine. Do this even if you are feeling better after only a few days.  Follow-up care  Follow up with your healthcare provider, or as advised. If you had an X-ray or ECG (electrocardiogram), a specialist will review it. You will be notified of any new findings that may affect your care.  If you are age 65 or older, or if you have a chronic lung disease or condition that affects your immune system, or you smoke, ask your healthcare provider about getting a pneumococcal vaccine and a yearly flu shot (influenza vaccine).  When to seek medical advice  Call your healthcare provider right away if any of these occur:    Fever of 100.4 F (38 C) or higher, or as directed by your healthcare provider    Coughing up increased amounts of colored sputum    Weakness, drowsiness, headache, facial pain, ear pain, or a stiff neck  Call 911  Call 911 if any of these occur.    Coughing up blood    Worsening weakness, drowsiness, headache, or stiff neck    Trouble breathing, wheezing, or pain with breathing  Date Last Reviewed: 9/13/2015 2000-2017 [employer]. [address] [place] [address]. All rights reserved. This information is not intended as a substitute for professional medical care. Always follow your healthcare professional's instructions.                Follow-ups after your visit        Your next 10 appointments already scheduled     Sep 24, 2018 11:30 AM CDT   Return Visit with Caterina Lau, PT   [facility] ([facility])    [address] [place] [id number] [phone]            Oct 02, 2018 10:30 AM CDT   Return Visit  with ELISE Howard CNP   [facility]    [address]  [address]  [facility] [address] [phone]            Oct 15, 2018 11:30 AM CDT   Return Visit with Caterina Lau, PT   [facility]    [address]  [address]  [facility] [address] [phone]            Oct 24, 2018  9:00 AM CDT   New Visit with Rigoberto Enciso, PhD [facility] ([facility])    [address]  [address]  [facility] [id number] [phone]              Who to contact     If you have questions or need follow up information about today's clinic visit or your schedule please contact [facility] directly at [phone].  Normal or non-critical lab and imaging results will be communicated to you by MyChart, letter or phone within 4 business days after the clinic has received the results. If you do not hear from us within 7 days, please contact the clinic through [employer] or phone. If you have a critical or abnormal lab result, we will notify you by phone as soon as possible.  Submit refill requests through [employer] or call your pharmacy and they will forward the refill request to us. Please allow 3 business days for your refill to be completed.          Additional Information About Your Visit        [employer] for Job Information     [employer] gives you secure access to your electronic health record. If you see a primary care provider, you can also send messages to your care team and make appointments. If you have questions, please call your primary care clinic.  If you do not have a primary care provider, please call [phone] and they will assist you.        Care EveryWhere ID     This is your Care EveryWhere ID. This could be used by other organizations to access your [facility] records  [id number]        Your Vitals Were     Pulse Temperature Respirations Last  Period Pulse Oximetry BMI (Body Mass Index)    94 97  F (36.1  C) (Tympanic) 24 (LMP Unknown) 93% 35.28 kg/m2       Blood Pressure from Last 3 Encounters:   09/19/18 121/78   09/14/18 104/68   09/13/18 117/78    Weight from Last 3 Encounters:   09/19/18 212 lb (96.2 kg)   09/13/18 216 lb (98 kg)   08/29/18 218 lb (98.9 kg)              Today, you had the following     No orders found for display         Today's Medication Changes          These changes are accurate as of 9/19/18  3:28 PM.  If you have any questions, ask your nurse or doctor.               Start taking these medicines.        Dose/Directions    amoxicillin-clavulanate 875-125 MG per tablet   Commonly known as:  AUGMENTIN   Used for:  Acute bronchitis with symptoms > 10 days   Started by:  Kimberly Molina APRN CNP        Dose:  1 tablet   Take 1 tablet by mouth 2 times daily   Quantity:  20 tablet   Refills:  0            Where to get your medicines      These medications were sent to [facility]/pharmacy #[id number] - [place], [place] - [address] [facility]  [address], [place] [address]     Phone:  [phone]     amoxicillin-clavulanate 875-125 MG per tablet                Primary Care Provider Office Phone # Fax #    Cherie Brennan [phone] [address] [facility] [address]        Equal Access to Services     [facility] Center[facility]ERRI AH: Hadii georgie cowan hadasho Sohéctor, waaxda luqadaha, qaybta kaalmada adeegyada, adrián bosch adejuan luis perkins. So [facility] [phone].    ATENCIÓN: Si habla español, tiene a rizvi disposición servicios gratuitos de asistencia lingüística. Llame al [phone].    We comply with applicable federal civil rights laws and [place] laws. We do not discriminate on the basis of race, color, national origin, age, disability, sex, sexual orientation, or gender identity.            Thank you!     Thank you for choosing [facility]  for your care. Our goal is always to provide you  with excellent care. Hearing back from our patients is one way we can continue to improve our services. Please take a few minutes to complete the written survey that you may receive in the mail after your visit with us. Thank you!             Your Updated Medication List - Protect others around you: Learn how to safely use, store and throw away your medicines at www.disposemymeds.org.          This list is accurate as of 9/19/18  3:28 PM.  Always use your most recent med list.                   Brand Name Dispense Instructions for use Diagnosis    ACETAMINOPHEN EXTRA STRENGTH PO      Pt reports taking 650 MG arthritis        * albuterol 108 (90 Base) MCG/ACT inhaler    PROAIR HFA    1 Inhaler    Inhale 1-2 puffs into the lungs every 6 hours as needed for shortness of breath / dyspnea    Moderate persistent asthma with exacerbation       * albuterol (2.5 MG/3ML) 0.083% neb solution     3 mL    Use every 4 hours as needed for wheezing and shortness of breath    Moderate persistent asthma without complication       amoxicillin-clavulanate 875-125 MG per tablet    AUGMENTIN    20 tablet    Take 1 tablet by mouth 2 times daily    Acute bronchitis with symptoms > 10 days       atorvastatin 10 MG tablet    LIPITOR    90 tablet    Take 1 tablet (10 mg) by mouth daily    Hyperlipidemia LDL goal <130       benzonatate 200 MG capsule    TESSALON    21 capsule    Take 0.5-1 capsules (100-200 mg) by mouth 3 times daily as needed for cough    Acute bronchitis, unspecified organism       CALCIUM 500 PO      Take 1 tablet by mouth daily        CLARITIN 10 MG tablet   Generic drug:  loratadine     0    1 TAB PO QD (Once per day) as needed for ALLERGY SYMPTOMS    Allergic rhinitis due to other allergen       CO Q 10 PO      Take 200 mg by mouth daily        cyclobenzaprine 5 MG tablet    FLEXERIL    180 tablet    Take 1-2 tablets (5-10 mg) by mouth 3 times daily as needed for muscle spasms    Spasm of back muscles       *  desvenlafaxine succinate 50 MG 24 hr tablet    PRISTIQ    30 tablet    Take 1 tablet (50 mg) by mouth daily    Major depressive disorder, recurrent episode, moderate (H)       * desvenlafaxine succinate 100 MG 24 hr tablet    PRISTIQ    90 tablet    Take 1 tablet (100 mg) by mouth daily    Major depressive disorder, recurrent episode, moderate (H)       fenofibrate 145 MG tablet     90 tablet    TAKE 1 TABLET BY MOUTH EVERY DAY    Pure hyperglyceridemia       gabapentin 100 MG capsule    NEURONTIN    150 capsule    100 mg capsule BID and 300 mg at HS    Chronic midline low back pain without sciatica, Spinal stenosis in cervical region, Cervical spondylosis without myelopathy, Primary osteoarthritis involving multiple joints       levothyroxine 75 MCG tablet    SYNTHROID    90 tablet    Take 1 tablet (75 mcg) by mouth every morning Overdue for labs    Chronic lymphocytic thyroiditis       lisinopril 10 MG tablet    PRINIVIL/ZESTRIL    90 tablet    Take 1 tablet (10 mg) by mouth daily    Hypertension goal BP (blood pressure) < 140/90       Lutein 10 MG Tabs      Take 10 mg by mouth daily        * MIRAPEX 0.125 MG tablet   Generic drug:  pramipexole      Take two tabs at dinner and one at hs        * pramipexole 0.125 MG tablet    MIRAPEX    270 tablet    Take two tabs at dinner and one at hs    Restless leg syndrome       naltrexone 50 MG tablet    DEPADE;REVIA    30 tablet    Take 1 tablet (50 mg) by mouth daily    Class 2 obesity with body mass index (BMI) of 36.0 to 36.9 in adult, unspecified obesity type, unspecified whether serious comorbidity present       * omeprazole 20 MG CR capsule    priLOSEC    180 capsule    Take 1 capsule (20 mg) by mouth 2 times daily    Gastroesophageal reflux disease without esophagitis       * omeprazole 20 MG CR capsule    priLOSEC    180 capsule    TAKE 1 TABLET (20 MG) BY MOUTH 2 TIMES DAILY TAKE 30-60 MINUTES BEFORE A MEAL.    Gastroesophageal reflux disease without esophagitis        PREDNISONE PO      Take 40 mg by mouth        ranitidine 300 MG tablet    ZANTAC    60 tablet    TAKE 1 TABLET (300 MG) BY MOUTH 2 TIMES DAILY    Gastroesophageal reflux disease without esophagitis       SYMBICORT 160-4.5 MCG/ACT Inhaler   Generic drug:  budesonide-formoterol      Inhale 1 puff into the lungs 2 times daily    Moderate persistent asthma without complication       triamcinolone 55 MCG/ACT nasal inhaler    NASACORT AQ    1 Inhaler    Inhale 2 sprays in both nostrils every day as needed    Allergic rhinitis due to other allergen       UNABLE TO FIND      MEDICATION NAME: Allergy shots        VITAMIN C PO      Take 1,000 mg by mouth daily        zolpidem 10 MG tablet    AMBIEN    30 tablet    TAKE 1/2 TO 1 TABLET BY MOUTH NIGHTLY AS NEEDED    Insomnia, unspecified type       * Notice:  This list has 8 medication(s) that are the same as other medications prescribed for you. Read the directions carefully, and ask your doctor or other care provider to review them with you.

## 2018-09-19 NOTE — PATIENT INSTRUCTIONS

## 2018-09-19 NOTE — PROGRESS NOTES
"  SUBJECTIVE:   Brandi Stern is a 63 year old female who presents to clinic today for the following health issues:  Chief Complaint   Patient presents with     Throat Problem     URI     RECHECK     urgent care follow up          ED/UC Followup:    Facility:  [facility]   Date of visit: 09/13/2018  Reason for visit: URI   Current Status: worsening- coughing fits. Chest feels very tight an heavy      Brandi has had problems with URI since 8/13/2018.  She struggled but she did okay.  On 9/13/2018 She was seen in UC.  She was given nebulizer which helped slightly.    9/17/2018:  She was seen by [facility].  No additional treatment.    9/18/2018:  Yesterday \"I finally opened up. I coughed up green phlegm.\"    She states she is feeling a little bit better overall, but she is still feeling ill.  She is requesting an antibiotic today if it is appropriate.      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  No missed work. \"I have been pushing through.\"    Prednisone daily for the past 5 days:  [id number]    Problem list and histories reviewed & adjusted, as indicated.  Additional history: as documented    Patient Active Problem List   Diagnosis     ALLERGIC RHINITIS      GERD     HASHIMOTO'S THYROIDITIS     Disorder of bone and cartilage     HYPERTRIGLYCERIDEMIA     Moderate persistent asthma     Insomnia     Osteoarthritis of multiple joints     Obstructive sleep apnea syndrome     Restless leg syndrome     Mild major depression (H)     Hypertension goal BP (blood pressure) < 140/90     Hyperlipidemia LDL goal <130     Abnormal liver function     Advanced directives, counseling/discussion     Hiatal hernia     Patellar tendonitis     Patellofemoral arthritis, right     Vitamin D deficiency     Anemia     Iron deficiency anemia     Fibromyalgia     Headache above the eye region     Psychological factors affecting medical condition     Depression, major, recurrent, in partial remission (H)     " Mixed hyperlipidemia     Major depressive disorder, recurrent episode, moderate (H)     Major depressive disorder, recurrent episode, moderate with anxious distress (H)     BMI > 35     Major depressive disorder, recurrent episode, mild with anxious distress (H)     Chronic pain     Past Surgical History:   Procedure Laterality Date     ABDOMEN SURGERY      GB out     ARTHROSCOPY KNEE RT/LT      left knee     BIOPSY      Colon     C TOTAL KNEE ARTHROPLASTY      bilateral     CHOLECYSTECTOMY       COLONOSCOPY  2014    Procedure: COLONOSCOPY;  Surgeon: Mau De La Fuente MD;  Location:  GI     ESOPHAGOSCOPY, GASTROSCOPY, DUODENOSCOPY (EGD), COMBINED  2014    Procedure: COMBINED ESOPHAGOSCOPY, GASTROSCOPY, DUODENOSCOPY (EGD), BIOPSY SINGLE OR MULTIPLE;  Surgeon: Mau De La Fuente MD;  Location:  GI     Gall bladder removal         Social History   Substance Use Topics     Smoking status: Former Smoker     Packs/day: 0.50     Years: 20.00     Types: Cigarettes     Start date: 10/1/1971     Quit date: 2007     Smokeless tobacco: Never Used     Alcohol use 0.0 oz/week     0 Standard drinks or equivalent per week      Comment: 2 glasses of wine per week     Family History   Problem Relation Age of Onset     Osteoporosis Mother      Hypertension Mother      Eye Disorder Mother      Mac d.,glaucoma, cataracts     Lipids Mother      Neurologic Disorder Mother      Parkinsons     Coronary Artery Disease Mother      Cancer Father      melanoma on back     Arthritis Father      Blood Disease Father      Hemachromatosis     Genitourinary Problems Father       of renal failure     Allergies Father      C.A.D. Maternal Grandmother      Cerebrovascular Disease Maternal Grandmother      Respiratory Maternal Grandmother      Hearing Loss Maternal Grandmother      C.A.D. Maternal Grandfather      Cardiovascular Maternal Grandfather      Circulatory Maternal Grandfather      Cancer - colorectal  Paternal Grandmother      Diabetes Paternal Grandmother      Cancer Paternal Grandmother      Colon     Asthma Paternal Grandmother      Cerebrovascular Disease Paternal Grandfather      Thyroid Disease Sister      Nodules/Nodules     Breast Cancer No family hx of          Current Outpatient Prescriptions   Medication Sig Dispense Refill     ACETAMINOPHEN EXTRA STRENGTH OR Pt reports taking 650 MG arthritis       albuterol (2.5 MG/3ML) 0.083% neb solution Use every 4 hours as needed for wheezing and shortness of breath 3 mL 12     albuterol (ALBUTEROL) 108 (90 BASE) MCG/ACT Inhaler Inhale 1-2 puffs into the lungs every 6 hours as needed for shortness of breath / dyspnea 1 Inhaler 1     Ascorbic Acid (VITAMIN C PO) Take 1,000 mg by mouth daily       atorvastatin (LIPITOR) 10 MG tablet Take 1 tablet (10 mg) by mouth daily 90 tablet 2     Calcium-Magnesium-Vitamin D (CALCIUM 500 PO) Take 1 tablet by mouth daily       CLARITIN 10 MG OR TABS 1 TAB PO QD (Once per day) as needed for ALLERGY SYMPTOMS 0 0     Coenzyme Q10 (CO Q 10 PO) Take 200 mg by mouth daily        cyclobenzaprine (FLEXERIL) 5 MG tablet Take 1-2 tablets (5-10 mg) by mouth 3 times daily as needed for muscle spasms 180 tablet 1     desvenlafaxine succinate (PRISTIQ) 100 MG 24 hr tablet Take 1 tablet (100 mg) by mouth daily 90 tablet 3     fenofibrate 145 MG tablet TAKE 1 TABLET BY MOUTH EVERY DAY 90 tablet 3     gabapentin (NEURONTIN) 100 MG capsule 100 mg capsule BID and 300 mg at  capsule 1     levothyroxine (SYNTHROID) 75 MCG tablet Take 1 tablet (75 mcg) by mouth every morning Overdue for labs 90 tablet 3     lisinopril (PRINIVIL/ZESTRIL) 10 MG tablet Take 1 tablet (10 mg) by mouth daily 90 tablet PRN     Lutein 10 MG TABS Take 10 mg by mouth daily       naltrexone (DEPADE;REVIA) 50 MG tablet Take 1 tablet (50 mg) by mouth daily 30 tablet 3     omeprazole (PRILOSEC) 20 MG CR capsule TAKE 1 TABLET (20 MG) BY MOUTH 2 TIMES DAILY TAKE 30-60 MINUTES  BEFORE A MEAL. 180 capsule 3     pramipexole (MIRAPEX) 0.125 MG tablet Take two tabs at dinner and one at hs 270 tablet PRN     PREDNISONE PO Take 40 mg by mouth       ranitidine (ZANTAC) 300 MG tablet TAKE 1 TABLET (300 MG) BY MOUTH 2 TIMES DAILY 60 tablet 5     SYMBICORT 160-4.5 MCG/ACT Inhaler Inhale 1 puff into the lungs 2 times daily       triamcinolone (NASACORT AQ) 55 MCG/ACT nasal inhaler Inhale 2 sprays in both nostrils every day as needed 1 Inhaler 7     UNABLE TO FIND MEDICATION NAME: Allergy shots       zolpidem (AMBIEN) 10 MG tablet TAKE 1/2 TO 1 TABLET BY MOUTH NIGHTLY AS NEEDED 30 tablet 5     benzonatate (TESSALON) 200 MG capsule Take 0.5-1 capsules (100-200 mg) by mouth 3 times daily as needed for cough (Patient not taking: Reported on 9/19/2018) 21 capsule 1     desvenlafaxine succinate (PRISTIQ) 50 MG 24 hr tablet Take 1 tablet (50 mg) by mouth daily (Patient not taking: Reported on 9/19/2018) 30 tablet 5     MIRAPEX 0.125 MG OR TABS Take two tabs at dinner and one at hs       omeprazole (PRILOSEC) 20 MG CR capsule Take 1 capsule (20 mg) by mouth 2 times daily (Patient not taking: Reported on 9/19/2018) 180 capsule 1     Allergies   Allergen Reactions     Animal Dander      Fluconazole      rash     Liquid Adhesive      Transdermal patch adhesive. Specifically to nicotine patch; not allergic to nicotine.      Nsaids      Seasonal Allergies      Suture      Non-healing suture line     Vistaril [Hydroxyzine Hcl]      Urinary retention     Recent Labs   Lab Test  08/31/18   1024  08/06/18   1440  03/21/18   1139  12/14/17   1202  09/08/17   1451  09/13/16   0902  08/13/16   1430   12/31/15   1207   LDL   --    --   76   --    --   Cannot estimate LDL when triglyceride exceeds 400 mg/dL  112*   --    --   76   HDL   --    --   49*   --    --   36*   --    --   67   TRIG   --    --   275*   --    --   794*   --    --   234*   ALT   --    --    --   18  22  26   --    < >   --    CR  0.80  1.06*   --    0.95  0.84  0.67   --    < >   --    GFRESTIMATED  72  52*   --   59*  69  89   --    < >   --    GFRESTBLACK  88  63   --   72  83  >90   GFR Calc     --    < >   --    POTASSIUM  3.9  4.1   --   4.2  4.4  4.0   --    < >   --    TSH   --    --    --    --   1.69   --   1.29   < >   --     < > = values in this interval not displayed.      BP Readings from Last 3 Encounters:   09/19/18 121/78   09/14/18 104/68   09/13/18 117/78    Wt Readings from Last 3 Encounters:   09/19/18 212 lb (96.2 kg)   09/13/18 216 lb (98 kg)   08/29/18 218 lb (98.9 kg)                  Labs reviewed in EPIC    Reviewed and updated as needed this visit by clinical staff       Reviewed and updated as needed this visit by Provider         ROS:  Constitutional, HEENT, cardiovascular, pulmonary, GI, , musculoskeletal, neuro, skin, endocrine and psych systems are negative, except as otherwise noted.      OBJECTIVE:     /78  Pulse 94  Temp 97  F (36.1  C) (Tympanic)  Resp 24  Wt 212 lb (96.2 kg)  LMP  (LMP Unknown)  SpO2 93%  BMI 35.28 kg/m2  Body mass index is 35.28 kg/(m^2).  EXAM:  Constitutional: healthy, alert, active and mild-moderate distress  Neck: Neck supple. + adenopathy.  ENT: Bilateral TM's are normal.  Posterior oropharynx is clear.  Nares clear without congestion.  Cardiovascular: S1, S2  Respiratory occasional congested/hacking cough.: Respirations easy and regular.  No respiratory distress. Lungs sounds CTA.  Skin: warm and dry  Psychiatric: mentation appears normal. and affect normal/bright but she does appear ill.      ASSESSMENT/PLAN:     (J20.9) Acute bronchitis with symptoms > 10 days  (primary encounter diagnosis)  Comment: Acute  Plan: amoxicillin-clavulanate (AUGMENTIN) 875-125 MG         per tablet          Since the patient has been ill for more than a month, I did go ahead and give her in a prescription for Augmentin today. She is to take antibiotics as prescribed.  I discussed the  importance of taking good care of herself with a healthy diet, fluids, rest etc.  She will maintain her respiratory health and since she has been on the prednisone for 5 days, she will taper down to 20 mg a day for the next 3-5 days and then she will stop.  She states she has prednisone on hand it does not need anymore prescribed today.  She is she is aware of red flag symptoms and reasons to be rechecked   I anticipate that between antibiotics and the prednisone, her symptoms will resolve.  In the event that they do not or if they worsen in any way, she will be reevaluated.  She was appreciative.    ELISE Hay [facility]

## 2018-09-20 ENCOUNTER — MYC MEDICAL ADVICE (OUTPATIENT)
Dept: PALLIATIVE MEDICINE | Facility: CLINIC | Age: 63
End: 2018-09-20

## 2018-09-20 NOTE — TELEPHONE ENCOUNTER
From: Brandi Stern      Created: 9/20/2018 12:13 PM      Evaristo Hutton! The hip bursa injection is a resounding success. Felt back to normal in an hour post injection. Thank you! I have asked Gia Stroud for orders to have you treat my lumbar area next.  I can walk and go up stairs without any pain now.

## 2018-09-20 NOTE — TELEPHONE ENCOUNTER
Patient was asked to follow up in 4 weeks at her last office visit on 8/29/18.    LILY FraserN, RN  Care Coordinator  [facility]

## 2018-09-20 NOTE — TELEPHONE ENCOUNTER
Thanks for the update!  Glad to hear.    We'll likely see you soon!    Nolvia Morales MD  [facility]

## 2018-09-21 ENCOUNTER — TELEPHONE (OUTPATIENT)
Dept: FAMILY MEDICINE | Facility: CLINIC | Age: 63
End: 2018-09-21

## 2018-09-21 ASSESSMENT — ASTHMA QUESTIONNAIRES: ACT_TOTALSCORE: 5

## 2018-09-21 NOTE — TELEPHONE ENCOUNTER
Gideon Charles MD  Fidencio Luciano  1948  12/15/2016    Patient Active Problem List   Diagnosis   • Near syncope   • Symptomatic bradycardia   • Dizziness   • Palpitations   • Hypertension   • Diabetes mellitus   • Benign prostatic hyperplasia   • Angina, class III   • Abnormal stress test   • Abnormal findings on cardiac catheterization   • ASCVD (arteriosclerotic cardiovascular disease), s/p stenting of 80 % stenosis in left circumflex on 10/05/16and 60% stenosis in the LAD that was left alone.   • Unstable angina   • Normal cardiac stress test 12/2016   • Dyslipidemia       Dear Gideon Charles MD:    Subjective     Fidencio Luciano is a 68 y.o. male with the problems as listed above, presents for follow up of chest pain.      History of Present Illness:  Mr. Luciano is a 68-year-old  male with history of known ASCVD, status post stenting of the left circumflex coronary artery in October 2016 with nonobstructive disease noted in the LAD in the right coronary artery.  His had some recent chest pains for which she was admitted to [facility] and had a Lexiscan sestamibi study that revealed no evidence of myocardial ischemia.  He is here for a regular cardiology follow-up.  His had some mild chest pain since discharge from the hospital.  Overall he feels much better.  He says this is not like prior to stenting.    No Known Allergies:      Current Outpatient Prescriptions:   •  amLODIPine (NORVASC) 5 MG tablet, Take 5 mg by mouth Daily., Disp: , Rfl:   •  aspirin  MG EC tablet, Take 1 tablet by mouth Daily., Disp: 30 tablet, Rfl: 5  •  atorvastatin (LIPITOR) 80 MG tablet, Take 1 tablet by mouth Every Night., Disp: 30 tablet, Rfl: 2  •  clopidogrel (PLAVIX) 75 MG tablet, Take 75 mg by mouth Daily., Disp: , Rfl:   •  finasteride (PROSCAR) 5 MG tablet, Take 5 mg by mouth Every Evening., Disp: , Rfl:   •  lisinopril (PRINIVIL,ZESTRIL) 5 MG tablet, Take 5 mg by mouth Daily., Disp: , Rfl:   •  NITROSTAT  Letter faxed. Pt will come by office to  copy. Copy placed on folder at .     "0.4 MG SL tablet, PLACE 1 TABLET UNDER THE TONGUE EVERY FIVE MINUTES AS NEEDED FOR CHEST PAIN FOR UP TO 3 DOSES, Disp: 25 tablet, Rfl: 1  •  isosorbide mononitrate (IMDUR) 60 MG 24 hr tablet, Take 1 tablet by mouth Every Morning., Disp: 30 tablet, Rfl: 2      The following portions of the patient's history were reviewed and updated as appropriate: allergies, current medications, past family history, past medical history, past social history, past surgical history and problem list.    Social History   Substance Use Topics   • Smoking status: Former Smoker     Packs/day: 3.00     Types: Cigarettes     Quit date: 1982   • Smokeless tobacco: Former User     Quit date: 1/16/1986   • Alcohol use No       Review of Systems   Constitution: Negative for chills and fever.   HENT: Negative for headaches, nosebleeds and sore throat.    Cardiovascular: Positive for chest pain.   Respiratory: Negative for cough, hemoptysis and wheezing.    Gastrointestinal: Negative for abdominal pain, hematemesis, hematochezia, melena, nausea and vomiting.   Genitourinary: Negative for dysuria and hematuria.       Objective   Vitals:    01/09/17 1335   BP: 134/58   Pulse: 60   Resp: 16   Weight: 178 lb (80.7 kg)   Height: 71\" (180.3 cm)     Body mass index is 24.83 kg/(m^2).        Physical Exam   Constitutional: He is oriented to person, place, and time. He appears well-developed and well-nourished.   HENT:   Head: Normocephalic.   Eyes: Conjunctivae and EOM are normal.   Neck: Normal range of motion. Neck supple. No JVD present. No tracheal deviation present. No thyromegaly present.   Cardiovascular: Normal rate and regular rhythm.  Exam reveals no gallop and no friction rub.    No murmur heard.  Pulmonary/Chest: Breath sounds normal. No respiratory distress. He has no wheezes. He has no rales.   Abdominal: Soft. Bowel sounds are normal. He exhibits no mass. There is no tenderness.   Musculoskeletal: He exhibits no edema.   Neurological: He is " alert and oriented to person, place, and time. No cranial nerve deficit.   Skin: Skin is warm and dry.   Psychiatric: He has a normal mood and affect.       Lab Results   Component Value Date     12/16/2016    K 4.1 12/16/2016     12/16/2016    CO2 24.7 12/16/2016    BUN 15 12/16/2016    CREATININE 0.80 12/16/2016    GLUCOSE 115 (H) 12/16/2016    CALCIUM 8.8 12/16/2016    AST 33 12/16/2016    ALT 34 12/16/2016    ALKPHOS 81 12/16/2016    LABIL2 1.4 (L) 12/16/2016     Lab Results   Component Value Date    CKTOTAL 79 10/05/2016     Lab Results   Component Value Date    WBC 6.71 12/16/2016    HGB 13.8 (L) 12/16/2016    HCT 41.4 (L) 12/16/2016     12/16/2016     Lab Results   Component Value Date    INR 1.06 10/03/2016    INR 1.01 07/16/2016     Lab Results   Component Value Date    MG 2.3 07/19/2016     Lab Results   Component Value Date    TSH 1.134 07/17/2016    PSA 1.160 11/04/2016    CHLPL 109 08/12/2015    TRIG 111 12/16/2016    HDL 36 (L) 12/16/2016    LDL 57 08/12/2015      Lab Results   Component Value Date    BNP 55.0 07/16/2016     Echo   Lab Results   Component Value Date    ECHOEFEST 60 07/17/2016     Cardiac cath.PCI by  in oct 2016:  Final impression and plan:  Overall it is my impression that Mr. Luciano is undergone successful percutaneous revascularization of the circumflex artery. He does have moderate disease involving the LAD and the right coronary arteries however these are both very focal lesions wouldn't easily be treated by percutaneous revascularization if necessary. For now, I would continue medical therapy as is and if he continues to have symptoms and would consider obtaining a stress Cardiolite test and if there are perfusion abnormalities in either the LAD or right coronary artery territories would proceed with percutaneous revascularization of these vessels as well.    Lexiscan sestamibi study on 12/16/16 revealed:  · Findings consistent with a normal ECG stress  test.  · Myocardial perfusion imaging indicates a normal myocardial perfusion study with no evidence of ischemia.  · Normal LV cavity size. Normal LV wall motion noted.  · (Calculated EF = 67%).  · Impressions are consistent with a low risk study.  · There is no prior study available for comparison.    Procedures    Assessment/Plan :    1. ASCVD (arteriosclerotic cardiovascular disease), s/p stenting of left circumflex on 10/05/16     2. Unstable angina  Start Imdur 60 mg po daily.    Essential Hypertension    3. Dyslipidemia Increase Lipitor to 80 mg po qhs.  Check FLP and CMP.          Recommendations:     I have reviewed the lexiscan stress test results with the patient.  Increase Lipitor to 80 mg po daily.  Start Imdur 60 mg po daily.  Check FLP and CMP.    Return in about 2 months (around 3/9/2017).    As always, I appreciate very much the opportunity to participate in the cardiovascular care of your patients.      With Best Regards,    Giovanni Buenrostro MD, [facility]    Dragon disclaimer:  Much of this encounter note is an electronic transcription/translation of spoken language to printed text. The electronic translation of spoken language may permit erroneous, or at times, nonsensical words or phrases to be inadvertently transcribed; Although I have reviewed the note for such errors, some may still exist.

## 2018-09-21 NOTE — TELEPHONE ENCOUNTER
Please clarify the exact days/dates that she missed work and I will write the letter.  Thank you

## 2018-09-21 NOTE — LETTER
September 21, 2018      Brandi Stern  [address] [place] [address]        To Whom It May Concern,     Brandi is cleared to return to work on 9/24/2018 without restrictions.      Sincerely,        Kimberly OLIVARES CNP

## 2018-09-24 ENCOUNTER — OFFICE VISIT (OUTPATIENT)
Dept: PALLIATIVE MEDICINE | Facility: CLINIC | Age: 63
End: 2018-09-24
Payer: MEDICARE

## 2018-09-24 DIAGNOSIS — G89.29 CHRONIC PAIN: Primary | ICD-10-CM

## 2018-09-24 PROCEDURE — 97112 NEUROMUSCULAR REEDUCATION: CPT | Mod: GP | Performed by: PHYSICAL THERAPIST

## 2018-09-24 PROCEDURE — 97110 THERAPEUTIC EXERCISES: CPT | Mod: GP | Performed by: PHYSICAL THERAPIST

## 2018-09-24 NOTE — MR AVS SNAPSHOT
After Visit Summary   9/24/2018    Brandi Stern    MRN: [id number]           Patient Information     Date Of Birth          1955        Visit Information        Provider Department      9/24/2018 11:30 AM Caterina Lau, PT [facility]        Today's Diagnoses     Chronic pain    -  1       Follow-ups after your visit        Your next 10 appointments already scheduled     Oct 02, 2018 10:30 AM CDT   Return Visit with ELISE Howard CNP   [facility] ([facility])    [address]  [address]  [facility] [id number] [phone]            Oct 15, 2018 11:30 AM CDT   Return Visit with Caterina Lau PT   [facility] ([facility])    [address]  [address]  [facility] [address] [phone]            Oct 24, 2018  9:00 AM CDT   New Visit with Rigoberto Enciso, PhD [facility] ([facility])    [address]  [address]  [facility] [address] [phone]              Who to contact     If you have questions or need follow up information about today's clinic visit or your schedule please contact [facility] directly at [phone].  Normal or non-critical lab and imaging results will be communicated to you by MyChart, letter or phone within 4 business days after the clinic has received the results. If you do not hear from us within 7 days, please contact the clinic through MyChart or phone. If you have a critical or abnormal lab result, we will notify you by phone as soon as possible.  Submit refill requests through [facility] or call your pharmacy and they will forward the refill request to us. Please allow 3 business days for your refill to be completed.          Additional Information About Your Visit        [employer] Information     [facility] gives you secure access to your electronic health record. If you see a primary care  provider, you can also send messages to your care team and make appointments. If you have questions, please call your primary care clinic.  If you do not have a primary care provider, please call [phone] and they will assist you.        Care EveryWhere ID     This is your Care EveryWhere ID. This could be used by other organizations to access your [facility] medical records  [id number]        Your Vitals Were     Last Period                   (LMP Unknown)            Blood Pressure from Last 3 Encounters:   09/19/18 121/78   09/14/18 104/68   09/13/18 117/78    Weight from Last 3 Encounters:   09/19/18 96.2 kg (212 lb)   09/13/18 98 kg (216 lb)   08/29/18 98.9 kg (218 lb)              We Performed the Following     NEUROMUSCULAR RE-EDUCATION     THERAPEUTIC EXERCISES        Primary Care Provider Office Phone # Fax #    Cherie Brennan [phone]       [address] [facility] [address]        Equal Access to Services     LORNE HALEY : Hadii aad ku hadasho Soomaali, waaxda luqadaha, qaybta kaalmada adeegyada, waxay idiin hayaan adeeg alec rodriguez . So [facility] [phone].    ATENCIÓN: Si habla español, tiene a rizvi disposición servicios gratuitos de asistencia lingüística. [facility] [phone].    We comply with applicable federal civil rights laws and [place] laws. We do not discriminate on the basis of race, color, national origin, age, disability, sex, sexual orientation, or gender identity.            Thank you!     Thank you for choosing [facility]  for your care. Our goal is always to provide you with excellent care. Hearing back from our patients is one way we can continue to improve our services. Please take a few minutes to complete the written survey that you may receive in the mail after your visit with us. Thank you!             Your Updated Medication List - Protect others around you: Learn how to safely use, store and throw away your medicines at  www.disposemymeds.org.          This list is accurate as of 9/24/18 12:27 PM.  Always use your most recent med list.                   Brand Name Dispense Instructions for use Diagnosis    ACETAMINOPHEN EXTRA STRENGTH PO      Pt reports taking 650 MG arthritis        * albuterol 108 (90 Base) MCG/ACT inhaler    PROAIR HFA    1 Inhaler    Inhale 1-2 puffs into the lungs every 6 hours as needed for shortness of breath / dyspnea    Moderate persistent asthma with exacerbation       * albuterol (2.5 MG/3ML) 0.083% neb solution     3 mL    Use every 4 hours as needed for wheezing and shortness of breath    Moderate persistent asthma without complication       amoxicillin-clavulanate 875-125 MG per tablet    AUGMENTIN    20 tablet    Take 1 tablet by mouth 2 times daily    Acute bronchitis with symptoms > 10 days       atorvastatin 10 MG tablet    LIPITOR    90 tablet    Take 1 tablet (10 mg) by mouth daily    Hyperlipidemia LDL goal <130       benzonatate 200 MG capsule    TESSALON    21 capsule    Take 0.5-1 capsules (100-200 mg) by mouth 3 times daily as needed for cough    Acute bronchitis, unspecified organism       CALCIUM 500 PO      Take 1 tablet by mouth daily        CLARITIN 10 MG tablet   Generic drug:  loratadine     0    1 TAB PO QD (Once per day) as needed for ALLERGY SYMPTOMS    Allergic rhinitis due to other allergen       CO Q 10 PO      Take 200 mg by mouth daily        cyclobenzaprine 5 MG tablet    FLEXERIL    180 tablet    Take 1-2 tablets (5-10 mg) by mouth 3 times daily as needed for muscle spasms    Spasm of back muscles       * desvenlafaxine succinate 50 MG 24 hr tablet    PRISTIQ    30 tablet    Take 1 tablet (50 mg) by mouth daily    Major depressive disorder, recurrent episode, moderate (H)       * desvenlafaxine succinate 100 MG 24 hr tablet    PRISTIQ    90 tablet    Take 1 tablet (100 mg) by mouth daily    Major depressive disorder, recurrent episode, moderate (H)       fenofibrate 145 MG  tablet     90 tablet    TAKE 1 TABLET BY MOUTH EVERY DAY    Pure hyperglyceridemia       gabapentin 100 MG capsule    NEURONTIN    150 capsule    100 mg capsule BID and 300 mg at HS    Chronic midline low back pain without sciatica, Spinal stenosis in cervical region, Cervical spondylosis without myelopathy, Primary osteoarthritis involving multiple joints       levothyroxine 75 MCG tablet    SYNTHROID    90 tablet    Take 1 tablet (75 mcg) by mouth every morning Overdue for labs    Chronic lymphocytic thyroiditis       lisinopril 10 MG tablet    PRINIVIL/ZESTRIL    90 tablet    Take 1 tablet (10 mg) by mouth daily    Hypertension goal BP (blood pressure) < 140/90       Lutein 10 MG Tabs      Take 10 mg by mouth daily        * MIRAPEX 0.125 MG tablet   Generic drug:  pramipexole      Take two tabs at dinner and one at hs        * pramipexole 0.125 MG tablet    MIRAPEX    270 tablet    Take two tabs at dinner and one at hs    Restless leg syndrome       naltrexone 50 MG tablet    DEPADE;REVIA    30 tablet    Take 1 tablet (50 mg) by mouth daily    Class 2 obesity with body mass index (BMI) of 36.0 to 36.9 in adult, unspecified obesity type, unspecified whether serious comorbidity present       * omeprazole 20 MG CR capsule    priLOSEC    180 capsule    Take 1 capsule (20 mg) by mouth 2 times daily    Gastroesophageal reflux disease without esophagitis       * omeprazole 20 MG CR capsule    priLOSEC    180 capsule    TAKE 1 TABLET (20 MG) BY MOUTH 2 TIMES DAILY TAKE 30-60 MINUTES BEFORE A MEAL.    Gastroesophageal reflux disease without esophagitis       PREDNISONE PO      Take 40 mg by mouth        ranitidine 300 MG tablet    ZANTAC    60 tablet    TAKE 1 TABLET (300 MG) BY MOUTH 2 TIMES DAILY    Gastroesophageal reflux disease without esophagitis       SYMBICORT 160-4.5 MCG/ACT Inhaler   Generic drug:  budesonide-formoterol      Inhale 1 puff into the lungs 2 times daily    Moderate persistent asthma without  complication       triamcinolone 55 MCG/ACT nasal inhaler    NASACORT AQ    1 Inhaler    Inhale 2 sprays in both nostrils every day as needed    Allergic rhinitis due to other allergen       UNABLE TO FIND      MEDICATION NAME: Allergy shots        VITAMIN C PO      Take 1,000 mg by mouth daily        zolpidem 10 MG tablet    AMBIEN    30 tablet    TAKE 1/2 TO 1 TABLET BY MOUTH NIGHTLY AS NEEDED    Insomnia, unspecified type       * Notice:  This list has 8 medication(s) that are the same as other medications prescribed for you. Read the directions carefully, and ask your doctor or other care provider to review them with you.

## 2018-09-24 NOTE — PROGRESS NOTES
"Patient Name: Brandi Stern      YOB: 1955     Medical Record Number: [id number]  Diagnosis: Chronic pain  Visit Info Length of Visit: 45 min  Arrived: On Time  Therapeutic Procedures/Exercises: 25 min; Therapeutic Activities: NA; Neuromuscular Re-education: 20 min;    Exercise/Activity Education Instruction:  Postural Training - reviewed neutral and posture habit reversal cues.    Activity:  Core Stabilization- instructed in \"zipper\" (TA) abdominal set - practice intermittently during the day, apply to transitional movements and lifting.  Aerobic Conditioning - Recumbent bike x 6', seat #11, no resistance. Continue to encourage consistent walking program - as she is feeling better.  Stretching:  Instructed in upper body stretches: UT, levator, single shoulder rolls BWD, bilateral shoulder shrugs and drop with breath.  Performed all - required moderate cues for form initially.  Declined h/o - wrote herself a reminder on a post-it.    Led through seated kinesthetic awareness activity (body scan) with mindful breathing. Pt reported increased relaxation effect throughout her body upon completion.  Encouraged to practice 1-2x /day.               Notes: Patient reports: had injection in bursa of hip - has had great relief - .  Hoping to have an injection in her neck next.   Back has been better - thinks it is b/c she has been coughing so much that she has been using her core mms.  Has been very sick with bronchitis over past few weeks.      Activity tolerance: can move better (walking, up/down stairs) since injection    HEP/Self Care/Home Management Training:   Pacing/Mini Breaks/Self Care: not addressed today ;   Relaxation Practice: nothing consistent ;   Posture Corrections: may notice a couple times/ day that she is \"turning into a snail\" - then corrects her posture - otherwise no use of reminder prompts ;   Walking program: nothing consistent over interval d/t feeling sick - may have walked her dogs " once ;   Heat/Ice/TENS: uses PRN  ;   HEP does some stretches in ways her body feels good; has been doing pelvic tilts for her LB.     GCode visit: 3/10     Demonstrates/Verbalizes Technique: 3, 4 (1= poor technique-difficulty performing exercises,significant cues required; 5= good technique-performs exercises without cues)  Body Awareness: 3, 4 (1=low awareness; 5=high awareness)  Posture/Stability: 3, 4 (1= poor posture, stability; 5= good posture, stability)   Motivational Level:   Eager to learn and Cooperative Participation:  Full   Patient Satisfaction:  satisfied   Response to Teaching:   demonstrated ability and verbalized, recall/understanding  Factors that affect learning: None   Plan of Care  Cont PT to support reactivation and integration of self regulation pain management skills. (next visit consider:  Hip/LB stretches/ROM, mini breaks, progress core)   Therapist: Caterina Lau PT                 Date: 9/24/2018

## 2018-09-30 DIAGNOSIS — K21.9 GASTROESOPHAGEAL REFLUX DISEASE WITHOUT ESOPHAGITIS: ICD-10-CM

## 2018-09-30 NOTE — TELEPHONE ENCOUNTER
"Requested Prescriptions   Pending Prescriptions Disp Refills     ranitidine (ZANTAC) 300 MG tablet [Pharmacy Med Name: RANITIDINE 300 MG TABLET]      Last Written Prescription Date:  3/30/2018  Last Fill Quantity: 60 tablets,  # refills: 5   Last office visit: No previous visit found with prescribing provider:  Cherie Brennan   Future Office Visit:   Next 5 appointments (look out 90 days)     Oct 02, 2018 10:30 AM CDT   Return Visit with ELISE Howard CNP   [facility]    [address]  [address]  [facility] [address]   [phone]            Oct 15, 2018 11:30 AM CDT   Return Visit with Caterina Lau, DENYS   [facility] ([facility])    [address]  [address]  [facility] [address]   [phone]                60 tablet 5     Sig: TAKE 1 TABLET (300 MG) BY MOUTH 2 TIMES DAILY    H2 Blockers Protocol Passed    9/30/2018  1:41 AM       Passed - Patient is age 12 or older       Passed - Recent (12 mo) or future (30 days) visit within the authorizing provider's specialty    Patient had office visit in the last 12 months or has a visit in the next 30 days with authorizing provider or within the authorizing provider's specialty.  See \"Patient Info\" tab in inbasket, or \"Choose Columns\" in Meds & Orders section of the refill encounter.              "

## 2018-10-02 ENCOUNTER — TELEPHONE (OUTPATIENT)
Dept: PALLIATIVE MEDICINE | Facility: CLINIC | Age: 63
End: 2018-10-02

## 2018-10-02 ENCOUNTER — OFFICE VISIT (OUTPATIENT)
Dept: PALLIATIVE MEDICINE | Facility: CLINIC | Age: 63
End: 2018-10-02
Payer: MEDICARE

## 2018-10-02 VITALS
SYSTOLIC BLOOD PRESSURE: 108 MMHG | HEART RATE: 102 BPM | BODY MASS INDEX: 35.28 KG/M2 | DIASTOLIC BLOOD PRESSURE: 72 MMHG | WEIGHT: 212 LBS | OXYGEN SATURATION: 98 % | RESPIRATION RATE: 16 BRPM

## 2018-10-02 DIAGNOSIS — M48.02 CERVICAL STENOSIS OF SPINAL CANAL: ICD-10-CM

## 2018-10-02 DIAGNOSIS — M54.12 CERVICAL RADICULOPATHY: Primary | ICD-10-CM

## 2018-10-02 PROCEDURE — 99214 OFFICE O/P EST MOD 30 MIN: CPT | Performed by: NURSE PRACTITIONER

## 2018-10-02 ASSESSMENT — PAIN SCALES - GENERAL: PAINLEVEL: MODERATE PAIN (5)

## 2018-10-02 NOTE — PATIENT INSTRUCTIONS
After Visit Instructions:     Thank you for coming to [facility] for your care. It is my goal to partner with you to help you reach your optimal state of health.     I am recommending multidisciplinary care at this time.  The focus of care will be to continue gradual rehabilitation and pain management with medication adjustments as needed.    Continue daily self-care, identifying contributing factors, and monitoring variations in pain level. Continue to integrate self-care into your life.      1. Pain psychology assessment  2. Schedule physical therapy visits  3. Schedule follow-up with ELISE Persaud NP-C in 4-8 weeks  4. Procedures recommended: Cervical epidural steroid injection    5. Medication recommendations: No change to current medications      ELISE Zhu, NP-C  [facility]    Contact information: [facility]    Please call if any side effects, questions, or concerns arise.    Nurse Triage line:  [phone]   Call this number with any questions or concerns. You may leave a detailed message anytime. Calls are typically returned Monday through Friday between 8 AM and 4:30 PM. We usually get back to you within 2 business days depending on the issue/request.    Scheduling number: [phone].  Call this number to schedule or change appointments.          Medication Refills Policy:    For non-narcotic medications, please your pharmacy directly to request a refill and the pharmacy will call the [facility] Center for authorization. Please allow 3-4 days for these refills.    For narcotic refills, call the nurse triage line and leave a message requesting your refill along with the name of the pharmacy that you use. Narcotic prescriptions will be mailed to your pharmacy or you may pick them up at the clinic.  Please call 7-10 days before your refill is due  The above policy allows adequate time so that you do not  run out of medication.    No Show - Late Cancellation - Late Arrival Policy  We believe regular attendance is key to your success in our program.    Any time you are unable to keep your appointment we ask that you call us at  least 24 hours in advance to let us know. This will allow us to offer the appointment time to another patient. The following is our policy for missed appointments. This also applies to appointments cancelled with less than 24 hours notice.    You will receive a letter after missing your 1st and 2nd appointments without contacting the clinic before your scheduled appointment time.     After missing 3 appointments without calling first, we will cancel all of your future appointments at [facility].    At that point, you will not be able to resume services unless approved by your care team  We understand that unforseen circumstances arise, however, out of respect for all concerned and to provide this appointment to another patient, this policy will be enforced.    Please note that most follow up appointments are 30 minutes long. If you arrive late, your provider may not be able to see you for the entire 30 minutes. Please also note that if you arrive more than 15 minutes for any appointment, it may be rescheduled.

## 2018-10-02 NOTE — TELEPHONE ENCOUNTER
Pre-screening Questions for Radiology Injections:    Injection to be done at which interventional clinic site? [facility]    Instruct patient to arrive as directed prior to the scheduled appointment time:    [place] AND [place]: 30 minutes before      Procedure ordered by Luanne    Procedure ordered? Cervical Epidural Steroid Injection    What insurance would patient like us to bill for this procedure? BC/Medicare      Worker's comp or MVA (motor vehicle accident) -Any injection DO NOT SCHEDULE and route to Jennifer Gama.      [employer] insurance - For SI joint injections, DO NOT SCHEDULE and route Ayana Cruz. [employer] NO PA REQUIRED EFFECTIVE 11/1/2017      [employer]- MBB's must be scheduled at LEAST two weeks apart      [employer] - Any injection besides hip/shoulder/knee joint DO NOT SCHEDULE and route to Ayana Cruz. She will obtain PA and call pt back to schedule procedure or notify pt of denial.       [employer]-Route to Ayana for review    Any chance of pregnancy? NO   If YES, do NOT schedule and route to RN pool    Is an  needed? No     Patient has a drive home? (mandatory) YES: informed    Is patient taking any blood thinners (plavix, coumadin, jantoven, warfarin, heparin, pradaxa or dabigatran )? No   If hold needed, do NOT schedule, route to RN pool     Is patient taking any aspirin products (includes Excedrin and Fiorinal)? No     If more than 325mg/day do NOT schedule; route to RN pool     For CERVICAL procedures, hold all aspirin products for 6 days.     Tell pt that if aspirin product is not held for 6 days, the procedure WILL BE cancelled.      Does the patient have a bleeding or clotting disorder? No     If YES, okay to schedule AND route to RN nurse pool    For any patients with platelet count <100, must be forwarded to provider    Is patient diabetic?  No  If YES, have them bring their glucometer.    Does patient have an active  infection or treated for one within the past week? No     Is patient currently taking any antibiotics?  No     For patients on chronic, preventative, or prophylactic antibiotics, procedures may be scheduled.     For patients on antibiotics for active or recent infection:    [place], [place]-antibiotic course must have been completed for 4 days    Is patient currently taking any steroid medications? (i.e. Prednisone, Medrol)  No     For patients on steroid medications:    [place], [place]-steroid course must have been completed for 4 days    Reviewed with patient:  If you are started on any steroids or antibiotics between now and your appointment, you must contact us because the procedure may need to be cancelled.  Yes    Is patient actively being treated for cancer or immunocompromised? No  If YES, do NOT schedule and route to RN pool     Are you able to get on and off an exam table with minimal or no assistance? Yes  If NO, do NOT schedule and route to RN pool    Are you able to roll over and lay on your stomach with minimal or no assistance? Yes  If NO, do NOT schedule and route to RN pool     Any allergies to contrast dye, iodine, shellfish, or numbing and steroid medications? No  If YES, route to RN pool AND add allergy information to appointment notes    Allergies: Animal dander; Fluconazole; Liquid adhesive; Nsaids; Seasonal allergies; Suture; and Vistaril [hydroxyzine hcl]      Has the patient had a flu shot or any other vaccinations within 7 days before or after the procedure.  No     Does patient have an MRI/CT?  YES: mri  (SI joint, hip injections, lumbar sympathetic blocks, and stellate ganglion blocks do not require an MRI)    Was the MRI done w/in the last 3 years?  Yes    Was MRI done at [facility]? Yes      If not, where was it done? N/A       If MRI was not done at [facility], [facility] or [facility] do NOT schedule and route to nursing.  If pt has an  imaging disc, the injection may be scheduled but pt has to bring disc to appt. If they show up w/out disc the injection cannot be done    Reminders (please tell patient if applicable):       Instructed pt to arrive 30 minutes early for IV start if this is for a cervical procedure, ALL sympathetic (stellate ganglion, hypogastric, or lumbar sympathetic block) and all sedation procedures (RFA, spinal cord stimulation trials).  YES: informed   -IVs are not routinely placed for Dr. Camara cervical cases   -Dr. Ceballos: IVs for cervical ESIs and cervical TBDs (not CMBBs/facet inj)      If NPO for sedation, informed patient that it is okay to take medications with sips of water (except if they are to hold blood thinners).  NO   *DO take blood pressure medication if it is prescribed*      If this is for a cervical MEGHAN, informed patient that aspirin needs to be held for 6 days.   NO      For all patients not having spinal cord stimulator (SCS) trials or radiofrequency ablations (RFAs), informed patient:    IV sedation is not provided for this procedure.  If you feel that an oral anti-anxiety medication is needed, you can discuss this further with your referring provider or primary care provider.  The Pain Clinic provider will discuss specifics of what the procedure includes at your appointment.  Most procedures last 10-20 minutes.  We use numbing medications to help with any discomfort during the procedure.  Not Applicable      Do not schedule procedures requiring IV placement in the first appointment of the day or first appointment after lunch. Do NOT schedule at 0745, 0815 or 1245.       For patients 85 or older we recommend having an adult stay w/ them for the remainder of the day.       Does the patient have any questions?  Not Applicable  Veronique Santo  [facility]

## 2018-10-02 NOTE — PROGRESS NOTES
[facility]    CHIEF COMPLAINT: Pain in left lumbar and neck     INTERVAL HISTORY:  Last seen on 8/29/18.        Recommendations/plan at the last visit included:  1. Schedule pain psychology assessment  2. Schedule physical therapy assessment  3. Schedule follow-up with ELISE Persaud NP-C in 4 weeks  4. Imaging: Left hip x-rays:   1. [facility] Please call to schedule: [phone] or [phone]   5. Medication recommendations:                  1. Gabapentin 100 mg in am and midday, 300 mg at HS    Since her last visit, Brandi Stern reports:  - Hip bursa injection VERY helpful. Would like to pursue cervical injection.   - Gabapentin increase going well and sleeping well     Pain Information:   Pain quality: Aching, Miserable and Nagging    Pain timing: afternoon is worst      Pain rating: intensity ranges from 4/10 to 8/10, and averages 6/10 on a 0-10 scale.   Aggravating factors include: Lifting, heavy objects   Relieving factors include: Tylenol       Annual Controlled Substance Agreement/UDS due date: N/A    CURRENT RELEVANT PAIN MEDICATIONS:   Tylenol arthritis strength 650 mg, 4-6 tablets daily  Capsaicin cream  Voltaren gel  Gabapentin 100-300 at HS  Flexeril 5mg10 mg at HS  Ambien 10 mg 1/2-1 tablet at HS      Patient is using the medication as prescribed:  YES  Is your medication helpful?               YES   Medication side effects? no side effect          Previous Pain Relevant Medications: (H--helped; HI--Helped initially; SWH--Somewhat helpful; NH--No help; W--worse; SE--side effects; ?--Unsure if helpful)   NOTE: This medication information taken from patient's intake form, not medical records.                         Opiates: Hydrocodone: H, hydromorphone: H, given postoperatively, morphine, H: given postoperatively and in ED, tramadol:NH                        NSAIDS: Celebrex: H, ibuprofen:[facility], Relafen:NH                        Muscle Relaxants:  Cyclobenzaprine:SE sedation                        Anti-migraine mediations: None                        Anti-depressants: Citalopram: NH, bupropion: Just started, too soon to tell                        Sleep aids: Ambien: H for sleep                        Anti-convulsants: Gabapentin: SE, sedation, pregabalin: NH                        Topicals: Diclofenac gel:NH                        Other medications not covered above: none      Any illicit drug use: Denies  Any use of prescriptions other than how they were prescribed:denies  [place] Board of Pharmacy Data Base Reviewed:    YES; No concern for abuse or misuse of controlled medications based on this report.       SELF CARE:   How often do you practice SELF-CARE (relaxing, stretching, pacing, monitoring posture, taking mini-breaks) in a typical day:  Few times daily     THE 4 As OF OPIOID MAINTENANCE ANALGESIA    Analgesia: Is pain relief clinically significant? N/A   Activity: Is patient functional and able to perform Activities of Daily Living? N/A   Adverse effects: Is patient free from adverse side effects from opiates? N/A   Adherence to Rx protocol: Is patient adhering to Controlled Substance Agreement and taking medications ONLY as ordered? N/A     Is Narcan prescribed for opiate use >50 MME daily? N/A     [place] Board  Pharmacy Data Base Reviewed:    YES; No concern for abuse or misuse of controlled medications based on this report.       Medications:  Current Outpatient Prescriptions   Medication Sig Dispense Refill     ACETAMINOPHEN EXTRA STRENGTH OR Pt reports taking 650 MG arthritis       albuterol (2.5 MG/3ML) 0.083% neb solution Use every 4 hours as needed for wheezing and shortness of breath 3 mL 12     albuterol (ALBUTEROL) 108 (90 BASE) MCG/ACT Inhaler Inhale 1-2 puffs into the lungs every 6 hours as needed for shortness of breath / dyspnea 1 Inhaler 1     amoxicillin-clavulanate (AUGMENTIN) 875-125 MG per tablet Take 1 tablet by mouth 2  times daily 20 tablet 0     Ascorbic Acid (VITAMIN C PO) Take 1,000 mg by mouth daily       atorvastatin (LIPITOR) 10 MG tablet Take 1 tablet (10 mg) by mouth daily 90 tablet 2     Calcium-Magnesium-Vitamin D (CALCIUM 500 PO) Take 1 tablet by mouth daily       CLARITIN 10 MG OR TABS 1 TAB PO QD (Once per day) as needed for ALLERGY SYMPTOMS 0 0     Coenzyme Q10 (CO Q 10 PO) Take 200 mg by mouth daily        cyclobenzaprine (FLEXERIL) 5 MG tablet Take 1-2 tablets (5-10 mg) by mouth 3 times daily as needed for muscle spasms 180 tablet 1     desvenlafaxine succinate (PRISTIQ) 100 MG 24 hr tablet Take 1 tablet (100 mg) by mouth daily 90 tablet 3     fenofibrate 145 MG tablet TAKE 1 TABLET BY MOUTH EVERY DAY 90 tablet 3     gabapentin (NEURONTIN) 100 MG capsule 100 mg capsule BID and 300 mg at  capsule 1     levothyroxine (SYNTHROID) 75 MCG tablet Take 1 tablet (75 mcg) by mouth every morning Overdue for labs 90 tablet 3     lisinopril (PRINIVIL/ZESTRIL) 10 MG tablet Take 1 tablet (10 mg) by mouth daily 90 tablet PRN     Lutein 10 MG TABS Take 10 mg by mouth daily       MIRAPEX 0.125 MG OR TABS Take two tabs at dinner and one at hs       naltrexone (DEPADE;REVIA) 50 MG tablet Take 1 tablet (50 mg) by mouth daily 30 tablet 3     omeprazole (PRILOSEC) 20 MG CR capsule TAKE 1 TABLET (20 MG) BY MOUTH 2 TIMES DAILY TAKE 30-60 MINUTES BEFORE A MEAL. 180 capsule 3     pramipexole (MIRAPEX) 0.125 MG tablet Take two tabs at dinner and one at hs 270 tablet PRN     PREDNISONE PO Take 40 mg by mouth       ranitidine (ZANTAC) 300 MG tablet TAKE 1 TABLET (300 MG) BY MOUTH 2 TIMES DAILY 60 tablet 5     SYMBICORT 160-4.5 MCG/ACT Inhaler Inhale 1 puff into the lungs 2 times daily       triamcinolone (NASACORT AQ) 55 MCG/ACT nasal inhaler Inhale 2 sprays in both nostrils every day as needed 1 Inhaler 7     UNABLE TO FIND MEDICATION NAME: Allergy shots       zolpidem (AMBIEN) 10 MG tablet TAKE 1/2 TO 1 TABLET BY MOUTH NIGHTLY AS  NEEDED 30 tablet 5     benzonatate (TESSALON) 200 MG capsule Take 0.5-1 capsules (100-200 mg) by mouth 3 times daily as needed for cough (Patient not taking: Reported on 9/19/2018) 21 capsule 1     desvenlafaxine succinate (PRISTIQ) 50 MG 24 hr tablet Take 1 tablet (50 mg) by mouth daily (Patient not taking: Reported on 9/19/2018) 30 tablet 5     omeprazole (PRILOSEC) 20 MG CR capsule Take 1 capsule (20 mg) by mouth 2 times daily (Patient not taking: Reported on 9/19/2018) 180 capsule 1       Review of Systems: A 10-point review of systems was negative, with the exception of chronic pain issues.      Social History: Reviewed; unchanged from previous consultation.      Family history: Reviewed; unchanged from previous consultation.     PHYSICAL EXAM    Vitals:    10/02/18 1032   BP: 108/72   BP Location: Left arm   Patient Position: Chair   Cuff Size: Adult Regular   Pulse: 102   Resp: 16   SpO2: 98%   Weight: 96.2 kg (212 lb)       Constitutional: healthy, alert and no distress  HEENT: Head atraumatic, normocephalic. Eyes without conjunctival injection or jaundice. Neck with reduced ROM. No obvious neck masses.  Cardiovascular: No edema or JVD appreciated. Peripheral pulses are palpable, no edema on bilateral lower extremities  Skin: No rash, lesions, or petechiae of exposed skin.   Extremities: No clubbing, cyanosis, or edema to exposed extremities  Psychiatric/mental status: Alert, without lethargy or stupor. Appropriate affect. Mood normal.   Neurologic exam:  CN:  Cranial nerves 2-12 are grossly normal.  Motor:  5/5 UE and LE strength    Musculoskeletal exam:  Cervical spine:                       Flex:  10 degrees, painful                        Ext: 20 degrees, painful                        Rotation to right: 20 degrees, painful                        Rotation to left: 20 degrees, painful                        Tenderness in the cervical spine at midline. No                       Tenderness in the cervical  paraspinal muscles. No  Thoracic spine:                        Kyphosis. Yes                       Tenderness in the thoracic spine at midline. No                       Tenderness in the thoracic paraspinal muscles. No  Lumbar/Sacral spine:                       Forward Flexion:  90 degrees, painful                        Ext: 30 degrees, painful                        Rotation/ext to right: painful                        Rotation/ext to left: painful                        Lordosis. Yes                       Tenderness in the lumbar spine at midline. No                       Tenderness in the lumbar paraspinal muscles.No     Psychiatric:  mentation appears normal., affect and mood normal     DIRE Score for ongoing opioid management is calculated as follows:    Diagnosis = 2 pts (slowly progressive; moderate pain/objective findings)    Intractability = 2 pts (most treatments tried; patient not fully engaged/barriers)    Risk        Psych = 3 pts (no significant personality dysfunction/mental illness; good communication with clinic)         Chem Hlth = 2 pts (use of medications to cope with stress; chemical dependency in remission) (?)       Reliability = 3 pts (highly reliable with meds, appointments, treatments)       Social = 3 pts (supportive family/close relationships; involved in work/school; no isolation)       (Psych + Chem hlth + Reliability + Social) = 15    Efficacy = 2 pts (moderate benefit/function; low med dose; too early/not tried meds)    DIRE Score = 17        7-13: likely NOT suitable candidate for long-term opioid analgesia       14-21: may be a suitable candidate for long-term opioid analgesia        Previous Diagnostic Tests:   Imaging Studies:   MR CERVICAL SPINE W/O CONTRAST 8/4/2017 9:20 AM  Provided History: Chronic changes of right C7 and C8 radiculopathies.  Comparison: Cervical spine radiograph 1/12/2017, MR cervical spine  2/8/2016  Technique: Sagittal T1-weighted, sagittal T2-weighted,  sagittal STIR,  sagittal diffusion weighted, axial T2-weighted, and axial T2* gradient  echo images of the cervical spine were obtained without intravenous  contrast.  Findings:Images are mildly degraded by motion artifact.   Approximately 2 mm degenerative retrolisthesis of C4 on C5.  Straightening of the normal cervical lordosis. Normal cord signal. No  abnormal vertebral marrow edema. No cord signal abnormality.  Multilevel disc degeneration, uncinate spurring and facet arthropathy.  Paraspinous soft tissues are unremarkable.  The findings on a level by level basis are as follows:  C2-3: No spinal canal or neural foraminal stenosis.  C3-4: Disc bulge. Uncinate spurring and facet arthropathy. Moderate  left and mild/moderate right neural foraminal stenosis. No significant  spinal canal stenosis.  C4-5:  Eccentric left disc osteophyte complex with flattening of the  ventral aspect of the cord. Bilateral uncinate spurring and facet  hypertrophy. Ligament flavum thickening. Moderate spinal canal  bilateral neural foraminal stenosis.  C5-6:  Disc osteophyte complex. Bilateral facet hypertrophy and  uncinate spurring. Ligamentum flavum thickening. Moderate spinal canal  and bilateral neural foraminal stenosis.  C6-7:  Dorsal osteophytic spurring. Uncinate spurring and facet  hypertrophy. Moderate spinal canal stenosis. Mild/moderate bilateral  neural foraminal stenosis.  C7-T1:  Disc osteophyte complex. Facet hypertrophy and uncinate  spurring. Moderate right and mild left neural foraminal stenosis. No  significant spinal canal stenosis.  T1-T2: Disc bulge with superimposed right foraminal protrusion.  Moderate right neural foraminal stenosis.  T2-T3: Right foraminal disc protrusion. Mild right neural foraminal  Stenosis.  Impression:   Multilevel cervical spondylosis with degenerative retrolisthesis of C4  on C5. Moderate spinal canal stenosis C4-C7 and moderate bilateral  neural foraminal stenosis C4-C6 and on the  right C7-T2. No cord signal  abnormality.     MR LUMBAR SPINE W/O CONTRAST, 4/5/2016 3:55 PM.  History: low back pain, lumbago with sciatica, right side.  Comparison: plain film 6/29/2012.  Technique: Sagittal T1-weighted, T2-weighted, STIR, diffusion and ADC,  and axial T2-weighted T2*-weighted images of the lumbar spine were  obtained without intravenous contrast.  Findings:   There are 5 lumbar-type vertebrae assumed for the purposes of this  dictation. The tip of the conus medullaris is at L1. The normal lumbar  lordotic curvature is mildly exaggerated. There is subtle  retrolisthesis L2 on L3. Extensive multilevel degenerative change with  multilevel disc space narrowing and loss of normal T2 signal. Type I  Modic degenerative endplate signal at L2-L3. Type II Modic  degenerative endplate signal at several other levels, most prominently  at L1-L2 and L3-L4. Small T12 inferior endplate hemangioma. No acute  fracture or dislocation.  On a level by level basis:  L1-2:   Posterior disc osteophyte complex. Mild spinal canal  narrowing. No significant neural foraminal narrowing. Bilateral facet  arthropathy with a small right facet effusion.  L2-3:   Posterior disc osteophyte complex. Mild to moderate narrowing  of the thecal sac, particularly in the transverse dimension secondary  to epidural fat. No significant neural foraminal narrowing. Facet  arthropathy.  L3-4:   Posterior disc osteophyte complex with small central disc  extrusion and fissuring. Prominent epidural fat with moderate to  severe narrowing of the thecal sac and effacement of the CSF signal.  Mild left neural foraminal narrowing. Degenerative facet hypertrophy.  L4-5:   Posterior disc osteophyte complex. Prominent epidural fat with  moderate narrowing of the thecal sac. Mild bilateral neural foraminal  narrowing. Facet arthropathy.  L5-S1:  Posterior disc osteophyte complex. Mildly prominent epidural  fat with multiple moderate narrowing of the  thecal sac. Mild to  moderate bilateral neural foraminal narrowing. Facet arthropathy.  Atrophy of the posterior paraspinal musculature. No focal fluid  collections.  Impression:  Extensive multilevel degenerative changes in the lumbar  spine with multilevel posterior disc osteophyte complexes and  prominent epidural fat resulting in moderate constriction of the  thecal sac at L3-L4. The spinal canal itself however is not narrowed.     Exam: Single frontal view of both hips and a frog-leg lateral view of  the left hip dated 9/4/2018.  COMPARISON: 6/29/2012.  CLINICAL HISTORY: Hip pain.  FINDINGS: Single frontal view of both hips and a frog-leg lateral view  of the left hip was obtained. Degenerative disc disease in the  visualized lower lumbar spine. No femoral head collapse. No displaced  fractures. Mild joint space narrowing with minimal spurring at the  femoral head and neck junction.  IMPRESSION: Mild degenerative changes in both hips, as above.     ASSESSMENT:   1.  Cervical spondylosis and stenosis  2.  Lumbar facet arthropathy, DDD, DJD  3.  Bilateral hip osteoarthritis, bilateral hip bursitis L>R  4.  Chronic depression     Brandi returns for f/u after hip bursa injections which were very helpful. We will order JOE at left C 4-6 to address neck pain. Continue current POC and add pain PT.     Plan:    Diagnosis reviewed, treatment option addressed, and risk/benifits discussed.  Self-care instructions given.  I am recommending a multidisciplinary treatment plan to help this patient better manage pain.        1. Pain psychology assessment  2. Schedule physical therapy visits  3. Schedule follow-up with ELISE Persaud, NP-C in 4-8 weeks  4. Procedures recommended: Cervical epidural steroid injection    5. Medication recommendations: No change to current medications    Total time spent face to face was 30 minutes and more than 50% of face to face time was spent in counseling and/or coordination of care  regarding the diagnosis and recommendations above.      ELISE Zhu, NP-C   [facility]    Disclaimer: This note consists of symbols derived from keyboarding, dictation and/or voice recognition software. As a result, there may be errors in the script that have gone undetected. Please consider this when interpreting information found in this chart.

## 2018-10-02 NOTE — MR AVS SNAPSHOT
After Visit Summary   10/2/2018    Brandi Stern    MRN: [id number]           Patient Information     Date Of Birth          1955        Visit Information        Provider Department      10/2/2018 10:30 AM Gia Stroud APRN CNP [facility]        Today's Diagnoses     Cervical radiculopathy    -  1    Cervical stenosis of spinal canal          Care Instructions    After Visit Instructions:     Thank you for coming to [facility] for your care. It is my goal to partner with you to help you reach your optimal state of health.     I am recommending multidisciplinary care at this time.  The focus of care will be to continue gradual rehabilitation and pain management with medication adjustments as needed.    Continue daily self-care, identifying contributing factors, and monitoring variations in pain level. Continue to integrate self-care into your life.      1. Pain psychology assessment  2. Schedule physical therapy visits  3. Schedule follow-up with ELISE Persaud, NP-C in 4-8 weeks  4. Procedures recommended: Cervical epidural steroid injection    5. Medication recommendations: No change to current medications      ELISE Zhu, NP-C  [facility]    Contact information: [facility]    Please call if any side effects, questions, or concerns arise.    Nurse Triage line:  [phone]   Call this number with any questions or concerns. You may leave a detailed message anytime. Calls are typically returned Monday through Friday between 8 AM and 4:30 PM. We usually get back to you within 2 business days depending on the issue/request.    Scheduling number: [phone].  Call this number to schedule or change appointments.          Medication Refills Policy:    For non-narcotic medications, please your pharmacy directly to request a refill and the pharmacy will call the Pain  [facility] for authorization. Please allow 3-4 days for these refills.    For narcotic refills, call the nurse triage line and leave a message requesting your refill along with the name of the pharmacy that you use. Narcotic prescriptions will be mailed to your pharmacy or you may pick them up at the clinic.  Please call 7-10 days before your refill is due  The above policy allows adequate time so that you do not run out of medication.    No Show - Late Cancellation - Late Arrival Policy  We believe regular attendance is key to your success in our program.    Any time you are unable to keep your appointment we ask that you call us at  least 24 hours in advance to let us know. This will allow us to offer the appointment time to another patient. The following is our policy for missed appointments. This also applies to appointments cancelled with less than 24 hours notice.    You will receive a letter after missing your 1st and 2nd appointments without contacting the clinic before your scheduled appointment time.     After missing 3 appointments without calling first, we will cancel all of your future appointments at [facility].    At that point, you will not be able to resume services unless approved by your care team  We understand that unforseen circumstances arise, however, out of respect for all concerned and to provide this appointment to another patient, this policy will be enforced.    Please note that most follow up appointments are 30 minutes long. If you arrive late, your provider may not be able to see you for the entire 30 minutes. Please also note that if you arrive more than 15 minutes for any appointment, it may be rescheduled.                                     Follow-ups after your visit        Additional Services     PAIN INJECTION EVAL/TREAT/FOLLOW UP                 Your next 10 appointments already scheduled     Oct 15, 2018 11:30 AM CDT   Return Visit with Caterina  Sid, PT   [facility] ([facility])    [address]  [address]  [facility] [address] [phone]            Oct 24, 2018  9:00 AM CDT   New Visit with Rigoberto Enciso, PhD [facility] ([facility])    [address]  [address]  [facility] [id number] [phone]              Who to contact     If you have questions or need follow up information about today's clinic visit or your schedule please contact [facility] directly at [phone].  Normal or non-critical lab and imaging results will be communicated to you by [employer], letter or phone within 4 business days after the clinic has received the results. If you do not hear from us within 7 days, please contact the clinic through [employer] or phone. If you have a critical or abnormal lab result, we will notify you by phone as soon as possible.  Submit refill requests through [employer] or call your pharmacy and they will forward the refill request to us. Please allow 3 business days for your refill to be completed.          Additional Information About Your Visit        [employer] Information     In[employer] gives you secure access to your electronic health record. If you see a primary care provider, you can also send messages to your care team and make appointments. If you have questions, please call your primary care clinic.  If you do not have a primary care provider, please call [phone] and they will assist you.        Care EveryWhere ID     This is your Care EveryWhere ID. This could be used by other organizations to access your [place] medical records  [id number]        Your Vitals Were     Pulse Respirations Last Period Pulse Oximetry BMI (Body Mass Index)       102 16 (LMP Unknown) 98% 35.28 kg/m2        Blood Pressure from Last 3 Encounters:   10/02/18 108/72   09/19/18 121/78   09/14/18 104/68    Weight from Last 3 Encounters:   10/02/18 96.2 kg  (212 lb)   09/19/18 96.2 kg (212 lb)   09/13/18 98 kg (216 lb)              We Performed the Following     PAIN INJECTION EVAL/TREAT/FOLLOW UP        Primary Care Provider Office Phone # Fax #    Cherie Brennan [phone] [address] [place]  [facility] [address]        Equal Access to Services     [facility] SUDHA : Hadii aad ku hadasho Soomaali, waaxda luqadaha, qaybta kaalmada adeegyada, waxay idiin hayaan adeeg kharash la'aan . [facility] [phone].    ATENCIÓN: Si habla español, tiene a rizvi disposición servicios gratuitos de asistencia lingüística. [facility] [phone].    We comply with applicable federal civil rights laws and [place] laws. We do not discriminate on the basis of race, color, national origin, age, disability, sex, sexual orientation, or gender identity.            Thank you!     Thank you for choosing [facility]  for your care. Our goal is always to provide you with excellent care. Hearing back from our patients is one way we can continue to improve our services. Please take a few minutes to complete the written survey that you may receive in the mail after your visit with us. Thank you!             Your Updated Medication List - Protect others around you: Learn how to safely use, store and throw away your medicines at www.disposemymeds.org.          This list is accurate as of 10/2/18 10:50 AM.  Always use your most recent med list.                   Brand Name Dispense Instructions for use Diagnosis    ACETAMINOPHEN EXTRA STRENGTH PO      Pt reports taking 650 MG arthritis        * albuterol 108 (90 Base) MCG/ACT inhaler    PROAIR HFA    1 Inhaler    Inhale 1-2 puffs into the lungs every 6 hours as needed for shortness of breath / dyspnea    Moderate persistent asthma with exacerbation       * albuterol (2.5 MG/3ML) 0.083% neb solution     3 mL    Use every 4 hours as needed for wheezing and shortness of breath    Moderate persistent asthma without  complication       amoxicillin-clavulanate 875-125 MG per tablet    AUGMENTIN    20 tablet    Take 1 tablet by mouth 2 times daily    Acute bronchitis with symptoms > 10 days       atorvastatin 10 MG tablet    LIPITOR    90 tablet    Take 1 tablet (10 mg) by mouth daily    Hyperlipidemia LDL goal <130       benzonatate 200 MG capsule    TESSALON    21 capsule    Take 0.5-1 capsules (100-200 mg) by mouth 3 times daily as needed for cough    Acute bronchitis, unspecified organism       CALCIUM 500 PO      Take 1 tablet by mouth daily        CLARITIN 10 MG tablet   Generic drug:  loratadine     0    1 TAB PO QD (Once per day) as needed for ALLERGY SYMPTOMS    Allergic rhinitis due to other allergen       CO Q 10 PO      Take 200 mg by mouth daily        cyclobenzaprine 5 MG tablet    FLEXERIL    180 tablet    Take 1-2 tablets (5-10 mg) by mouth 3 times daily as needed for muscle spasms    Spasm of back muscles       * desvenlafaxine succinate 50 MG 24 hr tablet    PRISTIQ    30 tablet    Take 1 tablet (50 mg) by mouth daily    Major depressive disorder, recurrent episode, moderate (H)       * desvenlafaxine succinate 100 MG 24 hr tablet    PRISTIQ    90 tablet    Take 1 tablet (100 mg) by mouth daily    Major depressive disorder, recurrent episode, moderate (H)       fenofibrate 145 MG tablet     90 tablet    TAKE 1 TABLET BY MOUTH EVERY DAY    Pure hyperglyceridemia       gabapentin 100 MG capsule    NEURONTIN    150 capsule    100 mg capsule BID and 300 mg at HS    Chronic midline low back pain without sciatica, Spinal stenosis in cervical region, Cervical spondylosis without myelopathy, Primary osteoarthritis involving multiple joints       levothyroxine 75 MCG tablet    SYNTHROID    90 tablet    Take 1 tablet (75 mcg) by mouth every morning Overdue for labs    Chronic lymphocytic thyroiditis       lisinopril 10 MG tablet    PRINIVIL/ZESTRIL    90 tablet    Take 1 tablet (10 mg) by mouth daily    Hypertension goal  BP (blood pressure) < 140/90       Lutein 10 MG Tabs      Take 10 mg by mouth daily        * MIRAPEX 0.125 MG tablet   Generic drug:  pramipexole      Take two tabs at dinner and one at hs        * pramipexole 0.125 MG tablet    MIRAPEX    270 tablet    Take two tabs at dinner and one at hs    Restless leg syndrome       naltrexone 50 MG tablet    DEPADE;REVIA    30 tablet    Take 1 tablet (50 mg) by mouth daily    Class 2 obesity with body mass index (BMI) of 36.0 to 36.9 in adult, unspecified obesity type, unspecified whether serious comorbidity present       * omeprazole 20 MG CR capsule    priLOSEC    180 capsule    Take 1 capsule (20 mg) by mouth 2 times daily    Gastroesophageal reflux disease without esophagitis       * omeprazole 20 MG CR capsule    priLOSEC    180 capsule    TAKE 1 TABLET (20 MG) BY MOUTH 2 TIMES DAILY TAKE 30-60 MINUTES BEFORE A MEAL.    Gastroesophageal reflux disease without esophagitis       PREDNISONE PO      Take 40 mg by mouth        ranitidine 300 MG tablet    ZANTAC    60 tablet    TAKE 1 TABLET (300 MG) BY MOUTH 2 TIMES DAILY    Gastroesophageal reflux disease without esophagitis       SYMBICORT 160-4.5 MCG/ACT Inhaler   Generic drug:  budesonide-formoterol      Inhale 1 puff into the lungs 2 times daily    Moderate persistent asthma without complication       triamcinolone 55 MCG/ACT nasal inhaler    NASACORT AQ    1 Inhaler    Inhale 2 sprays in both nostrils every day as needed    Allergic rhinitis due to other allergen       UNABLE TO FIND      MEDICATION NAME: Allergy shots        VITAMIN C PO      Take 1,000 mg by mouth daily        zolpidem 10 MG tablet    AMBIEN    30 tablet    TAKE 1/2 TO 1 TABLET BY MOUTH NIGHTLY AS NEEDED    Insomnia, unspecified type       * Notice:  This list has 8 medication(s) that are the same as other medications prescribed for you. Read the directions carefully, and ask your doctor or other care provider to review them with you.

## 2018-10-04 DIAGNOSIS — E06.3 CHRONIC LYMPHOCYTIC THYROIDITIS: ICD-10-CM

## 2018-10-04 RX ORDER — LEVOTHYROXINE SODIUM 75 UG/1
TABLET ORAL
Qty: 30 TABLET | Refills: 0 | Status: SHIPPED | OUTPATIENT
Start: 2018-10-04 | End: 2018-11-01

## 2018-10-04 NOTE — TELEPHONE ENCOUNTER
"Requested Prescriptions   Pending Prescriptions Disp Refills     levothyroxine (SYNTHROID/LEVOTHROID) 75 MCG tablet [Pharmacy Med Name: LEVOTHYROXINE 75 MCG TABLET] 90 tablet 3     Sig: TAKE 1 TABLET (75 MCG) BY MOUTH EVERY MORNING OVERDUE FOR LABS    Thyroid Protocol Failed    10/4/2018  4:15 AM       Failed - Normal TSH on file in past 12 months    Recent Labs   Lab Test  09/08/17   1451   TSH  1.69             Passed - Patient is 12 years or older       Passed - Recent (12 mo) or future (30 days) visit within the authorizing provider's specialty    Patient had office visit in the last 12 months or has a visit in the next 30 days with authorizing provider or within the authorizing provider's specialty.  See \"Patient Info\" tab in inbasket, or \"Choose Columns\" in Meds & Orders section of the refill encounter.           Passed - No active pregnancy on record    If patient is pregnant or has had a positive pregnancy test, please check TSH.         Passed - No positive pregnancy test in past 12 months    If patient is pregnant or has had a positive pregnancy test, please check TSH.          Medication is being filled for 1 time refill only due to:  Patient needs labs tsh.    "

## 2018-10-06 DIAGNOSIS — M62.830 SPASM OF BACK MUSCLES: ICD-10-CM

## 2018-10-07 NOTE — TELEPHONE ENCOUNTER
Requested Prescriptions   Pending Prescriptions Disp Refills     cyclobenzaprine (FLEXERIL) 5 MG tablet [Pharmacy Med Name:   CYCLOBENZAPRINE 5 MG TABLET]      Last Written Prescription Date:  8/16/2018  Last Fill Quantity: 180 tablet,  # refills: 1   Last office visit: 9/19/2018 with prescribing provider:  Cherie Brennan   Future Office Visit:   Next 5 appointments (look out 90 days)     Oct 15, 2018 11:30 AM CDT   Return Visit with Caterina Lau, PT   [facility] ([facility]    [address]  [facility] [address]  [facility] [address] [phone]                  180 tablet 1     Sig: TAKE 1-2 TABLETS (5-10 MG) BY MOUTH 3 TIMES DAILY AS NEEDED FOR MUSCLE SPASMS    There is no refill protocol information for this order

## 2018-10-08 RX ORDER — CYCLOBENZAPRINE HCL 5 MG
TABLET ORAL
Qty: 0.1 TABLET | Refills: 0 | OUTPATIENT
Start: 2018-10-08

## 2018-10-08 NOTE — TELEPHONE ENCOUNTER
Duplicate see script sent 8/16/2018 cyclobenzaprine (FLEXERIL) 5 MG tablet # 180 tablet x 1 refill

## 2018-10-11 NOTE — PROGRESS NOTES
[facility] - Procedure Note    Date of Visit: 10/12/2018    Procedure performed: C7-T1 interlaminar epidural steroid injection with fluoroscopic guidance  Diagnosis: Cervical spondylosis; Cervical radiculitis/radiculopathy  : Clarissa Camara MD   Anesthesia: none    Indications: Brandi Stern is a 63 year old female who is seen at the request of Gia Stroud CNP for cervical epidural steroid injection. The patient describes neck pain L>R with radiation into the right arm. The patient has been exhibiting symptoms consistent with cervical intraspinal inflammation and radiculopathy. Symptoms have been persistent, disabling, and intermittently severe. The patient reports minimal improvement with conservative treatment, including PT and medications.    Cervical MRI was done on 8/04/2017 which showed:     Impression:   Multilevel cervical spondylosis with degenerative retrolisthesis of C4  on C5. Moderate spinal canal stenosis C4-C7 and moderate bilateral  neural foraminal stenosis C4-C6 and on the right C7-T2. No cord signal  abnormality.    Allergies:      Allergies   Allergen Reactions     Animal Dander      Fluconazole      rash     Liquid Adhesive      Transdermal patch adhesive. Specifically to nicotine patch; not allergic to nicotine.      Nsaids      Seasonal Allergies      Suture      Non-healing suture line     Vistaril [Hydroxyzine Hcl]      Urinary retention        Vitals:  /73  Pulse 99  LMP  (LMP Unknown)  SpO2 96%    Review of Systems: The patient denies recent fever, chills, illness, use of antibiotics or anticoagulants. All other 10-point review of systems negative.     Procedure: The procedure and risks were explained, and informed written consent was obtained from the patient. Risks include but are not limited to: infection, bleeding, increased pain, and damage to soft tissue, nerve, muscle, and vasculature structures. After getting informed consent, patient was  brought into the procedure suite and was placed in a prone position on the procedure table. A Pause for the Cause was performed. Patient was prepped and draped in sterile fashion.     The C7-T1 interspace was identified with use of fluoroscopy in AP view. A 25-gauge, 1.5 inch needle was used to anesthetize the skin and subcutaneous tissue entry site with a total of 2 ml of 1% lidocaine. Under fluoroscopic visualization, a 22-gauge, 3.5 inch Tuohy epidural needle was slowly advanced towards the epidural space a few millimeters left of midline. The latter part of the needle advancement was guided with fluoroscopy in the lateral view. The epidural space was identified using loss of resistance technique. After negative aspiration for heme and cerebrospinal fluid, a total of 1 mL of non-ionic contrast was injected to confirm needle placement. 9 mL of contrast was wasted. Epidurogram confirmed spread within the posterior epidural space. 2 ml of 10mg/ml of dexamethasone and 1 ml of preservative free 1% lidocaine was injected. The needle was removed.  Images were saved to PACS.    The patient tolerated the procedure well, and there was no evidence of procedural complications. No new sensory or motor deficits were noted following the procedure. The patient was stable and able to ambulate on discharge home. Post-procedure instructions were provided.     Pre-procedure pain score: 6/10 in the neck, 0/10 in the arm  Post-procedure pain score: 4/10 in the neck, 0/10 in the arm    Assessment/Plan: Brandi Stern is a 63 year old female s/p cervical interlaminar epidural steroid injection today for cervical spondylosis and radiculitis/radiculopathy.     1. Following today's procedure, the patient was advised to contact the [facility] for any of the following:   Fever, chills, or night sweats   New onset of pain, numbness, or weakness   Any questions/concerns regarding the procedure  If unable to contact the  Pain Center, the patient was instructed to go to a local Emergency Room for any complications.   2. The patient will receive a follow-up call in 1 week.   3. Follow-up with the referring provider in 2 weeks for post-procedure evaluation.      Clarissa Camara MD   [facility]

## 2018-10-12 ENCOUNTER — RADIANT APPOINTMENT (OUTPATIENT)
Dept: GENERAL RADIOLOGY | Facility: CLINIC | Age: 63
End: 2018-10-12
Attending: ANESTHESIOLOGY
Payer: MEDICARE

## 2018-10-12 ENCOUNTER — RADIOLOGY INJECTION OFFICE VISIT (OUTPATIENT)
Dept: PALLIATIVE MEDICINE | Facility: CLINIC | Age: 63
End: 2018-10-12
Attending: NURSE PRACTITIONER
Payer: MEDICARE

## 2018-10-12 VITALS — SYSTOLIC BLOOD PRESSURE: 123 MMHG | DIASTOLIC BLOOD PRESSURE: 85 MMHG | HEART RATE: 95 BPM | OXYGEN SATURATION: 95 %

## 2018-10-12 DIAGNOSIS — M54.12 CERVICAL RADICULOPATHY: Primary | ICD-10-CM

## 2018-10-12 DIAGNOSIS — M54.12 CERVICAL RADICULOPATHY: ICD-10-CM

## 2018-10-12 PROCEDURE — 62321 NJX INTERLAMINAR CRV/THRC: CPT | Performed by: ANESTHESIOLOGY

## 2018-10-12 NOTE — MR AVS SNAPSHOT
After Visit Summary   10/12/2018    Brandi Stern    MRN: [id number]           Patient Information     Date Of Birth          1955        Visit Information        Provider Department      10/12/2018 2:15 PM Clarissa Camara MD [facility]        Today's Diagnoses     Cervical radiculopathy    -  1      Care Instructions    [facility] Procedure Discharge Instructions    Today you saw:   Dr. Clarissa Camara     Your procedure:  Epidural steroid injection     Medications used:  Lidocaine (anesthetic) Dexamethasone (steroid) Omnipaque (contrast)          Be cautious when walking as numbness and/or weakness in the legs may occur up to 6-8 hours after the procedure due to effect of the local anesthetic    Do not drive for 6 hours. The effect of the local anesthetic could slow your reflexes.     Avoid strenuous activity for the first 24 hours. You may resume your regular activities after that.     You may shower, however avoid swimming, tub baths or hot tubs for 24 hours following your procedure    You may have a mild to moderate increase in pain for several days following the injection.      You may use ice packs for 10-15 minutes, 3 to 4 times a day at the injection site for comfort    Do not use heat to painful areas for 6 to 8 hours. This will give the local anesthetic time to wear off and prevent you from accidentally burning your skin.    You may use anti-inflammatory medications (such as Ibuprofen/Advil or Aleve) or Tylenol for pain control if necessary    With diabetes, check your blood sugar more frequently than usual as your blood sugar may be higher than normal for 10-14 days following a steroid injection. Contact your doctor who manages your diabetes if your blood sugar is higher than usual    It may take up to 14 days for the steroid medication to start working although you may feel the effect as early as a few days after the procedure.     Follow up with your  referring provider in 2-3 weeks      If you experience any of the following, call the pain center line during work hours at [phone] or on-call physician after hours at [phone]:  -Fever over 100 degree F  -Swelling, bleeding, redness, drainage, warmth at the injection site  -Progressive weakness or numbness in your legs or arms  -Loss of bowel or bladder function  -Unusual headache that is not relieved by Tylenol or your regular headache medication  -Unusual new onset of pain that is not improving              Follow-ups after your visit        Your next 10 appointments already scheduled     Oct 15, 2018 11:30 AM CDT   Return Visit with Caterina Lau, PT   [facility] ([facility])    [address] [place], [place] [address]  [address]  [facility] [address] [phone]            Oct 15, 2018  1:00 PM CDT   (Arrive by 12:45 PM)   MA SCREENING DIGITAL BILATERAL with [id number]   [facility] ([email or web address] Clinics)    [address] [place] [address], [address]  [facility] [id number] [phone]           How do I prepare for my exam? (Food and drink instructions) No Food and Drink Restrictions.  How do I prepare for my exam? (Other instructions) Do not use any powder, lotion or deodorant under your arms or on your breast. If you do, we will ask you to remove it before your exam.  What should I wear: Wear comfortable, two-piece clothing.  How long does the exam take: Most scans will take 15 minutes.  What should I bring: Bring any previous mammograms from other facilities or have them mailed to the breast center.  Do I need a :  No  is needed.  What do I need to tell my doctor: If you have any allergies, tell your care team.  What should I do after the exam: No restrictions, You may resume normal activities.  What is this test: This test is an x-ray of the breast to look for breast disease. The breast is pressed between two plates to flatten and spread the tissue. An X-ray is taken  "of the breast from different angles.  Who should I call with questions: If you have any questions, please call the Imaging Department where you will have your exam. Directions, parking instructions, and other information is available on our website, [email or web address].  Other information about my exam Three-dimensional (3D) mammograms are available at [facility] locations in [place], [place], [place], [place], [facility], [place], [facility] and [place].  Health locations include [place] and the [facility] [place] in [place].  Benefits of 3D mammograms include * Improved rate of cancer detection * Decreases your chance of having to go back for more tests, which means fewer: * \"False-positive\" results (This means that there is an abnormal area but it isn't cancer.) * Invasive testing procedures, such as a biopsy or surgery * Can provide clearer images of the breast if you have dense breast tissue.  *3D mammography is an optional exam that anyone can have with a 2D mammogram. It doesn't replace or take the place of a 2D mammogram. 2D mammograms remain an effective screening test for all women.  Not all insurance companies cover the cost of a 3D mammogram. Check with your insurance.            Oct 24, 2018  9:00 AM CDT   New Visit with Rigoberto Enciso, PhD [facility] ([facility])    [address]  [facility] [address]  [facility] [id number] [phone]              Future tests that were ordered for you today     Open Future Orders        Priority Expected Expires Ordered    MA Screening Digital Bilateral Routine  10/12/2019 10/12/2018            Who to contact     If you have questions or need follow up information about today's clinic visit or your schedule please contact [facility] directly at [phone].  Normal or non-critical lab and imaging results will be communicated to you by MyChart, letter or phone within 4 business days " after the clinic has received the results. If you do not hear from us within 7 days, please contact the clinic through [employer] or phone. If you have a critical or abnormal lab result, we will notify you by phone as soon as possible.  Submit refill requests through [employer] or call your pharmacy and they will forward the refill request to us. Please allow 3 business days for your refill to be completed.          Additional Information About Your Visit        [employer] Information     [employer] gives you secure access to your electronic health record. If you see a primary care provider, you can also send messages to your care team and make appointments. If you have questions, please call your primary care clinic.  If you do not have a primary care provider, please call [phone] and they will assist you.        Care EveryWhere ID     This is your Care EveryWhere ID. This could be used by other organizations to access your [facility] medical records  [id number]        Your Vitals Were     Pulse Last Period Pulse Oximetry             99 (LMP Unknown) 96%          Blood Pressure from Last 3 Encounters:   10/12/18 105/73   10/02/18 108/72   09/19/18 121/78    Weight from Last 3 Encounters:   10/02/18 96.2 kg (212 lb)   09/19/18 96.2 kg (212 lb)   09/13/18 98 kg (216 lb)              Today, you had the following     No orders found for display       Primary Care Provider Office Phone # Fax #    Cherie KARRI Brennan [phone]       [address] [facility] [address]        Equal Access to Services     LORNE HALEY : Hadii aad ku hadasho Soomaali, waaxda luqadaha, qaybta kaalmada adeegyada, adrián rodriguez . So [facility] [phone].    ATENCIÓN: Si habla español, tiene a rizvi disposición servicios gratuitos de asistencia lingüística. Llame al [phone].    We comply with applicable federal civil rights laws and [place] laws. We do not discriminate on the basis of race, color,  national origin, age, disability, sex, sexual orientation, or gender identity.            Thank you!     Thank you for choosing [facility]  for your care. Our goal is always to provide you with excellent care. Hearing back from our patients is one way we can continue to improve our services. Please take a few minutes to complete the written survey that you may receive in the mail after your visit with us. Thank you!             Your Updated Medication List - Protect others around you: Learn how to safely use, store and throw away your medicines at www.disposemymeds.org.          This list is accurate as of 10/12/18  2:15 PM.  Always use your most recent med list.                   Brand Name Dispense Instructions for use Diagnosis    ACETAMINOPHEN EXTRA STRENGTH PO      Pt reports taking 650 MG arthritis        * albuterol 108 (90 Base) MCG/ACT inhaler    PROAIR HFA    1 Inhaler    Inhale 1-2 puffs into the lungs every 6 hours as needed for shortness of breath / dyspnea    Moderate persistent asthma with exacerbation       * albuterol (2.5 MG/3ML) 0.083% neb solution     3 mL    Use every 4 hours as needed for wheezing and shortness of breath    Moderate persistent asthma without complication       amoxicillin-clavulanate 875-125 MG per tablet    AUGMENTIN    20 tablet    Take 1 tablet by mouth 2 times daily    Acute bronchitis with symptoms > 10 days       atorvastatin 10 MG tablet    LIPITOR    90 tablet    Take 1 tablet (10 mg) by mouth daily    Hyperlipidemia LDL goal <130       benzonatate 200 MG capsule    TESSALON    21 capsule    Take 0.5-1 capsules (100-200 mg) by mouth 3 times daily as needed for cough    Acute bronchitis, unspecified organism       CALCIUM 500 PO      Take 1 tablet by mouth daily        CLARITIN 10 MG tablet   Generic drug:  loratadine     0    1 TAB PO QD (Once per day) as needed for ALLERGY SYMPTOMS    Allergic rhinitis due to other allergen       CO Q 10 PO      Take  200 mg by mouth daily        cyclobenzaprine 5 MG tablet    FLEXERIL    180 tablet    Take 1-2 tablets (5-10 mg) by mouth 3 times daily as needed for muscle spasms    Spasm of back muscles       * desvenlafaxine succinate 50 MG 24 hr tablet    PRISTIQ    30 tablet    Take 1 tablet (50 mg) by mouth daily    Major depressive disorder, recurrent episode, moderate (H)       * desvenlafaxine succinate 100 MG 24 hr tablet    PRISTIQ    90 tablet    Take 1 tablet (100 mg) by mouth daily    Major depressive disorder, recurrent episode, moderate (H)       fenofibrate 145 MG tablet     90 tablet    TAKE 1 TABLET BY MOUTH EVERY DAY    Pure hyperglyceridemia       gabapentin 100 MG capsule    NEURONTIN    150 capsule    100 mg capsule BID and 300 mg at HS    Chronic midline low back pain without sciatica, Spinal stenosis in cervical region, Cervical spondylosis without myelopathy, Primary osteoarthritis involving multiple joints       levothyroxine 75 MCG tablet    SYNTHROID/LEVOTHROID    30 tablet    TAKE 1 TABLET (75 MCG) BY MOUTH EVERY MORNING OVERDUE FOR LABS    Chronic lymphocytic thyroiditis       lisinopril 10 MG tablet    PRINIVIL/ZESTRIL    90 tablet    Take 1 tablet (10 mg) by mouth daily    Hypertension goal BP (blood pressure) < 140/90       Lutein 10 MG Tabs      Take 10 mg by mouth daily        * MIRAPEX 0.125 MG tablet   Generic drug:  pramipexole      Take two tabs at dinner and one at hs        * pramipexole 0.125 MG tablet    MIRAPEX    270 tablet    Take two tabs at dinner and one at hs    Restless leg syndrome       naltrexone 50 MG tablet    DEPADE;REVIA    30 tablet    Take 1 tablet (50 mg) by mouth daily    Class 2 obesity with body mass index (BMI) of 36.0 to 36.9 in adult, unspecified obesity type, unspecified whether serious comorbidity present       * omeprazole 20 MG CR capsule    priLOSEC    180 capsule    Take 1 capsule (20 mg) by mouth 2 times daily    Gastroesophageal reflux disease without  esophagitis       * omeprazole 20 MG CR capsule    priLOSEC    180 capsule    TAKE 1 TABLET (20 MG) BY MOUTH 2 TIMES DAILY TAKE 30-60 MINUTES BEFORE A MEAL.    Gastroesophageal reflux disease without esophagitis       PREDNISONE PO      Take 40 mg by mouth        ranitidine 300 MG tablet    ZANTAC    60 tablet    TAKE 1 TABLET (300 MG) BY MOUTH 2 TIMES DAILY    Gastroesophageal reflux disease without esophagitis       SYMBICORT 160-4.5 MCG/ACT Inhaler   Generic drug:  budesonide-formoterol      Inhale 1 puff into the lungs 2 times daily    Moderate persistent asthma without complication       triamcinolone 55 MCG/ACT nasal inhaler    NASACORT AQ    1 Inhaler    Inhale 2 sprays in both nostrils every day as needed    Allergic rhinitis due to other allergen       UNABLE TO FIND      MEDICATION NAME: Allergy shots        VITAMIN C PO      Take 1,000 mg by mouth daily        zolpidem 10 MG tablet    AMBIEN    30 tablet    TAKE 1/2 TO 1 TABLET BY MOUTH NIGHTLY AS NEEDED    Insomnia, unspecified type       * Notice:  This list has 8 medication(s) that are the same as other medications prescribed for you. Read the directions carefully, and ask your doctor or other care provider to review them with you.

## 2018-10-12 NOTE — PATIENT INSTRUCTIONS
[facility] Procedure Discharge Instructions    Today you saw:   Dr. Clarissa Camara     Your procedure:  Epidural steroid injection     Medications used:  Lidocaine (anesthetic) Dexamethasone (steroid) Omnipaque (contrast)          Be cautious when walking as numbness and/or weakness in the legs may occur up to 6-8 hours after the procedure due to effect of the local anesthetic    Do not drive for 6 hours. The effect of the local anesthetic could slow your reflexes.     Avoid strenuous activity for the first 24 hours. You may resume your regular activities after that.     You may shower, however avoid swimming, tub baths or hot tubs for 24 hours following your procedure    You may have a mild to moderate increase in pain for several days following the injection.      You may use ice packs for 10-15 minutes, 3 to 4 times a day at the injection site for comfort    Do not use heat to painful areas for 6 to 8 hours. This will give the local anesthetic time to wear off and prevent you from accidentally burning your skin.    You may use anti-inflammatory medications (such as Ibuprofen/Advil or Aleve) or Tylenol for pain control if necessary    With diabetes, check your blood sugar more frequently than usual as your blood sugar may be higher than normal for 10-14 days following a steroid injection. Contact your doctor who manages your diabetes if your blood sugar is higher than usual    It may take up to 14 days for the steroid medication to start working although you may feel the effect as early as a few days after the procedure.     Follow up with your referring provider in 2-3 weeks      If you experience any of the following, call the pain center line during work hours at [phone] or on-call physician after hours at [phone]:  -Fever over 100 degree F  -Swelling, bleeding, redness, drainage, warmth at the injection site  -Progressive weakness or numbness in your legs or arms  -Loss of bowel or bladder  function  -Unusual headache that is not relieved by Tylenol or your regular headache medication  -Unusual new onset of pain that is not improving

## 2018-10-12 NOTE — NURSING NOTE
Discharge Information    IV Discontiued Time:  NA    Amount of Fluid Infused:  NA    Discharge Criteria = When patient returns to baseline or as per MD order    Consciousness:  Pt is fully awake    Circulation:  BP +/- 20% of pre-procedure level    Respiration:  Patient is able to breathe deeply    O2 Sat:  Patient is able to maintain O2 Sat >92% on room air    Activity:  Moves 4 extremities on command    Ambulation:  Patient is able to stand and walk or stand and pivot into wheelchair    Dressing:  Clean/dry or No Dressing    Notes:   Discharge instructions and AVS given to patient    Patient meets criteria for discharge?  YES    Admitted to PCU?  No    Responsible adult present to accompany patient home?  Yes    Signature/Title:    Rima Clark  RN Care Coordinator  [facility]

## 2018-10-15 ENCOUNTER — RADIANT APPOINTMENT (OUTPATIENT)
Dept: MAMMOGRAPHY | Facility: CLINIC | Age: 63
End: 2018-10-15
Attending: NURSE PRACTITIONER
Payer: MEDICARE

## 2018-10-15 ENCOUNTER — OFFICE VISIT (OUTPATIENT)
Dept: PALLIATIVE MEDICINE | Facility: CLINIC | Age: 63
End: 2018-10-15
Payer: MEDICARE

## 2018-10-15 DIAGNOSIS — G89.29 CHRONIC PAIN: Primary | ICD-10-CM

## 2018-10-15 DIAGNOSIS — Z12.39 SCREENING BREAST EXAMINATION: ICD-10-CM

## 2018-10-15 PROCEDURE — 97110 THERAPEUTIC EXERCISES: CPT | Mod: GP | Performed by: PHYSICAL THERAPIST

## 2018-10-15 PROCEDURE — 97112 NEUROMUSCULAR REEDUCATION: CPT | Mod: GP | Performed by: PHYSICAL THERAPIST

## 2018-10-15 PROCEDURE — 77067 SCR MAMMO BI INCL CAD: CPT

## 2018-10-15 NOTE — PROGRESS NOTES
Patient Name: Brandi Stern      YOB: 1955     Medical Record Number: [id number]  Diagnosis: Chronic pain  Visit Info Length of Visit: 45 min  Arrived: On Time  Therapeutic Procedures/Exercises: 20 min; Therapeutic Activities: NA; Neuromuscular Re-education: 25 min;    Exercise/Activity Education Instruction:  Home Program - instructed in benefits of consistent exercise as it relates to pain management and mood regulation (increase endorphins, aid with sleep, build energy reserve and endurance, improve flexibility for improved posture and body mechanics). Instructed in walking program - aim for 3x/wk over this next interval - keep walking distances short and within pain tolerance - focus is on consistency vs duration/intensity (recommended doing without her dogs).    Activity:  Stretching:  Instructed in pretzel and piriformis str - performed 3x/ each.  Instructed in kinesthetic awareness of comfortable end ranges for stretches, compensatory mm tension awareness and use of mindful breathing with stretches. Declined h/o.  Encouraged to do 1-2x/day.    Postural Training:  Neutral sitting - use of supports as needed.    Practiced supine kinesthetic awareness activity (body scan) with mindful breathing.  Pt reported her neck pain was down to 3-4/10 upon completion of this activity.  Encouraged to practice briefly 2-3x/day as a self regulation tool.     Notes: Patient reports: had TPI in cervical area on Friday - still waiting for treatment effect in her neck.  Reports today she is feeling more sore (neck, upper back, LB, feet).    Hips continue to do well after injection from several weeks ago.      Pain ranges: 4-5/10 in her neck, 2-3/10 in her LB.    HEP/Self Care/Home Management Training:   Pacing/Mini Breaks/Self Care: takes breaks if she needs to ;   Relaxation Practice: didn't do over the interval - forgot; does report she checks in with how she is feeling when she is driving and does a settling  "exercise before bed ;   Posture Corrections: checks in while driving ;   Walking program: nothing currently ;   Heat/Ice/TENS: not addressed today  ;   HEP neck stretches from last session 1-2x/day; practiced \"zipper\" (TA) abdominal set often each day.       Observation: posture is with increased cervical extension, mild FHP, rounded and elevated shoulders (left higher than right).  Transitions are mildly effortful (sit to stand, supine to sit). Ambulation is with shortened step length bilaterally.  Piriformis tightness on right and TFL/ITB and gluts tight on the left.      GCode visit: 4/10   Demonstrates/Verbalizes Technique: 3, 4 (1= poor technique-difficulty performing exercises,significant cues required; 5= good technique-performs exercises without cues)  Body Awareness: 3, 4 (1=low awareness; 5=high awareness)  Posture/Stability: 3, 4 (1= poor posture, stability; 5= good posture, stability)   Motivational Level:   Cooperative Participation:  Full   Patient Satisfaction:  satisfied   Response to Teaching:   demonstrated ability and verbalized, recall/understanding  Factors that affect learning: None   Plan of Care  Cont PT to support reactivation and integration of self regulation pain management skills. (next visit consider:  Mini breaks, progress core, hip ABD strengthening)   Therapist: Caterina Lau PT                 Date: 10/15/2018                                                                                               "

## 2018-10-15 NOTE — MR AVS SNAPSHOT
After Visit Summary   10/15/2018    Brandi Stern    MRN: [id number]           Patient Information     Date Of Birth          1955        Visit Information        Provider Department      10/15/2018 11:30 AM Caterina Lau, DENYS [facility]        Today's Diagnoses     Chronic pain    -  1       Follow-ups after your visit        Your next 10 appointments already scheduled     Oct 15, 2018  1:00 PM CDT   (Arrive by 12:45 PM)   MA SCREENING DIGITAL BILATERAL with [id number]   [facility] ([facility])    [address] [place] [address], [address]  [facility] [address] [phone]           How do I prepare for my exam? (Food and drink instructions) No Food and Drink Restrictions.  How do I prepare for my exam? (Other instructions) Do not use any powder, lotion or deodorant under your arms or on your breast. If you do, we will ask you to remove it before your exam.  What should I wear: Wear comfortable, two-piece clothing.  How long does the exam take: Most scans will take 15 minutes.  What should I bring: Bring any previous mammograms from other facilities or have them mailed to the breast center.  Do I need a :  No  is needed.  What do I need to tell my doctor: If you have any allergies, tell your care team.  What should I do after the exam: No restrictions, You may resume normal activities.  What is this test: This test is an x-ray of the breast to look for breast disease. The breast is pressed between two plates to flatten and spread the tissue. An X-ray is taken of the breast from different angles.  Who should I call with questions: If you have any questions, please call the Imaging Department where you will have your exam. Directions, parking instructions, and other information is available on our website, [email or web address].  Other information about my exam Three-dimensional (3D) mammograms are available at [place] locations in [facility]  "[place], [place], [place], [facility], [place], [facility] and [place].  Health locations include [place] and the [facility] in [place].  Benefits of 3D mammograms include * Improved rate of cancer detection * Decreases your chance of having to go back for more tests, which means fewer: * \"False-positive\" results (This means that there is an abnormal area but it isn't cancer.) * Invasive testing procedures, such as a biopsy or surgery * Can provide clearer images of the breast if you have dense breast tissue.  *3D mammography is an optional exam that anyone can have with a 2D mammogram. It doesn't replace or take the place of a 2D mammogram. 2D mammograms remain an effective screening test for all women.  Not all insurance companies cover the cost of a 3D mammogram. Check with your insurance.            Oct 24, 2018  9:00 AM CDT   New Visit with Rigoberto Enciso, PhD LP   [facility] ([facility])    [address]  [facility] [address]  [facility] [address] [phone]              Who to contact     If you have questions or need follow up information about today's clinic visit or your schedule please contact [facility] directly at [phone].  Normal or non-critical lab and imaging results will be communicated to you by ImmuneWorkshart, letter or phone within 4 business days after the clinic has received the results. If you do not hear from us within 7 days, please contact the clinic through HiringBosst or phone. If you have a critical or abnormal lab result, we will notify you by phone as soon as possible.  Submit refill requests through [employer] or call your pharmacy and they will forward the refill request to us. Please allow 3 business days for your refill to be completed.          Additional Information About Your Visit        [employer] Information     [employer] gives you secure access to your electronic health record. If you see a " primary care provider, you can also send messages to your care team and make appointments. If you have questions, please call your primary care clinic.  If you do not have a primary care provider, please call [phone] and they will assist you.        Care EveryWhere ID     This is your Care EveryWhere ID. This could be used by other organizations to access your [place] medical records  [id number]        Your Vitals Were     Last Period                   (LMP Unknown)            Blood Pressure from Last 3 Encounters:   10/12/18 123/85   10/02/18 108/72   09/19/18 121/78    Weight from Last 3 Encounters:   10/02/18 96.2 kg (212 lb)   09/19/18 96.2 kg (212 lb)   09/13/18 98 kg (216 lb)              We Performed the Following     NEUROMUSCULAR RE-EDUCATION     THERAPEUTIC EXERCISES        Primary Care Provider Office Phone # Fax #    Cherie Brennan [phone]       [address] [facility] [address]        Equal Access to Services     LORNE HALEY : Hadii aad ku hadasho Soomaali, waaxda luqadaha, qaybta kaalmada adeegyada, waxay idiin hayaan hakeem rodriguez . So [facility] [phone].    ATENCIÓN: Si habla español, tiene a rizvi disposición servicios gratuitos de asistencia lingüística. Ll[facility] [phone].    We comply with applicable federal civil rights laws and [place] laws. We do not discriminate on the basis of race, color, national origin, age, disability, sex, sexual orientation, or gender identity.            Thank you!     Thank you for choosing [facility]  for your care. Our goal is always to provide you with excellent care. Hearing back from our patients is one way we can continue to improve our services. Please take a few minutes to complete the written survey that you may receive in the mail after your visit with us. Thank you!             Your Updated Medication List - Protect others around you: Learn how to safely use, store and throw away your  medicines at www.disposemymeds.org.          This list is accurate as of 10/15/18 12:39 PM.  Always use your most recent med list.                   Brand Name Dispense Instructions for use Diagnosis    ACETAMINOPHEN EXTRA STRENGTH PO      Pt reports taking 650 MG arthritis        * albuterol 108 (90 Base) MCG/ACT inhaler    PROAIR HFA    1 Inhaler    Inhale 1-2 puffs into the lungs every 6 hours as needed for shortness of breath / dyspnea    Moderate persistent asthma with exacerbation       * albuterol (2.5 MG/3ML) 0.083% neb solution     3 mL    Use every 4 hours as needed for wheezing and shortness of breath    Moderate persistent asthma without complication       amoxicillin-clavulanate 875-125 MG per tablet    AUGMENTIN    20 tablet    Take 1 tablet by mouth 2 times daily    Acute bronchitis with symptoms > 10 days       atorvastatin 10 MG tablet    LIPITOR    90 tablet    Take 1 tablet (10 mg) by mouth daily    Hyperlipidemia LDL goal <130       benzonatate 200 MG capsule    TESSALON    21 capsule    Take 0.5-1 capsules (100-200 mg) by mouth 3 times daily as needed for cough    Acute bronchitis, unspecified organism       CALCIUM 500 PO      Take 1 tablet by mouth daily        CLARITIN 10 MG tablet   Generic drug:  loratadine     0    1 TAB PO QD (Once per day) as needed for ALLERGY SYMPTOMS    Allergic rhinitis due to other allergen       CO Q 10 PO      Take 200 mg by mouth daily        cyclobenzaprine 5 MG tablet    FLEXERIL    180 tablet    Take 1-2 tablets (5-10 mg) by mouth 3 times daily as needed for muscle spasms    Spasm of back muscles       * desvenlafaxine succinate 50 MG 24 hr tablet    PRISTIQ    30 tablet    Take 1 tablet (50 mg) by mouth daily    Major depressive disorder, recurrent episode, moderate (H)       * desvenlafaxine succinate 100 MG 24 hr tablet    PRISTIQ    90 tablet    Take 1 tablet (100 mg) by mouth daily    Major depressive disorder, recurrent episode, moderate (H)        fenofibrate 145 MG tablet     90 tablet    TAKE 1 TABLET BY MOUTH EVERY DAY    Pure hyperglyceridemia       gabapentin 100 MG capsule    NEURONTIN    150 capsule    100 mg capsule BID and 300 mg at HS    Chronic midline low back pain without sciatica, Spinal stenosis in cervical region, Cervical spondylosis without myelopathy, Primary osteoarthritis involving multiple joints       levothyroxine 75 MCG tablet    SYNTHROID/LEVOTHROID    30 tablet    TAKE 1 TABLET (75 MCG) BY MOUTH EVERY MORNING OVERDUE FOR LABS    Chronic lymphocytic thyroiditis       lisinopril 10 MG tablet    PRINIVIL/ZESTRIL    90 tablet    Take 1 tablet (10 mg) by mouth daily    Hypertension goal BP (blood pressure) < 140/90       Lutein 10 MG Tabs      Take 10 mg by mouth daily        * MIRAPEX 0.125 MG tablet   Generic drug:  pramipexole      Take two tabs at dinner and one at hs        * pramipexole 0.125 MG tablet    MIRAPEX    270 tablet    Take two tabs at dinner and one at hs    Restless leg syndrome       naltrexone 50 MG tablet    DEPADE;REVIA    30 tablet    Take 1 tablet (50 mg) by mouth daily    Class 2 obesity with body mass index (BMI) of 36.0 to 36.9 in adult, unspecified obesity type, unspecified whether serious comorbidity present       * omeprazole 20 MG CR capsule    priLOSEC    180 capsule    Take 1 capsule (20 mg) by mouth 2 times daily    Gastroesophageal reflux disease without esophagitis       * omeprazole 20 MG CR capsule    priLOSEC    180 capsule    TAKE 1 TABLET (20 MG) BY MOUTH 2 TIMES DAILY TAKE 30-60 MINUTES BEFORE A MEAL.    Gastroesophageal reflux disease without esophagitis       PREDNISONE PO      Take 40 mg by mouth        ranitidine 300 MG tablet    ZANTAC    60 tablet    TAKE 1 TABLET (300 MG) BY MOUTH 2 TIMES DAILY    Gastroesophageal reflux disease without esophagitis       SYMBICORT 160-4.5 MCG/ACT Inhaler   Generic drug:  budesonide-formoterol      Inhale 1 puff into the lungs 2 times daily    Moderate  persistent asthma without complication       triamcinolone 55 MCG/ACT nasal inhaler    NASACORT AQ    1 Inhaler    Inhale 2 sprays in both nostrils every day as needed    Allergic rhinitis due to other allergen       UNABLE TO FIND      MEDICATION NAME: Allergy shots        VITAMIN C PO      Take 1,000 mg by mouth daily        zolpidem 10 MG tablet    AMBIEN    30 tablet    TAKE 1/2 TO 1 TABLET BY MOUTH NIGHTLY AS NEEDED    Insomnia, unspecified type       * Notice:  This list has 8 medication(s) that are the same as other medications prescribed for you. Read the directions carefully, and ask your doctor or other care provider to review them with you.

## 2018-10-24 ENCOUNTER — OFFICE VISIT (OUTPATIENT)
Dept: PALLIATIVE MEDICINE | Facility: CLINIC | Age: 63
End: 2018-10-24
Attending: NURSE PRACTITIONER
Payer: MEDICARE

## 2018-10-24 DIAGNOSIS — M15.9 OSTEOARTHRITIS OF MULTIPLE JOINTS, UNSPECIFIED OSTEOARTHRITIS TYPE: Primary | ICD-10-CM

## 2018-10-24 PROCEDURE — 96150 HC HEALTH & BEHAVIOR ASSESS INITIAL, EA 15MIN: CPT | Performed by: PSYCHOLOGIST

## 2018-10-24 NOTE — PROGRESS NOTES
Consult Date:  10/24/2018      CHIEF COMPLAINT:  The patient is presenting with osteoarthritis.  She is reporting current pain focus in her cervical disks, left and right.  She has a bulging disk on the right side.      She is also experiencing  muscle spasms in her shoulder and arm area.  She has ongoing knee pain related to patellar dysfunction.  She also has lumbar pain and overall joint pain.  She reports recent diagnosis of fibromyalgia as well.      PAIN HISTORY:  The patient reports that her pain began when she was in her 40s with knee pain.  She has had ongoing care for that and is also currently receiving epidural injection for her neck from Dr. Clarissa Camara.  She reports that she is also doing physical therapy for help with her osteoarthritis.      In the past, she has done acupuncture as well as learning some yoga stretches.  She is doing physical therapy work now and reports a positive relationship with her therapist.      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  She is variable in her hours but says that she is enjoying her work .  She works up to 12-hour shifts at a time.  She is managing self-care but says that she does need to take time periodically to rebalance herself.      She lives by herself and says that she is comfortable with her dogs and the company that she has with close friends.  She does maintain contact with friends through work, as well as in her book club.      CURRENT MEDICATIONS:  Medicines include Tylenol Arthritis multiple times a day as well as current use of gabapentin.  She also takes venlafaxine for mood.      The patient reports that she has seen a psychologist in the past, but is not seeing one currently.  She is comfortable with that.      LIFESTYLE PRACTICES:  The patient reports that she drinks red wine socially and occasionally beer.  She is reporting no problematic history but says that she likes watching movies and sometimes having a glass of wine.   She is doing some exercise within her limitations.  She is not doing active meditation, but reports that reading is her natural way of calming.  She does use a heating pad at times or a cool pack for her neck.      PSYCHOSOCIAL HISTORY:  The patient was born in [place].  She is the first of 4 children.  Her father was a .  She reports a close family and says that she was outgoing as a youngster until she went through a change of schools which caused her to be more shy.  She did well academically and was interested in science as well as psychology.  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  She also worked at the [employer] in psychology and has worked as an OR nurse and hospice nurse.  She is continuing to stay involved with her profession, both socially as well as an occupationally.      She does have some stresses related to her pain and limitations, as well as to aging and fatigue.  She also reports some stress related to family relationships and dealing with her mother's issues.      EXAMINATION:  The patient presents as oriented and responsive.  She acknowledges a history of depression, but says that she is not depressed now.  She does have some sadness and tearfulness at times, sometimes in the form of grief and some as a sense of loss.  She says that she for the most part she can offset any self-critical thinking, but she does have some continued self-consciousness about smoking.      She is not experiencing anxiety attacks and says that she learned early in her career how to deal with anxiety features.  She knows how to do breathing as necessary and says that she was able to practice thought stopping when she was younger.      She saw a therapist, Susana Lackey, about a year ago.  She reports that that was helpful and beneficial and that she feels that she knows how to use that resource  if she chooses to.      She seems to be positive nonconforming in her orientation.  She has an individualized way of being able to manage both pain as well as her psychological issues, but seems content with that for now.      DIAGNOSES:  Axis I:  Pain disorder with medical and psychological factors.  The patient has some history of depression interacting with pain but seems to be content now.  She is positive about the work that she is doing with Caterina.  We discussed the possibility of her following through as needed.      TREATMENT PLAN:  The plan will be to defer from referring her for pain services now, but she is welcome to pursue it in the future should she choose to.      FACILITY TIME:  60 minutes.         DAMION DONIS, PHD             D: 10/24/2018   T: 10/24/2018   MT: KAILYN      Name:     EDWARDO MALLORY   MRN:      [id number]        Account:       [id number]   :      1955           Consult Date:  10/24/2018      Document: [id number]

## 2018-10-24 NOTE — MR AVS SNAPSHOT
After Visit Summary   10/24/2018    Brandi Stern    MRN: [id number]           Patient Information     Date Of Birth          1955        Visit Information        Provider Department      10/24/2018 9:00 AM Rigoberto Enciso, PhD LP [facility]        Today's Diagnoses     Osteoarthritis of multiple joints, unspecified osteoarthritis type    -  1       Follow-ups after your visit        Who to contact     If you have questions or need follow up information about today's clinic visit or your schedule please contact [facility] directly at [phone].  Normal or non-critical lab and imaging results will be communicated to you by [employer] Biopharmahart, letter or phone within 4 business days after the clinic has received the results. If you do not hear from us within 7 days, please contact the clinic through Kuldatt or phone. If you have a critical or abnormal lab result, we will notify you by phone as soon as possible.  Submit refill requests through [employer] or call your pharmacy and they will forward the refill request to us. Please allow 3 business days for your refill to be completed.          Additional Information About Your Visit        MyChart Information     [employer] gives you secure access to your electronic health record. If you see a primary care provider, you can also send messages to your care team and make appointments. If you have questions, please call your primary care clinic.  If you do not have a primary care provider, please call [phone] and they will assist you.        Care EveryWhere ID     This is your Care EveryWhere ID. This could be used by other organizations to access your [facility] medical records  [id number]        Your Vitals Were     Last Period                   (LMP Unknown)            Blood Pressure from Last 3 Encounters:   10/12/18 123/85   10/02/18 108/72   09/19/18 121/78    Weight from Last 3 Encounters:   10/02/18 96.2  kg (212 lb)   09/19/18 96.2 kg (212 lb)   09/13/18 98 kg (216 lb)              We Performed the Following     HEALTH & BEHAVIOR ASSESS INITIAL, EA 15MIN        Primary Care Provider Office Phone # Fax #    Cherie KARRI Brennan [phone] [address] [place]  [facility] [address]        Equal Access to Services     [facility] SUDHA : Hadii aad ku hadasho Soomaali, waaxda luqadaha, qaybta kaalmada adeegyada, waxay idiin hayaan adeeg kharash la'aan . So [facility] [phone].    ATENCIÓN: Si habla espjed, tiene a rizvi disposición servicios gratuitos de asistencia lingüística. Ll[facility] [phone].    We comply with applicable federal civil rights laws and [place] laws. We do not discriminate on the basis of race, color, national origin, age, disability, sex, sexual orientation, or gender identity.            Thank you!     Thank you for choosing [facility]  for your care. Our goal is always to provide you with excellent care. Hearing back from our patients is one way we can continue to improve our services. Please take a few minutes to complete the written survey that you may receive in the mail after your visit with us. Thank you!             Your Updated Medication List - Protect others around you: Learn how to safely use, store and throw away your medicines at www.disposemymeds.org.          This list is accurate as of 10/24/18 11:59 PM.  Always use your most recent med list.                   Brand Name Dispense Instructions for use Diagnosis    ACETAMINOPHEN EXTRA STRENGTH PO      Pt reports taking 650 MG arthritis        * albuterol 108 (90 Base) MCG/ACT inhaler    PROAIR HFA    1 Inhaler    Inhale 1-2 puffs into the lungs every 6 hours as needed for shortness of breath / dyspnea    Moderate persistent asthma with exacerbation       * albuterol (2.5 MG/3ML) 0.083% neb solution     3 mL    Use every 4 hours as needed for wheezing and shortness of breath    Moderate persistent  asthma without complication       amoxicillin-clavulanate 875-125 MG per tablet    AUGMENTIN    20 tablet    Take 1 tablet by mouth 2 times daily    Acute bronchitis with symptoms > 10 days       atorvastatin 10 MG tablet    LIPITOR    90 tablet    Take 1 tablet (10 mg) by mouth daily    Hyperlipidemia LDL goal <130       benzonatate 200 MG capsule    TESSALON    21 capsule    Take 0.5-1 capsules (100-200 mg) by mouth 3 times daily as needed for cough    Acute bronchitis, unspecified organism       CALCIUM 500 PO      Take 1 tablet by mouth daily        CLARITIN 10 MG tablet   Generic drug:  loratadine     0    1 TAB PO QD (Once per day) as needed for ALLERGY SYMPTOMS    Allergic rhinitis due to other allergen       CO Q 10 PO      Take 200 mg by mouth daily        cyclobenzaprine 5 MG tablet    FLEXERIL    180 tablet    Take 1-2 tablets (5-10 mg) by mouth 3 times daily as needed for muscle spasms    Spasm of back muscles       * desvenlafaxine succinate 50 MG 24 hr tablet    PRISTIQ    30 tablet    Take 1 tablet (50 mg) by mouth daily    Major depressive disorder, recurrent episode, moderate (H)       * desvenlafaxine succinate 100 MG 24 hr tablet    PRISTIQ    90 tablet    Take 1 tablet (100 mg) by mouth daily    Major depressive disorder, recurrent episode, moderate (H)       fenofibrate 145 MG tablet     90 tablet    TAKE 1 TABLET BY MOUTH EVERY DAY    Pure hyperglyceridemia       gabapentin 100 MG capsule    NEURONTIN    150 capsule    100 mg capsule BID and 300 mg at HS    Chronic midline low back pain without sciatica, Spinal stenosis in cervical region, Cervical spondylosis without myelopathy, Primary osteoarthritis involving multiple joints       levothyroxine 75 MCG tablet    SYNTHROID/LEVOTHROID    30 tablet    TAKE 1 TABLET (75 MCG) BY MOUTH EVERY MORNING OVERDUE FOR LABS    Chronic lymphocytic thyroiditis       lisinopril 10 MG tablet    PRINIVIL/ZESTRIL    90 tablet    Take 1 tablet (10 mg) by mouth  daily    Hypertension goal BP (blood pressure) < 140/90       Lutein 10 MG Tabs      Take 10 mg by mouth daily        * MIRAPEX 0.125 MG tablet   Generic drug:  pramipexole      Take two tabs at dinner and one at hs        * pramipexole 0.125 MG tablet    MIRAPEX    270 tablet    Take two tabs at dinner and one at hs    Restless leg syndrome       naltrexone 50 MG tablet    DEPADE;REVIA    30 tablet    Take 1 tablet (50 mg) by mouth daily    Class 2 obesity with body mass index (BMI) of 36.0 to 36.9 in adult, unspecified obesity type, unspecified whether serious comorbidity present       * omeprazole 20 MG CR capsule    priLOSEC    180 capsule    Take 1 capsule (20 mg) by mouth 2 times daily    Gastroesophageal reflux disease without esophagitis       * omeprazole 20 MG CR capsule    priLOSEC    180 capsule    TAKE 1 TABLET (20 MG) BY MOUTH 2 TIMES DAILY TAKE 30-60 MINUTES BEFORE A MEAL.    Gastroesophageal reflux disease without esophagitis       PREDNISONE PO      Take 40 mg by mouth        ranitidine 300 MG tablet    ZANTAC    60 tablet    TAKE 1 TABLET (300 MG) BY MOUTH 2 TIMES DAILY    Gastroesophageal reflux disease without esophagitis       SYMBICORT 160-4.5 MCG/ACT Inhaler   Generic drug:  budesonide-formoterol      Inhale 1 puff into the lungs 2 times daily    Moderate persistent asthma without complication       triamcinolone 55 MCG/ACT nasal inhaler    NASACORT AQ    1 Inhaler    Inhale 2 sprays in both nostrils every day as needed    Allergic rhinitis due to other allergen       UNABLE TO FIND      MEDICATION NAME: Allergy shots        VITAMIN C PO      Take 1,000 mg by mouth daily        zolpidem 10 MG tablet    AMBIEN    30 tablet    TAKE 1/2 TO 1 TABLET BY MOUTH NIGHTLY AS NEEDED    Insomnia, unspecified type       * Notice:  This list has 8 medication(s) that are the same as other medications prescribed for you. Read the directions carefully, and ask your doctor or other care provider to review them  with you.

## 2018-10-31 DIAGNOSIS — M15.0 PRIMARY OSTEOARTHRITIS INVOLVING MULTIPLE JOINTS: ICD-10-CM

## 2018-10-31 DIAGNOSIS — M54.50 CHRONIC MIDLINE LOW BACK PAIN WITHOUT SCIATICA: ICD-10-CM

## 2018-10-31 DIAGNOSIS — M47.812 CERVICAL SPONDYLOSIS WITHOUT MYELOPATHY: ICD-10-CM

## 2018-10-31 DIAGNOSIS — M48.02 SPINAL STENOSIS IN CERVICAL REGION: ICD-10-CM

## 2018-10-31 DIAGNOSIS — G89.29 CHRONIC MIDLINE LOW BACK PAIN WITHOUT SCIATICA: ICD-10-CM

## 2018-10-31 RX ORDER — GABAPENTIN 100 MG/1
CAPSULE ORAL
Qty: 150 CAPSULE | Refills: 1 | Status: SHIPPED | OUTPATIENT
Start: 2018-10-31 | End: 2019-01-21

## 2018-10-31 NOTE — TELEPHONE ENCOUNTER
Received fax request from [facility] pharmacy requesting refill(s) for gabapentin (NEURONTIN) 100 MG capsule    Last refilled on 9/28/18    Pt last seen on 10/2/18  Next appt scheduled for none    Estuardo Martines MA  [facility]      Will facilitate refill.

## 2018-11-01 DIAGNOSIS — E06.3 CHRONIC LYMPHOCYTIC THYROIDITIS: ICD-10-CM

## 2018-11-01 NOTE — TELEPHONE ENCOUNTER
"Requested Prescriptions   Pending Prescriptions Disp Refills     levothyroxine (SYNTHROID/LEVOTHROID) 75 MCG tablet [Pharmacy Med Name: LEVOTHYROXINE 75 MCG TABLET]  PATIENT NEEDS THYROID LABS.  Last Written Prescription Date:  10/4/18  Last Fill Quantity: 30 TABLET,  # refills: 0   Last office visit: 9/19/2018 with prescribing provider:  CATALINO   Future Office Visit:     30 tablet 0     Sig: TAKE 1 TABLET (75 MCG) BY MOUTH EVERY MORNING OVERDUE FOR LABS    Thyroid Protocol Failed    11/1/2018  2:00 AM       Failed - Normal TSH on file in past 12 months    Recent Labs   Lab Test  09/08/17   1451   TSH  1.69             Passed - Patient is 12 years or older       Passed - Recent (12 mo) or future (30 days) visit within the authorizing provider's specialty    Patient had office visit in the last 12 months or has a visit in the next 30 days with authorizing provider or within the authorizing provider's specialty.  See \"Patient Info\" tab in inbasket, or \"Choose Columns\" in Meds & Orders section of the refill encounter.             Passed - No active pregnancy on record    If patient is pregnant or has had a positive pregnancy test, please check TSH.         Passed - No positive pregnancy test in past 12 months    If patient is pregnant or has had a positive pregnancy test, please check TSH.            "

## 2018-11-02 ENCOUNTER — TRANSFERRED RECORDS (OUTPATIENT)
Dept: HEALTH INFORMATION MANAGEMENT | Facility: CLINIC | Age: 63
End: 2018-11-02

## 2018-11-03 NOTE — TELEPHONE ENCOUNTER
Routing refill request to provider for review/approval because:  Caren given x1 and patient did not follow up, please advise    Routing to [facility] Reception - please call patient and encourage lab only appointment - bridge can be discussed with provider when scheduled

## 2018-11-05 DIAGNOSIS — E06.3 CHRONIC LYMPHOCYTIC THYROIDITIS: ICD-10-CM

## 2018-11-05 PROCEDURE — 36415 COLL VENOUS BLD VENIPUNCTURE: CPT | Performed by: NURSE PRACTITIONER

## 2018-11-05 PROCEDURE — 84443 ASSAY THYROID STIM HORMONE: CPT | Performed by: NURSE PRACTITIONER

## 2018-11-06 LAB — TSH SERPL DL<=0.005 MIU/L-ACNC: 1.01 MU/L (ref 0.4–4)

## 2018-11-06 RX ORDER — LEVOTHYROXINE SODIUM 88 UG/1
88 TABLET ORAL DAILY
Qty: 90 TABLET | Refills: 0 | Status: SHIPPED | OUTPATIENT
Start: 2018-11-06 | End: 2019-01-21

## 2018-11-06 RX ORDER — LEVOTHYROXINE SODIUM 75 UG/1
75 TABLET ORAL DAILY
Qty: 90 TABLET | Refills: 3 | Status: SHIPPED | OUTPATIENT
Start: 2018-11-06 | End: 2018-11-06

## 2018-11-06 NOTE — TELEPHONE ENCOUNTER
DUANE review thyroid lab. Brandi said she feels a little sluggish and wonder if she needs a slight adjustment? Or leave at the 75 mg for now?  I cna cancel the RX at pharmacy if you make a change.  Thyroid lab normal .  Did refill the levothyroxine 75 mcg. SHANNON Chaudhry RN

## 2018-11-06 NOTE — TELEPHONE ENCOUNTER
Phone call to patient - advised of new rx for levothyroxine 88 mcg     Phone call to [facility] pharmacy to advise to cancel the 75 mcg and fill the 88 mcg     Amanda Garcia Registered Nurse   [facility]

## 2018-11-06 NOTE — TELEPHONE ENCOUNTER
We don't have much wiggle room with a TSH of 1.0, but we can try the 88 mcg dose and she should make a lab-only appointment in 6-8 weeks to recheck.

## 2018-12-14 ENCOUNTER — TRANSFERRED RECORDS (OUTPATIENT)
Dept: HEALTH INFORMATION MANAGEMENT | Facility: CLINIC | Age: 63
End: 2018-12-14

## 2019-01-15 ENCOUNTER — OFFICE VISIT (OUTPATIENT)
Dept: FAMILY MEDICINE | Facility: CLINIC | Age: 64
End: 2019-01-15
Payer: MEDICARE

## 2019-01-15 VITALS
SYSTOLIC BLOOD PRESSURE: 114 MMHG | DIASTOLIC BLOOD PRESSURE: 85 MMHG | BODY MASS INDEX: 35.78 KG/M2 | TEMPERATURE: 99 F | WEIGHT: 215 LBS | OXYGEN SATURATION: 97 % | RESPIRATION RATE: 16 BRPM | HEART RATE: 94 BPM

## 2019-01-15 DIAGNOSIS — B00.9 HERPES SIMPLEX VIRUS INFECTION: Primary | ICD-10-CM

## 2019-01-15 PROCEDURE — 99213 OFFICE O/P EST LOW 20 MIN: CPT | Performed by: FAMILY MEDICINE

## 2019-01-15 RX ORDER — VALACYCLOVIR HYDROCHLORIDE 1 G/1
1000 TABLET, FILM COATED ORAL 2 TIMES DAILY
Qty: 20 TABLET | Refills: 0 | Status: SHIPPED | OUTPATIENT
Start: 2019-01-15 | End: 2019-10-15

## 2019-01-15 ASSESSMENT — PAIN SCALES - GENERAL: PAINLEVEL: EXTREME PAIN (9)

## 2019-01-15 NOTE — PROGRESS NOTES
SUBJECTIVE:   Brandi Stern is a 63 year old female who presents to clinic today for the following health issues:    HPI     New sore x 4 days on RIGHT labia.  Is exquisitely tender to touch.  3rd episode in past year.  Lesions have resolved on own in the past with time.    Not sexually active for many years.  No hx of HSV.    Problem list and histories reviewed & adjusted, as indicated.  Additional history: as documented        Patient Active Problem List   Diagnosis     ALLERGIC RHINITIS      GERD     HASHIMOTO'S THYROIDITIS     Disorder of bone and cartilage     HYPERTRIGLYCERIDEMIA     Moderate persistent asthma     Insomnia     Osteoarthritis of multiple joints     Obstructive sleep apnea syndrome     Restless leg syndrome     Mild major depression (H)     Hypertension goal BP (blood pressure) < 140/90     Hyperlipidemia LDL goal <130     Abnormal liver function     Advanced directives, counseling/discussion     Hiatal hernia     Patellar tendonitis     Patellofemoral arthritis, right     Vitamin D deficiency     Anemia     Iron deficiency anemia     Fibromyalgia     Headache above the eye region     Psychological factors affecting medical condition     Depression, major, recurrent, in partial remission (H)     Mixed hyperlipidemia     Major depressive disorder, recurrent episode, moderate (H)     Major depressive disorder, recurrent episode, moderate with anxious distress (H)     BMI > 35     Major depressive disorder, recurrent episode, mild with anxious distress (H)     Chronic pain     Past Surgical History:   Procedure Laterality Date     ABDOMEN SURGERY      GB out     ARTHROSCOPY KNEE RT/LT  2/07    left knee     BIOPSY      Colon     C TOTAL KNEE ARTHROPLASTY      bilateral     CHOLECYSTECTOMY       COLONOSCOPY  8/5/2014    Procedure: COLONOSCOPY;  Surgeon: Mau De La Fuente MD;  Location:  GI     ESOPHAGOSCOPY, GASTROSCOPY, DUODENOSCOPY (EGD), COMBINED  8/5/2014    Procedure: COMBINED  ESOPHAGOSCOPY, GASTROSCOPY, DUODENOSCOPY (EGD), BIOPSY SINGLE OR MULTIPLE;  Surgeon: Mau De La Fuente MD;  Location:  GI     Gall bladder removal         Social History     Tobacco Use     Smoking status: Former Smoker     Packs/day: 0.50     Years: 20.00     Pack years: 10.00     Types: Cigarettes     Start date: 10/1/1971     Last attempt to quit: 2007     Years since quittin.4     Smokeless tobacco: Never Used   Substance Use Topics     Alcohol use: Yes     Alcohol/week: 0.0 oz     Comment: 2 glasses of wine per week     Family History   Problem Relation Age of Onset     Osteoporosis Mother      Hypertension Mother      Eye Disorder Mother         Mac d.,glaucoma, cataracts     Lipids Mother      Neurologic Disorder Mother         Parkinsons     Coronary Artery Disease Mother      Cancer Father         melanoma on back     Arthritis Father      Blood Disease Father         Hemachromatosis     Genitourinary Problems Father          of renal failure     Allergies Father      C.A.D. Maternal Grandmother      Cerebrovascular Disease Maternal Grandmother      Respiratory Maternal Grandmother      Hearing Loss Maternal Grandmother      C.A.D. Maternal Grandfather      Cardiovascular Maternal Grandfather      Circulatory Maternal Grandfather      Cancer - colorectal Paternal Grandmother      Diabetes Paternal Grandmother      Cancer Paternal Grandmother         Colon     Asthma Paternal Grandmother      Cerebrovascular Disease Paternal Grandfather      Thyroid Disease Sister         Nodules/Nodules     Breast Cancer No family hx of          Current Outpatient Medications   Medication Sig Dispense Refill     ACETAMINOPHEN EXTRA STRENGTH OR Pt reports taking 650 MG arthritis       albuterol (2.5 MG/3ML) 0.083% neb solution Use every 4 hours as needed for wheezing and shortness of breath 3 mL 12     albuterol (ALBUTEROL) 108 (90 BASE) MCG/ACT Inhaler Inhale 1-2 puffs into the lungs every 6 hours as  needed for shortness of breath / dyspnea 1 Inhaler 1     amoxicillin-clavulanate (AUGMENTIN) 875-125 MG per tablet Take 1 tablet by mouth 2 times daily 20 tablet 0     Ascorbic Acid (VITAMIN C PO) Take 1,000 mg by mouth daily       atorvastatin (LIPITOR) 10 MG tablet Take 1 tablet (10 mg) by mouth daily 90 tablet 2     benzonatate (TESSALON) 200 MG capsule Take 0.5-1 capsules (100-200 mg) by mouth 3 times daily as needed for cough 21 capsule 1     Calcium-Magnesium-Vitamin D (CALCIUM 500 PO) Take 1 tablet by mouth daily       CLARITIN 10 MG OR TABS 1 TAB PO QD (Once per day) as needed for ALLERGY SYMPTOMS 0 0     Coenzyme Q10 (CO Q 10 PO) Take 200 mg by mouth daily        cyclobenzaprine (FLEXERIL) 5 MG tablet Take 1-2 tablets (5-10 mg) by mouth 3 times daily as needed for muscle spasms 180 tablet 1     desvenlafaxine succinate (PRISTIQ) 100 MG 24 hr tablet Take 1 tablet (100 mg) by mouth daily 90 tablet 3     desvenlafaxine succinate (PRISTIQ) 50 MG 24 hr tablet Take 1 tablet (50 mg) by mouth daily 30 tablet 5     fenofibrate 145 MG tablet TAKE 1 TABLET BY MOUTH EVERY DAY 90 tablet 3     gabapentin (NEURONTIN) 100 MG capsule 100 mg capsule BID and 300 mg at  capsule 1     levothyroxine (SYNTHROID/LEVOTHROID) 88 MCG tablet Take 1 tablet (88 mcg) by mouth daily 90 tablet 0     lisinopril (PRINIVIL/ZESTRIL) 10 MG tablet Take 1 tablet (10 mg) by mouth daily 90 tablet PRN     Lutein 10 MG TABS Take 10 mg by mouth daily       MIRAPEX 0.125 MG OR TABS Take two tabs at dinner and one at hs       naltrexone (DEPADE;REVIA) 50 MG tablet Take 1 tablet (50 mg) by mouth daily 30 tablet 3     omeprazole (PRILOSEC) 20 MG CR capsule TAKE 1 TABLET (20 MG) BY MOUTH 2 TIMES DAILY TAKE 30-60 MINUTES BEFORE A MEAL. 180 capsule 3     omeprazole (PRILOSEC) 20 MG CR capsule Take 1 capsule (20 mg) by mouth 2 times daily 180 capsule 1     pramipexole (MIRAPEX) 0.125 MG tablet Take two tabs at dinner and one at hs 270 tablet PRN      PREDNISONE PO Take 40 mg by mouth       ranitidine (ZANTAC) 300 MG tablet TAKE 1 TABLET (300 MG) BY MOUTH 2 TIMES DAILY 60 tablet 5     SYMBICORT 160-4.5 MCG/ACT Inhaler Inhale 1 puff into the lungs 2 times daily       triamcinolone (NASACORT AQ) 55 MCG/ACT nasal inhaler Inhale 2 sprays in both nostrils every day as needed 1 Inhaler 7     UNABLE TO FIND MEDICATION NAME: Allergy shots       valACYclovir (VALTREX) 1000 mg tablet Take 1 tablet (1,000 mg) by mouth 2 times daily for 10 days 20 tablet 0     zolpidem (AMBIEN) 10 MG tablet TAKE 1/2 TO 1 TABLET BY MOUTH NIGHTLY AS NEEDED 30 tablet 5     Allergies   Allergen Reactions     Animal Dander      Fluconazole      rash     Liquid Adhesive      Transdermal patch adhesive. Specifically to nicotine patch; not allergic to nicotine.      Nsaids      Seasonal Allergies      Suture      Non-healing suture line     Vistaril [Hydroxyzine Hcl]      Urinary retention     Recent Labs   Lab Test 11/05/18  1522 08/31/18  1024 08/06/18  1440 03/21/18  1139 12/14/17  1202 09/08/17  1451 09/13/16  0902  12/31/15  1207   LDL  --   --   --  76  --   --  Cannot estimate LDL when triglyceride exceeds 400 mg/dL  112*  --  76   HDL  --   --   --  49*  --   --  36*  --  67   TRIG  --   --   --  275*  --   --  794*  --  234*   ALT  --   --   --   --  18 22 26   < >  --    CR  --  0.80 1.06*  --  0.95 0.84 0.67   < >  --    GFRESTIMATED  --  72 52*  --  59* 69 89   < >  --    GFRESTBLACK  --  88 63  --  72 83 >90   GFR Calc     < >  --    POTASSIUM  --  3.9 4.1  --  4.2 4.4 4.0   < >  --    TSH 1.01  --   --   --   --  1.69  --    < >  --     < > = values in this interval not displayed.      BP Readings from Last 3 Encounters:   01/15/19 114/85   10/12/18 123/85   10/02/18 108/72    Wt Readings from Last 3 Encounters:   01/15/19 97.5 kg (215 lb)   10/02/18 96.2 kg (212 lb)   09/19/18 96.2 kg (212 lb)                    ROS:  Constitutional, HEENT, cardiovascular, pulmonary,  gi and gu systems are negative, except as otherwise noted.    OBJECTIVE:     /85   Pulse 94   Temp 99  F (37.2  C) (Oral)   Resp 16   Wt 97.5 kg (215 lb)   LMP  (LMP Unknown)   SpO2 97%   Breastfeeding? No   BMI 35.78 kg/m    Body mass index is 35.78 kg/m .  GENERAL: healthy, alert and no distress  EYES: Eyes grossly normal to inspection, PERRL and conjunctivae and sclerae normal  NECK: no adenopathy, no asymmetry, masses, or scars and thyroid normal to palpation   (female): normal female external genitalia, 1 cm ulcerated lesion on tip of RIGHT middle labia majora on erythematous base  MS: no gross musculoskeletal defects noted, no edema  PSYCH: mentation appears normal, affect normal/bright    ASSESSMENT/PLAN:     1. Herpes simplex virus infection    - valACYclovir (VALTREX) 1000 mg tablet; Take 1 tablet (1,000 mg) by mouth 2 times daily for 10 days  Dispense: 20 tablet; Refill: 0    Discussed HSV can be latent for long periods of time before it expresses itself and HSV-1 can also be found in genital area.  Prescribed Valtrex for treatment- will Follow-up with PCP for refills for recurrences prn.    Annelise Kitchen MD  [facility]

## 2019-01-21 ENCOUNTER — OFFICE VISIT (OUTPATIENT)
Dept: FAMILY MEDICINE | Facility: CLINIC | Age: 64
End: 2019-01-21
Payer: MEDICARE

## 2019-01-21 VITALS
WEIGHT: 214.13 LBS | BODY MASS INDEX: 36.56 KG/M2 | TEMPERATURE: 99 F | HEART RATE: 99 BPM | DIASTOLIC BLOOD PRESSURE: 70 MMHG | RESPIRATION RATE: 18 BRPM | SYSTOLIC BLOOD PRESSURE: 107 MMHG | HEIGHT: 64 IN | OXYGEN SATURATION: 95 %

## 2019-01-21 DIAGNOSIS — E66.01 MORBID OBESITY (H): ICD-10-CM

## 2019-01-21 DIAGNOSIS — E06.3 CHRONIC LYMPHOCYTIC THYROIDITIS: ICD-10-CM

## 2019-01-21 DIAGNOSIS — G47.00 INSOMNIA, UNSPECIFIED TYPE: Primary | ICD-10-CM

## 2019-01-21 DIAGNOSIS — F33.41 DEPRESSION, MAJOR, RECURRENT, IN PARTIAL REMISSION (H): ICD-10-CM

## 2019-01-21 PROCEDURE — 99214 OFFICE O/P EST MOD 30 MIN: CPT | Performed by: NURSE PRACTITIONER

## 2019-01-21 RX ORDER — TRAZODONE HYDROCHLORIDE 50 MG/1
50-100 TABLET, FILM COATED ORAL AT BEDTIME
Qty: 180 TABLET | Refills: 3 | Status: SHIPPED | OUTPATIENT
Start: 2019-01-21 | End: 2019-03-15 | Stop reason: SINTOL

## 2019-01-21 RX ORDER — LEVOTHYROXINE SODIUM 88 UG/1
88 TABLET ORAL DAILY
Qty: 90 TABLET | Status: SHIPPED | OUTPATIENT
Start: 2019-01-21 | End: 2019-04-17

## 2019-01-21 ASSESSMENT — MIFFLIN-ST. JEOR: SCORE: 1501.26

## 2019-01-21 ASSESSMENT — PATIENT HEALTH QUESTIONNAIRE - PHQ9: SUM OF ALL RESPONSES TO PHQ QUESTIONS 1-9: 13

## 2019-01-21 NOTE — PROGRESS NOTES
"  SUBJECTIVE:   Brandi Stern is a 63 year old female who presents to clinic today for the following health issues:    She is still taking Valtrex     Discuss restarting Celexa     She notes she is taking Omeprazole. Pantoprazole? Not sure if covered by medicare.    Discuss sleeping medication. Ambien is not helpful.     Fatigue has been severe  She is still working part time          She feels that Ambien is not working very well and also has some memory issues and wonders if it is related to this medication.  She does do some online shopping after taking the medication and does not remember doing this.    She is currently on Pristiq 100 mg.  She has been on many other antidepressants.  She wonders if this could be causing some fatigue.    She does have chronic pain, due to OA of multiple joints, DDD, fibromyalgia.  She is being seen at the pain clinic, receiving epidural injections.            Problem list and histories reviewed & adjusted, as indicated.  Additional history: as documented        Reviewed and updated as needed this visit by clinical staff       Reviewed and updated as needed this visit by Provider         ROS:  CONSTITUTIONAL: NEGATIVE for fever, chills, change in weight  RESP: NEGATIVE for significant cough or SOB  CV: NEGATIVE for chest pain, palpitations or peripheral edema  MUSCULOSKELETAL: see HPI  PSYCHIATRIC: see HPI    OBJECTIVE:     /70   Pulse 99   Temp 99  F (37.2  C) (Oral)   Resp 18   Ht 1.626 m (5' 4\")   Wt 97.1 kg (214 lb 2 oz)   LMP  (LMP Unknown)   SpO2 95%   BMI 36.75 kg/m    Body mass index is 36.75 kg/m .  GENERAL: healthy, alert and no distress  PSYCH: mentation appears normal, affect normal/bright        ASSESSMENT/PLAN:             1. Insomnia, unspecified type  Will try Trazodone.  Discussed the use and indication of this medication as well as potential side effects.   Discussed that her fatigue is likely multifactorial  Will try to improve sleep and see how " much her fatigue improves.  Consider decreasing Pristiq to 50 mg in future.   Also discussed seeing [facility] for MTM evaluation of medications.   - traZODone (DESYREL) 50 MG tablet; Take 1-2 tablets ( mg) by mouth At Bedtime  Dispense: 180 tablet; Refill: 3    2. Depression, major, recurrent, in partial remission (H)  See above.     3. BMI > 35 (H)  Work on diet and exercise.     4. HASHIMOTO'S THYROIDITIS  TSH recently rechecked.    Continue on current dose of medication.   - levothyroxine (SYNTHROID/LEVOTHROID) 88 MCG tablet; Take 1 tablet (88 mcg) by mouth daily  Dispense: 90 tablet; Refill: PRN        Cherie Brennan NP  [facility]

## 2019-01-22 ASSESSMENT — ASTHMA QUESTIONNAIRES: ACT_TOTALSCORE: 22

## 2019-01-24 ENCOUNTER — TELEPHONE (OUTPATIENT)
Dept: PALLIATIVE MEDICINE | Facility: CLINIC | Age: 64
End: 2019-01-24

## 2019-01-24 NOTE — TELEPHONE ENCOUNTER
Called patient and transferred to scheduling for a follow up with Gia Stroud.    LILY FraserN, RN  Care Coordinator  [facility]

## 2019-01-24 NOTE — TELEPHONE ENCOUNTER
Pt wants to see Dr. Morales again for injection. Seeing if Gia could put in an order or does she need to be seen by her first.    Soto Tapia  [facility]

## 2019-02-01 ENCOUNTER — TELEPHONE (OUTPATIENT)
Dept: PALLIATIVE MEDICINE | Facility: CLINIC | Age: 64
End: 2019-02-01

## 2019-02-01 ENCOUNTER — OFFICE VISIT (OUTPATIENT)
Dept: PALLIATIVE MEDICINE | Facility: CLINIC | Age: 64
End: 2019-02-01
Payer: MEDICARE

## 2019-02-01 VITALS
RESPIRATION RATE: 16 BRPM | BODY MASS INDEX: 38.24 KG/M2 | HEIGHT: 64 IN | WEIGHT: 224 LBS | SYSTOLIC BLOOD PRESSURE: 108 MMHG | HEART RATE: 98 BPM | OXYGEN SATURATION: 96 % | DIASTOLIC BLOOD PRESSURE: 70 MMHG

## 2019-02-01 DIAGNOSIS — M54.16 LUMBAR RADICULOPATHY: Primary | ICD-10-CM

## 2019-02-01 DIAGNOSIS — M48.02 CERVICAL STENOSIS OF SPINAL CANAL: ICD-10-CM

## 2019-02-01 DIAGNOSIS — M50.30 DDD (DEGENERATIVE DISC DISEASE), CERVICAL: ICD-10-CM

## 2019-02-01 DIAGNOSIS — M47.816 LUMBAR FACET ARTHROPATHY: ICD-10-CM

## 2019-02-01 PROCEDURE — 99214 OFFICE O/P EST MOD 30 MIN: CPT | Performed by: NURSE PRACTITIONER

## 2019-02-01 ASSESSMENT — PAIN SCALES - GENERAL: PAINLEVEL: SEVERE PAIN (7)

## 2019-02-01 ASSESSMENT — MIFFLIN-ST. JEOR: SCORE: 1546.06

## 2019-02-01 NOTE — PROGRESS NOTES
[facility]    CHIEF COMPLAINT: Neck, back, hand, knee and feet pain.     INTERVAL HISTORY:  Last seen on 10/2/18.        Recommendations/plan at the last visit included:     1.        Pain psychology assessment  2.        Schedule physical therapy visits  3.        Schedule follow-up with ELISE Persaud NP-C in 4-8 weeks  4.        Procedures recommended: Cervical epidural steroid injection    5. Medication recommendations:      No change to current medications    Since last visit:   - Had C7-T1 interlaminar epidural steroid injection on 10/12/18.   - Had PT assessment but has not scheduled recommended follow up. Does not want to do PT at this time. Doing HEP program daily.  Had Pain Psych assessment and follow up deferred at this time.   - On New Years day. Fell and broke her nose. Right knee went out and left leg couldn't support her. She landed face down on the carpet.   - Considering going to [place] for 2nd opinion on knee surgeries. Has had bilateral TKAs. Knee caps are in the wrong place. Has not been pain free since surgery. Has seen 4 surgeons who were not sure what to do. PMR doc thought only solution would be replacement.   -Stopped Ambien due to SE. Tried Trazodone which was ineffective, kept her up all night. Using Melatonin 5 mg which was helpful last night.     Pain Information:   Pain quality: Aching, Miserable, Nagging and Unbearable    Pain rating: intensity ranges from 5/10 to 9/10, and averages 7/10 on a 0-10 scale.      Annual Controlled Substance Agreement/UDS due date: N/A     CURRENT RELEVANT PAIN MEDICATIONS:   Tylenol arthritis strength 650 mg, 4-6 tablets daily  Capsaicin cream  Voltaren gel  Gabapentin 100-300 at HS  Flexeril 5mg10 mg at HS  Ambien 10 mg 1/2-1 tablet at HS      Patient is using the medication as prescribed:  YES  Is your medication helpful?               YES   Medication side effects? no side effect          Previous Pain Relevant Medications:  (H--helped; HI--Helped initially; SWH--Somewhat helpful; NH--No help; W--worse; SE--side effects; ?--Unsure if helpful)   NOTE: This medication information taken from patient's intake form, not medical records.                         Opiates: Hydrocodone: H, hydromorphone: H, given postoperatively, morphine, H: given postoperatively and in ED, tramadol:NH                        NSAIDS: Celebrex: H, ibuprofen:[facility], Relafen:NH                        Muscle Relaxants: Cyclobenzaprine:SE sedation                        Anti-migraine mediations: None                        Anti-depressants: Citalopram: NH, bupropion: Just started, too soon to tell                        Sleep aids: Ambien: H for sleep                        Anti-convulsants: Gabapentin: SE, sedation, pregabalin: NH                        Topicals: Diclofenac gel:NH                        Other medications not covered above: none      Any illicit drug use: Denies  Any use of prescriptions other than how they were prescribed:denies  [place] Board of Pharmacy Data Base Reviewed:    YES; No concern for abuse or misuse of controlled medications based on this report.       SELF CARE:   How often do you practice SELF-CARE (relaxing, stretching, pacing, monitoring posture, taking mini-breaks) in a typical day:  Few times daily     THE 4 As OF OPIOID MAINTENANCE ANALGESIA    Analgesia: Is pain relief clinically significant? N/A   Activity: Is patient functional and able to perform Activities of Daily Living? N/A   Adverse effects: Is patient free from adverse side effects from opiates? N/A   Adherence to Rx protocol: Is patient adhering to Controlled Substance Agreement and taking medications ONLY as ordered? N/A     Is Narcan prescribed for opiate use >50 MME daily? N/A     [place] Board of Pharmacy Data Base Reviewed:    YES; No concern for abuse or misuse of controlled medications based on this report.          Medications:  Current Outpatient Medications    Medication Sig Dispense Refill     ACETAMINOPHEN EXTRA STRENGTH OR Pt reports taking 650 MG arthritis       albuterol (2.5 MG/3ML) 0.083% neb solution Use every 4 hours as needed for wheezing and shortness of breath 3 mL 12     albuterol (ALBUTEROL) 108 (90 BASE) MCG/ACT Inhaler Inhale 1-2 puffs into the lungs every 6 hours as needed for shortness of breath / dyspnea 1 Inhaler 1     Ascorbic Acid (VITAMIN C PO) Take 1,000 mg by mouth daily       atorvastatin (LIPITOR) 10 MG tablet Take 1 tablet (10 mg) by mouth daily 90 tablet 2     CLARITIN 10 MG OR TABS 1 TAB PO QD (Once per day) as needed for ALLERGY SYMPTOMS 0 0     Coenzyme Q10 (CO Q 10 PO) Take 200 mg by mouth daily        cyclobenzaprine (FLEXERIL) 5 MG tablet Take 1-2 tablets (5-10 mg) by mouth 3 times daily as needed for muscle spasms 180 tablet 1     desvenlafaxine succinate (PRISTIQ) 100 MG 24 hr tablet Take 1 tablet (100 mg) by mouth daily 90 tablet 3     fenofibrate 145 MG tablet TAKE 1 TABLET BY MOUTH EVERY DAY 90 tablet 3     levothyroxine (SYNTHROID/LEVOTHROID) 88 MCG tablet Take 1 tablet (88 mcg) by mouth daily 90 tablet PRN     lisinopril (PRINIVIL/ZESTRIL) 10 MG tablet Take 1 tablet (10 mg) by mouth daily 90 tablet PRN     omeprazole (PRILOSEC) 20 MG CR capsule TAKE 1 TABLET (20 MG) BY MOUTH 2 TIMES DAILY TAKE 30-60 MINUTES BEFORE A MEAL. 180 capsule 3     pramipexole (MIRAPEX) 0.125 MG tablet Take two tabs at dinner and one at hs 270 tablet PRN     ranitidine (ZANTAC) 300 MG tablet TAKE 1 TABLET (300 MG) BY MOUTH 2 TIMES DAILY 60 tablet 5     SYMBICORT 160-4.5 MCG/ACT Inhaler Inhale 1 puff into the lungs 2 times daily       traZODone (DESYREL) 50 MG tablet Take 1-2 tablets ( mg) by mouth At Bedtime 180 tablet 3     triamcinolone (NASACORT AQ) 55 MCG/ACT nasal inhaler Inhale 2 sprays in both nostrils every day as needed 1 Inhaler 7     UNABLE TO FIND MEDICATION NAME: Allergy shots       valACYclovir (VALTREX) 1000 mg tablet Take 1 tablet  (1,000 mg) by mouth 2 times daily for 10 days 20 tablet 0       Review of Systems: A 10-point review of systems was negative, with the exception of chronic pain issues.      Social History: Reviewed; unchanged from previous consultation.      Family history: Reviewed; unchanged from previous consultation.     PHYSICAL EXAM    There were no vitals filed for this visit.    Constitutional: healthy, alert and no distress  HEENT: Head atraumatic, normocephalic. Eyes without conjunctival injection or jaundice. Neck with reduced ROM. No obvious neck masses.  Cardiovascular: No edema or JVD appreciated. Peripheral pulses are palpable, no edema on bilateral lower extremities  Skin: No rash, lesions, or petechiae of exposed skin.   Extremities: No clubbing, cyanosis, or edema to exposed extremities  Psychiatric/mental status: Alert, without lethargy or stupor. Appropriate affect. Mood normal.   Neurologic exam:  CN:  Cranial nerves 2-12 are grossly normal.  Motor:  5/5 UE and LE strength     Musculoskeletal exam:  Cervical spine:                       Flex:  10 degrees, painful                        Ext: 20 degrees, painful                        Rotation to right: 20 degrees, painful                        Rotation to left: 20 degrees, painful                        Tenderness in the cervical spine at midline. No                       Tenderness in the cervical paraspinal muscles. No  Thoracic spine:                        Kyphosis. Yes                       Tenderness in the thoracic spine at midline. No                       Tenderness in the thoracic paraspinal muscles. No  Lumbar/Sacral spine:                       Forward Flexion:  90 degrees, painful                        Ext: 30 degrees, painful                        Rotation/ext to right: painful                        Rotation/ext to left: painful                        Lordosis. Yes                       Tenderness in the lumbar spine at midline.  No                       Tenderness in the lumbar paraspinal muscles.No     Psychiatric:  mentation appears normal., affect and mood normal     DIRE Score for ongoing opioid management is calculated as follows:    Diagnosis = 2 pts (slowly progressive; moderate pain/objective findings)    Intractability = 2 pts (most treatments tried; patient not fully engaged/barriers)    Risk        Psych = 3 pts (no significant personality dysfunction/mental illness; good communication with clinic)         Chem Hlth = 2 pts (use of medications to cope with stress; chemical dependency in remission) (?)       Reliability = 3 pts (highly reliable with meds, appointments, treatments)       Social = 3 pts (supportive family/close relationships; involved in work/school; no isolation)       (Psych + Chem hlth + Reliability + Social) = 15    Efficacy = 2 pts (moderate benefit/function; low med dose; too early/not tried meds)    DIRE Score = 17        7-13: likely NOT suitable candidate for long-term opioid analgesia       14-21: may be a suitable candidate for long-term opioid analgesia        Previous Diagnostic Tests:   Imaging Studies:   MR CERVICAL SPINE W/O CONTRAST 8/4/2017 9:20 AM  Provided History: Chronic changes of right C7 and C8 radiculopathies.  Comparison: Cervical spine radiograph 1/12/2017, MR cervical spine  2/8/2016  Technique: Sagittal T1-weighted, sagittal T2-weighted, sagittal STIR,  sagittal diffusion weighted, axial T2-weighted, and axial T2* gradient  echo images of the cervical spine were obtained without intravenous  contrast.  Findings:Images are mildly degraded by motion artifact.   Approximately 2 mm degenerative retrolisthesis of C4 on C5.  Straightening of the normal cervical lordosis. Normal cord signal. No  abnormal vertebral marrow edema. No cord signal abnormality.  Multilevel disc degeneration, uncinate spurring and facet arthropathy.  Paraspinous soft tissues are unremarkable.  The findings on a level  by level basis are as follows:  C2-3: No spinal canal or neural foraminal stenosis.  C3-4: Disc bulge. Uncinate spurring and facet arthropathy. Moderate  left and mild/moderate right neural foraminal stenosis. No significant  spinal canal stenosis.  C4-5:  Eccentric left disc osteophyte complex with flattening of the  ventral aspect of the cord. Bilateral uncinate spurring and facet  hypertrophy. Ligament flavum thickening. Moderate spinal canal  bilateral neural foraminal stenosis.  C5-6:  Disc osteophyte complex. Bilateral facet hypertrophy and  uncinate spurring. Ligamentum flavum thickening. Moderate spinal canal  and bilateral neural foraminal stenosis.  C6-7:  Dorsal osteophytic spurring. Uncinate spurring and facet  hypertrophy. Moderate spinal canal stenosis. Mild/moderate bilateral  neural foraminal stenosis.  C7-T1:  Disc osteophyte complex. Facet hypertrophy and uncinate  spurring. Moderate right and mild left neural foraminal stenosis. No  significant spinal canal stenosis.  T1-T2: Disc bulge with superimposed right foraminal protrusion.  Moderate right neural foraminal stenosis.  T2-T3: Right foraminal disc protrusion. Mild right neural foraminal  Stenosis.  Impression:   Multilevel cervical spondylosis with degenerative retrolisthesis of C4  on C5. Moderate spinal canal stenosis C4-C7 and moderate bilateral  neural foraminal stenosis C4-C6 and on the right C7-T2. No cord signal  abnormality.     MR LUMBAR SPINE W/O CONTRAST, 4/5/2016 3:55 PM.  History: low back pain, lumbago with sciatica, right side.  Comparison: plain film 6/29/2012.  Technique: Sagittal T1-weighted, T2-weighted, STIR, diffusion and ADC,  and axial T2-weighted T2*-weighted images of the lumbar spine were  obtained without intravenous contrast.  Findings:   There are 5 lumbar-type vertebrae assumed for the purposes of this  dictation. The tip of the conus medullaris is at L1. The normal lumbar  lordotic curvature is mildly  exaggerated. There is subtle  retrolisthesis L2 on L3. Extensive multilevel degenerative change with  multilevel disc space narrowing and loss of normal T2 signal. Type I  Modic degenerative endplate signal at L2-L3. Type II Modic  degenerative endplate signal at several other levels, most prominently  at L1-L2 and L3-L4. Small T12 inferior endplate hemangioma. No acute  fracture or dislocation.  On a level by level basis:  L1-2:   Posterior disc osteophyte complex. Mild spinal canal  narrowing. No significant neural foraminal narrowing. Bilateral facet  arthropathy with a small right facet effusion.  L2-3:   Posterior disc osteophyte complex. Mild to moderate narrowing  of the thecal sac, particularly in the transverse dimension secondary  to epidural fat. No significant neural foraminal narrowing. Facet  arthropathy.  L3-4:   Posterior disc osteophyte complex with small central disc  extrusion and fissuring. Prominent epidural fat with moderate to  severe narrowing of the thecal sac and effacement of the CSF signal.  Mild left neural foraminal narrowing. Degenerative facet hypertrophy.  L4-5:   Posterior disc osteophyte complex. Prominent epidural fat with  moderate narrowing of the thecal sac. Mild bilateral neural foraminal  narrowing. Facet arthropathy.  L5-S1:  Posterior disc osteophyte complex. Mildly prominent epidural  fat with multiple moderate narrowing of the thecal sac. Mild to  moderate bilateral neural foraminal narrowing. Facet arthropathy.  Atrophy of the posterior paraspinal musculature. No focal fluid  collections.  Impression:  Extensive multilevel degenerative changes in the lumbar  spine with multilevel posterior disc osteophyte complexes and  prominent epidural fat resulting in moderate constriction of the  thecal sac at L3-L4. The spinal canal itself however is not narrowed.     Exam: Single frontal view of both hips and a frog-leg lateral view of  the left hip dated 9/4/2018.  COMPARISON:  6/29/2012.  CLINICAL HISTORY: Hip pain.  FINDINGS: Single frontal view of both hips and a frog-leg lateral view  of the left hip was obtained. Degenerative disc disease in the  visualized lower lumbar spine. No femoral head collapse. No displaced  fractures. Mild joint space narrowing with minimal spurring at the  femoral head and neck junction.  IMPRESSION: Mild degenerative changes in both hips, as above.     ASSESSMENT:   1.  Cervical spondylosis and stenosis  2.  Lumbar facet arthropathy, DDD, DJD  3.  Bilateral hip osteoarthritis, bilateral hip bursitis L>R  4.  Chronic depression    Brandi presents for check in and requesting injections. She has not had updated imaging of low back in nearly 3 years so will get updated MRI before ordering lumbar injection. Requesting same cervical injection as last time with no change in pain condition so will not update cervical MRI . I will touch base with her after MRI to discuss next steps.       Plan:    Diagnosis reviewed, treatment option addressed, and risk/benifits discussed.  Self-care instructions given.  I am recommending a multidisciplinary treatment plan to help this patient better manage pain.        1. Schedule follow-up with ELISE Persaud NP-C in 4-8 weeks  2. Imaging: MRI of lumbar spine  3. Procedures recommended:   1. Cervical injections, we will call to schedule    4. Medication recommendations:   1. No change to current medications     Total time spent face to face was 30 minutes and more than 50% of face to face time was spent in counseling and/or coordination of care regarding the diagnosis and recommendations above.      ELISE Zhu, NP-C   [facility]    Disclaimer: This note consists of symbols derived from keyboarding, dictation and/or voice recognition software. As a result, there may be errors in the script that have gone undetected. Please consider this when interpreting information found in this  chart.

## 2019-02-01 NOTE — PATIENT INSTRUCTIONS
After Visit Instructions:     Thank you for coming to [facility] for your care. It is my goal to partner with you to help you reach your optimal state of health.     Continue daily self-care, identifying contributing factors, and monitoring variations in pain level. Continue to integrate self-care into your life.      1. Schedule follow-up with ELISE Persaud, NPGeraldC in 4-8 weeks  2. Imaging: MRI of lumbar spine  3. Procedures recommended:   1. Cervical injections, we will call to schedule    4. Medication recommendations:   1. No change to current medications     ELISE Zhu, NP-C  [facility]  [facility]  Clinic Number:  [phone]

## 2019-02-12 NOTE — TELEPHONE ENCOUNTER
Pre-screening Questions for Radiology Injections:    Injection to be done at which interventional clinic site? [facility]    Instruct patient to arrive as directed prior to the scheduled appointment time:    [place] AND [place]: 30 minutes before      Procedure ordered by DV    Procedure ordered? Cervical Epidural Steroid Injection    What insurance would patient like us to bill for this procedure? MEDICARE      Worker's comp or MVA (motor vehicle accident) -Any injection DO NOT SCHEDULE and route to Ayana Cruz.      [employer] insurance - For SI joint injections, DO NOT SCHEDULE and route Aynaa Cruz.       [employer] - Any injection besides hip/shoulder/knee joint DO NOT SCHEDULE and route to Ayana Cruz. She will obtain PA and call pt back to schedule procedure or notify pt of denial.       [employer]-Route to Ayana for review        IF SCHEDULING IN [place] AND NEEDS A PA, IT IS OKAY TO SCHEDULE. [place] HANDLES THEIR OWN PA'S AFTER THE PATIENT IS SCHEDULED. PLEASE SCHEDULE AT LEAST 1 WEEK OUT SO A PA CAN BE OBTAINED.      Any chance of pregnancy? NO   If YES, do NOT schedule and route to RN pool    Is an  needed? No     Patient has a drive home? (mandatory) YES:     Is patient taking any blood thinners (plavix, coumadin, jantoven, warfarin, heparin, pradaxa or dabigatran )? No   If hold needed, do NOT schedule, route to RN pool     Is patient taking any aspirin products (includes Excedrin and Fiorinal)? No     If more than 325mg/day do NOT schedule; route to RN pool     For CERVICAL procedures, hold all aspirin products for 6 days.     Tell pt that if aspirin product is not held for 6 days, the procedure WILL BE cancelled.      Does the patient have a bleeding or clotting disorder? No     If YES, okay to schedule AND route to RN nurse pool    For any patients with platelet count <100, must be forwarded to provider    Is patient diabetic?  No  If YES, have them bring their  glucometer.    Does patient have an active infection or treated for one within the past week? No     Is patient currently taking any antibiotics?  No     For patients on chronic, preventative, or prophylactic antibiotics, procedures may be scheduled.     For patients on antibiotics for active or recent infection:    [place], [place]-antibiotic course must have been completed for 4 days    Is patient currently taking any steroid medications? (i.e. Prednisone, Medrol)  No     For patients on steroid medications:    [place], [place]-steroid course must have been completed for 4 days    Reviewed with patient:  If you are started on any steroids or antibiotics between now and your appointment, you must contact us because the procedure may need to be cancelled.  Yes    Is patient actively being treated for cancer or immunocompromised? No  If YES, do NOT schedule and route to RN pool     Are you able to get on and off an exam table with minimal or no assistance? Yes  If NO, do NOT schedule and route to RN pool    Are you able to roll over and lay on your stomach with minimal or no assistance? Yes  If NO, do NOT schedule and route to RN pool     Any allergies to contrast dye, iodine, shellfish, or numbing and steroid medications? No  If YES, route to RN pool AND add allergy information to appointment notes    Allergies: Animal dander; Fluconazole; Liquid adhesive; Nsaids; Seasonal allergies; Suture; and Vistaril [hydroxyzine hcl]      Has the patient had a flu shot or any other vaccinations within 7 days before or after the procedure.  No     Does patient have an MRI/CT?  YES:   (SI joint, hip injections, lumbar sympathetic blocks, and stellate ganglion blocks do not require an MRI)    Was the MRI done w/in the last 3 years?  Yes    Was MRI done at [place]? Yes      If not, where was it done? N/A       If MRI was not done at [place], [facility] do NOT schedule  and route to nursing.  If pt has an imaging disc, the injection may be scheduled but pt has to bring disc to appt. If they show up w/out disc the injection cannot be done    Reminders (please tell patient if applicable):       Instructed pt to arrive 30 minutes early for IV start if this is for a cervical procedure, ALL sympathetic (stellate ganglion, hypogastric, or lumbar sympathetic block) and all sedation procedures (RFA, spinal cord stimulation trials).  Not Applicable   -IVs are not routinely placed for Dr. Camara cervical cases   -Dr. Ceballos: IVs for cervical ESIs and cervical TBDs (not CMBBs/facet inj)      If NPO for sedation, informed patient that it is okay to take medications with sips of water (except if they are to hold blood thinners).  Not Applicable   *DO take blood pressure medication if it is prescribed*      If this is for a cervical MEGHAN, informed patient that aspirin needs to be held for 6 days.   Not Applicable      For all patients not having spinal cord stimulator (SCS) trials or radiofrequency ablations (RFAs), informed patient:    IV sedation is not provided for this procedure.  If you feel that an oral anti-anxiety medication is needed, you can discuss this further with your referring provider or primary care provider.  The Pain Clinic provider will discuss specifics of what the procedure includes at your appointment.  Most procedures last 10-20 minutes.  We use numbing medications to help with any discomfort during the procedure.  Not Applicable      Do not schedule procedures requiring IV placement in the first appointment of the day or first appointment after lunch. Do NOT schedule at 0745, 0815 or 1245.       For patients 85 or older we recommend having an adult stay w/ them for the remainder of the day.       Does the patient have any questions?    Ayana Cruz  [facility]

## 2019-02-13 ENCOUNTER — NURSE TRIAGE (OUTPATIENT)
Dept: NURSING | Facility: CLINIC | Age: 64
End: 2019-02-13

## 2019-02-13 ENCOUNTER — HOSPITAL ENCOUNTER (EMERGENCY)
Facility: CLINIC | Age: 64
Discharge: HOME | End: 2019-02-13
Attending: EMERGENCY MEDICINE | Admitting: EMERGENCY MEDICINE
Payer: OTHER MISCELLANEOUS

## 2019-02-13 VITALS
DIASTOLIC BLOOD PRESSURE: 98 MMHG | HEART RATE: 95 BPM | BODY MASS INDEX: 36.19 KG/M2 | WEIGHT: 217.2 LBS | SYSTOLIC BLOOD PRESSURE: 148 MMHG | RESPIRATION RATE: 16 BRPM | TEMPERATURE: 98.9 F | HEIGHT: 65 IN | OXYGEN SATURATION: 99 %

## 2019-02-13 DIAGNOSIS — Z57.9 OCCUPATIONAL EXPOSURE IN WORKPLACE: ICD-10-CM

## 2019-02-13 PROCEDURE — 99282 EMERGENCY DEPT VISIT SF MDM: CPT | Performed by: EMERGENCY MEDICINE

## 2019-02-13 PROCEDURE — 99282 EMERGENCY DEPT VISIT SF MDM: CPT | Mod: Z6 | Performed by: EMERGENCY MEDICINE

## 2019-02-13 SDOH — HEALTH STABILITY - PHYSICAL HEALTH: OCCUPATIONAL EXPOSURE TO UNSPECIFIED RISK FACTOR: Z57.9

## 2019-02-13 ASSESSMENT — MIFFLIN-ST. JEOR: SCORE: 1531.09

## 2019-02-13 NOTE — PROGRESS NOTES
[facility] - Procedure Note    Date of Visit: 2/14/2019    Pre procedure Diagnosis: facet arthropathy   Post procedure Diagnosis: Same  Procedure performed: Left C3-4, C4-5 facet joint injections  Anesthesia: none  Complications: none  Operators: Clarissa Camara MD & Ana María Limon MD (pain fellow)     Indications:   Brandi Stern is a 63 year old female was sent by Luanne Rucker CNP for localized left-sided neck pain.  They have a history of cervical radiculopathy with pain radiating down right arm, and a prior cervical epidural steroid injection with prolonged benefit. In fact, she continues to have no radiating pain.  Exam shows paraspinal tenderness at the C3-4, 4-5 region on the left side. She has tried conservative treatment including physical therapy and medications.    Options/alternatives, benefits and risks were discussed with the patient including bleeding, infection, flared pain, tissue trauma, exposure to radiation, reaction to medications including seizure, spinal cord injury, paralysis, weakness, numbness and headache.    Questions were answered to her satisfaction and she agrees to proceed. Voluntary informed consent was obtained and signed.     Vitals were reviewed: Yes  Allergies were reviewed:  Yes   Medications were reviewed:  Yes   Pre-procedure pain score: 6/10      Cervical MRI was done on 8/04/2017 which showed:     Impression:   Multilevel cervical spondylosis with degenerative retrolisthesis of C4  on C5. Moderate spinal canal stenosis C4-C7 and moderate bilateral  neural foraminal stenosis C4-C6 and on the right C7-T2. No cord signal  abnormality.    Procedure:  After getting informed consent, patient was brought into the procedure suite and was placed in a prone position on the procedure table.   A Pause for the Cause was performed.  Patient was prepped and draped in sterile fashion.     Under AP fluoroscopic guidance the C3-4 and 4-5 facet joints on the  left side were identified, and the C-arm was rotated obliquely to the affected side to open the joint space. A 25 gauge 3.5 inch quincke type spinal needle was inserted and advanced under fluoroscopic guidance targeting the superior articular pillar of each joint. Once the needle made a contact with SAP, it was rotated and was then advanced into the joint.    AP fluoroscopic views were obtained to confirm the needle placement. Then,  Omnipaque 300 contrast dye was injected after negative aspiration for heme and CSF in each joint, confirming appropriate placement.  A total of 0.5mL of Omnipaque was used and 9.5 mL was wasted.    The injection was then accomplished using a solution containing 1ml of 0.5% bupivacaine mixed with 20mg of dexamethasone; 1 ml of this mixture was injected in each of the two joints (2 ml of the 3 ml mixture). The needles were removed..     Hemostasis was achieved, the area was cleaned, and bandaids were placed when appropriate.  The patient tolerated the procedure well, and was taken to the recovery room.    Images were saved to PACS.    Post-procedure pain score: 3/10  Follow-up includes:   -f/u phone call in one week  -f/u with the referring provider      Clarissa Camara MD   [facility]

## 2019-02-13 NOTE — ED AVS SNAPSHOT
[facility], [place], Emergency Department  [address] [place] [id number]  Phone:  [phone]                                    Brandi Stern   MRN: [id number]    Department:  [facility], Emergency Department   Date of Visit:  2/13/2019           After Visit Summary Signature Page    I have received my discharge instructions, and my questions have been answered. I have discussed any challenges I see with this plan with the nurse or doctor.    ..........................................................................................................................................  Patient/Patient Representative Signature      ..........................................................................................................................................  Patient Representative Print Name and Relationship to Patient    ..................................................               ................................................  Date                                   Time    ..........................................................................................................................................  Reviewed by Signature/Title    ...................................................              ..............................................  Date                                               Time          22EPIC Rev 08/18

## 2019-02-13 NOTE — TELEPHONE ENCOUNTER
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  Her none glove hand was splashed with blood.  Today she found out that the patient is Hep B positive.  Brandi is concerned about the integrity of her skin, due to job she washes constantly and has dry, cracked hands with a significant hangnail on that hand.  She is concerned about exposure.    Brandi asked if [facility] could administer Hep B vaccine.  Spoke with [facility] and she advised they do not do the vaccine at clinic.    Paged on call provider for  Clinic to speak to me at [facility].  Dr. Upton is on call and on cell phone.  Contacted  page  to connect with Dr. Upton.  Paged  left message on cell at 6:00 pm. And again at 6:15 pm.    Dr. Upton called back at 6:25 pm and agrees with ER visit.    Reason for Disposition    [1] Exposure of non-intact skin (e.g., dermatitis, abrasion, wound) AND [2] with blood or body fluid containing visible blood    Additional Information    Negative: [1] Exposure to a body fluid AND [2] from needlestick or a wound from a sharp object    Negative: [1] Exposure to a body fluid AND [2] source has AIDS or is HIV positive    Protocols used: BLOOD AND BODY FLUID EXPOSURE - OCCUPATIONAL-ADULT-AH    Paula Huggins RN  [facility]

## 2019-02-14 ENCOUNTER — ANCILLARY PROCEDURE (OUTPATIENT)
Dept: GENERAL RADIOLOGY | Facility: CLINIC | Age: 64
End: 2019-02-14
Attending: ANESTHESIOLOGY
Payer: MEDICARE

## 2019-02-14 ENCOUNTER — RADIOLOGY INJECTION OFFICE VISIT (OUTPATIENT)
Dept: PALLIATIVE MEDICINE | Facility: CLINIC | Age: 64
End: 2019-02-14
Payer: MEDICARE

## 2019-02-14 VITALS — SYSTOLIC BLOOD PRESSURE: 122 MMHG | HEART RATE: 86 BPM | DIASTOLIC BLOOD PRESSURE: 85 MMHG | OXYGEN SATURATION: 97 %

## 2019-02-14 DIAGNOSIS — M47.812 ARTHROPATHY OF CERVICAL FACET JOINT: ICD-10-CM

## 2019-02-14 DIAGNOSIS — M50.30 DDD (DEGENERATIVE DISC DISEASE), CERVICAL: ICD-10-CM

## 2019-02-14 DIAGNOSIS — M47.812 FACET ARTHRITIS OF CERVICAL REGION: Primary | ICD-10-CM

## 2019-02-14 DIAGNOSIS — M48.02 CERVICAL STENOSIS OF SPINAL CANAL: ICD-10-CM

## 2019-02-14 PROCEDURE — 64490 INJ PARAVERT F JNT C/T 1 LEV: CPT | Mod: LT | Performed by: ANESTHESIOLOGY

## 2019-02-14 PROCEDURE — 64491 INJ PARAVERT F JNT C/T 2 LEV: CPT | Mod: LT | Performed by: ANESTHESIOLOGY

## 2019-02-14 NOTE — ED TRIAGE NOTES
Patient presents ambulatory to triage with c/o body fluid exposure. 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 Patient states she received a call today stating this person has been exposed to hepatitis B.

## 2019-02-14 NOTE — NURSING NOTE
Discharge Information    IV Discontiued Time:  NA    Amount of Fluid Infused:  NA    Discharge Criteria = When patient returns to baseline or as per MD order    Consciousness:  Pt is fully awake    Circulation:  BP +/- 20% of pre-procedure level    Respiration:  Patient is able to breathe deeply    O2 Sat:  Patient is able to maintain O2 Sat >92% on room air    Activity:  Moves 4 extremities on command    Ambulation:  Patient is able to stand and walk or stand and pivot into wheelchair    Dressing:  Clean/dry or No Dressing    Notes:   Discharge instructions and AVS given to patient    Patient meets criteria for discharge?  YES    Admitted to PCU?  No    Responsible adult present to accompany patient home?  Yes    Signature/Title:    Rima Clark  RN Care Coordinator  [facility] [place]

## 2019-02-14 NOTE — PATIENT INSTRUCTIONS
[facility] Procedure Discharge Instructions    Today you saw:   Dr. Clarissa Camara  Your procedure:  Cervical facet joint steroid injection     Medications used:  Lidocaine (anesthetic) Bupivacaine (anesthetic) Dexamethasone (steroid)  Omnipaque (contrast)           Be cautious when walking as numbness and/or weakness in the legs may occur up to 6-8 hours after the procedure due to effect of the local anesthetic    Do not drive for 6 hours. The effect of the local anesthetic could slow your reflexes.     Avoid strenuous activity for the first 24 hours. You may resume your regular activities after that.     You may shower, however avoid swimming, tub baths or hot tubs for 24 hours following your procedure    You may have a mild to moderate increase in pain for several days following the injection.      You may use ice packs for 10-15 minutes, 3 to 4 times a day at the injection site for comfort    Do not use heat to painful areas for 6 to 8 hours. This will give the local anesthetic time to wear off and prevent you from accidentally burning your skin.    You may use anti-inflammatory medications (such as Ibuprofen/Advil or Aleve) or Tylenol for pain control if necessary    With diabetes, check your blood sugar more frequently than usual as your blood sugar may be higher than normal for 10-14 days following a steroid injection. Contact your doctor who manages your diabetes if your blood sugar is higher than usual    Possible side effects of steroids that you may experience include flushing, elevated blood pressure, increased appetite, mild headaches and restlessness.  All of these symptoms will get better with time.    It may take up to 14 days for the steroid medication to start working although you may feel the effect as early as a few days after the procedure.     Follow up with your referring provider in 2-3 weeks      If you experience any of the following, call the pain center line during work hours at  [phone] or on-call physician after hours at [phone]:  -Fever over 100 degree F  -Swelling, bleeding, redness, drainage, warmth at the injection site  -Progressive weakness or numbness in your legs or arms  -Loss of bowel or bladder function  -Unusual headache that is not relieved by Tylenol or your regular headache medication  -Unusual new onset of pain that is not improving

## 2019-02-14 NOTE — DISCHARGE INSTRUCTIONS
Please practice safe work habits.     Please make an appointment to follow up with Employee Health Services (phone: [phone]) in 2-3 days.

## 2019-02-14 NOTE — ED PROVIDER NOTES
[place] EMERGENCY DEPARTMENT ([facility])  2/13/19   History     Chief Complaint   Patient presents with     Body Fluid Exposure     The history is provided by the patient and medical records.     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 She was removing an IV with only one hand gloved.  She states that she had a few drops of blood splash and dry onto her ungloved (right) hand.  She denies being poked or cut during this. However, she is concerned about the integrity of her skin, because due to job she washes constantly and has dry, cracked hands with a significant hangnail on that hand. Today, she found out that the source patient is hepatitis B positive and presents to the ED for post-exposure evaluation.  She is immunized against Hepatitis B.     I have reviewed the Medications, Allergies, Past Medical and Surgical History, and Social History in the [employer] system.    Past Medical History:   Diagnosis Date     Allergic rhinitis due to other allergen      Apneas, obstructive sleep      Chronic lymphocytic thyroiditis      COPD (chronic obstructive pulmonary disease) (H)      Depressive disorder      Depressive disorder, not elsewhere classified      Disorder of bone and cartilage, unspecified      Esophageal reflux      Essential hypertension, benign      Generalized osteoarthrosis, unspecified site     knees     HASHIMOTO'S THYROIDITIS 11/15/2004     HASHIMOTO'S THYROIDITIS      HASHIMOTO'S THYROIDITIS      Headache above the eye region      Headache above the eye region      Hiatal hernia      Insomnia, unspecified      Moderate persistent asthma      Nasal congestion      Pure hyperglyceridemia      Scoliosis      Snoring      Tobacco use disorder        Past Surgical History:   Procedure Laterality  Date     ABDOMEN SURGERY      GB out     ARTHROSCOPY KNEE RT/LT      left knee     BIOPSY      Colon     C TOTAL KNEE ARTHROPLASTY      bilateral     CHOLECYSTECTOMY       COLONOSCOPY  2014    Procedure: COLONOSCOPY;  Surgeon: Mau De La Fuente MD;  Location:  GI     ESOPHAGOSCOPY, GASTROSCOPY, DUODENOSCOPY (EGD), COMBINED  2014    Procedure: COMBINED ESOPHAGOSCOPY, GASTROSCOPY, DUODENOSCOPY (EGD), BIOPSY SINGLE OR MULTIPLE;  Surgeon: Mau De La Fuente MD;  Location:  GI     Gall bladder removal         Family History   Problem Relation Age of Onset     Osteoporosis Mother      Hypertension Mother      Eye Disorder Mother         Mac d.,glaucoma, cataracts     Lipids Mother      Neurologic Disorder Mother         Parkinsons     Coronary Artery Disease Mother      Cancer Father         melanoma on back     Arthritis Father      Blood Disease Father         Hemachromatosis     Genitourinary Problems Father          of renal failure     Allergies Father      C.A.D. Maternal Grandmother      Cerebrovascular Disease Maternal Grandmother      Respiratory Maternal Grandmother      Hearing Loss Maternal Grandmother      C.A.D. Maternal Grandfather      Cardiovascular Maternal Grandfather      Circulatory Maternal Grandfather      Cancer - colorectal Paternal Grandmother      Diabetes Paternal Grandmother      Cancer Paternal Grandmother         Colon     Asthma Paternal Grandmother      Cerebrovascular Disease Paternal Grandfather      Thyroid Disease Sister         Nodules/Nodules     Breast Cancer No family hx of        Social History     Tobacco Use     Smoking status: Former Smoker     Packs/day: 0.50     Years: 20.00     Pack years: 10.00     Types: Cigarettes     Start date: 10/1/1971     Last attempt to quit: 2007     Years since quittin.5     Smokeless tobacco: Never Used   Substance Use Topics     Alcohol use: Yes     Alcohol/week: 0.0 oz     Comment: 2 glasses of wine per  "week       No current facility-administered medications for this encounter.      Current Outpatient Medications   Medication     ACETAMINOPHEN EXTRA STRENGTH OR     albuterol (2.5 MG/3ML) 0.083% neb solution     albuterol (ALBUTEROL) 108 (90 BASE) MCG/ACT Inhaler     Ascorbic Acid (VITAMIN C PO)     atorvastatin (LIPITOR) 10 MG tablet     CLARITIN 10 MG OR TABS     Coenzyme Q10 (CO Q 10 PO)     cyclobenzaprine (FLEXERIL) 5 MG tablet     desvenlafaxine succinate (PRISTIQ) 100 MG 24 hr tablet     fenofibrate 145 MG tablet     levothyroxine (SYNTHROID/LEVOTHROID) 88 MCG tablet     lisinopril (PRINIVIL/ZESTRIL) 10 MG tablet     omeprazole (PRILOSEC) 20 MG CR capsule     pramipexole (MIRAPEX) 0.125 MG tablet     ranitidine (ZANTAC) 300 MG tablet     SYMBICORT 160-4.5 MCG/ACT Inhaler     traZODone (DESYREL) 50 MG tablet     triamcinolone (NASACORT AQ) 55 MCG/ACT nasal inhaler     UNABLE TO FIND     valACYclovir (VALTREX) 1000 mg tablet        Allergies   Allergen Reactions     Animal Dander      Fluconazole      rash     Liquid Adhesive      Transdermal patch adhesive. Specifically to nicotine patch; not allergic to nicotine.      Nsaids      Seasonal Allergies      Suture      Non-healing suture line     Vistaril [Hydroxyzine Hcl]      Urinary retention        Review of Systems   Skin:        Positive for blood exposure on hand   All other systems reviewed and are negative.      Physical Exam   BP: (!) 148/98  Pulse: 95  Heart Rate: 82  Temp: 98.9  F (37.2  C)  Resp: 16  Height: 165.1 cm (5' 5\")  Weight: 98.5 kg (217 lb 3.2 oz)(scale)  SpO2: 97 %      Physical Exam   Skin:   Normal right hand; no open wounds on dorsum of hand   Physical Exam   Constitutional: oriented to person, place, and time. appears well-developed and well-nourished.   HENT:   Head: Normocephalic and atraumatic.   Neck: Normal range of motion.   Pulmonary/Chest: Effort normal. No respiratory distress.   Neurological: alert and oriented to person, " place, and time.   Skin: Skin is warm and dry.   Psychiatric:  normal mood and affect.  behavior is normal. Thought content normal.       ED Course   8:10 PM  The patient was seen and examined by Crystal Mccracken in Room HW.       Procedures    Assessments & Plan (with Medical Decision Making)  Patient is a 63-year-old female who presented to the ER due to concern for occupational exposure.  Patient had some drops of blood on her right hand.  Her right hand had no open wounds and no cuts.  She did wash the hand appropriately.  This incident happened more than 24 hours ago.  Patient is immunized against hepatitis B.  Patient was not concerned about HIV.  At this point there is no postexposure prophylaxis that I would recommend.  Patient recommended to follow-up with employee health for further care.  Patient stable for discharge.       This part of the document was transcribed by Maritza Benavides, Medical Scribe.      I have reviewed the nursing notes.    I have reviewed the findings, diagnosis, plan and need for follow up with the patient.       Medication List      There are no discharge medications for this visit.         Final diagnoses:   Occupational exposure in workplace     IShlomo, am serving as a trained medical scribe to document services personally performed by Crystal Mccracken MD, based on the provider's statements to me.   Crystal PARRA MD, was physically present and have reviewed and verified the accuracy of this note documented by Shlomo Bonilla.     2/13/2019   [facility], [place], EMERGENCY DEPARTMENT     Crystal Mccracken MD  02/13/19 [id number]

## 2019-02-19 ENCOUNTER — HOSPITAL ENCOUNTER (OUTPATIENT)
Dept: MRI IMAGING | Facility: CLINIC | Age: 64
Discharge: HOME | End: 2019-02-19
Attending: NURSE PRACTITIONER | Admitting: NURSE PRACTITIONER
Payer: MEDICARE

## 2019-02-19 DIAGNOSIS — M47.816 LUMBAR FACET ARTHROPATHY: ICD-10-CM

## 2019-02-19 DIAGNOSIS — M54.16 LUMBAR RADICULOPATHY: ICD-10-CM

## 2019-02-19 PROCEDURE — 72148 MRI LUMBAR SPINE W/O DYE: CPT

## 2019-02-19 NOTE — PATIENT INSTRUCTIONS
[facility]   Post Procedure Instructions    Today you had: bursa injection      Medications used:  lidocaine   bupivicaine   kenolog         Go to the emergency room if you develop any shortness of breath    Monitor the injection sites for signs and symptoms of infection-fever, chills, redness, swelling, warmth, or drainage to areas.    You may have soreness at injection sites for up to 24 hours.    You may apply ice to the painful areas to help minimize the discomfort of the needle pokes.    Do not apply heat to sites for at least 12 hours.    You may use anti-inflammatory medications or Tylenol for pain control if necessary    Pain Clinic phone number during work hours Monday-Friday:  [phone]    After hours provider line: [phone]        
independent

## 2019-03-11 ENCOUNTER — TELEPHONE (OUTPATIENT)
Dept: FAMILY MEDICINE | Facility: CLINIC | Age: 64
End: 2019-03-11

## 2019-03-11 ENCOUNTER — MYC MEDICAL ADVICE (OUTPATIENT)
Dept: FAMILY MEDICINE | Facility: CLINIC | Age: 64
End: 2019-03-11

## 2019-03-11 DIAGNOSIS — F17.200 TOBACCO USE DISORDER: ICD-10-CM

## 2019-03-11 RX ORDER — VARENICLINE TARTRATE 1 MG/1
1 TABLET, FILM COATED ORAL 2 TIMES DAILY
Qty: 60 TABLET | Refills: 2 | Status: SHIPPED | OUTPATIENT
Start: 2019-03-11 | End: 2019-05-17

## 2019-03-11 NOTE — TELEPHONE ENCOUNTER
Cherie Brennan, NP     Patient request order for chantix - pended as last ordered 2015 - please review     Thank you,   Amanda Garcia, Registered Nurse   [facility]

## 2019-03-11 NOTE — TELEPHONE ENCOUNTER
Prior Authorization Retail Medication Request    Medication/Dose: varenicline (CHANTIX STARTING MONTH EDUARDO) 0.5 MG X 11 & 1 MG X 42 tablet  ICD code (if different than what is on RX):  Previously Tried and Failed:  Rationale:    Insurance Name:    Insurance ID:      Pharmacy Information (if different than what is on RX)  Name:  Phone:    Please include previous medications tried and failed.  Please ask insurance for medications on formulary.

## 2019-03-12 DIAGNOSIS — E78.5 HYPERLIPIDEMIA LDL GOAL <130: ICD-10-CM

## 2019-03-12 RX ORDER — ATORVASTATIN CALCIUM 10 MG/1
TABLET, FILM COATED ORAL
Qty: 90 TABLET | Refills: 0 | Status: SHIPPED | OUTPATIENT
Start: 2019-03-12 | End: 2019-04-17

## 2019-03-12 NOTE — TELEPHONE ENCOUNTER
Pt informed of medication refill.   She was also told to schedule cholesterol check and fasting labs after 3/21.  Pt verbalized understanding.       Jennifer VELÁZQUEZ     [facility]

## 2019-03-12 NOTE — TELEPHONE ENCOUNTER
"Requested Prescriptions   Pending Prescriptions Disp Refills     atorvastatin (LIPITOR) 10 MG tablet [Pharmacy Med Name: ATORVASTATIN 10 MG TABLET] 90 tablet 2     Sig: TAKE 1 TABLET BY MOUTH EVERY DAY    Statins Protocol Passed - 3/12/2019  1:32 AM       Passed - LDL on file in past 12 months    Recent Labs   Lab Test 03/21/18  1139   LDL 76            Passed - No abnormal creatine kinase in past 12 months    No lab results found.            Passed - Recent (12 mo) or future (30 days) visit within the authorizing provider's specialty    Patient had office visit in the last 12 months or has a visit in the next 30 days with authorizing provider or within the authorizing provider's specialty.  See \"Patient Info\" tab in inbasket, or \"Choose Columns\" in Meds & Orders section of the refill encounter.             Passed - Medication is active on med list       Passed - Patient is age 18 or older       Passed - No active pregnancy on record       Passed - No positive pregnancy test in past 12 months            Prescription approved per [facility] Refill Protocol.    Signed Prescriptions:                        Disp   Refills    atorvastatin (LIPITOR) 10 MG tablet        90 tab*0        Sig: TAKE 1 TABLET BY MOUTH EVERY DAY  Authorizing Provider: BENITO OMER  Ordering User: THAO GOEL      Routing to  Reception - can we give patient reminder she will want to recheck her cholesterol with fasting lab work after 3/21/19    Thank you,  Thao Goel RN    "

## 2019-03-14 NOTE — TELEPHONE ENCOUNTER
Prior Authorization Approval    Authorization Effective Date: 12/14/2018  Authorization Expiration Date: 9/10/2019  Medication: varenicline (CHANTIX STARTING MONTH EDUARDO) 0.5 MG X 11 & 1 MG X 42 tablet, REC 3-11-19  Approved Dose/Quantity:    Reference #:     Insurance Company: [employer] - Phone [phone] Fax [phone]  Expected CoPay:       CoPay Card Available:      Foundation Assistance Needed:    Which Pharmacy is filling the prescription (Not needed for infusion/clinic administered): [id number] - [place], [place] - [address] [place] [address]  Pharmacy Notified: Yes  Patient Notified: Yes

## 2019-03-14 NOTE — TELEPHONE ENCOUNTER
Central Prior Authorization Team   Phone: [phone]      PA Initiation    Medication: varenicline (CHANTIX STARTING MONTH EDUARDO) 0.5 MG X 11 & 1 MG X 42 tablet, REC 3-11-19  Insurance Company: [employer] - Phone [phone] Fax [phone]  Pharmacy Filling the Rx: [facility]ACY [id number] - [place], [place] - [address]  Filling Pharmacy Phone: [phone]  Filling Pharmacy Fax:    Start Date: 3/14/2019

## 2019-03-15 ENCOUNTER — OFFICE VISIT (OUTPATIENT)
Dept: PALLIATIVE MEDICINE | Facility: CLINIC | Age: 64
End: 2019-03-15
Payer: MEDICARE

## 2019-03-15 VITALS
BODY MASS INDEX: 36.11 KG/M2 | DIASTOLIC BLOOD PRESSURE: 83 MMHG | SYSTOLIC BLOOD PRESSURE: 114 MMHG | WEIGHT: 217 LBS | HEART RATE: 103 BPM

## 2019-03-15 DIAGNOSIS — M47.816 LUMBAR FACET ARTHROPATHY: ICD-10-CM

## 2019-03-15 DIAGNOSIS — M47.812 ARTHROPATHY OF CERVICAL FACET JOINT: Primary | ICD-10-CM

## 2019-03-15 DIAGNOSIS — Z71.6 TOBACCO ABUSE COUNSELING: ICD-10-CM

## 2019-03-15 DIAGNOSIS — Z72.0 TOBACCO ABUSE: ICD-10-CM

## 2019-03-15 PROCEDURE — 99214 OFFICE O/P EST MOD 30 MIN: CPT | Performed by: NURSE PRACTITIONER

## 2019-03-15 ASSESSMENT — PAIN SCALES - GENERAL: PAINLEVEL: SEVERE PAIN (6)

## 2019-03-15 NOTE — PROGRESS NOTES
[facility]    CHIEF COMPLAINT: neck and low back pain    INTERVAL HISTORY:  Last seen on 2/1/19.        Recommendations/plan at the last visit included:     1. Schedule follow-up with ELISE Persaud NP-C in 4-8 weeks  2. Imaging: MRI of lumbar spine  3. Procedures recommended:   1. Cervical injections, we will call to schedule    4. Medication recommendations:             1. No change to current medications    Since last visit:   - Had left C3-4 C4-5 facet injections on 2/14/19. Felt good immediately after but pain came back pretty quickly. She would like to try MBB/RFA    Pain Information:   Pain quality: Aching, Exhausting and Miserable    Pain rating: intensity ranges from 2/10 to 9/10, and averages 6/10 on a 0-10 scale.      Annual Controlled Substance Agreement/UDS due date: N/A     CURRENT RELEVANT PAIN MEDICATIONS:   Tylenol arthritis strength 650 mg, 4 tablets daily  Capsaicin cream: rare use   Gabapentin 100-300 at HS  Flexeril 5-10 mg at HS  Ambien 10 mg 1/2-1 tablet at HS, a few times per week.       Patient is using the medication as prescribed:  YES  Is your medication helpful?               YES   Medication side effects? no side effect          Previous Pain Relevant Medications: (H--helped; HI--Helped initially; SWH--Somewhat helpful; NH--No help; W--worse; SE--side effects; ?--Unsure if helpful)   NOTE: This medication information taken from patient's intake form, not medical records.                         Opiates: Hydrocodone: H, hydromorphone: H, given postoperatively, morphine, H: given postoperatively and in ED, tramadol:NH                        NSAIDS: Celebrex: H, ibuprofen:[facility], Relafen:NH                        Muscle Relaxants: Cyclobenzaprine:SE sedation                        Anti-migraine mediations: None                        Anti-depressants: Citalopram: NH, bupropion: Just started, too soon to tell                        Sleep aids: Ambien: H for  sleep                        Anti-convulsants: Gabapentin: SE, sedation, pregabalin: NH                        Topicals: Diclofenac gel:NH                        Other medications not covered above: none      Any illicit drug use: Denies  Any use of prescriptions other than how they were prescribed:denies  [place] Board  Pharmacy Data Base Reviewed:    YES; No concern for abuse or misuse of controlled medications based on this report.       SELF CARE:   How often do you practice SELF-CARE (relaxing, stretching, pacing, monitoring posture, taking mini-breaks) in a typical day:  Few times daily     THE 4 As OF OPIOID MAINTENANCE ANALGESIA    Analgesia: Is pain relief clinically significant? N/A   Activity: Is patient functional and able to perform Activities of Daily Living? N/A   Adverse effects: Is patient free from adverse side effects from opiates? N/A   Adherence to Rx protocol: Is patient adhering to Controlled Substance Agreement and taking medications ONLY as ordered? N/A     Is Narcan prescribed for opiate use >50 MME daily? N/A     [place] Board  Pharmacy Data Base Reviewed:    YES; No concern for abuse or misuse of controlled medications based on this report.          Medications:  Current Outpatient Medications   Medication Sig Dispense Refill     ACETAMINOPHEN EXTRA STRENGTH OR Pt reports taking 650 MG arthritis       albuterol (2.5 MG/3ML) 0.083% neb solution Use every 4 hours as needed for wheezing and shortness of breath 3 mL 12     albuterol (ALBUTEROL) 108 (90 BASE) MCG/ACT Inhaler Inhale 1-2 puffs into the lungs every 6 hours as needed for shortness of breath / dyspnea 1 Inhaler 1     Ascorbic Acid (VITAMIN C PO) Take 1,000 mg by mouth daily       atorvastatin (LIPITOR) 10 MG tablet TAKE 1 TABLET BY MOUTH EVERY DAY 90 tablet 0     CLARITIN 10 MG OR TABS 1 TAB PO QD (Once per day) as needed for ALLERGY SYMPTOMS 0 0     Coenzyme Q10 (CO Q 10 PO) Take 200 mg by mouth daily         cyclobenzaprine (FLEXERIL) 5 MG tablet TAKE 1-2 TABLETS (5-10 MG) BY MOUTH 3 TIMES DAILY AS NEEDED FOR MUSCLE SPASMS 180 tablet 1     desvenlafaxine succinate (PRISTIQ) 100 MG 24 hr tablet Take 1 tablet (100 mg) by mouth daily 90 tablet 3     fenofibrate 145 MG tablet TAKE 1 TABLET BY MOUTH EVERY DAY 90 tablet 3     levothyroxine (SYNTHROID/LEVOTHROID) 88 MCG tablet Take 1 tablet (88 mcg) by mouth daily 90 tablet PRN     lisinopril (PRINIVIL/ZESTRIL) 10 MG tablet Take 1 tablet (10 mg) by mouth daily 90 tablet PRN     omeprazole (PRILOSEC) 20 MG CR capsule TAKE 1 TABLET (20 MG) BY MOUTH 2 TIMES DAILY TAKE 30-60 MINUTES BEFORE A MEAL. 180 capsule 3     pramipexole (MIRAPEX) 0.125 MG tablet Take two tabs at dinner and one at hs 270 tablet PRN     ranitidine (ZANTAC) 300 MG tablet TAKE 1 TABLET (300 MG) BY MOUTH 2 TIMES DAILY 60 tablet 5     SYMBICORT 160-4.5 MCG/ACT Inhaler Inhale 1 puff into the lungs 2 times daily       triamcinolone (NASACORT AQ) 55 MCG/ACT nasal inhaler Inhale 2 sprays in both nostrils every day as needed 1 Inhaler 7     UNABLE TO FIND MEDICATION NAME: Allergy shots       valACYclovir (VALTREX) 1000 mg tablet Take 1 tablet (1,000 mg) by mouth 2 times daily for 10 days 20 tablet 0     varenicline (CHANTIX STARTING MONTH PAK) 0.5 MG X 11 & 1 MG X 42 tablet 0.5 mg daily for three days then 0.5 mg BID days 4-7 then 1.0mg BID (Patient not taking: Reported on 3/15/2019) 53 tablet 0     varenicline (CHANTIX) 1 MG tablet Take 1 tablet (1 mg) by mouth 2 times daily (Patient not taking: Reported on 3/15/2019) 60 tablet 2       Review of Systems: A 10-point review of systems was negative, with the exception of chronic pain issues.      Social History: Reviewed; unchanged from previous consultation.      Family history: Reviewed; unchanged from previous consultation.     PHYSICAL EXAM    Vitals:    03/15/19 1354   BP: 114/83   Pulse: 103   Weight: 98.4 kg (217 lb)       Constitutional: healthy, alert and no  distress  HEENT: Head atraumatic, normocephalic. Eyes without conjunctival injection or jaundice. Neck with reduced ROM. No obvious neck masses.  Cardiovascular: No edema or JVD appreciated. Peripheral pulses are palpable, no edema on bilateral lower extremities  Skin: No rash, lesions, or petechiae of exposed skin.   Extremities: No clubbing, cyanosis, or edema to exposed extremities  Psychiatric/mental status: Alert, without lethargy or stupor. Appropriate affect. Mood normal.   Neurologic exam:  CN:  Cranial nerves 2-12 are grossly normal.  Motor:  4/5 UE and LE strength     Musculoskeletal exam:  Cervical spine:                       Flex:  10 degrees, painful                        Ext: 20 degrees, painful                        Rotation to right: 20 degrees, painful                        Rotation to left: 20 degrees, painful                        Tenderness in the cervical spine at midline. No                       Tenderness in the cervical paraspinal muscles. No  Thoracic spine:                        Kyphosis. Yes                       Tenderness in the thoracic spine at midline. No                       Tenderness in the thoracic paraspinal muscles. No  Lumbar/Sacral spine:                       Forward Flexion:  90 degrees, painful                        Ext: 30 degrees, painful                        Rotation/ext to right: painful                        Rotation/ext to left: painful                        Lordosis. Yes                       Tenderness in the lumbar spine at midline. No                       Tenderness in the lumbar paraspinal muscles.No     Psychiatric:  mentation appears normal., affect and mood normal     DIRE Score for ongoing opioid management is calculated as follows:    Diagnosis = 2 pts (slowly progressive; moderate pain/objective findings)    Intractability = 2 pts (most treatments tried; patient not fully engaged/barriers)    Risk        Psych = 3 pts (no significant  personality dysfunction/mental illness; good communication with clinic)         Chem Hlth = 2 pts (use of medications to cope with stress; chemical dependency in remission) (?)       Reliability = 3 pts (highly reliable with meds, appointments, treatments)       Social = 3 pts (supportive family/close relationships; involved in work/school; no isolation)       (Psych + Chem hlth + Reliability + Social) = 15    Efficacy = 2 pts (moderate benefit/function; low med dose; too early/not tried meds)    DIRE Score = 17        7-13: likely NOT suitable candidate for long-term opioid analgesia       14-21: may be a suitable candidate for long-term opioid analgesia        Previous Diagnostic Tests:   Imaging Studies:   MR CERVICAL SPINE W/O CONTRAST 8/4/2017 9:20 AM  Provided History: Chronic changes of right C7 and C8 radiculopathies.  Comparison: Cervical spine radiograph 1/12/2017, MR cervical spine  2/8/2016  Technique: Sagittal T1-weighted, sagittal T2-weighted, sagittal STIR,  sagittal diffusion weighted, axial T2-weighted, and axial T2* gradient  echo images of the cervical spine were obtained without intravenous  contrast.  Findings:Images are mildly degraded by motion artifact.   Approximately 2 mm degenerative retrolisthesis of C4 on C5.  Straightening of the normal cervical lordosis. Normal cord signal. No  abnormal vertebral marrow edema. No cord signal abnormality.  Multilevel disc degeneration, uncinate spurring and facet arthropathy.  Paraspinous soft tissues are unremarkable.  The findings on a level by level basis are as follows:  C2-3: No spinal canal or neural foraminal stenosis.  C3-4: Disc bulge. Uncinate spurring and facet arthropathy. Moderate  left and mild/moderate right neural foraminal stenosis. No significant  spinal canal stenosis.  C4-5:  Eccentric left disc osteophyte complex with flattening of the  ventral aspect of the cord. Bilateral uncinate spurring and facet  hypertrophy. Ligament flavum  thickening. Moderate spinal canal  bilateral neural foraminal stenosis.  C5-6:  Disc osteophyte complex. Bilateral facet hypertrophy and  uncinate spurring. Ligamentum flavum thickening. Moderate spinal canal  and bilateral neural foraminal stenosis.  C6-7:  Dorsal osteophytic spurring. Uncinate spurring and facet  hypertrophy. Moderate spinal canal stenosis. Mild/moderate bilateral  neural foraminal stenosis.  C7-T1:  Disc osteophyte complex. Facet hypertrophy and uncinate  spurring. Moderate right and mild left neural foraminal stenosis. No  significant spinal canal stenosis.  T1-T2: Disc bulge with superimposed right foraminal protrusion.  Moderate right neural foraminal stenosis.  T2-T3: Right foraminal disc protrusion. Mild right neural foraminal  Stenosis.  Impression:   Multilevel cervical spondylosis with degenerative retrolisthesis of C4  on C5. Moderate spinal canal stenosis C4-C7 and moderate bilateral  neural foraminal stenosis C4-C6 and on the right C7-T2. No cord signal  abnormality.     MR LUMBAR SPINE W/O CONTRAST, 4/5/2016 3:55 PM.  History: low back pain, lumbago with sciatica, right side.  Comparison: plain film 6/29/2012.  Technique: Sagittal T1-weighted, T2-weighted, STIR, diffusion and ADC,  and axial T2-weighted T2*-weighted images of the lumbar spine were  obtained without intravenous contrast.  Findings:   There are 5 lumbar-type vertebrae assumed for the purposes of this  dictation. The tip of the conus medullaris is at L1. The normal lumbar  lordotic curvature is mildly exaggerated. There is subtle  retrolisthesis L2 on L3. Extensive multilevel degenerative change with  multilevel disc space narrowing and loss of normal T2 signal. Type I  Modic degenerative endplate signal at L2-L3. Type II Modic  degenerative endplate signal at several other levels, most prominently  at L1-L2 and L3-L4. Small T12 inferior endplate hemangioma. No acute  fracture or dislocation.  On a level by level  basis:  L1-2:   Posterior disc osteophyte complex. Mild spinal canal  narrowing. No significant neural foraminal narrowing. Bilateral facet  arthropathy with a small right facet effusion.  L2-3:   Posterior disc osteophyte complex. Mild to moderate narrowing  of the thecal sac, particularly in the transverse dimension secondary  to epidural fat. No significant neural foraminal narrowing. Facet  arthropathy.  L3-4:   Posterior disc osteophyte complex with small central disc  extrusion and fissuring. Prominent epidural fat with moderate to  severe narrowing of the thecal sac and effacement of the CSF signal.  Mild left neural foraminal narrowing. Degenerative facet hypertrophy.  L4-5:   Posterior disc osteophyte complex. Prominent epidural fat with  moderate narrowing of the thecal sac. Mild bilateral neural foraminal  narrowing. Facet arthropathy.  L5-S1:  Posterior disc osteophyte complex. Mildly prominent epidural  fat with multiple moderate narrowing of the thecal sac. Mild to  moderate bilateral neural foraminal narrowing. Facet arthropathy.  Atrophy of the posterior paraspinal musculature. No focal fluid  collections.  Impression:  Extensive multilevel degenerative changes in the lumbar  spine with multilevel posterior disc osteophyte complexes and  prominent epidural fat resulting in moderate constriction of the  thecal sac at L3-L4. The spinal canal itself however is not narrowed.     Exam: Single frontal view of both hips and a frog-leg lateral view of  the left hip dated 9/4/2018.  COMPARISON: 6/29/2012.  CLINICAL HISTORY: Hip pain.  FINDINGS: Single frontal view of both hips and a frog-leg lateral view  of the left hip was obtained. Degenerative disc disease in the  visualized lower lumbar spine. No femoral head collapse. No displaced  fractures. Mild joint space narrowing with minimal spurring at the  femoral head and neck junction.  IMPRESSION: Mild degenerative changes in both hips, as  above.     ASSESSMENT:   1.  Cervical spondylosis and stenosis  2.  Lumbar facet arthropathy, DDD, DJD  3.  Bilateral hip osteoarthritis, bilateral hip bursitis L>R  4.  Chronic depression      Brandi Elkins presents for follow-up regarding her cervical injection and to review lumbar MRI.  The cervical injection was very helpful but only for a few days.  She would like to pursue medial branch block and radiofrequency ablation.  That referral is provided.  We reviewed the MRI in detail.  No her lumbar pain is primarily axial and increases with twisting and flexing.  She would like to proceed with lumbar facet injections however would like to work on the medial branch blocks first.      Tobacco use: Tobacco cessation addressed at this visit    Plan:    Diagnosis reviewed, treatment option addressed, and risk/benifits discussed.  Self-care instructions given.  I am recommending a multidisciplinary treatment plan to help this patient better manage pain.        1.   Schedule follow-up with ELISE Persaud, NP-C as needed   2. Procedures recommended: Cervical medial branch block/radio ablation ablation & lumbar facet injections   3. Medication recommendations: No change to current medications.       Total tim30e spent face to face was 30 minutes and more than 50% of face to face time was spent in counseling and/or coordination of care regarding the diagnosis and recommendations above.      ELISE Zhu, NP-C   [facility]    Disclaimer: This note consists of symbols derived from keyboarding, dictation and/or voice recognition software. As a result, there may be errors in the script that have gone undetected. Please consider this when interpreting information found in this chart.

## 2019-03-15 NOTE — PATIENT INSTRUCTIONS
After Visit Instructions:     Thank you for coming to [facility] for your care. It is my goal to partner with you to help you reach your optimal state of health.     Continue daily self-care, identifying contributing factors, and monitoring variations in pain level. Continue to integrate self-care into your life.      1. Schedule follow-up with ELISE Persaud NP-C as needed   2. Procedures recommended: Cervical medial branch block/radio ablation ablation & lumbar facet injections   3. Medication recommendations: No change to current medications.     ELISE Zhu, NP-C  [facility]  Clinic Number:  [phone]              HOW TO QUIT SMOKING  Smoking is one of the hardest habits to break. About half of all those who have ever smoked have been able to quit, and most of those (about 70%) who still smoke want to quit. Here are some of the best ways to stop smoking.     KEEP TRYING:  It takes most smokers about 8 tries before they are finally able to fully quit. So, the more often you try and fail, the better your chance of quitting the next time! So, don't give up!    GO COLD TURKEY:  Most ex-smokers quit cold turkey. Trying to cut back gradually doesn't seem to work as well, perhaps because it continues the smoking habit. Also, it is possible to fool yourself by inhaling more while smoking fewer cigarettes. This results in the same amount of nicotine in your body!    GET SUPPORT:  Support programs can make an important difference, especially for the heavy smoker. These groups offer lectures, methods to change your behavior and peer support. Call the free national Quitline for more information. 800-QUIT-NOW (188-804-9534). Low-cost or free programs are offered by many hospitals, local chapters of the American Lung Association (201-400-5730) and the American Cancer Society (508-503-1596). Support at home is important too. Non-smokers can help by  offering praise and encouragement. If the smoker fails to quit, encourage them to try again!    OVER-THE-COUNTER MEDICINES:  For those who can't quit on their own, Nicotine Replacement Therapy (NRT) may make quitting much easier. Certain aids such as the nicotine patch, gum and lozenge are available without a prescription. However, it is best to use these under the guidance of your doctor. The skin patch provides a steady supply of nicotine to the body. Nicotine gum and lozenge gives temporary bursts of low levels of nicotine. Both methods take the edge off the craving for cigarettes. WARNING: If you feel symptoms of nicotine overdose, such as nausea, vomiting, dizziness, weakness, or fast heartbeat, stop using these and see your doctor.    PRESCRIPTION MEDICINES:  After evaluating your smoking patterns and prior attempts at quitting, your doctor may offer a prescription medicine such as bupropion (Zyban, Wellbutrin), varenicline (Chantix, Champix), a niocotine inhaler or nasal spray. Each has its unique advantage and side effects which your doctor can review with you.    HEALTH BENEFITS OF QUITTING:  The benefits of quitting start right away and keep improving the longer you go without smokin minutes: blood pressure and pulse return to normal  8 hours: oxygen levels return to normal  2 days: ability to smell and taste begins to improve as damaged nerves start to regrow  2-3 weeks: circulation and lung function improves  1-9 months: decreased cough, congestion and shortness of breath; less tired  1 year: risk of heart attack decreases by half  5 years: risk of lung cancer decreases by half; risk of stroke becomes the same as a non-smoker  For information about how to quit smoking, visit the following links:  National Cancer [place] ,   Clearing the Air, Quit Smoking Today   - an online booklet. http://www.smokefree.gov/pubs/clearing_the_air.pdf  Smokefree.gov http://smokefree.gov/  QuitNet  http://www.quitnet.com/    [phone] [employer]arrita Rabago, [address], [place] [address]. All rights reserved. This information is not intended as a substitute for professional medical care. Always follow your healthcare professional's instructions.    The Benefits of Living Smoke Free  What do you want to gain from quitting? Check off some reasons to quit.  Health Benefits  ___ Reduce my risk of lung cancer, heart disease, chronic lung disease  ___ Have fewer wrinkles and softer skin  ___ Improve my sense of taste and smell  ___ For pregnant women--reduce the risk of having a miscarriage, stillbirth, premature birth, or low-birth-weight baby  Personal Benefits  ___ Feel more in control of my life  ___ Have better-smelling hair, breath, clothes, home, and car  ___ Save time by not having to take smoke breaks, buy cigarettes, or hunt for a light  ___ Have whiter teeth  Family Benefits  ___ Reduce my children s respiratory tract infections  ___ Set a good example for my children  ___ Reduce my family s cancer risk  Financial Benefits  ___ Save hundreds of dollars each year that would be spent on cigarettes  ___ Save money on medical bills  ___ Save on life, health, and car insurance premiums    Those Dollars Add Up!  Cigarettes are expensive, and getting more expensive all the time. Do you realize how much money you are spending on cigarettes per year? What is the average amount you spend on a pack of cigarettes? What is the average number of packs that you smoke per day? Using your answers to these questions, fill in this formula to help you find out:  ($ _____ per pack) ×  ( _____ number of packs per day) × (365 days) =  $ _____ yearly cost of smoking  Besides tobacco, there are other costs, including extra cleaning bills and replacement costs for clothing and furniture; medical expenses for smoking-related illnesses; and higher health, life, and car insurance premiums.    Cigars and Pipes Count Too!  Cigars  and pipes are also dangerous. So are smokeless (chewing) tobacco and snuff. All of these products contain nicotine, a highly addictive substance that has harmful effects on your body. Quitting smoking means giving up all tobacco products.      [id number] [employer]arrit[employer] Rabago, [address] [place] [address], [place] [address]. All rights reserved. This information is not intended as a substitute for professional medical care. Always follow your healthcare professional's instructions.

## 2019-03-16 ENCOUNTER — TELEPHONE (OUTPATIENT)
Dept: PALLIATIVE MEDICINE | Facility: CLINIC | Age: 64
End: 2019-03-16

## 2019-03-16 NOTE — TELEPHONE ENCOUNTER
Left VM for patient to schedule CMBB and bilateral L4-5 and L5-S1 facet joint injections        Ayana KUMARI    [facility]

## 2019-03-19 NOTE — TELEPHONE ENCOUNTER
PATIENT HAS NOT SCHEDULED LUMBAR FACET INJS, WOULD LIKE TO WAIT       Pre-screening questions for MBB Injections:    Injection to be done at which interventional clinic site? [facility]     Instruct patient to arrive as directed prior to the scheduled appointment time:    [place] and [place]: 30 minutes before        Procedure ordered by Dr. LÓPEZ    Procedure ordered? Cervical Medial Branch Block    What insurance would patient like us to bill for this procedure? MEDICARE      [employer]- Any injection DO NOT SCHEDULE and route to Ayana Cruz.      [employer] insurance - If scheduling an SI joint injection DO NOT SCHEDULE and route to Ayana Cruz.          MBB's must be scheduled at LEAST two weeks apart for insurance purposes       [employer] - Any injection besides hip/shoulder/knee joint DO NOT SCHEDULE and route to Ayana Cruz. She will obtain PA and call pt back to schedule procedure or notify pt of denial.       [employer]- PA required for all MBB's    Any chance of pregnancy? NO   If YES, do NOT schedule and route to RN pool    Is an  needed? No     Patient has a drive home? (mandatory) No     Is patient taking any blood thinners (plavix, coumadin, jantoven, warfarin, heparin, pradaxa or dabigatran )? No    If hold needed, do NOT schedule, route to RN pool     Is patient taking any aspirin products? No     If more than 325mg/day do NOT schedule; route to RN pool     For CERVICAL procedures, hold all aspirin products for 6 days.      Does the patient have a bleeding or clotting disorder? No    If YES, okay to schedule AND route to RN nurse pool    **For any patients with platelet count <100, must be forwarded to provider**    Is patient diabetic? NO If YES, have them bring their glucometer.    Does patient have an active infection or treated for one within the past week? No    Is patient currently taking any antibiotics?  No    For patients on chronic, preventative, or prophylacti  antibiotics, procedures can be scheduled.     For patients on antibiotics for active or recent infection:    [place], [place], [place]-antibiotic course must have been completed for 4 days     Is patient currently taking any steroid medications? (i.e. Prednisone, Medrol)  No     For patients on steroid medications:    [place], [place]-steroid course must have been completed for 4 days    Review with patient:  If you are started on any steroids or antibiotics between now and your appointment, you must contact us because it may affect our ability to perform your procedure     Is patient actively being treated for cancer or immunocompromised? No   If YES, do NOT schedule and route to RN    Are you able to get on and off an exam table with minimal or no assistance? Yes   If NO, do NOT schedule and route to RN    Are you able to roll over and lay on your stomach with minimal or no assistance? Yes   If NO, do NOT schedule and route to RN    Any allergies to contrast dye, iodine, shellfish, or numbing and steroid medications? No  (If so, inform nursing and note in scheduling comments.)    Allergies: Animal dander; Fluconazole; Liquid adhesive; Nsaids; Seasonal allergies; Suture; and Vistaril [hydroxyzine hcl]     Has the patient had a flu shot or any other vaccinations within 7 days before or after the procedure.  No     Does patient have an MRI/CT?  YES:   (SI joint, hip injections, lumbar sympathetic blocks, and stellate ganglion blocks do not require an MRI)    Was the MRI done w/in the last 3 years?  Yes    Was MRI done at [facility]? Yes      If not, where was it done? N/A       If MRI was not done at [facility], [facility] or [facility] do NOT schedule and route to nursing.  If pt has an imaging disc, the injection may be scheduled but pt has to bring disc to appt. If they show up w/out disc the injection cannot be done    **Must be scheduled with elapsed time interval of at  least 2 weeks and not more than 6 months between the First MBB and the Second MBB**       Medial Branch Block Pre-Procedure Instructions    It is okay to take long acting pain medications (if you are on them) the day of the procedure but try not to take any short acting medications unless absolutely necessary. INFORMED        Long acting meds would include: Gabapentin (Neurontin), MS Contin, Oxycontin        Short acting meds would include:  Percocet, Oxycodone, Vicodin, Ibuprofen     The day of the procedure, you should try to do things that provoke your pain, since the injection is being done to see if it will relieve your pain . INFORMED    If your pain level is a 4 out of 10 or less on the day of the procedure, please call [phone] to reschedule.  INFORMED  Reminders (please tell patient if applicable):      Instructed pt to arrive 30 minutes early for IV start if this is for a cervical procedure, ALL sympathetic (stellate ganglion, hypogastric, or lumbar sympathetic block) and all sedation procedures (RFA, spinal cord stimulation trials). INFORMED        -IVs are not routinely placed for Dr. Camara cervical cases        -Dr. Ceballos: no IV needed for CMBB       If NPO for sedation, it is okay to take medications with sips of water (except if they are to hold blood thinners).    *DO take blood pressure medication if it is prescribed*      If this is for a cervical MBB aspirin needs to be held for 6 days.           Do not schedule procedures requiring IV placement in the first appointment of the day or first appointment after lunch. Do NOT schedule at 0745, 0815 or 1245.            For patients 85 or older we recommend having an adult stay w/ them for the remainder of the day.      Does the patient have any questions?

## 2019-03-19 NOTE — PROGRESS NOTES
[facility] - Procedure Note    Date of Service: 3/20/2019    Procedure performed: Left C3,4,5 medial branch blocks  Diagnosis: Cervical spondylosis; Cervical facet arthropathy  : Clarissa Camara MD & Hans Munoz DO (pain fellow)     Indications: Brandi Stern is a 63 year old female who is seen at the request of Gia Stroud CNP for cervical medial branch blocks. The patient describes pain with neck extension/rotation. The patient reports minimal improvement with conservative treatment, including PT and meds.    The patient is S/P left C3-4 & C4-5 facet joint injections on 2/14/2019 by myself that gave her minimal pain relief.     CERVICAL MRI was done on 8/4/2017 which showed   Findings:  Images are mildly degraded by motion artifact.      Approximately 2 mm degenerative retrolisthesis of C4 on C5.  Straightening of the normal cervical lordosis. Normal cord signal. No  abnormal vertebral marrow edema. No cord signal abnormality.  Multilevel disc degeneration, uncinate spurring and facet arthropathy.  Paraspinous soft tissues are unremarkable.     The findings on a level by level basis are as follows:     C2-3: No spinal canal or neural foraminal stenosis.     C3-4: Disc bulge. Uncinate spurring and facet arthropathy. Moderate  left and mild/moderate right neural foraminal stenosis. No significant  spinal canal stenosis.     C4-5:  Eccentric left disc osteophyte complex with flattening of the  ventral aspect of the cord. Bilateral uncinate spurring and facet  hypertrophy. Ligament flavum thickening. Moderate spinal canal  bilateral neural foraminal stenosis.     C5-6:  Disc osteophyte complex. Bilateral facet hypertrophy and  uncinate spurring. Ligamentum flavum thickening. Moderate spinal canal  and bilateral neural foraminal stenosis.     C6-7:  Dorsal osteophytic spurring. Uncinate spurring and facet  hypertrophy. Moderate spinal canal stenosis. Mild/moderate bilateral  neural  foraminal stenosis.     C7-T1:  Disc osteophyte complex. Facet hypertrophy and uncinate  spurring. Moderate right and mild left neural foraminal stenosis. No  significant spinal canal stenosis.     T1-T2: Disc bulge with superimposed right foraminal protrusion.  Moderate right neural foraminal stenosis.     T2-T3: Right foraminal disc protrusion. Mild right neural foraminal  stenosis.                                                                    Impression:   Multilevel cervical spondylosis with degenerative retrolisthesis of C4  on C5. Moderate spinal canal stenosis C4-C7 and moderate bilateral  neural foraminal stenosis C4-C6 and on the right C7-T2. No cord signal  abnormality.    Allergies:      Allergies   Allergen Reactions     Animal Dander      Fluconazole      rash     Liquid Adhesive      Transdermal patch adhesive. Specifically to nicotine patch; not allergic to nicotine.      Nsaids      Seasonal Allergies      Suture      Non-healing suture line     Vistaril [Hydroxyzine Hcl]      Urinary retention        Vitals:  /85   Pulse 98   LMP  (LMP Unknown)   SpO2 95%     Review of Systems: The patient denies recent fever, chills, illness, use of antibiotics or anticoagulants. All other 10-point review of systems negative.     Procedure:   Options/alternatives, benefits and risks were discussed with the patient including bleeding, infection, tissue trauma, exposure to radiation, reaction to medications, spinal cord injury, weakness, numbness and paralysis.  Questions were answered to her satisfaction and she agrees to proceed. Voluntary informed consent was obtained and signed.     After getting informed consent, patient was brought into the procedure suite and was placed in a side-lying position opposite the side of the procedure on the procedure table. A Pause for the Cause was performed. Patient was prepped and draped in sterile fashion.     Under AP fluoroscopic guidance the Left  C3, C4 and C5  articular pillars were identified. The C-arm was rotated to afford optimal visualization. Under intermittent fluoroscopy, a 22 gauge 1.5 inch needle was advanced slowly until it had contact on the mid portion of the articular pillar. Then, the two other needles of the same size and gauge were placed under fluorsoscopic guidance using the same technique. The needle positions were verified and optimized from the AP and lateral views.    The anatomic targets for the C3, C4 and C5 medial nerves were the C3, C4 and C5 articular pillars, resulting in blockade of the C3-C4 and C4-C5 facet joints.    After negative aspiration, bupivacaine 0.5% 0.5 ml was injected at each location.  The needles were removed. Hemostasis was achieved, the area was cleaned, and bandaids were placed when appropriate. The patient tolerated the procedure well, and was taken to the recovery room. Images were saved to PACS.    Assessment/Plan: Brandi Stern is a 63 year old female s/p Left C3,4,5 medial branch blocks today for cervical spondylosis, facet arthropathy.     Pre procecedure pain score: 7/10   Post procedure pain score: 0/10.   The patient will continue to monitor progress, and they were given a pain diary to complete at home.  They will either fax or mail this back to us or bring it to their next appointment. We will determine the treatment plan after we review the diary.      1. The patient was advised to contact the [facility] for any of the following:   Fever, chills, or night sweats   New onset of pain, numbness, or weakness   Any questions/concerns regarding the procedure  If unable to contact the Pain Center, the patient was instructed to go to a local Emergency Room for any complications.   2. The patient will receive a follow-up call in 1 week.  3. We will await the pain diary to determent next step of the treatment plan and call patient to schedule.    Clarissa Camara MD   [facility]

## 2019-03-20 ENCOUNTER — ANCILLARY PROCEDURE (OUTPATIENT)
Dept: GENERAL RADIOLOGY | Facility: CLINIC | Age: 64
End: 2019-03-20
Attending: ANESTHESIOLOGY
Payer: MEDICARE

## 2019-03-20 ENCOUNTER — RADIOLOGY INJECTION OFFICE VISIT (OUTPATIENT)
Dept: PALLIATIVE MEDICINE | Facility: CLINIC | Age: 64
End: 2019-03-20
Payer: MEDICARE

## 2019-03-20 VITALS — OXYGEN SATURATION: 98 % | SYSTOLIC BLOOD PRESSURE: 124 MMHG | HEART RATE: 93 BPM | DIASTOLIC BLOOD PRESSURE: 86 MMHG

## 2019-03-20 DIAGNOSIS — M47.812 ARTHROPATHY OF CERVICAL FACET JOINT: Primary | ICD-10-CM

## 2019-03-20 DIAGNOSIS — M47.812 FACET ARTHROPATHY, CERVICAL: ICD-10-CM

## 2019-03-20 PROCEDURE — 64490 INJ PARAVERT F JNT C/T 1 LEV: CPT | Mod: LT | Performed by: ANESTHESIOLOGY

## 2019-03-20 PROCEDURE — 64491 INJ PARAVERT F JNT C/T 2 LEV: CPT | Mod: LT | Performed by: ANESTHESIOLOGY

## 2019-03-20 NOTE — NURSING NOTE
Discharge Information    IV Discontiued Time:  NA    Amount of Fluid Infused:  NA    Discharge Criteria = When patient returns to baseline or as per MD order    Consciousness:  Pt is fully awake    Circulation:  BP +/- 20% of pre-procedure level    Respiration:  Patient is able to breathe deeply    O2 Sat:  Patient is able to maintain O2 Sat >92% on room air    Activity:  Moves 4 extremities on command    Ambulation:  Patient is able to stand and walk or stand and pivot into wheelchair    Dressing:  Clean/dry or No Dressing    Notes:   Discharge instructions and AVS given to patient    Patient meets criteria for discharge?  YES    Admitted to PCU?  No    Responsible adult present to accompany patient home?  Yes    Signature/Title:    Cherie Alonzo RN Care Coordinator  [facility]

## 2019-03-20 NOTE — PATIENT INSTRUCTIONS
[facility]   Medial Branch Block Discharge Instructions      Your procedure was performed by:  Dr. Clarissa Camara       Medications used: lidocaine, bupivicaine omnipaque normal saline     You will need to complete the Pain Scale Log form and return it to us as soon as possible.  Once we have received the form, we will review it and call you to determine the next steps.     The form can be faxed to [phone] or mailed to:   [facility] - [facility]    [id number] [place] [address], [address] [id number]      You may resume your regular activity after the injection.    If you were holding your blood thinning medication, please restart taking it: N/A    Be cautious with walking since you may have numbness and/or weakness in the lower extremities for up to 6-8 hours after the procedure due to the effects of the local anesthetic.    Avoid driving for 6 hours. The local anesthetic could slow your reflexes    You may shower, however no swimming or tub baths or hot tubs for 24 hours following your procedure.    Your pain will return after the numbing medications have worn off.  You may use your current pain medications as needed.    You may use anti-inflammatory medications (such as ibuprofen, aleve, or advil) or Tylenol for pain control if necessary.  Some people find it helpful to alternate ibuprofen and Tylenol every 3 hours for a couple of days.    You may use ice packs 10-15 minutes three to four times a day at the injection site for comfort.     Do not use heat to painful areas for 6 to 8 hours. This will give the local anesthetic time to wear off and prevent you from accidentally burning your skin.     If you experience any of the following, call the Pain Clinic during work hours at [phone] or the Provider Line after hours at [phone]:  -Fever over 100 degree F  -Swelling, bleeding, redness, drainage, warmth at the injection  site  -Progressive weakness or numbness on your legs or arms  -If lumbar, call if you have a loss of bowel or bladder function  -If cervical, call if you have any unusual headache that is not relieved by Tylenol  -Unusual new onset of pain that is not improving

## 2019-03-20 NOTE — NURSING NOTE
Pre-procedure Intake    Have you been fasting? No     If yes, for how long?     Are you taking a prescribed blood thinner such as coumadin, Plavix, Xarelto?    No    If yes, when did you take your last dose?    Do you take aspirin?  No    If cervical procedure, have you held aspirin for 6 days?   NA    Do you have any allergies to contrast dye, iodine, steroid and/or numbing medications?  NO    Are you currently taking antibiotics or have an active infection?  NO    Have you had a fever/elevated temperature within the past week? NO    Are you currently taking oral steroids? NO    Do you have a ? no       Are you pregnant or breastfeeding?  NO    Are the vital signs normal?  Yes

## 2019-03-26 ENCOUNTER — MYC MEDICAL ADVICE (OUTPATIENT)
Dept: FAMILY MEDICINE | Facility: CLINIC | Age: 64
End: 2019-03-26

## 2019-03-26 DIAGNOSIS — M15.9 OSTEOARTHRITIS OF MULTIPLE JOINTS, UNSPECIFIED OSTEOARTHRITIS TYPE: Primary | ICD-10-CM

## 2019-03-26 NOTE — TELEPHONE ENCOUNTER
Patient notified via my chart     Amanda Garcia, Registered Nurse   [facility]

## 2019-03-26 NOTE — TELEPHONE ENCOUNTER
Cherie Brennan, NP     Please see my chart message - CMP pended please sign if okay     Thank you,   Amanda Garcia, Registered Nurse   [facility]

## 2019-03-28 ENCOUNTER — TELEPHONE (OUTPATIENT)
Dept: PALLIATIVE MEDICINE | Facility: CLINIC | Age: 64
End: 2019-03-28

## 2019-03-28 NOTE — TELEPHONE ENCOUNTER
Pt called back saying she had faxed her post procedure forms and was hoping to get the RFA next week. Asked that I verify with nursing if forms have been received.   I said I would do so, and also explained that typically insurance will require a second round of diagnostic blocks, prior to RFA approval, although I could not speak to her particular case.

## 2019-03-28 NOTE — TELEPHONE ENCOUNTER
Patient had a Left C3,4,5 medial branch block # 1 on 3/20/19.   Called patient for an update.     Left message that we were calling for an update about how she was doing after the injection, and reminder to send in post procedure forms if not already done. LM that if she has any problems or questions to call the clinic at [phone].

## 2019-03-29 NOTE — TELEPHONE ENCOUNTER
Routing to CELENA Cruz to review insurance she require 2 MBB's, if no please process PA for RFA, if does require please route to scheduling for MBB#2.     Cervical MBB#1 pain data reviewed. Max relief obtained:     At rest:                    100% for hour 1-8  Left facet load:        100% for hour 2-8  Right facet load:      NA  Normal activity:       100% for hour 1-8       Called patient. Pain diary is only filled out for left facet load, no score for rest and activity filled out.     Patient reports that she had 100% pain relief for 2 day post MBB. She would like to proceed with MBB#2. Encouraged her to fill out form completely for next MBB.     She inquired about MBB #2 and also has order for lumbar facet, Advised that she should do the MBB prior to facet as she may have difficultly with insurance as there is sometimes requirement of no steroid use prior to MBB.     Rima BAUTISTAN, RN Care Coordinator  [facility]

## 2019-03-29 NOTE — TELEPHONE ENCOUNTER
Spoke to [place] at [facility], she states that this patients [facility] follows up Medicare guidelines          Per Medicare medical policy-No PA required        RFA  o Dx of arthropathy, spondylosis, or sprain; failed conservative treatment including 4 weeks of PT or an unfavorable evaluation; 2 successful workups resulting in >80-90% pain relief  o Indications:   - Patient must have history of at least 3 months of moderate to severe pain with functional impairment and pain is inadequately responsive to conservative care such as NSAIDs, acetaminophen, physical therapy (as tolerated).  - Pain is predominantly axial and, with the possible exception of facet joint cysts, not associated with radiculopathy or neurogenic claudication.  - There is no non-facet pathology that could explain the source of the patient s pain, such as fracture, tumor, infection, or significant deformity.Clinical assessment implicates the facet joint as the putative source of pain  Patient is scheduled    Ayana KUMARI    [facility]

## 2019-04-02 NOTE — PROGRESS NOTES
[facility] - Procedure Note    Date of Service: 4/4/2019    Procedure performed: Left C3,4,5 medial branch block #2  Diagnosis: Cervical spondylosis; Cervical facet arthropathy  : Clarissa Camara MD      Indications: Brandi Stern is a 63 year old female who is seen at the request of Gia Stroud CNP for cervical medial branch blocks. The patient describes pain with neck extension/rotation. The patient reports minimal improvement with conservative treatment, including PT and meds.    The patient is S/P left C3-4 & C4-5 facet joint injections on 2/14/2019 by myself that gave her minimal pain relief.     Cervical medial branch block #1 was done on 3/20/2019    CERVICAL MRI was done on 8/4/2017 which showed   Findings:  Images are mildly degraded by motion artifact.      Approximately 2 mm degenerative retrolisthesis of C4 on C5.  Straightening of the normal cervical lordosis. Normal cord signal. No  abnormal vertebral marrow edema. No cord signal abnormality.  Multilevel disc degeneration, uncinate spurring and facet arthropathy.  Paraspinous soft tissues are unremarkable.     The findings on a level by level basis are as follows:     C2-3: No spinal canal or neural foraminal stenosis.     C3-4: Disc bulge. Uncinate spurring and facet arthropathy. Moderate  left and mild/moderate right neural foraminal stenosis. No significant  spinal canal stenosis.     C4-5:  Eccentric left disc osteophyte complex with flattening of the  ventral aspect of the cord. Bilateral uncinate spurring and facet  hypertrophy. Ligament flavum thickening. Moderate spinal canal  bilateral neural foraminal stenosis.     C5-6:  Disc osteophyte complex. Bilateral facet hypertrophy and  uncinate spurring. Ligamentum flavum thickening. Moderate spinal canal  and bilateral neural foraminal stenosis.     C6-7:  Dorsal osteophytic spurring. Uncinate spurring and facet  hypertrophy. Moderate spinal canal stenosis.  Mild/moderate bilateral  neural foraminal stenosis.     C7-T1:  Disc osteophyte complex. Facet hypertrophy and uncinate  spurring. Moderate right and mild left neural foraminal stenosis. No  significant spinal canal stenosis.     T1-T2: Disc bulge with superimposed right foraminal protrusion.  Moderate right neural foraminal stenosis.     T2-T3: Right foraminal disc protrusion. Mild right neural foraminal  stenosis.                                                                    Impression:   Multilevel cervical spondylosis with degenerative retrolisthesis of C4  on C5. Moderate spinal canal stenosis C4-C7 and moderate bilateral  neural foraminal stenosis C4-C6 and on the right C7-T2. No cord signal  abnormality.    Allergies:      Allergies   Allergen Reactions     Animal Dander      Fluconazole      rash     Liquid Adhesive      Transdermal patch adhesive. Specifically to nicotine patch; not allergic to nicotine.      Nsaids      Seasonal Allergies      Suture      Non-healing suture line     Vistaril [Hydroxyzine Hcl]      Urinary retention        Vitals:  /89   Pulse 99   LMP  (LMP Unknown)   SpO2 96%     Review of Systems: The patient denies recent fever, chills, illness, use of antibiotics or anticoagulants. All other 10-point review of systems negative.     Procedure:   Options/alternatives, benefits and risks were discussed with the patient including bleeding, infection, tissue trauma, exposure to radiation, reaction to medications, spinal cord injury, weakness, numbness and paralysis.  Questions were answered to her satisfaction and she agrees to proceed. Voluntary informed consent was obtained and signed.     After getting informed consent, patient was brought into the procedure suite and was placed in a side-lying position opposite the side of the procedure on the procedure table. A Pause for the Cause was performed. Patient was prepped and draped in sterile fashion.     Under AP fluoroscopic  guidance the Left  C3, C4 and C5 articular pillars were identified. The C-arm was rotated to afford optimal visualization. Under intermittent fluoroscopy, a 22 gauge 1.5 inch needle was advanced slowly until it had contact on the mid portion of the articular pillar. Then, the two other needles of the same size and gauge were placed under fluorsoscopic guidance using the same technique. The needle positions were verified and optimized from the AP and lateral views.    The anatomic targets for the C3, C4 and C5 medial nerves were the C3, C4 and C5 articular pillars, resulting in blockade of the C3-C4 and C4-C5 facet joints.    After negative aspiration, Lidocaine 2% 0.5 ml was injected at each location.  The needles were removed. Hemostasis was achieved, the area was cleaned, and bandaids were placed when appropriate. The patient tolerated the procedure well, and was taken to the recovery room. Images were saved to PACS.    Assessment/Plan: Brandi Stern is a 63 year old female s/p Left C3,4,5 medial branch blocks today for cervical spondylosis, facet arthropathy.     Pre procecedure pain score: 7/10   Post procedure pain score: 0/10.   The patient will continue to monitor progress, and they were given a pain diary to complete at home.  They will either fax or mail this back to us or bring it to their next appointment. We will determine the treatment plan after we review the diary.      1. The patient was advised to contact the [facility] for any of the following:   Fever, chills, or night sweats   New onset of pain, numbness, or weakness   Any questions/concerns regarding the procedure  If unable to contact the Pain Center, the patient was instructed to go to a local Emergency Room for any complications.   2. The patient will receive a follow-up call in 1 week.  3. We will await the pain diary to determent next step of the treatment plan and call patient to schedule.    Clarissa Camara MD    [facility]

## 2019-04-04 ENCOUNTER — ANCILLARY PROCEDURE (OUTPATIENT)
Dept: GENERAL RADIOLOGY | Facility: CLINIC | Age: 64
End: 2019-04-04
Attending: ANESTHESIOLOGY
Payer: MEDICARE

## 2019-04-04 ENCOUNTER — RADIOLOGY INJECTION OFFICE VISIT (OUTPATIENT)
Dept: PALLIATIVE MEDICINE | Facility: CLINIC | Age: 64
End: 2019-04-04
Payer: MEDICARE

## 2019-04-04 VITALS — DIASTOLIC BLOOD PRESSURE: 89 MMHG | HEART RATE: 99 BPM | SYSTOLIC BLOOD PRESSURE: 120 MMHG | OXYGEN SATURATION: 96 %

## 2019-04-04 DIAGNOSIS — M47.812 ARTHROPATHY OF CERVICAL FACET JOINT: ICD-10-CM

## 2019-04-04 DIAGNOSIS — M47.812 ARTHROPATHY OF CERVICAL FACET JOINT: Primary | ICD-10-CM

## 2019-04-04 PROCEDURE — 64491 INJ PARAVERT F JNT C/T 2 LEV: CPT | Mod: LT | Performed by: ANESTHESIOLOGY

## 2019-04-04 PROCEDURE — 64490 INJ PARAVERT F JNT C/T 1 LEV: CPT | Mod: LT | Performed by: ANESTHESIOLOGY

## 2019-04-04 NOTE — PATIENT INSTRUCTIONS
[facility]   Medial Branch Block Discharge Instructions      Your procedure was performed by:  Dr. Clarissa Camara    Medications used: lidocaine    You will need to complete the Pain Scale Log form and return it to us as soon as possible.  Once we have received the form, we will review it and call you to determine the next steps.     The form can be faxed to [phone] or mailed to:   [facility]    [address], [address] [id number]      You may resume your regular activity after the injection.    Be cautious with walking since you may have numbness and/or weakness in the lower extremities for up to 6-8 hours after the procedure due to the effects of the local anesthetic.    Avoid driving for 6 hours. The local anesthetic could slow your reflexes    You may shower, however no swimming or tub baths or hot tubs for 24 hours following your procedure.    Your pain will return after the numbing medications have worn off.  You may use your current pain medications as needed.    You may use anti-inflammatory medications (such as ibuprofen, aleve, or advil) or Tylenol for pain control if necessary.  Some people find it helpful to alternate ibuprofen and Tylenol every 3 hours for a couple of days.    You may use ice packs 10-15 minutes three to four times a day at the injection site for comfort.     Do not use heat to painful areas for 6 to 8 hours. This will give the local anesthetic time to wear off and prevent you from accidentally burning your skin.     If you experience any of the following, call the Pain Clinic during work hours at [phone] or the Provider Line after hours at [phone]:  -Fever over 100 degree F  -Swelling, bleeding, redness, drainage, warmth at the injection site  -Progressive weakness or numbness on your legs or arms  -If lumbar, call if you have a loss of bowel or bladder function  -If cervical,  call if you have any unusual headache that is not relieved by Tylenol  -Unusual new onset of pain that is not improving

## 2019-04-04 NOTE — NURSING NOTE
Discharge Information    IV Discontiued Time:  NA    Amount of Fluid Infused:  NA    Discharge Criteria = When patient returns to baseline or as per MD order    Consciousness:  Pt is fully awake    Circulation:  BP +/- 20% of pre-procedure level    Respiration:  Patient is able to breathe deeply    O2 Sat:  Patient is able to maintain O2 Sat >92% on room air    Activity:  Moves 4 extremities on command    Ambulation:  Patient is able to stand and walk or stand and pivot into wheelchair    Dressing:  Clean/dry or No Dressing    Notes:   Discharge instructions and AVS given to patient    Patient meets criteria for discharge?  YES    Admitted to PCU?  No    Responsible adult present to accompany patient home?  Yes    Signature/Title:    Rima Clark  RN Care Coordinator  [facility]

## 2019-04-05 DIAGNOSIS — K21.9 GASTROESOPHAGEAL REFLUX DISEASE WITHOUT ESOPHAGITIS: ICD-10-CM

## 2019-04-05 NOTE — TELEPHONE ENCOUNTER
"Requested Prescriptions   Pending Prescriptions Disp Refills     ranitidine (ZANTAC) 300 MG tablet [Pharmacy Med Name: RANITIDINE 300 MG TABLET] 60 tablet 5     Sig: TAKE 1 TABLET (300 MG) BY MOUTH 2 TIMES DAILY    H2 Blockers Protocol Passed - 4/5/2019  2:09 AM       Passed - Patient is age 12 or older       Passed - Recent (12 mo) or future (30 days) visit within the authorizing provider's specialty    Patient had office visit in the last 12 months or has a visit in the next 30 days with authorizing provider or within the authorizing provider's specialty.  See \"Patient Info\" tab in inbasket, or \"Choose Columns\" in Meds & Orders section of the refill encounter.             Passed - Medication is active on med list        Prescription approved per [facility] Refill Protocol.    Signed Prescriptions:                        Disp   Refills    ranitidine (ZANTAC) 300 MG tablet          180 ta*2        Sig: TAKE 1 TABLET (300 MG) BY MOUTH 2 TIMES DAILY  Authorizing Provider: BENITO OMER  Ordering User: THAO GOEL      Closing encounter - no further actions needed at this time    Thao Goel RN    "

## 2019-04-09 NOTE — TELEPHONE ENCOUNTER
Cervical  MBB#2 pain data reviewed. Max relief obtained:     At rest:                    100% for hour 1-8  Left facet load:        did not fill out  Right facet load:      NA  Normal activity:       100% for hour 1-6     MBB to RFA order verified:  Yes  Insurance coverage verified with CELENA Cruz: OK to proceed to MBB or RFA: See encounter pain scores in 03/28/19- if same coverage applies submit for JOVANNY BAUTISTAN, RN Care Coordinator  [facility]

## 2019-04-10 NOTE — TELEPHONE ENCOUNTER
Per Medicare medical policy-No PA required        RFA  o Dx of arthropathy, spondylosis, or sprain; failed conservative treatment including 4 weeks of PT or an unfavorable evaluation; 2 successful workups resulting in >80-90% pain relief  o Indications:   - Patient must have history of at least 3 months of moderate to severe pain with functional impairment and pain is inadequately responsive to conservative care such as NSAIDs, acetaminophen, physical therapy (as tolerated).  - Pain is predominantly axial and, with the possible exception of facet joint cysts, not associated with radiculopathy or neurogenic claudication.  - There is no non-facet pathology that could explain the source of the patient s pain, such as fracture, tumor, infection, or significant deformity.Clinical assessment implicates the facet joint as the putative source of pain        Ayana G.    [facility]

## 2019-04-11 NOTE — TELEPHONE ENCOUNTER
Pre-screening Questions for RFA Procedure      Procedure ordered? Cervical RFA    What insurance are we billing for this procedure?  Medicare    Has patient had this injection before? No  Any chance of pregnancy? NO   If YES, do NOT schedule and route to RN pool    Is  Needed?: No  Will patient have a ?  Yes   If pt is given sedation meds, no driving for 24 hours.  Is pt taking a cab or transportation service? NO        If so will need to be accompanied by an adult too (friend/family member) in order for IV sedation to be given.      Per [facility] Policy:  Outpatients are to have responsible adult or family member to accompany them at discharge and drive them home. A service providing medically trained drivers or attendants would be acceptable. Public transportation would not be acceptable unless the patient is accompanied by a responsible adult or family member.    Is patient taking any blood thinners (plavix, coumadin, jantoven, warfarin, heparin, pradaxa or dabigatran )? No   If YES, do NOT schedule, and route to RN pool    Is patient taking any aspirin products? No     If more than 325mg/day do NOT schedule and route to RN pool     For CERVICAL procedures, hold all aspirin products for 6 days.     Does the patient have a bleeding or clotting disorder? No     If YES, it it OKAY to schedule AND route to RN pool    **For any patients with platelet count <100, must be forwarded to provider**    Does patient have an active infection or treated for one within the past week? No   If YES, do NOT schedule and route to RN nurse pool     Is patient currently taking any antibiotics?  No    For patients on chronic, preventative, or prophylactic antibiotics, procedures may be scheduled.     For patients on antibiotics for active or recent infection:    [place], [place]-antibiotic course must have been completed for 4 days    Is patient currently taking any steroid medications? (i.e.  Prednisone, Medrol)  No     For patients on steroid medications:    Gabe Tucker, Carmen, Jax, Tucker-steroid course must have been completed for 4 days    Reviewed with patient:  If you are started on any steroids or antibiotics between now and your appointment, you must contact us because it may affect our ability to perform your procedure.  Yes    Is patient actively being treated for cancer or immunocompromised, including the spleen having been removed? No  If YES, do NOT schedule and route to RN pool     Any history of complications with sedation medications?  NO   If YES, OK to schedule AND route to RN pool     Any history of sleep apnea?  YES   If YES, OK to schedule AND route to RN pool     Any cardiac history?  NO   If YES, OK to schedule AND route to RN pool     Do you have an implanted pacemaker, ICD (implanted cardiac device) or AICD (automatic implanted cardiac device)?  NO    If YES, do NOT schedule AND route to RN pool.     Obtain name of device : N/A      Obtain name of cardiologist: N/A      Do you have an implanted stimulator?  NO    If YES, OK to schedule AND route to nursing.     Instruct patient to bring in the remote to the appointment and it will need to be turned off.  reviewed      Does patient have an allergy to contrast dye, iodine or shellfish?  No   If YES, OK to schedule. Route to RN pool AND add allergy information to appointment notes    Are you able to get on and off an exam table with minimal or no assistance? Yes   If NO, do NOT schedule and route to RN pool    Are you able to roll over and lay on your stomach with minimal or no assistance? Yes   If NO, do NOT schedule and route to RN pool    Informed patient that s/he needs to be NPO for 6 hours before procedure?  YES   Informed patient that it is OK to take normal medications with sips of water, especially blood pressure medications, before the procedure and must hold blood thinners as instructed.   Yes  Informed patient to arrive 30 minutes before procedure time to have an IV inserted.  reviewed   Do NOT schedule at 0745, 0815 or 1245.  reviewed   All radiofrequency ablations are in a 90 minute time slot except genicular radiofrequency ablation those are 60 minute time slot.  Reviewed      Jihan Meyers    [facility]

## 2019-04-11 NOTE — TELEPHONE ENCOUNTER
Sleep apnea noted in appt notes.    LILY FraserN, RN  Care Coordinator  [facility]

## 2019-04-14 ASSESSMENT — ENCOUNTER SYMPTOMS
DIZZINESS: 0
PARESTHESIAS: 0
PALPITATIONS: 0
SHORTNESS OF BREATH: 0
NERVOUS/ANXIOUS: 0
HEMATURIA: 0
ABDOMINAL PAIN: 0
CHILLS: 0
HEMATOCHEZIA: 0
HEADACHES: 1
WEAKNESS: 0
JOINT SWELLING: 0
DIARRHEA: 0
NAUSEA: 0
ARTHRALGIAS: 1
FEVER: 0
COUGH: 0
SORE THROAT: 0
FREQUENCY: 0
CONSTIPATION: 0
EYE PAIN: 0
MYALGIAS: 1
HEARTBURN: 0
BREAST MASS: 0

## 2019-04-14 ASSESSMENT — ACTIVITIES OF DAILY LIVING (ADL): CURRENT_FUNCTION: NO ASSISTANCE NEEDED

## 2019-04-15 DIAGNOSIS — E06.3 CHRONIC LYMPHOCYTIC THYROIDITIS: ICD-10-CM

## 2019-04-15 DIAGNOSIS — M15.9 OSTEOARTHRITIS OF MULTIPLE JOINTS, UNSPECIFIED OSTEOARTHRITIS TYPE: ICD-10-CM

## 2019-04-15 DIAGNOSIS — E78.5 HYPERLIPIDEMIA LDL GOAL <130: ICD-10-CM

## 2019-04-15 LAB
ALBUMIN SERPL-MCNC: 3.5 G/DL (ref 3.4–5)
ALP SERPL-CCNC: 46 U/L (ref 40–150)
ALT SERPL W P-5'-P-CCNC: 16 U/L (ref 0–50)
ANION GAP SERPL CALCULATED.3IONS-SCNC: 8 MMOL/L (ref 3–14)
AST SERPL W P-5'-P-CCNC: 16 U/L (ref 0–45)
BILIRUB SERPL-MCNC: 0.2 MG/DL (ref 0.2–1.3)
BUN SERPL-MCNC: 18 MG/DL (ref 7–30)
CALCIUM SERPL-MCNC: 9.7 MG/DL (ref 8.5–10.1)
CHLORIDE SERPL-SCNC: 109 MMOL/L (ref 94–109)
CHOLEST SERPL-MCNC: 169 MG/DL
CO2 SERPL-SCNC: 24 MMOL/L (ref 20–32)
CREAT SERPL-MCNC: 0.82 MG/DL (ref 0.52–1.04)
GFR SERPL CREATININE-BSD FRML MDRD: 75 ML/MIN/{1.73_M2}
GLUCOSE SERPL-MCNC: 83 MG/DL (ref 70–99)
HDLC SERPL-MCNC: 44 MG/DL
LDLC SERPL CALC-MCNC: 73 MG/DL
NONHDLC SERPL-MCNC: 125 MG/DL
POTASSIUM SERPL-SCNC: 4.3 MMOL/L (ref 3.4–5.3)
PROT SERPL-MCNC: 7.2 G/DL (ref 6.8–8.8)
SODIUM SERPL-SCNC: 141 MMOL/L (ref 133–144)
TRIGL SERPL-MCNC: 258 MG/DL
TSH SERPL DL<=0.005 MIU/L-ACNC: 2.06 MU/L (ref 0.4–4)

## 2019-04-15 PROCEDURE — 80053 COMPREHEN METABOLIC PANEL: CPT | Performed by: NURSE PRACTITIONER

## 2019-04-15 PROCEDURE — 80061 LIPID PANEL: CPT | Performed by: NURSE PRACTITIONER

## 2019-04-15 PROCEDURE — 84443 ASSAY THYROID STIM HORMONE: CPT | Performed by: NURSE PRACTITIONER

## 2019-04-15 PROCEDURE — 36415 COLL VENOUS BLD VENIPUNCTURE: CPT | Performed by: NURSE PRACTITIONER

## 2019-04-17 ENCOUNTER — OFFICE VISIT (OUTPATIENT)
Dept: FAMILY MEDICINE | Facility: CLINIC | Age: 64
End: 2019-04-17
Payer: MEDICARE

## 2019-04-17 VITALS
TEMPERATURE: 99 F | OXYGEN SATURATION: 99 % | HEART RATE: 92 BPM | DIASTOLIC BLOOD PRESSURE: 79 MMHG | RESPIRATION RATE: 18 BRPM | BODY MASS INDEX: 35.78 KG/M2 | WEIGHT: 215 LBS | SYSTOLIC BLOOD PRESSURE: 118 MMHG

## 2019-04-17 DIAGNOSIS — E78.1 PURE HYPERGLYCERIDEMIA: ICD-10-CM

## 2019-04-17 DIAGNOSIS — I10 HYPERTENSION GOAL BP (BLOOD PRESSURE) < 140/90: ICD-10-CM

## 2019-04-17 DIAGNOSIS — G25.81 RESTLESS LEG SYNDROME: ICD-10-CM

## 2019-04-17 DIAGNOSIS — Z00.00 INITIAL MEDICARE ANNUAL WELLNESS VISIT: Primary | ICD-10-CM

## 2019-04-17 DIAGNOSIS — F33.1 MAJOR DEPRESSIVE DISORDER, RECURRENT EPISODE, MODERATE (H): ICD-10-CM

## 2019-04-17 DIAGNOSIS — E06.3 CHRONIC LYMPHOCYTIC THYROIDITIS: ICD-10-CM

## 2019-04-17 DIAGNOSIS — K21.9 GASTROESOPHAGEAL REFLUX DISEASE WITHOUT ESOPHAGITIS: ICD-10-CM

## 2019-04-17 DIAGNOSIS — E78.5 HYPERLIPIDEMIA LDL GOAL <130: ICD-10-CM

## 2019-04-17 DIAGNOSIS — G47.00 INSOMNIA, UNSPECIFIED TYPE: ICD-10-CM

## 2019-04-17 PROCEDURE — G0402 INITIAL PREVENTIVE EXAM: HCPCS | Performed by: NURSE PRACTITIONER

## 2019-04-17 PROCEDURE — 99213 OFFICE O/P EST LOW 20 MIN: CPT | Mod: 25 | Performed by: NURSE PRACTITIONER

## 2019-04-17 RX ORDER — ZOLPIDEM TARTRATE 10 MG/1
TABLET ORAL
Qty: 30 TABLET | Refills: 5 | Status: SHIPPED | OUTPATIENT
Start: 2019-04-17 | End: 2020-01-13 | Stop reason: SINTOL

## 2019-04-17 RX ORDER — ATORVASTATIN CALCIUM 10 MG/1
10 TABLET, FILM COATED ORAL DAILY
Qty: 90 TABLET | Status: SHIPPED | OUTPATIENT
Start: 2019-04-17 | End: 2020-05-21

## 2019-04-17 RX ORDER — FENOFIBRATE 145 MG/1
145 TABLET, COATED ORAL DAILY
Qty: 90 TABLET | Refills: 3 | Status: SHIPPED | OUTPATIENT
Start: 2019-04-17 | End: 2020-05-21

## 2019-04-17 RX ORDER — PRAMIPEXOLE DIHYDROCHLORIDE 0.12 MG/1
TABLET ORAL
Qty: 270 TABLET | Status: SHIPPED | OUTPATIENT
Start: 2019-04-17 | End: 2020-05-21

## 2019-04-17 RX ORDER — LISINOPRIL 10 MG/1
10 TABLET ORAL DAILY
Qty: 90 TABLET | Status: SHIPPED | OUTPATIENT
Start: 2019-04-17 | End: 2020-05-21

## 2019-04-17 RX ORDER — LEVOTHYROXINE SODIUM 88 UG/1
88 TABLET ORAL DAILY
Qty: 90 TABLET | Status: SHIPPED | OUTPATIENT
Start: 2019-04-17 | End: 2020-05-10

## 2019-04-17 RX ORDER — DESVENLAFAXINE 100 MG/1
100 TABLET, EXTENDED RELEASE ORAL DAILY
Qty: 90 TABLET | Refills: 3 | Status: SHIPPED | OUTPATIENT
Start: 2019-04-17 | End: 2020-05-07

## 2019-04-17 ASSESSMENT — ENCOUNTER SYMPTOMS
FREQUENCY: 0
JOINT SWELLING: 0
COUGH: 0
MYALGIAS: 1
WEAKNESS: 0
NERVOUS/ANXIOUS: 0
NAUSEA: 0
ARTHRALGIAS: 1
DIZZINESS: 0
CONSTIPATION: 0
BREAST MASS: 0
EYE PAIN: 0
SORE THROAT: 0
FEVER: 0
CHILLS: 0
DIARRHEA: 0
HEMATURIA: 0
HEADACHES: 1
PARESTHESIAS: 0
SHORTNESS OF BREATH: 0
ABDOMINAL PAIN: 0
HEMATOCHEZIA: 0
PALPITATIONS: 0
HEARTBURN: 0

## 2019-04-17 ASSESSMENT — ACTIVITIES OF DAILY LIVING (ADL): CURRENT_FUNCTION: NO ASSISTANCE NEEDED

## 2019-04-17 NOTE — PROGRESS NOTES
"SUBJECTIVE:   Brandi Stern is a 63 year old female who presents for Preventive Visit.  Are you in the first 12 months of your Medicare coverage?  Yes,  Visual Acuity:  Right Eye: 20/25   Left Eye: 20/25  Both Eyes: 20/32    Healthy Habits:     In general, how would you rate your overall health?  Fair    Frequency of exercise:  1 day/week    Duration of exercise:  Less than 15 minutes    Do you usually eat at least 4 servings of fruit and vegetables a day, include whole grains    & fiber and avoid regularly eating high fat or \"junk\" foods?  No    Taking medications regularly:  Yes    Ability to successfully perform activities of daily living:  No assistance needed    Home Safety:  No safety concerns identified    Hearing Impairment:  No hearing concerns    In the past 6 months, have you been bothered by leaking of urine?  No    In general, how would you rate your overall mental or emotional health?  Fair      PHQ-2 Total Score: 2    Additional concerns today:  No    Do you feel safe in your environment? Yes    Do you have a Health Care Directive? Yes: Patient states has Advance Directive and will bring in a copy to clinic.      Fall risk  Fallen 2 or more times in the past year?: Yes  Any fall with injury in the past year?: Yes    Cognitive Screening   1) Repeat 3 items (Leader, Season, Table)    2) Clock draw: NORMAL  3) 3 item recall: Recalls 3 objects  Results: NORMAL clock, 3 items recalled: COGNITIVE IMPAIRMENT LESS LIKELY    Mini-CogTM Copyright MAURO Segal. Licensed by the author for use in [facility]; reprinted with permission (fercho@.[facility]). All rights reserved.          Reviewed and updated as needed this visit by clinical staff  Tobacco  Allergies  Meds  Med Hx  Surg Hx  Fam Hx  Soc Hx        Reviewed and updated as needed this visit by Provider        Social History     Tobacco Use     Smoking status: Former Smoker     Packs/day: 0.50     Years: 20.00     Pack years: 10.00     Types: " Cigarettes     Start date: 10/1/1971     Last attempt to quit: 2007     Years since quittin.7     Smokeless tobacco: Never Used   Substance Use Topics     Alcohol use: Yes     Alcohol/week: 0.0 oz     Comment: 2 glasses of wine per week     If you drink alcohol do you typically have >3 drinks per day or >7 drinks per week? No    No flowsheet data found.            Current providers sharing in care for this patient include:   Patient Care Team:  Cherie Brennan NP as PCP - General  Cherie Brennan NP as Assigned PCP  William Christy MD as MD (Neurology)  Robbie Flores MD as MD (Internal Medicine)    The following health maintenance items are reviewed in Epic and correct as of today:  Health Maintenance   Topic Date Due     HIV SCREEN (SYSTEM ASSIGNED)  1971     ZOSTER IMMUNIZATION (2 of 3) 2014     URINE DRUG SCREEN Q1 YR  2017     ADVANCE DIRECTIVE PLANNING Q5 YRS  10/17/2017     FALL RISK ASSESSMENT  2018     INFLUENZA VACCINE (1) 2018     ASTHMA ACTION PLAN Q1 YR  2018     MEDICARE ANNUAL WELLNESS VISIT  04/10/2019     FILEMON QUESTIONNAIRE 1 YEAR  2019     ASTHMA CONTROL TEST Q6 MOS  2019     PHQ-9 Q6 MONTHS  2019     BMP Q1 YR  04/15/2020     MAMMO SCREEN Q2 YR (SYSTEM ASSIGNED)  10/15/2020     PNEUMOCOCCAL IMMUNIZATION 65+ LOW/MEDIUM RISK (2 of 2 - PPSV23) 2021     PAP Q5 YEARS  04/10/2023     HPV Q5 YEARS (Complete with PAP)  04/10/2023     LIPID SCREEN Q5 YR FEMALE (SYSTEM ASSIGNED)  04/15/2024     COLON CANCER SCREEN (SYSTEM ASSIGNED)  2024     DTAP/TDAP/TD IMMUNIZATION (3 - Td) 01/15/2025     DEXA SCAN SCREENING (SYSTEM ASSIGNED)  Completed     DEPRESSION ACTION PLAN  Completed     HEPATITIS C SCREENING  Completed     IPV IMMUNIZATION  Aged Out     MENINGITIS IMMUNIZATION  Aged Out           Review of Systems   Constitutional: Negative for chills and fever.   HENT: Negative for congestion, ear pain, hearing loss and  "sore throat.    Eyes: Negative for pain and visual disturbance.   Respiratory: Negative for cough and shortness of breath.    Cardiovascular: Negative for chest pain, palpitations and peripheral edema.   Gastrointestinal: Negative for abdominal pain, constipation, diarrhea, heartburn, hematochezia and nausea.   Breasts:  Negative for tenderness, breast mass and discharge.   Genitourinary: Negative for frequency, genital sores, hematuria, pelvic pain, urgency, vaginal bleeding and vaginal discharge.   Musculoskeletal: Positive for arthralgias and myalgias. Negative for joint swelling.   Skin: Negative for rash.   Neurological: Positive for headaches. Negative for dizziness, weakness and paresthesias.   Psychiatric/Behavioral: Negative for mood changes. The patient is not nervous/anxious.      She is doing well on her blood pressure medications, denies any side effects.  Blood pressure has been well-controlled.    Mood is stable.  She is doing well on Pristiq.    Trazodone did not help with sleep, so she is back on Ambien and takes 5 mg at HS.  GERD symptoms are well-controlled with Prilosec.      OBJECTIVE:   /79 (BP Location: Right arm, Patient Position: Sitting, Cuff Size: Adult Regular)   Pulse 92   Temp 99  F (37.2  C) (Oral)   Resp 18   Wt 97.5 kg (215 lb)   LMP  (LMP Unknown)   SpO2 99%   BMI 35.78 kg/m   Estimated body mass index is 35.78 kg/m  as calculated from the following:    Height as of 2/13/19: 1.651 m (5' 5\").    Weight as of this encounter: 97.5 kg (215 lb).  Physical Exam  GENERAL: healthy, alert and no distress  EYES: Eyes grossly normal to inspection, PERRL and conjunctivae and sclerae normal  HENT: ear canals and TM's normal, nose and mouth without ulcers or lesions  NECK: no adenopathy, no asymmetry, masses, or scars and thyroid normal to palpation  RESP: lungs clear to auscultation - no rales, rhonchi or wheezes  BREAST: normal without masses, tenderness or nipple discharge and no " palpable axillary masses or adenopathy  CV: regular rate and rhythm, normal S1 S2, no S3 or S4, no murmur, click or rub, no peripheral edema and peripheral pulses strong  ABDOMEN: soft, nontender, no hepatosplenomegaly, no masses and bowel sounds normal  MS: no gross musculoskeletal defects noted, no edema  SKIN: no suspicious lesions or rashes  NEURO: Normal strength and tone, mentation intact and speech normal  PSYCH: mentation appears normal, affect normal/bright        ASSESSMENT / PLAN:   1. Initial Medicare annual wellness visit      2. Hyperlipidemia LDL goal <130  The current medical regimen is effective;  continue present plan and medications.   - atorvastatin (LIPITOR) 10 MG tablet; Take 1 tablet (10 mg) by mouth daily  Dispense: 90 tablet; Refill: PRN    3. Major depressive disorder, recurrent episode, moderate (H)  Stable  The current medical regimen is effective;  continue present plan and medications.   - desvenlafaxine (PRISTIQ) 100 MG 24 hr tablet; Take 1 tablet (100 mg) by mouth daily  Dispense: 90 tablet; Refill: 3    4. HYPERTRIGLYCERIDEMIA  Well-controlled  The current medical regimen is effective;  continue present plan and medications.   - fenofibrate (TRICOR) 145 MG tablet; Take 1 tablet (145 mg) by mouth daily  Dispense: 90 tablet; Refill: 3    5. HASHIMOTO'S THYROIDITIS  The current medical regimen is effective;  continue present plan and medications.   - levothyroxine (SYNTHROID/LEVOTHROID) 88 MCG tablet; Take 1 tablet (88 mcg) by mouth daily  Dispense: 90 tablet; Refill: PRN    6. Hypertension goal BP (blood pressure) < 140/90  At goal  Labs done recently in preparation for visit, reviewed - normal.   The current medical regimen is effective;  continue present plan and medications.   - lisinopril (PRINIVIL/ZESTRIL) 10 MG tablet; Take 1 tablet (10 mg) by mouth daily  Dispense: 90 tablet; Refill: PRN    7. Gastroesophageal reflux disease without esophagitis  The current medical regimen is  "effective;  continue present plan and medications.   - omeprazole (PRILOSEC) 20 MG DR capsule; TAKE 1 TABLET (20 MG) BY MOUTH 2 TIMES DAILY TAKE 30-60 MINUTES BEFORE A MEAL.  Dispense: 180 capsule; Refill: 3    8. Restless leg syndrome  The current medical regimen is effective;  continue present plan and medications.   - pramipexole (MIRAPEX) 0.125 MG tablet; Take two tabs at dinner and one at hs  Dispense: 270 tablet; Refill: PRN    9. Insomnia, unspecified type  The current medical regimen is effective;  continue present plan and medications.   - zolpidem (AMBIEN) 10 MG tablet; TAKE 1/2 TO 1 TABLET BY MOUTH NIGHTLY AS NEEDED  Dispense: 30 tablet; Refill: 5    End of Life Planning:  Patient currently has an advanced directive: Yes.  Practitioner is supportive of decision.    COUNSELING:  Reviewed preventive health counseling, as reflected in patient instructions    BP Readings from Last 1 Encounters:   04/17/19 118/79     Estimated body mass index is 35.78 kg/m  as calculated from the following:    Height as of 2/13/19: 1.651 m (5' 5\").    Weight as of this encounter: 97.5 kg (215 lb).      Weight management plan: Discussed healthy diet and exercise guidelines     reports that she quit smoking about 11 years ago. Her smoking use included cigarettes. She started smoking about 47 years ago. She has a 10.00 pack-year smoking history. She has never used smokeless tobacco.      Appropriate preventive services were discussed with this patient, including applicable screening as appropriate for cardiovascular disease, diabetes, osteopenia/osteoporosis, and glaucoma.  As appropriate for age/gender, discussed screening for colorectal cancer, prostate cancer, breast cancer, and cervical cancer. Checklist reviewing preventive services available has been given to the patient.    Reviewed patients plan of care and provided an AVS. The Basic Care Plan (routine screening as documented in Health Maintenance) for Brandi meets the Care " Plan requirement. This Care Plan has been established and reviewed with the Patient.    Counseling Resources:  ATP IV Guidelines  Pooled Cohorts Equation Calculator  Breast Cancer Risk Calculator  FRAX Risk Assessment  ICSI Preventive Guidelines  Dietary Guidelines for Americans, 2010  USDA's MyPlate  ASA Prophylaxis  Lung CA Screening    Cherie Brennan NP  [facility]    Identified Health Risks:

## 2019-04-17 NOTE — PROGRESS NOTES
[facility] - Procedure Note    Date of Visit: 4/19/2019    Pre procedure Diagnosis: Cervical facet arthropathy   Post procedure Diagnosis: Same  Procedure performed: Left C3,4,5 radiofrequency ablation   Anesthesia: moderate sedation with 1mg versed & 50mcg fentanyl  Complications: NONE  Operators: Clarissa Camara MD     Indications:   Brandi Stern is a 63 year old female was sent by Gia Stroud CNP for Cervical RFA.  They have a history of left sided neck pain.  Exam shows increased discomfort with extension and rotation or the cervical spine and they have tried conservative treatment including PT and medications.    Physical Exam:   Resp: CTA bilaterally  CV: RRR no murmurs, rubs or gallops  Airway:  Mallampati Class II    Brandi Stern had medial branch blocks on 3/20/2019 and 4/4/2019 showing appropriate pain relief.  She is S/P left C3-4 & C4-5 facet joint injections on 2/14/2019.     Options/alternatives, benefits and risks were discussed with the patient including bleeding, infection, no pain relief, tissue trauma, exposure to radiation, reaction to medications including seizure, spinal cord injury,increased pain after the procedure, weakness, numbness or sensory changes and headache.   We also discussed risks of sedation, including reaction to medications and cardiovascular collapse.    Questions were answered to her satisfaction and she agrees to proceed. Voluntary informed consent was obtained and signed.     Vitals were reviewed: Yes  Allergies were reviewed:  Yes   Medications were reviewed:  Yes   Pre-procedure pain score: 6/10    CERVICAL MRI was done on 8/4/2017 which showed   Findings: Images are mildly degraded by motion artifact.      Approximately 2 mm degenerative retrolisthesis of C4 on C5.  Straightening of the normal cervical lordosis. Normal cord signal. No  abnormal vertebral marrow edema. No cord signal abnormality.  Multilevel disc degeneration, uncinate spurring  and facet arthropathy.  Paraspinous soft tissues are unremarkable.     The findings on a level by level basis are as follows:     C2-3: No spinal canal or neural foraminal stenosis.     C3-4: Disc bulge. Uncinate spurring and facet arthropathy. Moderate  left and mild/moderate right neural foraminal stenosis. No significant  spinal canal stenosis.     C4-5:  Eccentric left disc osteophyte complex with flattening of the  ventral aspect of the cord. Bilateral uncinate spurring and facet  hypertrophy. Ligament flavum thickening. Moderate spinal canal  bilateral neural foraminal stenosis.     C5-6:  Disc osteophyte complex. Bilateral facet hypertrophy and  uncinate spurring. Ligamentum flavum thickening. Moderate spinal canal  and bilateral neural foraminal stenosis.     C6-7:  Dorsal osteophytic spurring. Uncinate spurring and facet  hypertrophy. Moderate spinal canal stenosis. Mild/moderate bilateral  neural foraminal stenosis.     C7-T1:  Disc osteophyte complex. Facet hypertrophy and uncinate  spurring. Moderate right and mild left neural foraminal stenosis. No  significant spinal canal stenosis.     T1-T2: Disc bulge with superimposed right foraminal protrusion.  Moderate right neural foraminal stenosis.     T2-T3: Right foraminal disc protrusion. Mild right neural foraminal  stenosis.      Impression:   Multilevel cervical spondylosis with degenerative retrolisthesis of C4  on C5. Moderate spinal canal stenosis C4-C7 and moderate bilateral  neural foraminal stenosis C4-C6 and on the right C7-T2. No cord signal  abnormality.      Allergies:      Allergies   Allergen Reactions     Animal Dander      Fluconazole      rash     Liquid Adhesive      Transdermal patch adhesive. Specifically to nicotine patch; not allergic to nicotine.      Nsaids      Seasonal Allergies      Suture      Non-healing suture line     Vistaril [Hydroxyzine Hcl]      Urinary retention        Vitals:  BP (!) 115/91   Pulse 92   Resp 11    LMP  (LMP Unknown)   SpO2 95%     Review of Systems: The patient denies recent fever, chills, illness, use of antibiotics or anticoagulants. All other 10-point review of systems negative.       Procedure:  After getting informed consent, patient was brought into the procedure suite and was placed in a prone position on the procedure table.   A Pause for the Cause was performed.  Patient was prepped and draped in sterile fashion.     Brandi Stern had an IV line placed prior the procedure.  The C-arm was positioned in the AP view to afford optimal view of Left C3,4,5 lateral articular pillars. Lidocaine 1% was used to anesthetize the skin at each level.  A 100mm, 20 gauge curved tip RF needle with 10 mm active tip was initially advanced towards the articular pillar of C3. The needle was advanced slowly until it had a contact on superior articular pillar. Then under lateral fluoroscopic guidance the needle was adjusted so it stayed mid portion of the AP dimention of the articular pillar. Then, the two other RF needles of the same size and gauge were placed under fluorsoscopic guidance. The second needle was placed at the articular pillar of C4 and the third needle was placed at C5 using the same technique.  The needle placements were confirmed with AP and lateral fluoroscopic views, and they appeared in good position.   Each position was tested for motor and sensory stimulation, and was positioned so that stimulation was negative for stimuli outside the immediate area of the desired lesion.  Sensory stimulation was completed at 50 Hz, with max stimulation up to 0.8V.  Motor stimulation was completed at 2Hz, up to 2V.    Lidocaine 2% 0.5 ml was injected, and a 90 second, 80 degree Centigrade lesion was generated.  The needles were rotated 180 degrees and another 90 second, 80 degree Centigrade lesion was generated.     Needles were removed, hemostasis was achieved.    The needles were withdrawn. Hemostasis was  achieved, the area was cleaned, and bandaids were placed when appropriate.  The patient tolerated the procedure well, and was taken to the recovery room.    Images were saved to PACS.    Start sedation time: 1332  End sedation time: 1430    Post-procedure pain score: 0/10  Follow-up includes:   -f/u phone call in one week  -post-procedure pain medications: NONE  -f/u with Dr. Camara in 2 weeks

## 2019-04-17 NOTE — PATIENT INSTRUCTIONS
Patient Education   Personalized Prevention Plan  You are due for the preventive services outlined below.  Your care team is available to assist you in scheduling these services.  If you have already completed any of these items, please share that information with your care team to update in your medical record.  Health Maintenance Due   Topic Date Due     HIV SCREEN (SYSTEM ASSIGNED)  [birth year]1971     Zoster (Shingles) Vaccine (2 of 3) 12/04/2014     URINE DRUG SCREEN Q1 YR  01/08/2017     Discuss Advance Directive Planning  10/17/2017     FALL RISK ASSESSMENT  08/18/2018     Flu Vaccine (1) 09/01/2018     Asthma Action Plan - yearly  12/14/2018     Annual Wellness Visit  04/10/2019        At your visit today, we discussed your risk for falls and preventive options.    Fall Prevention  Falls often occur due to slipping, tripping or losing your balance. Millions of people fall every year and injure themselves. Here are ways to reduce your risk of falling again.    Think about your fall, was there anything that caused your fall that can be fixed, removed, or replaced?    Make your home safe by keeping walkways clear of objects you may trip over, such as electric cords.    Use non-slip pads under rugs. Don't use area rugs or small throw rugs.    Use non-slip mats in bathtubs and showers.    Install handrails and lights on staircases. The handrails should be on both sides of the stairs.    Don't walk in poorly lit areas.    Don't stand on chairs or wobbly ladders.    Use caution when reaching overhead or looking upward. This position can cause a loss of balance.    Be sure your shoes fit properly, have non-slip bottoms and are in good condition.     Wear shoes both inside and out. Don't go barefoot or wear slippers.    Be cautious when going up and down stairs, curbs, and when walking on uneven sidewalks.    If your balance is poor, consider using a cane or walker.    If your fall was related to alcohol use, stop or  limit alcohol intake.     If your fall was related to use of sleeping medicines, talk to your healthcare provider about this. You may need to reduce your dosage at bedtime if you awaken during the night to go to the bathroom.      To reduce the need for nighttime bathroom trips:  ? Don't drink fluids for several hours before going to bed  ? Empty your bladder before going to bed  ? Men can keep a urinal at the bedside    Stay as active as you can. Balance, flexibility, strength, and endurance all come from exercise. They all play a role in preventing falls. Ask your healthcare provider which types of activity are right for you.    Get your vision checked on a regular basis.    If you have pets, know where they are before you stand up or walk so you don't trip over them.    Use night lights.    Go over all your medicines with a pharmacist or other healthcare provider to see if any of them could make you more likely to fall.  Date Last Reviewed: 4/1/2018 2000-2018 [employer]. [address] [place] [address], [place] [address]. All rights reserved. This information is not intended as a substitute for professional medical care. Always follow your healthcare professional's instructions.

## 2019-04-19 ENCOUNTER — RADIOLOGY INJECTION OFFICE VISIT (OUTPATIENT)
Dept: PALLIATIVE MEDICINE | Facility: CLINIC | Age: 64
End: 2019-04-19
Payer: MEDICARE

## 2019-04-19 ENCOUNTER — ANCILLARY PROCEDURE (OUTPATIENT)
Dept: GENERAL RADIOLOGY | Facility: CLINIC | Age: 64
End: 2019-04-19
Attending: ANESTHESIOLOGY
Payer: MEDICARE

## 2019-04-19 VITALS
DIASTOLIC BLOOD PRESSURE: 75 MMHG | HEART RATE: 85 BPM | OXYGEN SATURATION: 97 % | RESPIRATION RATE: 11 BRPM | SYSTOLIC BLOOD PRESSURE: 107 MMHG

## 2019-04-19 DIAGNOSIS — M47.812 FACET ARTHROPATHY, CERVICAL: Primary | ICD-10-CM

## 2019-04-19 DIAGNOSIS — M47.812 ARTHROPATHY OF CERVICAL FACET JOINT: ICD-10-CM

## 2019-04-19 PROCEDURE — 99153 MOD SED SAME PHYS/QHP EA: CPT | Performed by: ANESTHESIOLOGY

## 2019-04-19 PROCEDURE — 64634 DESTROY C/TH FACET JNT ADDL: CPT | Mod: LT | Performed by: ANESTHESIOLOGY

## 2019-04-19 PROCEDURE — 64633 DESTROY CERV/THOR FACET JNT: CPT | Mod: LT | Performed by: ANESTHESIOLOGY

## 2019-04-19 PROCEDURE — 99152 MOD SED SAME PHYS/QHP 5/>YRS: CPT | Performed by: ANESTHESIOLOGY

## 2019-04-19 NOTE — NURSING NOTE
MD Time IN: 1332  Sedation start time:  1338  MD Time OUT:  1430    Medications given: fentanyl 50mcg IV; versed 1 mg IV  Intravenous fluids were administered, normal saline 100 cc's.  Sedation Level Achieved:  Minimal sedation      Rima BAUTISTAN, RN Care Coordinator  [facility]

## 2019-04-19 NOTE — NURSING NOTE
Discharge Information    IV Discontiued Time:  1422    Amount of Fluid Infused:  150mL    Discharge Criteria = When patient returns to baseline or as per MD order    Consciousness:  Pt is fully awake    Circulation:  BP +/- 20% of pre-procedure level    Respiration:  Patient is able to breathe deeply    O2 Sat:  Patient is able to maintain O2 Sat >92% on room air    Activity:  Moves 4 extremities on command    Ambulation:  Patient is able to stand and walk or stand and pivot into wheelchair    Dressing:  Clean/dry or No Dressing    Notes:   Discharge instructions and AVS given to patient    Patient meets criteria for discharge?  YES    Admitted to PCU?  No    Responsible adult present to accompany patient home?  Yes    Signature/Title:    Rima Clark RN Care Coordinator  [facility]

## 2019-04-19 NOTE — NURSING NOTE
22g PIV placed to left hand      Rima Clark  BSN, RN Care Coordinator  [facility]

## 2019-04-19 NOTE — PATIENT INSTRUCTIONS
[facility] Procedure Discharge Instructions       Today you saw:   Dr. Clarissa Camara      Your procedure:  Radiofrequency Nerve Ablation     Procedural Medications:  Lidocaine (anesthetic)         Sedation medications:  Fentanyl - pain.     Versed - relaxation      You have received sedation during your procedure; for the next 24 hours you should not:   -Drive   -Operate machinery   -Drink alcohol   -Sign any legal documents     You may resume your normal diet and medications.   Avoid strenuous activity for the first 24 hours and resume regular activities after that.   Be cautious with walking as numbness and/or weakness in the lower extremities up to 6-8 hours may occur due to effect of local anesthetic   You may shower, however no swimming or tub baths or hot tubs for 24 hours following your procedure   Anticipate pain for up to 2 weeks after this procedure.  You may use ice packs 10-15 minutes three to four times a day at the injection site for comfort   You may use anti-inflammatory medications (such as Ibuprofen or Aleve or Advil) or Tylenol for pain control if necessary           It may take up to 8 weeks to receive relief from the RFA    If you experience any of the following, call the pain center line during work hours at [phone] or on call physician after hours at [phone]:  -Fever over 100 degree F   -Swelling, bleeding, redness, drainage, warmth at the injection site   -Progressive weakness or numbness in your legs or arms   -Loss of bowel or bladder function   -Unusual headache that is not relieved by Tylenol   -Unusual new onset of pain that is not improving

## 2019-04-19 NOTE — NURSING NOTE
Pre-procedure Intake    Have you been fasting? Yes    If yes, for how long? 6 hours    Are you taking a prescribed blood thinner such as coumadin, Plavix, Xarelto?    No    If yes, when did you take your last dose?     Do you take aspirin?  No    If cervical procedure, have you held aspirin for 6 days?   NA    Do you have any allergies to contrast dye, iodine, steroid and/or numbing medications?  NO    Are you currently taking antibiotics or have an active infection?  NO    Have you had a fever/elevated temperature within the past week? NO    Are you currently taking oral steroids? NO    Do you have a ? Yes       Are you pregnant or breastfeeding?  NO    Are the vital signs normal?  Yes

## 2019-04-24 ENCOUNTER — TELEPHONE (OUTPATIENT)
Dept: PALLIATIVE MEDICINE | Facility: CLINIC | Age: 64
End: 2019-04-24

## 2019-04-24 DIAGNOSIS — M47.816 LUMBAR FACET ARTHROPATHY: Primary | ICD-10-CM

## 2019-04-24 NOTE — TELEPHONE ENCOUNTER
Patient is requesting to do Lumbar MBB/RFA, not the bilateral lumbar facet joint injection that has been ordered. Please review and place order if appropriate.         Jihan Meyers    [facility]

## 2019-04-25 DIAGNOSIS — F17.200 TOBACCO USE DISORDER: ICD-10-CM

## 2019-04-25 NOTE — TELEPHONE ENCOUNTER
Message sent to pharmacy - (SEE CONTINUEING PACK ORDER SENT 3/11/19.)  I called pharmacy and they have the order for continuing pack and so this request is a mistake on their part. Pharmacist will fix the error. Dominick SOLOMON

## 2019-04-25 NOTE — TELEPHONE ENCOUNTER
Plan from 3/15/19 OV with order details are pasted below. Will route to provider to review request for switch from bilateral lumbar facet joint injections to LMBB to RFA.  -----------------------------------------------------------  Diagnosis reviewed, treatment option addressed, and risk/benifits discussed.  Self-care instructions given.  I am recommending a multidisciplinary treatment plan to help this patient better manage pain.       1.         Schedule follow-up with ELISE Persaud, NP-C as needed   2.         Procedures recommended: Cervical medial branch block/radio ablation ablation & lumbar facet injections   ----------------------------  Interventional Injection Only - Type of Injection: bilateral L4-5 and L5-S1 facet joint injections Radiology?     LILY FraserN, RN  Care Coordinator  [facility]

## 2019-04-25 NOTE — TELEPHONE ENCOUNTER
"Requested Prescriptions   Pending Prescriptions Disp Refills     varenicline (CHANTIX STARTING MONTH EDUARDO) 0.5 MG X 11 & 1 MG X 42 tablet [Pharmacy Med Name: CHANTIX STARTING MONTH BOX]  0     Sig: FOLLOW PACKAGE INSTRUCTIONS  Last Written Prescription Date:  3/11/2019  Last Fill Quantity: 53 tablet,  # refills: 0   Last Office Visit: 4/17/2019   Future Office Visit:            Partial Cholinergic Nicotinic Agonist Agents Passed - 4/25/2019 12:24 PM        Passed - Blood pressure under 140/90 in past 12 months     BP Readings from Last 3 Encounters:   04/19/19 107/75   04/17/19 118/79   04/04/19 120/89                 Passed - Recent (12 mo) or future (30 days) visit within the authorizing provider's specialty     Patient had office visit in the last 12 months or has a visit in the next 30 days with authorizing provider or within the authorizing provider's specialty.  See \"Patient Info\" tab in inbasket, or \"Choose Columns\" in Meds & Orders section of the refill encounter.              Passed - Medication is active on med list        Passed - Patient is 18 years of age or older        Passed - Patient is not pregnant        Passed - No positive pregnancy test on file in past 12 months          "

## 2019-04-26 ENCOUNTER — TELEPHONE (OUTPATIENT)
Dept: PALLIATIVE MEDICINE | Facility: CLINIC | Age: 64
End: 2019-04-26

## 2019-04-26 NOTE — TELEPHONE ENCOUNTER
Dr Camara would like to complete the cervical RFA before starting the lumbar. Once recovered from cervical procedures, we can order lumbar MBB/RFA.     ELISE Zhu, NP-C  [facility] [place]

## 2019-04-26 NOTE — TELEPHONE ENCOUNTER
Patient had a radiofrequency ablation (RFA) on 4/19/19.  Called patient for an update.      Pt reported the following details:  Has a some slight post procedure pain, but overall pain levels are improved compared to before RFA.  Told patient that the information will be forwarded to Dr. aCmara    Reminded pt that the nerves were irritated during the procedure so it may cause increased pain for up to 2 weeks.      In addition, reminded pt that it may take up to 8 weeks to feel the full effect of the procedure.      If any more questions or concerns, pt was instructed to call the clinic at [phone].    Is patient's pain tolerable at this time on current medication regime? YES

## 2019-04-29 NOTE — TELEPHONE ENCOUNTER
Left Cervical RFA was completed on 4/19/19. Spoke with Brandi and she reports she is feeling good after her CRFA. She is confused about the order for facet joint injections for her low back and wants to start working on the MBB to RFA for her low back. She reports this was what she thought the plan was. Will route to Gia Stroud to review.    LILY FraserN, RN  Care Coordinator  [facility] [place]

## 2019-04-29 NOTE — TELEPHONE ENCOUNTER
Outreach X1. Left a  requesting call back. Provided call back number.    LILY FraserN, RN  Care Coordinator  [facility]

## 2019-04-29 NOTE — TELEPHONE ENCOUNTER
Reviewed with Dr Camara. Kaylynn to schedule first MBB. Order placed. Please let the patient know.     ELISE Zhu, NP-C  [facility]

## 2019-04-29 NOTE — TELEPHONE ENCOUNTER
Received call from patient who is returning a call. Phone #[phone]      Jihan Meyers    [facility]

## 2019-04-30 ENCOUNTER — TELEPHONE (OUTPATIENT)
Dept: PALLIATIVE MEDICINE | Facility: CLINIC | Age: 64
End: 2019-04-30

## 2019-04-30 NOTE — TELEPHONE ENCOUNTER
Left VM for patient to schedule L4-5 and L5-S1 MBB        Ayana KUMARI    [facility]

## 2019-05-01 NOTE — TELEPHONE ENCOUNTER
Pre-screening questions for MBB Injections:    Injection to be done at which interventional clinic site? [facility]     Instruct patient to arrive as directed prior to the scheduled appointment time:    [place] and [place]: 30 minutes before        Procedure ordered by Dr. Gia Stroud    Procedure ordered? Lumbar Medial Branch Block    What insurance would patient like us to bill for this procedure? Medicare      [employer]- Any injection DO NOT SCHEDULE and route to Ayana Cruz.      [employer] insurance - If scheduling an SI joint injection DO NOT SCHEDULE and route to Ayana Cruz.          MBB's must be scheduled at LEAST two weeks apart for insurance purposes       [employer] - Any injection besides hip/shoulder/knee joint DO NOT SCHEDULE and route to Ayana Cruz. She will obtain PA and call pt back to schedule procedure or notify pt of denial.       [employer]- PA required for all MBB's    Any chance of pregnancy? NO   If YES, do NOT schedule and route to RN pool    Is an  needed? No     Patient has a drive home? (mandatory) Yes -- No  per grid with Dr. Camara     Is patient taking any blood thinners (plavix, coumadin, jantoven, warfarin, heparin, pradaxa or dabigatran )? No    If hold needed, do NOT schedule, route to RN pool     Is patient taking any aspirin products? No     If more than 325mg/day do NOT schedule; route to RN pool     For CERVICAL procedures, hold all aspirin products for 6 days.      Does the patient have a bleeding or clotting disorder? No    If YES, okay to schedule AND route to RN nurse pool    **For any patients with platelet count <100, must be forwarded to provider**    Is patient diabetic? NO If YES, have them bring their glucometer.    Does patient have an active infection or treated for one within the past week? No    Is patient currently taking any antibiotics?  No    For patients on chronic, preventative, or prophylacti antibiotics, procedures  can be scheduled.     For patients on antibiotics for active or recent infection:    [place], [place], [place]-antibiotic course must have been completed for 4 days     Is patient currently taking any steroid medications? (i.e. Prednisone, Medrol)  No     For patients on steroid medications:    [place], [place]-steroid course must have been completed for 4 days    Review with patient:  If you are started on any steroids or antibiotics between now and your appointment, you must contact us because it may affect our ability to perform your procedure INFORMED    Is patient actively being treated for cancer or immunocompromised? No   If YES, do NOT schedule and route to RN    Are you able to get on and off an exam table with minimal or no assistance? Yes   If NO, do NOT schedule and route to RN    Are you able to roll over and lay on your stomach with minimal or no assistance? Yes   If NO, do NOT schedule and route to RN    Any allergies to contrast dye, iodine, shellfish, or numbing and steroid medications? No  (If so, inform nursing and note in scheduling comments.)    Allergies: Animal dander; Fluconazole; Liquid adhesive; Nsaids; Seasonal allergies; Suture; and Vistaril [hydroxyzine hcl]     Has the patient had a flu shot or any other vaccinations within 7 days before or after the procedure.  No     Does patient have an MRI/CT?  YES: MRI  (SI joint, hip injections, lumbar sympathetic blocks, and stellate ganglion blocks do not require an MRI)    Was the MRI done w/in the last 3 years?  Yes    Was MRI done at [place]? Yes      If not, where was it done? N/A       If MRI was not done at [place], [facility] or [facility] do NOT schedule and route to nursing.  If pt has an imaging disc, the injection may be scheduled but pt has to bring disc to appt. If they show up w/out disc the injection cannot be done    **Must be scheduled with elapsed time interval of at least 2 weeks  and not more than 6 months between the First MBB and the Second MBB**       Medial Branch Block Pre-Procedure Instructions    It is okay to take long acting pain medications (if you are on them) the day of the procedure but try not to take any short acting medications unless absolutely necessary. INFORMED        Long acting meds would include: Gabapentin (Neurontin), MS Contin, Oxycontin        Short acting meds would include:  Percocet, Oxycodone, Vicodin, Ibuprofen     The day of the procedure, you should try to do things that provoke your pain, since the injection is being done to see if it will relieve your pain . INFORMED    If your pain level is a 4 out of 10 or less on the day of the procedure, please call [phone] to reschedule.  INFORMED  Reminders (please tell patient if applicable):      Instructed pt to arrive 30 minutes early for IV start if this is for a cervical procedure, ALL sympathetic (stellate ganglion, hypogastric, or lumbar sympathetic block) and all sedation procedures (RFA, spinal cord stimulation trials). N/A        -IVs are not routinely placed for Dr. Camara cervical cases        -Dr. Ceballos: no IV needed for CMBB       If NPO for sedation, it is okay to take medications with sips of water (except if they are to hold blood thinners). N/A   *DO take blood pressure medication if it is prescribed*      If this is for a cervical MBB aspirin needs to be held for 6 days.  N/A         Do not schedule procedures requiring IV placement in the first appointment of the day or first appointment after lunch. Do NOT schedule at 0745, 0815 or 1245.  N/A          For patients 85 or older we recommend having an adult stay w/ them for the remainder of the day.      Does the patient have any questions? NO      Jihan Meyers    [facility]

## 2019-05-02 NOTE — PROGRESS NOTES
[facility] - Procedure Note    Date of Service: 5/3/2019    Procedure performed: Left L3,4,5 medial branch blocks  Diagnosis: Lumbar spondylosis; Lumbar facet arthropathy  : Clarissa Camara MD     Indications: Brandi Stern is a 63 year old female who is seen at the request of Gia Stroud CNP for lumbar medial branch blocks. The patient describes pain with extension/rotation. The patient reports minimal improvement with conservative treatment, including PT and medications.    MRI of the LUMBAR SPINE was done on 2-1-2019 which showed   Findings: Regarding numbering convention, there are 5 lumbar-type  vertebrae assumed for the purposes of this dictation.  The tip of the  conus medullaris is at  L1.  Regarding alignment, there is a  dextroconvex lumbar scoliosis centered in L2, similar to the prior  examination.  There is multilevel disc height narrowing and disc  desiccation more advanced at L1-L4 on the left. There are degenerative  endplate changes at multiple levels. Regarding bone marrow signal  intensity, no abnormality is visualized on STIR images. On a level by  level basis:     T12-L1: There is a small central disc protrusion. No spinal canal or  neural foraminal stenosis.     L1-2: Circumferential disc osteophyte complex. Facet arthropathy  bilaterally. No spinal canal stenosis. Mild bilateral neural foraminal  stenosis.     L2-3: Disc osteophyte complex. Bilateral facet arthropathy, greater on  the left. Mild spinal canal stenosis. Moderate right and mild left  neural foraminal stenosis.     L3-4: Disc osteophyte complex with a superimposed left subarticular  annular fissure. Facet arthropathy bilaterally. Ligamentum flavum  hypertrophy. Mild spinal canal stenosis. Mild right and moderate left  neural foraminal stenosis.     L4-5: Disc osteophyte complex. Facet arthropathy bilaterally.  Ligamentum flavum hypertrophy. Mild to moderate spinal canal stenosis.  Mild to moderate  neural foraminal stenosis bilaterally.     L5-S1: Left eccentric posterior disc bulge. Facet arthropathy. Mild  spinal canal stenosis. Mild left and moderate right neural foraminal  stenosis.     Paraspinous tissues are within normal limits.                                                                    Impression:  1. Moderate dextroconvex lumbar scoliosis.  2. Multilevel lumbar spondylosis most pronounced at L3-4 and L4-5  where there is mild spinal canal stenosis at the level of L5-S1 where  there is moderate right neural foraminal stenosis. Overall findings  are similar to 5/4/2016    Allergies:      Allergies   Allergen Reactions     Animal Dander      Fluconazole      rash     Liquid Adhesive      Transdermal patch adhesive. Specifically to nicotine patch; not allergic to nicotine.      Nsaids      Seasonal Allergies      Suture      Non-healing suture line     Vistaril [Hydroxyzine Hcl]      Urinary retention        Vitals:  BP (!) 131/98   Pulse 64   LMP  (LMP Unknown)   SpO2 98%     Review of Systems: The patient denies recent fever, chills, illness, use of antibiotics or anticoagulants. All other 10-point review of systems negative.     Procedure:   Options/alternatives, benefits and risks were discussed with the patient including bleeding, infection, tissue trauma, exposure to radiation, reaction to medications, spinal cord injury, weakness, numbness and paralysis.  Questions were answered to her satisfaction and she agrees to proceed. Voluntary informed consent was obtained and signed.     After getting informed consent, patient was brought into the procedure suite and was placed in a prone position on the procedure table.   A Pause for the Cause was performed.  Patient was prepped and draped in sterile fashion.     Under AP fluoroscopic guidance the L3, L4, L5 vertebral bodies were identified. The C-arm was rotated to the oblique view to afford optimal visualization the pedicles.  Under  intermittent fluoroscopy, 25 gauge 3.5 inch Quinke spinal needles were positioned inferior and lateral to the intersection of the transverse process and pedicle at the Left L4 & L5 levels, as well as the corresponding sacral alar notch. The needle positions were verified and optimized from the AP and lateral views.    The anatomic targets for the L3 & L4 medial nerve and L5 dorsal ramus (which functionally incorporates the medial branch) were the  L4 & L5 transverse processes and sacral alar notch, with laterality as described above, resulting in blockade of the L4/5 and L5/S1 facet joints.    After negative aspiration, 1cc's of Omnipaque 300 was injected to rule out intravascular injection.  9cc's of omnipaque 300 was wasted. Bupivacaine 0.5% 0.5 ml was injected at each location. The needles were removed. Hemostasis was achieved, the area was cleaned, and bandaids were placed when appropriate. The patient tolerated the procedure well, and was taken to the recovery room. Images were saved to PACS.    Assessment/Plan: Brandi Stern is a 63 year old female s/p Left L3,4,5 medial branch block today for lumbar spondylosis, facet arthropathy.     Pre procecedure pain score: 7/10   Post procedure pain score: 0/10.   The patient will continue to monitor progress, and they were given a pain diary to complete at home.  They will either fax or mail this back to us or bring it to their next appointment. We will determine the treatment plan after we review the diary.      1. The patient was advised to contact the [facility] for any of the following:   Fever, chills, or night sweats   New onset of pain, numbness, or weakness   Any questions/concerns regarding the procedure  If unable to contact the Pain Center, the patient was instructed to go to a local Emergency Room for any complications.   2. The patient will receive a follow-up call in 1 week.  3. We will await the pain diary to determent next step of  the treatment plan and call patient to schedule.      Clarissa Camara MD   [facility]

## 2019-05-03 ENCOUNTER — RADIOLOGY INJECTION OFFICE VISIT (OUTPATIENT)
Dept: PALLIATIVE MEDICINE | Facility: CLINIC | Age: 64
End: 2019-05-03
Payer: MEDICARE

## 2019-05-03 ENCOUNTER — ANCILLARY PROCEDURE (OUTPATIENT)
Dept: GENERAL RADIOLOGY | Facility: CLINIC | Age: 64
End: 2019-05-03
Attending: ANESTHESIOLOGY
Payer: MEDICARE

## 2019-05-03 VITALS — DIASTOLIC BLOOD PRESSURE: 87 MMHG | OXYGEN SATURATION: 98 % | HEART RATE: 64 BPM | SYSTOLIC BLOOD PRESSURE: 116 MMHG

## 2019-05-03 DIAGNOSIS — M47.816 FACET ARTHROPATHY, LUMBAR: ICD-10-CM

## 2019-05-03 DIAGNOSIS — M47.816 LUMBAR FACET ARTHROPATHY: Primary | ICD-10-CM

## 2019-05-03 PROCEDURE — 64494 INJ PARAVERT F JNT L/S 2 LEV: CPT | Mod: LT | Performed by: ANESTHESIOLOGY

## 2019-05-03 PROCEDURE — 64493 INJ PARAVERT F JNT L/S 1 LEV: CPT | Mod: LT | Performed by: ANESTHESIOLOGY

## 2019-05-03 NOTE — NURSING NOTE
Pre-procedure Intake    Have you been fasting? NA    If yes, for how long?     Are you taking a prescribed blood thinner such as coumadin, Plavix, Xarelto?    No    If yes, when did you take your last dose?     Do you take aspirin?  No    If cervical procedure, have you held aspirin for 6 days?   NA    Do you have any allergies to contrast dye, iodine, steroid and/or numbing medications?  NO    Are you currently taking antibiotics or have an active infection?  NO    Have you had a fever/elevated temperature within the past week? NO    Are you currently taking oral steroids? NO    Do you have a ? NA       Are you pregnant or breastfeeding?  NO    Are the vital signs normal?  Yes

## 2019-05-03 NOTE — PATIENT INSTRUCTIONS
[facility]   Medial Branch Block Discharge Instructions      Your procedure was performed by:  Dr. Clarissa Camara     Medications used:  bupivacaine    You will need to complete the Pain Scale Log form and return it to us as soon as possible.  Once we have received the form, we will review it and call you to determine the next steps.     The form can be faxed to [phone] or mailed to:   [facility]    [address], [address] [id number]      You may resume your regular activity after the injection.    Be cautious with walking since you may have numbness and/or weakness in the lower extremities for up to 6-8 hours after the procedure due to the effects of the local anesthetic.    Avoid driving for 6 hours. The local anesthetic could slow your reflexes    You may shower, however no swimming or tub baths or hot tubs for 24 hours following your procedure.    Your pain will return after the numbing medications have worn off.  You may use your current pain medications as needed.    You may use anti-inflammatory medications (such as ibuprofen, aleve, or advil) or Tylenol for pain control if necessary.  Some people find it helpful to alternate ibuprofen and Tylenol every 3 hours for a couple of days.    You may use ice packs 10-15 minutes three to four times a day at the injection site for comfort.     Do not use heat to painful areas for 6 to 8 hours. This will give the local anesthetic time to wear off and prevent you from accidentally burning your skin.     If you experience any of the following, call the Pain Clinic during work hours at [phone] or the Provider Line after hours at [phone]:  -Fever over 100 degree F  -Swelling, bleeding, redness, drainage, warmth at the injection site  -Progressive weakness or numbness on your legs or arms  -If lumbar, call if you have a loss of bowel or bladder function  -If  cervical, call if you have any unusual headache that is not relieved by Tylenol  -Unusual new onset of pain that is not improving

## 2019-05-09 ENCOUNTER — TRANSFERRED RECORDS (OUTPATIENT)
Dept: HEALTH INFORMATION MANAGEMENT | Facility: CLINIC | Age: 64
End: 2019-05-09

## 2019-05-09 NOTE — TELEPHONE ENCOUNTER
Lumbar MBB#1 pain data reviewed. Max relief obtained:     At rest:                    100% for hour 1-8  Left facet load:        100% for hour 1-8  Right facet load:      NA  Normal activity:       100% for hour 1-8     MBB to RFA order verified:  Yes  Insurance coverage verified with CELENA Cruz: OK to proceed to MBB or RFA:       Do MBB scores fall within criteria to proceed per insurance? -Does she have any criteria that would prevent RFA-for example needing 4 weeks PT ?       to schedule MBB#2

## 2019-05-10 NOTE — TELEPHONE ENCOUNTER
Per Medicare medical policy-No PA required        RFA  o Dx of arthropathy, spondylosis, or sprain; failed conservative treatment including 4 weeks of PT or an unfavorable evaluation; 2 successful workups resulting in >80-90% pain relief  o Indications:   - Patient must have history of at least 3 months of moderate to severe pain with functional impairment and pain is inadequately responsive to conservative care such as NSAIDs, acetaminophen, physical therapy (as tolerated).  - Pain is predominantly axial and, with the possible exception of facet joint cysts, not associated with radiculopathy or neurogenic claudication.  - There is no non-facet pathology that could explain the source of the patient s pain, such as fracture, tumor, infection, or significant deformity.Clinical assessment implicates the facet joint as the putative source of pain    Okay to schedule LMBB #2      Ayana KUMARI    [facility]

## 2019-05-10 NOTE — TELEPHONE ENCOUNTER
Ok to schedule MBB#2 thanks!    Gi BAUTISTAN-RN Care Coordinator  [facility]

## 2019-05-15 NOTE — TELEPHONE ENCOUNTER
Pre-screening questions for MBB Injections:    Injection to be done at which interventional clinic site? [facility]     Instruct patient to arrive as directed prior to the scheduled appointment time:    [place] and [place]: 30 minutes before        Procedure ordered by Dr. Camara    Procedure ordered? Lumbar Medial Branch Block    What insurance would patient like us to bill for this procedure? Medicare/BC      [employer]'s comp- Any injection DO NOT SCHEDULE and route to Ayana Cruz.      [employer] insurance - If scheduling an SI joint injection DO NOT SCHEDULE and route to Ayana Cruz.          MBB's must be scheduled at LEAST two weeks apart for insurance purposes       [employer] - Any injection besides hip/shoulder/knee joint DO NOT SCHEDULE and route to Ayana Cruz. She will obtain PA and call pt back to schedule procedure or notify pt of denial.       [employer]- PA required for all MBB's    Any chance of pregnancy? NO   If YES, do NOT schedule and route to RN pool    Is an  needed? No     Patient has a drive home? (mandatory) Yes     Is patient taking any blood thinners (plavix, coumadin, jantoven, warfarin, heparin, pradaxa or dabigatran )? No    If hold needed, do NOT schedule, route to RN pool     Is patient taking any aspirin products? No     If more than 325mg/day do NOT schedule; route to RN pool     For CERVICAL procedures, hold all aspirin products for 6 days.      Does the patient have a bleeding or clotting disorder? No    If YES, okay to schedule AND route to RN nurse pool    **For any patients with platelet count <100, must be forwarded to provider**    Is patient diabetic? NO If YES, have them bring their glucometer.    Does patient have an active infection or treated for one within the past week? No    Is patient currently taking any antibiotics?  No    For patients on chronic, preventative, or prophylacti antibiotics, procedures can be scheduled.     For patients on  antibiotics for active or recent infection:    [place], Burton, Snitzer-antibiotic course must have been completed for 4 days     Is patient currently taking any steroid medications? (i.e. Prednisone, Medrol)  No     For patients on steroid medications:    [place], Snitzer-steroid course must have been completed for 4 days    Review with patient:  If you are started on any steroids or antibiotics between now and your appointment, you must contact us because it may affect our ability to perform your procedure YES    Is patient actively being treated for cancer or immunocompromised? No   If YES, do NOT schedule and route to RN    Are you able to get on and off an exam table with minimal or no assistance? Yes   If NO, do NOT schedule and route to RN    Are you able to roll over and lay on your stomach with minimal or no assistance? Yes   If NO, do NOT schedule and route to RN    Any allergies to contrast dye, iodine, shellfish, or numbing and steroid medications? No  (If so, inform nursing and note in scheduling comments.)    Allergies: Animal dander; Fluconazole; Liquid adhesive; Nsaids; Seasonal allergies; Suture; and Vistaril [hydroxyzine hcl]     Has the patient had a flu shot or any other vaccinations within 7 days before or after the procedure.  No     Does patient have an MRI/CT?  YES: MRI  (SI joint, hip injections, lumbar sympathetic blocks, and stellate ganglion blocks do not require an MRI)    Was the MRI done w/in the last 3 years?  Yes    Was MRI done at [place]? Yes      If not, where was it done? N/A       If MRI was not done at [place], [facility] or [facility] do NOT schedule and route to nursing.  If pt has an imaging disc, the injection may be scheduled but pt has to bring disc to appt. If they show up w/out disc the injection cannot be done    **Must be scheduled with elapsed time interval of at least 2 weeks and not more than 6 months between the  First MBB and the Second MBB**       Medial Branch Block Pre-Procedure Instructions    It is okay to take long acting pain medications (if you are on them) the day of the procedure but try not to take any short acting medications unless absolutely necessary. ok        Long acting meds would include: Gabapentin (Neurontin), MS Contin, Oxycontin        Short acting meds would include:  Percocet, Oxycodone, Vicodin, Ibuprofen     The day of the procedure, you should try to do things that provoke your pain, since the injection is being done to see if it will relieve your pain . oj    If your pain level is a 4 out of 10 or less on the day of the procedure, please call [phone] to reschedule.  ok  Reminders (please tell patient if applicable):      Instructed pt to arrive 30 minutes early for IV start if this is for a cervical procedure, ALL sympathetic (stellate ganglion, hypogastric, or lumbar sympathetic block) and all sedation procedures (RFA, spinal cord stimulation trials).         -IVs are not routinely placed for Dr. Cmaara cervical cases        -Dr. Ceballos: no IV needed for CMBB       If NPO for sedation, it is okay to take medications with sips of water (except if they are to hold blood thinners).    *DO take blood pressure medication if it is prescribed*      If this is for a cervical MBB aspirin needs to be held for 6 days.           Do not schedule procedures requiring IV placement in the first appointment of the day or first appointment after lunch. Do NOT schedule at 0745, 0815 or 1245.            For patients 85 or older we recommend having an adult stay w/ them for the remainder of the day.      Does the patient have any questions? ok

## 2019-05-17 DIAGNOSIS — F17.200 TOBACCO USE DISORDER: ICD-10-CM

## 2019-05-17 NOTE — TELEPHONE ENCOUNTER
"Requested Prescriptions   Pending Prescriptions Disp Refills     CHANTIX 1 MG tablet [Pharmacy Med Name: CHANTIX 1 MG TABLET]  Last Written Prescription Date:  3-11-19  Last Fill Quantity: 60 tab,  # refills: 2   Last office visit: 4/17/2019 with prescribing provider:  Caro Brennan    Future Office Visit:    56 tablet 0     Sig: TAKE 1 TABLET (1 MG) BY MOUTH 2 TIMES DAILY       Partial Cholinergic Nicotinic Agonist Agents Passed - 5/17/2019 11:34 AM        Passed - Blood pressure under 140/90 in past 12 months     BP Readings from Last 3 Encounters:   05/03/19 116/87   04/19/19 107/75   04/17/19 118/79           Passed - Recent (12 mo) or future (30 days) visit within the authorizing provider's specialty     Patient had office visit in the last 12 months or has a visit in the next 30 days with authorizing provider or within the authorizing provider's specialty.  See \"Patient Info\" tab in inbasket, or \"Choose Columns\" in Meds & Orders section of the refill encounter.            Passed - Medication is active on med list        Passed - Patient is 18 years of age or older        Passed - Patient is not pregnant        Passed - No positive pregnancy test on file in past 12 months         "

## 2019-05-21 RX ORDER — VARENICLINE TARTRATE 1 MG/1
TABLET, FILM COATED ORAL
Qty: 56 TABLET | Refills: 0 | Status: SHIPPED | OUTPATIENT
Start: 2019-05-21 | End: 2020-06-17

## 2019-05-22 NOTE — PROGRESS NOTES
[facility] - Procedure Note    Date of Service: 5/23/2019    Procedure performed: Left L3,4,5 medial branch block #2 (#1 DONE ON 5/3/2018)  Diagnosis: Lumbar spondylosis; Lumbar facet arthropathy  : Clarissa Camara MD     Indications: Brandi Stern is a 63 year old female who is seen at the request of Gia Stroud CNP for lumbar medial branch blocks. The patient describes pain with extension/rotation. The patient reports minimal improvement with conservative treatment, including PT and medications.    MRI of the LUMBAR SPINE was done on 2-1-2019 which showed   Findings: Regarding numbering convention, there are 5 lumbar-type  vertebrae assumed for the purposes of this dictation.  The tip of the  conus medullaris is at  L1.  Regarding alignment, there is a  dextroconvex lumbar scoliosis centered in L2, similar to the prior  examination.  There is multilevel disc height narrowing and disc  desiccation more advanced at L1-L4 on the left. There are degenerative  endplate changes at multiple levels. Regarding bone marrow signal  intensity, no abnormality is visualized on STIR images. On a level by  level basis:     T12-L1: There is a small central disc protrusion. No spinal canal or  neural foraminal stenosis.     L1-2: Circumferential disc osteophyte complex. Facet arthropathy  bilaterally. No spinal canal stenosis. Mild bilateral neural foraminal  stenosis.     L2-3: Disc osteophyte complex. Bilateral facet arthropathy, greater on  the left. Mild spinal canal stenosis. Moderate right and mild left  neural foraminal stenosis.     L3-4: Disc osteophyte complex with a superimposed left subarticular  annular fissure. Facet arthropathy bilaterally. Ligamentum flavum  hypertrophy. Mild spinal canal stenosis. Mild right and moderate left  neural foraminal stenosis.     L4-5: Disc osteophyte complex. Facet arthropathy bilaterally.  Ligamentum flavum hypertrophy. Mild to moderate spinal canal  stenosis.  Mild to moderate neural foraminal stenosis bilaterally.     L5-S1: Left eccentric posterior disc bulge. Facet arthropathy. Mild  spinal canal stenosis. Mild left and moderate right neural foraminal  stenosis.     Paraspinous tissues are within normal limits.                                                                    Impression:  1. Moderate dextroconvex lumbar scoliosis.  2. Multilevel lumbar spondylosis most pronounced at L3-4 and L4-5  where there is mild spinal canal stenosis at the level of L5-S1 where  there is moderate right neural foraminal stenosis. Overall findings  are similar to 5/4/2016    Allergies:      Allergies   Allergen Reactions     Animal Dander      Fluconazole      rash     Liquid Adhesive      Transdermal patch adhesive. Specifically to nicotine patch; not allergic to nicotine.      Nsaids      Seasonal Allergies      Suture      Non-healing suture line     Vistaril [Hydroxyzine Hcl]      Urinary retention        Vitals:  LMP  (LMP Unknown)     Review of Systems: The patient denies recent fever, chills, illness, use of antibiotics or anticoagulants. All other 10-point review of systems negative.     Procedure:   Options/alternatives, benefits and risks were discussed with the patient including bleeding, infection, tissue trauma, exposure to radiation, reaction to medications, spinal cord injury, weakness, numbness and paralysis.  Questions were answered to her satisfaction and she agrees to proceed. Voluntary informed consent was obtained and signed.     After getting informed consent, patient was brought into the procedure suite and was placed in a prone position on the procedure table.   A Pause for the Cause was performed.  Patient was prepped and draped in sterile fashion.     Under AP fluoroscopic guidance the L3, L4, L5 vertebral bodies were identified. The C-arm was rotated to the oblique view to afford optimal visualization the pedicles.  Under intermittent  fluoroscopy, 25 gauge 3.5 inch Quinke spinal needles were positioned inferior and lateral to the intersection of the transverse process and pedicle at the Left L4 & L5 levels, as well as the corresponding sacral alar notch. The needle positions were verified and optimized from the AP and lateral views.    The anatomic targets for the L3 & L4 medial nerve and L5 dorsal ramus (which functionally incorporates the medial branch) were the  L4 & L5 transverse processes and sacral alar notch, with laterality as described above, resulting in blockade of the L4/5 and L5/S1 facet joints.    After negative aspiration, 1cc's of Omnipaque 300 was injected to rule out intravascular injection.  9cc's of omnipaque 300 was wasted. Lidocaine 2% 0.5 ml was injected at each location. The needles were removed. Hemostasis was achieved, the area was cleaned, and bandaids were placed when appropriate. The patient tolerated the procedure well, and was taken to the recovery room. Images were saved to PACS.    Assessment/Plan: Brandi Stern is a 63 year old female s/p Left L3,4,5 medial branch block today for lumbar spondylosis, facet arthropathy.     Pre procecedure pain score: 7/10   Post procedure pain score: 0/10.   The patient will continue to monitor progress, and they were given a pain diary to complete at home.  They will either fax or mail this back to us or bring it to their next appointment. We will determine the treatment plan after we review the diary.      1. The patient was advised to contact the [facility] for any of the following:   Fever, chills, or night sweats   New onset of pain, numbness, or weakness   Any questions/concerns regarding the procedure  If unable to contact the Pain Center, the patient was instructed to go to a local Emergency Room for any complications.   2. The patient will receive a follow-up call in 1 week.  3. We will await the pain diary to determent next step of the treatment  plan and call patient to schedule.      Clarissa Camara MD   [facility]

## 2019-05-22 NOTE — TELEPHONE ENCOUNTER
Routing refill request to provider for review/approval because:  Please review new warning regarding chantix

## 2019-05-23 ENCOUNTER — RADIOLOGY INJECTION OFFICE VISIT (OUTPATIENT)
Dept: PALLIATIVE MEDICINE | Facility: CLINIC | Age: 64
End: 2019-05-23
Payer: MEDICARE

## 2019-05-23 ENCOUNTER — ANCILLARY PROCEDURE (OUTPATIENT)
Dept: GENERAL RADIOLOGY | Facility: CLINIC | Age: 64
End: 2019-05-23
Attending: ANESTHESIOLOGY
Payer: MEDICARE

## 2019-05-23 VITALS — OXYGEN SATURATION: 95 % | DIASTOLIC BLOOD PRESSURE: 88 MMHG | HEART RATE: 98 BPM | SYSTOLIC BLOOD PRESSURE: 125 MMHG

## 2019-05-23 DIAGNOSIS — M47.816 FACET ARTHROPATHY, LUMBAR: ICD-10-CM

## 2019-05-23 DIAGNOSIS — M47.819 FACET ARTHROPATHY, MULTILEVEL: Primary | ICD-10-CM

## 2019-05-23 PROCEDURE — 64494 INJ PARAVERT F JNT L/S 2 LEV: CPT | Mod: LT | Performed by: ANESTHESIOLOGY

## 2019-05-23 PROCEDURE — 64493 INJ PARAVERT F JNT L/S 1 LEV: CPT | Mod: LT | Performed by: ANESTHESIOLOGY

## 2019-05-23 NOTE — NURSING NOTE
Discharge Information    IV Discontiued Time:  NA    Amount of Fluid Infused:  NA    Discharge Criteria = When patient returns to baseline or as per MD order    Consciousness:  Pt is fully awake    Circulation:  BP +/- 20% of pre-procedure level    Respiration:  Patient is able to breathe deeply    O2 Sat:  Patient is able to maintain O2 Sat >92% on room air    Activity:  Moves 4 extremities on command    Ambulation:  Patient is able to stand and walk or stand and pivot into wheelchair    Dressing:  Clean/dry or No Dressing    Notes:   Discharge instructions and AVS given to patient    Patient meets criteria for discharge?  YES    Admitted to PCU?  No    Responsible adult present to accompany patient home?  Yes    Signature/Title:    Gi Morejon RN Care Coordinator  [facility]

## 2019-05-23 NOTE — NURSING NOTE
Pre-procedure Intake    Have you been fasting? NA    If yes, for how long?     Are you taking a prescribed blood thinner such as coumadin, Plavix, Xarelto?    No    If yes, when did you take your last dose?     Do you take aspirin?  No    If cervical procedure, have you held aspirin for 6 days?   NA    Do you have any allergies to contrast dye, iodine, steroid and/or numbing medications?  NO    Are you currently taking antibiotics or have an active infection?  NO    Have you had a fever/elevated temperature within the past week? NO    Are you currently taking oral steroids? NODo you have a ? No =       Are you pregnant or breastfeeding?  NO    Are the vital signs normal?  Yes

## 2019-05-23 NOTE — PATIENT INSTRUCTIONS
[facility]   Medial Branch Block Discharge Instructions      Your procedure was performed by:  Dr. Clarissa Camara     Medications used: lidocaine    You will need to complete the Pain Scale Log form and return it to us as soon as possible.  Once we have received the form, we will review it and call you to determine the next steps.     The form can be faxed to [phone] or mailed to:   [facility] - [facility]    [address], [address] [id number]      You may resume your regular activity after the injection.    If you were holding your blood thinning medication, please restart taking it: N/A    Be cautious with walking since you may have numbness and/or weakness in the lower extremities for up to 6-8 hours after the procedure due to the effects of the local anesthetic.    Avoid driving for 6 hours. The local anesthetic could slow your reflexes    You may shower, however no swimming or tub baths or hot tubs for 24 hours following your procedure.    Your pain will return after the numbing medications have worn off.  You may use your current pain medications as needed.    Unless you have been directed to avoid the use of anti-inflammatory medications (NSAIDS), you may use medications such as ibuprofen, Aleve or Tylenol for pain control if needed. Some people find it helpful to alternate ibuprofen and Tylenol every 3 hours for a couple of days.    You may use ice packs 10-15 minutes three to four times a day at the injection site for comfort.     Do not use heat to painful areas for 6 to 8 hours. This will give the local anesthetic time to wear off and prevent you from accidentally burning your skin.     If you experience any of the following, call the Pain Clinic during work hours at [phone] or the Provider Line after hours at [phone]:  -Fever over 100 degree F  -Swelling, bleeding, redness, drainage, warmth at the injection  site  -Progressive weakness or numbness on your legs   -If lumbar, call if you have a loss of bowel or bladder function  -If cervical, call if you have any unusual headache that is not relieved by Tylenol  -Unusual new onset of pain that is not improving

## 2019-05-28 ENCOUNTER — TELEPHONE (OUTPATIENT)
Dept: PALLIATIVE MEDICINE | Facility: CLINIC | Age: 64
End: 2019-05-28

## 2019-05-28 NOTE — TELEPHONE ENCOUNTER
[employer] RFA REQUIREMENTS- ALL REQUIRE PA    [employer] (MEDICARE REPLACEMENT)   o REQUIRES AN AOR FORM FOR ALL PA S  o Dx of arthropathy, spondylosis, or sprain;   o  3 months failed conservative treatment (meds, muscle relaxants, etc.);  o  4 weeks of PT within the last 6 months (or an unfavorable evaluation);   o No spinal fusion of level being treated; non-radicular pain documented in radiographic evaluation within last 12 months;   o 2 successful workups resulting in >80% pain relief.      No PA required for this patient      Please schedule      Ayana KUMARI    [facility]

## 2019-05-28 NOTE — TELEPHONE ENCOUNTER
Lumbar MBB#2 pain data reviewed. Max relief obtained:     At rest:                    100% for hour 1-8  Left facet load:        100%for hour 1-8  Right facet load:      NA  Normal activity:       100%for hour 1-8     MBB to RFA order verified:  Yes  Insurance coverage verified with CELENA Cruz: OK to proceed to MBB or RFA: Please verify and if ok proceed with PA for RFA       MBB#1-  Lumbar MBB#1 pain data reviewed. Max relief obtained:     At rest:                    100% for hour 1-8  Left facet load:        100% for hour 1-8  Right facet load:      NA  Normal activity:       100% for hour 1-8

## 2019-05-29 NOTE — TELEPHONE ENCOUNTER
Pre-screening Questions for RFA Procedure      Procedure ordered? RFA    What insurance are we billing for this procedure?  BC/Medicare    Has patient had this injection before? No  Any chance of pregnancy? NO   If YES, do NOT schedule and route to RN pool    Is  Needed?: No  Will patient have a ?  Yes   If pt is given sedation meds, no driving for 24 hours.  Is pt taking a cab or transportation service? NO        If so will need to be accompanied by an adult too (friend/family member) in order for IV sedation to be given.      Per [place] Policy:  Outpatients are to have responsible adult or family member to accompany them at discharge and drive them home. A service providing medically trained drivers or attendants would be acceptable. Public transportation would not be acceptable unless the patient is accompanied by a responsible adult or family member.    Is patient taking any blood thinners (plavix, coumadin, jantoven, warfarin, heparin, pradaxa or dabigatran )? No   If YES, do NOT schedule, and route to RN pool    Is patient taking any aspirin products? No     If more than 325mg/day do NOT schedule and route to RN pool     For CERVICAL procedures, hold all aspirin products for 6 days.     Does the patient have a bleeding or clotting disorder? No     If YES, it it OKAY to schedule AND route to RN pool    **For any patients with platelet count <100, must be forwarded to provider**    Does patient have an active infection or treated for one within the past week? No   If YES, do NOT schedule and route to RN nurse pool     Is patient currently taking any antibiotics?  No    For patients on chronic, preventative, or prophylactic antibiotics, procedures may be scheduled.     For patients on antibiotics for active or recent infection:    [place], [place]-antibiotic course must have been completed for 4 days    Is patient currently taking any steroid medications? (i.e.  Prednisone, Medrol)  No     For patients on steroid medications:    [place], [place], [place], Tucker-steroid course must have been completed for 4 days    Reviewed with patient:  If you are started on any steroids or antibiotics between now and your appointment, you must contact us because it may affect our ability to perform your procedure.  Yes    Is patient actively being treated for cancer or immunocompromised, including the spleen having been removed? No  If YES, do NOT schedule and route to RN pool     Any history of complications with sedation medications?  NO   If YES, OK to schedule AND route to RN pool     Any history of sleep apnea?  YES   If YES, OK to schedule AND route to RN pool     Any cardiac history?  NO   If YES, OK to schedule AND route to RN pool     Do you have an implanted pacemaker, ICD (implanted cardiac device) or AICD (automatic implanted cardiac device)?  NO    If YES, do NOT schedule AND route to RN pool.     Obtain name of device :       Obtain name of cardiologist:       Do you have an implanted stimulator?  NO    If YES, OK to schedule AND route to nursing.     Instruct patient to bring in the remote to the appointment and it will need to be turned off.  reviewed      Does patient have an allergy to contrast dye, iodine or shellfish?  No   If YES, OK to schedule. Route to RN pool AND add allergy information to appointment notes    Are you able to get on and off an exam table with minimal or no assistance? Yes   If NO, do NOT schedule and route to RN pool    Are you able to roll over and lay on your stomach with minimal or no assistance? Yes   If NO, do NOT schedule and route to RN pool    Informed patient that s/he needs to be NPO for 6 hours before procedure?  NO   Informed patient that it is OK to take normal medications with sips of water, especially blood pressure medications, before the procedure and must hold blood thinners as instructed.  Yes  Informed  patient to arrive 30 minutes before procedure time to have an IV inserted.  reviewed   Do NOT schedule at 0745, 0815 or 1245.  reviewed   All radiofrequency ablations are in a 90 minute time slot except genicular radiofrequency ablation those are 60 minute time slot.  reviewed

## 2019-05-29 NOTE — TELEPHONE ENCOUNTER
Information noted. Scheduled for 6/7/19    Gi BAUTISTAN-RN Care Coordinator  [facility]

## 2019-06-02 ENCOUNTER — MYC MEDICAL ADVICE (OUTPATIENT)
Dept: FAMILY MEDICINE | Facility: CLINIC | Age: 64
End: 2019-06-02

## 2019-06-02 DIAGNOSIS — M81.0 OSTEOPOROSIS WITHOUT CURRENT PATHOLOGICAL FRACTURE, UNSPECIFIED OSTEOPOROSIS TYPE: Primary | ICD-10-CM

## 2019-06-03 RX ORDER — ALENDRONATE SODIUM 70 MG/1
70 TABLET ORAL
Qty: 12 TABLET | Status: SHIPPED | OUTPATIENT
Start: 2019-06-03 | End: 2019-09-11

## 2019-06-06 NOTE — PROGRESS NOTES
[facility] - Procedure Note    Date of Visit: 6/07/2019    Pre procedure Diagnosis: facet arthropathy   Post procedure Diagnosis: Same  Procedure performed: LEFT L3,4,5 radiofrequency ablation   Anesthesia: moderate sedation with 2mg versed & 100mcg fentanyl  Complications: NONE  Operators: Clarissa Camara MD    Indications:   Brandi Stern is a 63 year old female was sent by Gia Stroud CNP for lumbar RFA.  They have a history of chronic low back pain worse with extension.  Exam shows increased discomfort with extension and rotation and they have tried conservative treatment including PT and medications.    Brandi Stern had medial branch blocks on 5/3/2019 and 5/23/2019 showing appropriate pain relief, therefore radiofrequency ablation will be done.     MRI of the LUMBAR SPINE was done on 2/1/2019 which showed:   Impression:  1. Moderate dextroconvex lumbar scoliosis.  2. Multilevel lumbar spondylosis most pronounced at L3-4 and L4-5  where there is mild spinal canal stenosis at the level of L5-S1 where  there is moderate right neural foraminal stenosis. Overall findings  are similar to 5/4/2016      Allergies:      Allergies   Allergen Reactions     Animal Dander      Fluconazole      rash     Liquid Adhesive      Transdermal patch adhesive. Specifically to nicotine patch; not allergic to nicotine.      Nsaids      Seasonal Allergies      Suture      Non-healing suture line     Vistaril [Hydroxyzine Hcl]      Urinary retention        Vitals:  /80   Pulse 72   Resp 10   LMP  (LMP Unknown)   SpO2 96%     Review of Systems: The patient denies recent fever, chills, illness, use of antibiotics or anticoagulants. All other 10-point review of systems negative.     Physical Exam:   Resp: CTA bilaterally  CV: RRR no murmurs, rubs or gallops  Airway:  Mallampati Class 1      Options/alternatives, benefits and risks were discussed with the patient including bleeding, infection, no pain  relief, tissue trauma, exposure to radiation, reaction to medications including seizure, spinal cord injury,increased pain after the procedure, weakness, numbness or sensory changes and headache.   We also discussed risks of sedation, including reaction to medications and cardiovascular collapse.    Questions were answered to her satisfaction and she agrees to proceed. Voluntary informed consent was obtained and signed.     Medications were reviewed:  Yes   Pre-procedure pain score: 7/10    Procedure:  After getting informed consent, patient was brought into the procedure suite and was placed in a prone position on the procedure table.   A Pause for the Cause was performed.  Patient was prepped and draped in sterile fashion.     Brandi Stern had an IV line placed prior the procedure.  The C-arm was positioned in the lateral oblique view to afford optimal view of the L4-L5 vertebral bodies. Lidocaine 1% was used to anesthetize the skin at each level.  At each level, a 120mm, 20G curved radiofrequency cannula with a 10mm active tip was positioned, overlying the intersection of the transverse process and pedicle at L4 & L5, and was advanced under intermittent fluoroscopy until it contacted the transverse process and notch, and the tip slightly overran that process, just lateral to the mamillary process.  The position of each cannula was verified and optimized in the oblique view and AP views.    In the AP view, another cannula was placed at the sacral alar notch.      Each position was tested for motor and sensory stimulation, and was positioned so that stimulation was negative for stimuli outside the immediate area of the desired lesion.  Sensory stimulation was completed at 50 Hz, with max stimulation up to 0.8V.  Motor stimulation was completed at 2Hz, up to 2.5V.  Lidocaine 1% 0.5 ml was injected at each level, and a 90 second, 80 degree Centigrade lesion was generated.    The needles were then rotated within the  pathway of the medial branch, and locations were evaluated with repeat imaging.  Motor testing was again completed, and showed appropriate stimulation.  A second lesion was then generated at each location.    The needles were withdrawn. Hemostasis was achieved, the area was cleaned, and bandaids were placed when appropriate.  The patient tolerated the procedure well, and was taken to the recovery room.    Images were saved to PACS.    Start sedation time: 1350  End sedation time: 1435    Post-procedure pain score: 0/10  Follow-up includes:   -f/u phone call in one week  -post-procedure pain medications: NONE  -f/u with Dr. Camara in 2 weeks

## 2019-06-07 ENCOUNTER — ANCILLARY PROCEDURE (OUTPATIENT)
Dept: GENERAL RADIOLOGY | Facility: CLINIC | Age: 64
End: 2019-06-07
Attending: ANESTHESIOLOGY
Payer: MEDICARE

## 2019-06-07 ENCOUNTER — RADIOLOGY INJECTION OFFICE VISIT (OUTPATIENT)
Dept: PALLIATIVE MEDICINE | Facility: CLINIC | Age: 64
End: 2019-06-07
Payer: MEDICARE

## 2019-06-07 VITALS
DIASTOLIC BLOOD PRESSURE: 80 MMHG | RESPIRATION RATE: 10 BRPM | HEART RATE: 72 BPM | SYSTOLIC BLOOD PRESSURE: 117 MMHG | OXYGEN SATURATION: 96 %

## 2019-06-07 DIAGNOSIS — M47.816 FACET ARTHROPATHY, LUMBAR: ICD-10-CM

## 2019-06-07 DIAGNOSIS — M47.816 LUMBAR FACET ARTHROPATHY: Primary | ICD-10-CM

## 2019-06-07 PROCEDURE — 64635 DESTROY LUMB/SAC FACET JNT: CPT | Mod: LT | Performed by: ANESTHESIOLOGY

## 2019-06-07 PROCEDURE — 99153 MOD SED SAME PHYS/QHP EA: CPT | Performed by: ANESTHESIOLOGY

## 2019-06-07 PROCEDURE — 99152 MOD SED SAME PHYS/QHP 5/>YRS: CPT | Performed by: ANESTHESIOLOGY

## 2019-06-07 PROCEDURE — 64636 DESTROY L/S FACET JNT ADDL: CPT | Mod: LT | Performed by: ANESTHESIOLOGY

## 2019-06-07 NOTE — NURSING NOTE
24 gauge Peripheral IV inserted into left hand - attempts: 1    Gi Morejon   BSN-RN Care Coordinator  [facility]

## 2019-06-07 NOTE — NURSING NOTE
Pre-procedure Intake    Have you been fasting? Yes    If yes, for how long? 6 HRS    Are you taking a prescribed blood thinner such as coumadin, Plavix, Xarelto?    No    If yes, when did you take your last dose?     Do you take aspirin?  No    If cervical procedure, have you held aspirin for 6 days?   NA    Do you have any allergies to contrast dye, iodine, steroid and/or numbing medications?  NO    Are you currently taking antibiotics or have an active infection?  NO    Have you had a fever/elevated temperature within the past week? NO    Are you currently taking oral steroids? NO    Do you have a ? Yes       Are you pregnant or breastfeeding?  Not Applicable    Are the vital signs normal?  Yes

## 2019-06-07 NOTE — NURSING NOTE
Discharge Information    IV Discontiued Time:  1445    Amount of Fluid Infused:  200 cc    Discharge Criteria = When patient returns to baseline or as per MD order    Consciousness:  Pt is fully awake    Circulation:  BP +/- 20% of pre-procedure level    Respiration:  Patient is able to breathe deeply    O2 Sat:  Patient is able to maintain O2 Sat >92% on room air    Activity:  Moves 4 extremities on command    Ambulation:  Patient is able to stand and walk or stand and pivot into wheelchair    Dressing:  Clean/dry or No Dressing    Notes:   Discharge instructions and AVS given to patient    Patient meets criteria for discharge?  YES    Admitted to PCU?  No    Responsible adult present to accompany patient home?  Yes    Signature/Title:    Gi Morejon RN Care Coordinator  [facility]

## 2019-06-07 NOTE — PATIENT INSTRUCTIONS
[facility]   Radiofrequency Ablation (RFA) Discharge Instructions     Today you saw:    Dr. Clarissa Camara       You should anticipate procedural pain for up to 2 weeks.     It may take up to 8 weeks to receive relief from the RFA     If you received sedation before, during or after your procedure, for the next 24 hours you shall NOT:    -Drive    -Operate machinery    -Drink alcohol    -Sign any legal documents     You may resume your normal diet     You may resume your regular medications after the procedure     If you were holding your blood thinning medication, please restart taking it: N/A    Be cautious with walking. Numbness and/or weakness in the lower extremities may occur for up to 6-8 hours due to effect of local anesthetic    Avoid strenuous activity for the first 24 hours     You may resume your regular activities after 24 hours     You may shower, however no swimming, tub baths or hot tubs for 24 hours following your procedure     You may use ice packs 10-15 minutes three to four times a day at the injection site for comfort     Do not use heat to painful areas for 6 to 8 hours. This will give the local anesthetic time to wear off and prevent you from accidentally burning your skin.     Unless you have been directed to avoid the use of anti-inflammatory medications (NSAIDS), you may use medications such as ibuprofen, Aleve or Tylenol for pain control if needed.     If you experience any of the following, call the Pain Clinic during work hours at [phone] or the Provider Line after hours at [phone]:   -Fever over 100 degree F    -Swelling, bleeding, redness, drainage, warmth at the injection site    -Progressive weakness or numbness on your legs   -Loss of bowel or bladder function    -Unusual headache that is not relieved by Tylenol    -Unusual new onset of pain that is not improving

## 2019-06-07 NOTE — NURSING NOTE
MD Time IN: 1345   Sedation start time:  1350 1400 1405  MD Time OUT:  1427 1438    Medications given: fentanyl 100 mcg IV; versed 2 mg IV  Intravenous fluids were administered, normal saline 250 cc's.  Sedation Level Achieved:  Moderate (conscious) sedation      Gi BAUTISTAN-RN Care Coordinator  [facility]

## 2019-06-15 ENCOUNTER — TELEPHONE (OUTPATIENT)
Dept: PALLIATIVE MEDICINE | Facility: CLINIC | Age: 64
End: 2019-06-15

## 2019-06-15 NOTE — TELEPHONE ENCOUNTER
Patient had a radiofrequency ablation (RFA) on 6/7/19.  Called patient for an update.          Left message that we were calling for an update about how she was doing after the procedure and that if she has any questions or concerns to call the clinic at [phone].

## 2019-06-26 NOTE — TELEPHONE ENCOUNTER
Patient is returning call, she was having a lot of back spasms on Sunday. This morning when she starts moving it is very painful.      Ayana KUMARI    [facility]

## 2019-06-28 NOTE — TELEPHONE ENCOUNTER
Called patient to see how she was doing. Patient states that her pain is tolerable now and is easing up as days go by. Explained to her that improvement can take about 8 weeks. She states that she can tell things are better.  Just wanted to provide an update.     Gi BAUTISTAN-RN Care Coordinator  [facility]

## 2019-07-17 ENCOUNTER — MYC MEDICAL ADVICE (OUTPATIENT)
Dept: FAMILY MEDICINE | Facility: CLINIC | Age: 64
End: 2019-07-17

## 2019-07-17 ASSESSMENT — PATIENT HEALTH QUESTIONNAIRE - PHQ9
SUM OF ALL RESPONSES TO PHQ QUESTIONS 1-9: 11
SUM OF ALL RESPONSES TO PHQ QUESTIONS 1-9: 11
10. IF YOU CHECKED OFF ANY PROBLEMS, HOW DIFFICULT HAVE THESE PROBLEMS MADE IT FOR YOU TO DO YOUR WORK, TAKE CARE OF THINGS AT HOME, OR GET ALONG WITH OTHER PEOPLE: VERY DIFFICULT

## 2019-07-18 ASSESSMENT — PATIENT HEALTH QUESTIONNAIRE - PHQ9: SUM OF ALL RESPONSES TO PHQ QUESTIONS 1-9: 11

## 2019-08-28 DIAGNOSIS — M81.0 OSTEOPOROSIS WITHOUT CURRENT PATHOLOGICAL FRACTURE, UNSPECIFIED OSTEOPOROSIS TYPE: ICD-10-CM

## 2019-08-28 RX ORDER — ALENDRONATE SODIUM 70 MG/1
TABLET ORAL
Qty: 0.1 TABLET | Refills: 0 | OUTPATIENT
Start: 2019-08-28

## 2019-08-28 NOTE — TELEPHONE ENCOUNTER
"Requested Prescriptions   Pending Prescriptions Disp Refills     alendronate (FOSAMAX) 70 MG tablet [Pharmacy Med Name: ALENDRONATE SODIUM 70 MG TAB] 12 tablet 0     Sig: TAKE 1 TABLET (70 MG) BY MOUTH EVERY 7 DAYS    Last Written Prescription Date:  6/3/2019  Last Fill Quantity: 12 tablet    ,  # refills: PRN   Last Office Visit: 4/17/2019   Future Office Visit:            Bisphosphonates Failed - 8/28/2019  1:17 AM        Failed - Dexa on file within past 2 years     Please review last Dexa result.         Passed - Recent (12 mo) or future (30 days) visit within the authorizing provider's specialty     Patient had office visit in the last 12 months or has a visit in the next 30 days with authorizing provider or within the authorizing provider's specialty.  See \"Patient Info\" tab in inbasket, or \"Choose Columns\" in Meds & Orders section of the refill encounter.          Passed - Medication is active on med list        Passed - Patient is age 18 or older        Passed - Normal serum creatinine on file within past 12 months     Recent Labs   Lab Test 04/15/19  0959   CR 0.82             "

## 2019-08-28 NOTE — TELEPHONE ENCOUNTER
Duplicate see script sent 6/3/2019 alendronate (FOSAMAX) 70 MG tablet # 12 tablet x as needed refills x 1 year

## 2019-09-11 DIAGNOSIS — M81.0 OSTEOPOROSIS WITHOUT CURRENT PATHOLOGICAL FRACTURE, UNSPECIFIED OSTEOPOROSIS TYPE: ICD-10-CM

## 2019-09-11 NOTE — TELEPHONE ENCOUNTER
"Requested Prescriptions   Pending Prescriptions Disp Refills     alendronate (FOSAMAX) 70 MG tablet [Pharmacy Med Name: ALENDRONATE SODIUM 70 MG TAB] 12 tablet 0     Sig: TAKE 1 TABLET (70 MG) BY MOUTH EVERY 7 DAYS      Last Written Prescription Date:  6/3/2019  Last Fill Quantity: 12 tablet    ,  # refills: PRN   Last Office Visit: 4/17/2019   Future Office Visit:            Bisphosphonates Failed - 9/11/2019  5:30 PM        Failed - Dexa on file within past 2 years     Please review last Dexa result.         Passed - Recent (12 mo) or future (30 days) visit within the authorizing provider's specialty     Patient had office visit in the last 12 months or has a visit in the next 30 days with authorizing provider or within the authorizing provider's specialty.  See \"Patient Info\" tab in inbasket, or \"Choose Columns\" in Meds & Orders section of the refill encounter.          Passed - Medication is active on med list        Passed - Patient is age 18 or older        Passed - Normal serum creatinine on file within past 12 months     Recent Labs   Lab Test 04/15/19  0959   CR 0.82             "

## 2019-09-13 RX ORDER — ALENDRONATE SODIUM 70 MG/1
70 TABLET ORAL
Qty: 12 TABLET | Refills: 1 | Status: SHIPPED | OUTPATIENT
Start: 2019-09-13 | End: 2020-05-21

## 2019-09-19 ENCOUNTER — OFFICE VISIT (OUTPATIENT)
Dept: FAMILY MEDICINE | Facility: CLINIC | Age: 64
End: 2019-09-19
Payer: MEDICARE

## 2019-09-19 ENCOUNTER — ANCILLARY PROCEDURE (OUTPATIENT)
Dept: GENERAL RADIOLOGY | Facility: CLINIC | Age: 64
End: 2019-09-19
Attending: PREVENTIVE MEDICINE
Payer: MEDICARE

## 2019-09-19 VITALS
SYSTOLIC BLOOD PRESSURE: 116 MMHG | DIASTOLIC BLOOD PRESSURE: 76 MMHG | HEART RATE: 87 BPM | WEIGHT: 219 LBS | TEMPERATURE: 98.7 F | RESPIRATION RATE: 20 BRPM | HEIGHT: 65 IN | BODY MASS INDEX: 36.49 KG/M2 | OXYGEN SATURATION: 95 %

## 2019-09-19 DIAGNOSIS — R05.9 COUGH: Primary | ICD-10-CM

## 2019-09-19 DIAGNOSIS — R05.9 COUGH: ICD-10-CM

## 2019-09-19 PROCEDURE — 71046 X-RAY EXAM CHEST 2 VIEWS: CPT

## 2019-09-19 PROCEDURE — 94640 AIRWAY INHALATION TREATMENT: CPT | Performed by: PREVENTIVE MEDICINE

## 2019-09-19 PROCEDURE — 99215 OFFICE O/P EST HI 40 MIN: CPT | Mod: 25 | Performed by: PREVENTIVE MEDICINE

## 2019-09-19 RX ORDER — AZITHROMYCIN 250 MG/1
TABLET, FILM COATED ORAL
Qty: 6 TABLET | Refills: 0 | Status: SHIPPED | OUTPATIENT
Start: 2019-09-19 | End: 2019-09-24

## 2019-09-19 RX ORDER — PREDNISONE 10 MG/1
20 TABLET ORAL DAILY
COMMUNITY
End: 2020-01-13

## 2019-09-19 RX ORDER — PREDNISONE 20 MG/1
TABLET ORAL
Qty: 20 TABLET | Refills: 0 | Status: SHIPPED | OUTPATIENT
Start: 2019-09-19 | End: 2020-01-13

## 2019-09-19 RX ORDER — IPRATROPIUM BROMIDE AND ALBUTEROL SULFATE 2.5; .5 MG/3ML; MG/3ML
3 SOLUTION RESPIRATORY (INHALATION) ONCE
Status: COMPLETED | OUTPATIENT
Start: 2019-09-19 | End: 2019-09-19

## 2019-09-19 RX ADMIN — IPRATROPIUM BROMIDE AND ALBUTEROL SULFATE 3 ML: 2.5; .5 SOLUTION RESPIRATORY (INHALATION) at 14:51

## 2019-09-19 ASSESSMENT — MIFFLIN-ST. JEOR: SCORE: 1534.26

## 2019-09-19 NOTE — PROGRESS NOTES
"SUBJECTIVE:  Brandi Stern, a 64 year old female scheduled an appointment to discuss the following issues:  Cough  Pt has been having a lot of cough and wheezing recently  She has been using 20-40 mg of prednisone daily for the past 4 days.  Has also been using rescue inhaler and nebulizer.  No fever or chills.  Cough is somewhat productive.  Did have uri symptoms before including sore throat and runny nose.  Not getting better so she comes here to get evaluated.  Medical, social, surgical, and family histories reviewed.    ROS:  CONSTITUTIONAL: NEGATIVE for fever, chills  EYES: NEGATIVE for vision changes   CV: NEGATIVE for chest pain, palpitations   GI: NEGATIVE for nausea, abdominal pain, heartburn, or change in bowel habits  : NEGATIVE for frequency, dysuria, or hematuria  MUSCULOSKELETAL: NEGATIVE for significant arthralgias or myalgia  NEURO: NEGATIVE for weakness, dizziness or paresthesias or headache    OBJECTIVE:  /76   Pulse 87   Temp 98.7  F (37.1  C) (Oral)   Resp 20   Ht 1.651 m (5' 5\")   Wt 99.3 kg (219 lb)   LMP  (LMP Unknown)   SpO2 95%   BMI 36.44 kg/m    EXAM:  GENERAL APPEARANCE: healthy, alert and no distress  EYES: EOMI,  PERRL  HENT: ear canals and TM's normal and nose and mouth without ulcers or lesions  RESP: lungs with bilateral inspiratory and expiratory wheezing, no crackles  CV: regular rates and rhythm, normal S1 S2, no S3 or S4 and no murmur, click or rub -  ABDOMEN:  soft, nontender, no HSM or masses and bowel sounds normal    Pt given nebulizer in clinic which helped her breathing.  Duoneb was used.    cxr - negative for infiltrate, no cardiac silhouette, nl pulmonary vasculature, there is a large hiatal hernia    ASSESSMENT/PLAN:  (R05) Cough  (primary encounter diagnosis)  Most consistent with an asthma exacerbation  Will do a 12 day prednisone taper  Also z pack for pt  Follow up in one week for recheck  Continue using nebulizer at home as well as asthma " maintenance inhalers.  Go to ED if breathing becomes more difficult.  Plan: XR Chest 2 Views, ipratropium - albuterol 0.5         mg/2.5 mg/3 mL (DUONEB) neb solution 3 mL,         INHALATION/NEBULIZER TREATMENT, INITIAL,         azithromycin (ZITHROMAX) 250 MG tablet,         predniSONE (DELTASONE) 20 MG tablet    40 minutes spent with patient, over 50% time counseling, coordinating care and explaining about nature of the patient's conditions.  All risks, benefits of treatment and further evaluation was reviewed with patient.  Pt expressed understanding.  Pt was in agreement with this plan.  Yoel Anglin MD

## 2019-10-06 DIAGNOSIS — M62.830 SPASM OF BACK MUSCLES: ICD-10-CM

## 2019-10-07 NOTE — TELEPHONE ENCOUNTER
Requested Prescriptions   Pending Prescriptions Disp Refills     cyclobenzaprine (FLEXERIL) 5 MG tablet [Pharmacy Med Name: CYCLOBENZAPRINE 5 MG TABLET]  Last Written Prescription Date:  2-25-19  Last Fill Quantity: 180 tab,  # refills: 1   Last office visit: 9/19/2019 with prescribing provider:  CLAUDIA Anglin   Future Office Visit:   180 tablet 1     Sig: TAKE 1-2 TABLETS (5-10 MG) BY MOUTH 3 TIMES DAILY AS NEEDED FOR MUSCLE SPASMS       There is no refill protocol information for this order

## 2019-10-08 RX ORDER — CYCLOBENZAPRINE HCL 5 MG
5-10 TABLET ORAL 3 TIMES DAILY PRN
Qty: 180 TABLET | Refills: 1 | Status: SHIPPED | OUTPATIENT
Start: 2019-10-08 | End: 2020-05-21

## 2019-11-02 ENCOUNTER — HEALTH MAINTENANCE LETTER (OUTPATIENT)
Age: 64
End: 2019-11-02

## 2019-11-02 DIAGNOSIS — G25.81 RESTLESS LEG SYNDROME: ICD-10-CM

## 2019-11-02 RX ORDER — PRAMIPEXOLE DIHYDROCHLORIDE 0.12 MG/1
TABLET ORAL
Qty: 0.1 TABLET | Refills: 0 | OUTPATIENT
Start: 2019-11-02

## 2019-11-02 NOTE — TELEPHONE ENCOUNTER
"pramipexole (MIRAPEX) 0.125 MG tablet   Last Written Prescription Date:  4/17/2019  Last Fill Quantity: 270 TABLET,  # refills: PRN   Last office visit: 9/19/2019 with prescribing provider:  BENITO OMER   Future Office Visit:      Requested Prescriptions   Pending Prescriptions Disp Refills     pramipexole (MIRAPEX) 0.125 MG tablet [Pharmacy Med Name: PRAMIPEXOLE 0.125 MG TABLET] 270 tablet 0     Sig: TAKE TWO TABS AT DINNER AND ONE TAB AT BEDTIME       Antiparkinson's Agents Protocol Failed - 11/2/2019  4:12 PM        Failed - CBC on record in past 12 months     Recent Labs   Lab Test 12/14/17  1202   WBC 3.9*   RBC 4.52   HGB 14.2   HCT 43.7              Passed - Blood pressure under 140/90 in past 12 months     BP Readings from Last 3 Encounters:   09/19/19 116/76   06/07/19 117/80   05/23/19 125/88           Passed - ALT on record in past 12 months         Recent Labs   Lab Test 04/15/19  0959   ALT 16           Passed - Serum Creatinine on file in past 12 months     Recent Labs   Lab Test 04/15/19  0959   CR 0.82           Passed - Medication is active on med list        Passed - Patient is age 18 or older        Passed - No active pregnancy on record        Passed - No positive pregnancy test in the past 12 months        Passed - Recent (6 mo) or future (30 days) visit within the authorizing provider's specialty     Patient had office visit in the last 6 months or has a visit in the next 30 days with authorizing provider or within the authorizing provider's specialty.  See \"Patient Info\" tab in inbasket, or \"Choose Columns\" in Meds & Orders section of the refill encounter.              "

## 2019-11-03 NOTE — TELEPHONE ENCOUNTER
Refused Prescriptions:                       Disp   Refills    pramipexole (MIRAPEX) 0.125 MG tablet [Pha*0.1 ta*0        Sig: TAKE TWO TABS AT DINNER AND ONE TAB AT BEDTIME  Refused By: THAO GOEL  Reason for Refusal: Duplicate

## 2020-01-07 ENCOUNTER — TELEPHONE (OUTPATIENT)
Dept: PALLIATIVE MEDICINE | Facility: CLINIC | Age: 65
End: 2020-01-07

## 2020-01-07 NOTE — TELEPHONE ENCOUNTER
Outreach X1. No option to leave VM. Patient should schedule a follow up with Gia Stroud if she calls back.    LILY FraserN, RN  Care Coordinator  [facility]

## 2020-01-07 NOTE — TELEPHONE ENCOUNTER
Reason for call:  Other   Patient called regarding (reason for call): call back  Additional comments: Pt is requesting a call back to determine what would be the next best option for her to pursue. Pt states that the RFA didn't provide much relief and would like to speak to the care team about what is the next best option. Pt states Gia had suggested taking medication, but pt would like to try and stay away from that.    Phone number to reach patient:  Cell number on file:    Telephone Information:   Mobile [phone]       Best Time:  N/A    Can we leave a detailed message on this number?  YES     Jihan LUTZ    [facility]

## 2020-01-10 DIAGNOSIS — K21.9 GASTROESOPHAGEAL REFLUX DISEASE WITHOUT ESOPHAGITIS: ICD-10-CM

## 2020-01-10 RX ORDER — FAMOTIDINE 20 MG/1
20 TABLET, FILM COATED ORAL 2 TIMES DAILY
Qty: 180 TABLET | Refills: 1 | Status: SHIPPED | OUTPATIENT
Start: 2020-01-10 | End: 2020-05-21

## 2020-01-10 NOTE — TELEPHONE ENCOUNTER
"Requested Prescriptions   Pending Prescriptions Disp Refills     ranitidine (ZANTAC) 300 MG tablet [Pharmacy Med Name: RANITIDINE 300 MG TABLET]  Last Written Prescription Date:  4-5-19  Last Fill Quantity: 180 tab,  # refills: 2   Last office visit: 9/19/2019 with prescribing provider:  Mone Anglin   Future Office Visit:   Next 5 appointments (look out 90 days)    Jan 13, 2020  2:30 PM CST  Return Visit with ELISE Howard CNP  [facility] ([facility]) [address]  [address]  [facility] [address]  [phone]       180 tablet 2     Sig: TAKE 1 TABLET (300 MG) BY MOUTH 2 TIMES DAILY       H2 Blockers Protocol Passed - 1/10/2020  2:01 AM        Passed - Patient is age 12 or older        Passed - Recent (12 mo) or future (30 days) visit within the authorizing provider's specialty     Patient has had an office visit with the authorizing provider or a provider within the authorizing providers department within the previous 12 mos or has a future within next 30 days. See \"Patient Info\" tab in inbasket, or \"Choose Columns\" in Meds & Orders section of the refill encounter.            Passed - Medication is active on med list         "

## 2020-01-11 NOTE — TELEPHONE ENCOUNTER
Medication has been recalled - new order per [facility] Therapeutic substitution protocol    Signed Prescriptions:                        Disp   Refills    famotidine (PEPCID) 20 MG tablet           180 ta*1        Sig: Take 1 tablet (20 mg) by mouth 2 times daily  Authorizing Provider: BENITO OMER  Ordering User: THAO GOEL    Refused Prescriptions:                       Disp   Refills    ranitidine (ZANTAC) 300 MG tablet [Pharmac*0.1 ta*0        Sig: TAKE 1 TABLET (300 MG) BY MOUTH 2 TIMES DAILY  Refused By: THAO GOEL  Reason for Refusal: Adjustment in Therapy

## 2020-01-13 ENCOUNTER — OFFICE VISIT (OUTPATIENT)
Dept: PALLIATIVE MEDICINE | Facility: CLINIC | Age: 65
End: 2020-01-13
Payer: MEDICARE

## 2020-01-13 VITALS
BODY MASS INDEX: 36.78 KG/M2 | DIASTOLIC BLOOD PRESSURE: 95 MMHG | SYSTOLIC BLOOD PRESSURE: 136 MMHG | HEART RATE: 106 BPM | WEIGHT: 221 LBS

## 2020-01-13 DIAGNOSIS — M47.812 FACET ARTHROPATHY, CERVICAL: ICD-10-CM

## 2020-01-13 DIAGNOSIS — R05.9 COUGH: ICD-10-CM

## 2020-01-13 DIAGNOSIS — M48.02 CERVICAL STENOSIS OF SPINAL CANAL: Primary | ICD-10-CM

## 2020-01-13 DIAGNOSIS — M54.12 CERVICAL RADICULOPATHY: ICD-10-CM

## 2020-01-13 PROCEDURE — 99214 OFFICE O/P EST MOD 30 MIN: CPT | Performed by: NURSE PRACTITIONER

## 2020-01-13 ASSESSMENT — PAIN SCALES - GENERAL: PAINLEVEL: SEVERE PAIN (6)

## 2020-01-13 NOTE — PATIENT INSTRUCTIONS
After Visit Instructions:     Thank you for coming to [facility] for your care. It is my goal to partner with you to help you reach your optimal state of health.     Continue daily self-care, identifying contributing factors, and monitoring variations in pain level. Continue to integrate self-care into your life.      1. We will discuss PT after MRI  2. Schedule follow-up with ELISE Persaud NP-C in 4-8 weeks  3. Imaging: MRI of cervical spine  4. Procedures recommended: We will discuss after MRI   5. Referrals: We will talk about neurosurgery referral after MRI  6. Order for TENS unit provided. Take to any home medical store.   7. Medication recommendations: No change to current medication.    ELISE Zhu, NP-C  [facility]    Clinic Number:  [phone]     Call with any questions about your care and for scheduling assistance.     Calls are returned Monday through Friday between 8 AM and 4:30 PM. We usually get back to you within 2 business days depending on the issue/request.    If we are prescribing your medications:    For opioid medication refills, call the clinic or send a [facility] message 7 days in advance.  Please include:    Name of requested medication    Name of the pharmacy.    For non-opioid medications, call your pharmacy directly to request a refill. Please allow 3-4 days to be processed.     Per [place] State Law:    All controlled substance prescriptions must be filled within 30 days of being written.      For those controlled substances allowing refills, pickup must occur within 30 days of last fill.      We believe regular attendance is key to your success in our program!      Any time you are unable to keep your appointment we ask that you call us at least 24 hours in advance to cancel.This will allow us to offer the appointment time to another patient.   Multiple missed appointments may lead to dismissal from the  clinic.

## 2020-01-13 NOTE — PROGRESS NOTES
"[facility]    CHIEF COMPLAINT: neck and low back pain     INTERVAL HISTORY:  Last seen on 3/15/2019.        Recommendations/plan at the last visit included:  1.         Schedule follow-up with ELISE Persaud, NP-C as needed   2.         Procedures recommended: Cervical medial branch block/radio ablation ablation & lumbar facet injections   3.         Medication recommendations:           No change to current medications    Since last visit:   -3/20/19: LEFT C3,4,5 RFA: having numbness and tingling down right arm to 4th/5th digits.  6/7/2019: LEFT L3,4,5 radiofrequency ablation., pain was \"minimal\" for 3-4 months and now back as severe pain as before.   - Mom moved into asst living. They have been cleaning out her house and moving a lot of books which has flared her back pain.     Pain Information:   Pain quality: Aching and Tingly    Pain rating: intensity ranges from 1/10 to 9/10, and averages 7/10 on a 0-10 scale.    Annual Controlled Substance Agreement/UDS due date: N/A     CURRENT RELEVANT PAIN MEDICATIONS:   Tylenol arthritis strength 650 mg, 4 tablets daily  Cyclobenzaprine 10 mg at HS  Capsaicin cream: rare use         Patient is using the medication as prescribed:  YES  Is your medication helpful?               YES   Medication side effects? no side effect          Previous Pain Relevant Medications: (H--helped; HI--Helped initially; SWH--Somewhat helpful; NH--No help; W--worse; SE--side effects; ?--Unsure if helpful)   NOTE: This medication information taken from patient's intake form, not medical records.                         Opiates: Hydrocodone: H, hydromorphone: H, given postoperatively, morphine, H: given postoperatively and in ED, tramadol:NH                        NSAIDS: Celebrex: H, ibuprofen:[facility], Relafen:NH                        Muscle Relaxants: Cyclobenzaprine:SE sedation                        Anti-migraine mediations: None                        Anti-depressants: " Citalopram: NH, bupropion: Just started, too soon to tell                        Sleep aids: Ambien: H for sleep                        Anti-convulsants: Gabapentin: SE, sedation, pregabalin: NH                        Topicals: Diclofenac gel:NH                        Other medications not covered above: none      Any illicit drug use: Denies  Any use of prescriptions other than how they were prescribed:denies  [place] Board  Pharmacy Data Base Reviewed:    YES; No concern for abuse or misuse of controlled medications based on this report.       SELF CARE:   How often do you practice SELF-CARE (relaxing, stretching, pacing, monitoring posture, taking mini-breaks) in a typical day:  Few times daily     THE 4 As OF OPIOID MAINTENANCE ANALGESIA    Analgesia: Is pain relief clinically significant? N/A   Activity: Is patient functional and able to perform Activities of Daily Living? N/A   Adverse effects: Is patient free from adverse side effects from opiates? N/A   Adherence to Rx protocol: Is patient adhering to Controlled Substance Agreement and taking medications ONLY as ordered? N/A     Is Narcan prescribed for opiate use >50 MME daily? N/A     [place] Board of Pharmacy Data Base Reviewed:    YES; No concern for abuse or misuse of controlled medications based on this report.      Medications:  Current Outpatient Medications   Medication Sig Dispense Refill     ACETAMINOPHEN EXTRA STRENGTH OR Pt reports taking 650 MG arthritis       albuterol (2.5 MG/3ML) 0.083% neb solution Use every 4 hours as needed for wheezing and shortness of breath 3 mL 12     albuterol (ALBUTEROL) 108 (90 BASE) MCG/ACT Inhaler Inhale 1-2 puffs into the lungs every 6 hours as needed for shortness of breath / dyspnea 1 Inhaler 1     alendronate (FOSAMAX) 70 MG tablet TAKE 1 TABLET (70 MG) BY MOUTH EVERY 7 DAYS 12 tablet 1     Ascorbic Acid (VITAMIN C PO) Take 1,000 mg by mouth daily       atorvastatin (LIPITOR) 10 MG tablet Take 1 tablet  (10 mg) by mouth daily 90 tablet PRN     CLARITIN 10 MG OR TABS 1 TAB PO QD (Once per day) as needed for ALLERGY SYMPTOMS 0 0     Coenzyme Q10 (CO Q 10 PO) Take 200 mg by mouth daily        cyclobenzaprine (FLEXERIL) 5 MG tablet TAKE 1-2 TABLETS (5-10 MG) BY MOUTH 3 TIMES DAILY AS NEEDED FOR MUSCLE SPASMS 180 tablet 1     desvenlafaxine (PRISTIQ) 100 MG 24 hr tablet Take 1 tablet (100 mg) by mouth daily 90 tablet 3     famotidine (PEPCID) 20 MG tablet Take 1 tablet (20 mg) by mouth 2 times daily 180 tablet 1     fenofibrate (TRICOR) 145 MG tablet Take 1 tablet (145 mg) by mouth daily 90 tablet 3     levothyroxine (SYNTHROID/LEVOTHROID) 88 MCG tablet Take 1 tablet (88 mcg) by mouth daily 90 tablet PRN     lisinopril (PRINIVIL/ZESTRIL) 10 MG tablet Take 1 tablet (10 mg) by mouth daily 90 tablet PRN     omeprazole (PRILOSEC) 20 MG DR capsule TAKE 1 TABLET (20 MG) BY MOUTH 2 TIMES DAILY TAKE 30-60 MINUTES BEFORE A MEAL. 180 capsule 3     pramipexole (MIRAPEX) 0.125 MG tablet Take two tabs at dinner and one at hs 270 tablet PRN     ranitidine (ZANTAC) 300 MG tablet TAKE 1 TABLET (300 MG) BY MOUTH 2 TIMES DAILY 180 tablet 2     SYMBICORT 160-4.5 MCG/ACT Inhaler Inhale 1 puff into the lungs 2 times daily       triamcinolone (NASACORT AQ) 55 MCG/ACT nasal inhaler Inhale 2 sprays in both nostrils every day as needed 1 Inhaler 7     UNABLE TO FIND MEDICATION NAME: Allergy shots       valACYclovir (VALTREX) 1000 mg tablet Take 1 tablet (1,000 mg) by mouth 2 times daily 20 tablet 2     CHANTIX 1 MG tablet TAKE 1 TABLET (1 MG) BY MOUTH 2 TIMES DAILY (Patient not taking: Reported on 9/19/2019) 56 tablet 0     varenicline (CHANTIX STARTING MONTH PAK) 0.5 MG X 11 & 1 MG X 42 tablet 0.5 mg daily for three days then 0.5 mg BID days 4-7 then 1.0mg BID (Patient not taking: Reported on 9/19/2019) 53 tablet 0       Review of Systems: A 10-point review of systems was negative, with the exception of chronic pain issues.      PHYSICAL  EXAM    Vitals:    01/13/20 1436   BP: (!) 136/95   Pulse: 106   Weight: 100.2 kg (221 lb)       Constitutional: healthy, alert and no distress  HEENT: Head atraumatic, normocephalic. Eyes without conjunctival injection or jaundice. Neck with reduced ROM. No obvious neck masses.  Cardiovascular: No edema or JVD appreciated. Peripheral pulses are palpable, no edema on bilateral lower extremities  Skin: No rash, lesions, or petechiae of exposed skin.   Extremities: No clubbing, cyanosis, or edema to exposed extremities  Psychiatric/mental status: Alert, without lethargy or stupor. Appropriate affect. Mood normal.   Neurologic exam:  CN:  Cranial nerves 2-12 are grossly normal.  Motor:  4/5 UE and LE strength     Musculoskeletal exam:  Cervical spine:                       Flex:  10 degrees, painful                        Ext: 20 degrees, painful                        Rotation to right: 20 degrees, painful                        Rotation to left: 20 degrees, painful                        Tenderness in the cervical spine at midline. No                       Tenderness in the cervical paraspinal muscles. No  Thoracic spine:                        Kyphosis. Yes                       Tenderness in the thoracic spine at midline. No                       Tenderness in the thoracic paraspinal muscles. No  Lumbar/Sacral spine:                       Forward Flexion:  90 degrees, painful                        Ext: 30 degrees, painful                        Rotation/ext to right: painful                        Rotation/ext to left: painful                        Lordosis. Yes                       Tenderness in the lumbar spine at midline. No                       Tenderness in the lumbar paraspinal muscles.No     Psychiatric:  mentation appears normal., affect and mood normal     DIRE Score for ongoing opioid management is calculated as follows:    Diagnosis = 2 pts (slowly progressive; moderate pain/objective  findings)    Intractability = 2 pts (most treatments tried; patient not fully engaged/barriers)    Risk        Psych = 3 pts (no significant personality dysfunction/mental illness; good communication with clinic)         Chem Hlth = 2 pts (use of medications to cope with stress; chemical dependency in remission) (?)       Reliability = 3 pts (highly reliable with meds, appointments, treatments)       Social = 3 pts (supportive family/close relationships; involved in work/school; no isolation)       (Psych + Chem hlth + Reliability + Social) = 15    Efficacy = 2 pts (moderate benefit/function; low med dose; too early/not tried meds)    DIRE Score = 17        7-13: likely NOT suitable candidate for long-term opioid analgesia       14-21: may be a suitable candidate for long-term opioid analgesia        Previous Diagnostic Tests:   Imaging Studies:   MR CERVICAL SPINE W/O CONTRAST 8/4/2017 9:20 AM  Provided History: Chronic changes of right C7 and C8 radiculopathies.  Comparison: Cervical spine radiograph 1/12/2017, MR cervical spine  2/8/2016  Technique: Sagittal T1-weighted, sagittal T2-weighted, sagittal STIR,  sagittal diffusion weighted, axial T2-weighted, and axial T2* gradient  echo images of the cervical spine were obtained without intravenous  contrast.  Findings:Images are mildly degraded by motion artifact.   Approximately 2 mm degenerative retrolisthesis of C4 on C5.  Straightening of the normal cervical lordosis. Normal cord signal. No  abnormal vertebral marrow edema. No cord signal abnormality.  Multilevel disc degeneration, uncinate spurring and facet arthropathy.  Paraspinous soft tissues are unremarkable.  The findings on a level by level basis are as follows:  C2-3: No spinal canal or neural foraminal stenosis.  C3-4: Disc bulge. Uncinate spurring and facet arthropathy. Moderate  left and mild/moderate right neural foraminal stenosis. No significant  spinal canal stenosis.  C4-5:  Eccentric left disc  osteophyte complex with flattening of the  ventral aspect of the cord. Bilateral uncinate spurring and facet  hypertrophy. Ligament flavum thickening. Moderate spinal canal  bilateral neural foraminal stenosis.  C5-6:  Disc osteophyte complex. Bilateral facet hypertrophy and  uncinate spurring. Ligamentum flavum thickening. Moderate spinal canal  and bilateral neural foraminal stenosis.  C6-7:  Dorsal osteophytic spurring. Uncinate spurring and facet  hypertrophy. Moderate spinal canal stenosis. Mild/moderate bilateral  neural foraminal stenosis.  C7-T1:  Disc osteophyte complex. Facet hypertrophy and uncinate  spurring. Moderate right and mild left neural foraminal stenosis. No  significant spinal canal stenosis.  T1-T2: Disc bulge with superimposed right foraminal protrusion.  Moderate right neural foraminal stenosis.  T2-T3: Right foraminal disc protrusion. Mild right neural foraminal  Stenosis.  Impression:   Multilevel cervical spondylosis with degenerative retrolisthesis of C4  on C5. Moderate spinal canal stenosis C4-C7 and moderate bilateral  neural foraminal stenosis C4-C6 and on the right C7-T2. No cord signal  abnormality.     MR CERVICAL SPINE W/O CONTRAST 1/17/2020 2:21 PM  Provided History: Radiculopathy; Facet arthropathy, cervical; Cervical  stenosis of spinal canal; Cervical radiculopathy  ICD-10: Facet arthropathy, cervical; Cervical stenosis of spinal  canal; Cervical radiculopathy  Comparison: 8/4/2017  Technique: Sagittal T1-weighted, sagittal T2-weighted, sagittal STIR,  sagittal diffusion weighted, axial T2-weighted, and axial T2* gradient  echo images of the cervical spine were obtained without intravenous  contrast.  Findings: Trace anterolisthesis of C3 on C4 and trace retrolisthesis  of C4 on C5. Multilevel disc height narrowing throughout the cervical  spine. Bone marrow signal is normal. Degenerative endplate signal  changes. Spinal cord signal is normal. Level by level findings are  as  follows:  C2-C3: Spinal canal and neural foramina are patent.  C3-C4: There is mild disc bulge and bilateral uncinate hypertrophy and bilateral facet hypertrophy with moderate to severe right,  moderate-severe left foraminal stenoses. Spinal canal is patent.  C4-C5: Disc osteophyte complex with bilateral severe neural foraminal  stenoses. Effacement of ventral subarachnoid space and mild canal  stenosis.  C5-C6: Bilateral uncinate hypertrophy and facet hypertrophy with  moderate to severe neural foraminal stenosis. Disc osteophyte complex  with mild canal stenosis.  C6-C7: Bilateral neural foraminal narrowing due to disc osteophyte  complex and facet uncinate hypertrophy. Mild canal narrowing.  C7-T1: Bilateral uncinate hypertrophy with mild bilateral foraminal  narrowing. Spinal canal is patent.  Paraspinous, paravertebral and prevertebral soft tissues are normal.  IMPRESSION: Multilevel degenerative changes throughout the cervical  spine with mild to moderate canal stenosis and moderate to severe  neural foraminal stenoses at multiple levels as described above in  detail. No myelopathic cord signal.    Exam: Single frontal view of both hips and a frog-leg lateral view of  the left hip dated 9/4/2018.  COMPARISON: 6/29/2012.  CLINICAL HISTORY: Hip pain.  FINDINGS: Single frontal view of both hips and a frog-leg lateral view  of the left hip was obtained. Degenerative disc disease in the  visualized lower lumbar spine. No femoral head collapse. No displaced  fractures. Mild joint space narrowing with minimal spurring at the  femoral head and neck junction.  IMPRESSION: Mild degenerative changes in both hips, as above.     ASSESSMENT:   1.  Cervical spondylosis and stenosis  2.  Lumbar facet arthropathy, DDD, DJD  3.  Bilateral hip osteoarthritis, bilateral hip bursitis L>R  4.  Chronic depression    Brandi Elkins presents regarding increased neck pain and right upper extremity radiculopathy which goes to her right  fourth and fifth digits.  She has been doing a lot of physical work going through her mother's house as they prepare to sell it in the spring.  MRI shows increased stenosis throughout the cervical spine.  Additionally she has facet arthropathy throughout.  Given the extent of the degenerative disease it may be difficult to pinpoint a specific injection which would be helpful for her.  We did discuss having a neurosurgical consultation prior to doing injections depending on the results of this MRI I think that is a reasonable option.  If she is not a surgical candidate at this time we could consider interventional injections.  I do think it would also be a good idea for her to start working on some physical therapy prior to any planned surgery.  I placed an order for neurosurgical consultation and physical therapy.      Hypertension: Brandi to follow up with Primary Care provider regarding elevated blood pressure.     Plan:    Diagnosis reviewed, treatment option addressed, and risk/benifits discussed.  Self-care instructions given.  I am recommending a multidisciplinary treatment plan to help this patient better manage pain.      1. We will discuss PT after MRI  2. Schedule follow-up with ELISE Persaud NP-C in 4-8 weeks  3. Imaging: MRI of cervical spine  4. Procedures recommended: We will discuss after MRI   5. Referrals: We will talk about neurosurgery referral after MRI  6. Order for TENS unit provided. Take to any home medical store.   7. Medication recommendations: No change to current medication.    A total of 40 minutes was spent on the patient today, greater than 50% of that time was spent on face to face counseling and care coordination regarding diagnoses and treatment options as mentioned above including the multidisciplinary pain management program and the benefits of physical therapy, pain psychology and medication options.      ELISE Zhu, NP-C  [facility]

## 2020-01-15 ENCOUNTER — TRANSFERRED RECORDS (OUTPATIENT)
Dept: HEALTH INFORMATION MANAGEMENT | Facility: CLINIC | Age: 65
End: 2020-01-15

## 2020-01-17 ENCOUNTER — HOSPITAL ENCOUNTER (OUTPATIENT)
Dept: MRI IMAGING | Facility: CLINIC | Age: 65
Discharge: HOME | End: 2020-01-17
Attending: NURSE PRACTITIONER | Admitting: NURSE PRACTITIONER
Payer: MEDICARE

## 2020-01-17 DIAGNOSIS — M47.812 FACET ARTHROPATHY, CERVICAL: ICD-10-CM

## 2020-01-17 DIAGNOSIS — M54.12 CERVICAL RADICULOPATHY: ICD-10-CM

## 2020-01-17 DIAGNOSIS — M48.02 CERVICAL STENOSIS OF SPINAL CANAL: ICD-10-CM

## 2020-01-17 PROCEDURE — 72141 MRI NECK SPINE W/O DYE: CPT

## 2020-01-25 DIAGNOSIS — K21.9 GASTROESOPHAGEAL REFLUX DISEASE WITHOUT ESOPHAGITIS: ICD-10-CM

## 2020-01-25 RX ORDER — FAMOTIDINE 40 MG/1
40 TABLET, FILM COATED ORAL 2 TIMES DAILY
Qty: 180 TABLET | Refills: 1 | Status: SHIPPED | OUTPATIENT
Start: 2020-01-25 | End: 2020-03-17

## 2020-01-26 NOTE — TELEPHONE ENCOUNTER
"Requested Prescriptions   Pending Prescriptions Disp Refills     ranitidine (ZANTAC) 300 MG tablet [Pharmacy Med Name: RANITIDINE 300 MG TABLET] 180 tablet 2     Sig: TAKE 1 TABLET (300 MG) BY MOUTH 2 TIMES DAILY       H2 Blockers Protocol Passed - 1/25/2020  9:26 AM        Passed - Patient is age 12 or older        Passed - Recent (12 mo) or future (30 days) visit within the authorizing provider's specialty     Patient has had an office visit with the authorizing provider or a provider within the authorizing providers department within the previous 12 mos or has a future within next 30 days. See \"Patient Info\" tab in inbasket, or \"Choose Columns\" in Meds & Orders section of the refill encounter.              Passed - Medication is active on med list        Sent therapeutic alternative due to FDA recall of medication    Signed Prescriptions:                        Disp   Refills    famotidine (PEPCID) 40 MG tablet           180 ta*1        Sig: Take 1 tablet (40 mg) by mouth 2 times daily  Authorizing Provider: BENITO OMER  Ordering User: THAO GOEL    Refused Prescriptions:                       Disp   Refills    ranitidine (ZANTAC) 300 MG tablet [Pharmac*0.1 ta*0        Sig: TAKE 1 TABLET (300 MG) BY MOUTH 2 TIMES DAILY  Refused By: THAO GOEL  Reason for Refusal: Refill not appropriate      "

## 2020-01-27 DIAGNOSIS — M48.02 SPINAL STENOSIS IN CERVICAL REGION: Primary | ICD-10-CM

## 2020-01-28 NOTE — PROGRESS NOTES
Neurosurgery Clinic Note    Chief Complaint: neck pain    History of Present Illness:  It was a pleasure to evaluate Brandi Stern in clinic today at the kind referral of Gia Stroud, APRN CNP  [address]  [place] [address].    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  All fingers feel affected but it is most severe in the thumb and forefinger.  Exacerbated by working in movement and laying on the left side relieved somewhat with stretching.  Had an EMG several years ago for the symptoms that showed only chronic changes actually suggestive and C7 and C8 without active radiculopathy or neuropathy.  No changes in hand dexterity, but gets hand cramping fine finger movement.  Minor decrease in balance.  No changes in bladder urgency or frequency.  No significant left hand symptoms.  No prior spine surgery    On alendronate for osteoporosis, no recent DEXA  Has had multiple facet injections and epidural injections in cervical and lumbar spine  She is an active cigarette smoker.  Wears CPAP for obstructive sleep apnea       Xr Cervical Radiofreqency Ablation Unilat    Result Date: 4/19/2019  Xr Lumbar Radiofrequency Ablation Unilat    Result Date: 6/7/2019    Xr Medial Branch Block Lumbar Left    Result Date: 5/23/2019    Xr Medial Branch Block Lumbar Left    Result Date: 5/3/2019    Xr Medial Branch Block Cervical Left    Result Date: 4/4/2019  Xr Medial Branch Block Cervical Left    Result Date: 3/20/2019      Xr Cervical/thoracic Epidural Inj    Result Date: 10/12/2018      Xr Cervical Thoracic Facet Injection Left    Result Date: 2/14/2019          Review of Systems   Positive for neck pain, back pain, intermittent trouble with swallowing that consists of feeling like she needs to swallow twice.  No aspiration.      Past Medical History:   Diagnosis Date     Allergic rhinitis due to other  allergen      Apneas, obstructive sleep      Chronic lymphocytic thyroiditis      COPD (chronic obstructive pulmonary disease) (H)      Depressive disorder      Depressive disorder, not elsewhere classified      Disorder of bone and cartilage, unspecified      Esophageal reflux      Essential hypertension, benign      Generalized osteoarthrosis, unspecified site     knees     HASHIMOTO'S THYROIDITIS 11/15/2004     HASHIMOTO'S THYROIDITIS      HASHIMOTO'S THYROIDITIS      Headache above the eye region      Headache above the eye region      Hiatal hernia      Insomnia, unspecified      Moderate persistent asthma      Nasal congestion      Pure hyperglyceridemia      Scoliosis      Snoring      Tobacco use disorder        Past Surgical History:   Procedure Laterality Date     ABDOMEN SURGERY      GB out     ARTHROSCOPY KNEE RT/LT  2/07    left knee     BIOPSY      Colon     C TOTAL KNEE ARTHROPLASTY      bilateral     CHOLECYSTECTOMY       COLONOSCOPY  8/5/2014    Procedure: COLONOSCOPY;  Surgeon: Mau De La Fuente MD;  Location:  GI     ESOPHAGOSCOPY, GASTROSCOPY, DUODENOSCOPY (EGD), COMBINED  8/5/2014    Procedure: COMBINED ESOPHAGOSCOPY, GASTROSCOPY, DUODENOSCOPY (EGD), BIOPSY SINGLE OR MULTIPLE;  Surgeon: Mau De La Fuente MD;  Location:  GI     Gall bladder removal  2011       Social History     Socioeconomic History     Marital status: Single     Spouse name: Not on file     Number of children: Not on file     Years of education: Not on file     Highest education level: Not on file   Occupational History     Not on file   Social Needs     Financial resource strain: Not on file     Food insecurity:     Worry: Not on file     Inability: Not on file     Transportation needs:     Medical: Not on file     Non-medical: Not on file   Tobacco Use     Smoking status: Former Smoker     Packs/day: 0.50     Years: 20.00     Pack years: 10.00     Types: Cigarettes     Start date: 10/1/1971     Last attempt to  quit: 2007     Years since quittin.5     Smokeless tobacco: Never Used   Substance and Sexual Activity     Alcohol use: Yes     Alcohol/week: 0.0 standard drinks     Comment: 2 glasses of wine per week     Drug use: No     Sexual activity: Never     Partners: Male     Birth control/protection: Abstinence   Lifestyle     Physical activity:     Days per week: Not on file     Minutes per session: Not on file     Stress: Not on file   Relationships     Social connections:     Talks on phone: Not on file     Gets together: Not on file     Attends Judaism service: Not on file     Active member of club or organization: Not on file     Attends meetings of clubs or organizations: Not on file     Relationship status: Not on file     Intimate partner violence:     Fear of current or ex partner: Not on file     Emotionally abused: Not on file     Physically abused: Not on file     Forced sexual activity: Not on file   Other Topics Concern     Parent/sibling w/ CABG, MI or angioplasty before 65F 55M? No   Social History Narrative    Dairy/d 2 servings/d.    Caffeine 1 -2servings/d    Exercise 1 x week walking,problems with knees    Sunscreen used - Yes    Seatbelts used - Yes    Working smoke/CO detectors in the home - Yes    Guns stored in the home - No    Self Breast Exams - Yes-occ    Self Testicular Exam - NA    Eye Exam up to date - no,does every 2yrs    Dental Exam up to date - Yes  Q 6 months    Pap Smear up to date - no    Mammogram up to date -had done today    PSA up to date - NA    Dexa Scan up to date - Yes- 2008    Flex Sig / Colonoscopy up to date - colonoscopy in ,repeat in 10yrs    Immunizations up to date - Yes Td     Abuse: Current or Past(Physical, Sexual or Emotional)- No    Do you feel safe in your environment - Yes       family history includes Allergies in her father; Arthritis in her father; Asthma in her paternal grandmother; Blood Disease in her father; C.A.D. in her maternal  grandfather and maternal grandmother; Cancer in her father and paternal grandmother; Cancer - colorectal in her paternal grandmother; Cardiovascular in her maternal grandfather; Cerebrovascular Disease in her maternal grandmother and paternal grandfather; Circulatory in her maternal grandfather; Coronary Artery Disease in her mother; Diabetes in her paternal grandmother; Eye Disorder in her mother; Genitourinary Problems in her father; Hearing Loss in her maternal grandmother; Hypertension in her mother; Lipids in her mother; Neurologic Disorder in her mother; Osteoporosis in her mother; Respiratory in her maternal grandmother; Thyroid Disease in her sister.        IMAGING per my own measurement and interpretation:  Xrays:cervical flexion-extension 01/30/20      Pelvic   Resulted Imaging/Labs:  Mr Cervical Spine W/o Contrast    Result Date: 1/17/2020  MR CERVICAL SPINE W/O CONTRAST 1/17/2020 2:21 PM Provided History: Radiculopathy; Facet arthropathy, cervical; Cervical stenosis of spinal canal; Cervical radiculopathy ICD-10: Facet arthropathy, cervical; Cervical stenosis of spinal canal; Cervical radiculopathy Comparison: 8/4/2017 Technique: Sagittal T1-weighted, sagittal T2-weighted, sagittal STIR, sagittal diffusion weighted, axial T2-weighted, and axial T2* gradient echo images of the cervical spine were obtained without intravenous contrast. Findings: Trace anterolisthesis of C3 on C4 and trace retrolisthesis of C4 on C5. Multilevel disc height narrowing throughout the cervical spine. Bone marrow signal is normal. Degenerative endplate signal changes. Spinal cord signal is normal. Level by level findings are as follows: C2-C3: Spinal canal and neural foramina are patent. C3-C4: There is mild disc bulge and bilateral uncinate hypertrophy and bilateral facet hypertrophy with moderate to severe right, moderate-severe left foraminal stenoses. Spinal canal is patent. C4-C5: Disc osteophyte complex with bilateral  severe neural foraminal stenoses. Effacement of ventral subarachnoid space and mild canal stenosis. C5-C6: Bilateral uncinate hypertrophy and facet hypertrophy with moderate to severe neural foraminal stenosis. Disc osteophyte complex with mild canal stenosis. C6-C7: Bilateral neural foraminal narrowing due to disc osteophyte complex and facet uncinate hypertrophy. Mild canal narrowing. C7-T1: Bilateral uncinate hypertrophy with mild bilateral foraminal narrowing. Spinal canal is patent. Paraspinous, paravertebral and prevertebral soft tissues are normal.     IMPRESSION: Multilevel degenerative changes throughout the cervical spine with mild to moderate canal stenosis and moderate to severe neural foraminal stenoses at multiple levels as described above in detail. No myelopathic cord signal. PURA KERR MD    Mr Lumbar Spine W/o Contrast    Result Date: 2/20/2019  MR LUMBAR SPINE W/O CONTRAST 2/19/2019 4:02 PM Provided History: Back pain, < 6wks, no red flags, no prior management; Facet arthropathy, lumbar radiculopathy; Lumbar radiculopathy; Lumbar facet arthropathy ICD-10: Lumbar radiculopathy; Lumbar facet arthropathy Comparison: 5/4/2016 Technique: Sagittal T1-weighted, sagittal STIR, 3D volumetric axial and sagittal reconstructed T2-weighted images of the lumbar spine were obtained without intravenous contrast. Findings: Regarding numbering convention, there are 5 lumbar-type vertebrae assumed for the purposes of this dictation.  The tip of the conus medullaris is at  L1.  Regarding alignment, there is a dextroconvex lumbar scoliosis centered in L2, similar to the prior examination.  There is multilevel disc height narrowing and disc desiccation more advanced at L1-L4 on the left. There are degenerative endplate changes at multiple levels. Regarding bone marrow signal intensity, no abnormality is visualized on STIR images. On a level by level basis: T12-L1: There is a small central disc protrusion. No spinal  canal or neural foraminal stenosis. L1-2: Circumferential disc osteophyte complex. Facet arthropathy bilaterally. No spinal canal stenosis. Mild bilateral neural foraminal stenosis. L2-3: Disc osteophyte complex. Bilateral facet arthropathy, greater on the left. Mild spinal canal stenosis. Moderate right and mild left neural foraminal stenosis. L3-4: Disc osteophyte complex with a superimposed left subarticular annular fissure. Facet arthropathy bilaterally. Ligamentum flavum hypertrophy. Mild spinal canal stenosis. Mild right and moderate left neural foraminal stenosis. L4-5: Disc osteophyte complex. Facet arthropathy bilaterally. Ligamentum flavum hypertrophy. Mild to moderate spinal canal stenosis. Mild to moderate neural foraminal stenosis bilaterally. L5-S1: Left eccentric posterior disc bulge. Facet arthropathy. Mild spinal canal stenosis. Mild left and moderate right neural foraminal stenosis. Paraspinous tissues are within normal limits.     Impression: 1. Moderate dextroconvex lumbar scoliosis. 2. Multilevel lumbar spondylosis most pronounced at L3-4 and L4-5 where there is mild spinal canal stenosis at the level of L5-S1 where there is moderate right neural foraminal stenosis. Overall findings are similar to 5/4/2016 I have personally reviewed the examination and initial interpretation and I agree with the findings. GHANSHYAM BHAT MD          Vitamin D:  Vitamin D Deficiency Screening Results:  Lab Results   Component Value Date    VITDT 45 09/08/2017    VITDT 50 12/31/2015    VITDT 17 (L) 04/13/2015    VITDT 64 09/09/2014    VITDT 26 (L) 05/27/2014     25 OH Vit D2   Date Value Ref Range Status   06/11/2010 <5 ug/L Final     25 OH Vit D3   Date Value Ref Range Status   06/11/2010 37 ug/L Final     25 OH Vit D total   Date Value Ref Range Status   06/11/2010  30 - 75 ug/L Final    <42  Season, race, dietary intake, and treatment affect the concentration of   25-hydroxy-Vitamin D. Values may decrease  "during winter months and increase   during summer months. Values less than 30 ug/L may indicate Vitamin D   deficiency.         Nutritional Status:  Estimated body mass index is 36.78 kg/m  as calculated from the following:    Height as of 9/19/19: 1.651 m (5' 5\").    Weight as of 1/13/20: 100.2 kg (221 lb).         Lab Results   Component Value Date    ALBUMIN 3.5 04/15/2019       Diabetes Screening:  No results found for: A1C    Nicotine Usage:  Yes-               Physical Exam   /83 (BP Location: Right arm, Patient Position: Sitting)   Pulse 103   Wt 100.5 kg (221 lb 8 oz)   LMP  (LMP Unknown)   SpO2 92%   BMI 36.86 kg/m      Constitutional: Oriented to person, place, and time. Appears well-developed and well-nourished. Cooperative. No distress.   HENT:   Head: Normocephalic and atraumatic.   Eyes: Conjunctivae are normal.  Neck: Normal range of motion. Neck supple. No spinous process tenderness and no muscular tenderness present. No tracheal deviation present.  Cardiovascular: Normal rate and regular rhythm.    Pulmonary/Chest: Effort normal and breath sounds normal.  Abdominal: Soft. Bowel sounds are normal. Exhibits no distension. There is no tenderness.   Musculoskeletal:   Cervical flexion-extension range of motion: extension to 10 degrees      Neurological: alert and oriented to person, place, and time.   No cranial nerve deficit   sensory deficit none  Gait normal      Reflex Scores:       Tricep reflexes are 2+ on the right side and 2+ on the left side.       Bicep reflexes are 2+ on the right side and 2+ on the left side.       Brachioradialis reflexes are 2+ on the right side and 2+ on the left side.       Patellar reflexes are 2+ on the right side and 2+ on the left side.       Achilles reflexes are 2+ on the right side and 2+ on the left side.    STRENGTH LEFT RIGHT   Deltoid 5 5   Bicep 5 5   Wrist Extensor 5 5   Tricep 5 5   Finger flexion 5 5   Finger abduction 5 5    5 5       Hip " Flexion     5     5   Knee Extension 5 5   Ankle Dorsiflexion 5 5   Extensor Hallucis Longus 5 5   Plantar Flexion 5 5   Foot eversion 5 5   Foot inversion 5 5     No Lhermitte's, No Spurling's  No Papo's   No ankle clonus  Able to tandem walk          Skin: Skin is warm, dry and intact.   Psychiatric: Normal mood and affect. Speech is normal and behavior is normal.        ASSESSMENT:  Brandi Stern is a 64 year old female with obesity, cervical spondylosis with central and foraminal stenosis and cervical radiculopathy      PLAN:    Cervical spondylosis with significant foraminal and central stenosis at C4-5 and C5-6.  There are diffuse degenerative changes throughout the cervical spine but these are the most significant and in my opinion symptomatic levels. She has some foraminal stenosis on the right at C7-T1 without any central stenosis. This may be contributing to her symptomatology but I dont think this needs an anterior approach.  I recommended C4-5, C5-6 ACDF. If her right C8 nerve root is problematic over time after addressing the most severe levels of C4-5 and C5-6, she could have a right MIS C7-T1 foraminotomy to address this. She has considerable risk factors of osteoporosis, obesity, and ISATU.    Last DEXA scan on record is 2014- will repeat DEXA now. If osteoporotic, alendronate will not be acceptable to continue for fusion surgery- she would need to be switched to Forteo or Tymlos. Will refer to Dr. Lawton to evaluate whether this is possible if DEXA confirms current osteoporosis, and would ask her to return to see me after starting Forteo or Tymlos for surgical discusssion, would need to be on these for 3 months prior to surgery consideration.    If DEXA scan does not show osteoporosis, we will proceed with surgical planning and discontinue alendronate for the perioperative period. She will return to see me for surgical discussion. Stop smoking immediately, we discussed this.      Severe risk of  airway compromise due to obesity and ISATU, would need to stay overnight in observation after ACDF, not appropriate for same-day surgery.        DEXA and vitamin D level ordered. She has been on vitamin D 4000IU daily for years without recent level checked.  NO DEXA done since 2014.        Chrissy Jones MD    [facility] Department of Neurosurgery  Complex Spinal Deformity, Scoliosis, and Minimally Invasive Spine Surgery Specialist  Office: [phone]      I spent 45 minutes in patient care with greater than 50% spent in counseling and/or coordination of care.

## 2020-01-30 ENCOUNTER — OFFICE VISIT (OUTPATIENT)
Dept: NEUROSURGERY | Facility: CLINIC | Age: 65
End: 2020-01-30
Payer: MEDICARE

## 2020-01-30 ENCOUNTER — ANCILLARY PROCEDURE (OUTPATIENT)
Dept: GENERAL RADIOLOGY | Facility: CLINIC | Age: 65
End: 2020-01-30
Attending: NEUROLOGICAL SURGERY
Payer: MEDICARE

## 2020-01-30 ENCOUNTER — THERAPY VISIT (OUTPATIENT)
Dept: PHYSICAL THERAPY | Facility: CLINIC | Age: 65
End: 2020-01-30
Payer: MEDICARE

## 2020-01-30 VITALS
OXYGEN SATURATION: 92 % | HEART RATE: 103 BPM | DIASTOLIC BLOOD PRESSURE: 83 MMHG | WEIGHT: 221.5 LBS | BODY MASS INDEX: 36.86 KG/M2 | SYSTOLIC BLOOD PRESSURE: 117 MMHG

## 2020-01-30 DIAGNOSIS — M47.22 CERVICAL SPONDYLOSIS WITH RADICULOPATHY: Primary | ICD-10-CM

## 2020-01-30 DIAGNOSIS — M48.02 SPINAL STENOSIS IN CERVICAL REGION: ICD-10-CM

## 2020-01-30 DIAGNOSIS — M47.22 CERVICAL SPONDYLOSIS WITH RADICULOPATHY: ICD-10-CM

## 2020-01-30 DIAGNOSIS — E55.9 VITAMIN D DEFICIENCY: ICD-10-CM

## 2020-01-30 DIAGNOSIS — M81.8 OTHER OSTEOPOROSIS WITHOUT CURRENT PATHOLOGICAL FRACTURE: Primary | ICD-10-CM

## 2020-01-30 DIAGNOSIS — M54.12 CERVICAL RADICULOPATHY: ICD-10-CM

## 2020-01-30 DIAGNOSIS — M81.0 AGE RELATED OSTEOPOROSIS: ICD-10-CM

## 2020-01-30 DIAGNOSIS — M47.812 ARTHROPATHY OF CERVICAL FACET JOINT: ICD-10-CM

## 2020-01-30 PROCEDURE — 97110 THERAPEUTIC EXERCISES: CPT | Mod: GP | Performed by: PHYSICAL THERAPIST

## 2020-01-30 PROCEDURE — 82306 VITAMIN D 25 HYDROXY: CPT | Performed by: NEUROLOGICAL SURGERY

## 2020-01-30 PROCEDURE — 97162 PT EVAL MOD COMPLEX 30 MIN: CPT | Mod: GP | Performed by: PHYSICAL THERAPIST

## 2020-01-30 ASSESSMENT — PAIN SCALES - GENERAL: PAINLEVEL: SEVERE PAIN (6)

## 2020-01-30 NOTE — PATIENT INSTRUCTIONS
Obtain DEXA scan and vitamin D level.   Depending on results, you might be referred to an Endocrinologist. If results within normal range, return for surgical discussion. Stop smoking immediately
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
## 2020-01-30 NOTE — LETTER
1/30/2020       RE: Brandi Stern  [address] [place] [address]     Dear Colleague,    Thank you for referring your patient, Brandi Stern, to the [facility] NEUROSURGERY at [facility]. Please see a copy of my visit note below.      Neurosurgery Clinic Note    Chief Complaint: neck pain    History of Present Illness:  It was a pleasure to evaluate Brandi Stern in clinic today at the kind referral of Gia Stroud, APRN CNP  [address]  [place] [address].    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  All fingers feel affected but it is most severe in the thumb and forefinger.  Exacerbated by working in movement and laying on the left side relieved somewhat with stretching.  Had an EMG several years ago for the symptoms that showed only chronic changes actually suggestive and C7 and C8 without active radiculopathy or neuropathy.  No changes in hand dexterity, but gets hand cramping fine finger movement.  Minor decrease in balance.  No changes in bladder urgency or frequency.  No significant left hand symptoms.  No prior spine surgery    On alendronate for osteoporosis, no recent DEXA  Has had multiple facet injections and epidural injections in cervical and lumbar spine  She is an active cigarette smoker.  Wears CPAP for obstructive sleep apnea       Xr Cervical Radiofreqency Ablation Unilat    Result Date: 4/19/2019  Xr Lumbar Radiofrequency Ablation Unilat    Result Date: 6/7/2019    Xr Medial Branch Block Lumbar Left    Result Date: 5/23/2019    Xr Medial Branch Block Lumbar Left    Result Date: 5/3/2019    Xr Medial Branch Block Cervical Left    Result Date: 4/4/2019  Xr Medial Branch Block Cervical Left    Result Date: 3/20/2019      Xr Cervical/thoracic Epidural Inj    Result Date: 10/12/2018      Xr Cervical Thoracic Facet Injection  Left    Result Date: 2/14/2019          Review of Systems   Positive for neck pain, back pain, intermittent trouble with swallowing that consists of feeling like she needs to swallow twice.  No aspiration.      Past Medical History:   Diagnosis Date     Allergic rhinitis due to other allergen      Apneas, obstructive sleep      Chronic lymphocytic thyroiditis      COPD (chronic obstructive pulmonary disease) (H)      Depressive disorder      Depressive disorder, not elsewhere classified      Disorder of bone and cartilage, unspecified      Esophageal reflux      Essential hypertension, benign      Generalized osteoarthrosis, unspecified site     knees     HASHIMOTO'S THYROIDITIS 11/15/2004     HASHIMOTO'S THYROIDITIS      HASHIMOTO'S THYROIDITIS      Headache above the eye region      Headache above the eye region      Hiatal hernia      Insomnia, unspecified      Moderate persistent asthma      Nasal congestion      Pure hyperglyceridemia      Scoliosis      Snoring      Tobacco use disorder        Past Surgical History:   Procedure Laterality Date     ABDOMEN SURGERY      GB out     ARTHROSCOPY KNEE RT/LT  2/07    left knee     BIOPSY      Colon     C TOTAL KNEE ARTHROPLASTY      bilateral     CHOLECYSTECTOMY       COLONOSCOPY  8/5/2014    Procedure: COLONOSCOPY;  Surgeon: Mau De La Fuente MD;  Location:  GI     ESOPHAGOSCOPY, GASTROSCOPY, DUODENOSCOPY (EGD), COMBINED  8/5/2014    Procedure: COMBINED ESOPHAGOSCOPY, GASTROSCOPY, DUODENOSCOPY (EGD), BIOPSY SINGLE OR MULTIPLE;  Surgeon: Mau De La Fuente MD;  Location:  GI     Gall bladder removal  2011       Social History     Socioeconomic History     Marital status: Single     Spouse name: Not on file     Number of children: Not on file     Years of education: Not on file     Highest education level: Not on file   Occupational History     Not on file   Social Needs     Financial resource strain: Not on file     Food insecurity:     Worry: Not on file      Inability: Not on file     Transportation needs:     Medical: Not on file     Non-medical: Not on file   Tobacco Use     Smoking status: Former Smoker     Packs/day: 0.50     Years: 20.00     Pack years: 10.00     Types: Cigarettes     Start date: 10/1/1971     Last attempt to quit: 2007     Years since quittin.5     Smokeless tobacco: Never Used   Substance and Sexual Activity     Alcohol use: Yes     Alcohol/week: 0.0 standard drinks     Comment: 2 glasses of wine per week     Drug use: No     Sexual activity: Never     Partners: Male     Birth control/protection: Abstinence   Lifestyle     Physical activity:     Days per week: Not on file     Minutes per session: Not on file     Stress: Not on file   Relationships     Social connections:     Talks on phone: Not on file     Gets together: Not on file     Attends Tenriism service: Not on file     Active member of club or organization: Not on file     Attends meetings of clubs or organizations: Not on file     Relationship status: Not on file     Intimate partner violence:     Fear of current or ex partner: Not on file     Emotionally abused: Not on file     Physically abused: Not on file     Forced sexual activity: Not on file   Other Topics Concern     Parent/sibling w/ CABG, MI or angioplasty before 65F 55M? No   Social History Narrative    Dairy/d 2 servings/d.    Caffeine 1 -2servings/d    Exercise 1 x week walking,problems with knees    Sunscreen used - Yes    Seatbelts used - Yes    Working smoke/CO detectors in the home - Yes    Guns stored in the home - No    Self Breast Exams - Yes-occ    Self Testicular Exam - NA    Eye Exam up to date - no,does every 2yrs    Dental Exam up to date - Yes  Q 6 months    Pap Smear up to date - no    Mammogram up to date -had done today    PSA up to date - NA    Dexa Scan up to date - Yes- 2008    Flex Sig / Colonoscopy up to date - colonoscopy in ,repeat in 10yrs    Immunizations up to date - Yes Td      Abuse: Current or Past(Physical, Sexual or Emotional)- No    Do you feel safe in your environment - Yes       family history includes Allergies in her father; Arthritis in her father; Asthma in her paternal grandmother; Blood Disease in her father; C.A.D. in her maternal grandfather and maternal grandmother; Cancer in her father and paternal grandmother; Cancer - colorectal in her paternal grandmother; Cardiovascular in her maternal grandfather; Cerebrovascular Disease in her maternal grandmother and paternal grandfather; Circulatory in her maternal grandfather; Coronary Artery Disease in her mother; Diabetes in her paternal grandmother; Eye Disorder in her mother; Genitourinary Problems in her father; Hearing Loss in her maternal grandmother; Hypertension in her mother; Lipids in her mother; Neurologic Disorder in her mother; Osteoporosis in her mother; Respiratory in her maternal grandmother; Thyroid Disease in her sister.        IMAGING per my own measurement and interpretation:  Xrays:cervical flexion-extension 01/30/20      Pelvic   Resulted Imaging/Labs:  Mr Cervical Spine W/o Contrast    Result Date: 1/17/2020  MR CERVICAL SPINE W/O CONTRAST 1/17/2020 2:21 PM Provided History: Radiculopathy; Facet arthropathy, cervical; Cervical stenosis of spinal canal; Cervical radiculopathy ICD-10: Facet arthropathy, cervical; Cervical stenosis of spinal canal; Cervical radiculopathy Comparison: 8/4/2017 Technique: Sagittal T1-weighted, sagittal T2-weighted, sagittal STIR, sagittal diffusion weighted, axial T2-weighted, and axial T2* gradient echo images of the cervical spine were obtained without intravenous contrast. Findings: Trace anterolisthesis of C3 on C4 and trace retrolisthesis of C4 on C5. Multilevel disc height narrowing throughout the cervical spine. Bone marrow signal is normal. Degenerative endplate signal changes. Spinal cord signal is normal. Level by level findings are as follows: C2-C3: Spinal canal  and neural foramina are patent. C3-C4: There is mild disc bulge and bilateral uncinate hypertrophy and bilateral facet hypertrophy with moderate to severe right, moderate-severe left foraminal stenoses. Spinal canal is patent. C4-C5: Disc osteophyte complex with bilateral severe neural foraminal stenoses. Effacement of ventral subarachnoid space and mild canal stenosis. C5-C6: Bilateral uncinate hypertrophy and facet hypertrophy with moderate to severe neural foraminal stenosis. Disc osteophyte complex with mild canal stenosis. C6-C7: Bilateral neural foraminal narrowing due to disc osteophyte complex and facet uncinate hypertrophy. Mild canal narrowing. C7-T1: Bilateral uncinate hypertrophy with mild bilateral foraminal narrowing. Spinal canal is patent. Paraspinous, paravertebral and prevertebral soft tissues are normal.     IMPRESSION: Multilevel degenerative changes throughout the cervical spine with mild to moderate canal stenosis and moderate to severe neural foraminal stenoses at multiple levels as described above in detail. No myelopathic cord signal. PURA KERR MD    Mr Lumbar Spine W/o Contrast    Result Date: 2/20/2019  MR LUMBAR SPINE W/O CONTRAST 2/19/2019 4:02 PM Provided History: Back pain, < 6wks, no red flags, no prior management; Facet arthropathy, lumbar radiculopathy; Lumbar radiculopathy; Lumbar facet arthropathy ICD-10: Lumbar radiculopathy; Lumbar facet arthropathy Comparison: 5/4/2016 Technique: Sagittal T1-weighted, sagittal STIR, 3D volumetric axial and sagittal reconstructed T2-weighted images of the lumbar spine were obtained without intravenous contrast. Findings: Regarding numbering convention, there are 5 lumbar-type vertebrae assumed for the purposes of this dictation.  The tip of the conus medullaris is at  L1.  Regarding alignment, there is a dextroconvex lumbar scoliosis centered in L2, similar to the prior examination.  There is multilevel disc height narrowing and disc  desiccation more advanced at L1-L4 on the left. There are degenerative endplate changes at multiple levels. Regarding bone marrow signal intensity, no abnormality is visualized on STIR images. On a level by level basis: T12-L1: There is a small central disc protrusion. No spinal canal or neural foraminal stenosis. L1-2: Circumferential disc osteophyte complex. Facet arthropathy bilaterally. No spinal canal stenosis. Mild bilateral neural foraminal stenosis. L2-3: Disc osteophyte complex. Bilateral facet arthropathy, greater on the left. Mild spinal canal stenosis. Moderate right and mild left neural foraminal stenosis. L3-4: Disc osteophyte complex with a superimposed left subarticular annular fissure. Facet arthropathy bilaterally. Ligamentum flavum hypertrophy. Mild spinal canal stenosis. Mild right and moderate left neural foraminal stenosis. L4-5: Disc osteophyte complex. Facet arthropathy bilaterally. Ligamentum flavum hypertrophy. Mild to moderate spinal canal stenosis. Mild to moderate neural foraminal stenosis bilaterally. L5-S1: Left eccentric posterior disc bulge. Facet arthropathy. Mild spinal canal stenosis. Mild left and moderate right neural foraminal stenosis. Paraspinous tissues are within normal limits.     Impression: 1. Moderate dextroconvex lumbar scoliosis. 2. Multilevel lumbar spondylosis most pronounced at L3-4 and L4-5 where there is mild spinal canal stenosis at the level of L5-S1 where there is moderate right neural foraminal stenosis. Overall findings are similar to 5/4/2016 I have personally reviewed the examination and initial interpretation and I agree with the findings. GHANSHYAM BHAT MD          Vitamin D:  Vitamin D Deficiency Screening Results:  Lab Results   Component Value Date    VITDT 45 09/08/2017    VITDT 50 12/31/2015    VITDT 17 (L) 04/13/2015    VITDT 64 09/09/2014    VITDT 26 (L) 05/27/2014     25 OH Vit D2   Date Value Ref Range Status   06/11/2010 <5 ug/L Final  "    25 OH Vit D3   Date Value Ref Range Status   06/11/2010 37 ug/L Final     25 OH Vit D total   Date Value Ref Range Status   06/11/2010  30 - 75 ug/L Final    <42  Season, race, dietary intake, and treatment affect the concentration of   25-hydroxy-Vitamin D. Values may decrease during winter months and increase   during summer months. Values less than 30 ug/L may indicate Vitamin D   deficiency.         Nutritional Status:  Estimated body mass index is 36.78 kg/m  as calculated from the following:    Height as of 9/19/19: 1.651 m (5' 5\").    Weight as of 1/13/20: 100.2 kg (221 lb).         Lab Results   Component Value Date    ALBUMIN 3.5 04/15/2019       Diabetes Screening:  No results found for: A1C    Nicotine Usage:  Yes-               Physical Exam   /83 (BP Location: Right arm, Patient Position: Sitting)   Pulse 103   Wt 100.5 kg (221 lb 8 oz)   LMP  (LMP Unknown)   SpO2 92%   BMI 36.86 kg/m       Constitutional: Oriented to person, place, and time. Appears well-developed and well-nourished. Cooperative. No distress.   HENT:   Head: Normocephalic and atraumatic.   Eyes: Conjunctivae are normal.  Neck: Normal range of motion. Neck supple. No spinous process tenderness and no muscular tenderness present. No tracheal deviation present.  Cardiovascular: Normal rate and regular rhythm.    Pulmonary/Chest: Effort normal and breath sounds normal.  Abdominal: Soft. Bowel sounds are normal. Exhibits no distension. There is no tenderness.   Musculoskeletal:   Cervical flexion-extension range of motion: extension to 10 degrees      Neurological: alert and oriented to person, place, and time.   No cranial nerve deficit   sensory deficit none  Gait normal      Reflex Scores:       Tricep reflexes are 2+ on the right side and 2+ on the left side.       Bicep reflexes are 2+ on the right side and 2+ on the left side.       Brachioradialis reflexes are 2+ on the right side and 2+ on the left side.       " Patellar reflexes are 2+ on the right side and 2+ on the left side.       Achilles reflexes are 2+ on the right side and 2+ on the left side.    STRENGTH LEFT RIGHT   Deltoid 5 5   Bicep 5 5   Wrist Extensor 5 5   Tricep 5 5   Finger flexion 5 5   Finger abduction 5 5    5 5       Hip Flexion     5     5   Knee Extension 5 5   Ankle Dorsiflexion 5 5   Extensor Hallucis Longus 5 5   Plantar Flexion 5 5   Foot eversion 5 5   Foot inversion 5 5     No Lhermitte's, No Spurling's  No Papo's   No ankle clonus  Able to tandem walk          Skin: Skin is warm, dry and intact.   Psychiatric: Normal mood and affect. Speech is normal and behavior is normal.        ASSESSMENT:  Brandi Stern is a 64 year old female with obesity, cervical spondylosis with central and foraminal stenosis and cervical radiculopathy      PLAN:    Cervical spondylosis with significant foraminal and central stenosis at C4-5 and C5-6.  There are diffuse degenerative changes throughout the cervical spine but these are the most significant and in my opinion symptomatic levels. She has some foraminal stenosis on the right at C7-T1 without any central stenosis. This may be contributing to her symptomatology but I dont think this needs an anterior approach.  I recommended C4-5, C5-6 ACDF. If her right C8 nerve root is problematic over time after addressing the most severe levels of C4-5 and C5-6, she could have a right MIS C7-T1 foraminotomy to address this. She has considerable risk factors of osteoporosis, obesity, and ISATU.    Last DEXA scan on record is 2014- will repeat DEXA now. If osteoporotic, alendronate will not be acceptable to continue for fusion surgery- she would need to be switched to Forteo or Tymlos. Will refer to Dr. Lawton to evaluate whether this is possible if DEXA confirms current osteoporosis, and would ask her to return to see me after starting Forteo or Tymlos for surgical discusssion, would need to be on these for 3 months  prior to surgery consideration.    If DEXA scan does not show osteoporosis, we will proceed with surgical planning and discontinue alendronate for the perioperative period. She will return to see me for surgical discussion. Stop smoking immediately, we discussed this.      Severe risk of airway compromise due to obesity and ISATU, would need to stay overnight in observation after ACDF, not appropriate for same-day surgery.        DEXA and vitamin D level ordered. She has been on vitamin D 4000IU daily for years without recent level checked.  NO DEXA done since 2014.        Chrissy Jones MD    [facility] Department of Neurosurgery  Complex Spinal Deformity, Scoliosis, and Minimally Invasive Spine Surgery Specialist  Office: [phone]      I spent 45 minutes in patient care with greater than 50% spent in counseling and/or coordination of care.    Chrissy Jones MD

## 2020-01-31 ENCOUNTER — ANCILLARY PROCEDURE (OUTPATIENT)
Dept: BONE DENSITY | Facility: CLINIC | Age: 65
End: 2020-01-31
Attending: NEUROLOGICAL SURGERY
Payer: MEDICARE

## 2020-01-31 DIAGNOSIS — M81.0 AGE RELATED OSTEOPOROSIS: ICD-10-CM

## 2020-02-01 PROBLEM — M47.812 ARTHROPATHY OF CERVICAL FACET JOINT: Status: ACTIVE | Noted: 2020-02-01

## 2020-02-01 PROBLEM — M54.12 CERVICAL RADICULOPATHY: Status: ACTIVE | Noted: 2020-02-01

## 2020-02-01 PROBLEM — M48.02 SPINAL STENOSIS IN CERVICAL REGION: Status: ACTIVE | Noted: 2020-02-01

## 2020-02-02 LAB
DEPRECATED CALCIDIOL+CALCIFEROL SERPL-MC: <61 UG/L (ref 20–75)
VITAMIN D2 SERPL-MCNC: <5 UG/L
VITAMIN D3 SERPL-MCNC: 56 UG/L

## 2020-02-13 ENCOUNTER — MYC MEDICAL ADVICE (OUTPATIENT)
Dept: NEUROSURGERY | Facility: CLINIC | Age: 65
End: 2020-02-13

## 2020-02-13 NOTE — TELEPHONE ENCOUNTER
Returned call.    T-score on DEXA is -2.2.  This was relayed to pt.  Writer will let MD know and get back to pt.

## 2020-02-20 ENCOUNTER — THERAPY VISIT (OUTPATIENT)
Dept: PHYSICAL THERAPY | Facility: CLINIC | Age: 65
End: 2020-02-20
Payer: MEDICARE

## 2020-02-20 DIAGNOSIS — M47.812 ARTHROPATHY OF CERVICAL FACET JOINT: ICD-10-CM

## 2020-02-20 DIAGNOSIS — M48.02 SPINAL STENOSIS IN CERVICAL REGION: ICD-10-CM

## 2020-02-20 DIAGNOSIS — M54.12 CERVICAL RADICULOPATHY: ICD-10-CM

## 2020-02-20 PROCEDURE — 97110 THERAPEUTIC EXERCISES: CPT | Mod: GP | Performed by: PHYSICAL THERAPIST

## 2020-02-20 PROCEDURE — 97112 NEUROMUSCULAR REEDUCATION: CPT | Mod: GP | Performed by: PHYSICAL THERAPIST

## 2020-02-20 PROCEDURE — 97140 MANUAL THERAPY 1/> REGIONS: CPT | Mod: GP | Performed by: PHYSICAL THERAPIST

## 2020-02-27 ENCOUNTER — THERAPY VISIT (OUTPATIENT)
Dept: PHYSICAL THERAPY | Facility: CLINIC | Age: 65
End: 2020-02-27
Payer: MEDICARE

## 2020-02-27 DIAGNOSIS — M47.812 ARTHROPATHY OF CERVICAL FACET JOINT: ICD-10-CM

## 2020-02-27 DIAGNOSIS — M54.12 CERVICAL RADICULOPATHY: ICD-10-CM

## 2020-02-27 DIAGNOSIS — M48.02 SPINAL STENOSIS IN CERVICAL REGION: ICD-10-CM

## 2020-02-27 PROCEDURE — 97140 MANUAL THERAPY 1/> REGIONS: CPT | Mod: GP | Performed by: PHYSICAL THERAPIST

## 2020-02-27 PROCEDURE — 97112 NEUROMUSCULAR REEDUCATION: CPT | Mod: GP | Performed by: PHYSICAL THERAPIST

## 2020-02-27 PROCEDURE — 97110 THERAPEUTIC EXERCISES: CPT | Mod: GP | Performed by: PHYSICAL THERAPIST

## 2020-03-03 NOTE — TELEPHONE ENCOUNTER
Call received from pt regarding see a MD for osteoporosis.  Pt contacted pt on 2/13 and was told that her T-score was -2.2.    Returned call.    Writer left a message that we don't send pts to be seen by an osteoporosis unless their T-score is -2.5 or greater.

## 2020-03-11 DIAGNOSIS — K21.9 GASTROESOPHAGEAL REFLUX DISEASE WITHOUT ESOPHAGITIS: ICD-10-CM

## 2020-03-11 NOTE — TELEPHONE ENCOUNTER
"Requested Prescriptions   Pending Prescriptions Disp Refills     ranitidine (ZANTAC) 300 MG tablet [Pharmacy Med Name: RANITIDINE 300 MG TABLET]  Last Written Prescription Date:  4/5/19  Last Fill Quantity: 180,  # refills: 2   Last office visit: 9/19/19 with prescribing provider:  Derrek   Future Office Visit:     180 tablet 2     Sig: TAKE 1 TABLET (300 MG) BY MOUTH 2 TIMES DAILY       H2 Blockers Protocol Passed - 3/11/2020  3:33 AM        Passed - Patient is age 12 or older        Passed - Recent (12 mo) or future (30 days) visit within the authorizing provider's specialty     Patient has had an office visit with the authorizing provider or a provider within the authorizing providers department within the previous 12 mos or has a future within next 30 days. See \"Patient Info\" tab in inbasket, or \"Choose Columns\" in Meds & Orders section of the refill encounter.              Passed - Medication is active on med list             "

## 2020-03-12 ENCOUNTER — DOCUMENTATION ONLY (OUTPATIENT)
Dept: FAMILY MEDICINE | Facility: CLINIC | Age: 65
End: 2020-03-12

## 2020-03-12 NOTE — PROGRESS NOTES
Reason for call:  Form   Our goal is to have forms completed within 72 hours, however some forms may require a visit or additional information.     Who is the form from? [facility]   Where did the form come from? form was faxed in  What clinic location was the form placed at?   Where was the form placed? folder Box/Folder  What number is listed as a contact on the form? [phone]    Phone call message - patient request for a letter, form or note:     Date needed: as soon as possible  Please fax to [phone]  Has the patient signed a consent form for release of information? Not Applicable    Additional comments:     Type of letter, form or note: medical    Phone number to reach patient:      Best Time:      Can we leave a detailed message on this number?      Travel screening:

## 2020-03-16 ENCOUNTER — MYC MEDICAL ADVICE (OUTPATIENT)
Dept: FAMILY MEDICINE | Facility: CLINIC | Age: 65
End: 2020-03-16

## 2020-03-16 DIAGNOSIS — M81.0 OSTEOPOROSIS WITHOUT CURRENT PATHOLOGICAL FRACTURE, UNSPECIFIED OSTEOPOROSIS TYPE: Primary | ICD-10-CM

## 2020-03-17 NOTE — TELEPHONE ENCOUNTER
Brandi is concerned about decreasingcalcium absorption as she has osteopenia in several places.  Should she change my calcium supplement to calcium citrate for better absorption?  , neurosurgeon at the  indicated that she should see a specialist for recommendations to treat her borderline osteoporosis. Do you have a recommendation?

## 2020-03-17 NOTE — TELEPHONE ENCOUNTER
I'm not sure why two different doses of famotidine were sent in.  She should take 20 mg BID, not 40 mg BID.  Calcium citrate would be preferred over calcium carbonate.  Endocrinologists are the specialists that deal with osteoporosis.  I placed a referral if she'd like.

## 2020-03-19 NOTE — RESULT ENCOUNTER NOTE
Trev Valverde-  Please let Brandi know that her DEXA scan shows osteopenia but not osteoporosis, so we will not need to add a new osteoporosis medication for her. Please ask her to return to see me to discuss surgery when she has stopped smoking for 6 weeks, and please order a urine nicotine test for the day of that appointment, no other imaging needed. Please also let her know that her vitamin D level looks normal and good, and she should continue taking the home dose that she has taken long-term from another provider.    Thanks  Chrissy Jones MD    [facility] Department of Neurosurgery  Office: [phone]    3/19/2020  11:16 AM

## 2020-04-01 ENCOUNTER — TELEPHONE (OUTPATIENT)
Dept: ENDOCRINOLOGY | Facility: CLINIC | Age: 65
End: 2020-04-01

## 2020-04-01 NOTE — TELEPHONE ENCOUNTER
Robbie has no openings sooner. New patient.. Does Blayne Penn or Yeecnia Avalos have any sooner New ? Not sure how telephone new are scheduled .

## 2020-04-01 NOTE — TELEPHONE ENCOUNTER
[facility] Call Center    Phone Message    May a detailed message be left on voicemail: no     Reason for Call: made appt for pt with dr pedraza on may 29. Pt states she wants to be seen sooner due to body pain caused by taking fosamax. 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 Please call pt back at . Pt was referred by mark anthony dinh for osteoporosis.     Action Taken: Message routed to:  [facility] endo    Travel Screening: Not Applicable

## 2020-04-02 NOTE — TELEPHONE ENCOUNTER
RECORDS RECEIVED FROM: Internal   DATE RECEIVED: 5.29.2020   NOTES (FOR ALL VISITS) STATUS DETAILS   OFFICE NOTES from referring provider Internal 3.17.2020 Cherie Brennan NP, [facility]   OFFICE NOTES from other specialist Internal 1.30.2020 , [facility]   ED NOTES N/A    OPERATIVE REPORT  (thyroid, pituitary, adrenal, parathyroid) N/A    MEDICATION LIST Internal    IMAGING      DEXASCAN N/A    MRI (BRAIN) Internal 1.17.2020 Cervical spine     XR (Chest) N/A    CT (HEAD/NECK/CHEST/ABDOMEN) N/A    NUCLEAR  N/A    ULTRASOUND (HEAD/NECK) N/A    LABS     DIABETES: HBGA1C, CREATININE, FASTING LIPIDS, MICROALBUMIN URINE, POTASSIUM, TSH, T4    THYROID: TSH, T4, CBC, THYRODLONULIN, TOTAL T3, FREE T4, CALCITONIN, CEA N/A

## 2020-04-06 NOTE — TELEPHONE ENCOUNTER
LVM for pt to schedule sooner appt with any provider accepting new patients. Have held slot on 4/17 at 8am with Dr. Cisneros that can be used. Pt can be scheduled as 60 minute telephone or video visit, per pt preference. If pt chooses video, please confirm email address.

## 2020-04-09 ENCOUNTER — VIRTUAL VISIT (OUTPATIENT)
Dept: PHYSICAL THERAPY | Facility: CLINIC | Age: 65
End: 2020-04-09
Payer: MEDICARE

## 2020-04-09 DIAGNOSIS — M54.12 CERVICAL RADICULOPATHY: ICD-10-CM

## 2020-04-09 DIAGNOSIS — M47.812 ARTHROPATHY OF CERVICAL FACET JOINT: ICD-10-CM

## 2020-04-09 DIAGNOSIS — M48.02 SPINAL STENOSIS IN CERVICAL REGION: ICD-10-CM

## 2020-04-09 PROCEDURE — 99207 C NO CHARGE LOS: CPT | Mod: GP | Performed by: PHYSICAL THERAPIST

## 2020-04-09 NOTE — PROGRESS NOTES
Pt reports her neck feels worse in morning and improves throughout day. She feels her neck ROM is slowly improving. Pt was hopeful to perform video visit with PT today. She does not have a device that will allow for video capabilities with PT. She declined phone PT appointment d/t not wanting to pay out of pocket. She will continue with her HEP at this time. She will follow up with PT clinic appointment on 5/4/20 pending status of COVID-19 and clinic access.

## 2020-05-06 DIAGNOSIS — E06.3 CHRONIC LYMPHOCYTIC THYROIDITIS: ICD-10-CM

## 2020-05-06 DIAGNOSIS — K21.9 GASTROESOPHAGEAL REFLUX DISEASE WITHOUT ESOPHAGITIS: ICD-10-CM

## 2020-05-07 NOTE — TELEPHONE ENCOUNTER
"Requested Prescriptions   Pending Prescriptions Disp Refills     omeprazole (PRILOSEC) 20 MG DR capsule [Pharmacy Med Name: OMEPRAZOLE DR 20 MG CAPSULE] 180 capsule 3     Sig: TAKE 1 TABLET (20 MG) BY MOUTH 2 TIMES DAILY TAKE 30-60 MINUTES BEFORE A MEAL.  Last Written Prescription Date:  04/17/2019  Last Fill Quantity: 180 capsule,  # refills: 3   Last Office Visit: 9/19/2019 Derrek  Future Office Visit:            PPI Protocol Failed - 5/6/2020 12:21 AM        Failed - No diagnosis of osteoporosis on record        Passed - Not on Clopidogrel (unless Pantoprazole ordered)        Passed - Recent (12 mo) or future (30 days) visit within the authorizing provider's specialty     Patient has had an office visit with the authorizing provider or a provider within the authorizing providers department within the previous 12 mos or has a future within next 30 days. See \"Patient Info\" tab in inbasket, or \"Choose Columns\" in Meds & Orders section of the refill encounter.              Passed - Medication is active on med list        Passed - Patient is age 18 or older        Passed - No active pregnacy on record        Passed - No positive pregnancy test in past 12 months      ________________________________________________________________________       levothyroxine (SYNTHROID/LEVOTHROID) 88 MCG tablet [Pharmacy Med Name: LEVOTHYROXINE 88 MCG TABLET] 90 tablet PRN     Sig: TAKE 1 TABLET (88 MCG) BY MOUTH DAILY  Last Written Prescription Date:  04/17/2019  Last Fill Quantity: 90 tablet,  # refills: PRN   Last Office Visit: 9/19/2019 Derrek  Future Office Visit:            Thyroid Protocol Failed - 5/6/2020 12:21 AM        Failed - Normal TSH on file in past 12 months     Recent Labs   Lab Test 04/15/19  0959   TSH 2.06              Passed - Patient is 12 years or older        Passed - Recent (12 mo) or future (30 days) visit within the authorizing provider's specialty     Patient has had an office visit with the authorizing " "provider or a provider within the authorizing providers department within the previous 12 mos or has a future within next 30 days. See \"Patient Info\" tab in inbasket, or \"Choose Columns\" in Meds & Orders section of the refill encounter.              Passed - Medication is active on med list        Passed - No active pregnancy on record     If patient is pregnant or has had a positive pregnancy test, please check TSH.          Passed - No positive pregnancy test in past 12 months     If patient is pregnant or has had a positive pregnancy test, please check TSH.             "

## 2020-05-10 RX ORDER — LEVOTHYROXINE SODIUM 88 UG/1
88 TABLET ORAL DAILY
Qty: 90 TABLET | Refills: 0 | Status: SHIPPED | OUTPATIENT
Start: 2020-05-10 | End: 2020-05-21

## 2020-05-11 NOTE — TELEPHONE ENCOUNTER
Medication is being filled for 1 time refill only due to:  Patient needs to be seen because it has been more than one year since last visit.   My chart sent to pt to schedule wellness exam.  Kandi Bansal RN

## 2020-05-20 DIAGNOSIS — E78.1 PURE HYPERGLYCERIDEMIA: ICD-10-CM

## 2020-05-20 DIAGNOSIS — I10 HYPERTENSION GOAL BP (BLOOD PRESSURE) < 140/90: ICD-10-CM

## 2020-05-20 DIAGNOSIS — E78.5 HYPERLIPIDEMIA LDL GOAL <130: ICD-10-CM

## 2020-05-21 ENCOUNTER — VIRTUAL VISIT (OUTPATIENT)
Dept: FAMILY MEDICINE | Facility: CLINIC | Age: 65
End: 2020-05-21
Payer: MEDICARE

## 2020-05-21 DIAGNOSIS — E06.3 CHRONIC LYMPHOCYTIC THYROIDITIS: ICD-10-CM

## 2020-05-21 DIAGNOSIS — K21.9 GASTROESOPHAGEAL REFLUX DISEASE WITHOUT ESOPHAGITIS: ICD-10-CM

## 2020-05-21 DIAGNOSIS — R19.09 ABDOMINAL MASS OF OTHER SITE: ICD-10-CM

## 2020-05-21 DIAGNOSIS — E78.1 PURE HYPERGLYCERIDEMIA: ICD-10-CM

## 2020-05-21 DIAGNOSIS — G25.81 RESTLESS LEG SYNDROME: ICD-10-CM

## 2020-05-21 DIAGNOSIS — Z00.00 ENCOUNTER FOR MEDICARE ANNUAL WELLNESS EXAM: Primary | ICD-10-CM

## 2020-05-21 DIAGNOSIS — M25.562 LEFT KNEE PAIN, UNSPECIFIED CHRONICITY: ICD-10-CM

## 2020-05-21 DIAGNOSIS — I10 HYPERTENSION GOAL BP (BLOOD PRESSURE) < 140/90: ICD-10-CM

## 2020-05-21 DIAGNOSIS — G47.00 INSOMNIA, UNSPECIFIED TYPE: ICD-10-CM

## 2020-05-21 DIAGNOSIS — E78.5 HYPERLIPIDEMIA LDL GOAL <130: ICD-10-CM

## 2020-05-21 DIAGNOSIS — F33.1 MAJOR DEPRESSIVE DISORDER, RECURRENT EPISODE, MODERATE (H): ICD-10-CM

## 2020-05-21 DIAGNOSIS — M79.10 MYALGIA: ICD-10-CM

## 2020-05-21 DIAGNOSIS — M62.830 SPASM OF BACK MUSCLES: ICD-10-CM

## 2020-05-21 DIAGNOSIS — F17.200 TOBACCO USE DISORDER: ICD-10-CM

## 2020-05-21 PROCEDURE — G0438 PPPS, INITIAL VISIT: HCPCS | Mod: 95 | Performed by: NURSE PRACTITIONER

## 2020-05-21 PROCEDURE — 99213 OFFICE O/P EST LOW 20 MIN: CPT | Mod: 95 | Performed by: NURSE PRACTITIONER

## 2020-05-21 RX ORDER — VARENICLINE TARTRATE 1 MG/1
1 TABLET, FILM COATED ORAL 2 TIMES DAILY
Qty: 60 TABLET | Refills: 2 | Status: SHIPPED | OUTPATIENT
Start: 2020-05-21 | End: 2020-06-17

## 2020-05-21 RX ORDER — PRAMIPEXOLE DIHYDROCHLORIDE 0.12 MG/1
TABLET ORAL
Qty: 270 TABLET | Status: SHIPPED | OUTPATIENT
Start: 2020-05-21 | End: 2021-06-15

## 2020-05-21 RX ORDER — FENOFIBRATE 145 MG/1
145 TABLET, COATED ORAL DAILY
Qty: 90 TABLET | Refills: 3 | Status: SHIPPED | OUTPATIENT
Start: 2020-05-21 | End: 2021-05-05

## 2020-05-21 RX ORDER — FAMOTIDINE 20 MG/1
20 TABLET, FILM COATED ORAL 2 TIMES DAILY
Qty: 180 TABLET | Refills: 3 | Status: SHIPPED | OUTPATIENT
Start: 2020-05-21 | End: 2021-05-28

## 2020-05-21 RX ORDER — DESVENLAFAXINE 100 MG/1
100 TABLET, EXTENDED RELEASE ORAL DAILY
Qty: 90 TABLET | Refills: 3 | Status: SHIPPED | OUTPATIENT
Start: 2020-05-21 | End: 2020-09-24

## 2020-05-21 RX ORDER — LEVOTHYROXINE SODIUM 88 UG/1
88 TABLET ORAL DAILY
Qty: 90 TABLET | Refills: 3 | Status: SHIPPED | OUTPATIENT
Start: 2020-05-21 | End: 2021-08-04

## 2020-05-21 RX ORDER — TRAZODONE HYDROCHLORIDE 50 MG/1
50-100 TABLET, FILM COATED ORAL AT BEDTIME
Qty: 180 TABLET | Refills: 3 | Status: SHIPPED | OUTPATIENT
Start: 2020-05-21 | End: 2020-09-24

## 2020-05-21 RX ORDER — CYCLOBENZAPRINE HCL 5 MG
5-10 TABLET ORAL 3 TIMES DAILY PRN
Qty: 180 TABLET | Refills: 3 | Status: SHIPPED | OUTPATIENT
Start: 2020-05-21 | End: 2021-10-27

## 2020-05-21 RX ORDER — LISINOPRIL 10 MG/1
10 TABLET ORAL DAILY
Qty: 90 TABLET | Status: SHIPPED | OUTPATIENT
Start: 2020-05-21 | End: 2021-08-09

## 2020-05-21 RX ORDER — ATORVASTATIN CALCIUM 10 MG/1
10 TABLET, FILM COATED ORAL DAILY
Qty: 90 TABLET | Status: SHIPPED | OUTPATIENT
Start: 2020-05-21 | End: 2021-08-09

## 2020-05-21 NOTE — NURSING NOTE
Mailed guide to advance health care planning and Health Care Directive to pt's home address along with today's GRISEL Jackson MA

## 2020-05-21 NOTE — PATIENT INSTRUCTIONS
Patient Education   Personalized Prevention Plan  You are due for the preventive services outlined below.  Your care team is available to assist you in scheduling these services.  If you have already completed any of these items, please share that information with your care team to update in your medical record.  Health Maintenance Due   Topic Date Due     URINE DRUG SCREEN  01/08/2017     Discuss Advance Care Planning  10/17/2017     Asthma Action Plan - yearly  12/14/2018     Asthma Control Test  07/21/2019     Depression Assessment  01/17/2020     Basic Metabolic Panel  04/15/2020     Annual Wellness Visit  04/17/2020     FALL RISK ASSESSMENT  04/17/2020               My Asthma Action Plan    Name: Brandi Stern   YOB: 1955  Date: 5/21/2020   My doctor: Cherie Brennan NP   My clinic: [facility]        My Control Medicine: Fluticasone furoate + vilanterol (Breo Ellipta)  -  200/25mcg *  My Rescue Medicine: Albuterol (Proair/Ventolin/Proventil HFA) 2-4 puffs EVERY 4 HOURS as needed. Use a spacer if recommended by your provider.   My Asthma Severity:   Moderate Persistent  Know your asthma triggers:                GREEN ZONE   Good Control    I feel good    No cough or wheeze    Can work, sleep and play without asthma symptoms       Take your asthma control medicine every day.     1. If exercise triggers your asthma, take your rescue medication    15 minutes before exercise or sports, and    During exercise if you have asthma symptoms  2. Spacer to use with inhaler: If you have a spacer, make sure to use it with your inhaler             YELLOW ZONE Getting Worse  I have ANY of these:    I do not feel good    Cough or wheeze    Chest feels tight    Wake up at night   1. Keep taking your Green Zone medications  2. Start taking your rescue medicine:    every 20 minutes for up to 1 hour. Then every 4 hours for 24-48 hours.  3. If you stay in the Yellow Zone for more than 12-24  hours, contact your doctor.  4. If you do not return to the Green Zone in 12-24 hours or you get worse, start taking your oral steroid medicine if prescribed by your provider.           RED ZONE Medical Alert - Get Help  I have ANY of these:    I feel awful    Medicine is not helping    Breathing getting harder    Trouble walking or talking    Nose opens wide to breathe       1. Take your rescue medicine NOW  2. If your provider has prescribed an oral steroid medicine, start taking it NOW  3. Call your doctor NOW  4. If you are still in the Red Zone after 20 minutes and you have not reached your doctor:    Take your rescue medicine again and    Call 911 or go to the emergency room right away    See your regular doctor within 2 weeks of an Emergency Room or Urgent Care visit for follow-up treatment.          Annual Reminders:  Meet with Asthma Educator,  Flu Shot in the Fall, consider Pneumonia Vaccination for patients with asthma (aged 19 and older).    Asthma Triggers  How To Control Things That Make Your Asthma Worse    Triggers are things that make your asthma worse.  Look at the list below to help you find your triggers and what you can do about them.  You can help prevent asthma flare-ups by staying away from your triggers.      Trigger                                                          What you can do   Cigarette Smoke  Tobacco smoke can make asthma worse. Do not allow smoking in your home, car or around you.  Be sure no one smokes at a child s day care or school.  If you smoke, ask your health care provider for ways to help you quit.  Ask family members to quit too.  Ask your health care provider for a referral to Quit Plan to help you quit smoking, or call 2-483-036-PLAN.     Colds, Flu, Bronchitis  These are common triggers of asthma. Wash your hands often.  Don t touch your eyes, nose or mouth.  Get a flu shot every year.     Dust Mites  These are tiny bugs that live in cloth or carpet. They are too  small to see. Wash sheets and blankets in hot water every week.   Encase pillows and mattress in dust mite proof covers.  Avoid having carpet if you can. If you have carpet, vacuum weekly.   Use a dust mask and HEPA vacuum.   Pollen and Outdoor Mold  Some people are allergic to trees, grass, or weed pollen, or molds. Try to keep your windows closed.  Limit time out doors when pollen count is high.   Ask you health care provider about taking medicine during allergy season.     Animal Dander  Some people are allergic to skin flakes, urine or saliva from pets with fur or feathers. Keep pets with fur or feathers out of your home.    If you can t keep the pet outdoors, then keep the pet out of your bedroom.  Keep the bedroom door closed.  Keep pets off cloth furniture and away from stuffed toys.     Mice, Rats, and Cockroaches   Some people are allergic to the waste from these pests.   Cover food and garbage.  Clean up spills and food crumbs.  Store grease in the refrigerator.   Keep food out of the bedroom.   Indoor Mold  This can be a trigger if your home has high moisture. Fix leaking faucets, pipes, or other sources of water.   Clean moldy surfaces.  Dehumidify basement if it is damp and smelly.   Smoke, Strong Odors, and Sprays  These can reduce air quality. Stay away from strong odors and sprays, such as perfume, powder, hair spray, paints, smoke incense, paint, cleaning products, candles and new carpet.   Exercise or Sports  Some people with asthma have this trigger. Be active!  Ask your doctor about taking medicine before sports or exercise to prevent symptoms.    Warm up for 5-10 minutes before and after sports or exercise.     Other Triggers of Asthma  Cold air:  Cover your nose and mouth with a scarf.  Sometimes laughing or crying can be a trigger.  Some medicines and food can trigger asthma.

## 2020-05-21 NOTE — LETTER
My Asthma Action Plan    Name: Brandi Stern   YOB: 1955  Date: 5/21/2020   My doctor: Cherie Brennan NP   My clinic: [facility]        My Control Medicine: Fluticasone furoate + vilanterol (Breo Ellipta)  -  200/25mcg *  My Rescue Medicine: Albuterol (Proair/Ventolin/Proventil HFA) 2-4 puffs EVERY 4 HOURS as needed. Use a spacer if recommended by your provider.   My Asthma Severity:   Moderate Persistent  Know your asthma triggers:                GREEN ZONE   Good Control    I feel good    No cough or wheeze    Can work, sleep and play without asthma symptoms       Take your asthma control medicine every day.     1. If exercise triggers your asthma, take your rescue medication    15 minutes before exercise or sports, and    During exercise if you have asthma symptoms  2. Spacer to use with inhaler: If you have a spacer, make sure to use it with your inhaler             YELLOW ZONE Getting Worse  I have ANY of these:    I do not feel good    Cough or wheeze    Chest feels tight    Wake up at night   1. Keep taking your Green Zone medications  2. Start taking your rescue medicine:    every 20 minutes for up to 1 hour. Then every 4 hours for 24-48 hours.  3. If you stay in the Yellow Zone for more than 12-24 hours, contact your doctor.  4. If you do not return to the Green Zone in 12-24 hours or you get worse, start taking your oral steroid medicine if prescribed by your provider.           RED ZONE Medical Alert - Get Help  I have ANY of these:    I feel awful    Medicine is not helping    Breathing getting harder    Trouble walking or talking    Nose opens wide to breathe       1. Take your rescue medicine NOW  2. If your provider has prescribed an oral steroid medicine, start taking it NOW  3. Call your doctor NOW  4. If you are still in the Red Zone after 20 minutes and you have not reached your doctor:    Take your rescue medicine again and    Call 911 or go to the  emergency room right away    See your regular doctor within 2 weeks of an Emergency Room or Urgent Care visit for follow-up treatment.          Annual Reminders:  Meet with Asthma Educator,  Flu Shot in the Fall, consider Pneumonia Vaccination for patients with asthma (aged 19 and older).    Pharmacy:    [facility]  [facility] - [address]  [id number] - [place], [place] - [address]  [address]  [place]    Electronically signed by Cherei Brennan NP   Date: 05/21/20                      Asthma Triggers  How To Control Things That Make Your Asthma Worse    Triggers are things that make your asthma worse.  Look at the list below to help you find your triggers and what you can do about them.  You can help prevent asthma flare-ups by staying away from your triggers.      Trigger                                                          What you can do   Cigarette Smoke  Tobacco smoke can make asthma worse. Do not allow smoking in your home, car or around you.  Be sure no one smokes at a child s day care or school.  If you smoke, ask your health care provider for ways to help you quit.  Ask family members to quit too.  Ask your health care provider for a referral to Quit Plan to help you quit smoking, or call 0-948-835-PLAN.     Colds, Flu, Bronchitis  These are common triggers of asthma. Wash your hands often.  Don t touch your eyes, nose or mouth.  Get a flu shot every year.     Dust Mites  These are tiny bugs that live in cloth or carpet. They are too small to see. Wash sheets and blankets in hot water every week.   Encase pillows and mattress in dust mite proof covers.  Avoid having carpet if you can. If you have carpet, vacuum weekly.   Use a dust mask and HEPA vacuum.   Pollen and Outdoor Mold  Some people are allergic to trees, grass, or weed pollen, or molds. Try to keep your windows closed.  Limit time out doors when pollen count is high.   Ask you health care provider about taking  medicine during allergy season.     Animal Dander  Some people are allergic to skin flakes, urine or saliva from pets with fur or feathers. Keep pets with fur or feathers out of your home.    If you can t keep the pet outdoors, then keep the pet out of your bedroom.  Keep the bedroom door closed.  Keep pets off cloth furniture and away from stuffed toys.     Mice, Rats, and Cockroaches   Some people are allergic to the waste from these pests.   Cover food and garbage.  Clean up spills and food crumbs.  Store grease in the refrigerator.   Keep food out of the bedroom.   Indoor Mold  This can be a trigger if your home has high moisture. Fix leaking faucets, pipes, or other sources of water.   Clean moldy surfaces.  Dehumidify basement if it is damp and smelly.   Smoke, Strong Odors, and Sprays  These can reduce air quality. Stay away from strong odors and sprays, such as perfume, powder, hair spray, paints, smoke incense, paint, cleaning products, candles and new carpet.   Exercise or Sports  Some people with asthma have this trigger. Be active!  Ask your doctor about taking medicine before sports or exercise to prevent symptoms.    Warm up for 5-10 minutes before and after sports or exercise.     Other Triggers of Asthma  Cold air:  Cover your nose and mouth with a scarf.  Sometimes laughing or crying can be a trigger.  Some medicines and food can trigger asthma.

## 2020-05-21 NOTE — PROGRESS NOTES
"  SUBJECTIVE:   Brandi Stern is a 64 year old female who presents for Preventive Visit.    Brandi Stern is a 64 year old female who is being evaluated via a billable video visit.      The patient has been notified of following:     \"This video visit will be conducted via a call between you and your physician/provider. We have found that certain health care needs can be provided without the need for an in-person physical exam.  This service lets us provide the care you need with a video conversation.  If a prescription is necessary we can send it directly to your pharmacy.  If lab work is needed we can place an order for that and you can then stop by our lab to have the test done at a later time.    Video visits are billed at different rates depending on your insurance coverage.  Please reach out to your insurance provider with any questions.    If during the course of the call the physician/provider feels a video visit is not appropriate, you will not be charged for this service.\"    Patient has given verbal consent for Video visit? Yes    How would you like to obtain your AVS? Anand    Patient would like the video invitation sent by: Send to e-mail at: [email or web address]     Will anyone else be joining your video visit? No    Subjective     Brandi Stern is a 64 year old female who presents today via video visit for the following health issues:    HPI         Video Start Time: 1:00            Reviewed and updated as needed this visit by Provider                   Video-Visit Details    Type of service:  Video Visit    Video End Time:1:42 PM    Originating Location (pt. Location): Home    Distant Location (provider location):  [facility]     Platform used for Video Visit: [email or web address]    Return in about 53 weeks (around 5/27/2021) for Annual Wellness Visit.       Cherie Brennan NP                MA informed patient that additional E&M charge may apply, if additional problems " "addressed  Are you in the first 12 months of your Medicare Part B coverage?  No    Physical Health:    In general, how would you rate your overall physical health? Poor at this time     Outside of work, how many days during the week do you exercise? none. Has been doing some gardening.     Outside of work, approximately how many minutes a day do you exercise?not applicable    If you drink alcohol do you typically have >3 drinks per day or >7 drinks per week? No    Do you usually eat at least 4 servings of fruit and vegetables a day, include whole grains & fiber and avoid regularly eating high fat or \"junk\" foods? NO    Do you have any problems taking medications regularly?  No    Do you have any side effects from medications? muscular pain?     Needs assistance for the following daily activities: no assistance needed    Which of the following safety concerns are present in your home?  none identified     Hearing impairment: No    In the past 6 months, have you been bothered by leaking of urine? no    Mental Health:    In general, how would you rate your overall mental or emotional health? Fair at this time   PHQ-2 Score:      Do you feel safe in your environment? Yes    Have you ever done Advance Care Planning? (For example, a Health Directive, POLST, or a discussion with a medical provider or your loved ones about your wishes): Yes, advance care planning is on file. will have patient update. MA will mail out patient information to update directive.     Additional concerns to address?  YES    Patient is having pain in Left knee for the past few months.  She denies any injury.  Requesting xray to check knee replacement. both knees replaced before age 53 (Dr. Costello).  She does have patellar pain, which is different than this pain, which is lateral and distal.     Lump in abdomen left of belly button.  Notices more when she is lying on her size.  First noticed it a few months ago.  Not tender.   Describes it as about " an inch.  Somewhat firm, not mobile.     Stopped taking Fosamax 70 MG because she was having : Pt will see endocrinologist at the end of the month     Overall, she is doing well on Pristiq.  Her employer was bought out by another company, work is more stressful.  She is thinking of retiring in August.    Her GERD is well-controlled on Omeprazole and famotidine.    Fall risk:  Fallen 2 or more times in the past year?: No  Any fall with injury in the past year?: No    Cognitive Screenin) Repeat 3 items (Leader, Season, Table)    2) Clock draw: NORMAL  3) 3 item recall: Recalls 3 objects  Results: 3 words recalled. PCP to verify clock    Mini-CogTM Copyright MAURO Segal. Licensed by the author for use in [facility]; reprinted with permission (fercho@[email or web address]). All rights reserved.      Do you have sleep apnea, excessive snoring or daytime drowsiness?: fatigue. uses CPAP              Reviewed and updated as needed this visit by clinical staff  Allergies  Meds         Reviewed and updated as needed this visit by Provider        Social History     Tobacco Use     Smoking status: Current Some Day Smoker     Packs/day: 0.25     Years: 20.00     Pack years: 5.00     Types: Cigarettes     Start date: 10/1/1971     Last attempt to quit: 2007     Years since quittin.8     Smokeless tobacco: Never Used     Tobacco comment: 2 PACK PER WEEK    Substance Use Topics     Alcohol use: Yes     Alcohol/week: 0.0 standard drinks     Comment: switched to beer 6 pack a week                            Current providers sharing in care for this patient include:   Patient Care Team:  Cherie Brennan NP as PCP - General  William Christy MD as MD (Neurology)  Robbie Flores MD as MD (Internal Medicine)  Yoel Anglin MD as Assigned PCP    The following health maintenance items are reviewed in Epic and correct as of today:  Health Maintenance   Topic Date Due     URINE DRUG SCREEN   "01/08/2017     ADVANCE CARE PLANNING  10/17/2017     ASTHMA ACTION PLAN  12/14/2018     ASTHMA CONTROL TEST  07/21/2019     PHQ-9  01/17/2020     BMP  04/15/2020     MEDICARE ANNUAL WELLNESS VISIT  04/17/2020     FALL RISK ASSESSMENT  04/17/2020     MAMMO SCREENING  10/15/2020     PNEUMOCOCCAL IMMUNIZATION 65+ LOW/MEDIUM RISK (2 of 2 - PPSV23) 07/26/2021     LIPID  04/15/2024     COLORECTAL CANCER SCREENING  08/05/2024     DTAP/TDAP/TD IMMUNIZATION (3 - Td) 01/15/2025     DEXA  Completed     HEPATITIS C SCREENING  Completed     DEPRESSION ACTION PLAN  Completed     INFLUENZA VACCINE  Completed     ZOSTER IMMUNIZATION  Completed     IPV IMMUNIZATION  Aged Out     MENINGITIS IMMUNIZATION  Aged Out           ROS:  CONSTITUTIONAL: NEGATIVE for fever, chills, change in weight  INTEGUMENTARY/SKIN: NEGATIVE for worrisome rashes, moles or lesions  EYES: NEGATIVE for vision changes or irritation  ENT/MOUTH: NEGATIVE for ear, mouth and throat problems  RESP: NEGATIVE for significant cough or SOB  CV: NEGATIVE for chest pain, palpitations or peripheral edema  GI: NEGATIVE for nausea, abdominal pain, heartburn, or change in bowel habits  MUSCULOSKELETAL: see HPI  NEURO: NEGATIVE for weakness, dizziness or paresthesias  ENDOCRINE: NEGATIVE for temperature intolerance, skin/hair changes  PSYCHIATRIC: NEGATIVE for changes in mood or affect    OBJECTIVE:   LMP  (LMP Unknown)  Estimated body mass index is 36.86 kg/m  as calculated from the following:    Height as of 9/19/19: 1.651 m (5' 5\").    Weight as of 1/30/20: 100.5 kg (221 lb 8 oz).  EXAM:   GENERAL: Healthy, alert and no distress  EYES: Eyes grossly normal to inspection.  No discharge or erythema, or obvious scleral/conjunctival abnormalities.  RESP: No audible wheeze, cough, or visible cyanosis.  No visible retractions or increased work of breathing.    SKIN: Visible skin clear. No significant rash, abnormal pigmentation or lesions.  NEURO: Cranial nerves grossly intact.  " Mentation and speech appropriate for age.  PSYCH: Mentation appears normal, affect normal/bright, judgement and insight intact, normal speech and appearance well-groomed.          ASSESSMENT / PLAN:   (Z00.00) Encounter for Medicare annual wellness exam  (primary encounter diagnosis)  Comment:   Plan:     (M25.562) Left knee pain, unspecified chronicity  Comment:   Plan: XR Knee Left 3 Views, OFFICE/OUTPT         VISIT,EST,LEVL III        Will schedule xray only appointment and will follow up with ortho if needed.     (M79.10) Myalgia  Comment:   Plan: CK total, OFFICE/OUTPT VISIT,EST,LEVL III        Will check a CK level to rule out rhabdomyolysis      (R19.09) Abdominal mass of other site  Comment:   Plan: US Abdomen Limited, OFFICE/OUTPT VISIT,EST,LEVL        III        Will schedule abdominal US to rule out lipoma, hernia, cyst, neoplasm.    (E78.5) Hyperlipidemia LDL goal <130  Comment:   Plan: Lipid panel reflex to direct LDL Fasting,         **Comprehensive metabolic panel FUTURE anytime,        atorvastatin (LIPITOR) 10 MG tablet,         OFFICE/OUTPT VISIT,EST,LEVL III        She will make a lab-only appointment.     (F33.1) Major depressive disorder, recurrent episode, moderate (H)  Comment: in partial remission  Plan: desvenlafaxine (PRISTIQ) 100 MG 24 hr tablet,         OFFICE/OUTPT VISIT,EST,LEVL III        The current medical regimen is effective;  continue present plan and medications.      (E78.1) HYPERTRIGLYCERIDEMIA  Comment:   Plan: fenofibrate (TRICOR) 145 MG tablet,         OFFICE/OUTPT VISIT,EST,LEVL III        See above.    (E06.3) HASHIMOTO'S THYROIDITIS  Comment:   Plan: **TSH with free T4 reflex FUTURE anytime,         levothyroxine (SYNTHROID/LEVOTHROID) 88 MCG         tablet, OFFICE/OUTPT VISIT,EST,LEVL III        Will check TSH and adjust dose if needed.     (I10) Hypertension goal BP (blood pressure) < 140/90  Comment: at goal  Plan: **Comprehensive metabolic panel FUTURE anytime,        " lisinopril (ZESTRIL) 10 MG tablet, OFFICE/OUTPT        VISIT,EST,LEVL III        The current medical regimen is effective;  continue present plan and medications.     (K21.9) Gastroesophageal reflux disease without esophagitis  Comment: controlled  Plan: omeprazole (PRILOSEC) 20 MG DR capsule,         famotidine (PEPCID) 20 MG tablet, OFFICE/OUTPT         VISIT,EST,LEVL III        The current medical regimen is effective;  continue present plan and medications.     (G25.81) Restless leg syndrome  Comment: stable  Plan: pramipexole (MIRAPEX) 0.125 MG tablet,         OFFICE/OUTPT VISIT,EST,LEVL III        The current medical regimen is effective;  continue present plan and medications.     (F17.200) Tobacco use disorder  Comment:   Plan: varenicline (CHANTIX STARTING MONTH PAK) 0.5 MG        X 11 & 1 MG X 42 tablet, varenicline (CHANTIX)         1 MG tablet, OFFICE/OUTPT VISIT,EST,LEVL III        Discussed the use and indication of this medication as well as potential side effects.     (M62.830) Spasm of back muscles  Comment:   Plan: cyclobenzaprine (FLEXERIL) 5 MG tablet,         OFFICE/OUTPT VISIT,EST,LEVL III        Refills given.     (G47.00) Insomnia, unspecified type  Comment: stable  Plan: traZODone (DESYREL) 50 MG tablet, OFFICE/OUTPT         VISIT,EST,LEVL III        The current medical regimen is effective;  continue present plan and medications.       COUNSELING:  Reviewed preventive health counseling, as reflected in patient instructions    Estimated body mass index is 36.86 kg/m  as calculated from the following:    Height as of 9/19/19: 1.651 m (5' 5\").    Weight as of 1/30/20: 100.5 kg (221 lb 8 oz).    Weight management plan: Discussed healthy diet and exercise guidelines     reports that she has been smoking cigarettes. She started smoking about 48 years ago. She has a 5.00 pack-year smoking history. She has never used smokeless tobacco.  Tobacco Cessation Action Plan: see above    Appropriate " preventive services were discussed with this patient, including applicable screening as appropriate for cardiovascular disease, diabetes, osteopenia/osteoporosis, and glaucoma.  As appropriate for age/gender, discussed screening for colorectal cancer, prostate cancer, breast cancer, and cervical cancer. Checklist reviewing preventive services available has been given to the patient.    Reviewed patients plan of care and provided an AVS. The Basic Care Plan (routine screening as documented in Health Maintenance) for Brandi meets the Care Plan requirement. This Care Plan has been established and reviewed with the Patient.    Counseling Resources:  ATP IV Guidelines  Pooled Cohorts Equation Calculator  Breast Cancer Risk Calculator  FRAX Risk Assessment  ICSI Preventive Guidelines  Dietary Guidelines for Americans, 2010  USDA's MyPlate  ASA Prophylaxis  Lung CA Screening    Cherie Brennan NP  [facility]

## 2020-05-22 RX ORDER — LISINOPRIL 10 MG/1
TABLET ORAL
Qty: 90 TABLET | OUTPATIENT
Start: 2020-05-22

## 2020-05-22 RX ORDER — FENOFIBRATE 145 MG/1
TABLET, COATED ORAL
Qty: 90 TABLET | Refills: 3 | OUTPATIENT
Start: 2020-05-22

## 2020-05-22 RX ORDER — ATORVASTATIN CALCIUM 10 MG/1
TABLET, FILM COATED ORAL
Qty: 90 TABLET | OUTPATIENT
Start: 2020-05-22

## 2020-05-28 NOTE — PROGRESS NOTES
"Brandi Stern is a 64 year old female who is being evaluated via a billable video visit.      The patient has been notified of following:     \"This video visit will be conducted via a call between you and your physician/provider. We have found that certain health care needs can be provided without the need for an in-person physical exam.  This service lets us provide the care you need with a video conversation.  If a prescription is necessary we can send it directly to your pharmacy.  If lab work is needed we can place an order for that and you can then stop by our lab to have the test done at a later time.    Video visits are billed at different rates depending on your insurance coverage.  Please reach out to your insurance provider with any questions.    If during the course of the call the physician/provider feels a video visit is not appropriate, you will not be charged for this service.\"    Patient has given verbal consent for Video visit? Yes    How would you like to obtain your AVS? Mail a copy    Patient would like the video invitation sent by: Text to cell phone: [phone]    Will anyone else be joining your video visit? No  {If patient encounters technical issues they should call [phone] :[id number]}         Endocrinology Note         Brandi is a 64 year old female hx of who has VDO visit today for osteoporosis    HPI  Brandi is a 64 year old female with hx of GERD, osteoporosis, hashimoto's thyroiditis, hypertension, depression who has VDO visit today for osteoporosis    Stopped taking Fosamax 70 MG because she was having     Past Medical History  Past Medical History:   Diagnosis Date     Allergic rhinitis due to other allergen      Apneas, obstructive sleep      Chronic lymphocytic thyroiditis      COPD (chronic obstructive pulmonary disease) (H)      Depressive disorder      Depressive disorder, not elsewhere classified      Disorder of bone and cartilage, unspecified      Esophageal reflux      " Essential hypertension, benign      Generalized osteoarthrosis, unspecified site     knees     HASHIMOTO'S THYROIDITIS 11/15/2004     HASHIMOTO'S THYROIDITIS      HASHIMOTO'S THYROIDITIS      Headache above the eye region      Headache above the eye region      Hiatal hernia      Insomnia, unspecified      Moderate persistent asthma      Nasal congestion      Pure hyperglyceridemia      Scoliosis      Snoring      Tobacco use disorder        Allergies  Allergies   Allergen Reactions     Animal Dander      Fluconazole      rash     Liquid Adhesive      Transdermal patch adhesive. Specifically to nicotine patch; not allergic to nicotine.      Nsaids      Seasonal Allergies      Suture      Non-healing suture line     Vistaril [Hydroxyzine Hcl]      Urinary retention     Medications  Current Outpatient Medications   Medication Sig Dispense Refill     ACETAMINOPHEN EXTRA STRENGTH OR Pt reports taking 650 MG arthritis       albuterol (2.5 MG/3ML) 0.083% neb solution Use every 4 hours as needed for wheezing and shortness of breath (Patient not taking: Reported on 5/21/2020) 3 mL 12     albuterol (ALBUTEROL) 108 (90 BASE) MCG/ACT Inhaler Inhale 1-2 puffs into the lungs every 6 hours as needed for shortness of breath / dyspnea 1 Inhaler 1     Ascorbic Acid (VITAMIN C PO) Take 2,000 mg by mouth daily        atorvastatin (LIPITOR) 10 MG tablet Take 1 tablet (10 mg) by mouth daily 90 tablet PRN     BREO ELLIPTA 200-25 MCG/INH Inhaler        CALCIUM CITRATE PO Take 1,200 mg by mouth daily       CHANTIX 1 MG tablet TAKE 1 TABLET (1 MG) BY MOUTH 2 TIMES DAILY (Patient not taking: Reported on 9/19/2019) 56 tablet 0     CLARITIN 10 MG OR TABS 1 TAB PO QD (Once per day) as needed for ALLERGY SYMPTOMS 0 0     cyclobenzaprine (FLEXERIL) 5 MG tablet Take 1-2 tablets (5-10 mg) by mouth 3 times daily as needed for muscle spasms 180 tablet 3     desvenlafaxine (PRISTIQ) 100 MG 24 hr tablet Take 1 tablet (100 mg) by mouth daily 90 tablet 3      famotidine (PEPCID) 20 MG tablet Take 1 tablet (20 mg) by mouth 2 times daily 180 tablet 3     fenofibrate (TRICOR) 145 MG tablet Take 1 tablet (145 mg) by mouth daily 90 tablet 3     levothyroxine (SYNTHROID/LEVOTHROID) 88 MCG tablet Take 1 tablet (88 mcg) by mouth daily 90 tablet 3     lisinopril (ZESTRIL) 10 MG tablet Take 1 tablet (10 mg) by mouth daily 90 tablet PRN     omeprazole (PRILOSEC) 20 MG DR capsule TAKE 1 TABLET (20 MG) BY MOUTH 2 TIMES DAILY TAKE 30-60 MINUTES BEFORE A MEAL. 180 capsule 3     pramipexole (MIRAPEX) 0.125 MG tablet Take two tabs at dinner and one at hs 270 tablet PRN     traZODone (DESYREL) 50 MG tablet Take 1-2 tablets ( mg) by mouth At Bedtime 180 tablet 3     triamcinolone (NASACORT AQ) 55 MCG/ACT nasal inhaler Inhale 2 sprays in both nostrils every day as needed 1 Inhaler 7     UNABLE TO FIND MEDICATION NAME: Allergy shots       valACYclovir (VALTREX) 1000 mg tablet Take 1 tablet (1,000 mg) by mouth 2 times daily (Patient not taking: Reported on 1/30/2020) 20 tablet 2     varenicline (CHANTIX STARTING MONTH PAK) 0.5 MG X 11 & 1 MG X 42 tablet 0.5 mg daily for three days then 0.5 mg BID days 4-7 then 1.0mg BID 53 tablet 0     varenicline (CHANTIX) 1 MG tablet Take 1 tablet (1 mg) by mouth 2 times daily 60 tablet 2     Family History  family history includes Allergies in her father; Arthritis in her father; Asthma in her paternal grandmother; Blood Disease in her father; C.A.D. in her maternal grandfather and maternal grandmother; Cancer in her father and paternal grandmother; Cancer - colorectal in her paternal grandmother; Cardiovascular in her maternal grandfather; Cerebrovascular Disease in her maternal grandmother and paternal grandfather; Circulatory in her maternal grandfather; Coronary Artery Disease in her mother; Diabetes in her paternal grandmother; Eye Disorder in her mother; Genitourinary Problems in her father; Hearing Loss in her maternal grandmother;  Hypertension in her mother; Lipids in her mother; Neurologic Disorder in her mother; Osteoporosis in her mother; Respiratory in her maternal grandmother; Thyroid Disease in her sister.  Social History  Social History     Tobacco Use     Smoking status: Current Some Day Smoker     Packs/day: 0.25     Years: 20.00     Pack years: 5.00     Types: Cigarettes     Start date: 10/1/1971     Last attempt to quit: 2007     Years since quittin.8     Smokeless tobacco: Never Used     Tobacco comment: 2 PACK PER WEEK    Substance Use Topics     Alcohol use: Yes     Alcohol/week: 0.0 standard drinks     Comment: switched to beer 6 pack a week      Drug use: No       ROS  Constitutional: no weight change, good energy  Eyes: no vision change, diplopia or red eyes   Neck: no difficulty swallowing, no choking, no neck pain, no neck swelling  Cardiovascular: no chest pain, palpitations  Respiratory: no dyspnea, cough, shortness of breath or wheezing   GI: no nausea, vomiting, diarrhea or constipation, no abdominal pain   : no change in urine, no dysuria or hematuria  Musculoskeletal: no joint or muscle pain or swelling   Integumentary: no concerning lesions or moles   Neuro: no loss of strength or sensation, no numbness or tingling, no tremor, no dizziness, no headache   Endo: no polyuria or polydipsia, no temperature intolerance   Heme/Lymph: no concerning bumps, no bleeding problems   Allergy: no environmental allergies   Psych: no depression or anxiety, no sleep problems.    Physical Exam  LMP  (LMP Unknown)   There is no height or weight on file to calculate BMI.  Constitutional: no distress, comfortable, pleasant   Eyes: anicteric, normal extra-ocular movements, no lid lag or retraction  Neck: no thyromegaly, no discrete nodule  Cardiovascular: regular rate and rhythm, normal S1 and S2, no murmurs  Respiratory: clear to auscultation, no wheezes or crackles, normal breath sounds   Gastrointestinal:  nontender, no  hepatosplenomegaly, no masses   Musculoskeletal: no edema   Skin: no concerning lesions, no jaundice   Neurological: cranial nerves intact, normal strength and sensation, reflexes at patella, normal gait, no tremor on outstretched hands bilaterally  Psychological: appropriate mood   Lymphatic: no cervical  lymphadenopathy.      RESULTS  I have personally reviewed labs and images. I also reviewed labs with patient and discussed the result and plan of care.    DXA 1/31/2020  The most negative and valid T-score of -2.2 at the level of the 33% radius corresponds with low bone density according to WHO criteria for postmenopausal females and men age 50 and over. The risk of osteoporotic fracture increases approximately 2-fold for each 1.0 SD decrease in T-score.    ASSESSMENT:    Brandi is a 64 year old female with hx of GERD, osteoporosis, hashimoto's thyroiditis, hyperlipidemia, hypertension, depression who has VDO visit today for osteoporosis    1) Osteoporosis:    PLAN:       Robbie Flores MD  Division of Diabetes and Endocrinology  Department of Medicine  [phone]

## 2020-05-29 ENCOUNTER — PRE VISIT (OUTPATIENT)
Dept: ENDOCRINOLOGY | Facility: CLINIC | Age: 65
End: 2020-05-29

## 2020-05-29 ENCOUNTER — VIRTUAL VISIT (OUTPATIENT)
Dept: ENDOCRINOLOGY | Facility: CLINIC | Age: 65
End: 2020-05-29
Attending: NURSE PRACTITIONER
Payer: MEDICARE

## 2020-05-29 DIAGNOSIS — M85.9 LOW BONE DENSITY: ICD-10-CM

## 2020-05-29 PROBLEM — M85.80 OSTEOPENIA: Status: ACTIVE | Noted: 2020-05-29

## 2020-05-29 NOTE — PATIENT INSTRUCTIONS
- continue calcium and vitamin D supplementation  - no need for Fosamax  - repeat bone density in 2 years    If you have any questions, please do not hesitate to call clinic line at [phone] and ask for Endocrinology clinic.  If you need to fax, please fax to clinic fax number at [phone]    After clinic hours or weekends, please contact [phone] and ask for Endocrinologist-on call      Sincerely,    Robbie Flores MD  Endocrinology

## 2020-05-29 NOTE — PROGRESS NOTES
"Brandi Stern is a 64 year old female who is being evaluated via a billable video visit.      The patient has been notified of following:     \"This video visit will be conducted via a call between you and your physician/provider. We have found that certain health care needs can be provided without the need for an in-person physical exam.  This service lets us provide the care you need with a video conversation.  If a prescription is necessary we can send it directly to your pharmacy.  If lab work is needed we can place an order for that and you can then stop by our lab to have the test done at a later time.    Video visits are billed at different rates depending on your insurance coverage.  Please reach out to your insurance provider with any questions.    If during the course of the call the physician/provider feels a video visit is not appropriate, you will not be charged for this service.\"    Patient has given verbal consent for Video visit? Yes    How would you like to obtain your AVS? Mail a copy    Patient would like the video invitation sent by: Text to cell phone: [phone]    Will anyone else be joining your video visit? No           Endocrinology Note         Brandi is a 64 year old female hx of who has VDO visit today for osteoporosis    HPI  Brandi is a 64 year old female with hx of GERD, osteoporosis, hashimoto's thyroiditis, hypertension, depression who has VDO visit today for osteopenia    She was diagnosed with low bone density since 2004 when T-score of -1.1 at the femoral neck. She took Fosamax 1 month and then stopping taking it because she was having muscle ache and joint ache.   She said that she was uncertain whether her symptoms are because of her stresses or Fosamax.  She was having a lot of stresses and also having fibromyalgia.    Bone health history  - fracture -- none, foot fracture <10 years slipped on ice  - Father had osteoporosis, hemochromatosis, no fracture  - steroid --- take " prednisone couple of weeks when asthma flare  - calcium -- calcium citrate 600 mg twice a day  - vitamin D -- 4000 units  - smoking -- 1 pack per week since college   - alcohol -- beer a couple time per week    Past Medical History  Past Medical History:   Diagnosis Date     Allergic rhinitis due to other allergen      Apneas, obstructive sleep      Chronic lymphocytic thyroiditis      COPD (chronic obstructive pulmonary disease) (H)      Depressive disorder      Depressive disorder, not elsewhere classified      Disorder of bone and cartilage, unspecified      Esophageal reflux      Essential hypertension, benign      Generalized osteoarthrosis, unspecified site     knees     HASHIMOTO'S THYROIDITIS 11/15/2004     HASHIMOTO'S THYROIDITIS      HASHIMOTO'S THYROIDITIS      Headache above the eye region      Headache above the eye region      Hiatal hernia      Insomnia, unspecified      Moderate persistent asthma      Nasal congestion      Pure hyperglyceridemia      Scoliosis      Snoring      Tobacco use disorder      Bilateral knees replacement    Allergies  Allergies   Allergen Reactions     Animal Dander      Fluconazole      rash     Liquid Adhesive      Transdermal patch adhesive. Specifically to nicotine patch; not allergic to nicotine.      Nsaids      Seasonal Allergies      Suture      Non-healing suture line     Vistaril [Hydroxyzine Hcl]      Urinary retention     Medications  Current Outpatient Medications   Medication Sig Dispense Refill     ACETAMINOPHEN EXTRA STRENGTH OR Pt reports taking 650 MG arthritis       albuterol (2.5 MG/3ML) 0.083% neb solution Use every 4 hours as needed for wheezing and shortness of breath (Patient not taking: Reported on 5/21/2020) 3 mL 12     albuterol (ALBUTEROL) 108 (90 BASE) MCG/ACT Inhaler Inhale 1-2 puffs into the lungs every 6 hours as needed for shortness of breath / dyspnea 1 Inhaler 1     Ascorbic Acid (VITAMIN C PO) Take 2,000 mg by mouth daily         atorvastatin (LIPITOR) 10 MG tablet Take 1 tablet (10 mg) by mouth daily 90 tablet PRN     BREO ELLIPTA 200-25 MCG/INH Inhaler        CALCIUM CITRATE PO Take 1,200 mg by mouth daily       CHANTIX 1 MG tablet TAKE 1 TABLET (1 MG) BY MOUTH 2 TIMES DAILY (Patient not taking: Reported on 9/19/2019) 56 tablet 0     CLARITIN 10 MG OR TABS 1 TAB PO QD (Once per day) as needed for ALLERGY SYMPTOMS 0 0     cyclobenzaprine (FLEXERIL) 5 MG tablet Take 1-2 tablets (5-10 mg) by mouth 3 times daily as needed for muscle spasms 180 tablet 3     desvenlafaxine (PRISTIQ) 100 MG 24 hr tablet Take 1 tablet (100 mg) by mouth daily 90 tablet 3     famotidine (PEPCID) 20 MG tablet Take 1 tablet (20 mg) by mouth 2 times daily 180 tablet 3     fenofibrate (TRICOR) 145 MG tablet Take 1 tablet (145 mg) by mouth daily 90 tablet 3     levothyroxine (SYNTHROID/LEVOTHROID) 88 MCG tablet Take 1 tablet (88 mcg) by mouth daily 90 tablet 3     lisinopril (ZESTRIL) 10 MG tablet Take 1 tablet (10 mg) by mouth daily 90 tablet PRN     omeprazole (PRILOSEC) 20 MG DR capsule TAKE 1 TABLET (20 MG) BY MOUTH 2 TIMES DAILY TAKE 30-60 MINUTES BEFORE A MEAL. 180 capsule 3     pramipexole (MIRAPEX) 0.125 MG tablet Take two tabs at dinner and one at hs 270 tablet PRN     traZODone (DESYREL) 50 MG tablet Take 1-2 tablets ( mg) by mouth At Bedtime 180 tablet 3     triamcinolone (NASACORT AQ) 55 MCG/ACT nasal inhaler Inhale 2 sprays in both nostrils every day as needed 1 Inhaler 7     UNABLE TO FIND MEDICATION NAME: Allergy shots       valACYclovir (VALTREX) 1000 mg tablet Take 1 tablet (1,000 mg) by mouth 2 times daily (Patient not taking: Reported on 1/30/2020) 20 tablet 2     varenicline (CHANTIX STARTING MONTH PAK) 0.5 MG X 11 & 1 MG X 42 tablet 0.5 mg daily for three days then 0.5 mg BID days 4-7 then 1.0mg BID 53 tablet 0     varenicline (CHANTIX) 1 MG tablet Take 1 tablet (1 mg) by mouth 2 times daily 60 tablet 2     Family History  family history  includes Allergies in her father; Arthritis in her father; Asthma in her paternal grandmother; Blood Disease in her father; C.A.D. in her maternal grandfather and maternal grandmother; Cancer in her father and paternal grandmother; Cancer - colorectal in her paternal grandmother; Cardiovascular in her maternal grandfather; Cerebrovascular Disease in her maternal grandmother and paternal grandfather; Circulatory in her maternal grandfather; Coronary Artery Disease in her mother; Diabetes in her paternal grandmother; Eye Disorder in her mother; Genitourinary Problems in her father; Hearing Loss in her maternal grandmother; Hypertension in her mother; Lipids in her mother; Neurologic Disorder in her mother; Osteoporosis in her mother; Respiratory in her maternal grandmother; Thyroid Disease in her sister.   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  Constitutional: no distress, comfortable, pleasant       RESULTS  I have personally reviewed labs and images. I also reviewed labs with patient and discussed the result and plan of care.    ENDO CALCIUM LABS-[facility] Latest Ref Rng & Units 2020   25 OH VIT D2 ug/L <5   25 OH VIT D3 ug/L 56   25 OH VIT D TOTAL 20 - 75 ug/L <61     DXA 3/11/2004      DXA  3/2/2006  FINDINGS:                Lumbar Spine L1-L4:  T-score 0.9                Left Femoral Neck:  T-score -1.0                  Right Femoral Neck:  T-score -0.9                  COMMENTS:  None     CURRENT BMD:  Lumbar Spine: 1.311.  Femoral Neck: 0.900.     Comparison was made to scan done on [employer]  machine on 3/11/04       DXA 5/5/2008  FINDINGS:                Lumbar Spine L1-L4:  T-score 0.9                Left Femoral Neck:  T-score -1.0                  Right Femoral Neck:  T-score -1.2                  Comparison is performed to previous DEXA performed on a Lunar   Prodig[employer] Scanner on 03/02/2006.     CURRENT BMD:  Lumbar Spine: 1.307.  Femoral Neck: 1.008.   PREVIOUS BMD: Lumbar Spine: 1.311. Femoral Neck: 1.015.     IMPRESSION:Osteopenia     Degenerative changes at L3-L4 may be falsely elevating BMD and T-scores.     Comparison was made to scan done on this [employer] machine on 3/2/06. In the interim, there has been no significant change.       DXA 8/2/2010  FINDINGS:               Lumbar Spine L1-L4:  T-score 0.9 (L1-L2: T-score -0.9)               Left Femoral Neck:  T-score  -0.9               Right Femoral Neck:  T-score -1.3                  Comparison is performed to previous DEXA performed on a [employer] Scanner on 05/05/2008.     CURRENT BMD:  Lumbar Spine: 1.307.  Femoral Neck: 1.036.   PREVIOUS BMD: Lumbar Spine: 1.307. Femoral Neck: 1.008.     Patient had previous scan on Elite [employer] machine on 5/5/08.     In the interim, allowing for standardization calculations, there is suggestion of no significant change.     IMPRESSION:   Osteopenia   Degenerative changes of the spine may falsely be elevating T-scores     DXA 9/16/2014  Lumbar Spine (L1-L4) T-score: 0.7  Left Femoral Neck T-score: -1.2  Right Femoral Neck T-score: -1.4    Lumbar (L1-L4) BMD: 1.280 Previous: 1.307  Total Hip Mean BMD: 1.004 Previous: 1.036    Comparison is made to another DXA performed on the same [employer]  "Prodigy machine on 8/2/2010.     LATERAL VERTEBRAL ASSESSMENT  Procedure:  Vertebral fracture assessment was performed in the lateral decubitus position using a [employer]dig[employer]  densitometer.  Indications for VFA: T-score of -1.0 or worse and height loss > 1.5\"  Confounding factors for VFA: rib shadows.  The LVA scan is interpretable from T6 to L4.     VFA Findings: Using the semi-quantitative analysis of [employer] there was evidence of no spinal deformity  VFA Impression: Brandi Stern has no vertebral fractures identified on the VFA.      IMPRESSION  Osteopenia (low bone mass)  Recommendations include ensuring adequate daily Calcium and Vitamin D intake     Compared to previous bone densitometry performed on this patient, there is the suggestion of no significant change.    DXA 1/31/2020  The most negative and valid T-score of -2.2 at the level of the 33% radius corresponds with low bone density according to WHO criteria for postmenopausal females and men age 50 and over.     ASSESSMENT:    Brandi is a 64 year old female with hx of GERD, osteoporosis, hashimoto's thyroiditis, hyperlipidemia, hypertension, depression who has VDO visit today for osteoporosis    1) Osteopenia: long standing. Was on Fosamax in 2005 for 4-5 years. Restarted Fosamax a month ago but has had muscle ache and joint ache. Unclear her symptoms are related to Fosamax.   FRAX score for hip fracture = 1.2, FRAX score for major osteoporotic fracture 8.0    Discussed with patient. I don't think that she is indicated for pharmacological treatment yet. She should optimize calcium and vitamin D supplement. She should also have close monitoring of DXA in 2 years    PLAN:   - continue with current dose of vitamin D and calcium  - recommend to repeat DXA in 2 years    Robbie Flores MD  Division of Diabetes and Endocrinology  Department of Medicine  [phone]            "

## 2020-05-29 NOTE — LETTER
"5/29/2020       RE: Brandi Stern  [address] [place] [address]     Dear Colleague,    Thank you for referring your patient, Brandi Stern, to the [facility] ENDOCRINOLOGY at [facility]. Please see a copy of my visit note below.    Brandi Stern is a 64 year old female who is being evaluated via a billable video visit.      The patient has been notified of following:     \"This video visit will be conducted via a call between you and your physician/provider. We have found that certain health care needs can be provided without the need for an in-person physical exam.  This service lets us provide the care you need with a video conversation.  If a prescription is necessary we can send it directly to your pharmacy.  If lab work is needed we can place an order for that and you can then stop by our lab to have the test done at a later time.    Video visits are billed at different rates depending on your insurance coverage.  Please reach out to your insurance provider with any questions.    If during the course of the call the physician/provider feels a video visit is not appropriate, you will not be charged for this service.\"    Patient has given verbal consent for Video visit? Yes    How would you like to obtain your AVS? Mail a copy    Patient would like the video invitation sent by: Text to cell phone: [phone]    Will anyone else be joining your video visit? No           Endocrinology Note         Brandi is a 64 year old female hx of who has VDO visit today for osteoporosis    HPI  Brandi is a 64 year old female with hx of GERD, osteoporosis, hashimoto's thyroiditis, hypertension, depression who has VDO visit today for osteopenia    She was diagnosed with low bone density since 2004 when T-score of -1.1 at the femoral neck. She took Fosamax 1 month and then stopping taking it because she was having muscle ache and joint ache.   She said that she was uncertain " whether her symptoms are because of her stresses or Fosamax.  She was having a lot of stresses and also having fibromyalgia.    Bone health history  - fracture -- none, foot fracture <10 years slipped on ice  - Father had osteoporosis, hemochromatosis, no fracture  - steroid --- take prednisone couple of weeks when asthma flare  - calcium -- calcium citrate 600 mg twice a day  - vitamin D -- 4000 units  - smoking -- 1 pack per week since college   - alcohol -- beer a couple time per week    Past Medical History  Past Medical History:   Diagnosis Date     Allergic rhinitis due to other allergen      Apneas, obstructive sleep      Chronic lymphocytic thyroiditis      COPD (chronic obstructive pulmonary disease) (H)      Depressive disorder      Depressive disorder, not elsewhere classified      Disorder of bone and cartilage, unspecified      Esophageal reflux      Essential hypertension, benign      Generalized osteoarthrosis, unspecified site     knees     HASHIMOTO'S THYROIDITIS 11/15/2004     HASHIMOTO'S THYROIDITIS      HASHIMOTO'S THYROIDITIS      Headache above the eye region      Headache above the eye region      Hiatal hernia      Insomnia, unspecified      Moderate persistent asthma      Nasal congestion      Pure hyperglyceridemia      Scoliosis      Snoring      Tobacco use disorder      Bilateral knees replacement    Allergies  Allergies   Allergen Reactions     Animal Dander      Fluconazole      rash     Liquid Adhesive      Transdermal patch adhesive. Specifically to nicotine patch; not allergic to nicotine.      Nsaids      Seasonal Allergies      Suture      Non-healing suture line     Vistaril [Hydroxyzine Hcl]      Urinary retention     Medications  Current Outpatient Medications   Medication Sig Dispense Refill     ACETAMINOPHEN EXTRA STRENGTH OR Pt reports taking 650 MG arthritis       albuterol (2.5 MG/3ML) 0.083% neb solution Use every 4 hours as needed for wheezing and shortness of breath  (Patient not taking: Reported on 5/21/2020) 3 mL 12     albuterol (ALBUTEROL) 108 (90 BASE) MCG/ACT Inhaler Inhale 1-2 puffs into the lungs every 6 hours as needed for shortness of breath / dyspnea 1 Inhaler 1     Ascorbic Acid (VITAMIN C PO) Take 2,000 mg by mouth daily        atorvastatin (LIPITOR) 10 MG tablet Take 1 tablet (10 mg) by mouth daily 90 tablet PRN     BREO ELLIPTA 200-25 MCG/INH Inhaler        CALCIUM CITRATE PO Take 1,200 mg by mouth daily       CHANTIX 1 MG tablet TAKE 1 TABLET (1 MG) BY MOUTH 2 TIMES DAILY (Patient not taking: Reported on 9/19/2019) 56 tablet 0     CLARITIN 10 MG OR TABS 1 TAB PO QD (Once per day) as needed for ALLERGY SYMPTOMS 0 0     cyclobenzaprine (FLEXERIL) 5 MG tablet Take 1-2 tablets (5-10 mg) by mouth 3 times daily as needed for muscle spasms 180 tablet 3     desvenlafaxine (PRISTIQ) 100 MG 24 hr tablet Take 1 tablet (100 mg) by mouth daily 90 tablet 3     famotidine (PEPCID) 20 MG tablet Take 1 tablet (20 mg) by mouth 2 times daily 180 tablet 3     fenofibrate (TRICOR) 145 MG tablet Take 1 tablet (145 mg) by mouth daily 90 tablet 3     levothyroxine (SYNTHROID/LEVOTHROID) 88 MCG tablet Take 1 tablet (88 mcg) by mouth daily 90 tablet 3     lisinopril (ZESTRIL) 10 MG tablet Take 1 tablet (10 mg) by mouth daily 90 tablet PRN     omeprazole (PRILOSEC) 20 MG DR capsule TAKE 1 TABLET (20 MG) BY MOUTH 2 TIMES DAILY TAKE 30-60 MINUTES BEFORE A MEAL. 180 capsule 3     pramipexole (MIRAPEX) 0.125 MG tablet Take two tabs at dinner and one at hs 270 tablet PRN     traZODone (DESYREL) 50 MG tablet Take 1-2 tablets ( mg) by mouth At Bedtime 180 tablet 3     triamcinolone (NASACORT AQ) 55 MCG/ACT nasal inhaler Inhale 2 sprays in both nostrils every day as needed 1 Inhaler 7     UNABLE TO FIND MEDICATION NAME: Allergy shots       valACYclovir (VALTREX) 1000 mg tablet Take 1 tablet (1,000 mg) by mouth 2 times daily (Patient not taking: Reported on 1/30/2020) 20 tablet 2      varenicline (CHANTIX STARTING MONTH ) 0.5 MG X 11 & 1 MG X 42 tablet 0.5 mg daily for three days then 0.5 mg BID days 4-7 then 1.0mg BID 53 tablet 0     varenicline (CHANTIX) 1 MG tablet Take 1 tablet (1 mg) by mouth 2 times daily 60 tablet 2     Family History  family history includes Allergies in her father; Arthritis in her father; Asthma in her paternal grandmother; Blood Disease in her father; C.A.D. in her maternal grandfather and maternal grandmother; Cancer in her father and paternal grandmother; Cancer - colorectal in her paternal grandmother; Cardiovascular in her maternal grandfather; Cerebrovascular Disease in her maternal grandmother and paternal grandfather; Circulatory in her maternal grandfather; Coronary Artery Disease in her mother; Diabetes in her paternal grandmother; Eye Disorder in her mother; Genitourinary Problems in her father; Hearing Loss in her maternal grandmother; Hypertension in her mother; Lipids in her mother; Neurologic Disorder in her mother; Osteoporosis in her mother; Respiratory in her maternal grandmother; Thyroid Disease in her sister.   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  Constitutional: no distress, comfortable, pleasant       RESULTS  I have personally  reviewed labs and images. I also reviewed labs with patient and discussed the result and plan of care.    ENDO CALCIUM LABS-[facility] Latest Ref Rng & Units 1/30/2020   25 OH VIT D2 ug/L <5   25 OH VIT D3 ug/L 56   25 OH VIT D TOTAL 20 - 75 ug/L <61     DXA 3/11/2004      DXA 3/2/2006  FINDINGS:                Lumbar Spine L1-L4:  T-score 0.9                Left Femoral Neck:  T-score -1.0                  Right Femoral Neck:  T-score -0.9                  COMMENTS:  None     CURRENT BMD:  Lumbar Spine: 1.311.  Femoral Neck: 0.900.     Comparison was made to scan done on [employer]  machine on 3/11/04       DXA 5/5/2008  FINDINGS:                Lumbar Spine L1-L4:  T-score 0.9                Left Femoral Neck:  T-score -1.0                  Right Femoral Neck:  T-score -1.2                  Comparison is performed to previous DEXA performed on a [employer]   [employer] Scanner on 03/02/2006.     CURRENT BMD:  Lumbar Spine: 1.307.  Femoral Neck: 1.008.   PREVIOUS BMD: Lumbar Spine: 1.311. Femoral Neck: 1.015.     IMPRESSION:Osteopenia     Degenerative changes at L3-L4 may be falsely elevating BMD and T-scores.     Comparison was made to scan done on this [employer] machine on 3/2/06. In the interim, there has been no significant change.       DXA 8/2/2010  FINDINGS:               Lumbar Spine L1-L4:  T-score 0.9 (L1-L2: T-score -0.9)               Left Femoral Neck:  T-score  -0.9               Right Femoral Neck:  T-score -1.3                  Comparison is performed to previous DEXA performed on a [employer] Scanner on 05/05/2008.     CURRENT BMD:  Lumbar Spine: 1.307.  Femoral Neck: 1.036.   PREVIOUS BMD: Lumbar Spine: 1.307. Femoral Neck: 1.008.     Patient had previous scan on [employer] machine on 5/5/08.     In the interim, allowing for standardization calculations, there is suggestion of no significant change.     IMPRESSION:   Osteopenia   Degenerative changes of the spine may falsely be elevating T-scores  "    DXA 9/16/2014  Lumbar Spine (L1-L4) T-score: 0.7  Left Femoral Neck T-score: -1.2  Right Femoral Neck T-score: -1.4    Lumbar (L1-L4) BMD: 1.280 Previous: 1.307  Total Hip Mean BMD: 1.004 Previous: 1.036    Comparison is made to another DXA performed on the same [employer] Prodig[employer] machine on 8/2/2010.     LATERAL VERTEBRAL ASSESSMENT  Procedure:  Vertebral fracture assessment was performed in the lateral decubitus position using a [employer]  densitometer.  Indications for VFA: T-score of -1.0 or worse and height loss > 1.5\"  Confounding factors for VFA: rib shadows.  The LVA scan is interpretable from T6 to L4.     VFA Findings: Using the semi-quantitative analysis of [place] there was evidence of no spinal deformity  VFA Impression: Brandi Stern has no vertebral fractures identified on the VFA.      IMPRESSION  Osteopenia (low bone mass)  Recommendations include ensuring adequate daily Calcium and Vitamin D intake     Compared to previous bone densitometry performed on this patient, there is the suggestion of no significant change.    DXA 1/31/2020  The most negative and valid T-score of -2.2 at the level of the 33% radius corresponds with low bone density according to WHO criteria for postmenopausal females and men age 50 and over.     ASSESSMENT:    Brandi is a 64 year old female with hx of GERD, osteoporosis, hashimoto's thyroiditis, hyperlipidemia, hypertension, depression who has VDO visit today for osteoporosis    1) Osteopenia: long standing. Was on Fosamax in 2005 for 4-5 years. Restarted Fosamax a month ago but has had muscle ache and joint ache. Unclear her symptoms are related to Fosamax.   FRAX score for hip fracture = 1.2, FRAX score for major osteoporotic fracture 8.0    Discussed with patient. I don't think that she is indicated for pharmacological treatment yet. She should optimize calcium and vitamin D supplement. She should also have close monitoring of DXA in 2 years    PLAN:   - continue with " current dose of vitamin D and calcium  - recommend to repeat DXA in 2 years    Robbie Flores MD  Division of Diabetes and Endocrinology  Department of Medicine  [phone]

## 2020-06-04 ENCOUNTER — VIRTUAL VISIT (OUTPATIENT)
Dept: PHYSICAL THERAPY | Facility: CLINIC | Age: 65
End: 2020-06-04
Payer: MEDICARE

## 2020-06-04 DIAGNOSIS — E78.5 HYPERLIPIDEMIA LDL GOAL <130: ICD-10-CM

## 2020-06-04 DIAGNOSIS — M79.10 MYALGIA: ICD-10-CM

## 2020-06-04 DIAGNOSIS — I10 HYPERTENSION GOAL BP (BLOOD PRESSURE) < 140/90: ICD-10-CM

## 2020-06-04 DIAGNOSIS — E06.3 CHRONIC LYMPHOCYTIC THYROIDITIS: ICD-10-CM

## 2020-06-04 DIAGNOSIS — M47.812 ARTHROPATHY OF CERVICAL FACET JOINT: ICD-10-CM

## 2020-06-04 DIAGNOSIS — M48.02 SPINAL STENOSIS IN CERVICAL REGION: ICD-10-CM

## 2020-06-04 DIAGNOSIS — M54.12 CERVICAL RADICULOPATHY: ICD-10-CM

## 2020-06-04 LAB
ALBUMIN SERPL-MCNC: 3.5 G/DL (ref 3.4–5)
ALP SERPL-CCNC: 32 U/L (ref 40–150)
ALT SERPL W P-5'-P-CCNC: 20 U/L (ref 0–50)
ANION GAP SERPL CALCULATED.3IONS-SCNC: 6 MMOL/L (ref 3–14)
AST SERPL W P-5'-P-CCNC: 20 U/L (ref 0–45)
BILIRUB SERPL-MCNC: 0.4 MG/DL (ref 0.2–1.3)
BUN SERPL-MCNC: 13 MG/DL (ref 7–30)
CALCIUM SERPL-MCNC: 8.8 MG/DL (ref 8.5–10.1)
CHLORIDE SERPL-SCNC: 104 MMOL/L (ref 94–109)
CHOLEST SERPL-MCNC: 152 MG/DL
CK SERPL-CCNC: 65 U/L (ref 30–225)
CO2 SERPL-SCNC: 26 MMOL/L (ref 20–32)
CREAT SERPL-MCNC: 0.86 MG/DL (ref 0.52–1.04)
GFR SERPL CREATININE-BSD FRML MDRD: 70 ML/MIN/{1.73_M2}
GLUCOSE SERPL-MCNC: 97 MG/DL (ref 70–99)
HDLC SERPL-MCNC: 47 MG/DL
LDLC SERPL CALC-MCNC: 66 MG/DL
NONHDLC SERPL-MCNC: 105 MG/DL
POTASSIUM SERPL-SCNC: 4.3 MMOL/L (ref 3.4–5.3)
PROT SERPL-MCNC: 7.5 G/DL (ref 6.8–8.8)
SODIUM SERPL-SCNC: 136 MMOL/L (ref 133–144)
TRIGL SERPL-MCNC: 195 MG/DL
TSH SERPL DL<=0.005 MIU/L-ACNC: 1.56 MU/L (ref 0.4–4)

## 2020-06-04 PROCEDURE — 80053 COMPREHEN METABOLIC PANEL: CPT | Performed by: NURSE PRACTITIONER

## 2020-06-04 PROCEDURE — 84443 ASSAY THYROID STIM HORMONE: CPT | Performed by: NURSE PRACTITIONER

## 2020-06-04 PROCEDURE — 82550 ASSAY OF CK (CPK): CPT | Performed by: NURSE PRACTITIONER

## 2020-06-04 PROCEDURE — 97110 THERAPEUTIC EXERCISES: CPT | Mod: 95 | Performed by: PHYSICAL THERAPIST

## 2020-06-04 PROCEDURE — 80061 LIPID PANEL: CPT | Performed by: NURSE PRACTITIONER

## 2020-06-04 PROCEDURE — 97161 PT EVAL LOW COMPLEX 20 MIN: CPT | Mod: 95 | Performed by: PHYSICAL THERAPIST

## 2020-06-04 PROCEDURE — 36415 COLL VENOUS BLD VENIPUNCTURE: CPT | Performed by: NURSE PRACTITIONER
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
## 2020-06-05 ENCOUNTER — TRANSFERRED RECORDS (OUTPATIENT)
Dept: HEALTH INFORMATION MANAGEMENT | Facility: CLINIC | Age: 65
End: 2020-06-05

## 2020-06-08 ENCOUNTER — MYC MEDICAL ADVICE (OUTPATIENT)
Dept: PALLIATIVE MEDICINE | Facility: CLINIC | Age: 65
End: 2020-06-08

## 2020-06-09 ENCOUNTER — MYC MEDICAL ADVICE (OUTPATIENT)
Dept: PALLIATIVE MEDICINE | Facility: CLINIC | Age: 65
End: 2020-06-09

## 2020-06-09 ENCOUNTER — TELEPHONE (OUTPATIENT)
Dept: FAMILY MEDICINE | Facility: CLINIC | Age: 65
End: 2020-06-09

## 2020-06-09 DIAGNOSIS — M50.30 DDD (DEGENERATIVE DISC DISEASE), CERVICAL: Primary | ICD-10-CM

## 2020-06-09 DIAGNOSIS — M51.369 DDD (DEGENERATIVE DISC DISEASE), LUMBAR: ICD-10-CM

## 2020-06-09 NOTE — TELEPHONE ENCOUNTER
Pended order for RUSS eval and treat for DDD Cervical and DDD lumbar  Please sign if you agree.  Desiree Trent RN

## 2020-06-09 NOTE — TELEPHONE ENCOUNTER
Patient called requesting a order to continue PT for her neck & back?      Please advise and ok to leave a message

## 2020-06-10 ENCOUNTER — MYC MEDICAL ADVICE (OUTPATIENT)
Dept: FAMILY MEDICINE | Facility: CLINIC | Age: 65
End: 2020-06-10

## 2020-06-10 DIAGNOSIS — M54.2 NECK PAIN: Primary | ICD-10-CM

## 2020-06-10 DIAGNOSIS — M54.50 BILATERAL LOW BACK PAIN WITHOUT SCIATICA, UNSPECIFIED CHRONICITY: ICD-10-CM

## 2020-06-10 NOTE — TELEPHONE ENCOUNTER
TUSHAR informing patient that orders were signed.    Liliana TSANG     [facility]

## 2020-06-12 ENCOUNTER — THERAPY VISIT (OUTPATIENT)
Dept: PHYSICAL THERAPY | Facility: CLINIC | Age: 65
End: 2020-06-12
Payer: MEDICARE

## 2020-06-12 DIAGNOSIS — M47.22 CERVICAL SPONDYLOSIS WITH RADICULOPATHY: ICD-10-CM

## 2020-06-12 PROCEDURE — 97110 THERAPEUTIC EXERCISES: CPT | Mod: GP | Performed by: PHYSICAL THERAPIST

## 2020-06-12 PROCEDURE — 97140 MANUAL THERAPY 1/> REGIONS: CPT | Mod: GP | Performed by: PHYSICAL THERAPIST

## 2020-06-12 PROCEDURE — 97112 NEUROMUSCULAR REEDUCATION: CPT | Mod: GP | Performed by: PHYSICAL THERAPIST

## 2020-06-19 ENCOUNTER — THERAPY VISIT (OUTPATIENT)
Dept: PHYSICAL THERAPY | Facility: CLINIC | Age: 65
End: 2020-06-19
Payer: MEDICARE

## 2020-06-19 ENCOUNTER — ANCILLARY PROCEDURE (OUTPATIENT)
Dept: ULTRASOUND IMAGING | Facility: CLINIC | Age: 65
End: 2020-06-19
Attending: NURSE PRACTITIONER
Payer: MEDICARE

## 2020-06-19 DIAGNOSIS — R19.09 ABDOMINAL MASS OF OTHER SITE: ICD-10-CM

## 2020-06-19 DIAGNOSIS — M47.22 CERVICAL SPONDYLOSIS WITH RADICULOPATHY: ICD-10-CM

## 2020-06-19 PROCEDURE — 97112 NEUROMUSCULAR REEDUCATION: CPT | Mod: GP | Performed by: PHYSICAL THERAPIST

## 2020-06-19 PROCEDURE — 97140 MANUAL THERAPY 1/> REGIONS: CPT | Mod: GP | Performed by: PHYSICAL THERAPIST

## 2020-06-19 PROCEDURE — 97110 THERAPEUTIC EXERCISES: CPT | Mod: GP | Performed by: PHYSICAL THERAPIST

## 2020-06-21 ENCOUNTER — MYC MEDICAL ADVICE (OUTPATIENT)
Dept: FAMILY MEDICINE | Facility: CLINIC | Age: 65
End: 2020-06-21

## 2020-06-27 ENCOUNTER — MYC MEDICAL ADVICE (OUTPATIENT)
Dept: FAMILY MEDICINE | Facility: CLINIC | Age: 65
End: 2020-06-27

## 2020-06-27 DIAGNOSIS — L71.9 ROSACEA: Primary | ICD-10-CM

## 2020-06-29 NOTE — TELEPHONE ENCOUNTER
Would you like a visit or ok to fill-Order pended. Mone Guzman RN    Last visit with you-[place] 5/21/2020

## 2020-07-09 ENCOUNTER — THERAPY VISIT (OUTPATIENT)
Dept: PHYSICAL THERAPY | Facility: CLINIC | Age: 65
End: 2020-07-09
Payer: MEDICARE

## 2020-07-09 DIAGNOSIS — M47.22 CERVICAL SPONDYLOSIS WITH RADICULOPATHY: ICD-10-CM

## 2020-07-09 PROCEDURE — 97110 THERAPEUTIC EXERCISES: CPT | Mod: GP | Performed by: PHYSICAL THERAPIST

## 2020-07-09 PROCEDURE — 97140 MANUAL THERAPY 1/> REGIONS: CPT | Mod: GP | Performed by: PHYSICAL THERAPIST

## 2020-07-09 PROCEDURE — 97112 NEUROMUSCULAR REEDUCATION: CPT | Mod: GP | Performed by: PHYSICAL THERAPIST

## 2020-07-17 ENCOUNTER — THERAPY VISIT (OUTPATIENT)
Dept: PHYSICAL THERAPY | Facility: CLINIC | Age: 65
End: 2020-07-17
Payer: MEDICARE

## 2020-07-17 DIAGNOSIS — M47.22 CERVICAL SPONDYLOSIS WITH RADICULOPATHY: ICD-10-CM

## 2020-07-17 PROCEDURE — 97110 THERAPEUTIC EXERCISES: CPT | Mod: GP | Performed by: PHYSICAL THERAPIST

## 2020-07-17 PROCEDURE — 97140 MANUAL THERAPY 1/> REGIONS: CPT | Mod: GP | Performed by: PHYSICAL THERAPIST

## 2020-08-06 ENCOUNTER — TELEPHONE (OUTPATIENT)
Dept: FAMILY MEDICINE | Facility: CLINIC | Age: 65
End: 2020-08-06

## 2020-08-06 NOTE — TELEPHONE ENCOUNTER
Medicare/MA Certification for Physical/Occupational Therapy needs PCP signature     Faxed to Cherie Brennan'cathi castaneda and placed in signature folder in provider office     Qing Jackson MA

## 2020-08-08 DIAGNOSIS — M81.0 OSTEOPOROSIS WITHOUT CURRENT PATHOLOGICAL FRACTURE, UNSPECIFIED OSTEOPOROSIS TYPE: ICD-10-CM

## 2020-08-10 RX ORDER — ALENDRONATE SODIUM 70 MG/1
70 TABLET ORAL
Qty: 12 TABLET | Refills: 1 | Status: SHIPPED | OUTPATIENT
Start: 2020-08-10 | End: 2020-09-24

## 2020-08-10 NOTE — TELEPHONE ENCOUNTER
Routing refill request to provider for review/approval because:  Drug not active on patient's medication list  Marked as stopped by patient and Reported Not Taking on 5/21/2020  Desiree Trent RN

## 2020-08-20 ENCOUNTER — THERAPY VISIT (OUTPATIENT)
Dept: PHYSICAL THERAPY | Facility: CLINIC | Age: 65
End: 2020-08-20
Payer: MEDICARE

## 2020-08-20 DIAGNOSIS — M47.22 CERVICAL SPONDYLOSIS WITH RADICULOPATHY: ICD-10-CM

## 2020-08-20 PROBLEM — M54.12 CERVICAL RADICULOPATHY: Status: RESOLVED | Noted: 2020-02-01 | Resolved: 2020-08-20

## 2020-08-20 PROBLEM — M48.02 SPINAL STENOSIS IN CERVICAL REGION: Status: RESOLVED | Noted: 2020-02-01 | Resolved: 2020-08-20

## 2020-08-20 PROBLEM — M47.812 ARTHROPATHY OF CERVICAL FACET JOINT: Status: RESOLVED | Noted: 2020-02-01 | Resolved: 2020-08-20

## 2020-08-20 PROCEDURE — 97110 THERAPEUTIC EXERCISES: CPT | Mod: GP | Performed by: PHYSICAL THERAPIST

## 2020-08-20 PROCEDURE — 97140 MANUAL THERAPY 1/> REGIONS: CPT | Mod: GP | Performed by: PHYSICAL THERAPIST

## 2020-08-20 PROCEDURE — 97112 NEUROMUSCULAR REEDUCATION: CPT | Mod: GP | Performed by: PHYSICAL THERAPIST

## 2020-08-20 NOTE — PROGRESS NOTES
Subjective:  The history is provided by the patient. No  was used.     Physical Exam                    Objective:  Standing Alignment:    Cervical/Thoracic:  Forward head  Shoulder/UE:  Rounded shoulders                             Lumbar/SI Evaluation  ROM:    AROM Lumbar:   Flexion:          WNL -  Ext:                    WNL -   Side Bend:        Left:     Right:   Rotation:           Left:     Right:   Side Glide:        Left:  WNL -    Right:  WNL -                                 Cervical/Thoracic Evaluation    AROM:  AROM Cervical:    Flexion:          WNL -  Extension:       Mod loss -  Rotation:         Left: mod loss -     Right: min loss -  Side Bend:      Left:     Right:                                                                   General     ROS    Assessment/Plan:    DISCHARGE REPORT    Progress reporting period is from 6/4/20 to 8/20/20.       SUBJECTIVE  Subjective changes noted by patient:  Neck feels much better compared to 6 months ago. Able to look over shoulder with greater ease. Has been sleeping good. C/o LBP d/t yard work and gardening activities. Needs to start walking more.       Current pain level is 1/10 neck and back.     Initial Pain level: 9/10.   Changes in function:  Yes (See Goal flowsheet attached for changes in current functional level)  Adverse reaction to treatment or activity: None    OBJECTIVE  Changes noted in objective findings:  Yes, see objective findings.    ASSESSMENT/PLAN  Updated problem list and treatment plan: Diagnosis 1:  Neck pain  Pain -  hot/cold therapy, manual therapy, self management, education, directional preference exercise and home program  Decreased ROM/flexibility - manual therapy, therapeutic exercise and home program  Decreased proprioception - neuro re-education, therapeutic activities and home program  Impaired muscle performance - neuro re-education and home program  Decreased function - therapeutic activities and home  program  Impaired posture - neuro re-education and home program  STG/LTGs have been met or progress has been made towards goals:  Yes (See Goal flow sheet completed today.)  Assessment of Progress: The patient's condition is improving.  Self Management Plans:  Patient is independent in a home treatment program.  Patient is independent in self management of symptoms.  I have re-evaluated this patient and find that the nature, scope, duration and intensity of the therapy is appropriate for the medical condition of the patient.  Brandi continues to require the following intervention to meet STG and LTG's:  PT intervention is no longer required to meet STG/LTG.    Recommendations:  This patient is ready to be discharged from therapy and continue their home treatment program.    Please refer to the daily flowsheet for treatment today, total treatment time and time spent performing 1:1 timed codes.

## 2020-08-20 NOTE — LETTER
[facility] ATHLETIC [facility] PHYSICAL [facility]  [address] [place] [address]  [phone]    2020    Re: Brandi Stern   :   1955  MRN:  [id number]   REFERRING PHYSICIAN:   Gia Stroud    [facility]    Date of Initial Evaluation:  2020  Visits:  Rxs Used: 6  Reason for Referral:  Cervical spondylosis with radiculopathy    DISCHARGE REPORT  Progress reporting period is from 20 to 20.       Subjective:  The history is provided by the patient. No  was used.   Physical Exam                Objective:  Standing Alignment:    Cervical/Thoracic:  Forward head  Shoulder/UE:  Rounded shoulders  Lumbar/SI Evaluation  ROM:    AROM Lumbar:   Flexion:          WNL -  Ext:                    WNL -   Side Bend:        Left:     Right:   Rotation:           Left:     Right:   Side Glide:        Left:  WNL -    Right:  WNL -  Cervical/Thoracic Evaluation  AROM:  AROM Cervical:  Flexion:          WNL -  Extension:       Mod loss -  Rotation:         Left: mod loss -     Right: min loss -  Side Bend:      Left:     Right:     SUBJECTIVE  Subjective changes noted by patient:  Neck feels much better compared to 6 months ago. Able to look over shoulder with greater ease. Has been sleeping good. C/o LBP d/t yard work and gardening activities. Needs to start walking more.       Current pain level is 1/10 neck and back.     Initial Pain level: 9/10.   Changes in function:  Yes (See Goal flowsheet attached for changes in current functional level)  Adverse reaction to treatment or activity: None    Re: Brandi Stern   :   1955    OBJECTIVE  Changes noted in objective findings:  Yes, see objective findings.    ASSESSMENT/PLAN  Updated problem list and treatment plan: Diagnosis 1:  Neck pain  Pain -  hot/cold therapy, manual therapy, self management, education, directional preference  exercise and home program  Decreased ROM/flexibility - manual therapy, therapeutic exercise and home program  Decreased proprioception - neuro re-education, therapeutic activities and home program  Impaired muscle performance - neuro re-education and home program  Decreased function - therapeutic activities and home program  Impaired posture - neuro re-education and home program  STG/LTGs have been met or progress has been made towards goals:  Yes (See Goal flow sheet completed today.)  Assessment of Progress: The patient's condition is improving.  Self Management Plans:  Patient is independent in a home treatment program.  Patient is independent in self management of symptoms.  I have re-evaluated this patient and find that the nature, scope, duration and intensity of the therapy is appropriate for the medical condition of the patient.  Brandi continues to require the following intervention to meet STG and LTG's:  PT intervention is no longer required to meet STG/LTG.    Recommendations:  This patient is ready to be discharged from therapy and continue their home treatment program.            Thank you for your referral.    INQUIRIES  Therapist: Jeffrey Ferreira DPT   [facility] PHYSICAL THERAPY  [address] [place] [address]  Phone: [phone]  Fax: [phone]

## 2020-09-03 ENCOUNTER — MYC MEDICAL ADVICE (OUTPATIENT)
Dept: FAMILY MEDICINE | Facility: CLINIC | Age: 65
End: 2020-09-03

## 2020-09-16 ENCOUNTER — MYC MEDICAL ADVICE (OUTPATIENT)
Dept: FAMILY MEDICINE | Facility: CLINIC | Age: 65
End: 2020-09-16

## 2020-09-24 ENCOUNTER — VIRTUAL VISIT (OUTPATIENT)
Dept: FAMILY MEDICINE | Facility: CLINIC | Age: 65
End: 2020-09-24
Payer: MEDICARE

## 2020-09-24 DIAGNOSIS — F33.1 MAJOR DEPRESSIVE DISORDER, RECURRENT EPISODE, MODERATE (H): ICD-10-CM

## 2020-09-24 DIAGNOSIS — G47.00 INSOMNIA, UNSPECIFIED TYPE: Primary | ICD-10-CM

## 2020-09-24 PROCEDURE — 96127 BRIEF EMOTIONAL/BEHAV ASSMT: CPT | Performed by: NURSE PRACTITIONER

## 2020-09-24 PROCEDURE — 99214 OFFICE O/P EST MOD 30 MIN: CPT | Mod: 95 | Performed by: NURSE PRACTITIONER

## 2020-09-24 RX ORDER — ESZOPICLONE 2 MG/1
2 TABLET, FILM COATED ORAL AT BEDTIME
Qty: 30 TABLET | Refills: 5 | Status: SHIPPED | OUTPATIENT
Start: 2020-09-24 | End: 2021-04-12

## 2020-09-24 RX ORDER — DESVENLAFAXINE 50 MG/1
50 TABLET, FILM COATED, EXTENDED RELEASE ORAL DAILY
Qty: 90 TABLET | Refills: 3 | Status: SHIPPED | OUTPATIENT
Start: 2020-09-24 | End: 2021-09-08

## 2020-09-24 ASSESSMENT — PATIENT HEALTH QUESTIONNAIRE - PHQ9: SUM OF ALL RESPONSES TO PHQ QUESTIONS 1-9: 8

## 2020-09-24 NOTE — PROGRESS NOTES
"Brandi Stern is a 65 year old female who is being evaluated via a billable video visit.      The patient has been notified of following:     \"This video visit will be conducted via a call between you and your physician/provider. We have found that certain health care needs can be provided without the need for an in-person physical exam.  This service lets us provide the care you need with a video conversation.  If a prescription is necessary we can send it directly to your pharmacy.  If lab work is needed we can place an order for that and you can then stop by our lab to have the test done at a later time.    Video visits are billed at different rates depending on your insurance coverage.  Please reach out to your insurance provider with any questions.    If during the course of the call the physician/provider feels a video visit is not appropriate, you will not be charged for this service.\"    Patient has given verbal consent for Video visit? Yes  How would you like to obtain your AVS? MyChart  If you are dropped from the video visit, the video invite should be resent to: Text to cell phone: [phone]  Will anyone else be joining your video visit? No    Subjective     Brandi Stern is a 65 year old female who presents today via video visit for the following health issues:    HPI   Balance is off sometimes, \"I don't know if its my antidepressant\".  Feels balance is off, especially when turning.  Symptoms are intermittent.    Sleep Problem  Onset/Duration: several months   Description:   Time to fall asleep (sleep latency): a couple of hours   Middle of night awakening:  YES  Early morning awakenin, 5, or 6 AM   Progression of Symptoms:  same  Accompanying Signs & Symptoms:  Daytime sleepiness/napping: YES: sometimes napping   Excessive snoring/apnea: Uses CPAP at night   Restless legs: usually no; Takes Mirapex   Waking to urinate: YES  Chronic pain: YES  Depression symptoms (if yes, do PHQ9): YES  Anxiety " "symptoms (if yes, do FILEMON-7): Not really   History:  Prior Insomnia: YES.   New stressful situation: No   Precipitating factors:   Caffeine intake: 2 cups of coffee before 1 pm   OTC decongestants: YES: Zyrtec   Any new medications: no  Alleviating factors:  Self medicating (alcohol, etc.):  no  Stress-reduction (exercise, yoga, meditation etc): readings and praying   Therapies tried and outcome: Melatonin 10 MG extended at bedtime; patient stopped due to dizziness.  Flexeril PRN to help get to sleep   Trazodone: \"Makes me feel drugged the next day\".  Side effect to Ambien - forgetful the next morning.     PHQ 7/17/2019 9/3/2020 9/24/2020   PHQ-9 Total Score 11 5 8   Q9: Thoughts of better off dead/self-harm past 2 weeks Not at all Not at all Not at all   Some encounter information is confidential and restricted. Go to Review Flowsheets activity to see all data.     ACT Score today: 24             Video Start Time: 11:40        Review of Systems   CONSTITUTIONAL: NEGATIVE for fever, chills, change in weight  ENT/MOUTH: NEGATIVE for ear, mouth and throat problems  RESP: NEGATIVE for significant cough or SOB  CV: NEGATIVE for chest pain, palpitations or peripheral edema  GI: NEGATIVE for nausea, abdominal pain, heartburn, or change in bowel habits  NEURO: see HPI  PSYCHIATRIC: NEGATIVE for changes in mood or affect      Objective           Vitals:  No vitals were obtained today due to virtual visit.    Physical Exam     GENERAL: Healthy, alert and no distress  EYES: Eyes grossly normal to inspection.  No discharge or erythema, or obvious scleral/conjunctival abnormalities.  RESP: No audible wheeze, cough, or visible cyanosis.  No visible retractions or increased work of breathing.    SKIN: Visible skin clear. No significant rash, abnormal pigmentation or lesions.  NEURO: Cranial nerves grossly intact.  Mentation and speech appropriate for age.  PSYCH: Mentation appears normal, affect normal/bright, judgement and " insight intact, normal speech and appearance well-groomed.              Assessment & Plan     Major depressive disorder, recurrent episode, moderate (H)  Will decrease Pristiq to 50 mg to see if this helps with her dizziness and still keeps her depression well-controlled.   - desvenlafaxine (PRISTIQ) 50 MG 24 hr tablet; Take 1 tablet (50 mg) by mouth daily    Insomnia, unspecified type  Will try Lunesta.  Discussed the use and indication of this medication as well as potential side effects.   - eszopiclone (LUNESTA) 2 MG tablet; Take 1 tablet (2 mg) by mouth At Bedtime           No follow-ups on file.    Cherie Brennan NP  [facility]      Video-Visit Details    Type of service:  Video Visit    Video End Time:12:04 PM    Originating Location (pt. Location): Home    Distant Location (provider location):  [facility]     Platform used for Video Visit: Jane

## 2020-09-25 ASSESSMENT — ASTHMA QUESTIONNAIRES: ACT_TOTALSCORE: 24

## 2020-11-26 NOTE — TELEPHONE ENCOUNTER
Prescription approved per [facility] Refill Protocol.  Thanks! Brandy Daniels RN     no

## 2020-12-01 ENCOUNTER — ANCILLARY PROCEDURE (OUTPATIENT)
Dept: MAMMOGRAPHY | Facility: CLINIC | Age: 65
End: 2020-12-01
Attending: NURSE PRACTITIONER
Payer: MEDICARE

## 2020-12-01 DIAGNOSIS — Z12.31 VISIT FOR SCREENING MAMMOGRAM: ICD-10-CM

## 2020-12-01 PROCEDURE — 77067 SCR MAMMO BI INCL CAD: CPT | Mod: GC | Performed by: STUDENT IN AN ORGANIZED HEALTH CARE EDUCATION/TRAINING PROGRAM

## 2021-01-08 ENCOUNTER — MYC MEDICAL ADVICE (OUTPATIENT)
Dept: FAMILY MEDICINE | Facility: CLINIC | Age: 66
End: 2021-01-08

## 2021-01-12 ENCOUNTER — TRANSFERRED RECORDS (OUTPATIENT)
Dept: HEALTH INFORMATION MANAGEMENT | Facility: CLINIC | Age: 66
End: 2021-01-12

## 2021-01-20 ENCOUNTER — TRANSFERRED RECORDS (OUTPATIENT)
Dept: HEALTH INFORMATION MANAGEMENT | Facility: CLINIC | Age: 66
End: 2021-01-20

## 2021-02-04 ENCOUNTER — TRANSFERRED RECORDS (OUTPATIENT)
Dept: HEALTH INFORMATION MANAGEMENT | Facility: CLINIC | Age: 66
End: 2021-02-04

## 2021-02-26 ENCOUNTER — TRANSFERRED RECORDS (OUTPATIENT)
Dept: HEALTH INFORMATION MANAGEMENT | Facility: CLINIC | Age: 66
End: 2021-02-26

## 2021-03-19 ENCOUNTER — ANCILLARY PROCEDURE (OUTPATIENT)
Dept: GENERAL RADIOLOGY | Facility: CLINIC | Age: 66
End: 2021-03-19
Attending: FAMILY MEDICINE
Payer: MEDICARE

## 2021-03-19 ENCOUNTER — OFFICE VISIT (OUTPATIENT)
Dept: FAMILY MEDICINE | Facility: CLINIC | Age: 66
End: 2021-03-19
Payer: MEDICARE

## 2021-03-19 VITALS
SYSTOLIC BLOOD PRESSURE: 118 MMHG | HEART RATE: 102 BPM | BODY MASS INDEX: 38.94 KG/M2 | HEIGHT: 64 IN | DIASTOLIC BLOOD PRESSURE: 81 MMHG | TEMPERATURE: 98.2 F | WEIGHT: 228.12 LBS | OXYGEN SATURATION: 95 %

## 2021-03-19 DIAGNOSIS — F33.1 MAJOR DEPRESSIVE DISORDER, RECURRENT EPISODE, MODERATE (H): ICD-10-CM

## 2021-03-19 DIAGNOSIS — K21.00 GASTROESOPHAGEAL REFLUX DISEASE WITH ESOPHAGITIS WITHOUT HEMORRHAGE: ICD-10-CM

## 2021-03-19 DIAGNOSIS — K44.9 HIATAL HERNIA: ICD-10-CM

## 2021-03-19 DIAGNOSIS — R06.09 DYSPNEA ON EXERTION: Primary | ICD-10-CM

## 2021-03-19 DIAGNOSIS — M79.7 FIBROMYALGIA: ICD-10-CM

## 2021-03-19 DIAGNOSIS — G89.29 OTHER CHRONIC PAIN: ICD-10-CM

## 2021-03-19 DIAGNOSIS — E66.01 MORBID OBESITY (H): ICD-10-CM

## 2021-03-19 DIAGNOSIS — J45.40 MODERATE PERSISTENT ASTHMA WITHOUT COMPLICATION: ICD-10-CM

## 2021-03-19 DIAGNOSIS — G47.33 OBSTRUCTIVE SLEEP APNEA SYNDROME: ICD-10-CM

## 2021-03-19 LAB
BASOPHILS # BLD AUTO: 0 10E9/L (ref 0–0.2)
BASOPHILS NFR BLD AUTO: 0.5 %
DIFFERENTIAL METHOD BLD: ABNORMAL
EOSINOPHIL # BLD AUTO: 0.2 10E9/L (ref 0–0.7)
EOSINOPHIL NFR BLD AUTO: 3.5 %
ERYTHROCYTE [DISTWIDTH] IN BLOOD BY AUTOMATED COUNT: 11.9 % (ref 10–15)
HCT VFR BLD AUTO: 47.6 % (ref 35–47)
HGB BLD-MCNC: 16.3 G/DL (ref 11.7–15.7)
LYMPHOCYTES # BLD AUTO: 2.1 10E9/L (ref 0.8–5.3)
LYMPHOCYTES NFR BLD AUTO: 33.1 %
MCH RBC QN AUTO: 33.4 PG (ref 26.5–33)
MCHC RBC AUTO-ENTMCNC: 34.2 G/DL (ref 31.5–36.5)
MCV RBC AUTO: 98 FL (ref 78–100)
MONOCYTES # BLD AUTO: 0.5 10E9/L (ref 0–1.3)
MONOCYTES NFR BLD AUTO: 7.2 %
NEUTROPHILS # BLD AUTO: 3.5 10E9/L (ref 1.6–8.3)
NEUTROPHILS NFR BLD AUTO: 55.7 %
NT-PROBNP SERPL-MCNC: 40 PG/ML (ref 0–125)
PLATELET # BLD AUTO: 336 10E9/L (ref 150–450)
RBC # BLD AUTO: 4.88 10E12/L (ref 3.8–5.2)
WBC # BLD AUTO: 6.4 10E9/L (ref 4–11)

## 2021-03-19 PROCEDURE — 36415 COLL VENOUS BLD VENIPUNCTURE: CPT | Performed by: FAMILY MEDICINE

## 2021-03-19 PROCEDURE — 83880 ASSAY OF NATRIURETIC PEPTIDE: CPT | Performed by: FAMILY MEDICINE

## 2021-03-19 PROCEDURE — 85025 COMPLETE CBC W/AUTO DIFF WBC: CPT | Performed by: FAMILY MEDICINE

## 2021-03-19 PROCEDURE — 71046 X-RAY EXAM CHEST 2 VIEWS: CPT | Performed by: RADIOLOGY

## 2021-03-19 PROCEDURE — 80048 BASIC METABOLIC PNL TOTAL CA: CPT | Performed by: FAMILY MEDICINE

## 2021-03-19 PROCEDURE — 99214 OFFICE O/P EST MOD 30 MIN: CPT | Performed by: FAMILY MEDICINE

## 2021-03-19 ASSESSMENT — MIFFLIN-ST. JEOR: SCORE: 1558.72

## 2021-03-19 NOTE — PROGRESS NOTES
Assessment & Plan       1. Dyspnea on exertion  - H/o asthma followed by [facility] asthma clinic, GERD, MDD, hiatal hernia, chronic pain, ISATU who has been experiencing worsening dyspnea on exertion and rest. Pt was recently seen by Dr. Stern who adjusted her GERD medication from Prilosec to Dexilant. Pt does not feel improvement with symptoms. She has gained 8 lbs during the pandemic and retired. We discussed that symptoms could be due to deconditioning. I did recommend seeing her asthma provider for new PFTs. Ordered CXR to ensure no acute changes and BNP to r/u heart failure. Advised to slowly increase daily activity as tolerated. Follow if symptoms worsen or fail to improve.  - CBC with platelets and differential  - XR Chest 2 Views  - BNP-N terminal pro  - Basic metabolic panel  (Ca, Cl, CO2, Creat, Gluc, K, Na, BUN)    2. Morbid obesity (H)  - COMPREHENSIVE WEIGHT MANAGEMENT    3. Gastroesophageal reflux disease with esophagitis without hemorrhage      4. Major depressive disorder, recurrent episode, moderate (H)      5. Hiatal hernia      6. Fibromyalgia      7. Other chronic pain      8. Obstructive sleep apnea syndrome      9. Moderate persistent asthma without complication          Donnie Leal MD  [facility]    Karl Paz is a 65 year old who presents for the following health issues    HPI     She saw her pulmonologist one week ago. Thoughts were possibly due not GERD not controlled. A few months ago she started experiencing shortness of breath with activity and not so much at rest. She received prednisone burst X 5 days. That helped a little bit. Shortness of breath continued especially walking up the stairs. Previously she was able to walk a few blocks.She has also gained 8 lbs during the pandemic. She finds that when she wears masks it is harder to breath. When walking up the stairs her oxygen 94%. It is hard to do house work due to symptoms.  "She has chronic rhinitis 2/2 allergies. She has a dry cough (chronic) due to allergies. Not related to lisinopril.   No chest pain, fever, chills, leg swelling or orthopnea.   H/o GERD - on pepcid and dexilant and feels that heartburn is under control - she doesn't notice difference in dexilant and will go back to [facility]   She has her 2nd pzifier shot next week.   No recent travel.   Quit smoking - smoking on and off 40 years. Quit Oct 2020.     Review of Systems   Constitutional, HEENT, cardiovascular, pulmonary, gi and gu systems are negative, except as otherwise noted.      Objective    LMP  (LMP Unknown)   There is no height or weight on file to calculate BMI.  Physical Exam   /81 (BP Location: Left arm, Patient Position: Chair, Cuff Size: Adult Regular)   Pulse 111   Temp 98.2  F (36.8  C) (Tympanic)   Ht 1.632 m (5' 4.25\")   Wt 103.5 kg (228 lb 1.9 oz)   LMP  (LMP Unknown)   SpO2 95%   BMI 38.85 kg/m    GENERAL: healthy, alert and no distress  NECK: no adenopathy, no asymmetry, masses, or scars and thyroid normal to palpation  RESP: lungs clear to auscultation - no rales, rhonchi or wheezes  CV: regular rate and rhythm, normal S1 S2  MS:trace left lower extremity edema            "

## 2021-03-19 NOTE — PATIENT INSTRUCTIONS
Follow up with asthma provider for new pulmonary function test   Pending BNP to assess for heart failure  Continue to stay hydrated   I'll touch base with you on Monday on the rest of the results

## 2021-03-20 LAB
ANION GAP SERPL CALCULATED.3IONS-SCNC: 5 MMOL/L (ref 3–14)
BUN SERPL-MCNC: 15 MG/DL (ref 7–30)
CALCIUM SERPL-MCNC: 9.4 MG/DL (ref 8.5–10.1)
CHLORIDE SERPL-SCNC: 108 MMOL/L (ref 94–109)
CO2 SERPL-SCNC: 27 MMOL/L (ref 20–32)
CREAT SERPL-MCNC: 0.95 MG/DL (ref 0.52–1.04)
GFR SERPL CREATININE-BSD FRML MDRD: 62 ML/MIN/{1.73_M2}
GLUCOSE SERPL-MCNC: 106 MG/DL (ref 70–99)
POTASSIUM SERPL-SCNC: 4.3 MMOL/L (ref 3.4–5.3)
SODIUM SERPL-SCNC: 140 MMOL/L (ref 133–144)

## 2021-05-03 ENCOUNTER — OFFICE VISIT (OUTPATIENT)
Dept: SURGERY | Facility: CLINIC | Age: 66
End: 2021-05-03
Attending: FAMILY MEDICINE
Payer: MEDICARE

## 2021-05-03 VITALS
HEIGHT: 66 IN | DIASTOLIC BLOOD PRESSURE: 84 MMHG | BODY MASS INDEX: 37.85 KG/M2 | SYSTOLIC BLOOD PRESSURE: 126 MMHG | HEART RATE: 101 BPM | WEIGHT: 235.5 LBS | OXYGEN SATURATION: 93 %

## 2021-05-03 DIAGNOSIS — E66.01 CLASS 2 SEVERE OBESITY DUE TO EXCESS CALORIES WITH SERIOUS COMORBIDITY AND BODY MASS INDEX (BMI) OF 38.0 TO 38.9 IN ADULT (H): ICD-10-CM

## 2021-05-03 DIAGNOSIS — E78.1 PURE HYPERGLYCERIDEMIA: ICD-10-CM

## 2021-05-03 DIAGNOSIS — G89.29 CHRONIC BILATERAL LOW BACK PAIN, UNSPECIFIED WHETHER SCIATICA PRESENT: ICD-10-CM

## 2021-05-03 DIAGNOSIS — K21.9 GASTROESOPHAGEAL REFLUX DISEASE WITHOUT ESOPHAGITIS: Primary | ICD-10-CM

## 2021-05-03 DIAGNOSIS — E66.812 CLASS 2 SEVERE OBESITY DUE TO EXCESS CALORIES WITH SERIOUS COMORBIDITY AND BODY MASS INDEX (BMI) OF 38.0 TO 38.9 IN ADULT (H): ICD-10-CM

## 2021-05-03 DIAGNOSIS — M25.562 BILATERAL CHRONIC KNEE PAIN: ICD-10-CM

## 2021-05-03 DIAGNOSIS — M25.561 BILATERAL CHRONIC KNEE PAIN: ICD-10-CM

## 2021-05-03 DIAGNOSIS — G89.29 BILATERAL CHRONIC KNEE PAIN: ICD-10-CM

## 2021-05-03 DIAGNOSIS — M54.50 CHRONIC BILATERAL LOW BACK PAIN, UNSPECIFIED WHETHER SCIATICA PRESENT: ICD-10-CM

## 2021-05-03 DIAGNOSIS — G47.33 OSA (OBSTRUCTIVE SLEEP APNEA): ICD-10-CM

## 2021-05-03 PROCEDURE — 99205 OFFICE O/P NEW HI 60 MIN: CPT | Performed by: PHYSICIAN ASSISTANT

## 2021-05-03 RX ORDER — PHENTERMINE HYDROCHLORIDE 15 MG/1
15 CAPSULE ORAL EVERY MORNING
Qty: 60 CAPSULE | Refills: 1 | Status: SHIPPED | OUTPATIENT
Start: 2021-05-03 | End: 2021-06-08

## 2021-05-03 ASSESSMENT — MIFFLIN-ST. JEOR: SCORE: 1612.03

## 2021-05-03 NOTE — PATIENT INSTRUCTIONS
Nice to talk with you today.  Below is our plan we discussed.-  ERIN Abarca    -Start phentermine 15 mg in the morning x 1 week.  Can increase to 2 capsules if needed after 1 week.    -Have Get Moving Eval    Goals:  Start eating within 1 hour of waking. (something with protein)  Avoid alcohol    FOLLOW-UP:  Return to clinic in 6 weeks.        MEDICATION STARTED AT THIS APPOINTMENT    We are starting Phentermine. Take one tablet in the morning. Contact the nurse via Swyzzle or call [phone] if you have any questions or concerns. (Do not stop taking it if you don't think it's working. For some people it works without them knowing it.)    Please get blood pressure and pulse checked 1-2 weeks after starting phentermine. If BP above 140/90 or pulse is greater than 100 will contact clinic.       Phentermine is being prescribed because you identified hunger as one of the main causes for your extra weight.      Our patients on Phentermine find that they:    >feel less hunger    >find it easier to push the plate away   >have an easier time eating less    For some of our patients, these feelings are very real and immediate. For other patients, the feelings are less obvious. They don't feel much of a change but find they've lost weight. Like all weight loss medications, Phentermine  works best when you help it work. This means:  1. Having less tempting high calorie (fattening) food around the house or office. (For people with strong cravings this is very important.)   2. Staying away from situations or people that may trigger your cravings .   3. Eating out only one time or less each week.  4. Eating your meals at a table with the TV or computer off.    Side-effects. Phentermine is generally well tolerated. The main side-effects we see are feelings of racing pulse or rapid heart beat. Some people can get an elevated blood pressure. Because of this we may have you come back within a week or so of starting the medication  for a blood pressure check.         In order to get refills of this or any medication we prescribe you must be seen in the medical weight mgmt clinic every 2-3 months.      10 Healthy Habits  Eat Better ? Move More ? Live Well  1.  Eat breakfast daily.      Try to eat within the first hour after you wake up. This helps you control your appetite during the day, and you are less likely to snack in the evening.     80% of people who skip meals are overweight or obese.    2.   Include protein with every meal, even breakfast.     Protein will suppress your appetite longer than other types of food. This helps you  feel more satisfied with the amount of food you have eaten.    3.  Fill up on Fiber    Fiber comes from plants--fruits, veggies, whole grains, nuts/seeds and beans    Fiber is low in calories, high in phytonutrients and helps you stay full longer    4.  Eat S-L-O-W-L-Y    Take 20-30 minutes to eat each meal by taking small bites, chewing foods to applesauce consistency or 20-30 times before you swallow    Eating foods too fast can delay satiety/fullness signals and increase overeating     5.  Avoid Mindless Eating    Be present when you eat--take note of the smell, taste and quality of your food    Make a list of alternative activities you could do to prevent boredom/stress eating  Go for a walk, call a friend, chew gum, paint your nails    6.  Keep a Journal    Writing down what you eat, how you feel and when you are active helps you identify new changes to work on from week to week          Look for ways to cut 100 calories from your current diet 2-3 times per day    7.  Drink 64 ounces of Non Calorie drinks between meals    Water    Zero calorie Propel , Vitamin Water , Crystal Light     Avoid regular soda pop    8.  Stop thinking about food choices as a  diet.     Instead, think of them as healthy, lifelong habits--an effort toward a new way of eating.    Weigh yourself once a week instead of everyday.      9.  Get enough sleep--at least 7 to 8 hours each night.     Lack of sleep is one reason that fat builds up around the waist.    10.  Exercise five to six times per week     Do a combination aerobic exercise (fast walking, biking, swimming) and strength training (Dumbbells, pushups, weight machines, resistance bands).    Start at 10 minutes per day and slowly work your way up to 30 minutes or more.   Taking the stairs instead of the elevator, park at the far end of the parking lot, pace while on the phone, take the dog for a walk.     [email or web address]

## 2021-05-04 ENCOUNTER — THERAPY VISIT (OUTPATIENT)
Dept: PHYSICAL THERAPY | Facility: CLINIC | Age: 66
End: 2021-05-04
Attending: PHYSICIAN ASSISTANT
Payer: MEDICARE

## 2021-05-04 DIAGNOSIS — M25.561 BILATERAL CHRONIC KNEE PAIN: ICD-10-CM

## 2021-05-04 DIAGNOSIS — G89.29 CHRONIC BILATERAL LOW BACK PAIN, UNSPECIFIED WHETHER SCIATICA PRESENT: ICD-10-CM

## 2021-05-04 DIAGNOSIS — M54.50 CHRONIC BILATERAL LOW BACK PAIN, UNSPECIFIED WHETHER SCIATICA PRESENT: ICD-10-CM

## 2021-05-04 DIAGNOSIS — G89.29 BILATERAL CHRONIC KNEE PAIN: ICD-10-CM

## 2021-05-04 DIAGNOSIS — M25.562 BILATERAL CHRONIC KNEE PAIN: ICD-10-CM

## 2021-05-04 DIAGNOSIS — E66.812 CLASS 2 SEVERE OBESITY DUE TO EXCESS CALORIES WITH SERIOUS COMORBIDITY AND BODY MASS INDEX (BMI) OF 38.0 TO 38.9 IN ADULT (H): ICD-10-CM

## 2021-05-04 DIAGNOSIS — E66.01 CLASS 2 SEVERE OBESITY DUE TO EXCESS CALORIES WITH SERIOUS COMORBIDITY AND BODY MASS INDEX (BMI) OF 38.0 TO 38.9 IN ADULT (H): ICD-10-CM

## 2021-05-04 PROCEDURE — 97112 NEUROMUSCULAR REEDUCATION: CPT | Mod: GP | Performed by: PHYSICAL THERAPIST

## 2021-05-04 PROCEDURE — 97161 PT EVAL LOW COMPLEX 20 MIN: CPT | Mod: GP | Performed by: PHYSICAL THERAPIST

## 2021-05-04 NOTE — PROGRESS NOTES
Physical Therapy Initial Evaluation  Subjective:    Therapist Generated HPI Evaluation  Problem details: 6/10 pain level .         Type of problem:  Lumbar (January 2021).    This is a chronic condition.  Condition occurred with:  Other reason.  Where condition occurred: other.  Patient reports pain:  Lower lumbar spine and central lumbar spine.  Pain is described as aching and is constant.  Pain radiates to:  No radiation. Pain is worse in the A.M..  Since onset symptoms are gradually worsening.  Associated with: stiffness. Symptoms are exacerbated by bending, lifting, sitting, twisting, standing and carrying    Imaging testing: none.  Past treatment: none.       Therapist Generated HPI Evaluation         Type of problem:  Bilateral knees (January 2021, pain: 6/10 ).    This is a chronic condition.      Patient reports pain:  Anterior.  Pain is described as aching (5/10 ) and is intermittent.  Radiates to: none. Pain is worse in the A.M..  Since onset symptoms are unchanged.  Associated symptoms:  Loss of strength (stiffness). Symptoms are exacerbated by ascending stairs, descending stairs, walking and standing  and relieved by rest (medication ).  Imaging testing: normal   Past treatment: none. Improved with treatment: na.  Work activity restrictions: reitred.  Barriers include:  None as reported by patient.                        Objective:  System         Lumbar/SI Evaluation  ROM:    AROM Lumbar:   Flexion:          90% with left and central low back stiffnes   Ext:                    50% with stiffness left and central low back    Side Bend:        Left:  50%    Right:  50%  Rotation:           Left:     Right:   Side Glide:        Left:     Right:           Lumbar Myotomes:  not assessed            Lumbar DTR's:  not assessed        Lumbar Dermtomes:  not assessed                                                                 Knee Evaluation:  ROM:    AROM      Extension:  Left: 0    Right:  0  Flexion:  Left: 110    Right: 110        Strength:     Extension:  Left: 5/5   Pain:      Right: 5/5   Pain:    Quad Set Left: Good    Pain:   Quad Set Right: Good    Pain:  Ligament Testing:  Not Assessed                Special Tests: Not Assessed              mmt: gluteus medius: 3+/5 (B), hip extensors: 4/5 (B), resting position of patella: significant left greater than right- lateral, faulty abdominal recruitment pattern with compensatory sucking in of upper and lower abdominals, poor abdominal tone    General     ROS   Obese: current weight: 235 pounds, exercise history history: 20 min per week walking, strength training: none  Assessment/Plan:    Patient is a 65 year old female with lumbar, right side knee and lumbar complaints.    Patient has the following significant findings with corresponding treatment plan.                Diagnosis 1:  Right knee pain  Pain -  hot/cold therapy, self management, education, directional preference exercise and home program  Decreased ROM/flexibility - manual therapy, therapeutic exercise, therapeutic activity and home program  Decreased strength - therapeutic exercise, therapeutic activities and home program  Impaired muscle performance - neuro re-education and home program  Decreased function - therapeutic activities and home program Diagnosis 2:  Chronic low back pain  Pain -  hot/cold therapy, manual therapy, self management, education, directional preference exercise and home program  Decreased ROM/flexibility - manual therapy, therapeutic exercise, therapeutic activity and home program  Decreased strength - therapeutic exercise, therapeutic activities and home program  Impaired muscle performance - neuro re-education and home program  Decreased function - therapeutic activities and home program    Therapy Evaluation Codes:      Comorbidity factors that impact the plan of care are:    1)   Clinical presentation characteristics  are:   Stable/Uncomplicated.  2) Decision-Making    Low complexity using standardized patient assessment instrument and/or measureable assessment of functional outcome.  Cumulative Therapy Evaluation is: Low complexity.    Previous and current functional limitations:  (See Goal Flow Sheet for this information)    Short term and Long term goals: (See Goal Flow Sheet for this information)     Communication ability:  Patient appears to be able to clearly communicate and understand verbal and written communication and follow directions correctly.  Treatment Explanation - The following has been discussed with the patient:   RX ordered/plan of care  Anticipated outcomes  Possible risks and side effects  This patient would benefit from PT intervention to resume normal activities.   Rehab potential is good.    Frequency:  1 X week, once daily  Duration:  for 6 weeks  Discharge Plan:  Achieve all LTG.  Independent in home treatment program.  Reach maximal therapeutic benefit.    Please refer to the daily flowsheet for treatment today, total treatment time and time spent performing 1:1 timed codes.

## 2021-05-04 NOTE — LETTER
DEPARTMENT OF HEALTH AND HUMAN SERVICES  CENTERS FOR MEDICARE & MEDICAID SERVICES    PLAN/UPDATED PLAN OF PROGRESS FOR OUTPATIENT REHABILITATION    PATIENTS NAME:  Brandi Stern   : 1955  PROVIDER NUMBER:    [id number]  HICN:  [id number]  PROVIDER NAME: [facility]  MEDICAL RECORD NUMBER: [id number]   START OF CARE DATE:  21    TYPE:  PT  PRIMARY/TREATMENT DIAGNOSIS: (Pertinent Medical Diagnosis)   Chronic bilateral low back pain, unspecified whether sciatica present  Bilateral chronic knee pain  Class 2 severe obesity due to excess calories with serious comorbidity and body mass index (BMI) of 38.0 to 38.9 in adult (H)  VISITS FROM START OF CARE:  Rxs Used: 1     Physical Therapy Initial Evaluation  Subjective:    Therapist Generated HPI Evaluation  Problem details: 6/10 pain level .         Type of problem:  Lumbar (2021). Date of MD Order 5/3/21.    This is a chronic condition.  Condition occurred with:  Other reason.  Where condition occurred: other.  Patient reports pain:  Lower lumbar spine and central lumbar spine.  Pain is described as aching and is constant.  Pain radiates to:  No radiation. Pain is worse in the A.M..  Since onset symptoms are gradually worsening.  Associated with: stiffness. Symptoms are exacerbated by bending, lifting, sitting, twisting, standing and carrying    Imaging testing: none.  Past treatment: none.     Therapist Generated HPI Evaluation       Type of problem:  Bilateral knees (2021, pain: 6/10 ).  This is a chronic condition.  Patient reports pain:  Anterior.  Pain is described as aching (5/10 ) and is intermittent.  Radiates to: none. Pain is worse in the A.M..  Since onset symptoms are unchanged.  Associated symptoms:  Loss of strength (stiffness). Symptoms are exacerbated by ascending stairs, descending stairs, walking and standing  and relieved by rest (medication ).  Imaging testing: normal   Past  treatment: none. Improved with treatment: na.  Work activity restrictions: reitred.  Barriers include:  None as reported by patient.      PATIENTS NAME:  Brandi Stern   : 1955    Pain is reported as 4/10 on pain scale.  General health as reported by patient is fair.  Pertinent medical history includes: asthma, depression, fibromyalgia, high blood pressure and overweight.      Surgeries: knee, gallbladder.    Current medications:  Anti-depressants, anti-inflammatory, high blood pressure medication, muscle relaxants, pain medication and thyroid medication.    Current occupation is Retired RN.   Primary job tasks include:  Lifting/carrying and prolonged sitting.   Other job/home tasks details: Housework, gardening.              Knee Activity of Daily Living Score: 48.57                       Objective:  System    Lumbar/SI Evaluation  ROM:    AROM Lumbar:   Flexion:          90% with left and central low back stiffnes   Ext:                    50% with stiffness left and central low back    Side Bend:        Left:  50%    Right:  50%  Rotation:           Left:     Right:   Side Glide:        Left:     Right:         Lumbar Myotomes:  not assessed  Lumbar DTR's:  not assessed  Lumbar Dermtomes:  not assessed          Knee Evaluation:  ROM:    AROM   Extension:  Left: 0    Right:  0  Flexion: Left: 110    Right: 110    Strength:   Extension:  Left: 5/5   Pain:      Right: 5/5   Pain:  Quad Set Left: Good    Pain:   Quad Set Right: Good    Pain:  Ligament Testing:  Not Assessed  Special Tests: Not Assessed  mmt: gluteus medius: 3+/5 (B), hip extensors: 4/5 (B), resting position of patella: significant left greater than right- lateral, faulty abdominal recruitment pattern with compensatory sucking in of upper and lower abdominals, poor abdominal tone    General   ROS     Obese: current weight: 235 pounds, exercise history history: 20 min per week walking, strength training: none    PATIENTS NAME:  Brandi Stern    : 1955    Assessment/Plan:    Patient is a 65 year old female with lumbar, right side knee and lumbar complaints.    Patient has the following significant findings with corresponding treatment plan.                Diagnosis 1:  Right knee pain  Pain -  hot/cold therapy, self management, education, directional preference exercise and home program  Decreased ROM/flexibility - manual therapy, therapeutic exercise, therapeutic activity and home program  Decreased strength - therapeutic exercise, therapeutic activities and home program  Impaired muscle performance - neuro re-education and home program  Decreased function - therapeutic activities and home program Diagnosis 2:  Chronic low back pain  Pain -  hot/cold therapy, manual therapy, self management, education, directional preference exercise and home program  Decreased ROM/flexibility - manual therapy, therapeutic exercise, therapeutic activity and home program  Decreased strength - therapeutic exercise, therapeutic activities and home program  Impaired muscle performance - neuro re-education and home program  Decreased function - therapeutic activities and home program    Therapy Evaluation Codes:      Comorbidity factors that impact the plan of care are:    1)   Clinical presentation characteristics are:   Stable/Uncomplicated.  2) Decision-Making    Low complexity using standardized patient assessment instrument and/or measureable assessment of functional outcome.  Cumulative Therapy Evaluation is: Low complexity.    Previous and current functional limitations:  (See Goal Flow Sheet for this information)    Short term and Long term goals: (See Goal Flow Sheet for this information)     Communication ability:  Patient appears to be able to clearly communicate and understand verbal and written communication and follow directions correctly.  Treatment Explanation - The following has been discussed with the patient:   RX ordered/plan of care  Anticipated  "outcomes  Possible risks and side effects  This patient would benefit from PT intervention to resume normal activities.   Rehab potential is good.    Frequency:  1 X week, once daily  Duration:  for 6 weeks  Discharge Plan:  Achieve all LTG.  Independent in home treatment program.  Reach maximal therapeutic benefit.                PATIENTS NAME:  Brandi Stern   : 1955            Caregiver Signature/Credentials _____________________________ Date ________       Treating Provider: Kaylee Woody, PT     I have reviewed and certified the need for these services and plan of treatment while under my care.        PHYSICIAN'S SIGNATURE:   _________________________________________  Date___________   Rima Long MD    Certification period:  Beginning of Cert date period: 21 to  End of Cert period date: 21     Functional Level Progress Report: Please see attached \"Goal Flow sheet for Functional level.\"    ____X____ Continue Services or       ________ DC Services                Service dates: From  21 to present                         "

## 2021-05-05 ENCOUNTER — MYC MEDICAL ADVICE (OUTPATIENT)
Dept: FAMILY MEDICINE | Facility: CLINIC | Age: 66
End: 2021-05-05

## 2021-05-05 DIAGNOSIS — M47.812 FACET ARTHROPATHY, CERVICAL: Primary | ICD-10-CM

## 2021-05-05 ASSESSMENT — ACTIVITIES OF DAILY LIVING (ADL)
GO UP STAIRS: ACTIVITY IS FAIRLY DIFFICULT
KNEE_ACTIVITY_OF_DAILY_LIVING_SCORE: 48.57
HOW_WOULD_YOU_RATE_THE_OVERALL_FUNCTION_OF_YOUR_KNEE_DURING_YOUR_USUAL_DAILY_ACTIVITIES?: ABNORMAL
GO DOWN STAIRS: ACTIVITY IS FAIRLY DIFFICULT
AS_A_RESULT_OF_YOUR_KNEE_INJURY,_HOW_WOULD_YOU_RATE_YOUR_CURRENT_LEVEL_OF_DAILY_ACTIVITY?: ABNORMAL
WALK: ACTIVITY IS SOMEWHAT DIFFICULT
SIT WITH YOUR KNEE BENT: ACTIVITY IS NOT DIFFICULT
STIFFNESS: THE SYMPTOM AFFECTS MY ACTIVITY MODERATELY
RISE FROM A CHAIR: ACTIVITY IS SOMEWHAT DIFFICULT
KNEE_ACTIVITY_OF_DAILY_LIVING_SUM: 34
GIVING WAY, BUCKLING OR SHIFTING OF KNEE: THE SYMPTOM AFFECTS MY ACTIVITY MODERATELY
KNEEL ON THE FRONT OF YOUR KNEE: ACTIVITY IS VERY DIFFICULT
RAW_SCORE: 34
STAND: ACTIVITY IS FAIRLY DIFFICULT
SWELLING: THE SYMPTOM AFFECTS MY ACTIVITY SLIGHTLY
LIMPING: I DO NOT HAVE THE SYMPTOM
PAIN: THE SYMPTOM AFFECTS MY ACTIVITY SEVERELY
HOW_WOULD_YOU_RATE_THE_CURRENT_FUNCTION_OF_YOUR_KNEE_DURING_YOUR_USUAL_DAILY_ACTIVITIES_ON_A_SCALE_FROM_0_TO_100_WITH_100_BEING_YOUR_LEVEL_OF_KNEE_FUNCTION_PRIOR_TO_YOUR_INJURY_AND_0_BEING_THE_INABILITY_TO_PERFORM_ANY_OF_YOUR_USUAL_DAILY_ACTIVITIES?: 60
WEAKNESS: THE SYMPTOM AFFECTS MY ACTIVITY MODERATELY
SQUAT: ACTIVITY IS VERY DIFFICULT

## 2021-05-05 NOTE — PROGRESS NOTES
"MEDICAL WEIGHT LOSS INITIAL EVALUATION  DIAGNOSIS:  Class II Obesity    NUTRITION HISTORY:    Diet Recall Review with Patient 4/28/2021   Do you typically eat breakfast? Yes Wake up variable 9-11 am   If you do eat breakfast, what types of food do you eat? Leftovers/ Coffee within 1 hour of waking and V8   Do you typically eat lunch? Yes noon-1 PM   If you do eat lunch, what types of food do you typically eat?  Green Salad, leftovers or pepperoni pizza or  mac and cheese   Do you typically eat supper? Yes  6 PM   If you do eat supper, what types of food do you typically eat? [place] Islander or Salvadorean or Italian  Ickes and rice or stew or pasta   Do you typically eat snacks? Yes    If you do snack, what types of food do you typically eat? Chips and dip, small portion of entree.         Beverage choices: no plain water or carbonated water, 16-24 oz juice (tomato or cran raspberry)+ carbonated, water juice, 8 oz. whole milk, ETOH-4 drinks per week  Dining out: 1X per week-Ethnic type foods or seafood  Binge eating: no  Emotional eating: yes-stress, depression, bored, reward/happy  Night time eating: no  Exercise: none (back, neck, knee pain); currently doing core ex. 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 Since retiring she enjoys cooking and likes to cook and freeze foods. Had to buy bra extenders on [employer] and that was. Biggest craving is for cheese, butter, See's candy. Struggling to maintain her house. Will be going back to pain clinic in June    MEDICATION FOR WEIGHT LOSS:  Phentermine    ANTHROPOMETRICS:  Height: 5'5.5\"  Weight: 233.3 lb.   BMI:  38.23 kg/m2  NUTRITION DIAGNOSIS:   Obese, class II related to excess energy intake as evidence by BMI of  38.23  NUTRITION INTERVENTIONS  Nutrition Prescription:  Recommend modified energy- nutrient intake  Implementation:  Nutrition Education (Content):    Discussed portion sizes     Determined " alternative to emotional eating    Reviewed beverage choices    Provided  My Plate guidelines    Nutrition Education (Application):     Patient to practice goals as stated below    Patient verbalizes understanding of diet by     Anticipate good compliance      Goals:  Focus on alternatives to emotional eating  Replace fruit juice with low calorie juice/ drink at least 32 oz water  Switch to 1% milk      FOLLOW UP AND MONITORING:    Other  - follow up in 8 weeks.     TIME SPENT WITH PATIENT:   42 minutes   Junior Wiggins RD, LD  [facility]

## 2021-05-06 ENCOUNTER — TELEPHONE (OUTPATIENT)
Dept: PALLIATIVE MEDICINE | Facility: CLINIC | Age: 66
End: 2021-05-06

## 2021-05-06 NOTE — TELEPHONE ENCOUNTER
Recommend follow up with Gia Stroud CNP for clarification and recommendations for next approprate next step.  This patient has had MRI's, PT and surgical consults since her last follow up and many procedures including cervical and lumbar RFA's.     Clarissa Camara MD

## 2021-05-06 NOTE — TELEPHONE ENCOUNTER
Interventional Evaluation:   Injections as recommended in last evaluation 1/13/20            Routing to review            Caren Keating    [facility]

## 2021-05-06 NOTE — TELEPHONE ENCOUNTER
Incoming call from pt. Pt states she wants to establish care with Gia Stroud NP again before injections. Informed pt we would need an order for comprehensive services sent over by PCP.    Jihan LUTZ    [facility]

## 2021-05-10 ENCOUNTER — ALLIED HEALTH/NURSE VISIT (OUTPATIENT)
Dept: SURGERY | Facility: CLINIC | Age: 66
End: 2021-05-10
Attending: FAMILY MEDICINE
Payer: MEDICARE

## 2021-05-10 VITALS — BODY MASS INDEX: 38.23 KG/M2 | WEIGHT: 233.3 LBS

## 2021-05-10 PROCEDURE — 97802 MEDICAL NUTRITION INDIV IN: CPT | Performed by: DIETITIAN, REGISTERED

## 2021-05-10 NOTE — TELEPHONE ENCOUNTER

## 2021-05-12 NOTE — PROGRESS NOTES
Physical Therapy Initial Evaluation  Subjective:    Patient Health History         Pain is reported as 4/10 on pain scale.  General health as reported by patient is fair.  Pertinent medical history includes: asthma, depression, fibromyalgia, high blood pressure and overweight.        Surgeries: knee, gallbladder.    Current medications:  Anti-depressants, anti-inflammatory, high blood pressure medication, muscle relaxants, pain medication and thyroid medication.    Current occupation is Retired RN.   Primary job tasks include:  Lifting/carrying and prolonged sitting.   Other job/home tasks details: Housework, gardening.                     Knee Activity of Daily Living Score: 48.57            Objective:  System    Physical Exam    General     ROS    Assessment/Plan:

## 2021-05-18 ENCOUNTER — THERAPY VISIT (OUTPATIENT)
Dept: PHYSICAL THERAPY | Facility: CLINIC | Age: 66
End: 2021-05-18
Payer: MEDICARE

## 2021-05-18 DIAGNOSIS — G89.29 CHRONIC BILATERAL LOW BACK PAIN, UNSPECIFIED WHETHER SCIATICA PRESENT: Primary | ICD-10-CM

## 2021-05-18 DIAGNOSIS — M25.561 BILATERAL CHRONIC KNEE PAIN: ICD-10-CM

## 2021-05-18 DIAGNOSIS — G89.29 BILATERAL CHRONIC KNEE PAIN: ICD-10-CM

## 2021-05-18 DIAGNOSIS — M25.562 BILATERAL CHRONIC KNEE PAIN: ICD-10-CM

## 2021-05-18 DIAGNOSIS — M54.50 CHRONIC BILATERAL LOW BACK PAIN, UNSPECIFIED WHETHER SCIATICA PRESENT: Primary | ICD-10-CM

## 2021-05-18 PROCEDURE — 97140 MANUAL THERAPY 1/> REGIONS: CPT | Mod: GP | Performed by: PHYSICAL THERAPIST

## 2021-05-18 PROCEDURE — 97112 NEUROMUSCULAR REEDUCATION: CPT | Mod: GP | Performed by: PHYSICAL THERAPIST

## 2021-05-24 ENCOUNTER — THERAPY VISIT (OUTPATIENT)
Dept: PHYSICAL THERAPY | Facility: CLINIC | Age: 66
End: 2021-05-24
Payer: MEDICARE

## 2021-05-24 DIAGNOSIS — G89.29 CHRONIC BILATERAL LOW BACK PAIN, UNSPECIFIED WHETHER SCIATICA PRESENT: ICD-10-CM

## 2021-05-24 DIAGNOSIS — M54.50 CHRONIC BILATERAL LOW BACK PAIN, UNSPECIFIED WHETHER SCIATICA PRESENT: ICD-10-CM

## 2021-05-24 DIAGNOSIS — M25.562 BILATERAL CHRONIC KNEE PAIN: Primary | ICD-10-CM

## 2021-05-24 DIAGNOSIS — M25.561 BILATERAL CHRONIC KNEE PAIN: Primary | ICD-10-CM

## 2021-05-24 DIAGNOSIS — G89.29 BILATERAL CHRONIC KNEE PAIN: Primary | ICD-10-CM

## 2021-05-24 PROCEDURE — 97112 NEUROMUSCULAR REEDUCATION: CPT | Mod: GP | Performed by: PHYSICAL THERAPIST

## 2021-05-24 PROCEDURE — 97140 MANUAL THERAPY 1/> REGIONS: CPT | Mod: GP | Performed by: PHYSICAL THERAPIST

## 2021-06-05 DIAGNOSIS — K21.9 GASTROESOPHAGEAL REFLUX DISEASE WITHOUT ESOPHAGITIS: ICD-10-CM

## 2021-06-06 NOTE — PROGRESS NOTES
"[facility]    VIDEO VISIT   How would you like to obtain your AVS? Samantaharvic  If you are dropped from the video visit, the video invite should be resent to: Other e-mail: Anand  Will anyone else be joining your video visit? No  Is patient CURRENTLY in [place]? Yes  Candice Nolan MA  [facility]      Brandi Stern is a 65 year old female who is being evaluated via a billable virtual visit.        Video-Visit Details  Type of service:  Video Visit  Video Start Time: 3:32 PM  Video End Time: 3:48 PM  Originating Location (pt. Location): Home  Distant Location (provider location):  [facility]   Platform used for Video Visit: [employer]    CHIEF COMPLAINT: lumbar radiculopathy, low back pain    INTERVAL HISTORY:  Last seen on 1/13/20.        Recommendations/plan at the last visit included:     1. We will discuss PT after MRI  2. Schedule follow-up with ELISE Persaud NP-C in 4-8 weeks  3. Imaging: MRI of cervical spine  4. Procedures recommended: We will discuss after MRI   5. Referrals: We will talk about neurosurgery referral after MRI  6. Order for TENS unit provided. Take to any home medical store.   7. Medication recommendations:No change to current medication.    Since last visit:   - Brandi Sharp has retired and her mother passed away.   - Has been attending PT intermittently.   - Has noted increased radiating pain. States that it feels like ice water running down right leg and also like a wasp sting. Describes as \"a nagging awful feeling\". Pain treated with previous RFA provided about 75% pain relief for 3-6 months. Continues to smoke intermittently.     Pain Information Today: Score: 3/10.     Annual Requirements last collected:  N/A    CURRENT PAIN RELEVANT MEDICATIONS:   Tylenol arthritis strength 650 mg, 4 tablets daily  Cyclobenzaprine 10 mg at HS   Lunesta 2 mg at HS      Patient is using the medication as prescribed: "  YES  Is your medication helpful?               YES   Medication side effects? no side effect          Previous Pain Relevant Medications: (H--helped; HI--Helped initially; SWH--Somewhat helpful; NH--No help; W--worse; SE--side effects; ?--Unsure if helpful)   NOTE: This medication information taken from patient's intake form, not medical records.                         Opiates: Hydrocodone: H, hydromorphone: H, given postoperatively, morphine, H: given postoperatively and in ED, tramadol:NH                        NSAIDS: Celebrex: H, ibuprofen:[facility], Relafen:NH                        Muscle Relaxants: Cyclobenzaprine:SE sedation                        Anti-migraine mediations: None                        Anti-depressants: Citalopram: NH, bupropion: Just started, too soon to tell                        Sleep aids: Ambien: H for sleep                        Anti-convulsants: Gabapentin: SE, sedation, pregabalin: NH                        Topicals: Diclofenac gel:NH                        Other medications not covered above: none      Any illicit drug use: Denies  Any use of prescriptions other than how they were prescribed:denies    Past Pain Treatments:              Pain Clinic:   No               PT: Yes: for back pain, H and after knee surgeries: H              Psychologist: No             Relaxation techniques/biofeedback: No             Chiropractor: No             Acupuncture: Yes: went to 12-16 sessions years ago             Pharmacotherapy:                         Opioids: Yes                          Non-opioids:  Yes                             TENs Unit:No             Injections: Yes: several for knees, back      THE 4 As OF OPIOID MAINTENANCE ANALGESIA    Analgesia: Is pain relief clinically significant? N/A   Activity: Is patient functional and able to perform Activities of Daily Living? N/A   Adverse effects: Is patient free from adverse side effects from opiates? N/A   Adherence to Rx protocol: Is  patient adhering to Controlled Substance Agreement and taking medications ONLY as ordered? N/A    Is Narcan prescribed for opiate use >50 MME daily or concurrent use of opiates and benzodiazepines? N/A    [place] Board of Pharmacy Data Base Reviewed:    YES; As expected, no concern for misuse/abuse of controlled medications based on this report. Reviewed [facility] June 20, 2021- no concerning fills.    _______________________________________________      Physical Exam unable to be completed due to virtual visit. There were no vitals taken for this visit or reported by patient.     General: Verbal, alert and no distress   Psychiatric:  affect and mood normal, mentation appears normal    DIRE Score for ongoing opioid management is calculated as follows:    Diagnosis = 2 pts (slowly progressive; moderate pain/objective findings)    Intractability = 2 pts (most treatments tried; patient not fully engaged/barriers)    Risk        Psych = 3 pts (no significant personality dysfunction/mental illness; good communication with clinic)         Chem Hlth = 2 pts (use of medications to cope with stress; chemical dependency in remission) (?)       Reliability = 3 pts (highly reliable with meds, appointments, treatments)       Social = 3 pts (supportive family/close relationships; involved in work/school; no isolation)       (Psych + Chem hlth + Reliability + Social) = 15    Efficacy = 2 pts (moderate benefit/function; low med dose; too early/not tried meds)    DIRE Score = 17        7-13: likely NOT suitable candidate for long-term opioid analgesia       14-21: may be a suitable candidate for long-term opioid analgesia        Previous Diagnostic Tests:   Imaging Studies:   MR CERVICAL SPINE W/O CONTRAST 8/4/2017 9:20 AM  Provided History: Chronic changes of right C7 and C8 radiculopathies.  Comparison: Cervical spine radiograph 1/12/2017, MR cervical spine  2/8/2016  Technique: Sagittal T1-weighted, sagittal T2-weighted, sagittal  STIR,  sagittal diffusion weighted, axial T2-weighted, and axial T2* gradient  echo images of the cervical spine were obtained without intravenous  contrast.  Findings:Images are mildly degraded by motion artifact.   Approximately 2 mm degenerative retrolisthesis of C4 on C5.  Straightening of the normal cervical lordosis. Normal cord signal. No  abnormal vertebral marrow edema. No cord signal abnormality.  Multilevel disc degeneration, uncinate spurring and facet arthropathy.  Paraspinous soft tissues are unremarkable.  The findings on a level by level basis are as follows:  C2-3: No spinal canal or neural foraminal stenosis.  C3-4: Disc bulge. Uncinate spurring and facet arthropathy. Moderate  left and mild/moderate right neural foraminal stenosis. No significant  spinal canal stenosis.  C4-5:  Eccentric left disc osteophyte complex with flattening of the  ventral aspect of the cord. Bilateral uncinate spurring and facet  hypertrophy. Ligament flavum thickening. Moderate spinal canal  bilateral neural foraminal stenosis.  C5-6:  Disc osteophyte complex. Bilateral facet hypertrophy and  uncinate spurring. Ligamentum flavum thickening. Moderate spinal canal  and bilateral neural foraminal stenosis.  C6-7:  Dorsal osteophytic spurring. Uncinate spurring and facet  hypertrophy. Moderate spinal canal stenosis. Mild/moderate bilateral  neural foraminal stenosis.  C7-T1:  Disc osteophyte complex. Facet hypertrophy and uncinate  spurring. Moderate right and mild left neural foraminal stenosis. No  significant spinal canal stenosis.  T1-T2: Disc bulge with superimposed right foraminal protrusion.  Moderate right neural foraminal stenosis.  T2-T3: Right foraminal disc protrusion. Mild right neural foraminal  Stenosis.  Impression:   Multilevel cervical spondylosis with degenerative retrolisthesis of C4  on C5. Moderate spinal canal stenosis C4-C7 and moderate bilateral  neural foraminal stenosis C4-C6 and on the right  C7-T2. No cord signal  abnormality.     MR CERVICAL SPINE W/O CONTRAST 1/17/2020 2:21 PM  Provided History: Radiculopathy; Facet arthropathy, cervical; Cervical  stenosis of spinal canal; Cervical radiculopathy  ICD-10: Facet arthropathy, cervical; Cervical stenosis of spinal  canal; Cervical radiculopathy  Comparison: 8/4/2017  Technique: Sagittal T1-weighted, sagittal T2-weighted, sagittal STIR,  sagittal diffusion weighted, axial T2-weighted, and axial T2* gradient  echo images of the cervical spine were obtained without intravenous  contrast.  Findings: Trace anterolisthesis of C3 on C4 and trace retrolisthesis  of C4 on C5. Multilevel disc height narrowing throughout the cervical  spine. Bone marrow signal is normal. Degenerative endplate signal  changes. Spinal cord signal is normal. Level by level findings are as  follows:  C2-C3: Spinal canal and neural foramina are patent.  C3-C4: There is mild disc bulge and bilateral uncinate hypertrophy and bilateral facet hypertrophy with moderate to severe right,  moderate-severe left foraminal stenoses. Spinal canal is patent.  C4-C5: Disc osteophyte complex with bilateral severe neural foraminal  stenoses. Effacement of ventral subarachnoid space and mild canal  stenosis.  C5-C6: Bilateral uncinate hypertrophy and facet hypertrophy with  moderate to severe neural foraminal stenosis. Disc osteophyte complex  with mild canal stenosis.  C6-C7: Bilateral neural foraminal narrowing due to disc osteophyte  complex and facet uncinate hypertrophy. Mild canal narrowing.  C7-T1: Bilateral uncinate hypertrophy with mild bilateral foraminal  narrowing. Spinal canal is patent.  Paraspinous, paravertebral and prevertebral soft tissues are normal.  IMPRESSION: Multilevel degenerative changes throughout the cervical  spine with mild to moderate canal stenosis and moderate to severe  neural foraminal stenoses at multiple levels as described above in  detail. No myelopathic cord  signal.     Exam: Single frontal view of both hips and a frog-leg lateral view of  the left hip dated 9/4/2018.  COMPARISON: 6/29/2012.  CLINICAL HISTORY: Hip pain.  FINDINGS: Single frontal view of both hips and a frog-leg lateral view  of the left hip was obtained. Degenerative disc disease in the  visualized lower lumbar spine. No femoral head collapse. No displaced  fractures. Mild joint space narrowing with minimal spurring at the  femoral head and neck junction.  IMPRESSION: Mild degenerative changes in both hips, as above.     PAIN RELEVANT CONDITIONS:  1.  Cervical spondylosis and stenosis  2.  Lumbar facet arthropathy, DDD, DJD  3.  Bilateral hip osteoarthritis, bilateral hip bursitis L>R  4.  Chronic depression      ASSESSMENT AND PLAN    (M54.16) Lumbar radiculopathy  (primary encounter diagnosis)  Comment: contact pain center if you wish to pursue injections or medication options   Plan: Continue PT    (M47.812) Facet arthropathy, cervical  (M48.02) Cervical stenosis of spinal canal  Plan: Continue PT     (M79.7) Fibromyalgia  Plan: Continue current regimen.          Tobacco use: Advised complete tobacco cessation.       Plan:    Diagnosis reviewed, treatment option addressed, and risk/benefits discussed.  Self-care instructions given.  I am recommending a multidisciplinary treatment plan to help this patient better manage pain.    Remember to request ALL medication refills 5 days before you run out.     1. Physical therapy: YES Contact PCP for referral   2. Follow-up with ELISE Persaud, NP-C as needed     I have reviewed the note as documented above.  This accurately captures the substance of my conversation with the patient.    BILLING TIME DOCUMENTATION:   TOTAL TIME includes:   Time spent preparing to see the patient: 4 minutes (reviewing records and tests)  Time spend face to face with the patient: 16 minutes  Time spent ordering tests, medications, procedures and referrals: 0 minutes  Time  spent Referring and communicating with other healthcare professionals: 0 minutes  Documenting clinical information in Epic: 0 minutes    The total TIME spent on this patient on the day of the appointment was 20 minutes.     ELISE Zhu, NP-C  [facility] [place]

## 2021-06-07 ENCOUNTER — VIRTUAL VISIT (OUTPATIENT)
Dept: PALLIATIVE MEDICINE | Facility: CLINIC | Age: 66
End: 2021-06-07
Payer: MEDICARE

## 2021-06-07 ENCOUNTER — THERAPY VISIT (OUTPATIENT)
Dept: PHYSICAL THERAPY | Facility: CLINIC | Age: 66
End: 2021-06-07
Payer: MEDICARE

## 2021-06-07 DIAGNOSIS — M79.7 FIBROMYALGIA: ICD-10-CM

## 2021-06-07 DIAGNOSIS — M54.16 LUMBAR RADICULOPATHY: Primary | ICD-10-CM

## 2021-06-07 DIAGNOSIS — M47.812 FACET ARTHROPATHY, CERVICAL: ICD-10-CM

## 2021-06-07 DIAGNOSIS — G89.29 CHRONIC BILATERAL LOW BACK PAIN, UNSPECIFIED WHETHER SCIATICA PRESENT: ICD-10-CM

## 2021-06-07 DIAGNOSIS — M48.02 CERVICAL STENOSIS OF SPINAL CANAL: ICD-10-CM

## 2021-06-07 DIAGNOSIS — M54.50 CHRONIC BILATERAL LOW BACK PAIN, UNSPECIFIED WHETHER SCIATICA PRESENT: ICD-10-CM

## 2021-06-07 DIAGNOSIS — G89.29 BILATERAL CHRONIC KNEE PAIN: Primary | ICD-10-CM

## 2021-06-07 DIAGNOSIS — M25.562 BILATERAL CHRONIC KNEE PAIN: Primary | ICD-10-CM

## 2021-06-07 DIAGNOSIS — M25.561 BILATERAL CHRONIC KNEE PAIN: Primary | ICD-10-CM

## 2021-06-07 PROCEDURE — 99207 PR CONSULT E&M CHANGED TO SUBSEQUENT LEVEL: CPT | Performed by: NURSE PRACTITIONER

## 2021-06-07 PROCEDURE — 97140 MANUAL THERAPY 1/> REGIONS: CPT | Mod: GP | Performed by: PHYSICAL THERAPIST

## 2021-06-07 PROCEDURE — 99213 OFFICE O/P EST LOW 20 MIN: CPT | Mod: 95 | Performed by: NURSE PRACTITIONER

## 2021-06-07 PROCEDURE — 97112 NEUROMUSCULAR REEDUCATION: CPT | Mod: GP | Performed by: PHYSICAL THERAPIST

## 2021-06-07 ASSESSMENT — PAIN SCALES - GENERAL: PAINLEVEL: MILD PAIN (3)

## 2021-06-07 NOTE — PROGRESS NOTES
Brandi Elkins is a 65 year old who is being evaluated via a billable video visit.      If the video visit is dropped, the invitation should be resent by: Text to cell phone: [phone]  Will anyone else be joining your video visit? No      Video-Visit Details    Type of service:  Video Visit    Video Start Time: 1:04 PM    Video End Time: 1:26 PM    Originating Location (pt. Location): Home    Distant Location (provider location):  [facility]     Platform used for Video Visit: [employer]        2021      Return Medical Weight Management Note     Brandi Stern  MRN:  [id number]  :  1955      Dear Cherie Brennan,    I had the pleasure of doing a virtual visit with your patient Brandi Stern.  She is a 65 year old female who I am continuing to see for treatment of obesity related to:       2021   I have the following health issues associated with obesity: High Blood Pressure, Sleep Apnea, GERD (Reflux), Hypothyroidism   I have the following symptoms associated with obesity: Knee Pain, Back Pain, Fatigue       INTERVAL HISTORY:  Pt seen in May for initial MWL appt.  Was started on phentermine 15 mg. Get Moving Evaluation was ordered.  She has seen PT for hp pain  This is going well.  The more active she is the better she feels.  Went to  pain clinic and previous injection and RF ablation did not long so she is going to continue PT on neck once back PT has been completed.     Saw Dietician on 5/10/2021.     Previous Goals:  Start eating within 1 hour of waking. 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 Since retiring she enjoys cooking and likes to cook and freeze foods. .Biggest craving is for cheese.     CURRENT WEIGHT (PATIENT REPORTED):  225 lbs 0 oz    Wt Readings from Last 4 Encounters:   21 225 lb (102.1  kg)   05/10/21 233 lb 4.8 oz (105.8 kg)   05/03/21 235 lb 8 oz (106.8 kg)   03/19/21 228 lb 1.9 oz (103.5 kg)     BP Readings from Last 6 Encounters:   05/03/21 126/84   03/19/21 118/81   01/30/20 117/83   01/13/20 (!) 136/95   09/19/19 116/76   06/07/19 117/80     BARIATRIC METRICS:  Current Weight: 225 lb (102.1 kg)(pt reported)  Body mass index is 36.87 kg/m .   Wt change since last visit (lbs): -8.2  Cumulative weight loss (lbs): 10.5    OVERALL:  Positive Changes Since Last Visit: Doing PT, very active this past month.   Not eating anything after supper  Sticking to a normal sleep/wake schedule.    Bedtime: 10 PM, read and be asleep at midnight   Struggling With: cheese portions. Snack in afternoon    WEIGHT LOSS MEDICATION:  Which weight loss medications are you currently taking? Phentermine 15 mg  How is it helpful? yes  Are you having any side effects? Was having some urinary retention and malaise on the 30 mg.,     DIET: Sees   Diet Changes since last visit: eating more salads, make smoothies, Cut back on tomato juice.  Is down to 2% milk from whole milk  Struggling With: Still eating a lot of cheese    EXERCISE:  Current Exercise: Doing PT exercises 3x a week.  Does 3 more sessions with PT once a week for 3 more weeks.    ROS:  General  Fatigue: no  Insomnia: no  HEENT  Dry Mouth:  none  Cardiovascular  Chest Pain with Exertion: none  Palpitations: none  Psychiatric  Moods: stable    MEDICATIONS:   Current Outpatient Medications   Medication     ACETAMINOPHEN EXTRA STRENGTH OR     albuterol (ALBUTEROL) 108 (90 BASE) MCG/ACT Inhaler     Ascorbic Acid (VITAMIN C PO)     atorvastatin (LIPITOR) 10 MG tablet     BREO ELLIPTA 200-25 MCG/INH Inhaler     CALCIUM CITRATE PO     Cetirizine HCl (ZYRTEC PO)     cyclobenzaprine (FLEXERIL) 5 MG tablet     desvenlafaxine (PRISTIQ) 50 MG 24 hr tablet     eszopiclone (LUNESTA) 2 MG tablet     famotidine (PEPCID) 20 MG tablet     fenofibrate (TRICOR) 145 MG tablet      levothyroxine (SYNTHROID/LEVOTHROID) 88 MCG tablet     lisinopril (ZESTRIL) 10 MG tablet     metroNIDAZOLE (METROCREAM) 0.75 % external cream     omeprazole (PRILOSEC) 20 MG DR capsule     phentermine (ADIPEX-P) 15 MG capsule     pramipexole (MIRAPEX) 0.125 MG tablet     triamcinolone (NASACORT AQ) 55 MCG/ACT nasal inhaler     UNABLE TO FIND     VITAMIN D PO     No current facility-administered medications for this visit.        LABS:  Vitamin D Deficiency screening   Date Value Ref Range Status   09/08/2017 45 20 - 75 ug/L Final     Comment:     Season, race, dietary intake, and treatment affect the concentration of   25-hydroxy-Vitamin D. Values may decrease during winter months and increase   during summer months. Values 20-29 ug/L may indicate Vitamin D insufficiency   and values <20 ug/L may indicate Vitamin D deficiency.  Vitamin D determination is routinely performed by an immunoassay specific for   25 hydroxyvitamin D3.  If an individual is on vitamin D2 (ergocalciferol)   supplementation, please specify 25 OH vitamin D2 and D3 level determination by   LCMSMS test [id number].       Sodium   Date Value Ref Range Status   03/19/2021 140 133 - 144 mmol/L Final     Potassium   Date Value Ref Range Status   03/19/2021 4.3 3.4 - 5.3 mmol/L Final     Chloride   Date Value Ref Range Status   03/19/2021 108 94 - 109 mmol/L Final     Carbon Dioxide   Date Value Ref Range Status   03/19/2021 27 20 - 32 mmol/L Final     Anion Gap   Date Value Ref Range Status   03/19/2021 5 3 - 14 mmol/L Final     Glucose   Date Value Ref Range Status   03/19/2021 106 (H) 70 - 99 mg/dL Final     Urea Nitrogen   Date Value Ref Range Status   03/19/2021 15 7 - 30 mg/dL Final     Creatinine   Date Value Ref Range Status   03/19/2021 0.95 0.52 - 1.04 mg/dL Final     GFR Estimate   Date Value Ref Range Status   03/19/2021 62 >60 mL/min/[1.73_m2] Final     Comment:     Non  GFR Calc  Starting 12/18/2018, serum creatinine based  "estimated GFR (eGFR) will be   calculated using the Chronic Kidney Disease Epidemiology Collaboration   (CKD-EPI) equation.       Calcium   Date Value Ref Range Status   03/19/2021 9.4 8.5 - 10.1 mg/dL Final     Bilirubin Total   Date Value Ref Range Status   06/04/2020 0.4 0.2 - 1.3 mg/dL Final     Alkaline Phosphatase   Date Value Ref Range Status   06/04/2020 32 (L) 40 - 150 U/L Final     ALT   Date Value Ref Range Status   06/04/2020 20 0 - 50 U/L Final     AST   Date Value Ref Range Status   06/04/2020 20 0 - 45 U/L Final     Cholesterol   Date Value Ref Range Status   06/04/2020 152 <200 mg/dL Final     HDL Cholesterol   Date Value Ref Range Status   06/04/2020 47 (L) >49 mg/dL Final     LDL Cholesterol Calculated   Date Value Ref Range Status   06/04/2020 66 <100 mg/dL Final     Comment:     Desirable:       <100 mg/dl     Triglycerides   Date Value Ref Range Status   06/04/2020 195 (H) <150 mg/dL Final     Comment:     Borderline high:  150-199 mg/dl  High:             200-499 mg/dl  Very high:       >499 mg/dl          PE:  Ht 5' 5.5\" (1.664 m)   Wt 225 lb (102.1 kg)   LMP  (LMP Unknown)   BMI 36.87 kg/m    GENERAL: Healthy, alert and no distress  EYES: Eyes grossly normal to inspection.  No discharge or erythema, or obvious scleral/conjunctival abnormalities.  RESP: No audible wheeze, cough, or visible cyanosis.  No visible retractions or increased work of breathing.    SKIN: Visible skin clear. No significant rash, abnormal pigmentation or lesions.  NEURO: Cranial nerves grossly intact.  Mentation and speech appropriate for age.  PSYCH: Mentation appears normal, affect normal/bright, judgement and insight intact, normal speech and appearance well-groomed.    ASSESSMENT AND PLAN:     Class 2 severe obesity due to excess calories with serious comorbidity and body mass index (BMI) of 38.0 to 38.9 in adult (H)  Patient was congratulated on weight loss success thus far. Healthy habits to assist with further " weight loss were discussed. Brandi Elkins will continue the phentermine 15 mg daily.  - phentermine (ADIPEX-P) 15 MG capsule; Take 1 capsule (15 mg) by mouth every morning  She will continue to avoid eating after supper  She will cut back on her cheese intake.  We discussed a portion size of cheese being 3 dice or 3 dominos.  Will try to cut back to this when she has her snack with triskets in afternoon.     Patient will check blood pressure/pulse today and mychart result.  If BP above 140/90 or pulse is greater than 100 will contact clinic sooner.     ISATU (obstructive sleep apnea)  Should improve with weight loss     Back Pain  Pt to continue PT.  She sees the benefit to continuous movement.  Once her back PT has finished she will talk to her PCP about starting PT on her neck.  She had seen her neck specialist and they decided injections and other procedures were not that helpful and PT is a better option.     Follow up: Return to clinic in 2 months to see Rima Long PA-C 1 month for diet visit    47 minutes spent on the date of the encounter doing chart review, history and exam, review test results, counseling, developing plan of care, documentation, and further activities as noted above.

## 2021-06-08 ENCOUNTER — VIRTUAL VISIT (OUTPATIENT)
Dept: SURGERY | Facility: CLINIC | Age: 66
End: 2021-06-08
Payer: MEDICARE

## 2021-06-08 VITALS — WEIGHT: 225 LBS | BODY MASS INDEX: 36.16 KG/M2 | HEIGHT: 66 IN

## 2021-06-08 DIAGNOSIS — G89.29 CHRONIC BILATERAL LOW BACK PAIN, UNSPECIFIED WHETHER SCIATICA PRESENT: ICD-10-CM

## 2021-06-08 DIAGNOSIS — G47.33 OSA (OBSTRUCTIVE SLEEP APNEA): Primary | ICD-10-CM

## 2021-06-08 DIAGNOSIS — E66.812 CLASS 2 SEVERE OBESITY DUE TO EXCESS CALORIES WITH SERIOUS COMORBIDITY AND BODY MASS INDEX (BMI) OF 38.0 TO 38.9 IN ADULT (H): ICD-10-CM

## 2021-06-08 DIAGNOSIS — M54.50 CHRONIC BILATERAL LOW BACK PAIN, UNSPECIFIED WHETHER SCIATICA PRESENT: ICD-10-CM

## 2021-06-08 DIAGNOSIS — E66.01 CLASS 2 SEVERE OBESITY DUE TO EXCESS CALORIES WITH SERIOUS COMORBIDITY AND BODY MASS INDEX (BMI) OF 38.0 TO 38.9 IN ADULT (H): ICD-10-CM

## 2021-06-08 PROBLEM — M85.80 OSTEOPENIA: Status: RESOLVED | Noted: 2020-05-29 | Resolved: 2021-06-08

## 2021-06-08 PROBLEM — F33.1 MAJOR DEPRESSIVE DISORDER, RECURRENT EPISODE, MODERATE (H): Status: RESOLVED | Noted: 2017-02-21 | Resolved: 2021-06-08

## 2021-06-08 PROCEDURE — 99215 OFFICE O/P EST HI 40 MIN: CPT | Mod: 95 | Performed by: PHYSICIAN ASSISTANT

## 2021-06-08 RX ORDER — PHENTERMINE HYDROCHLORIDE 15 MG/1
15 CAPSULE ORAL EVERY MORNING
Qty: 90 CAPSULE | Refills: 0 | Status: SHIPPED | OUTPATIENT
Start: 2021-06-08 | End: 2021-09-07

## 2021-06-08 ASSESSMENT — MIFFLIN-ST. JEOR: SCORE: 1564.4

## 2021-06-08 NOTE — LETTER
Brandi Stern  June 8, 2021        Bariatric Task List      Required Weight loss:    Lose 5 lbs prior to surgery.  Goal Weight: 220 lbs  Tasks:  Have preoperative laboratory tests drawn.     Psychological Evaluation with MMPI and clearance for weight loss surgery.    Achieve clearance from dietitian to see surgeon.    A total of 6 structured dietitian weight loss visits are required by your insurance.

## 2021-06-08 NOTE — TELEPHONE ENCOUNTER
Routing refill request to provider for review/approval because:  --RN not able to authorize because patient has osteoporosis on problem list.              --Last visit:  3/19/2021

## 2021-06-09 ENCOUNTER — TELEPHONE (OUTPATIENT)
Dept: SURGERY | Facility: CLINIC | Age: 66
End: 2021-06-09

## 2021-06-09 NOTE — TELEPHONE ENCOUNTER
Pharm seeks more info on phentermine script as it's an early refill    Pharmacy Contact    Telephone Fax   [phone]

## 2021-06-09 NOTE — TELEPHONE ENCOUNTER
Called [facility] pharmacy re: phentermine prescription    Per pharmacist Amarjit pt is stating that she was told she can increase to 2 tablets of 15 mg phentermine daily. Per Rima Hartmann note, pt is to continue on 15 mg per day.     Pt states she was told by Rima she can try taking 2 tablets per day a few weeks ago for and took 2 tablets for approx 1.5 weeks. Pt did not tolerate that dose and went back to taking 1 tablet per day. Pt states she has 5 tablets left.     Per Rima KHAN 5/3/21    Per Riam KHAN 6/8/21          Informed Amarjit Pharmacist of above. Medication to be filled.      Giselle Ramirez RN on 6/9/2021 at 2:44 PM

## 2021-06-10 NOTE — TELEPHONE ENCOUNTER
Also spoke with ERIN Abarca who verified prescription.  Was not aware Giselle SOLOMON also working on this.  Spoke with Nolvia at [facility] who will make sure Rx gets filled today and patient is notified.  Also notified patient that it should be ready today with her discount card to be used for .  Lenora Salcido, MS, RD, RN

## 2021-06-12 DIAGNOSIS — G25.81 RESTLESS LEG SYNDROME: ICD-10-CM

## 2021-06-14 ENCOUNTER — THERAPY VISIT (OUTPATIENT)
Dept: PHYSICAL THERAPY | Facility: CLINIC | Age: 66
End: 2021-06-14
Payer: MEDICARE

## 2021-06-14 DIAGNOSIS — G89.29 BILATERAL CHRONIC KNEE PAIN: Primary | ICD-10-CM

## 2021-06-14 DIAGNOSIS — M25.562 BILATERAL CHRONIC KNEE PAIN: Primary | ICD-10-CM

## 2021-06-14 DIAGNOSIS — M25.561 BILATERAL CHRONIC KNEE PAIN: Primary | ICD-10-CM

## 2021-06-14 PROCEDURE — 97140 MANUAL THERAPY 1/> REGIONS: CPT | Mod: GP | Performed by: PHYSICAL THERAPIST

## 2021-06-15 RX ORDER — PRAMIPEXOLE DIHYDROCHLORIDE 0.12 MG/1
TABLET ORAL
Qty: 270 TABLET | Refills: 2 | Status: SHIPPED | OUTPATIENT
Start: 2021-06-15 | End: 2021-10-27

## 2021-06-15 NOTE — TELEPHONE ENCOUNTER
Routing refill request to provider for review/approval because:  --Protocol requires annual ALT.  --ALT was not ordered in last labs drawn 3/19/21.  --Do you want ALT drawn?              --Last visit:  3/19/2021

## 2021-06-28 ENCOUNTER — THERAPY VISIT (OUTPATIENT)
Dept: PHYSICAL THERAPY | Facility: CLINIC | Age: 66
End: 2021-06-28
Payer: MEDICARE

## 2021-06-28 DIAGNOSIS — G89.29 BILATERAL CHRONIC KNEE PAIN: Primary | ICD-10-CM

## 2021-06-28 DIAGNOSIS — G89.29 CHRONIC BILATERAL LOW BACK PAIN, UNSPECIFIED WHETHER SCIATICA PRESENT: ICD-10-CM

## 2021-06-28 DIAGNOSIS — M25.561 BILATERAL CHRONIC KNEE PAIN: Primary | ICD-10-CM

## 2021-06-28 DIAGNOSIS — M25.562 BILATERAL CHRONIC KNEE PAIN: Primary | ICD-10-CM

## 2021-06-28 DIAGNOSIS — M54.50 CHRONIC BILATERAL LOW BACK PAIN, UNSPECIFIED WHETHER SCIATICA PRESENT: ICD-10-CM

## 2021-06-28 PROCEDURE — 97140 MANUAL THERAPY 1/> REGIONS: CPT | Mod: GP | Performed by: PHYSICAL THERAPIST

## 2021-06-28 ASSESSMENT — ACTIVITIES OF DAILY LIVING (ADL)
KNEE_ACTIVITY_OF_DAILY_LIVING_SCORE: 67.14
GO UP STAIRS: ACTIVITY IS MINIMALLY DIFFICULT
HOW_WOULD_YOU_RATE_THE_OVERALL_FUNCTION_OF_YOUR_KNEE_DURING_YOUR_USUAL_DAILY_ACTIVITIES?: ABNORMAL
KNEEL ON THE FRONT OF YOUR KNEE: I AM UNABLE TO DO THE ACTIVITY
RISE FROM A CHAIR: ACTIVITY IS MINIMALLY DIFFICULT
GO DOWN STAIRS: ACTIVITY IS SOMEWHAT DIFFICULT
STAND: ACTIVITY IS MINIMALLY DIFFICULT
LIMPING: I DO NOT HAVE THE SYMPTOM
WEAKNESS: THE SYMPTOM AFFECTS MY ACTIVITY SLIGHTLY
STIFFNESS: THE SYMPTOM AFFECTS MY ACTIVITY SLIGHTLY
SQUAT: I AM UNABLE TO DO THE ACTIVITY
SIT WITH YOUR KNEE BENT: ACTIVITY IS NOT DIFFICULT
GIVING WAY, BUCKLING OR SHIFTING OF KNEE: THE SYMPTOM AFFECTS MY ACTIVITY SLIGHTLY
RAW_SCORE: 47
HOW_WOULD_YOU_RATE_THE_CURRENT_FUNCTION_OF_YOUR_KNEE_DURING_YOUR_USUAL_DAILY_ACTIVITIES_ON_A_SCALE_FROM_0_TO_100_WITH_100_BEING_YOUR_LEVEL_OF_KNEE_FUNCTION_PRIOR_TO_YOUR_INJURY_AND_0_BEING_THE_INABILITY_TO_PERFORM_ANY_OF_YOUR_USUAL_DAILY_ACTIVITIES?: 70
WALK: ACTIVITY IS NOT DIFFICULT
KNEE_ACTIVITY_OF_DAILY_LIVING_SUM: 47
AS_A_RESULT_OF_YOUR_KNEE_INJURY,_HOW_WOULD_YOU_RATE_YOUR_CURRENT_LEVEL_OF_DAILY_ACTIVITY?: ABNORMAL
PAIN: THE SYMPTOM AFFECTS MY ACTIVITY SLIGHTLY
SWELLING: I DO NOT HAVE THE SYMPTOM

## 2021-06-28 NOTE — PROGRESS NOTES
Subjective:  HPI  Physical Exam                    Objective:  System    Physical Exam    General     ROS    Assessment/Plan:    DISCHARGE REPORT    Progress reporting period is from May 4, 2021 to June 28, 2021.       SUBJECTIVE  Subjective changes noted by patient:   Significant improvement of low back.       Current pain level is 3/10     Previous pain level was  6/10  .   Changes in function:  Yes (See Goal flowsheet attached for changes in current functional level)  Adverse reaction to treatment or activity: None    OBJECTIVE  Changes noted in objective findings:  Lumbar/SI Evaluation  ROM:    AROM Lumbar:   Flexion:          90%  Ext:                     WNL with less central and left low back    Side Bend:        Left:  50%    Right:  50%  Rotation:           Left:     Right:   Side Glide:        Left:     Right:                 Knee Evaluation:  ROM:    AROM        Extension:  Left: 0    Right:  0  Flexion: Left: 110    Right: 110           Strength:      Extension:  Left: 5/5   Pain:      Right: 5/5   Pain:     Quad Set Left: Good    Pain:   Quad Set Right: Good    Pain:  Ligament Testing:  Not Assessed                       Special Tests: Not Assessed                    mmt: gluteus medius: 4+/5 (B), hip extensors: 4/5-4+/5 (B), faulty abdominal recruitment pattern with compensatory sucking in of upper and lower abdominals, poor abdominal tone       ASSESSMENT/PLAN  Updated problem list and treatment plan:Diagnosis 1:  Right knee pain  Pain -  hot/cold therapy, self management, education, directional preference exercise and home program  Decreased ROM/flexibility - manual therapy, therapeutic exercise, therapeutic activity and home program  Decreased strength - therapeutic exercise, therapeutic activities and home program  Impaired muscle performance - neuro re-education and home program  Decreased function - therapeutic activities and home program Diagnosis 2:  Chronic low back pain  Pain -  hot/cold  therapy, manual therapy, self management, education, directional preference exercise and home program  Decreased ROM/flexibility - manual therapy, therapeutic exercise, therapeutic activity and home program  Decreased strength - therapeutic exercise, therapeutic activities and home program  Impaired muscle performance - neuro re-education and home program  Decreased function - therapeutic activities and home program        STG/LTGs have been met or progress has been made towards goals:  Progress toward goals has plateaued  Assessment of Progress: The patient's progress has plateaued.  Self Management Plans:  Patient  has been instructed in self management of symptoms.  Patient is independent in self management of symptoms.  I have re-evaluated this patient and find that the nature, scope, duration and intensity of the therapy is appropriate for the medical condition of the patient.  Brandi continues to require the following intervention to meet STG and LTG's:  PT intervention is no longer required to meet STG/LTG.    Recommendations:  This patient is ready to be discharged from therapy and continue their home treatment program.    Please refer to the daily flowsheet for treatment today, total treatment time and time spent performing 1:1 timed codes.

## 2021-07-05 ENCOUNTER — VIRTUAL VISIT (OUTPATIENT)
Dept: SURGERY | Facility: CLINIC | Age: 66
End: 2021-07-05
Payer: MEDICARE

## 2021-07-05 VITALS — BODY MASS INDEX: 35.73 KG/M2 | WEIGHT: 218 LBS

## 2021-07-05 PROCEDURE — 97803 MED NUTRITION INDIV SUBSEQ: CPT | Mod: 95 | Performed by: DIETITIAN, REGISTERED

## 2021-07-05 NOTE — PATIENT INSTRUCTIONS
"Hi Brandi Elkins-   It was great to visit with you and learn about your progress. Below are the goals we discussed.  Goals:  Consistently eat protein at each meal/ aim for 60-70 g per day (1-1.2 g protein/kg IBW)/provided \"Sources of Protein for Weight Loss\" via Mychart  Increase water to 48-64 oz per day  Restart exercise as able (back pain)  Avoid fried food when getting take out meals  Nutrition Educational Materials:  Protein Sources for Weight Loss  [email or web address]   Please call [phone] to schedule your next  visit with a Dietitian in 8-10 weeks.  Thanks!  Junior Wiggins RD, LD  [facility]         "

## 2021-07-18 ENCOUNTER — HEALTH MAINTENANCE LETTER (OUTPATIENT)
Age: 66
End: 2021-07-18

## 2021-07-19 ENCOUNTER — THERAPY VISIT (OUTPATIENT)
Dept: PHYSICAL THERAPY | Facility: CLINIC | Age: 66
End: 2021-07-19
Payer: MEDICARE

## 2021-07-19 DIAGNOSIS — M54.2 NECK PAIN: Primary | ICD-10-CM

## 2021-07-19 PROCEDURE — 97140 MANUAL THERAPY 1/> REGIONS: CPT | Mod: GP | Performed by: PHYSICAL THERAPIST

## 2021-07-19 PROCEDURE — 97161 PT EVAL LOW COMPLEX 20 MIN: CPT | Mod: GP | Performed by: PHYSICAL THERAPIST

## 2021-07-19 NOTE — LETTER
DEPARTMENT OF HEALTH AND HUMAN SERVICES  CENTERS FOR MEDICARE & MEDICAID SERVICES    PLAN/UPDATED PLAN OF PROGRESS FOR OUTPATIENT REHABILITATION     PATIENTS NAME:  Brandi Stern   : 1955  PROVIDER NUMBER:    [id number]  [id number]  [facility]  MEDICAL RECORD NUMBER: [id number]   START OF CARE DATE:    21  TYPE:  PT  PRIMARY/TREATMENT DIAGNOSIS: (Pertinent Medical Diagnosis)  Neck pain  VISITS FROM START OF CARE:  Rxs Used: 1     Physical Therapy Initial Evaluation  Subjective:    Patient Health History  Brandi Stern being seen for Neck pain (left ).     Date of Onset: 2021.  Date of MD Order 21.  HPI: Documentation: insidous, unknown.   Pain is reported as 2/10 on pain scale.  General health as reported by patient is fair.  Pertinent medical history includes: fibromyalgia, overweight, osteoarthritis and pain at night/rest.     Medical allergies: other. Other medical allergies details: Hydroxyzine.   Surgeries include:  Orthopedic surgery (Knee replacements (B)).    Current medications:  Anti-depressants, muscle relaxants, pain medication and sleep medication.       Primary job tasks include:  Lifting/carrying and other.   Other job/home tasks details: Gardening, stooping.                Therapist Generated HPI Evaluation    This is a chronic condition.  Pain is described as aching and is intermittent.  Radiates to: left neck and temple. Pain is worse in the P.M..  Since onset symptoms are unchanged.  Associated symptoms:  Headache. Exacerbated by: nothing.  and relieved by rest (lidocaine lotion- over the counter).  Imaging testing: none.  Past treatment: none. Improved with treatment: na.  Work activity restrictions: retired   Barriers include:  None as reported by patient.               Objective:  System              PATIENTS NAME:  Brandi Stern   : 1955    Cervical/Thoracic Evaluation  AROM:  AROM  Cervical:  Flexion:            95%  Extension:       45%  Rotation:         Left: 25%     Right: 25%  Side Bend:      Left: 10%     Right:  25%    Headaches: cervical  DTR's:  not assessed  Cervical Dermatomes:  not assessed  Cervical Palpation:  : left UT and levator scapulae, left greater than right cervical parasapinals   Functional Tests:  not assessed  Cervical Stability/Joint Clearing:  not assessed  Cord Sign:  not assessed    moderate loss of OA mobility ( left greater than right) , mild to moderate loss of mid to lower cervical mobility, moderate loss of upper to mid thoracic mobility (B)     General   ROS    Assessment/Plan:    Patient is a 65 year old female with cervical complaints.    Patient has the following significant findings with corresponding treatment plan.                Diagnosis 1:   Neck pain   Pain -  hot/cold therapy, manual therapy, self management, education, directional preference exercise and home program  Decreased ROM/flexibility - manual therapy, therapeutic exercise, therapeutic activity and home program  Decreased joint mobility - manual therapy, therapeutic exercise, therapeutic activity and home program  Decreased strength - therapeutic exercise, therapeutic activities and home program  Impaired muscle performance - neuro re-education and home program  Decreased function - therapeutic activities and home program    Therapy Evaluation Codes:     1) Clinical presentation characteristics are:   Stable/Uncomplicated.  2) Decision-Making    Low complexity using standardized patient assessment instrument and/or measureable assessment of functional outcome.  Cumulative Therapy Evaluation is: Low complexity.    Previous and current functional limitations:  (See Goal Flow Sheet for this information)    Short term and Long term goals: (See Goal Flow Sheet for this information)     Communication ability:  Patient appears to be able to clearly communicate and understand verbal and written  "communication and follow directions correctly.    PATIENTS NAME:  Brandi Stern   : 1955    Treatment Explanation - The following has been discussed with the patient:   RX ordered/plan of care  Anticipated outcomes  Possible risks and side effects  This patient would benefit from PT intervention to resume normal activities.   Rehab potential is good.    Frequency:  1 X week, once daily  Duration:  for 6 weeks  Discharge Plan:  Achieve all LTG.  Independent in home treatment program.  Reach maximal therapeutic benefit.      Caregiver Signature/Credentials _____________________________ Date ________       Kaylee Woody, PT            I have reviewed and certified the need for these services and plan of treatment while under my care.        PHYSICIAN'S SIGNATURE:   _________________________________________  Date___________   Cherie Brennan CNP    Certification period:  Beginning of Cert date period: 21 to  End of Cert period date: 21     Functional Level Progress Report: Please see attached \"Goal Flow sheet for Functional level.\"    ____X____ Continue Services or       ________ DC Services                Service dates: From  21 to present                         "

## 2021-07-19 NOTE — PROGRESS NOTES
Physical Therapy Initial Evaluation  Subjective:    Patient Health History  Brandi Stern being seen for Neck pain (left ).     Date of Onset: July 2021.   HPI: Documentation: insidous, unknown.   Pain is reported as 2/10 on pain scale.  General health as reported by patient is fair.  Pertinent medical history includes: fibromyalgia, overweight, osteoarthritis and pain at night/rest.     Medical allergies: other. Other medical allergies details: Hydroxyzine.   Surgeries include:  Orthopedic surgery (Knee replacements (B)).    Current medications:  Anti-depressants, muscle relaxants, pain medication and sleep medication.       Primary job tasks include:  Lifting/carrying and other.   Other job/home tasks details: Gardening, stooping.                Therapist Generated HPI Evaluation           This is a chronic condition.        Pain is described as aching and is intermittent.  Radiates to: left neck and temple. Pain is worse in the P.M..  Since onset symptoms are unchanged.  Associated symptoms:  Headache. Exacerbated by: nothing.  and relieved by rest (lidocaine lotion- over the counter).  Imaging testing: none.  Past treatment: none. Improved with treatment: na.  Work activity restrictions: retired   Barriers include:  None as reported by patient.                        Objective:  System              Cervical/Thoracic Evaluation    AROM:  AROM Cervical:    Flexion:            95%  Extension:       45%  Rotation:         Left: 25%     Right: 25%  Side Bend:      Left: 10%     Right:  25%      Headaches: cervical    DTR's:  not assessed          Cervical Dermatomes:  not assessed                    Cervical Palpation:  : left UT and levator scapulae, left greater than right cervical parasapinals       Functional Tests:  not assessed    Cervical Stability/Joint Clearing:  not assessed        Cord Sign:  not assessed                                        moderate loss of OA mobility ( left greater than right) , mild  to moderate loss of mid to lower cervical mobility, moderate loss of upper to mid thoracic mobility (B)     General     ROS    Assessment/Plan:    Patient is a 65 year old female with cervical complaints.    Patient has the following significant findings with corresponding treatment plan.                Diagnosis 1:   Neck pain   Pain -  hot/cold therapy, manual therapy, self management, education, directional preference exercise and home program  Decreased ROM/flexibility - manual therapy, therapeutic exercise, therapeutic activity and home program  Decreased joint mobility - manual therapy, therapeutic exercise, therapeutic activity and home program  Decreased strength - therapeutic exercise, therapeutic activities and home program  Impaired muscle performance - neuro re-education and home program  Decreased function - therapeutic activities and home program    Therapy Evaluation Codes:     1) Clinical presentation characteristics are:   Stable/Uncomplicated.  2) Decision-Making    Low complexity using standardized patient assessment instrument and/or measureable assessment of functional outcome.  Cumulative Therapy Evaluation is: Low complexity.    Previous and current functional limitations:  (See Goal Flow Sheet for this information)    Short term and Long term goals: (See Goal Flow Sheet for this information)     Communication ability:  Patient appears to be able to clearly communicate and understand verbal and written communication and follow directions correctly.  Treatment Explanation - The following has been discussed with the patient:   RX ordered/plan of care  Anticipated outcomes  Possible risks and side effects  This patient would benefit from PT intervention to resume normal activities.   Rehab potential is good.    Frequency:  1 X week, once daily  Duration:  for 6 weeks  Discharge Plan:  Achieve all LTG.  Independent in home treatment program.  Reach maximal therapeutic benefit.    Please refer to  the daily flowsheet for treatment today, total treatment time and time spent performing 1:1 timed codes.

## 2021-07-26 ENCOUNTER — THERAPY VISIT (OUTPATIENT)
Dept: PHYSICAL THERAPY | Facility: CLINIC | Age: 66
End: 2021-07-26
Payer: MEDICARE

## 2021-07-26 DIAGNOSIS — M54.2 NECK PAIN: Primary | ICD-10-CM

## 2021-07-26 PROCEDURE — 97140 MANUAL THERAPY 1/> REGIONS: CPT | Mod: GP | Performed by: PHYSICAL THERAPIST

## 2021-07-26 PROCEDURE — 97112 NEUROMUSCULAR REEDUCATION: CPT | Mod: GP | Performed by: PHYSICAL THERAPIST

## 2021-08-02 ENCOUNTER — THERAPY VISIT (OUTPATIENT)
Dept: PHYSICAL THERAPY | Facility: CLINIC | Age: 66
End: 2021-08-02
Payer: MEDICARE

## 2021-08-02 DIAGNOSIS — M54.2 NECK PAIN: Primary | ICD-10-CM

## 2021-08-02 PROCEDURE — 97140 MANUAL THERAPY 1/> REGIONS: CPT | Mod: GP | Performed by: PHYSICAL THERAPIST

## 2021-08-02 PROCEDURE — 97530 THERAPEUTIC ACTIVITIES: CPT | Mod: GP | Performed by: PHYSICAL THERAPIST

## 2021-08-25 ENCOUNTER — THERAPY VISIT (OUTPATIENT)
Dept: PHYSICAL THERAPY | Facility: CLINIC | Age: 66
End: 2021-08-25
Payer: MEDICARE

## 2021-08-25 DIAGNOSIS — M54.2 NECK PAIN: Primary | ICD-10-CM

## 2021-08-25 PROCEDURE — 97140 MANUAL THERAPY 1/> REGIONS: CPT | Mod: GP | Performed by: PHYSICAL THERAPIST

## 2021-08-25 PROCEDURE — 97112 NEUROMUSCULAR REEDUCATION: CPT | Mod: GP | Performed by: PHYSICAL THERAPIST

## 2021-09-05 DIAGNOSIS — F33.1 MAJOR DEPRESSIVE DISORDER, RECURRENT EPISODE, MODERATE (H): ICD-10-CM

## 2021-09-06 RX ORDER — CIPROFLOXACIN HYDROCHLORIDE 3.5 MG/ML
2 SOLUTION/ DROPS TOPICAL 4 TIMES DAILY
Qty: 2.8 ML | Refills: 0 | Status: CANCELLED | OUTPATIENT
Start: 2021-09-06 | End: 2021-09-13

## 2021-09-06 NOTE — PROGRESS NOTES
2021    Return Medical Weight Management Note     Brandi Stern  MRN:  [id number]  :  1955  YUSEF:  2021    Dear Cehrie Brennan NP,    I had the pleasure of seeing your patient Brandi Stern. She is a 66 year old female who I am continuing to see for treatment of obesity related to:       2021   I have the following health issues associated with obesity: High Blood Pressure, Sleep Apnea, GERD (Reflux), Hypothyroidism   I have the following symptoms associated with obesity: Knee Pain, Back Pain, Fatigue       INTERVAL HISTORY:  Pt seen in .  Was on phentermine 15 mg. States phentermine was not going well.  She would wake up after 3 hours of sleep wide awake.    Also felt medication was affecting her focus when driving as well.  Has been off if for 2 weeks. Sleep is back to baseline. Still having trouble with hunger.     Goals:  She will continue to avoid eating after supper- met, going well. Is eating more salads.    She will cut back on her cheese intake.  We discussed a portion size of cheese being 3 dice or 3 dominos.  Will try to cut back to this when she has her snack with triskets in afternoon. - partially met.  Has been able to decrease portion size but frequency is more than once a day.      CURRENT WEIGHT:   214 lbs 12.8 oz    Initial Weight (lbs): 235.5 lbs  Last Visits Weight: 233 lb 4.8 oz (105.8 kg)  Cumulative weight loss (lbs): 20.7  Weight Loss Percentage: 8.79%    Changes and Difficulties 2021   I have made the following changes to my diet since my last visit: Decrease calories, smaller portion.   With regards to my diet, I am still struggling with: Hunger   I have made the following changes to my activity/exercise since my last visit: Unable, is doing PT for neck. The more active she is the better she feels.     With regards to my activity/exercise, I am still struggling with: Pain   Has stopped drinking wine.  Is using sugar free coffee creamer.    Has a  beer 1-2 three nights a week when watching TV.      MEDICATIONS:   Current Outpatient Medications   Medication Sig Dispense Refill     ACETAMINOPHEN EXTRA STRENGTH OR Take by mouth 2 times daily        albuterol (ALBUTEROL) 108 (90 BASE) MCG/ACT Inhaler Inhale 1-2 puffs into the lungs every 6 hours as needed for shortness of breath / dyspnea 1 Inhaler 1     Ascorbic Acid (VITAMIN C PO) Take 2,000 mg by mouth daily        atorvastatin (LIPITOR) 10 MG tablet TAKE 1 TABLET BY MOUTH EVERY DAY 90 tablet 0     BREO ELLIPTA 200-25 MCG/INH Inhaler        CALCIUM CITRATE PO Take 1,200 mg by mouth daily       Cetirizine HCl (ZYRTEC PO) Take 10 mg by mouth daily       cyclobenzaprine (FLEXERIL) 5 MG tablet Take 1-2 tablets (5-10 mg) by mouth 3 times daily as needed for muscle spasms 180 tablet 3     desvenlafaxine (PRISTIQ) 50 MG 24 hr tablet Take 1 tablet (50 mg) by mouth daily 90 tablet 3     eszopiclone (LUNESTA) 2 MG tablet TAKE 1 TABLET BY MOUTH AT BEDTIME 30 tablet 5     famotidine (PEPCID) 20 MG tablet TAKE 1 TABLET BY MOUTH TWICE A  tablet 3     fenofibrate (TRICOR) 145 MG tablet TAKE 1 TABLET BY MOUTH EVERY DAY 90 tablet 1     levothyroxine (SYNTHROID/LEVOTHROID) 88 MCG tablet TAKE 1 TABLET BY MOUTH EVERY DAY 90 tablet 0     lisinopril (ZESTRIL) 10 MG tablet TAKE 1 TABLET BY MOUTH EVERY DAY 90 tablet 0     metroNIDAZOLE (METROCREAM) 0.75 % external cream Apply topically daily To face 45 g 1     omeprazole (PRILOSEC) 20 MG DR capsule TAKE 1 TABLET (20 MG) BY MOUTH 2 TIMES DAILY TAKE 30-60 MINUTES BEFORE A MEAL. 180 capsule 3     pramipexole (MIRAPEX) 0.125 MG tablet TAKE 2 TABLETS BY MOUTH AT DINNER AND 1 TABLET AT BEDTIME 270 tablet 2     triamcinolone (NASACORT AQ) 55 MCG/ACT nasal inhaler Inhale 2 sprays in both nostrils every day as needed 1 Inhaler 7     UNABLE TO FIND MEDICATION NAME: Allergy shots       VITAMIN D PO Take 2,000 Units by mouth daily         Weight Loss Medication History Reviewed With  "Patient 9/7/2021   Which weight loss medications are you currently taking on a regular basis?  None   If you are not taking a weight loss medication that was prescribed to you, please indicate why: I did not like the side effects   Are you having any side effects from the weight loss medication that we have prescribed you? Yes   If you are having side effects please describe: Insomnia     ROS:    General  Fatigue:   Sleep Quality:poor, better off phentermine  HEENT  Hx of glaucoma: High pressure in Left eye, will be seen specialist at end of month  Vision changes: see above  Cardiovascular  Chest Pain with Exertion:   Palpitations: some on phentermine  Hx of heart disease:   HTN:  Gastrointestinal  Constipation: none  Psychiatric  Moods Stable: yes  History of alcohol/drug abuse: none  Hx of eating disorder: none  Neurologic:  Hx of seizures: none    /82   Pulse 95   Ht 5' 5.5\" (1.664 m)   Wt 214 lb 12.8 oz (97.4 kg)   LMP  (LMP Unknown)   SpO2 94%   BMI 35.20 kg/m    GENERAL: Healthy, alert and no distress  EYES: Eyes grossly normal to inspection.  No discharge or erythema, or obvious scleral/conjunctival abnormalities.  RESP: No audible wheeze, cough, or visible cyanosis.  No visible retractions or increased work of breathing.    SKIN: Visible skin clear. No significant rash, abnormal pigmentation or lesions.  NEURO: Cranial nerves grossly intact.  Mentation and speech appropriate for age.  PSYCH: Mentation appears normal, affect normal/bright, judgement and insight intact, normal speech and appearance well-groomed.    ASSESSMENT AND PLAN:      Class 2 severe obesity due to excess calories with serious comorbidity and body mass index (BMI) of 35.0 to 35.9 in adult (H)  Patient was congratulated on maintaining her weight loss success thus far. Healthy habits to assist with further weight loss were discussed. Brandi Elkins will start the bupropion. Due to her age and sensitivity to medication we will start " her slowly.    - buPROPion (WELLBUTRIN SR) 100 MG 12 hr tablet; Take 1 tablet for 14 days then increase to 1 tablet twice daily.  We discussed naltrexone as a possible add on in the future to help with cravings.      She will follow up with dietician on 9/15/2021.      Follow up: Return to clinic in 1 month    50 minutes spent on the date of the encounter doing chart review, patient visit, formulation of plan of care, and documentation.      Sincerely,    Rima Long PA-C

## 2021-09-07 ENCOUNTER — OFFICE VISIT (OUTPATIENT)
Dept: SURGERY | Facility: CLINIC | Age: 66
End: 2021-09-07
Payer: MEDICARE

## 2021-09-07 VITALS
DIASTOLIC BLOOD PRESSURE: 82 MMHG | BODY MASS INDEX: 34.52 KG/M2 | OXYGEN SATURATION: 94 % | HEIGHT: 66 IN | SYSTOLIC BLOOD PRESSURE: 126 MMHG | HEART RATE: 95 BPM | WEIGHT: 214.8 LBS

## 2021-09-07 DIAGNOSIS — E66.812 CLASS 2 SEVERE OBESITY DUE TO EXCESS CALORIES WITH SERIOUS COMORBIDITY AND BODY MASS INDEX (BMI) OF 35.0 TO 35.9 IN ADULT (H): Primary | ICD-10-CM

## 2021-09-07 DIAGNOSIS — E66.01 CLASS 2 SEVERE OBESITY DUE TO EXCESS CALORIES WITH SERIOUS COMORBIDITY AND BODY MASS INDEX (BMI) OF 35.0 TO 35.9 IN ADULT (H): Primary | ICD-10-CM

## 2021-09-07 PROCEDURE — 99215 OFFICE O/P EST HI 40 MIN: CPT | Performed by: PHYSICIAN ASSISTANT

## 2021-09-07 RX ORDER — BUPROPION HYDROCHLORIDE 100 MG/1
TABLET, EXTENDED RELEASE ORAL
Qty: 60 TABLET | Refills: 1 | Status: SHIPPED | OUTPATIENT
Start: 2021-09-07 | End: 2021-10-29

## 2021-09-07 ASSESSMENT — MIFFLIN-ST. JEOR: SCORE: 1513.14

## 2021-09-08 RX ORDER — DESVENLAFAXINE 50 MG/1
TABLET, FILM COATED, EXTENDED RELEASE ORAL
Qty: 90 TABLET | Refills: 0 | Status: SHIPPED | OUTPATIENT
Start: 2021-09-08 | End: 2021-10-27

## 2021-09-08 NOTE — TELEPHONE ENCOUNTER
Routing refill request to provider for review/approval because:  --Last PHQ-9 > 4 and overdue.  --Please let TC know if you want patient to schedule appointment.      MA  --Please contact patient for PHQ-9.  Thank you.          --Last visit:  3/19/2021 [facility] chronic care.    --Future Visit: none with FP.    PHQ 7/17/2019 9/3/2020 9/24/2020   PHQ-9 Total Score 11 5 8   Q9: Thoughts of better off dead/self-harm past 2 weeks Not at all Not at all Not at all   Some encounter information is confidential and restricted. Go to Review Flowsheets activity to see all data.

## 2021-09-12 ENCOUNTER — HEALTH MAINTENANCE LETTER (OUTPATIENT)
Age: 66
End: 2021-09-12

## 2021-09-14 NOTE — PROGRESS NOTES
"MEDICAL WEIGHT LOSS FOLLOW UP      DIAGNOSIS:  Class II Obesity    NUTRITION HISTORY:    Breakfast: skips 1-2 X per week or leftovers from dinner within 2 hours of waking     Lunch:  Skips 2X per week or BLT sandwich     Dinner:  lettuce salad + carrots, dried cranberries, walnuts, blue cheese/ Citizen of [place] dressing/ Blue or gorgonzola or sharp cheddar cheese, crackers or Romansh fried rice or     Snacks:  rare     Beverage Choices:  32 oz water, Clamato juice, 1% milk, 2 cups coffee, sugar free creamer, carbonated water (Ice), sometimes cranapple juice, 1 beer 3X per week     Exercise: walking 2 dogs (beagles) 1X per week-3 blocks     Other: Eating this past week has been atypical due to her brother being in town for a visit. She has done PT for back and neck, but did not find it helpful. Has stopped keeping wine in the house, but has replaced it with beer as it fills hr up more. Trying to cook at home more versus take out meals. Patient asked if this visit code would be different than in the past. Explained to patient that we bill out based on BMI (class II Obesity). She is has not got a bill from Medicare, but is concerned about this. Encouraged patient to check with her medical insurance. This RD indicated that Medicare generally covers medical nutrition therapy for kidney disease and diabetes.     ANTHROPOMETRICS:    Initial Weight: 233.3 pounds    Previous Weight: 218 pounds     Height: 65.5\"    Current Weight:  215 pounds    Weight Change: decrease 3 pounds/ overall decrease of 18.3 pounds    BMI: 35.23 g/m2    MEDICATIONS:  Wellbutrin (stopped Phentermine)    EVALUATION/PROGRESS TOWARDS GOALS:  Previous Goals:  Consistently eat protein at each meal/ aim for 60-70 g per day (1-1.2 g protein/kg IBW)/provided \"Sources of Protein for Weight Loss\" via Mychart-improving  Increase water to 48-64 oz per day-improving  Restart exercise as able (back pain)-improving  Avoid fried food when getting take out meals- " met    Previous Nutrition Diagnosis:  Obese class II related to excess energy intake as evidence by BMI of 35.73 kg/m2     Current Nutrition Diagnosis:   Obese class II related to excess energy intake as evidence by BMI of 35.23 kg/m2  No change      INTERVENTION:    Nutrition Prescription:  Recommend modified nutrient intake by decreasing energy intake    Implementation:    Meals and Snacks: 2-3/ rare    Nutrition Education (Content):    Discussed previous goals and determined new goals    Encourage physical activity    Supported patient in attempted weight loss and behavior changes     Congratulated patient on successful weight loss     Patient verbalizes understanding of diet by wanting to select foods with less calories    Anticipate good compliance    Goals:  Increase exercise (stationary bike or walk) 3X per week starting with 10-15 minutes  Focus on eating 3 meals per day  Switch to low calorie fruit juice  Try a lower calorie dressing (light or home made option)      Follow Up/Monitoring:  Other  -  patient to follow up in 8 weeks    Time Spent With Patient:  28 Minutes  Junior Wiggins RD, LD  [facility]

## 2021-09-15 ENCOUNTER — ALLIED HEALTH/NURSE VISIT (OUTPATIENT)
Dept: SURGERY | Facility: CLINIC | Age: 66
End: 2021-09-15
Payer: MEDICARE

## 2021-09-15 VITALS — WEIGHT: 215 LBS | BODY MASS INDEX: 35.23 KG/M2

## 2021-09-15 PROCEDURE — 97803 MED NUTRITION INDIV SUBSEQ: CPT | Performed by: DIETITIAN, REGISTERED

## 2021-09-17 ASSESSMENT — PATIENT HEALTH QUESTIONNAIRE - PHQ9: SUM OF ALL RESPONSES TO PHQ QUESTIONS 1-9: 2

## 2021-09-17 NOTE — TELEPHONE ENCOUNTER
PHQ 9/3/2020 9/24/2020 9/17/2021   PHQ-9 Total Score 5 8 2   Q9: Thoughts of better off dead/self-harm past 2 weeks Not at all Not at all Not at all   Some encounter information is confidential and restricted. Go to Review Flowsheets activity to see all data.     ACT Total Scores 1/21/2019 9/24/2020 9/17/2021   ACT TOTAL SCORE - - -   ASTHMA ER VISITS - - -   ASTHMA HOSPITALIZATIONS - - -   ACT TOTAL SCORE (Goal Greater than or Equal to 20) 22 24 20   In the past 12 months, how many times did you visit the emergency room for your asthma without being admitted to the hospital? 2 0 0   In the past 12 months, how many times were you hospitalized overnight because of your asthma? 0 0 0     Closing ANGELY Carranza MA

## 2021-09-18 ASSESSMENT — ASTHMA QUESTIONNAIRES: ACT_TOTALSCORE: 20

## 2021-10-27 ENCOUNTER — OFFICE VISIT (OUTPATIENT)
Dept: FAMILY MEDICINE | Facility: CLINIC | Age: 66
End: 2021-10-27
Payer: MEDICARE

## 2021-10-27 VITALS
BODY MASS INDEX: 34.92 KG/M2 | SYSTOLIC BLOOD PRESSURE: 120 MMHG | TEMPERATURE: 99.2 F | HEART RATE: 84 BPM | OXYGEN SATURATION: 97 % | DIASTOLIC BLOOD PRESSURE: 82 MMHG | WEIGHT: 213.08 LBS

## 2021-10-27 DIAGNOSIS — K21.9 GASTROESOPHAGEAL REFLUX DISEASE WITHOUT ESOPHAGITIS: ICD-10-CM

## 2021-10-27 DIAGNOSIS — E78.1 PURE HYPERGLYCERIDEMIA: ICD-10-CM

## 2021-10-27 DIAGNOSIS — Z00.00 ENCOUNTER FOR MEDICARE ANNUAL WELLNESS EXAM: Primary | ICD-10-CM

## 2021-10-27 DIAGNOSIS — E06.3 CHRONIC LYMPHOCYTIC THYROIDITIS: ICD-10-CM

## 2021-10-27 DIAGNOSIS — G47.00 INSOMNIA, UNSPECIFIED TYPE: ICD-10-CM

## 2021-10-27 DIAGNOSIS — M62.830 SPASM OF BACK MUSCLES: ICD-10-CM

## 2021-10-27 DIAGNOSIS — E78.5 HYPERLIPIDEMIA LDL GOAL <130: ICD-10-CM

## 2021-10-27 DIAGNOSIS — G25.81 RESTLESS LEG SYNDROME: ICD-10-CM

## 2021-10-27 DIAGNOSIS — F33.1 MAJOR DEPRESSIVE DISORDER, RECURRENT EPISODE, MODERATE (H): ICD-10-CM

## 2021-10-27 DIAGNOSIS — I10 HYPERTENSION GOAL BP (BLOOD PRESSURE) < 140/90: ICD-10-CM

## 2021-10-27 PROCEDURE — G0439 PPPS, SUBSEQ VISIT: HCPCS | Performed by: NURSE PRACTITIONER

## 2021-10-27 PROCEDURE — 99214 OFFICE O/P EST MOD 30 MIN: CPT | Mod: 25 | Performed by: NURSE PRACTITIONER

## 2021-10-27 PROCEDURE — G0009 ADMIN PNEUMOCOCCAL VACCINE: HCPCS | Performed by: NURSE PRACTITIONER

## 2021-10-27 PROCEDURE — 90662 IIV NO PRSV INCREASED AG IM: CPT | Performed by: NURSE PRACTITIONER

## 2021-10-27 PROCEDURE — 90732 PPSV23 VACC 2 YRS+ SUBQ/IM: CPT | Performed by: NURSE PRACTITIONER

## 2021-10-27 PROCEDURE — G0008 ADMIN INFLUENZA VIRUS VAC: HCPCS | Performed by: NURSE PRACTITIONER

## 2021-10-27 RX ORDER — ESZOPICLONE 3 MG/1
3 TABLET, FILM COATED ORAL AT BEDTIME
Qty: 30 TABLET | Refills: 5 | Status: SHIPPED | OUTPATIENT
Start: 2021-10-27 | End: 2022-01-24

## 2021-10-27 RX ORDER — CYCLOBENZAPRINE HCL 5 MG
5-10 TABLET ORAL
Qty: 90 TABLET | Refills: 3 | Status: SHIPPED | OUTPATIENT
Start: 2021-10-27 | End: 2022-10-05

## 2021-10-27 RX ORDER — LISINOPRIL 10 MG/1
10 TABLET ORAL DAILY
Qty: 90 TABLET | Refills: 3 | Status: SHIPPED | OUTPATIENT
Start: 2021-10-27 | End: 2022-10-05

## 2021-10-27 RX ORDER — ATORVASTATIN CALCIUM 10 MG/1
10 TABLET, FILM COATED ORAL DAILY
Qty: 90 TABLET | Refills: 3 | Status: SHIPPED | OUTPATIENT
Start: 2021-10-27 | End: 2022-10-05

## 2021-10-27 RX ORDER — LEVOTHYROXINE SODIUM 88 UG/1
88 TABLET ORAL DAILY
Qty: 90 TABLET | Refills: 3 | Status: SHIPPED | OUTPATIENT
Start: 2021-10-27 | End: 2022-10-05

## 2021-10-27 RX ORDER — FAMOTIDINE 20 MG/1
TABLET, FILM COATED ORAL
Qty: 180 TABLET | Refills: 3 | Status: SHIPPED | OUTPATIENT
Start: 2021-10-27 | End: 2022-10-05

## 2021-10-27 RX ORDER — DESVENLAFAXINE 50 MG/1
50 TABLET, FILM COATED, EXTENDED RELEASE ORAL DAILY
Qty: 90 TABLET | Refills: 3 | Status: SHIPPED | OUTPATIENT
Start: 2021-10-27 | End: 2022-01-24

## 2021-10-27 RX ORDER — PRAMIPEXOLE DIHYDROCHLORIDE 0.12 MG/1
TABLET ORAL
Qty: 270 TABLET | Refills: 3 | Status: SHIPPED | OUTPATIENT
Start: 2021-10-27 | End: 2022-10-05

## 2021-10-27 RX ORDER — FENOFIBRATE 145 MG/1
145 TABLET, COATED ORAL DAILY
Qty: 90 TABLET | Refills: 3 | Status: SHIPPED | OUTPATIENT
Start: 2021-10-27 | End: 2022-10-05

## 2021-10-27 ASSESSMENT — ENCOUNTER SYMPTOMS: BREAST MASS: 0

## 2021-10-27 ASSESSMENT — ACTIVITIES OF DAILY LIVING (ADL): CURRENT_FUNCTION: NO ASSISTANCE NEEDED

## 2021-10-27 NOTE — PATIENT INSTRUCTIONS
Patient Education   Personalized Prevention Plan  You are due for the preventive services outlined below.  Your care team is available to assist you in scheduling these services.  If you have already completed any of these items, please share that information with your care team to update in your medical record.  Health Maintenance Due   Topic Date Due     ANNUAL REVIEW OF HM ORDERS  Never done     URINE DRUG SCREEN  01/08/2017     Asthma Action Plan - yearly  05/21/2021     FALL RISK ASSESSMENT  05/21/2021     Flu Vaccine (1) 09/01/2021     Pneumococcal Vaccine (2 of 2 - PPSV23) 07/26/2021

## 2021-10-27 NOTE — NURSING NOTE
Prior to immunization administration, verified patients identity using patient s name and date of birth. Please see Immunization Activity for additional information.     Screening Questionnaire for Adult Immunization    Are you sick today?   No   Do you have allergies to medications, food, a vaccine component or latex?   Yes   Have you ever had a serious reaction after receiving a vaccination?   No   Do you have a long-term health problem with heart, lung, kidney, or metabolic disease (e.g., diabetes), asthma, a blood disorder, no spleen, complement component deficiency, a cochlear implant, or a spinal fluid leak?  Are you on long-term aspirin therapy?   Yes   Do you have cancer, leukemia, HIV/AIDS, or any other immune system problem?   No   Do you have a parent, brother, or sister with an immune system problem?   No   In the past 3 months, have you taken medications that affect  your immune system, such as prednisone, other steroids, or anticancer drugs; drugs for the treatment of rheumatoid arthritis, Crohn s disease, or psoriasis; or have you had radiation treatments?   No   Have you had a seizure, or a brain or other nervous system problem?   No   During the past year, have you received a transfusion of blood or blood    products, or been given immune (gamma) globulin or antiviral drug?   No   For women: Are you pregnant or is there a chance you could become       pregnant during the next month?   No   Have you received any vaccinations in the past 4 weeks?   No     Immunization questionnaire was positive for at least one answer.  Notified Cherie Brennan.        Per orders of Cherie Brennan NP, injection of PPSV23 given by Juju Ballard. Patient instructed to remain in clinic for 15 minutes afterwards, and to report any adverse reaction to me immediately.       Screening performed by Juju Ballard on 10/27/2021 at 2:15 PM.

## 2021-10-27 NOTE — PROGRESS NOTES
"SUBJECTIVE:   Brandi Stern is a 66 year old female who presents for Preventive Visit.    Patient has been advised of split billing requirements and indicates understanding: Yes   Are you in the first 12 months of your Medicare coverage?  No    Healthy Habits:     In general, how would you rate your overall health?  Good    Frequency of exercise:  1 day/week    Duration of exercise:  Less than 15 minutes    Do you usually eat at least 4 servings of fruit and vegetables a day, include whole grains    & fiber and avoid regularly eating high fat or \"junk\" foods?  No    Taking medications regularly:  Yes    Medication side effects:  None    Ability to successfully perform activities of daily living:  No assistance needed    Home Safety:  No safety concerns identified    Hearing Impairment:  No hearing concerns    In the past 6 months, have you been bothered by leaking of urine?  No    In general, how would you rate your overall mental or emotional health?  Fair      PHQ-2 Total Score: 2    Additional concerns today:  Yes    RIGHT EAR CONCERNS   Can sound louder at times.  Sleep concerns- increase on her medication to 3mg   requesting to do labs another day     She is working with the weight management clinic for weight loss.  Wellbutrin was not helpful, so she is tapering off.  Phentermine worked well, but interfered with sleep.    Sometimes 2 mg of Lunesta is not as helpful, she would like to increase this dose.  She has not had any side effects.    She is doing well on Lisinopril,.denies any side effects.  Her blood pressure has been well-controlled.    Her GERD is controlled on Prilosec and famotidine.    Pristiq is working well for her depression.    She is doing well on her current dose of levothyroxine.  She denies any symptoms such as fatigue; changes in weight; temperature intolerance; skin changes; constipation or loose stools.    Restless leg symptoms are controlled on Mirapex.        Do you feel safe in your " environment? Yes    Have you ever done Advance Care Planning? (For example, a Health Directive, POLST, or a discussion with a medical provider or your loved ones about your wishes): No, advance care planning information given to patient to review.  Patient plans to discuss their wishes with loved ones or provider.      Fall risk  Fallen 2 or more times in the past year?: No  Any fall with injury in the past year?: No    Cognitive Screening   1) Repeat 3 items (Leader, Season, Table)    2) Clock draw: NORMAL  3) 3 item recall: Recalls 3 objects  Results: 3 items recalled: COGNITIVE IMPAIRMENT LESS LIKELY    Mini-CogTM Copyright S Philipp. Licensed by the author for use in [facility]; reprinted with permission (soob@[facility]). All rights reserved.      Do you have sleep apnea, excessive snoring or daytime drowsiness?: yes    Reviewed and updated as needed this visit by clinical staff                 Reviewed and updated as needed this visit by Provider                Social History     Tobacco Use     Smoking status: Former Smoker     Packs/day: 0.25     Years: 20.00     Pack years: 5.00     Types: Cigarettes     Start date: 10/1/1971     Quit date: 2007     Years since quittin.2     Smokeless tobacco: Never Used     Tobacco comment: 2 PACK PER WEEK    Substance Use Topics     Alcohol use: Yes     Alcohol/week: 0.0 standard drinks     Comment: switched to beer 6 pack a week      If you drink alcohol do you typically have >3 drinks per day or >7 drinks per week? No    Alcohol Use 10/27/2021   Prescreen: >3 drinks/day or >7 drinks/week? No   Prescreen: >3 drinks/day or >7 drinks/week? -   No flowsheet data found.    Current providers sharing in care for this patient include:   Patient Care Team:  Cherie Brennan NP as PCP - General  William Christy MD as MD (Neurology)  Robbie Flores MD as MD (Internal Medicine)  Robbie Flores MD as Assigned Endocrinology Provider  Ethan  "Rima Shin PA-C as Assigned Surgical Provider  Donnie Leal MD as Assigned PCP    The following health maintenance items are reviewed in Epic and correct as of today:  Health Maintenance Due   Topic Date Due     ANNUAL REVIEW OF HM ORDERS  Never done     URINE DRUG SCREEN  01/08/2017     ASTHMA ACTION PLAN  05/21/2021     FALL RISK ASSESSMENT  05/21/2021     INFLUENZA VACCINE (1) 09/01/2021     Pneumococcal Vaccine: 65+ Years (2 of 2 - PPSV23) 07/26/2021           FHS-7:   Breast CA Risk Assessment (FHS-7) 10/27/2021   Did any of your first-degree relatives have breast or ovarian cancer? Yes   Did any of your relatives have bilateral breast cancer? No   Did any man in your family have breast cancer? No   Did any woman in your family have breast and ovarian cancer? Yes   Did any woman in your family have breast cancer before age 50 y? No   Do you have 2 or more relatives with breast and/or ovarian cancer? No   Do you have 2 or more relatives with breast and/or bowel cancer? No       yearly mammograms due to family history  Pertinent mammograms are reviewed under the imaging tab.    Review of Systems   Breasts:  Negative for tenderness, breast mass and discharge.   Genitourinary: Negative for pelvic pain, vaginal bleeding and vaginal discharge.   Psychiatric/Behavioral: Positive for mood changes.         OBJECTIVE:   LMP  (LMP Unknown)  Estimated body mass index is 35.23 kg/m  as calculated from the following:    Height as of 9/7/21: 1.664 m (5' 5.5\").    Weight as of 9/15/21: 97.5 kg (215 lb).  Physical Exam  GENERAL: healthy, alert and no distress  EYES: Eyes grossly normal to inspection, PERRL and conjunctivae and sclerae normal  HENT: ear canals and TM's normal, nose and mouth without ulcers or lesions  NECK: no adenopathy, no asymmetry, masses, or scars and thyroid normal to palpation  RESP: lungs clear to auscultation - no rales, rhonchi or wheezes  CV: regular rate and rhythm, normal S1 S2, no S3 or " S4, no murmur, click or rub, no peripheral edema and peripheral pulses strong  ABDOMEN: soft, nontender, no hepatosplenomegaly, no masses and bowel sounds normal  MS: no gross musculoskeletal defects noted, no edema  SKIN: no suspicious lesions or rashes  NEURO: Normal strength and tone, mentation intact and speech normal  PSYCH: mentation appears normal, affect normal/bright        ASSESSMENT / PLAN:   (Z00.00) Encounter for Medicare annual wellness exam  (primary encounter diagnosis)  Comment:   Plan:     (G47.00) Insomnia, unspecified type  Comment:   Plan: eszopiclone (LUNESTA) 3 MG tablet        Will increase dose to 3 mg.  Discussed the use and indication of this medication as well as potential side effects.  Discussed risk related to age and gender.  Discussed good sleep hygiene.     (F33.1) Major depressive disorder, recurrent episode, moderate (H)  Comment: in complete remission  Plan: desvenlafaxine (PRISTIQ) 50 MG 24 hr tablet        The current medical regimen is effective;  continue present plan and medications.     (E78.5) Hyperlipidemia LDL goal <130  Comment:   Plan: atorvastatin (LIPITOR) 10 MG tablet, Lipid         panel reflex to direct LDL Fasting        The current medical regimen is effective;  continue present plan and medications.     (E78.1) HYPERTRIGLYCERIDEMIA  Comment:   Plan: fenofibrate (TRICOR) 145 MG tablet        The current medical regimen is effective;  continue present plan and medications.     (K21.9) Gastroesophageal reflux disease without esophagitis  Comment: controlled  Plan: famotidine (PEPCID) 20 MG tablet, omeprazole         (PRILOSEC) 20 MG DR capsule        The current medical regimen is effective;  continue present plan and medications.     (E06.3) HASHIMOTO'S THYROIDITIS  Comment:   Plan: levothyroxine (SYNTHROID/LEVOTHROID) 88 MCG         tablet, TSH with free T4 reflex        The current medical regimen is effective;  continue present plan and medications.  Will  "adjust dose if needed based on labs.     (I10) Hypertension goal BP (blood pressure) < 140/90  Comment: at goal  Plan: lisinopril (ZESTRIL) 10 MG tablet,         Comprehensive metabolic panel (BMP + Alb, Alk         Phos, ALT, AST, Total. Bili, TP)        The current medical regimen is effective;  continue present plan and medications.     (G25.81) Restless leg syndrome  Comment: stable  Plan: pramipexole (MIRAPEX) 0.125 MG tablet        The current medical regimen is effective;  continue present plan and medications.     (M62.830) Spasm of back muscles  Comment:   Plan: cyclobenzaprine (FLEXERIL) 5 MG tablet        Refills given.       Patient has been advised of split billing requirements and indicates understanding:   COUNSELING:  Reviewed preventive health counseling, as reflected in patient instructions    Estimated body mass index is 35.23 kg/m  as calculated from the following:    Height as of 9/7/21: 1.664 m (5' 5.5\").    Weight as of 9/15/21: 97.5 kg (215 lb).    Weight management plan: Discussed healthy diet and exercise guidelines    She reports that she quit smoking about 14 years ago. Her smoking use included cigarettes. She started smoking about 50 years ago. She has a 5.00 pack-year smoking history. She has never used smokeless tobacco.      Appropriate preventive services were discussed with this patient, including applicable screening as appropriate for cardiovascular disease, diabetes, osteopenia/osteoporosis, and glaucoma.  As appropriate for age/gender, discussed screening for colorectal cancer, prostate cancer, breast cancer, and cervical cancer. Checklist reviewing preventive services available has been given to the patient.    Reviewed patients plan of care and provided an AVS. The Basic Care Plan (routine screening as documented in Health Maintenance) for Brandi meets the Care Plan requirement. This Care Plan has been established and reviewed with the Patient.    Counseling Resources:  ATP IV " Guidelines  Pooled Cohorts Equation Calculator  Breast Cancer Risk Calculator  Breast Cancer: Medication to Reduce Risk  FRAX Risk Assessment  ICSI Preventive Guidelines  Dietary Guidelines for Americans, 2010  USDA's MyPlate  ASA Prophylaxis  Lung CA Screening    Cherie Brennan NP  [facility]    Identified Health Risks:

## 2021-10-28 ENCOUNTER — TELEPHONE (OUTPATIENT)
Dept: FAMILY MEDICINE | Facility: CLINIC | Age: 66
End: 2021-10-28

## 2021-10-28 NOTE — TELEPHONE ENCOUNTER
Central Prior Authorization Team   Phone: [phone]    PA Initiation    Medication: eszopiclone (LUNESTA) 3 MG tablet  Insurance Company: [facility] [employer] - Phone [phone] Fax [phone]  Pharmacy Filling the Rx: [facility]Y #[id number] - [place], [place] - [address]  Filling Pharmacy Phone: [phone]  Filling Pharmacy Fax:    Start Date: 10/28/2021

## 2021-10-28 NOTE — TELEPHONE ENCOUNTER
Prior Authorization Approval    Authorization Effective Date: 7/30/2021  Authorization Expiration Date: 10/28/2022  Medication: eszopiclone (LUNESTA) 3 MG tablet  Approved Dose/Quantity:    Reference #:     Insurance Company: [facility] [employer] - Phone [phone] Fax [phone]  Expected CoPay:       CoPay Card Available:      Foundation Assistance Needed:    Which Pharmacy is filling the prescription (Not needed for infusion/clinic administered): [facility] #[id number] - [place], [place] - [address] [place] [address]  Pharmacy Notified: Yes  Patient Notified: Yes  **Instructed pharmacy to notify patient when script is ready to /ship.**

## 2021-10-28 NOTE — TELEPHONE ENCOUNTER
Prior Authorization Retail Medication Request    Medication/Dose: eszopiclone (LUNESTA) 3 MG tablet  ICD code (if different than what is on RX):  Previously Tried and Failed:  Rationale:    Insurance Name: unknown  Insurance ID: unknown    Pharmacy Information (if different than what is on RX)  [facility]  Phone[facility] [phone]    Please include previous medications tried and failed.  Please ask insurance for medications on formulary.

## 2021-10-29 ENCOUNTER — TELEPHONE (OUTPATIENT)
Dept: SURGERY | Facility: CLINIC | Age: 66
End: 2021-10-29

## 2021-10-29 ENCOUNTER — VIRTUAL VISIT (OUTPATIENT)
Dept: SURGERY | Facility: CLINIC | Age: 66
End: 2021-10-29
Payer: MEDICARE

## 2021-10-29 VITALS — HEIGHT: 66 IN | WEIGHT: 213 LBS | BODY MASS INDEX: 34.23 KG/M2

## 2021-10-29 DIAGNOSIS — E66.09 CLASS 1 OBESITY DUE TO EXCESS CALORIES WITHOUT SERIOUS COMORBIDITY WITH BODY MASS INDEX (BMI) OF 34.0 TO 34.9 IN ADULT: Primary | ICD-10-CM

## 2021-10-29 DIAGNOSIS — E66.811 CLASS 1 OBESITY DUE TO EXCESS CALORIES WITHOUT SERIOUS COMORBIDITY WITH BODY MASS INDEX (BMI) OF 34.0 TO 34.9 IN ADULT: Primary | ICD-10-CM

## 2021-10-29 PROCEDURE — 99215 OFFICE O/P EST HI 40 MIN: CPT | Mod: 95 | Performed by: PHYSICIAN ASSISTANT

## 2021-10-29 ASSESSMENT — MIFFLIN-ST. JEOR: SCORE: 1504.97

## 2021-10-29 NOTE — PROGRESS NOTES
Brandi Elkins is a 66 year old who is being evaluated via a billable video visit.      If the video visit is dropped, the invitation should be resent by: Text to cell phone: [phone]  Will anyone else be joining your video visit? No      Video-Visit Details    Type of service:  Telephone Visit    Total Visit Time 26 minute    Originating Location (pt. Location): Home    Distant Location (provider location):  [facility] [place]     Platform used for Video Visit: Unable to complete video visit      Return Medical Weight Management Note     Brandi Stern  MRN:  [id number]  :  1955  YUSEF:  2021    Dear Cehrie Brennan NP,    I had the pleasure of seeing your patient Brandi Stern. She is a 66 year old female who I am continuing to see for treatment of obesity related to:       2021   I have the following health issues associated with obesity: High Blood Pressure, Sleep Apnea, GERD (Reflux), Hypothyroidism   I have the following symptoms associated with obesity: Knee Pain, Back Pain, Fatigue       INTERVAL HISTORY:  Pt seen on 2021. Was started on bupropion. Due to her age and sensitivity to medication we will start her slowly. Had been on phentermine but had adverse SE: insomnia, decrease in focus. Still having trouble with hunger. Would like to get off bupropion.  Decreased dose to 1 tablets and saw no difference in hunger, mood was lower.  Still would like to get off.  Has douglas on 1 tab for a week.   We discussed naltrexone as a possible add on in the future to help with cravings.   She saw the dietician on 9/15/2021.      She is still having cravings with pasta and bread.  She is trying to limit this.  Is going to try vegetable based pasta instead of high carb diet Can't see the dietician anymore.  Not covered by insurance. Got a keto  Book, is looking for tips from this.    Goals:  Increase exercise (stationary bike or walk) 3X per week starting  with 10-15 minutes- met  Focus on eating 3 meals per day-met  Switch to low calorie fruit juice-met, hardly drinking, Drinking diet soda now instead  Decrease Beer-met having once every couple weeks    CURRENT WEIGHT:   213 lbs 0 oz    Initial Weight (lbs): 235.5 lbs  Last Visits Weight: 213 lb 1.6 oz (96.7 kg)  Cumulative weight loss (lbs): 22.5  Weight Loss Percentage: 9.55%     Wt Readings from Last 10 Encounters:   10/29/21 213 lb (96.6 kg)   10/27/21 213 lb 1.3 oz (96.7 kg)   09/15/21 215 lb (97.5 kg)   09/07/21 214 lb 12.8 oz (97.4 kg)   07/05/21 218 lb (98.9 kg)   06/08/21 225 lb (102.1 kg)   05/10/21 233 lb 4.8 oz (105.8 kg)   05/03/21 235 lb 8 oz (106.8 kg)   03/19/21 228 lb 1.9 oz (103.5 kg)   01/30/20 221 lb 8 oz (100.5 kg)       Changes and Difficulties    I have made the following changes to my diet since my last visit: Decrease calories, smaller portion, Increasing her vegetables.    With regards to my diet, I am still struggling with: Hunger   I have made the following changes to my activity/exercise since my last visit: Minimal due to neck pain   With regards to my activity/exercise, I am still struggling with: Pain         MEDICATIONS:   Current Outpatient Medications   Medication Sig Dispense Refill     ACETAMINOPHEN EXTRA STRENGTH OR Take by mouth 2 times daily        albuterol (ALBUTEROL) 108 (90 BASE) MCG/ACT Inhaler Inhale 1-2 puffs into the lungs every 6 hours as needed for shortness of breath / dyspnea 1 Inhaler 1     Ascorbic Acid (VITAMIN C PO) Take 2,000 mg by mouth daily        atorvastatin (LIPITOR) 10 MG tablet Take 1 tablet (10 mg) by mouth daily 90 tablet 3     BREO ELLIPTA 200-25 MCG/INH Inhaler        buPROPion (WELLBUTRIN SR) 100 MG 12 hr tablet Take 1 tablet for for 14 days then increase to 1 tablet twice daily. 60 tablet 1     CALCIUM CITRATE PO Take 1,200 mg by mouth daily       Cetirizine HCl (ZYRTEC PO) Take 10 mg by mouth daily       cyclobenzaprine (FLEXERIL) 5 MG tablet  Take 1-2 tablets (5-10 mg) by mouth nightly as needed for muscle spasms 90 tablet 3     desvenlafaxine (PRISTIQ) 50 MG 24 hr tablet Take 1 tablet (50 mg) by mouth daily 90 tablet 3     eszopiclone (LUNESTA) 3 MG tablet Take 1 tablet (3 mg) by mouth At Bedtime 30 tablet 5     famotidine (PEPCID) 20 MG tablet TAKE 1 TABLET BY MOUTH TWICE A  tablet 3     fenofibrate (TRICOR) 145 MG tablet Take 1 tablet (145 mg) by mouth daily 90 tablet 3     levothyroxine (SYNTHROID/LEVOTHROID) 88 MCG tablet Take 1 tablet (88 mcg) by mouth daily 90 tablet 3     lisinopril (ZESTRIL) 10 MG tablet Take 1 tablet (10 mg) by mouth daily 90 tablet 3     metroNIDAZOLE (METROCREAM) 0.75 % external cream Apply topically daily To face 45 g 1     omeprazole (PRILOSEC) 20 MG DR capsule TAKE 1 TABLET (20 MG) BY MOUTH 2 TIMES DAILY TAKE 30-60 MINUTES BEFORE A MEAL. 180 capsule 3     pramipexole (MIRAPEX) 0.125 MG tablet TAKE 2 TABLETS BY MOUTH AT DINNER AND 1 TABLET AT BEDTIME 270 tablet 3     triamcinolone (NASACORT AQ) 55 MCG/ACT nasal inhaler Inhale 2 sprays in both nostrils every day as needed 1 Inhaler 7     UNABLE TO FIND MEDICATION NAME: Allergy shots       VITAMIN D PO Take 2,000 Units by mouth daily         Weight Loss Medication History Reviewed With Patient 9/7/2021   Which weight loss medications are you currently taking on a regular basis?  Wellbutrin   If you are not taking a weight loss medication that was prescribed to you, please indicate why: Not losing weight, no appetite suppression   Are you having any side effects from the weight loss medication that we have prescribed you? No   If you are having side effects please describe:      ROS:    General  Fatigue:   Sleep Quality:poor, better off phentermine.  HEENT  Hx of glaucoma: High pressure in Left eye, will be seen specialist at end of month  Vision changes: see above  Cardiovascular  Chest Pain with Exertion:   Palpitations: some on phentermine at 30 mg  Hx of heart  "disease: None  HTN:  Gastrointestinal  Constipation: none  Psychiatric  Moods Stable: yes (was lower over weekend when wellborn decreased.  History of alcohol/drug abuse: none  Hx of eating disorder: none  Neurologic:  Hx of seizures: none    Ht 5' 5.5\" (1.664 m)   Wt 213 lb (96.6 kg)   LMP  (LMP Unknown)   BMI 34.91 kg/m    GENERAL: Healthy, alert and no distress  EYES: Eyes grossly normal to inspection.  No discharge or erythema, or obvious scleral/conjunctival abnormalities.  RESP: No audible wheeze, cough, or visible cyanosis.  No visible retractions or increased work of breathing.    SKIN: Visible skin clear. No significant rash, abnormal pigmentation or lesions.  NEURO: Cranial nerves grossly intact.  Mentation and speech appropriate for age.  PSYCH: Mentation appears normal, affect normal/bright, judgement and insight intact, normal speech and appearance well-groomed.    ASSESSMENT AND PLAN:        Class 1 obesity due to excess calories without serious comorbidity with body mass index (BMI) of 34.0 to 34.9 in adult   Healthy habits to assist with further weight loss were discussed. Brandi Elkins will discontinue the Wellbutrin. Since she already has tapered she can now just stop.  She will start Lomaira 8 mg in the morning.  If this does not help for night cravings, she can mychart and we can add the naltrexone.  Risks/ benefits and possible side effects were discussed and questions were answered. Written information was given. IF Lomaira causes insomnia or other SE Brandi Elkins can stop and let me know    - phentermine (LOMAIRA) 8 MG tablet; Take 1 tablet (8 mg) by mouth every morning (before breakfast)    We discussed the Keto diet, dissuaded pt for diet mentality and full keto plan.  Did encourage competents of high protein and healthy fats to help with satiety and healthy cholesterol.       Follow up: Return to clinic in 8 weeks    40 minutes spent on the date of the encounter doing chart review, history " and exam, review test results, counseling, developing plan of care, documentation, and further activities as noted above.      Sincerely,    Rima Long PA-C

## 2021-10-29 NOTE — TELEPHONE ENCOUNTER
Check in was canceled. Pt wanted to reschedule appt but changed her mind and will keep todays time

## 2021-11-02 ENCOUNTER — LAB (OUTPATIENT)
Dept: LAB | Facility: CLINIC | Age: 66
End: 2021-11-02
Payer: MEDICARE

## 2021-11-02 ENCOUNTER — TELEPHONE (OUTPATIENT)
Dept: SURGERY | Facility: CLINIC | Age: 66
End: 2021-11-02

## 2021-11-02 DIAGNOSIS — E78.5 HYPERLIPIDEMIA LDL GOAL <130: ICD-10-CM

## 2021-11-02 DIAGNOSIS — E06.3 CHRONIC LYMPHOCYTIC THYROIDITIS: ICD-10-CM

## 2021-11-02 DIAGNOSIS — I10 HYPERTENSION GOAL BP (BLOOD PRESSURE) < 140/90: ICD-10-CM

## 2021-11-02 PROCEDURE — 80053 COMPREHEN METABOLIC PANEL: CPT

## 2021-11-02 PROCEDURE — 36415 COLL VENOUS BLD VENIPUNCTURE: CPT

## 2021-11-02 PROCEDURE — 80061 LIPID PANEL: CPT

## 2021-11-02 PROCEDURE — 84443 ASSAY THYROID STIM HORMONE: CPT

## 2021-11-02 NOTE — TELEPHONE ENCOUNTER
Prior Authorization Specialty Medication Request    Medication/Dose: lomaira 8 mg tablet  ICD code (if different than what is on RX):    Previously Tried and Failed:      Important Lab Values:   Rationale:     Insurance Name: [facility]  Insurance ID: [id number]  Insurance Phone Number: [phone]     Pharmacy Information (if different than what is on RX)  Name:  [facility]  Phone:  [phone]

## 2021-11-02 NOTE — TELEPHONE ENCOUNTER
Central Prior Authorization Team   Phone: [phone]      PA Initiation    Medication: lomaira 8 mg tablet  Insurance Company:  [employer] MED PART D  [facility]  Filling Pharmacy Phone: [phone]  Filling Pharmacy Fax:    Start Date: 11/2/2021

## 2021-11-03 LAB
ALBUMIN SERPL-MCNC: 3.8 G/DL (ref 3.4–5)
ALP SERPL-CCNC: 40 U/L (ref 40–150)
ALT SERPL W P-5'-P-CCNC: 21 U/L (ref 0–50)
ANION GAP SERPL CALCULATED.3IONS-SCNC: 8 MMOL/L (ref 3–14)
AST SERPL W P-5'-P-CCNC: 19 U/L (ref 0–45)
BILIRUB SERPL-MCNC: 0.4 MG/DL (ref 0.2–1.3)
BUN SERPL-MCNC: 18 MG/DL (ref 7–30)
CALCIUM SERPL-MCNC: 9 MG/DL (ref 8.5–10.1)
CHLORIDE BLD-SCNC: 105 MMOL/L (ref 94–109)
CHOLEST SERPL-MCNC: 170 MG/DL
CO2 SERPL-SCNC: 24 MMOL/L (ref 20–32)
CREAT SERPL-MCNC: 0.85 MG/DL (ref 0.52–1.04)
FASTING STATUS PATIENT QL REPORTED: YES
GFR SERPL CREATININE-BSD FRML MDRD: 71 ML/MIN/1.73M2
GLUCOSE BLD-MCNC: 81 MG/DL (ref 70–99)
HDLC SERPL-MCNC: 46 MG/DL
LDLC SERPL CALC-MCNC: 62 MG/DL
NONHDLC SERPL-MCNC: 124 MG/DL
POTASSIUM BLD-SCNC: 4.2 MMOL/L (ref 3.4–5.3)
PROT SERPL-MCNC: 7.2 G/DL (ref 6.8–8.8)
SODIUM SERPL-SCNC: 137 MMOL/L (ref 133–144)
TRIGL SERPL-MCNC: 308 MG/DL
TSH SERPL DL<=0.005 MIU/L-ACNC: 3.12 MU/L (ref 0.4–4)

## 2021-11-03 NOTE — TELEPHONE ENCOUNTER
PRIOR AUTHORIZATION DENIED    Medication: lomaira 8 mg tablet -DENIED     Denial Date: 11/2/2021    Denial Rational: Medicare Part D cannot cover drugs used for loss of appetite (anorexia), weight loss, or weight gain.  Due to this, this drug is excluded under Medicare Part D coverage.        Appeal Information: If the provider would like to appeal this denial, please provide a letter of medical necessity. Please also include any therapies that the patient has tried and their outcomes. The patient's insurance company will also require the provider to address why the insurance preferred options are not appropriate in the patient's therapy.  The reason could be that the preferred options will harm the patient; either physically or mentally. They are contraindicated to the patient; or the patient has already been taking the requested medication and changing the therapy would change the outcome of their therapy.    Once it has been completed and placed in the patient's chart, notify the Central PA Team ([facility] PA MED) and the appeal can be initiated on behalf of the patient and provider.

## 2021-11-04 NOTE — TELEPHONE ENCOUNTER
Called patient and walked her through how to get GoodRx coupon for Lomaira.  .Patient verbalized understanding and is agreeable to plan.  Lenora Salcido, MS, RD, RN

## 2021-12-02 ENCOUNTER — E-VISIT (OUTPATIENT)
Dept: FAMILY MEDICINE | Facility: CLINIC | Age: 66
End: 2021-12-02
Payer: MEDICARE

## 2021-12-02 DIAGNOSIS — F33.1 MAJOR DEPRESSIVE DISORDER, RECURRENT EPISODE, MODERATE WITH ANXIOUS DISTRESS (H): Primary | ICD-10-CM

## 2021-12-02 PROCEDURE — 99422 OL DIG E/M SVC 11-20 MIN: CPT | Performed by: NURSE PRACTITIONER

## 2021-12-08 RX ORDER — DESVENLAFAXINE 25 MG/1
TABLET, EXTENDED RELEASE ORAL
Qty: 14 TABLET | Refills: 0 | Status: SHIPPED | OUTPATIENT
Start: 2021-12-08 | End: 2022-01-24

## 2021-12-12 RX ORDER — BUPROPION HYDROCHLORIDE 100 MG/1
100 TABLET, EXTENDED RELEASE ORAL DAILY
Qty: 90 TABLET | Refills: 1 | Status: SHIPPED | OUTPATIENT
Start: 2021-12-12 | End: 2022-03-27

## 2022-01-04 ENCOUNTER — ANCILLARY PROCEDURE (OUTPATIENT)
Dept: MAMMOGRAPHY | Facility: CLINIC | Age: 67
End: 2022-01-04
Attending: NURSE PRACTITIONER
Payer: MEDICARE

## 2022-01-04 DIAGNOSIS — Z12.31 VISIT FOR SCREENING MAMMOGRAM: ICD-10-CM

## 2022-01-04 PROCEDURE — 77067 SCR MAMMO BI INCL CAD: CPT | Mod: GC | Performed by: RADIOLOGY

## 2022-01-04 PROCEDURE — 77063 BREAST TOMOSYNTHESIS BI: CPT | Mod: GC | Performed by: RADIOLOGY

## 2022-01-23 NOTE — PROGRESS NOTES
Brandi Elkins is a 66 year old who is being evaluated via a billable video visit.      If the video visit is dropped, the invitation should be resent by: Text to cell phone: [phone]  Will anyone else be joining your video visit? No      Video-Visit Details    Type of service:  Video Visit    Video Start Time: 1:01 PM    Video End Time: 1:35 PM    Originating Location (pt. Location): Home    Distant Location (provider location):  [facility] [place]     Platform used for Video Visit: [facility] Medical Weight Management Note     Brandi Stern  MRN:  [id number]  :  1955  YUSEF:  2021    Dear Cherie Brennan NP,    I had the pleasure of seeing your patient Brandi Stern. She is a 66 year old female who I am continuing to see for treatment of obesity related to:       2021   I have the following health issues associated with obesity: High Blood Pressure, Sleep Apnea, GERD (Reflux), Hypothyroidism   I have the following symptoms associated with obesity: Knee Pain, Back Pain, Fatigue       INTERVAL HISTORY:  Pt last seen  In October.  Was started on [place] and tolerating it well.  Asked to increase to two tabs per day- Am and before lunch at the end of November.      She has been in a holding pattern with her weight.  Is happy she did not gain weight during the holidays.  She does not feel the lomira it is as effective as the phentermine 15 mg daily.  Her sleep is still a problem.  She can sleep about 4 hours at a time.  Then takes a nap later in the day. Wonders if she should go back to phentermine 15 mg and try it again.     Pt is noticing she is not hungry all the time.  She does have hunger in the morning. This is controlled once she has her high protein breakfast with eggs.       Diet Hx:  Morning: eggs  Late Lunch 1-2 PM Salad with chicken  Supper 5:30-7:00 PM Supper Leftovers, Soup, Pasta with Spaghetti sauce.      BP Readings from Last 6 Encounters:    10/27/21 120/82   09/07/21 126/82   05/03/21 126/84   03/19/21 118/81   01/30/20 117/83   01/13/20 (!) 136/95       AOM:  Phentermine- was too buzzed, trouble sleeping  Wellbutrin- using as her antidepressant.    [place]- currently on, not working as well as na appetite suppressant, still interrupts sleep slightly but less so than phentermine.     She is still having some cravings with pasta and bread.   Can't see the dietician anymore.  Not covered by insurance.     CURRENT WEIGHT:   215 lbs 0 oz    Initial Weight (lbs): 235.5 lbs  Last Visits Weight: 213 lb (96.6 kg)  Cumulative weight loss (lbs): 20.5  Weight Loss Percentage: 8.7%     BARIATRIC METRICS:  Current Weight: 215 lb (97.5 kg) (pt reported)  Body mass index is 35.23 kg/m .   Wt change since last visit (lbs): 2  Cumulative weight loss (lbs): 20.5      Wt Readings from Last 10 Encounters:   01/24/22 215 lb (97.5 kg)   10/29/21 213 lb (96.6 kg)   10/27/21 213 lb 1.3 oz (96.7 kg)   09/15/21 215 lb (97.5 kg)   09/07/21 214 lb 12.8 oz (97.4 kg)   07/05/21 218 lb (98.9 kg)   06/08/21 225 lb (102.1 kg)   05/10/21 233 lb 4.8 oz (105.8 kg)   05/03/21 235 lb 8 oz (106.8 kg)   03/19/21 228 lb 1.9 oz (103.5 kg)     Changes and Difficulties    I have made the following changes to my diet since my last visit: Decrease calories, smaller portion, Increasing her vegetables.    With regards to my diet, I am still struggling with: Cravings at night   I have made the following changes to my activity/exercise since my last visit: Minimal due to neck pain   With regards to my activity/exercise, I am still struggling with: Pain         MEDICATIONS:   Current Outpatient Medications   Medication Sig Dispense Refill     ACETAMINOPHEN EXTRA STRENGTH OR Take by mouth 2 times daily        albuterol (ALBUTEROL) 108 (90 BASE) MCG/ACT Inhaler Inhale 1-2 puffs into the lungs every 6 hours as needed for shortness of breath / dyspnea 1 Inhaler 1     Ascorbic Acid (VITAMIN C PO) Take  2,000 mg by mouth daily        atorvastatin (LIPITOR) 10 MG tablet Take 1 tablet (10 mg) by mouth daily 90 tablet 3     BREO ELLIPTA 200-25 MCG/INH Inhaler        buPROPion (WELLBUTRIN SR) 100 MG 12 hr tablet Take 1 tablet (100 mg) by mouth daily 90 tablet 1     CALCIUM CITRATE PO Take 1,200 mg by mouth daily       Cetirizine HCl (ZYRTEC PO) Take 10 mg by mouth daily       cyclobenzaprine (FLEXERIL) 5 MG tablet Take 1-2 tablets (5-10 mg) by mouth nightly as needed for muscle spasms 90 tablet 3     eszopiclone (LUNESTA) 3 MG tablet Take 3 mg by mouth At Bedtime       famotidine (PEPCID) 20 MG tablet TAKE 1 TABLET BY MOUTH TWICE A  tablet 3     fenofibrate (TRICOR) 145 MG tablet Take 1 tablet (145 mg) by mouth daily 90 tablet 3     levothyroxine (SYNTHROID/LEVOTHROID) 88 MCG tablet Take 1 tablet (88 mcg) by mouth daily 90 tablet 3     lisinopril (ZESTRIL) 10 MG tablet Take 1 tablet (10 mg) by mouth daily 90 tablet 3     metroNIDAZOLE (METROCREAM) 0.75 % external cream Apply topically daily To face 45 g 1     omeprazole (PRILOSEC) 20 MG DR capsule TAKE 1 TABLET (20 MG) BY MOUTH 2 TIMES DAILY TAKE 30-60 MINUTES BEFORE A MEAL. 180 capsule 3     phentermine (LOMAIRA) 8 MG tablet Take 1 tablet (8 mg) by mouth 2 times daily 60 tablet 1     pramipexole (MIRAPEX) 0.125 MG tablet TAKE 2 TABLETS BY MOUTH AT DINNER AND 1 TABLET AT BEDTIME 270 tablet 3     triamcinolone (NASACORT AQ) 55 MCG/ACT nasal inhaler Inhale 2 sprays in both nostrils every day as needed 1 Inhaler 7     UNABLE TO FIND MEDICATION NAME: Allergy shots       VITAMIN D PO Take 2,000 Units by mouth daily         Weight Loss Medication History Reviewed With Patient 9/7/2021   Which weight loss medications are you currently taking on a regular basis?  Wellbutrin   If you are not taking a weight loss medication that was prescribed to you, please indicate why: Not losing weight, no appetite suppression   Are you having any side effects from the weight loss  "medication that we have prescribed you? No   If you are having side effects please describe:      ROS:    General  Fatigue:   Sleep Quality:poor, better off phentermine.  HEENT  Hx of glaucoma: Occular HTN, had procedure to relieve pressure.  Pressure now normal.  Will have routine annual follow up.   Vision changes: see above  Cardiovascular  Chest Pain with Exertion: none  Palpitations: some on phentermine at 30 mg.  None now.   Hx of heart disease: None  HTN: present, controlled  Gastrointestinal  Constipation: none  Psychiatric  Moods Stable: yes .  History of alcohol/drug abuse: none  Hx of eating disorder: none  Neurologic:  Hx of seizures: none  No hx of kidney stones    Ht 5' 5.5\" (1.664 m)   Wt 215 lb (97.5 kg)   LMP  (LMP Unknown)   BMI 35.23 kg/m    GENERAL: Healthy, alert and no distress  EYES: Eyes grossly normal to inspection.  No discharge or erythema, or obvious scleral/conjunctival abnormalities.  RESP: No audible wheeze, cough, or visible cyanosis.  No visible retractions or increased work of breathing.    SKIN: Visible skin clear. No significant rash, abnormal pigmentation or lesions.  NEURO: Cranial nerves grossly intact.  Mentation and speech appropriate for age.  PSYCH: Mentation appears normal, affect normal/bright, judgement and insight intact, normal speech and appearance well-groomed.    ASSESSMENT AND PLAN:      Class 2 severe obesity due to excess calories with serious comorbidity and body mass index (BMI) of 35.0 to 35.9 in adult (H)     Healthy habits to assist with further weight loss were discussed. Brandi Elkins will continue her Wellbutrin which her PCP is now prescribing for depression.   She will continue her [place] 8 mg in the morning and at noon.  If this does not help for night cravings, she can mychart and we can add the naltrexone.  She will start topiramate 25 mg titration. Risks/ benefits and possible side effects were discussed and questions were answered. Written " information was given.   - phentermine (LOMAIRA) 8 MG tablet; Take 1 tablet (8 mg) by mouth 2 times daily  - topiramate (TOPAMAX) 25 MG tablet; 25mg at bedtime for week 1, 50mg at bedtime for 1 week, and 75mg at bedtime thereafter  - Basic metabolic panel; Future    She will continue eating protein and healthy fats at each of her meals to help with satiety and healthy cholesterol.        FOLLOW-UP:    Please call [phone] to schedule your next visit with Rima Long PA-C at the end of April/beginning of May.    Please call [phone] to schedule with medical therapy management with Lauren Bloch, MTM Pharmacist  In 6-8 wks.     40 minutes spent on the date of the encounter doing chart review, history and exam, review test results, counseling, developing plan of care, documentation, and further activities as noted above.      Sincerely,    Rima Long PA-C

## 2022-01-24 ENCOUNTER — VIRTUAL VISIT (OUTPATIENT)
Dept: SURGERY | Facility: CLINIC | Age: 67
End: 2022-01-24
Payer: MEDICARE

## 2022-01-24 VITALS — BODY MASS INDEX: 34.55 KG/M2 | HEIGHT: 66 IN | WEIGHT: 215 LBS

## 2022-01-24 DIAGNOSIS — E66.09 CLASS 1 OBESITY DUE TO EXCESS CALORIES WITHOUT SERIOUS COMORBIDITY WITH BODY MASS INDEX (BMI) OF 34.0 TO 34.9 IN ADULT: ICD-10-CM

## 2022-01-24 DIAGNOSIS — E66.812 CLASS 2 SEVERE OBESITY DUE TO EXCESS CALORIES WITH SERIOUS COMORBIDITY AND BODY MASS INDEX (BMI) OF 35.0 TO 35.9 IN ADULT (H): Primary | ICD-10-CM

## 2022-01-24 DIAGNOSIS — E66.01 CLASS 2 SEVERE OBESITY DUE TO EXCESS CALORIES WITH SERIOUS COMORBIDITY AND BODY MASS INDEX (BMI) OF 35.0 TO 35.9 IN ADULT (H): Primary | ICD-10-CM

## 2022-01-24 DIAGNOSIS — I10 HYPERTENSION GOAL BP (BLOOD PRESSURE) < 140/90: ICD-10-CM

## 2022-01-24 DIAGNOSIS — E66.811 CLASS 1 OBESITY DUE TO EXCESS CALORIES WITHOUT SERIOUS COMORBIDITY WITH BODY MASS INDEX (BMI) OF 34.0 TO 34.9 IN ADULT: ICD-10-CM

## 2022-01-24 PROCEDURE — 99215 OFFICE O/P EST HI 40 MIN: CPT | Mod: 95 | Performed by: PHYSICIAN ASSISTANT

## 2022-01-24 RX ORDER — ESZOPICLONE 3 MG/1
3 TABLET, FILM COATED ORAL AT BEDTIME
COMMUNITY
End: 2022-02-08

## 2022-01-24 RX ORDER — TOPIRAMATE 25 MG/1
TABLET, FILM COATED ORAL
Qty: 90 TABLET | Refills: 1 | Status: SHIPPED | OUTPATIENT
Start: 2022-01-24 | End: 2022-04-28

## 2022-01-24 ASSESSMENT — MIFFLIN-ST. JEOR: SCORE: 1514.04

## 2022-01-24 NOTE — PATIENT INSTRUCTIONS
Nice to talk with you today.  Below is our plan we discussed.-  ERIN Abarca    Plan:  Please continue the [place].  8 mg in the morning and 8 mg around noon.   Start Topiramate.  25 mg nightly for 1 week, then 50 mg nightly for 1 week, then 75 mg nightly until you see Kadi    FOLLOW-UP:    Please call [phone] to schedule your next visit with Rima Long PA-C at the end of April/beginning of May.    Please call [phone] to schedule with medical therapy management with Lauren Bloch, [facility] Pharmacist  In 6-8 wks.       MEDICATION STARTED AT THIS APPOINTMENT  We are starting topiramate at bedtime.  Start one tab, 25 mg, for a week. Go up to 50 mg (2 tabs) for the next week. At the third week, take   3 tabs (75 mg).  Stay at 3 tabs until you are seen again. Contact the nurse via [facility] or call [phone] if you have any questions or concerns. (Do not stop taking it if you don't think it's working. For some people it works even though they do not feel much different.)    Topiramate (Topamax) is a medication that is used most often to treat migraine headaches or for seizures. It has also been found to help with weight loss. Although it's not currently FDA approved for weight loss, it has been used safely for a number of years to help people who are carrying extra weight.     Just how topiramate helps with weight loss has not been exactly determined. However it seems to work on areas of the brain to quiet down signals related to eating.      Topiramate may make you:    >feel less interest in eating in between meals   >think less about food and eating   >find it easier to push the plate away   >find giving up pop easier    >have an easier time eating less    For some of our patients, the pills work right away. They feel and think quite differently about food. Other patients don't feel much of a change but find in fact they have lost weight! Like all weight loss medications, topiramate works best when  "you help it work.  This means:    1) Have less tempting high calorie (fattening) food around the house or office    2) Have lower calorie food (fruits, vegetables,low fat meats and dairy) for snacks    3) Eat out only one time or less each week.   4) Eat your meals at a table with the TV or computer off.    Side-effects. Topiramate is generally well tolerated. The main side-effects we see are:   Tingling in hands,feet, or face (usually not very troublesome)   Mental confusion and word finding trouble (about 10% of patients have this.)     Feeling sleepy or a bit dopey- this goes away very soon after starting.    One of the dangers of topiramate is the possibility of birth defects--if you get pregnant when you are on it, there is the risk that your baby will be born with a cleft lip or palate.  If you are on topiramate and of child bearing age, you need to be on a reliable form of birth control or refrain from sexual intercourse.     Please refer to the pharmacy insert for more information on side-effects. Since many pharmacists are not familiar with the use of topiramate in weight loss, calling the clinic will get you the most accurate information on the use of this medication for weight loss.     In order to get refills of this or any medication we prescribe you must be seen in the medical weight mgmt clinic every 2-3 months.      MAINTAIN WEIGHT and OPTIMIZE YOUR METABOLISM     According to the National Weight Control Registry there are several things that people who have lost weight and kept it off have in common. Some of them are...    3 MEALS A DAY:  Make sure you are eating 3 meals each day.  Skipping breakfast, working through your lunch, or not eating dinner will lead to a slowing of your metabolism.  Studies show that 80% of people who skip meals are overweight or obese. Avoid \"mindless\" eating, i.e., eating at the TV, and the car, in front of the computer.    ADEQUATE PROTEIN INTAKE: Getting adequate " "protein is beneficial for a number of reasons: to aid the healing process, to blunt cravings immediately after eating and for a period of time after eating, to help keep blood sugars level, and to help you maintain your muscle mass.     HIGH FIBER/LOW FAT:  Lean sources of protein (skim milk, skinless, baked or broiled chicken breast, fish, etc.)  will help you meet your protein needs while fruits, vegetables, and whole grains will help you get the fiber that your body needs.  This is heart healthy eating and helps to keep calorie levels in balance.    FOOD DIARY:  Much like keeping a ledger for your checkbook, keeping a food and exercise diary helps you \"keep track\" of the balance of energy (calories) in and energy out. It also helps you recognize potential unhealthy deviations from healthy patterns before they become habit. It's a nice way to monitor whether you are getting the protein, fiber, and other nutrients that your body needs.    MOVE EVERY DAY:  It is essential to get your steps in every day, 7 days a week.  You don't have to \"work out \" 7 days a week, but throughout the day, getting 8000 steps will help you maintain the weight you have lost.  Parking far away, taking the stairs instead of the elevator, and pacing while on the phone are some ways to help achieve this goal.    MUSCLE MAINTENANCE: Muscle burns calories up to 70% better than fat.  As we age her body composition changes. We lose muscle mass.  Weight training can help us keep and even build muscle mass.  Dumbbells, pushups, rubber band training, weight machines are all examples of ways to keep and/or build muscle mass.    FOLLOW-UP:  Studies show that those who follow up with their health professional regularly maintained their weight loss and those who are \"lost to follow-up \"are at risk for regain.  Moreover, it is essential to monitor vitamin levels with laboratory studies for life following gastric bypass surgery.     EAT AT HOME:  People " who maintain a healthy weight eat at home, or meal prepared at home, 90% of the time.  Studies show that people consume an average of 770 zaki when eating out at a restaurant and 440 zaki when eating a meal prepared at home.  This equates to almost 35 pounds of excess weight for a person who eats out once a day for 1 year.

## 2022-02-08 DIAGNOSIS — G47.00 INSOMNIA, UNSPECIFIED TYPE: Primary | ICD-10-CM

## 2022-02-08 RX ORDER — ESZOPICLONE 3 MG/1
3 TABLET, FILM COATED ORAL AT BEDTIME
Qty: 30 TABLET | Refills: 5 | Status: SHIPPED | OUTPATIENT
Start: 2022-02-08 | End: 2022-06-20 | Stop reason: ALTCHOICE

## 2022-03-14 ENCOUNTER — VIRTUAL VISIT (OUTPATIENT)
Dept: PHARMACY | Facility: CLINIC | Age: 67
End: 2022-03-14

## 2022-03-14 DIAGNOSIS — L71.9 ROSACEA: ICD-10-CM

## 2022-03-14 DIAGNOSIS — Z72.0 TOBACCO ABUSE: ICD-10-CM

## 2022-03-14 DIAGNOSIS — M15.9 OSTEOARTHRITIS OF MULTIPLE JOINTS, UNSPECIFIED OSTEOARTHRITIS TYPE: ICD-10-CM

## 2022-03-14 DIAGNOSIS — I10 HYPERTENSION GOAL BP (BLOOD PRESSURE) < 140/90: ICD-10-CM

## 2022-03-14 DIAGNOSIS — E78.2 MIXED HYPERLIPIDEMIA: ICD-10-CM

## 2022-03-14 DIAGNOSIS — G25.81 RESTLESS LEG SYNDROME: ICD-10-CM

## 2022-03-14 DIAGNOSIS — F33.41 DEPRESSION, MAJOR, RECURRENT, IN PARTIAL REMISSION (H): ICD-10-CM

## 2022-03-14 DIAGNOSIS — G47.00 INSOMNIA, UNSPECIFIED TYPE: ICD-10-CM

## 2022-03-14 DIAGNOSIS — J30.2 SEASONAL ALLERGIES: ICD-10-CM

## 2022-03-14 DIAGNOSIS — K21.9 GASTROESOPHAGEAL REFLUX DISEASE WITHOUT ESOPHAGITIS: ICD-10-CM

## 2022-03-14 DIAGNOSIS — E06.3 CHRONIC LYMPHOCYTIC THYROIDITIS: ICD-10-CM

## 2022-03-14 DIAGNOSIS — J45.40 MODERATE PERSISTENT ASTHMA WITHOUT COMPLICATION: ICD-10-CM

## 2022-03-14 DIAGNOSIS — Z78.9 TAKES DIETARY SUPPLEMENTS: ICD-10-CM

## 2022-03-14 DIAGNOSIS — E66.01 MORBID OBESITY (H): Primary | ICD-10-CM

## 2022-03-14 PROCEDURE — 99605 MTMS BY PHARM NP 15 MIN: CPT | Performed by: PHARMACIST

## 2022-03-14 PROCEDURE — 99607 MTMS BY PHARM ADDL 15 MIN: CPT | Performed by: PHARMACIST

## 2022-03-14 RX ORDER — CHOLECALCIFEROL (VITAMIN D3) 50 MCG
1 TABLET ORAL DAILY
COMMUNITY
End: 2024-02-15

## 2022-03-14 RX ORDER — SENNOSIDES 8.6 MG
2 CAPSULE ORAL 2 TIMES DAILY
COMMUNITY
End: 2024-02-15

## 2022-03-14 NOTE — PROGRESS NOTES
Medication Therapy Management (MTM) Encounter    ASSESSMENT:                            Medication Adherence/Access: No issues identified    Obesity: Due to potential side effects with topiramate, consider lowering the dose to 50 mg daily as of now.     Smoking Cessation: Needs improvement. Could consider increasing bupropion to assist with smoking cessation. Dose of bupropion  mg daily in AM for 3 day then 150 mg twice daily is typically dosing to assist with smoking cessation. If PM dose affects sleep can make necessary adjustments.     Depression: Needs improvement. May benefit from dose increase of bupropion to assist with depression. See above for dosing.     Osteoarthritis: Stable.     Asthma/Allergies: Stable. Would benefit from ACT repeat in future.     Hyperlipidemia: Stable.     Supplements/Bone Health: Stable.     Insomnia: Unimproved. See above for topiramate change.     GERD: Stable.     Hypothyroidism: TSH within normal limits.     Hypertension: Stable. blood pressure at goal <140/90 mmHg.     Rosacea: Stable.     Restless Leg Syndrome: Stable.     PLAN:                            1. Can consider bupropion  mg daily in AM for 3 days then increase to 150 mg twice daily thereafter. If PM dose causes issue of sleep, can consider dropping PM dose and doing Bupropion  mg daily once daily in AM or switching to XL. Patient wishing to follow up with primary care provider about this.     2. Decrease topiramate 50 mg daily bedtime     Follow-up: 4-6 weeks with Rima Long PA-C  As planned and 3-4 months with MTM pharmacist if needed in future.     SUBJECTIVE/OBJECTIVE:                          Brandi Stern is a 66 year old female called for an initial visit. She was referred to me from Rima Long PA-C.      Reason for visit: comprehensive review of medications.    Allergies/ADRs: Reviewed in chart.   Past Medical History: Reviewed in chart  Tobacco: She reports that she quit smoking  "about 14 years ago. Her smoking use included cigarettes. She started smoking about 50 years ago. She has a 5.00 pack-year smoking history. She has never used smokeless tobacco. 1/2 pack per week.   Alcohol: not currently using    Medication Adherence/Access: no issues reported    Obesity:     Topiramate 75 mg once daily at bedtime     Followed by Rima Long PA-C , seen 1/24/2022 for [facility].  Reports higher phentermine doses were having issues with sleep and now phentermine 8 mg daily \"not doing much\" and difficulty sleeping, so stopped.  She describes she gets full fast on the regimen. Reports she thinks she has lost 6-7 lb for weight loss since starting topiramate, but is unsure if effective. Reports that eating is a highlight of the day since COVID-19 especially during winter. Has lost from 245 lb to current weight. Working on eating more nutritious foods. She does feel fatigue, some word finding difficulty and confusion at times and wonders if from topiramate.     Weight: 208 lb     Wt Readings from Last 4 Encounters:   01/24/22 215 lb (97.5 kg)   10/29/21 213 lb (96.6 kg)   10/27/21 213 lb 1.3 oz (96.7 kg)   09/15/21 215 lb (97.5 kg)     Estimated body mass index is 35.23 kg/m  as calculated from the following:    Height as of 1/24/22: 5' 5.5\" (1.664 m).    Weight as of 1/24/22: 215 lb (97.5 kg).    Smoking Cessation:   Bupropion  mg daily     Bupropion was started as wanted to be off other antidepressant but also finding that lowering # cigarettes per week smoking. Currently 1/2 pack per week. Previously before starting was smoking 2 packs per week.     Depression:   Bupropion  mg daily in AM     She reports that the medication \"isn't helping that much\" from a mood/depression perspective. She does feel somewhat depressed and has for a little while. Reports previously antidepressant was lower libido. Reports that libido is slightly improved with this transition. Has been " on for the last 3 months.     PHQ 9/3/2020 9/24/2020 9/17/2021   PHQ-9 Total Score 5 8 2   Q9: Thoughts of better off dead/self-harm past 2 weeks Not at all Not at all Not at all   Some encounter information is confidential and restricted. Go to Review Flowsheets activity to see all data.     Osteoarthritis:   Acetaminophen 1300 mg twice daily   Cyclobenzaprine 5-10 mg as needed     Takes cyclobenzaprine typically at bedtime. When she has tried NSAID before caused elevated creatinine, so now sticking to acetaminophen and finding somewhat helpful. Arthritis at multiple joints.     Asthma/Allergies:   ICS/LABA- Breo 1 puff(s) daily - not using   Short-Acting Bronchodilator: Albuterol MDI and pt reports using 0 times per week.  Cetirizine 10 mg daily   Nasacort 1 spray each nostril as needed     She will only need rescue inhaler when sick. She does [facility] allergy shots [facility]. Is not using Breo inhaler as $400. Doesn't find that she has been needing daily inhaler, reports prescribing provider is aware she doesn't need. Hasn't used rescue inhaler in a while. Triggers include: upper respiratory infections. Finds that allergies and asthma are related. Doesn't use Nasacort as of now.   Patient reports the following symptoms: none.  Asthma Action Plan on file: NO  ACT Total Scores 1/21/2019 9/24/2020 9/17/2021   ACT TOTAL SCORE - - -   ASTHMA ER VISITS - - -   ASTHMA HOSPITALIZATIONS - - -   ACT TOTAL SCORE (Goal Greater than or Equal to 20) 22 24 20   In the past 12 months, how many times did you visit the emergency room for your asthma without being admitted to the hospital? 2 0 0   In the past 12 months, how many times were you hospitalized overnight because of your asthma? 0 0 0     Hyperlipidemia:   Atorvastatin 10mg daily  Fenofibrate 145mg once daily.      Patient reports no significant myalgias or other side effects.    Recent Labs   Lab Test 11/02/21  1010 06/04/20  1049 12/31/15  1207 10/09/15  0850  09/09/14  0852   CHOL 170 152   < > 169 166   HDL 46* 47*   < > 75 64   LDL 62 66   < > 71 55   TRIG 308* 195*   < > 115 235*   CHOLHDLRATIO  --   --   --  2.3 2.6    < > = values in this interval not displayed.     Supplements/Bone Health:   Calcium citrate 500 mg twice daily   Vitamin D 2000 international unit(s) daily      Started for general bone health. Last DEXA 1/31/2020 showed lowest T Score was -2.2 at the radius.     Insomnia:   Eszopiclone 3 mg nightly.     Reports that sleep has not been great since starting weight loss medication(s). She will wake up from sleep after 4 hours, she isn't sure if from topiramate versus other item. Previously had worse issues of sleep on the phentermine so stopped phentermine and somewhat improved. Now still some issues. On days when only getting 4 hours of sleep, will need to take nap during the day.      GERD:   Pepcid (famotidine) 20 mg 2 times daily  Omeprazole 20 mg twice daily     Pt reports no current symptoms.  Patient feels that current regimen is effective. She has a hiatal hernia, reports surgery isn't an option as of now.     Hypothyroidism:   levothyroxine 88 mcg daily.     Patient is having the following symptoms: none.   TSH   Date Value Ref Range Status   11/02/2021 3.12 0.40 - 4.00 mU/L Final   06/04/2020 1.56 0.40 - 4.00 mU/L Final     Hypertension:   Lisinopril 10 mg daily.      Patient does self-monitor blood pressure every once in a while. Home BP monitoring in range of 120's systolic over 80's diastolic.  Patient reports no current medication side effects. Other day 126/80 mmHg.   BP Readings from Last 3 Encounters:   10/27/21 120/82   09/07/21 126/82   05/03/21 126/84     Rosacea:   Metronidazole .75% cram     Helpful, no issues.     Restless Leg Syndrome:   Pramipexole 0.125 mg tablet: 2 tablets with dinner daily and 1 tablet bedtime as needed     Reports that at [place] [employer] concert was the first time it occurred and had to hold her own legs down.  Reports hemochromatosis carrier.   Ferritin   Date Value Ref Range Status   09/08/2017 31 8 - 252 ng/mL Final      Hemoglobin   Date Value Ref Range Status   03/19/2021 16.3 (H) 11.7 - 15.7 g/dL Final     ----------------    I spent 30 minutes with this patient today. I offer these suggestions for consideration by prescribing provider, collaborative practice agreement with Rima Long PA-C . A copy of the visit note was provided to the patient's provider(s).    The patient was sent via [email or web address] a summary of these recommendations.     Lauren Bloch, PharmD, BCACP   Medication Therapy Management Pharmacist   [facility]    Telemedicine Visit Details  Type of service:  Telephone visit  Start Time: 1:05 PM   End Time: 1:35 PM  Originating Location (patient location): [place]  Distant Location (provider location):  [facility]     Medication Therapy Recommendations  Morbid obesity (H)    Current Medication: topiramate (TOPAMAX) 25 MG tablet   Rationale: Undesirable effect - Adverse medication event - Safety   Recommendation: Decrease Dose   Status: Accepted per CPA         Tobacco abuse    Current Medication: buPROPion (WELLBUTRIN SR) 100 MG 12 hr tablet   Rationale: Dose too low - Dosage too low - Effectiveness   Recommendation: Increase Dose - buPROPion 150 MG 12 hr tablet   Status: Contact Provider - Awaiting Response

## 2022-03-16 NOTE — PATIENT INSTRUCTIONS
Recommendations from today's MTM visit:                                                    MTM (medication therapy management) is a service provided by a clinical pharmacist designed to help you get the most of out of your medicines.   Today we reviewed what your medicines are for, how to know if they are working, that your medicines are safe and how to make your medicine regimen as easy as possible.      1. Can consider bupropion  mg daily in AM for 3 days then increase to 150 mg twice daily thereafter. If PM dose causes issue of sleep, can consider dropping PM dose and doing Bupropion  mg daily once daily in AM or switching to XL.     2. Decrease topiramate 50 mg daily bedtime     Follow-up: 4-6 weeks with Rima Long PA-C  As planned and 3-4 months with MTM pharmacist if needed in future  It was great to speak with you today.  I value your experience and would be very thankful for your time with providing feedback on our clinic survey. You may receive a survey via email or text message in the next few days.       My Clinical Pharmacist's contact information:                                                      Please feel free to contact me with any questions or concerns you have.      Lauren Bloch, PharmD  Medication Therapy Management Pharmacist   [facility] [place]

## 2022-03-21 ENCOUNTER — MYC MEDICAL ADVICE (OUTPATIENT)
Dept: SURGERY | Facility: CLINIC | Age: 67
End: 2022-03-21
Payer: MEDICARE

## 2022-03-22 DIAGNOSIS — E66.01 MORBID OBESITY (H): Primary | ICD-10-CM

## 2022-03-22 NOTE — TELEPHONE ENCOUNTER
LEOLA Booth - call:  phentermine (LOMAIRA) 8 MG tablet 60 tablet 0 3/22/2022  --   Sig - Route: Take 1 tablet (8 mg) by mouth 2 times daily - Oral     To [facility] pharmacy [place].    Above prescription called to [facility] and spoke with [facility] pharmacist who did read back.  Lenora Salcido, MS, RD, RN       
Cardiology NP post procedure note:    -s/p BRENDA by Dr. Narayan:  Large echodensity measuring 4.6cm x 3.0cm adherent to atrial side of Watchman device; see full report below    TELE: afib 70s    MEDICATIONS  (STANDING):  chlorhexidine 4% Liquid 1 Application(s) Topical Once    Allergies:  dabigatran (Unknown)      PAST MEDICAL & SURGICAL HISTORY:  Chronic atrial fibrillation    Hypertension    Cerebrovascular accident (CVA)    No significant past surgical history        Vital Signs Last 24 Hrs  T(C): 36.6 (06 Apr 2021 08:22), Max: 36.6 (06 Apr 2021 08:22)  T(F): 97.8 (06 Apr 2021 08:22), Max: 97.8 (06 Apr 2021 08:22)  HR: 69 (06 Apr 2021 08:22) (69 - 69)  BP: 156/98 (06 Apr 2021 08:22) (156/98 - 156/98)  BP(mean): --  RR: 19 (06 Apr 2021 08:22) (19 - 19)  SpO2: 99% (06 Apr 2021 08:22) (99% - 99%)    Physical Exam:  Constitutional: NAD, AAOx3  Cardiovascular: +S1S2 RRR  Pulmonary: CTA b/l, unlabored  GI: soft NTND +BS  Extremities: no pedal edema, +distal pulses b/l  Neuro: non focal, HADLEY x4    LABS:                        15.3   6.87  )-----------( 219      ( 06 Apr 2021 08:09 )             45.9     04-06    141  |  105  |  22.0<H>  ----------------------------<  97  3.4<L>   |  26.0  |  0.99    Ca    8.7      06 Apr 2021 08:09      PT/INR - ( 06 Apr 2021 08:09 )   PT: 13.4 sec;   INR: 1.16 ratio         PTT - ( 06 Apr 2021 08:09 )  PTT:30.3 sec      RADIOLOGY & ADDITIONAL TESTS:  < from: BRENDA Echo Doppler (04.06.21 @ 09:18) >  EXAM:  ECHO TRANSESOPHAGEAL    EXAM:  DOPPLER ECHO COMP W SPECTRAL    EXAM:  DOPPLER ECHOCARD COLOR FLOW      PROCEDURE DATE:  Apr  6 2021   .      INTERPRETATION:  REPORT:  TRANSESOPHAGEAL ECHOCARDIOGRAM REPORT        Patient Name:   BETH JAVIER Patient Location: [facility] Rec #:  [id number]        Accession #:      [id number]  Account #:                      Height:           74.8 in 190.0 cm  YOB: 1952       Weight:           240.3 lb 109.00 kg  Patient Age:    68 years        BSA:              2.37 m²  Patient Gender: M               BP:               156/98 mmHg      Date of Exam:        4/6/2021 9:18:54 AM  Sonographer:         Dylan Del Castillo  Referring Physician: Gila Field    Procedure:   Transesophageal Echocardiogram.  Indications: Thrombus  Diagnosis:   Left atrial thrombus    SPECTRAL DOPPLER ANALYSIS (where applicable):      PROCEDURE: After discussion of the risks and benefits of the BRENDA, an informed consent was obtained by the cardiologist. Intravenous sedation was performed by anesthesia. The BRENDA probe was passed by the cardiologist without difficulty. Images were obtained with the patient in a left lateral decubitus position. Image quality was adequate. The patient tolerated the procedure well and without complications.    PHYSICIAN INTERPRETATION:  Left Ventricle: The left ventricular internal cavity size is normal.  Global LV systolic function was normal.  Right Ventricle: The right ventricular size is normal. RV systolic function is normal.  Left Atrium: Dilated left atrium with spontaneous echocontrast. Large echodensity measuring 4.6cm x 3.0cm adherent to atrial side of Watchman device. Watchman well-seated on left atrial appendage without evidence of leak. Color flow doppler and intravenous injection of agitated saline demonstrates the presence of an intact intra atrial septum.  Pericardium: There is no evidence of pericardial effusion.  Mitral Valve: The mitral valve is normal in structure. Mitral leaflet mobility is normal. Mild mitral valve regurgitation is seen.  Tricuspid Valve: The tricuspid valve is normal in structure. Mild-moderate tricuspid regurgitation is visualized.  Aortic Valve: The aortic valve is trileaflet. Trivial aortic valve regurgitation is seen.  Pulmonic Valve: The pulmonic valve is normal. Trace pulmonic valve regurgitation.  Aorta: Aortic root measured at Sinus of Valsalva is normal. Simple atheroma seen in the descending aorta.  Shunts: Agitated saline contrast was given intravenously to evaluate for intracardiac shunting. There is no evidence of a patent foramen ovale.      Summary:   1. Normal global left ventricular systolic function.   2. Dilated left atrium with spontaneous echocontrast. Large echodensity measuring 4.6cmx 3.0cm adherent to atrial side of Watchman device. Watchman well-seated on left atrial appendage without evidence of leak.   3. Mild mitral valve regurgitation.   4. Mild-moderate tricuspid regurgitation.   5. Color flow doppler and intravenous injection of agitated saline demonstrates the presence of an intact intra atrial septum.    [id number] Ange Narayan , Electronically signed on 4/6/2021 at 10:12:17 AM        *** Final ***              ANGE NARAYAN MD; Attending Cardiologist  This document has been electronically signed. Apr 6 2021  9:18AM    < end of copied text >

## 2022-03-24 ENCOUNTER — E-VISIT (OUTPATIENT)
Dept: FAMILY MEDICINE | Facility: CLINIC | Age: 67
End: 2022-03-24
Payer: MEDICARE

## 2022-03-24 DIAGNOSIS — F33.1 MAJOR DEPRESSIVE DISORDER, RECURRENT EPISODE, MODERATE WITH ANXIOUS DISTRESS (H): ICD-10-CM

## 2022-03-24 DIAGNOSIS — Z51.81 ENCOUNTER FOR THERAPEUTIC DRUG MONITORING: Primary | ICD-10-CM

## 2022-03-24 PROCEDURE — 99421 OL DIG E/M SVC 5-10 MIN: CPT | Performed by: NURSE PRACTITIONER

## 2022-03-24 ASSESSMENT — PATIENT HEALTH QUESTIONNAIRE - PHQ9
SUM OF ALL RESPONSES TO PHQ QUESTIONS 1-9: 5
SUM OF ALL RESPONSES TO PHQ QUESTIONS 1-9: 5
10. IF YOU CHECKED OFF ANY PROBLEMS, HOW DIFFICULT HAVE THESE PROBLEMS MADE IT FOR YOU TO DO YOUR WORK, TAKE CARE OF THINGS AT HOME, OR GET ALONG WITH OTHER PEOPLE: SOMEWHAT DIFFICULT

## 2022-03-25 ASSESSMENT — PATIENT HEALTH QUESTIONNAIRE - PHQ9: SUM OF ALL RESPONSES TO PHQ QUESTIONS 1-9: 5

## 2022-03-25 NOTE — TELEPHONE ENCOUNTER
Routing refill request to provider for review/approval because:  PHQ-9 score greater than 4 per [facility] protocol  PHQ-9 score:    PHQ 3/24/2022   PHQ-9 Total Score 5   Q9: Thoughts of better off dead/self-harm past 2 weeks Not at all   Some encounter information is confidential and restricted. Go to Review Flowsheets activity to see all data.       LILY PetersN RN  [facility]

## 2022-03-27 RX ORDER — BUPROPION HYDROCHLORIDE 100 MG/1
TABLET, EXTENDED RELEASE ORAL
Qty: 90 TABLET | Refills: 1 | Status: SHIPPED | OUTPATIENT
Start: 2022-03-27 | End: 2022-03-28

## 2022-04-01 ENCOUNTER — TRANSFERRED RECORDS (OUTPATIENT)
Dept: HEALTH INFORMATION MANAGEMENT | Facility: CLINIC | Age: 67
End: 2022-04-01
Payer: MEDICARE

## 2022-04-11 ENCOUNTER — TRANSFERRED RECORDS (OUTPATIENT)
Dept: HEALTH INFORMATION MANAGEMENT | Facility: CLINIC | Age: 67
End: 2022-04-11
Payer: MEDICARE

## 2022-04-18 NOTE — PROGRESS NOTES
Brandi Elkins is a 66 year old who is being evaluated via a billable video visit.      If the video visit is dropped, the invitation should be resent by: Text to cell phone: [phone]  Will anyone else be joining your video visit? No      Video-Visit Details    Type of service:  Video Visit    Video Start Time: 2:03 PM    Video End Time:2:29 PM      Originating Location (pt. Location): Home    Distant Location (provider location):  [facility] [place]     Platform used for Video Visit: Gracious Eloise    2022    Return Medical Weight Management Note     Brandi Stern  MRN:  [id number]  :  1955    Dear Cherie Brennan NP,    I had the pleasure of seeing your patient Brandi Stern. She is a 66 year old female who I am continuing to see for treatment of obesity related to:       2021   I have the following health issues associated with obesity: High Blood Pressure, Sleep Apnea, GERD (Reflux), Hypothyroidism   I have the following symptoms associated with obesity: Knee Pain, Back Pain, Fatigue       Assessment & Plan   Problem List Items Addressed This Visit     Major depressive disorder, recurrent episode, moderate      Pt has taken herself off Wellbutrin.  Continues to have depression and some tearfulness when speaking about her mothers death from 2 years ago.  We discussed the potential  of using Lexapro which has minimal effect on weight in future if needed.  A Mental Health referral was ordered today. PT would like to go back to her therapist Ronda Escobar whom she sawa at [place] in 2018.             Relevant Orders    Adult Mental Health  Referral    Class 1 obesity due to excess calories without serious comorbidity with body mass index (BMI) of 34.0 to 34.9 in adult - Primary     Patient was congratulated on weight loss success thus far. Healthy habits to assist with further weight loss were discussed. Brandi Elkins will not restart the [place], Wellbutrin,  or Topiramate.  She will start metformin.  Risks/ benefits and possible side effects were discussed and questions were answered. Written information was given.     Discussed 24 week HLP plan.  Info phone call offered but pt declined. Pt has interested and would like a handout sent to her house.  Will follow up at next visit.  If pt interested in starting sooner she will mychart.                  Relevant Medications    metFORMIN (GLUCOPHAGE-XR) 500 MG 24 hr tablet    Other Relevant Orders    Adult Mental Health  Referral           PATIENT INSTRUCTIONS:  Start metformin  Take 1 tablet by mouth daily with a meal for 1 week   Increase to 2 tablets daily with a meal in week 2  Increase to 3 tablets with a meal if tolerating in week 3    Goals:  Schedule initial therapy appointment.  Consider the 24 week program     FOLLOW-UP:    Please call [phone] to schedule your next visit in July.     41 minutes spent on the date of the encounter doing chart review, history and exam, result review, counseling, developing plan of care, documentation, and further activities as noted      INTERVAL HISTORY:  Brandi Elkins returns for medical weight management follow up.  She continues on [place]. We started Topiramate last visit and it did not work at all.  It caused her terrible malaise.  She also took her off the [place] because it was causing her trouble with sleep.  She took herself of Wellbutrin and Mirapex and feels there helped her balance issues.     She is not sure if there is any other AOM medications she could try.  Is weary of side effects.       Mood:  Not working has been fun but with Covid she feels she has been isolated.      Feels she needs to get back on the road to healthy eating.  Has some self indulgence with eating and emotional eating. Feels she has some depression.  She has interested is seeing someone again regarding this.     WEIGHT METRICS:  Body mass index is 34.41 kg/m .   Current Weight: 210 lb  (95.3 kg) (pt reported)  Last Visits Weight: 215 lb (97.5 kg)  Initial Weight (lbs): 235.5 lbs  Cumulative weight loss (lbs): 25.5  Weight Loss Percentage: 10.83%    Wt Readings from Last 10 Encounters:   04/28/22 210 lb (95.3 kg)   01/24/22 215 lb (97.5 kg)   10/29/21 213 lb (96.6 kg)   10/27/21 213 lb 1.3 oz (96.7 kg)   09/15/21 215 lb (97.5 kg)   09/07/21 214 lb 12.8 oz (97.4 kg)   07/05/21 218 lb (98.9 kg)   06/08/21 225 lb (102.1 kg)   05/10/21 233 lb 4.8 oz (105.8 kg)   05/03/21 235 lb 8 oz (106.8 kg)        Weight Loss Medication History Reviewed With Patient    Which weight loss medications are you currently taking on a regular basis?  None   If you are not taking a weight loss medication that was prescribed to you, please indicate why: I did not like the side effects   Are you having any side effects from the weight loss medication that we have prescribed you? Yes   If you are having side effects please describe: Insomnia, malaise see HPI       Changes and Difficulties    I have made the following changes to my diet since my last visit: Decrease calories, smaller portion.   With regards to my diet, I am still struggling with: Emotional eating   I have made the following changes to my activity/exercise since my last visit: Unable   With regards to my activity/exercise, I am still struggling with: Pain       MEDICATIONS:   Current Outpatient Medications   Medication Sig Dispense Refill     acetaminophen (TYLENOL) 650 MG CR tablet Take 2 mg by mouth 2 times daily       albuterol (ALBUTEROL) 108 (90 BASE) MCG/ACT Inhaler Inhale 1-2 puffs into the lungs every 6 hours as needed for shortness of breath / dyspnea 1 Inhaler 1     atorvastatin (LIPITOR) 10 MG tablet Take 1 tablet (10 mg) by mouth daily 90 tablet 3     CALCIUM CITRATE PO Take 500 mg by mouth 2 times daily       Cetirizine HCl (ZYRTEC PO) Take 10 mg by mouth daily       cyclobenzaprine (FLEXERIL) 5 MG tablet Take 1-2 tablets (5-10 mg) by mouth nightly  as needed for muscle spasms 90 tablet 3     eszopiclone (LUNESTA) 3 MG tablet Take 1 tablet (3 mg) by mouth At Bedtime 30 tablet 5     famotidine (PEPCID) 20 MG tablet TAKE 1 TABLET BY MOUTH TWICE A  tablet 3     fenofibrate (TRICOR) 145 MG tablet Take 1 tablet (145 mg) by mouth daily 90 tablet 3     levothyroxine (SYNTHROID/LEVOTHROID) 88 MCG tablet Take 1 tablet (88 mcg) by mouth daily 90 tablet 3     lisinopril (ZESTRIL) 10 MG tablet Take 1 tablet (10 mg) by mouth daily 90 tablet 3     metFORMIN (GLUCOPHAGE-XR) 500 MG 24 hr tablet 1 tablet by mouth daily with supper for 1 week, then 2 tablets daily with supper, then if tolerating can increase to 3 tablets daily with supper 270 tablet 0     metroNIDAZOLE (METROCREAM) 0.75 % external cream Apply topically daily To face 45 g 1     omeprazole (PRILOSEC) 20 MG DR capsule TAKE 1 TABLET (20 MG) BY MOUTH 2 TIMES DAILY TAKE 30-60 MINUTES BEFORE A MEAL. 180 capsule 3     triamcinolone (NASACORT AQ) 55 MCG/ACT nasal inhaler Inhale 2 sprays in both nostrils every day as needed 1 Inhaler 7     UNABLE TO FIND MEDICATION NAME: Allergy shots       vitamin D3 (CHOLECALCIFEROL) 50 mcg (2000 units) tablet Take 1 tablet by mouth daily       pramipexole (MIRAPEX) 0.125 MG tablet TAKE 2 TABLETS BY MOUTH AT DINNER AND 1 TABLET AT BEDTIME 270 tablet 3       LABS:  Vitamin D Deficiency screening   Date Value Ref Range Status   09/08/2017 45 20 - 75 ug/L Final     Comment:     Season, race, dietary intake, and treatment affect the concentration of   25-hydroxy-Vitamin D. Values may decrease during winter months and increase   during summer months. Values 20-29 ug/L may indicate Vitamin D insufficiency   and values <20 ug/L may indicate Vitamin D deficiency.  Vitamin D determination is routinely performed by an immunoassay specific for   25 hydroxyvitamin D3.  If an individual is on vitamin D2 (ergocalciferol)   supplementation, please specify 25 OH vitamin D2 and D3 level  determination by   LCMSMS test [id number].       TSH   Date Value Ref Range Status   11/02/2021 3.12 0.40 - 4.00 mU/L Final   06/04/2020 1.56 0.40 - 4.00 mU/L Final     Sodium   Date Value Ref Range Status   11/02/2021 137 133 - 144 mmol/L Final   03/19/2021 140 133 - 144 mmol/L Final     Potassium   Date Value Ref Range Status   11/02/2021 4.2 3.4 - 5.3 mmol/L Final   03/19/2021 4.3 3.4 - 5.3 mmol/L Final     Chloride   Date Value Ref Range Status   11/02/2021 105 94 - 109 mmol/L Final   03/19/2021 108 94 - 109 mmol/L Final     Carbon Dioxide   Date Value Ref Range Status   03/19/2021 27 20 - 32 mmol/L Final     Carbon Dioxide (CO2)   Date Value Ref Range Status   11/02/2021 24 20 - 32 mmol/L Final     Anion Gap   Date Value Ref Range Status   11/02/2021 8 3 - 14 mmol/L Final   03/19/2021 5 3 - 14 mmol/L Final     Glucose   Date Value Ref Range Status   11/02/2021 81 70 - 99 mg/dL Final   03/19/2021 106 (H) 70 - 99 mg/dL Final     Urea Nitrogen   Date Value Ref Range Status   11/02/2021 18 7 - 30 mg/dL Final   03/19/2021 15 7 - 30 mg/dL Final     Creatinine   Date Value Ref Range Status   11/02/2021 0.85 0.52 - 1.04 mg/dL Final   03/19/2021 0.95 0.52 - 1.04 mg/dL Final     GFR Estimate   Date Value Ref Range Status   11/02/2021 71 >60 mL/min/1.73m2 Final     Comment:     As of July 11, 2021, eGFR is calculated by the CKD-EPI creatinine equation, without race adjustment. eGFR can be influenced by muscle mass, exercise, and diet. The reported eGFR is an estimation only and is only applicable if the renal function is stable.   03/19/2021 62 >60 mL/min/[1.73_m2] Final     Comment:     Non  GFR Calc  Starting 12/18/2018, serum creatinine based estimated GFR (eGFR) will be   calculated using the Chronic Kidney Disease Epidemiology Collaboration   (CKD-EPI) equation.       Calcium   Date Value Ref Range Status   11/02/2021 9.0 8.5 - 10.1 mg/dL Final   03/19/2021 9.4 8.5 - 10.1 mg/dL Final     Bilirubin Total  "  Date Value Ref Range Status   11/02/2021 0.4 0.2 - 1.3 mg/dL Final   06/04/2020 0.4 0.2 - 1.3 mg/dL Final     Alkaline Phosphatase   Date Value Ref Range Status   11/02/2021 40 40 - 150 U/L Final   06/04/2020 32 (L) 40 - 150 U/L Final     ALT   Date Value Ref Range Status   11/02/2021 21 0 - 50 U/L Final   06/04/2020 20 0 - 50 U/L Final     AST   Date Value Ref Range Status   11/02/2021 19 0 - 45 U/L Final   06/04/2020 20 0 - 45 U/L Final     Cholesterol   Date Value Ref Range Status   11/02/2021 170 <200 mg/dL Final   06/04/2020 152 <200 mg/dL Final     HDL Cholesterol   Date Value Ref Range Status   06/04/2020 47 (L) >49 mg/dL Final     Direct Measure HDL   Date Value Ref Range Status   11/02/2021 46 (L) >=50 mg/dL Final     LDL Cholesterol Calculated   Date Value Ref Range Status   11/02/2021 62 <=100 mg/dL Final   06/04/2020 66 <100 mg/dL Final     Comment:     Desirable:       <100 mg/dl     Triglycerides   Date Value Ref Range Status   11/02/2021 308 (H) <150 mg/dL Final   06/04/2020 195 (H) <150 mg/dL Final     Comment:     Borderline high:  150-199 mg/dl  High:             200-499 mg/dl  Very high:       >499 mg/dl       WBC   Date Value Ref Range Status   03/19/2021 6.4 4.0 - 11.0 10e9/L Final     Hemoglobin   Date Value Ref Range Status   03/19/2021 16.3 (H) 11.7 - 15.7 g/dL Final     Hematocrit   Date Value Ref Range Status   03/19/2021 47.6 (H) 35.0 - 47.0 % Final     MCV   Date Value Ref Range Status   03/19/2021 98 78 - 100 fl Final     Platelet Count   Date Value Ref Range Status   03/19/2021 336 150 - 450 10e9/L Final         PE:  Ht 5' 5.5\" (1.664 m)   Wt 210 lb (95.3 kg)   LMP  (LMP Unknown)   BMI 34.41 kg/m    GENERAL: Healthy, alert and no distress  EYES: Eyes grossly normal to inspection.  No discharge or erythema, or obvious scleral/conjunctival abnormalities.  RESP: No audible wheeze, cough, or visible cyanosis.  No visible retractions or increased work of breathing.    SKIN: Visible skin " clear. No significant rash, abnormal pigmentation or lesions.  NEURO: Cranial nerves grossly intact.  Mentation and speech appropriate for age.  PSYCH: Mentation appears normal, affect normal/bright, judgement and insight intact, normal speech and appearance well-groomed.      Sincerely,      Rima Long PA-C

## 2022-04-28 ENCOUNTER — VIRTUAL VISIT (OUTPATIENT)
Dept: SURGERY | Facility: CLINIC | Age: 67
End: 2022-04-28
Payer: MEDICARE

## 2022-04-28 VITALS — BODY MASS INDEX: 33.75 KG/M2 | WEIGHT: 210 LBS | HEIGHT: 66 IN

## 2022-04-28 DIAGNOSIS — F33.1 MAJOR DEPRESSIVE DISORDER, RECURRENT EPISODE, MODERATE WITH ANXIOUS DISTRESS (H): ICD-10-CM

## 2022-04-28 DIAGNOSIS — E66.09 CLASS 1 OBESITY DUE TO EXCESS CALORIES WITHOUT SERIOUS COMORBIDITY WITH BODY MASS INDEX (BMI) OF 34.0 TO 34.9 IN ADULT: Primary | ICD-10-CM

## 2022-04-28 DIAGNOSIS — E66.811 CLASS 1 OBESITY DUE TO EXCESS CALORIES WITHOUT SERIOUS COMORBIDITY WITH BODY MASS INDEX (BMI) OF 34.0 TO 34.9 IN ADULT: Primary | ICD-10-CM

## 2022-04-28 PROCEDURE — 99215 OFFICE O/P EST HI 40 MIN: CPT | Mod: 95 | Performed by: PHYSICIAN ASSISTANT

## 2022-04-28 RX ORDER — METFORMIN HCL 500 MG
TABLET, EXTENDED RELEASE 24 HR ORAL
Qty: 270 TABLET | Refills: 0 | Status: SHIPPED | OUTPATIENT
Start: 2022-04-28 | End: 2022-07-11

## 2022-04-28 NOTE — Clinical Note
Pt interested in 24 week plan.  Could you please send her our handout on this?  Thanks.  RAYMOND Patient is a 37y old  Female who presents with a chief complaint of Fever and RUQ abdominal pain X 6 days (17 Oct 2019 08:12)    pt seen in icu [  ], reg med floor [ x  ], bed [  x], chair at bedside [   ], a+o x3 [x  ], lethargic [  ],  nad [ x ]      Allergies    No Known Allergies        Vitals    T(F): 97.2 (10-17-19 @ 05:27), Max: 99.3 (10-16-19 @ 13:45)  HR: 74 (10-17-19 @ 05:27) (67 - 82)  BP: 106/72 (10-17-19 @ 05:27) (102/65 - 123/77)  RR: 16 (10-17-19 @ 05:27) (16 - 16)  SpO2: 97% (10-17-19 @ 05:27) (97% - 100%)  Wt(kg): --  CAPILLARY BLOOD GLUCOSE      POCT Blood Glucose.: 131 mg/dL (17 Oct 2019 08:09)      Labs                          12.1   8.43  )-----------( 281      ( 17 Oct 2019 07:35 )             37.5       10-17    138  |  108  |  8   ----------------------------<  143<H>  3.8   |  23  |  0.67    Ca    8.4      17 Oct 2019 07:35    TPro  7.6  /  Alb  3.0<L>  /  TBili  0.6  /  DBili  x   /  AST  442<H>  /  ALT  610<H>  /  AlkPhos  147<H>  10-17    Lipase, Serum: 130 U/L (10.16.19 @ 12:25)        Infectious Mononucleosis Screen: Negative: Method: Multibead Immunoassay (10.16.19 @ 19:39)    Cancer Antigen, GI Ca 19-9: 2: Method: Roche Electrochemiluminescence Immuno Assay  Values obtained with different assay methods or kits cannot be  used interchangeably.  Results cannot be interpreted as absolute evidence of the presence  or absence of malignant disease. U/mL (10.16.19 @ 19:41)              .Urine  10-13 @ 21:58   <10,000 CFU/mL Normal Urogenital Daysi  --  --      .Blood  10-13 @ 14:30   No growth to date.  --  --          Radiology Results    < from: CT Chest No Cont (10.16.19 @ 12:50) >  LUNGS AND AIRWAYS: Patent central airways.  Bilateral lower lobe and   right middle lobe discoid atelectasis. No focal consolidation or dominant   mass.    PLEURA: No pleural effusion.    MEDIASTINUM AND CARISSA: No lymphadenopathy.    VESSELS: Within normal limits.    HEART: Mild cardiomegaly. No pericardial effusion.    CHEST WALL AND LOWER NECK: Within normal limits.    VISUALIZED UPPER ABDOMEN: Enlarged liver. The left lobe extends across   the midline draping over the spleen. Hepatic steatosis.    BONES: Within normal limits.    IMPRESSION:     Bilateral lower lobe and right middle lobe discoid atelectasis. Mild   cardiomegaly.    Hepatomegaly and hepatic steatosis.    < end of copied text >        Meds    MEDICATIONS  (STANDING):  dextrose 5%. 1000 milliLiter(s) (50 mL/Hr) IV Continuous <Continuous>  dextrose 50% Injectable 12.5 Gram(s) IV Push once  dextrose 50% Injectable 25 Gram(s) IV Push once  dextrose 50% Injectable 25 Gram(s) IV Push once  insulin lispro (HumaLOG) corrective regimen sliding scale   SubCutaneous three times a day before meals  insulin lispro (HumaLOG) corrective regimen sliding scale   SubCutaneous at bedtime  melatonin 3 milliGRAM(s) Oral at bedtime  pantoprazole    Tablet 40 milliGRAM(s) Oral before breakfast  piperacillin/tazobactam IVPB.. 3.375 Gram(s) IV Intermittent every 8 hours  sodium chloride 0.9%. 1000 milliLiter(s) (50 mL/Hr) IV Continuous <Continuous>      MEDICATIONS  (PRN):  dextrose 40% Gel 15 Gram(s) Oral once PRN Blood Glucose LESS THAN 70 milliGRAM(s)/deciliter  glucagon  Injectable 1 milliGRAM(s) IntraMuscular once PRN Glucose LESS THAN 70 milligrams/deciliter  ibuprofen  Tablet. 200 milliGRAM(s) Oral three times a day PRN Mild Pain (1 - 3)      Physical Exam        Neuro :  no focal deficits  Respiratory: CTA B/L  CV: RRR, S1S2, no murmurs,   Abdominal: Soft, ND +BS, ruq mild tender to deep palp  Extremities: No edema, + peripheral pulses      ASSESSMENT    sepsis   r/o cholangitis  possible acalculous cholecystitis  right lower lobe pna r/o  atelectasis  ? pancreatitis vs duodenitis  splenomegally  transaminitis  h/o DM (diabetes mellitus)        PLAN        id f/u   blood cx : no growth   Continue zosyn inpatient and May change to oral Levaquin and Flagyl on discharge to complete the 10 days course  mrcp result noted noted  ct abd pelvis with contrast with medial  right middle lobe air bronchogram may represent pneumonia. Hepatic steatosis and hepatomegaly again noted.  Mild undulating contour of the liver may represent cirrhosis. Splenomegaly and mild ascites. Mild gallbladder wall thickening versus pericholecystic fluid. No CT   evidence for gallstone. Mild common bile duct dilatation. Mild stranding adjacent to the pancreatic head and duodenum may represent   acute pancreatitis or nonspecific duodenitis. Mildly enlarged 1.4 cm peripancreatic lymph node noted above   f/u hida scan  gi f/u   ca 19-9 neg noted above,   lipase wnl  infectious mono scrn neg noted above  hepatitis panel : negative   lft's still elevated  pt tolerating clears  npo at midnight for hida in am   ct chest with Bilateral lower lobe and right middle lobe discoid atelectasis. No focal consolidation or dominant mass noted above.  pulm f/u  incentive spirometer  hss  hgba1c : 7.4  cont current meds

## 2022-04-28 NOTE — PATIENT INSTRUCTIONS
Nice to talk with you today.  Below is our plan we discussed.-  ERIN Abarca    Plan:  Start metformin  Take 1 tablet by mouth daily with a meal for 1 week   Increase to 2 tablets daily with a meal in week 2  Increase to 3 tablets with a meal if tolerating in week 3    Goals:  Schedule initial therapy appointment.  Consider the 24 week program     FOLLOW-UP:    Please call [phone] to schedule your next visit in July.     MEDICATION STARTED AT THIS APPOINTMENT    We are starting metformin.  Starting instructions:    Extended release tablet: Take 1 tablet by mouth daily with a meal for 1 week, then increase to 2 tablets daily with a meal, then increase to 3 tablets with a meal if tolerating.  (If diarrhea or GI side effects too much, back down on your dose.)    Contact the nurse via [employer] or call [phone] if you have any questions or concerns    Metformin is a medication that is used to assist with lowering blood sugars in patients that have Pre-Diabetes or Diabetes. It has also been found to help with weight loss.    Metformin helps to lower blood sugars by lowering the amount of sugar (glucose) your liver produces that would otherwise cause your blood sugar to increase. It also makes your body respond better to insulin (the product that lowers your blood sugar). It is unclear how metformin assists with weight loss.     Side-effects. Metformin is generally well tolerated. The main side-effects we see are diarrhea, nausea and stomach upset - this tends to subside over time as your body gets used to the medication. Taking this medications with food will lower these side effects.    Low blood sugar (hypoglycemia) is rare to occur with metformin, but can occur if you do not eat enough or are taking other medications that assist to lower blood sugar. The signs of low blood sugar are:  Weakness  Shaky   Hungry  Sweating  Confusion    See below for ways to treat low blood sugar without adding in lots of extra  calories.    Treating Low Blood Sugar    If you have symptoms of low blood sugar (sweating, shaking, dizzy, confused) eat 15 grams of carbs and wait 15 minutes:    Glucose Tabs are best for sugars under 70 -  Dex4 or BD Glucose tablets are good, you will need to take 3-4 of these to equal 15 grams.     One small box of raisins  4 oz fruit juice box or   cup fruit juice  1 small apple  1 small banana    cup canned fruit in water    English muffin or a slice of bread with jelly   1 low fat frozen waffle with sugar-free syrup    cup cottage cheese with   cup frozen or fresh blueberries  1 cup skim or low-fat milk    cup whole grain cereal  4-6 crackers such as Triscuits    Please refer to the pharmacy insert for more information on side-effects. Please call the clinic should you have more questions regarding this medication.      In order to get refills of this or any medication we prescribe you must be seen in the medical weight mgmt clinic every 2-3 months.

## 2022-04-28 NOTE — ASSESSMENT & PLAN NOTE
Patient was congratulated on weight loss success thus far. Healthy habits to assist with further weight loss were discussed. Brandi Elkins will not restart the [place], Wellbutrin, or Topiramate.  She will start metformin.  Risks/ benefits and possible side effects were discussed and questions were answered. Written information was given.     Discussed 24 week HLP plan.  Info phone call offered but pt declined. Pt has interested and would like a handout sent to her house.  Will follow up at next visit.  If pt interested in starting sooner she will mychart.

## 2022-04-28 NOTE — ASSESSMENT & PLAN NOTE
Pt has taken herself off Wellbutrin.  Continues to have depression and some tearfulness when speaking about her mothers death from 2 years ago.  We discussed the potential  of using Lexapro which has minimal effect on weight in future if needed.  A Mental Health referral was ordered today. PT would like to go back to her therapist Ronda Escobar whom she sawa at [place] in 2018.

## 2022-05-02 ENCOUNTER — TELEPHONE (OUTPATIENT)
Dept: BEHAVIORAL HEALTH | Facility: CLINIC | Age: 67
End: 2022-05-02
Payer: MEDICARE

## 2022-05-02 NOTE — TELEPHONE ENCOUNTER
First attempt to contact pt. Writer left a VM with TC contact info and encouraged a phone call back to schedule initial therapy appointment. Writer will postpone for tomorrow.    Afshannayan Ritterrera  05/02/22  1042    ----- Message from Lauro Alonoz sent at 5/2/2022  9:56 AM CDT -----  Transition Clinic Referral   [place] Only   Limited [place] Availability    Type of Referral:      ___X__Therapy  _____Therapy & Medication (Psychiatry next level of care appointment needs to be scheduled)  _____Medication Only (Psychiatry next level of care appointment needs to be scheduled)  _____Diagnostic Assessment Only      Referring Provider Name: Laruo Alonzo    Clinician completing the assessment. NA    Referring Provider: OTHER: intake     If known, referring provider contact name: NA; Phone Number: NA  Service Line/Location: na    Reason for Transition Clinic Referral: pt called to radha vergara as a new client.   Appts @ [facility] out to Oct     Next Level of Care Patient Will Be Transitioned To: Therapy @ [facility]     Start Date for Next Level of Care Therapy (Required): 10/17/22  Provider Maryjo Humphries, [facility]  Location  [place]     Start Date for Next Level of Care Medication (Required): na   Provider na  Location na   Psychiatry cannot see patients who do not have active medical insurance    What Would Be Helpful from the Transition Clinic: help bridge the appt gap      Needs: NO    Does Patient Have Access to Technology: shane    Patient E-mail Address: [email or web address]    Current Patient Phone Number: [phone];     Clinician Gender Preference (if applicable): shane Alonzo

## 2022-05-03 ENCOUNTER — TELEPHONE (OUTPATIENT)
Dept: BEHAVIORAL HEALTH | Facility: CLINIC | Age: 67
End: 2022-05-03
Payer: MEDICARE

## 2022-05-03 NOTE — TELEPHONE ENCOUNTER
Pt called back on que and scheduled initital TC therapy appointment for 05/10/22 @ 1pm with a [facility] provider. Referral can be marked as complete and reply to referral source tomorrow.    Afshan French  05/03/22  1002    ----- Message from Lauro Alonzo sent at 5/2/2022  9:56 AM CDT -----  Transition Clinic Referral   [place] Only   Limited [place] Availability     Type of Referral:        ___X__Therapy  _____Therapy & Medication (Psychiatry next level of care appointment needs to be scheduled)  _____Medication Only (Psychiatry next level of care appointment needs to be scheduled)  _____Diagnostic Assessment Only        Referring Provider Name: Lauro Alonzo     Clinician completing the assessment. NA     Referring Provider: OTHER: intake      If known, referring provider contact name: NA; Phone Number: NA  Service Line/Location: na     Reason for Transition Clinic Referral: pt called to radha vergara as a new client.   Appts @ [facility] out to Oct      Next Level of Care Patient Will Be Transitioned To: Therapy @ [facility]      Start Date for Next Level of Care Therapy (Required): 10/17/22  Provider Maryjo Humphries, [facility]  Location  [facility]      Start Date for Next Level of Care Medication (Required): na   Provider na  Location [facility] Psychiatry cannot see patients who do not have active medical insurance     What Would Be Helpful from the Transition Clinic: help bridge the appt gap       Needs: NO     Does Patient Have Access to Technology: shane     Patient E-mail Address: [email or web address]     Current Patient Phone Number: [phone];      Clinician Gender Preference (if applicable): shane Alonzo

## 2022-05-03 NOTE — TELEPHONE ENCOUNTER
First attempt at reaching patient. Left message asking for a return call to schedule with the TC. Coordinator sent mychart message to patient re: TC referral.     Patient has MEDICARE, so will need to be scheduled with specific TC therapists.    Radha Jain  Transition Clinic Coordinator  Date and Time: 05/03/22 7:55 AM      ----- Message from Lauro Alonzo sent at 5/2/2022  9:56 AM CDT -----  Transition Clinic Referral   [place] Only   Limited [place] Availability    Type of Referral:      ___X__Therapy  _____Therapy & Medication (Psychiatry next level of care appointment needs to be scheduled)  _____Medication Only (Psychiatry next level of care appointment needs to be scheduled)  _____Diagnostic Assessment Only      Referring Provider Name: Lauro Alonzo    Clinician completing the assessment. NA    Referring Provider: OTHER: intake     If known, referring provider contact name: NA; Phone Number: NA  Service Line/Location: na    Reason for Transition Clinic Referral: pt called to radha vergara as a new client.   Appts @ [facility] out to Oct     Next Level of Care Patient Will Be Transitioned To: Therapy @ [facility]     Start Date for Next Level of Care Therapy (Required): 10/17/22  Provider Maryjo Humphries, [facility]  Location  [facility]     Start Date for Next Level of Care Medication (Required): na   Provider na  Location   [facility] Psychiatry cannot see patients who do not have active medical insurance    What Would Be Helpful from the Transition Clinic: help bridge the appt gap      Needs: NO    Does Patient Have Access to Technology: shane    Patient E-mail Address: [email or web address]    Current Patient Phone Number: [phone];     Clinician Gender Preference (if applicable): shane Alonzo

## 2022-05-10 ENCOUNTER — VIRTUAL VISIT (OUTPATIENT)
Dept: BEHAVIORAL HEALTH | Facility: CLINIC | Age: 67
End: 2022-05-10
Attending: PSYCHIATRY & NEUROLOGY
Payer: MEDICARE

## 2022-05-10 DIAGNOSIS — F43.21 ADJUSTMENT DISORDER WITH DEPRESSED MOOD: ICD-10-CM

## 2022-05-10 DIAGNOSIS — F33.1 MAJOR DEPRESSIVE DISORDER, RECURRENT EPISODE, MODERATE WITH ANXIOUS DISTRESS (H): Primary | ICD-10-CM

## 2022-05-10 ASSESSMENT — PATIENT HEALTH QUESTIONNAIRE - PHQ9
SUM OF ALL RESPONSES TO PHQ QUESTIONS 1-9: 12
10. IF YOU CHECKED OFF ANY PROBLEMS, HOW DIFFICULT HAVE THESE PROBLEMS MADE IT FOR YOU TO DO YOUR WORK, TAKE CARE OF THINGS AT HOME, OR GET ALONG WITH OTHER PEOPLE: VERY DIFFICULT
SUM OF ALL RESPONSES TO PHQ QUESTIONS 1-9: 12

## 2022-05-10 NOTE — PROGRESS NOTES
"      [facility]   Mental Health & Addiction Services     Progress Note - Initial Visit    Patient  Name:  Brandi Stern Date: 5/10/2022         Service Type: Individual     Visit Start Time: 1300  Visit End Time: 1348    Visit #: 1    Attendees: Client attended alone    Service Modality:  Video Visit:      Provider verified identity through the following two step process.  Patient provided:  Patient  and Patient address    Telemedicine Visit: The patient's condition can be safely assessed and treated via synchronous audio and visual telemedicine encounter.      Reason for Telemedicine Visit: Services only offered telehealth    Originating Site (Patient Location): Patient's home    Distant Site (Provider Location): [facility] HEALTH & ADDICTION SERVICES    Consent:  The patient/guardian has verbally consented to: the potential risks and benefits of telemedicine (video visit) versus in person care; bill my insurance or make self-payment for services provided; and responsibility for payment of non-covered services.     Patient would like the video invitation sent by:  My Chart    Mode of Communication:  Video Conference via [employer]    As the provider I attest to compliance with applicable laws and regulations related to telemedicine.       DATA:   Interactive Complexity: No   Crisis: No     Presenting Concerns/  Current Stressors:   The patient was referred to the Transition Clinic on 2022 by the provider she has been seeing at the [facility] in [place]. The patient reports an increase in depression and anxiety over the past two years. 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 Shortly after the \"shutdown\", the patient reports her mother  and she was unable to be with her because of the covid restrictions at that time. The patient reports feeling isolated, lonely, and " "\"very sad\" on most days. The patient reports that she was on medication in the past for her depressive symptoms but she experienced significant side effects so stopped taking them. Additionally, the patient had been taking a medication to help with weight loss but this was causing her to have severe sleep disturbances (phentermine) so she also stopped taking this. The patient reports that she continues to struggle with insomnia and when she does fall asleep, it is only for a short time and she will wake up frequently. The patient reports low energy, low mood, tearfulness, and insomnia as her primary concerns. The patient states that she has a good support system in her peers and has 2 brothers and 1 sister.     Pt expressed comprehension and acceptance of informed consent and the nature of / limitations to confidentiality due to mandated reporting when reviewed in session.      ASSESSMENT:  Mental Status Assessment:  Appearance:   Appropriate   Eye Contact:   Good   Psychomotor Behavior: Normal   Attitude:   Cooperative  Friendly  Orientation:   All  Speech   Rate / Production: Normal/ Responsive   Volume:  Normal   Mood:    Sad   Affect:    Tearful  Thought Content:  Clear   Thought Form:  Coherent  Logical   Insight:    Good       Safety Issues and Plan for Safety and Risk Management:     [place] Suicide Severity Rating Scale (Lifetime/Recent)  [place] Suicide Severity Rating (Lifetime/Recent) 2/13/2019   Q1 Wished to be Dead (Past Month) no   Q2 Suicidal Thoughts (Past Month) no   Q6 Suicide Behavior (Lifetime) no     Patient denies current fears or concerns for personal safety.  Patient denies current or recent suicidal ideation or behaviors.  Patient denies current or recent homicidal ideation or behaviors.  Patient denies current or recent self injurious behavior or ideation.  Patient denies other safety concerns.  Recommended that patient call 911 or go to the local ED should there be a change in any of " these risk factors.  Patient reports there are no firearms in the house.     Diagnostic Criteria:  Major Depressive Disorder   - Depressed mood. Note: In children and adolescents, can be irritable mood.     - Diminished interest or pleasure in all, or almost all, activities.    - Increased sleep.    - Fatigue or loss of energy.    - Feelings of worthlessness or excessive guilt.    - Diminished ability to think or concentrate, or indecisiveness.   B) The symptoms cause clinically significant distress or impairment in social, occupational, or other important areas of functioning  C) The episode is not attributable to the physiological effects of a substance or to another medical condition  D) The occurence of major depressive episode is not better explained by other thought / psychotic disorders  E) There has never been a manic episode or hypomanic episode  Adjustment Disorder  A. The development of emotional or behavioral symptoms in response to an identifiable stressor(s) occurring within 3 months of the onset of the stressor(s)  B. These symptoms or behaviors are clinically significant, as evidenced by one or both of the following:       - Marked distress that is out of proportion to the severity/intensity of the stressor (with consideration for external context & culture)       - Significant impairment in social, occupational, or other important areas of functioning  C. The stress-related disturbance does not meet criteria for another disorder & is not not an exacerbation of another mental disorder  D. The symptoms do not represent normal bereavement  E. Once the stressor or its consequences have terminated, the symptoms do not persist for more than an additional 6 months       * Adjustment Disorder with Depressed Mood: The predominant manifestations are symptoms such as low mood, tearfulness, or feelings of hopelessness      DSM5 Diagnoses: (Sustained by DSM5 Criteria Listed Above)  Diagnoses: 296.32 (F33.1)  Major Depressive Disorder, Recurrent Episode, Moderate _ and With melancholic features  Adjustment Disorders  309.28 (F43.23) With mixed anxiety and depressed mood  Psychosocial & Contextual Factors: Covid causing increased isolation; death of mother; recently retired. Lives alone.  WHODAS 2.0 (12 item):   WHODAS 2.0 Total Score 4/8/2016 5/6/2022   Total Score 23 24   Total Score MyChart - 24     Intervention:   Mindfulness- Patient was educated on relaxation and mindfulness techniques, Educated on Sleep Hygiene- regular sleep patterns, eliminating electronics prior to bed, relaxation techniques, Discussed stages of grief, Educated on treatment planning and started identifying goals and interventions for treatment plan.  Collateral Reports Completed:  Not Applicable      PLAN: (Homework, other):  1. Provider will continue Diagnostic Assessment.  Patient was given the following to do until next session:  Review long-term providers sent via email    2. Provider recommended the following referrals: patient requested assistance in finding a long-term therapist outside of the [facility] as her appointment with  is not scheduled until October 2022.    3.  Suicide Risk and Safety Concerns were assessed for Brandi Stern.    Patient meets the following risk assessment and triage: Patient has no change in safety concerns. Committed to safety and agreed to follow previously developed safety plan.      Nano Sawyer [facility]  May 10, 2022      Answers for HPI/ROS submitted by the patient on 5/10/2022  If you checked off any problems, how difficult have these problems made it for you to do your work, take care of things at home, or get along with other people?: Very difficult  PHQ9 TOTAL SCORE: 12

## 2022-05-11 ASSESSMENT — PATIENT HEALTH QUESTIONNAIRE - PHQ9: SUM OF ALL RESPONSES TO PHQ QUESTIONS 1-9: 12

## 2022-05-19 ENCOUNTER — VIRTUAL VISIT (OUTPATIENT)
Dept: BEHAVIORAL HEALTH | Facility: CLINIC | Age: 67
End: 2022-05-19
Attending: PSYCHIATRY & NEUROLOGY
Payer: MEDICARE

## 2022-05-19 DIAGNOSIS — F43.21 ADJUSTMENT DISORDER WITH DEPRESSED MOOD: ICD-10-CM

## 2022-05-19 DIAGNOSIS — F33.1 MAJOR DEPRESSIVE DISORDER, RECURRENT EPISODE, MODERATE WITH ANXIOUS DISTRESS (H): Primary | ICD-10-CM

## 2022-05-19 NOTE — PROGRESS NOTES
[facility] Transition Clinic                                    Progress Note    Patient Name: Brandi Stern  Date: 5/19/2022         Service Type: Individual      Session Start Time: 1430  Session End Time: 11518     Session Length: 48 Minutes    Session #: 2    Attendees: Client attended alone    Service Modality:  Video Visit:      Provider verified identity through the following two step process.  Patient provided:  Patient is known previously to provider    Telemedicine Visit: The patient's condition can be safely assessed and treated via synchronous audio and visual telemedicine encounter.      Reason for Telemedicine Visit: Services only offered telehealth    Originating Site (Patient Location): Patient's home    Distant Site (Provider Location): [facility] HEALTH & ADDICTION SERVICES    Consent:  The patient/guardian has verbally consented to: the potential risks and benefits of telemedicine (video visit) versus in person care; bill my insurance or make self-payment for services provided; and responsibility for payment of non-covered services.     Patient would like the video invitation sent by:  My Chart    Mode of Communication:  Video Conference via Amwell    As the provider I attest to compliance with applicable laws and regulations related to telemedicine.    DATA  Interactive Complexity: No  Crisis: No        Progress Since Last Session (Related to Symptoms / Goals / Homework):   Symptoms: Improving pt reports slight improvment in overall mood. Not crying as easily or as much. Continues to have difficulties with sleep    Homework: n/a      Episode of Care Goals: Satisfactory progress - CONTEMPLATION (Considering change and yet undecided); Intervened by assessing the negative and positive thinking (ambivalence) about behavior change     Current / Ongoing Stressors and Concerns:    The patient was referred to the Transition Clinic on 5/2/2022 by the provider she  "has been seeing at the [facility] in [place]. The patient reports an increase in depression and anxiety over the past two years. 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 Shortly after the \"shutdown\", the patient reports her mother  and she was unable to be with her because of the covid restrictions at that time. The patient reports feeling isolated, lonely, and \"very sad\" on most days. The patient reports that she was on medication in the past for her depressive symptoms but she experienced significant side effects so stopped taking them. Additionally, the patient had been taking a medication to help with weight loss but this was causing her to have severe sleep disturbances (phentermine) so she also stopped taking this. The patient reports that she continues to struggle with insomnia and when she does fall asleep, it is only for a short time and she will wake up frequently. The patient reports low energy, low mood, tearfulness, and insomnia as her primary concerns. The patient states that she has a good support system in her peers and has 2 brothers and 1 sister.     Today, 2022, the patient reports a slight improvement in overall mood since the previous TC visit. Patient states she has been keeping herself busy and has been getting out of the house more often. Seep continues to be difficult for the patient, both falling asleep and staying asleep and no medications seem to assist her. Patient states she fully admits that she stays on her electronics longer than she should each night and this could be a contributing factor in why she is having a hard time falling asleep. Patient states her home improvement is set to start next week and the cost of the renovations are a concern for her. Will be discussing this with the contractor prior to work being started.     Pt expressed comprehension and acceptance of " informed consent and the nature of / limitations to confidentiality due to mandated reporting when reviewed in session.     Treatment Objective(s) Addressed in This Session:   Increase interest, engagement, and pleasure in doing things  Decrease frequency and intensity of feeling down, depressed, hopeless  Improve quantity and quality of night time sleep / decrease daytime naps  identify sleep hygiene practices       Intervention:   CBT: Reviewed CBT coping skills and modeled their use  Motivational Interviewing: active listening, validation, and reassurance    Assessments completed prior to visit:  The following assessments were completed by patient for this visit:  PHQ9:   PHQ-9 SCORE 1/21/2019 7/17/2019 9/3/2020 9/24/2020 9/17/2021 3/24/2022 5/10/2022   PHQ-9 Total Score - - - - - - -   PHQ-9 Total Score MyChart - 11 (Moderate depression) 5 (Mild depression) - - 5 (Mild depression) 12 (Moderate depression)   PHQ-9 Total Score 13 11 5 8 2 5 12     GAD7:   FILEMON-7 SCORE 1/28/2016 4/8/2016 2/8/2017 10/31/2017 4/16/2018 6/25/2018 9/3/2020   Total Score - - - - - - -   Total Score - - - - - - 1 (minimal anxiety)   Total Score 0 2 6 0 5 0 1     PROMIS 10-Global Health (all questions and answers displayed):   PROMIS 10 4/28/2021 9/7/2021 5/6/2022   In general, would you say your health is: Fair Good Fair   In general, would you say your quality of life is: Fair Good Good   In general, how would you rate your physical health? Fair Good Fair   In general, how would you rate your mental health, including your mood and your ability to think? Fair Very good Excellent   In general, how would you rate your satisfaction with your social activities and relationships? Fair Very good Very good   In general, please rate how well you carry out your usual social activities and roles Fair Very good Excellent   To what extent are you able to carry out your everyday physical activities such as walking, climbing stairs, carrying groceries,  or moving a chair? Moderately Mostly Moderately   How often have you been bothered by emotional problems such as feeling anxious, depressed or irritable? Sometimes Sometimes Sometimes   How would you rate your fatigue on average? Severe Moderate Severe   How would you rate your pain on average?   0 = No Pain  to  10 = Worst Imaginable Pain 7 6 7   In general, would you say your health is: 2 3 2   In general, would you say your quality of life is: 2 3 3   In general, how would you rate your physical health? 2 3 2   In general, how would you rate your mental health, including your mood and your ability to think? 2 4 5   In general, how would you rate your satisfaction with your social activities and relationships? 2 4 4   In general, please rate how well you carry out your usual social activities and roles. (This includes activities at home, at work and in your community, and responsibilities as a parent, child, spouse, employee, friend, etc.) 2 4 5   To what extent are you able to carry out your everyday physical activities such as walking, climbing stairs, carrying groceries, or moving a chair? 3 4 3   In the past 7 days, how often have you been bothered by emotional problems such as feeling anxious, depressed, or irritable? 3 3 3   In the past 7 days, how would you rate your fatigue on average? 4 3 4   In the past 7 days, how would you rate your pain on average, where 0 means no pain, and 10 means worst imaginable pain? 7 6 7   Global Mental Health Score 9 14 15   Global Physical Health Score 9 13 9   PROMIS TOTAL - SUBSCORES 18 27 24   Some recent data might be hidden         ASSESSMENT: Current Emotional / Mental Status (status of significant symptoms):   Risk status (Self / Other harm or suicidal ideation)   Patient denies current fears or concerns for personal safety.   Patient denies current or recent suicidal ideation or behaviors.   Patient denies current or recent homicidal ideation or behaviors.   Patient  denies current or recent self injurious behavior or ideation.   Patient denies other safety concerns.   Patient reports there has been no change in risk factors since their last session.     Patient reports there has been no change in protective factors since their last session.     Recommended that patient call 911 or go to the local ED should there be a change in any of these risk factors.     Appearance:   Appropriate    Eye Contact:   Good    Psychomotor Behavior: Normal    Attitude:   Cooperative    Orientation:   All   Speech    Rate / Production: Normal     Volume:  Normal    Mood:    Normal   Affect:    Appropriate    Thought Content:  Clear    Thought Form:  Coherent  Logical    Insight:    Good      Medication Review:   No current psychiatric medications prescribed     Medication Compliance:   NA     Changes in Health Issues:   None reported     Chemical Use Review:   Substance Use: Chemical use reviewed, no active concerns identified      Tobacco Use: No current tobacco use.      Diagnosis:  1. Major depressive disorder, recurrent episode, moderate  F33.1   2. Adjustment disorder with depressed mood F43.23       Collateral Reports Completed:   Not Applicable    PLAN: (Patient Tasks / Therapist Tasks / Other)  Patient will use CBT coping skills as needed for anxiety and relaxation  Patient will review biographies of potential long-term providers sent to her via email by this provider        Nano Sawyer [facility], May 19, 2022                                                         ______________________________________________________________________

## 2022-05-26 ENCOUNTER — VIRTUAL VISIT (OUTPATIENT)
Dept: BEHAVIORAL HEALTH | Facility: CLINIC | Age: 67
End: 2022-05-26
Attending: PSYCHIATRY & NEUROLOGY
Payer: MEDICARE

## 2022-05-26 DIAGNOSIS — F43.21 ADJUSTMENT DISORDER WITH DEPRESSED MOOD: ICD-10-CM

## 2022-05-26 DIAGNOSIS — F33.1 MAJOR DEPRESSIVE DISORDER, RECURRENT EPISODE, MODERATE WITH ANXIOUS DISTRESS (H): Primary | ICD-10-CM

## 2022-06-01 NOTE — PROGRESS NOTES
[facility]                                    Progress Note    Patient Name: Brandi Stern  Date: 5/26/2022         Service Type: Individual      Session Start Time: 1300  Session End Time: 1355     Session Length: 55 Minutes    Session #: 3    Attendees: Client attended alone    Service Modality:  Video Visit:      Provider verified identity through the following two step process.  Patient provided:  Patient is known previously to provider    Telemedicine Visit: The patient's condition can be safely assessed and treated via synchronous audio and visual telemedicine encounter.      Reason for Telemedicine Visit: Services only offered telehealth    Originating Site (Patient Location): Patient's home    Distant Site (Provider Location): [facility] MENT[facility] HE[facility] & ADDI[facility]    Consent:  The patient/guardian has verbally consented to: the potential risks and benefits of telemedicine (video visit) versus in person care; bill my insurance or make self-payment for services provided; and responsibility for payment of non-covered services.     Patient would like the video invitation sent by:  My Chart    Mode of Communication:  Video Conference via Amwell    As the provider I attest to compliance with applicable laws and regulations related to telemedicine.    DATA  Interactive Complexity: No  Crisis: No        Progress Since Last Session (Related to Symptoms / Goals / Homework):   Symptoms: No change no signigicant change in overall symptoms. Continues to endorse difficulties with sleep and becomes easily emotional    Homework: Achieved / completed to satisfaction      Episode of Care Goals: Satisfactory progress - PREPARATION (Decided to change - considering how); Intervened by negotiating a change plan and determining options / strategies for behavior change, identifying triggers, exploring social supports, and working towards setting a date to begin behavior  "change     Current / Ongoing Stressors and Concerns:  The patient was referred to the Transition Clinic on 2022 by the provider she has been seeing at the [facility] in [place]. The patient reports an increase in depression and anxiety over the past two years. 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 Shortly after the \"shutdown\", the patient reports her mother  and she was unable to be with her because of the covid restrictions at that time. The patient reports feeling isolated, lonely, and \"very sad\" on most days. The patient reports that she was on medication in the past for her depressive symptoms but she experienced significant side effects so stopped taking them. Additionally, the patient had been taking a medication to help with weight loss but this was causing her to have severe sleep disturbances (phentermine) so she also stopped taking this. The patient reports that she continues to struggle with insomnia and when she does fall asleep, it is only for a short time and she will wake up frequently. The patient reports low energy, low mood, tearfulness, and insomnia as her primary concerns. The patient states that she has a good support system in her peers and has 2 brothers and 1 sister.     Today, 2022, the patient states he continues to have difficulties with sleep and because of this, she has low energy and doesn't have much motivation during the day. The patient states she tries to restrain from taking naps but it becomes difficult to stay awake during the day. Patient reports she continues to become emotional and \"cries at the drop of a hat\" but usually pulls out of this quickly. Discussed long-term providers and the patient stated she had placed two phone calls to potential long-term therapists and was waiting to hear back if they have openings. The patient states that she continues to be " frustrated with her inability to sleep well and has considered inquiring about medical THC.     Pt expressed comprehension and acceptance of informed consent and the nature of / limitations to confidentiality due to mandated reporting when reviewed in session.        Treatment Objective(s) Addressed in This Session:   Increase interest, engagement, and pleasure in doing things  Decrease frequency and intensity of feeling down, depressed, hopeless  Improve quantity and quality of night time sleep / decrease daytime naps  identify sleep hygiene practices      Intervention:   CBT: Reviewed CBT coping skills and modeled their use  Motivational Interviewing: active listening, validation, and reassurance       Assessments completed prior to visit:  The following assessments were completed by patient for this visit:  PHQ9:   PHQ-9 SCORE 1/21/2019 7/17/2019 9/3/2020 9/24/2020 9/17/2021 3/24/2022 5/10/2022   PHQ-9 Total Score - - - - - - -   PHQ-9 Total Score MyChart - 11 (Moderate depression) 5 (Mild depression) - - 5 (Mild depression) 12 (Moderate depression)   PHQ-9 Total Score 13 11 5 8 2 5 12     GAD7:   FILEMON-7 SCORE 1/28/2016 4/8/2016 2/8/2017 10/31/2017 4/16/2018 6/25/2018 9/3/2020   Total Score - - - - - - -   Total Score - - - - - - 1 (minimal anxiety)   Total Score 0 2 6 0 5 0 1         ASSESSMENT: Current Emotional / Mental Status (status of significant symptoms):   Risk status (Self / Other harm or suicidal ideation)   Patient denies current fears or concerns for personal safety.   Patient denies current or recent suicidal ideation or behaviors.   Patient denies current or recent homicidal ideation or behaviors.   Patient denies current or recent self injurious behavior or ideation.   Patient denies other safety concerns.   Patient reports there has been no change in risk factors since their last session.     Patient reports there has been no change in protective factors since their last session.     Recommended  that patient call 911 or go to the local ED should there be a change in any of these risk factors.     Appearance:   Appropriate    Eye Contact:   Good    Psychomotor Behavior: Normal    Attitude:   Cooperative  Friendly   Orientation:   All   Speech    Rate / Production: Normal     Volume:  Normal    Mood:    Normal   Affect:    Appropriate    Thought Content:  Clear    Thought Form:  Coherent  Logical    Insight:    Good      Medication Review:   No current psychiatric medications prescribed     Medication Compliance:   NA     Changes in Health Issues:   None reported     Chemical Use Review:   Substance Use: Chemical use reviewed, no active concerns identified      Tobacco Use: No current tobacco use.      Diagnosis:  1. Major depressive disorder, recurrent episode, moderate  F33.1   2. Adjustment disorder with depressed mood F43.23       Collateral Reports Completed:   Not Applicable    PLAN: (Patient Tasks / Therapist Tasks / Other)  Patient will follow up with potential long-term therapy providers to schedule  Patient will reach out to her PCP about the possibility of medical THC  Provider will send patient link for a guided sleep meditaion  Patient will utilize the guided sleep meditation         Nano Sawyer, [facility] , May 26, 2022                                                       ______________________________________________________________________

## 2022-06-02 ENCOUNTER — TELEPHONE (OUTPATIENT)
Dept: BEHAVIORAL HEALTH | Facility: CLINIC | Age: 67
End: 2022-06-02
Payer: MEDICARE

## 2022-06-02 NOTE — TELEPHONE ENCOUNTER
Coordinator made first attempt to reschedule patient with TC. Voicemail left for patient stating need to cancel appointment today with TC therapist and requested call back to reschedule.    Radha Jain  Transition Clinic Coordinator  Date and Time: 06/02/22 9:38 AM

## 2022-06-02 NOTE — TELEPHONE ENCOUNTER
Writer spoke with pt on que and scheduled TC return appointment for 06/03/22 @ 2:00 pm as appointment today was cancelled as provider is out sick. Tracker completed.    Afshan French  06/02/22  [id number]

## 2022-06-03 ENCOUNTER — VIRTUAL VISIT (OUTPATIENT)
Dept: BEHAVIORAL HEALTH | Facility: CLINIC | Age: 67
End: 2022-06-03
Attending: PSYCHIATRY & NEUROLOGY
Payer: MEDICARE

## 2022-06-03 DIAGNOSIS — F43.21 ADJUSTMENT DISORDER WITH DEPRESSED MOOD: ICD-10-CM

## 2022-06-03 DIAGNOSIS — F33.1 MAJOR DEPRESSIVE DISORDER, RECURRENT EPISODE, MODERATE WITH ANXIOUS DISTRESS (H): Primary | ICD-10-CM

## 2022-06-03 NOTE — PROGRESS NOTES
[facility]                                    Progress Note    Patient Name: Brandi Stern  Date: 6/3/2022         Service Type: Individual      Session Start Time: 1400  Session End Time: 1426     Session Length: 26 Minutes    Session #: 4    Attendees: Client attended alone    Service Modality:  Video Visit:      Provider verified identity through the following two step process.  Patient provided:  Patient is known previously to provider    Telemedicine Visit: The patient's condition can be safely assessed and treated via synchronous audio and visual telemedicine encounter.      Reason for Telemedicine Visit: Services only offered telehealth    Originating Site (Patient Location): Patient's home    Distant Site (Provider Location): [facility] & ADDICTION SERVICES    Consent:  The patient/guardian has verbally consented to: the potential risks and benefits of telemedicine (video visit) versus in person care; bill my insurance or make self-payment for services provided; and responsibility for payment of non-covered services.     Patient would like the video invitation sent by:  My Chart    Mode of Communication:  Video Conference via Amwell    As the provider I attest to compliance with applicable laws and regulations related to telemedicine.    DATA  Interactive Complexity: No  Crisis: No        Progress Since Last Session (Related to Symptoms / Goals / Homework):   Symptoms: No change no significant change in overall symptom; continues to endorse sleep difficulties and cries easily    Homework: Achieved / completed to satisfaction      Episode of Care Goals: Satisfactory progress - ACTION (Actively working towards change); Intervened by reinforcing change plan / affirming steps taken     Current / Ongoing Stressors and Concerns:   The patient was referred to the Transition Clinic on 5/2/2022 by the provider she has been seeing at the [facility] in [place]. The patient reports an increase in depression and anxiety over the past two years. 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 Shortly after the \"shutdown\", the patient reports her mother  and she was unable to be with her because of the covid restrictions at that time. The patient reports feeling isolated, lonely, and \"very sad\" on most days. The patient reports that she was on medication in the past for her depressive symptoms but she experienced significant side effects so stopped taking them. Additionally, the patient had been taking a medication to help with weight loss but this was causing her to have severe sleep disturbances (phentermine) so she also stopped taking this. The patient reports that she continues to struggle with insomnia and when she does fall asleep, it is only for a short time and she will wake up frequently. The patient reports low energy, low mood, tearfulness, and insomnia as her primary concerns. The patient states that she has a good support system in her peers and has 2 brothers and 1 sister.      Today, 6/3/2022, the patient reports that she contacted one of the providers that this provider had suggested and is scheduled for her first appointment on 2022. Today will be the discharge session for the patient. The patient reports that she continues to have sleep issues and is wondering if she should just stop taking medications all together as a kind of \"detox\" in order to start fresh. This provider suggested discussing this with her primary care physician prior to stopping any medications. The patient states that she is very fatigued today and doesn't have much motivation to \"do much of anything\" but realizes that this is due to her lack of sleep rather than depressive symptoms. The patient is looking forward to starting with the new long-term provider.     Pt expressed " comprehension and acceptance of informed consent and the nature of / limitations to confidentiality due to mandated reporting when reviewed in session.     Treatment Objective(s) Addressed in This Session:   Improve quantity and quality of night time sleep / decrease daytime naps  identify sleep hygiene practices      Intervention:   CBT: Reviewed CBT coping skills and modeled their use  Motivational Interviewing: active listening, validation, and reassurance     Assessments completed prior to visit:  The following assessments were completed by patient for this visit:  PHQ9:   PHQ-9 SCORE 1/21/2019 7/17/2019 9/3/2020 9/24/2020 9/17/2021 3/24/2022 5/10/2022   PHQ-9 Total Score - - - - - - -   PHQ-9 Total Score MyChart - 11 (Moderate depression) 5 (Mild depression) - - 5 (Mild depression) 12 (Moderate depression)   PHQ-9 Total Score 13 11 5 8 2 5 12     GAD7:   FILEMON-7 SCORE 1/28/2016 4/8/2016 2/8/2017 10/31/2017 4/16/2018 6/25/2018 9/3/2020   Total Score - - - - - - -   Total Score - - - - - - 1 (minimal anxiety)   Total Score 0 2 6 0 5 0 1     PROMIS 10-Global Health (all questions and answers displayed):   PROMIS 10 4/28/2021 9/7/2021 5/6/2022   In general, would you say your health is: Fair Good Fair   In general, would you say your quality of life is: Fair Good Good   In general, how would you rate your physical health? Fair Good Fair   In general, how would you rate your mental health, including your mood and your ability to think? Fair Very good Excellent   In general, how would you rate your satisfaction with your social activities and relationships? Fair Very good Very good   In general, please rate how well you carry out your usual social activities and roles Fair Very good Excellent   To what extent are you able to carry out your everyday physical activities such as walking, climbing stairs, carrying groceries, or moving a chair? Moderately Mostly Moderately   How often have you been bothered by emotional  problems such as feeling anxious, depressed or irritable? Sometimes Sometimes Sometimes   How would you rate your fatigue on average? Severe Moderate Severe   How would you rate your pain on average?   0 = No Pain  to  10 = Worst Imaginable Pain 7 6 7   In general, would you say your health is: 2 3 2   In general, would you say your quality of life is: 2 3 3   In general, how would you rate your physical health? 2 3 2   In general, how would you rate your mental health, including your mood and your ability to think? 2 4 5   In general, how would you rate your satisfaction with your social activities and relationships? 2 4 4   In general, please rate how well you carry out your usual social activities and roles. (This includes activities at home, at work and in your community, and responsibilities as a parent, child, spouse, employee, friend, etc.) 2 4 5   To what extent are you able to carry out your everyday physical activities such as walking, climbing stairs, carrying groceries, or moving a chair? 3 4 3   In the past 7 days, how often have you been bothered by emotional problems such as feeling anxious, depressed, or irritable? 3 3 3   In the past 7 days, how would you rate your fatigue on average? 4 3 4   In the past 7 days, how would you rate your pain on average, where 0 means no pain, and 10 means worst imaginable pain? 7 6 7   Global Mental Health Score 9 14 15   Global Physical Health Score 9 13 9   PROMIS TOTAL - SUBSCORES 18 27 24   Some recent data might be hidden     [place] Suicide Severity Rating Scale (Lifetime/Recent)  [place] Suicide Severity Rating (Lifetime/Recent) 2/13/2019   Q1 Wished to be Dead (Past Month) no   Q2 Suicidal Thoughts (Past Month) no   Q6 Suicide Behavior (Lifetime) no         ASSESSMENT: Current Emotional / Mental Status (status of significant symptoms):   Risk status (Self / Other harm or suicidal ideation)   Patient denies current fears or concerns for personal  safety.   Patient denies current or recent suicidal ideation or behaviors.   Patient denies current or recent homicidal ideation or behaviors.   Patient denies current or recent self injurious behavior or ideation.   Patient denies other safety concerns.   Patient reports there has been no change in risk factors since their last session.     Patient reports there has been no change in protective factors since their last session.     Recommended that patient call 911 or go to the local ED should there be a change in any of these risk factors.     Appearance:   Appropriate    Eye Contact:   Good    Psychomotor Behavior: Normal    Attitude:   Cooperative    Orientation:   All   Speech    Rate / Production: Normal     Volume:  Normal    Mood:    Normal   Affect:    Appropriate    Thought Content:  Clear    Thought Form:  Coherent  Logical    Insight:    Good      Medication Review:   No current psychiatric medications prescribed     Medication Compliance:   NA     Changes in Health Issues:   None reported     Chemical Use Review:   Substance Use: Chemical use reviewed, no active concerns identified      Tobacco Use: No current tobacco use.      Diagnosis:  1. Major depressive disorder, recurrent episode, moderate  F33.1   2. Adjustment disorder with depressed mood F43.23       Collateral Reports Completed:   Not Applicable    PLAN: (Patient Tasks / Therapist Tasks / Other)  Pt will attend her long-term therapy appointment scheduled for 6/9/2022  Patient will reach out to her PCP about the possibility of medical THC      Nano Sawyer [facility], [place] [address], 2022                                                         ______________________________________________________________________

## 2022-06-04 DIAGNOSIS — L71.9 ROSACEA: ICD-10-CM

## 2022-06-07 NOTE — PROGRESS NOTES
"                    Discharge Summary  Multiple Sessions    Client Name: Brandi Stern MRN#: [id number] YOB: 1955      Intake / Discharge Date: 5/10/2022 (intake) - 6/3/2022 (Discharge)      DSM5 Diagnoses: (Sustained by DSM5 Criteria Listed Above)  Diagnoses: 296.32 (F33.1) Major Depressive Disorder, Recurrent Episode, Moderate _ and With mixed features  Adjustment Disorders  309.24 (F43.22) With anxiety  Psychosocial & Contextual Factors: Retired at start of pandemic; death of mother; isolated  WHODAS 2.0 (12 item) Score: n/a          Presenting Concern:  The patient was referred to the Transition Clinic on 2022 by the provider she has been seeing at the [facility] in [place]. The patient reports an increase in depression and anxiety over the past two years. 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 Shortly after the \"shutdown\", the patient reports her mother  and she was unable to be with her because of the covid restrictions at that time. The patient reports feeling isolated, lonely, and \"very sad\" on most days. The patient reports that she was on medication in the past for her depressive symptoms but she experienced significant side effects so stopped taking them. Additionally, the patient had been taking a medication to help with weight loss but this was causing her to have severe sleep disturbances (phentermine) so she also stopped taking this. The patient reports that she continues to struggle with insomnia and when she does fall asleep, it is only for a short time and she will wake up frequently. The patient reports low energy, low mood, tearfulness, and insomnia as her primary concerns      Reason for Discharge:  Referred to long-term psychotherapy - first session scheduled for 2022      Disposition at Time of Last Encounter:   Comments:   Recommend ongoing therapy 1/weekly     Risk " Management:   Client denies a history of suicidal ideation, suicide attempts, self-injurious behavior, homicidal ideation, homicidal behavior and and other safety concerns  Recommended that patient call 911 or go to the local ED should there be a change in any of these risk factors.      Referred To:  Long-term therapy  SANCHEZ Gray  [address], [address]  [place] [address]  [phone]        [facility] Sawyer [facility]   6/3/2022

## 2022-06-15 ENCOUNTER — VIRTUAL VISIT (OUTPATIENT)
Dept: PHARMACY | Facility: CLINIC | Age: 67
End: 2022-06-15

## 2022-06-15 DIAGNOSIS — E66.01 MORBID OBESITY (H): Primary | ICD-10-CM

## 2022-06-15 DIAGNOSIS — G47.00 INSOMNIA, UNSPECIFIED TYPE: ICD-10-CM

## 2022-06-15 PROCEDURE — 99606 MTMS BY PHARM EST 15 MIN: CPT | Performed by: PHARMACIST

## 2022-06-15 PROCEDURE — 99607 MTMS BY PHARM ADDL 15 MIN: CPT | Performed by: PHARMACIST

## 2022-06-15 NOTE — PATIENT INSTRUCTIONS
"Recommendations from today's MTM visit:                                                         1. Potential alternatives from Eszopicolone (patient to discuss more with primary care provider their thoughts):   -To help with sleep onset: Ramelteon 8 mg nightly  -To help with sleep maintenance and sleep onset(?): doxepin 3 mg nightly  taking at least 30 minutes before sleep    2. Get BMP completed     Follow-up: 4-6 months or as needed     It was great speaking with you today.  I value your experience and would be very thankful for your time in providing feedback in our clinic survey. In the next few days, you may receive an email or text message from [facility] [employer] with a link to a survey related to your  clinical pharmacist.\"     My Clinical Pharmacist's contact information:                                                      Please feel free to contact me with any questions or concerns you have.      Lauren Bloch, PharmD  Medication Therapy Management Pharmacist   [facility] [place]             "

## 2022-06-15 NOTE — PROGRESS NOTES
Medication Therapy Management (MTM) Encounter    ASSESSMENT:                            Medication Adherence/Access: No issues identified    Obesity: Would benefit from getting BMP checked since starting metformin. Discussed that as options or next steps are limited from medication perspective as all off label weight loss medication(s) have been tried and either not effective or side effects and FDA approved weight loss medication(s) are not covered by Part D. Did discuss that items such as dietitian, health  and/or health psychology are readily available.     Insomnia: May benefit from considering alternative option(s) from eszopicolone such as either low dose doxepin for sleep onset (less data for this over doxepin assisting with sleep maintenance) and sleep maintenance or ramelteon for sleep onset. Ramelteon may have more difficulty with Part D coverage than doxepin.     PLAN:                            1. Potential alternatives from Eszopicolone (patient to discuss more with primary care provider their thoughts):   -To help with sleep onset: Ramelteon 8 mg nightly  -To help with sleep maintenance and sleep onset(?): doxepin 3 mg nightly  taking at least 30 minutes before sleep    2. Get BMP completed     Follow-up: 4-6 months or as needed     SUBJECTIVE/OBJECTIVE:                          Brandi Stern is a 66 year old female called for a follow-up visit.  Today's visit is a follow-up MTM visit from 3/14/2022     Reason for visit: continued issues of sleep, recent metformin start.    Allergies/ADRs: Reviewed in chart  Past Medical History: Reviewed in chart  Tobacco: She reports that she quit smoking about 14 years ago. Her smoking use included cigarettes. She started smoking about 50 years ago. She has a 5.00 pack-year smoking history. She has never used smokeless tobacco.  Alcohol: not currently using    Medication Adherence/Access: no issues reported    Obesity:   Metformin ER 1000 mg daily with dinner  "         Followed by Rima Long PA-C , seen 1/24/2022 for New Medical Weight Management.  Had issues of diarrhea at the 1500 mg so decreased back down to 1000 mg and tolerating better. She finds that she is hungry after finishing portioned meal. Reports activity is \"not great\" due to chronic pain - back, neck pain and fibromyagia.   Failed medications: phentermine: insomnia, anxiety; topiramate: cognitive issues, malaise, food taste bud changes (\"everything tasted bitter\"). Naltrexone: previously tried?      Weight: 212 lb     Wt Readings from Last 4 Encounters:   04/28/22 210 lb (95.3 kg)   01/24/22 215 lb (97.5 kg)   10/29/21 213 lb (96.6 kg)   10/27/21 213 lb 1.3 oz (96.7 kg)     Estimated body mass index is 34.41 kg/m  as calculated from the following:    Height as of 4/28/22: 5' 5.5\" (1.664 m).    Weight as of 4/28/22: 210 lb (95.3 kg).    GFR Estimate   Date Value Ref Range Status   11/02/2021 71 >60 mL/min/1.73m2 Final     Comment:     As of July 11, 2021, eGFR is calculated by the CKD-EPI creatinine equation, without race adjustment. eGFR can be influenced by muscle mass, exercise, and diet. The reported eGFR is an estimation only and is only applicable if the renal function is stable.   03/19/2021 62 >60 mL/min/[1.73_m2] Final     Comment:     Non  GFR Calc  Starting 12/18/2018, serum creatinine based estimated GFR (eGFR) will be   calculated using the Chronic Kidney Disease Epidemiology Collaboration   (CKD-EPI) equation.       GFR Estimate If Black   Date Value Ref Range Status   03/19/2021 72 >60 mL/min/[1.73_m2] Final     Comment:      GFR Calc  Starting 12/18/2018, serum creatinine based estimated GFR (eGFR) will be   calculated using the Chronic Kidney Disease Epidemiology Collaboration   (CKD-EPI) equation.       Insomnia:   Eszopiclone 3 mg nightly  Melatonin 15 mg nightly     Trouble getting to sleep even with above regimen. Wakes up multiple times in middle of " night. Worsened on phentermine, then stopped. Reports that sleep issues have continued since stopping phentermine. Reports that sleep has not been great since starting weight loss medication(s). Has been working on sleep hygiene - has blue blocker filter in glasses.    Failed medications: Zolpidem (didn't like she felt); Trazodone: ineffective; Gabapentin: ineffective  ----------------    I spent 25 minutes with this patient today. All changes were made via collaborative practice agreement with Rima Long PA-C. A copy of the visit note was provided to the patient's provider(s).    The patient was sent via [employer] a summary of these recommendations.     Lauren Bloch, PharmD, BCACP   Medication Therapy Management Pharmacist   [facility]    Telemedicine Visit Details  Type of service:  Telephone visit  Start Time: 11:00 AM  End Time: 11:25 AM  Originating Location (patient location): Home  Distant Location (provider location):  [facility]     Medication Therapy Recommendations  Class 1 obesity due to excess calories without serious comorbidity with body mass index (BMI) of 34.0 to 34.9 in adult    Current Medication: metFORMIN (GLUCOPHAGE-XR) 500 MG 24 hr tablet   Rationale: Medication requires monitoring - Needs additional monitoring   Recommendation: Order Lab - Ordered, need to complete. patient forgot   Status: Patient Agreed - Adherence/Education         Insomnia    Current Medication: eszopiclone (LUNESTA) 3 MG tablet   Rationale: More effective medication available - Ineffective medication - Effectiveness   Recommendation: Change Medication - doxepin 3 MG tablet   Status: Contact Provider - Awaiting Response         Tobacco abuse    Current Medication: buPROPion (WELLBUTRIN SR) 100 MG 12 hr tablet (Discontinued)   Rationale: Dose too low - Dosage too low - Effectiveness   Recommendation: Increase Dose - buPROPion 150 MG 12 hr  tablet   Status: No Longer Relevant

## 2022-06-17 ENCOUNTER — E-VISIT (OUTPATIENT)
Dept: FAMILY MEDICINE | Facility: CLINIC | Age: 67
End: 2022-06-17
Payer: MEDICARE

## 2022-06-17 DIAGNOSIS — G47.00 INSOMNIA, UNSPECIFIED TYPE: Primary | ICD-10-CM

## 2022-06-17 PROCEDURE — 99421 OL DIG E/M SVC 5-10 MIN: CPT | Performed by: NURSE PRACTITIONER

## 2022-06-20 ENCOUNTER — LAB (OUTPATIENT)
Dept: LAB | Facility: CLINIC | Age: 67
End: 2022-06-20
Payer: MEDICARE

## 2022-06-20 DIAGNOSIS — I10 HYPERTENSION GOAL BP (BLOOD PRESSURE) < 140/90: ICD-10-CM

## 2022-06-20 DIAGNOSIS — E66.01 CLASS 2 SEVERE OBESITY DUE TO EXCESS CALORIES WITH SERIOUS COMORBIDITY AND BODY MASS INDEX (BMI) OF 35.0 TO 35.9 IN ADULT (H): ICD-10-CM

## 2022-06-20 DIAGNOSIS — E66.812 CLASS 2 SEVERE OBESITY DUE TO EXCESS CALORIES WITH SERIOUS COMORBIDITY AND BODY MASS INDEX (BMI) OF 35.0 TO 35.9 IN ADULT (H): ICD-10-CM

## 2022-06-20 PROCEDURE — 80048 BASIC METABOLIC PNL TOTAL CA: CPT

## 2022-06-20 PROCEDURE — 36415 COLL VENOUS BLD VENIPUNCTURE: CPT

## 2022-06-20 RX ORDER — DOXEPIN 3 MG/1
3 TABLET, FILM COATED ORAL
Qty: 30 TABLET | Refills: 5 | Status: SHIPPED | OUTPATIENT
Start: 2022-06-20 | End: 2022-07-13

## 2022-06-22 LAB
ANION GAP SERPL CALCULATED.3IONS-SCNC: 8 MMOL/L (ref 3–14)
BUN SERPL-MCNC: 15 MG/DL (ref 7–30)
CALCIUM SERPL-MCNC: 9.1 MG/DL (ref 8.5–10.1)
CHLORIDE BLD-SCNC: 107 MMOL/L (ref 94–109)
CO2 SERPL-SCNC: 23 MMOL/L (ref 20–32)
CREAT SERPL-MCNC: 0.86 MG/DL (ref 0.52–1.04)
GFR SERPL CREATININE-BSD FRML MDRD: 73 ML/MIN/1.73M2
GLUCOSE BLD-MCNC: 83 MG/DL (ref 70–99)
POTASSIUM BLD-SCNC: 4.7 MMOL/L (ref 3.4–5.3)
SODIUM SERPL-SCNC: 138 MMOL/L (ref 133–144)

## 2022-06-29 NOTE — PROGRESS NOTES
Brandi Elkins is a 66 year old who is being evaluated via a billable video visit.      If the video visit is dropped, the invitation should be resent by: Text to cell phone: [phone]  Will anyone else be joining your video visit? No      Video-Visit Details    Type of service:  Video Visit    Video Start Time: 2:07 PM    Video End Time:2:38 PM      Originating Location (pt. Location): Home    Distant Location (provider location):  [facility] [place]     Platform used for Video Visit: G[employer]      2022      Return Medical Weight Management Note     Brandi Stern  MRN:  [id number]  :  1955    Dear Cherie Brennan NP,    I had the pleasure of seeing your patient Brandi Stern. She is a 66 year old female who I am continuing to see for treatment of obesity related to:       2021   I have the following health issues associated with obesity: High Blood Pressure, Sleep Apnea, GERD (Reflux), Hypothyroidism   I have the following symptoms associated with obesity: Knee Pain, Back Pain, Fatigue       Assessment & Plan   Problem List Items Addressed This Visit     ISATU (obstructive sleep apnea)     May improve with weight loss           Class 2 severe obesity due to excess calories with serious comorbidity and body mass index (BMI) of 35.0 to 35.9 in adult (H) - Primary     Patient was congratulated on wt loss success thus far. She is down 20 lbs since starting the program.  Healthy habits to assist with further weight loss were discussed. Brandi Elkins will continue using the skills she has gained through our program.  She is going to work with her therapist to help with her sleep hygiene and grief counseling.  Sleep referral was offered today but pt preferred to wait. She will return to clinic as needed for support or if weight gain occurs.                     PATIENT INSTRUCTIONS:  Continue using the skills you've gained through our program.    She is going to work with  her therapist to help with her sleep hygiene and grief counseling.   If needed call and I can order a sleep referral    FOLLOW-UP:  She will return to clinic as needed for support or if weight gain occurs.     26 minutes spent on the date of the encounter doing chart review, history and exam, result review, counseling, developing plan of care, documentation, and further activities as noted      INTERVAL HISTORY:  Brandi Elkins returns for medical weight management follow up.  She was started on Metformin last visit and was to follow up with Lauren Bloch, [facility] pharmacist. Metformin caused diarrhea and some fecal urgency/incontinence so she has tapered off.  Continues to have insomnia.  Started to have this when when she was on phentermine over a year ago. She is dealing with grief and some depression. Working with a therapist. Right now she is just going to focus on trying to sleep.  She has learned some about portion control with our clinic and has lost weight.  Is felling good about this.  She doesn't think she needs to continue with us right now.     Goals.  Make appointment to see therapist- completed.       WEIGHT METRICS:  Body mass index is 35.23 kg/m .   Current Weight: 215 lb (97.5 kg) (pt reported)  Last Visits Weight: 210 lb (95.3 kg)  Initial Weight (lbs): 235.5 lbs  Cumulative weight loss (lbs): 20.5  Weight Loss Percentage: 8.7%    Wt Readings from Last 10 Encounters:   07/11/22 215 lb (97.5 kg)   04/28/22 210 lb (95.3 kg)   01/24/22 215 lb (97.5 kg)   10/29/21 213 lb (96.6 kg)   10/27/21 213 lb 1.3 oz (96.7 kg)   09/15/21 215 lb (97.5 kg)   09/07/21 214 lb 12.8 oz (97.4 kg)   07/05/21 218 lb (98.9 kg)   06/08/21 225 lb (102.1 kg)   05/10/21 233 lb 4.8 oz (105.8 kg)        Weight Loss Medication History Reviewed With Patient 7/5/2022   Which weight loss medications are you currently taking on a regular basis?  Metformin   If you are not taking a weight loss medication that was prescribed to you, please  indicate why: Tapered off the metformin.   Are you having any side effects from the weight loss medication that we have prescribed you? Yes   If you are having side effects please describe: Diarrhea, stool leakage.         Changes and Difficulties 7/5/2022   I have made the following changes to my diet since my last visit: Smaller portions   With regards to my diet, I am still struggling with: Appetite.     I have made the following changes to my activity/exercise since my last visit: A bit more walking   With regards to my activity/exercise, I am still struggling with: Back and neck pain         MEDICATIONS:   Current Outpatient Medications   Medication Sig Dispense Refill     acetaminophen (TYLENOL) 650 MG CR tablet Take 2 mg by mouth 2 times daily       albuterol (ALBUTEROL) 108 (90 BASE) MCG/ACT Inhaler Inhale 1-2 puffs into the lungs every 6 hours as needed for shortness of breath / dyspnea 1 Inhaler 1     atorvastatin (LIPITOR) 10 MG tablet Take 1 tablet (10 mg) by mouth daily 90 tablet 3     CALCIUM CITRATE PO Take 500 mg by mouth 2 times daily       Cetirizine HCl (ZYRTEC PO) Take 10 mg by mouth daily       cyclobenzaprine (FLEXERIL) 5 MG tablet Take 1-2 tablets (5-10 mg) by mouth nightly as needed for muscle spasms 90 tablet 3     doxepin (SILENOR) 3 MG tablet Take 1 tablet (3 mg) by mouth nightly as needed for sleep 30 tablet 5     famotidine (PEPCID) 20 MG tablet TAKE 1 TABLET BY MOUTH TWICE A  tablet 3     fenofibrate (TRICOR) 145 MG tablet Take 1 tablet (145 mg) by mouth daily 90 tablet 3     levothyroxine (SYNTHROID/LEVOTHROID) 88 MCG tablet Take 1 tablet (88 mcg) by mouth daily 90 tablet 3     lisinopril (ZESTRIL) 10 MG tablet Take 1 tablet (10 mg) by mouth daily 90 tablet 3     metroNIDAZOLE (METROCREAM) 0.75 % external cream APPLY TOPICALLY DAILY TO FACE 45 g 1     omeprazole (PRILOSEC) 20 MG DR capsule TAKE 1 TABLET (20 MG) BY MOUTH 2 TIMES DAILY TAKE 30-60 MINUTES BEFORE A MEAL. 180 capsule  3     pramipexole (MIRAPEX) 0.125 MG tablet TAKE 2 TABLETS BY MOUTH AT DINNER AND 1 TABLET AT BEDTIME 270 tablet 3     triamcinolone (NASACORT AQ) 55 MCG/ACT nasal inhaler Inhale 2 sprays in both nostrils every day as needed 1 Inhaler 7     UNABLE TO FIND MEDICATION NAME: Allergy shots       vitamin D3 (CHOLECALCIFEROL) 50 mcg (2000 units) tablet Take 1 tablet by mouth daily         LABS:  Vitamin D Deficiency screening   Date Value Ref Range Status   09/08/2017 45 20 - 75 ug/L Final     Comment:     Season, race, dietary intake, and treatment affect the concentration of   25-hydroxy-Vitamin D. Values may decrease during winter months and increase   during summer months. Values 20-29 ug/L may indicate Vitamin D insufficiency   and values <20 ug/L may indicate Vitamin D deficiency.  Vitamin D determination is routinely performed by an immunoassay specific for   25 hydroxyvitamin D3.  If an individual is on vitamin D2 (ergocalciferol)   supplementation, please specify 25 OH vitamin D2 and D3 level determination by   LCMSMS test [id number].       TSH   Date Value Ref Range Status   11/02/2021 3.12 0.40 - 4.00 mU/L Final   06/04/2020 1.56 0.40 - 4.00 mU/L Final     Sodium   Date Value Ref Range Status   06/20/2022 138 133 - 144 mmol/L Final   03/19/2021 140 133 - 144 mmol/L Final     Potassium   Date Value Ref Range Status   06/20/2022 4.7 3.4 - 5.3 mmol/L Final   03/19/2021 4.3 3.4 - 5.3 mmol/L Final     Chloride   Date Value Ref Range Status   06/20/2022 107 94 - 109 mmol/L Final   03/19/2021 108 94 - 109 mmol/L Final     Carbon Dioxide   Date Value Ref Range Status   03/19/2021 27 20 - 32 mmol/L Final     Carbon Dioxide (CO2)   Date Value Ref Range Status   06/20/2022 23 20 - 32 mmol/L Final     Anion Gap   Date Value Ref Range Status   06/20/2022 8 3 - 14 mmol/L Final   03/19/2021 5 3 - 14 mmol/L Final     Glucose   Date Value Ref Range Status   06/20/2022 83 70 - 99 mg/dL Final   03/19/2021 106 (H) 70 - 99 mg/dL Final      Urea Nitrogen   Date Value Ref Range Status   06/20/2022 15 7 - 30 mg/dL Final   03/19/2021 15 7 - 30 mg/dL Final     Creatinine   Date Value Ref Range Status   06/20/2022 0.86 0.52 - 1.04 mg/dL Final   03/19/2021 0.95 0.52 - 1.04 mg/dL Final     GFR Estimate   Date Value Ref Range Status   06/20/2022 73 >60 mL/min/1.73m2 Final     Comment:     Effective December 21, 2021 eGFRcr in adults is calculated using the 2021 CKD-EPI creatinine equation which includes age and gender (Orly et al., NEJM, DOI: 10.1056/JSIQha2609654)   03/19/2021 62 >60 mL/min/[1.73_m2] Final     Comment:     Non  GFR Calc  Starting 12/18/2018, serum creatinine based estimated GFR (eGFR) will be   calculated using the Chronic Kidney Disease Epidemiology Collaboration   (CKD-EPI) equation.       Calcium   Date Value Ref Range Status   06/20/2022 9.1 8.5 - 10.1 mg/dL Final   03/19/2021 9.4 8.5 - 10.1 mg/dL Final     Bilirubin Total   Date Value Ref Range Status   11/02/2021 0.4 0.2 - 1.3 mg/dL Final   06/04/2020 0.4 0.2 - 1.3 mg/dL Final     Alkaline Phosphatase   Date Value Ref Range Status   11/02/2021 40 40 - 150 U/L Final   06/04/2020 32 (L) 40 - 150 U/L Final     ALT   Date Value Ref Range Status   11/02/2021 21 0 - 50 U/L Final   06/04/2020 20 0 - 50 U/L Final     AST   Date Value Ref Range Status   11/02/2021 19 0 - 45 U/L Final   06/04/2020 20 0 - 45 U/L Final     Cholesterol   Date Value Ref Range Status   11/02/2021 170 <200 mg/dL Final   06/04/2020 152 <200 mg/dL Final     HDL Cholesterol   Date Value Ref Range Status   06/04/2020 47 (L) >49 mg/dL Final     Direct Measure HDL   Date Value Ref Range Status   11/02/2021 46 (L) >=50 mg/dL Final     LDL Cholesterol Calculated   Date Value Ref Range Status   11/02/2021 62 <=100 mg/dL Final   06/04/2020 66 <100 mg/dL Final     Comment:     Desirable:       <100 mg/dl     Triglycerides   Date Value Ref Range Status   11/02/2021 308 (H) <150 mg/dL Final   06/04/2020 195 (H)  "<150 mg/dL Final     Comment:     Borderline high:  150-199 mg/dl  High:             200-499 mg/dl  Very high:       >499 mg/dl       WBC   Date Value Ref Range Status   03/19/2021 6.4 4.0 - 11.0 10e9/L Final     Hemoglobin   Date Value Ref Range Status   03/19/2021 16.3 (H) 11.7 - 15.7 g/dL Final     Hematocrit   Date Value Ref Range Status   03/19/2021 47.6 (H) 35.0 - 47.0 % Final     MCV   Date Value Ref Range Status   03/19/2021 98 78 - 100 fl Final     Platelet Count   Date Value Ref Range Status   03/19/2021 336 150 - 450 10e9/L Final         PE:  Ht 5' 5.5\" (1.664 m)   Wt 215 lb (97.5 kg)   LMP  (LMP Unknown)   BMI 35.23 kg/m    GENERAL: Healthy, alert and no distress  EYES: Eyes grossly normal to inspection.  No discharge or erythema, or obvious scleral/conjunctival abnormalities.  RESP: No audible wheeze, cough, or visible cyanosis.  No visible retractions or increased work of breathing.    SKIN: Visible skin clear. No significant rash, abnormal pigmentation or lesions.  NEURO: Cranial nerves grossly intact.  Mentation and speech appropriate for age.  PSYCH: Mentation appears normal, affect normal/bright, judgement and insight intact, normal speech and appearance well-groomed.      Sincerely,      Rima Long PA-C        "

## 2022-07-11 ENCOUNTER — MYC MEDICAL ADVICE (OUTPATIENT)
Dept: FAMILY MEDICINE | Facility: CLINIC | Age: 67
End: 2022-07-11

## 2022-07-11 ENCOUNTER — VIRTUAL VISIT (OUTPATIENT)
Dept: SURGERY | Facility: CLINIC | Age: 67
End: 2022-07-11
Payer: MEDICARE

## 2022-07-11 VITALS — BODY MASS INDEX: 34.55 KG/M2 | WEIGHT: 215 LBS | HEIGHT: 66 IN

## 2022-07-11 DIAGNOSIS — E66.01 CLASS 2 SEVERE OBESITY DUE TO EXCESS CALORIES WITH SERIOUS COMORBIDITY AND BODY MASS INDEX (BMI) OF 35.0 TO 35.9 IN ADULT (H): Primary | ICD-10-CM

## 2022-07-11 DIAGNOSIS — E66.812 CLASS 2 SEVERE OBESITY DUE TO EXCESS CALORIES WITH SERIOUS COMORBIDITY AND BODY MASS INDEX (BMI) OF 35.0 TO 35.9 IN ADULT (H): Primary | ICD-10-CM

## 2022-07-11 DIAGNOSIS — G47.33 OSA (OBSTRUCTIVE SLEEP APNEA): ICD-10-CM

## 2022-07-11 DIAGNOSIS — G47.00 INSOMNIA, UNSPECIFIED TYPE: ICD-10-CM

## 2022-07-11 PROCEDURE — 99213 OFFICE O/P EST LOW 20 MIN: CPT | Mod: 95 | Performed by: PHYSICIAN ASSISTANT

## 2022-07-11 NOTE — ASSESSMENT & PLAN NOTE
Patient was congratulated on wt loss success thus far. She is down 20 lbs since starting the program.  Healthy habits to assist with further weight loss were discussed. Brandi Elkins will continue using the skills she has gained through our program.  She is going to work with her therapist to help with her sleep hygiene and grief counseling.  Sleep referral was offered today but pt preferred to wait. She will return to clinic as needed for support or if weight gain occurs.

## 2022-07-13 RX ORDER — DOXEPIN 3 MG/1
6 TABLET, FILM COATED ORAL
Qty: 60 TABLET | Refills: 5 | Status: SHIPPED | OUTPATIENT
Start: 2022-07-13 | End: 2022-08-29

## 2022-07-13 NOTE — TELEPHONE ENCOUNTER
She would like to increase the doxepin to 6 mg nightly, sign off on new script if correct.    Thank you

## 2022-07-15 ENCOUNTER — TELEPHONE (OUTPATIENT)
Dept: FAMILY MEDICINE | Facility: CLINIC | Age: 67
End: 2022-07-15

## 2022-07-15 NOTE — TELEPHONE ENCOUNTER
Prior Authorization Retail Medication Request    Medication/Dose: doxepin (SILENOR) 3 MG tablet  ICD code (if different than what is on RX):  Previously Tried and Failed:  Rationale:  Ins only covers 1 tablet daily.    Insurance Name:    Insurance ID:      Pharmacy Information (if different than what is on RX)  Name:  Phone:    Please include previous medications tried and failed.  Please ask insurance for medications on formulary.

## 2022-07-18 NOTE — TELEPHONE ENCOUNTER
Prior Authorization Approval    Authorization Effective Date: 4/19/2022  Authorization Expiration Date: 7/18/2023  Medication: doxepin (SILENOR) 3 MG tablet, rec 7-13-22  Approved Dose/Quantity:    Reference #:     Insurance Company: [employer] - Phone [phone] Fax [phone]  Expected CoPay:       CoPay Card Available:      Foundation Assistance Needed:    Which Pharmacy is filling the prescription (Not needed for infusion/clinic administered): [facility]ACY #[id number] - [place], [place] - [address] [place] [address]  Pharmacy Notified: Yes  Patient Notified: Yes  **Instructed pharmacy to notify patient when script is ready to /ship.**

## 2022-07-18 NOTE — TELEPHONE ENCOUNTER
Central Prior Authorization Team   Phone: [phone]    PA Initiation    Medication: doxepin (SILENOR) 3 MG tablet, rec 7-13-22  Insurance Company: [employer] - Phone [phone] Fax [phone]  Pharmacy Filling the Rx: [facility]/PHARMACY #[id number] - [place], [place] - [address]  Filling Pharmacy Phone: [phone]  Filling Pharmacy Fax:    Start Date: 7/18/2022

## 2022-08-15 DIAGNOSIS — E78.5 HYPERLIPIDEMIA LDL GOAL <130: ICD-10-CM

## 2022-08-15 DIAGNOSIS — E06.3 CHRONIC LYMPHOCYTIC THYROIDITIS: ICD-10-CM

## 2022-08-15 DIAGNOSIS — E78.1 PURE HYPERGLYCERIDEMIA: ICD-10-CM

## 2022-08-15 DIAGNOSIS — I10 HYPERTENSION GOAL BP (BLOOD PRESSURE) < 140/90: ICD-10-CM

## 2022-08-17 ENCOUNTER — MYC MEDICAL ADVICE (OUTPATIENT)
Dept: FAMILY MEDICINE | Facility: CLINIC | Age: 67
End: 2022-08-17

## 2022-08-17 NOTE — CONFIDENTIAL NOTE
MyChart message sent to patient to clarify remaining doses (should have at least two months) and to schedule annual.    GONZALES Ruiz RN  [facility]

## 2022-08-18 RX ORDER — ATORVASTATIN CALCIUM 10 MG/1
TABLET, FILM COATED ORAL
Qty: 90 TABLET | Refills: 3 | OUTPATIENT
Start: 2022-08-18

## 2022-08-18 RX ORDER — LISINOPRIL 10 MG/1
TABLET ORAL
Qty: 90 TABLET | Refills: 3 | OUTPATIENT
Start: 2022-08-18

## 2022-08-18 RX ORDER — LEVOTHYROXINE SODIUM 88 UG/1
TABLET ORAL
Qty: 90 TABLET | Refills: 3 | OUTPATIENT
Start: 2022-08-18

## 2022-08-18 RX ORDER — FENOFIBRATE 145 MG/1
145 TABLET, COATED ORAL DAILY
Qty: 90 TABLET | Refills: 3 | OUTPATIENT
Start: 2022-08-18

## 2022-08-18 NOTE — CONFIDENTIAL NOTE
Pt scheduled for 10/5/22 and replies that she has sufficient medications until that time.    Meds refused.    GONZALES Ruiz RN  [facility]

## 2022-08-26 ENCOUNTER — E-VISIT (OUTPATIENT)
Dept: FAMILY MEDICINE | Facility: CLINIC | Age: 67
End: 2022-08-26
Payer: MEDICARE

## 2022-08-26 DIAGNOSIS — M79.7 FIBROMYALGIA: ICD-10-CM

## 2022-08-26 DIAGNOSIS — G47.00 INSOMNIA, UNSPECIFIED TYPE: Primary | ICD-10-CM

## 2022-08-26 PROCEDURE — 99421 OL DIG E/M SVC 5-10 MIN: CPT | Performed by: NURSE PRACTITIONER

## 2022-08-28 NOTE — PROGRESS NOTES
New Medical Weight Management Consult    PATIENT:  Brandi Stern  MRN:         [id number]  :         1955  YUSEF:         2018    Dear Cherie Brennan,    I had the pleasure of seeing your patient, Brandi Stern. Ms Stern has had a hiatal hernia for years now. She has been managed medically with zantac and omeprazole. Unfortunately, she developed exercise intolerance, SOB, chest pressure and cough about 6 months ago prompting pulmonary and cardiac workups which have been negative. She presents today questioning if hiatal hernia is cause of symptoms. Reflux is controlled, no dysphagia, no odynophagia. Last EGD was in , no yuen's esophagus. Ph probe done in  had a DeMeester score of over 60.    Full intake/assessment done to determine barriers to weight loss success and develop a treatment plan.  Brandi Stern is a 62 year old female interested in treatment of medical problems associated with weight.  Her weight today is 226 lbs 14.4 oz, Body mass index is 37.76 kg/(m^2)., and she has the following co-morbidities:    Wt Readings from Last 4 Encounters:   18 102.9 kg (226 lb 14.4 oz)   17 103.5 kg (228 lb 2 oz)   10/31/17 103.4 kg (228 lb)   17 103.4 kg (228 lb)     ROS  ROS negative except her usual joint and muscle pains and as stated in HPI above.     PAST MEDICAL HISTORY:  Past Medical History:   Diagnosis Date     Allergic rhinitis due to other allergen      Apneas, obstructive sleep      Chronic lymphocytic thyroiditis      COPD (chronic obstructive pulmonary disease) (H)      Depressive disorder, not elsewhere classified      Disorder of bone and cartilage, unspecified      Esophageal reflux      Essential hypertension, benign      Generalized osteoarthrosis, unspecified site     knees     HASHIMOTO'S THYROIDITIS 11/15/2004     HASHIMOTO'S THYROIDITIS      Headache above the eye region      Hiatal hernia      Insomnia, unspecified      Moderate persistent asthma       Nasal congestion      Pure hyperglyceridemia      Scoliosis      Snoring      Tobacco use disorder      MEDICATIONS:   Current Outpatient Prescriptions   Medication Sig Dispense Refill     diclofenac (VOLTAREN-XR) 100 MG 24 hr tablet Take 1 tablet (100 mg) by mouth daily 30 tablet 3     buPROPion (WELLBUTRIN XL) 300 MG 24 hr tablet Take 1 tablet (300 mg) by mouth every morning 90 tablet PRN     zolpidem (AMBIEN) 10 MG tablet TAKE 1/2 TO 1 TABLET BY MOUTH NIGHTLY AS NEEDED 30 tablet 5     atorvastatin (LIPITOR) 10 MG tablet TAKE 1 TABLET (10 MG) BY MOUTH DAILY 90 tablet PRN     levothyroxine (SYNTHROID) 75 MCG tablet Take 1 tablet (75 mcg) by mouth every morning Overdue for labs 90 tablet 3     desvenlafaxine fumarate 100 MG 24 hr tablet Take 1 tablet (100 mg) by mouth daily 30 tablet PRN     albuterol (ALBUTEROL) 108 (90 BASE) MCG/ACT Inhaler Inhale 1-2 puffs into the lungs every 6 hours as needed for shortness of breath / dyspnea 1 Inhaler 1     diclofenac (VOLTAREN) 1 % GEL topical gel Apply 4 grams to knees or 2 grams to feet four times daily using enclosed dosing card. 100 g 3     fenofibrate 145 MG tablet Take 1 tablet (145 mg) by mouth daily 90 tablet 2     ranitidine (ZANTAC) 300 MG tablet Take 1 tablet (300 mg) by mouth 2 times daily 60 tablet prn     omeprazole (PRILOSEC) 20 MG CR capsule Take 1 capsule (20 mg) by mouth 2 times daily 180 capsule 1     SYMBICORT 160-4.5 MCG/ACT Inhaler Inhale 1 puff into the lungs 2 times daily       cyclobenzaprine (FLEXERIL) 5 MG tablet Take 1-2 tablets (5-10 mg) by mouth 3 times daily as needed for muscle spasms 180 tablet 1     UNABLE TO FIND MEDICATION NAME: Allergy shots       Ascorbic Acid (VITAMIN C PO) Take 1,000 mg by mouth daily       Calcium-Magnesium-Vitamin D (CALCIUM 500 PO) Take 1 tablet by mouth daily       Lutein 10 MG TABS Take 10 mg by mouth daily       triamcinolone (NASACORT AQ) 55 MCG/ACT nasal inhaler Inhale 2 sprays in both nostrils every day as  "needed 1 Inhaler 7     albuterol (2.5 MG/3ML) 0.083% nebulizer solution Take  by nebulization. take 3 mL by nebulization 4 times daily as needed 3 mL 12     Coenzyme Q10 (CO Q 10 PO) Take 200 mg by mouth daily        MIRAPEX 0.125 MG OR TABS Take two tabs at dinner and one at hs       ACETAMINOPHEN EXTRA STRENGTH OR Pt reports taking 650 MG arthritis       CLARITIN 10 MG OR TABS 1 TAB PO QD (Once per day) as needed for ALLERGY SYMPTOMS 0 0     ALLERGIES:   Allergies   Allergen Reactions     Animal Dander      Fluconazole      rash     Liquid Adhesive      Transdermal patch adhesive. Specifically to nicotine patch; not allergic to nicotine.      Seasonal Allergies      Suture      Non-healing suture line     Vistaril [Hydroxyzine Hcl]      Urinary retention     PHYSICAL EXAM:  /88  Pulse 89  Temp 98.7  F (37.1  C) (Oral)  Ht 1.651 m (5' 5\")  Wt 102.9 kg (226 lb 14.4 oz)  LMP  (LMP Unknown)  SpO2 95%  BMI 37.76 kg/m2   A & O x 3  HEENT: NCAT, mucous membranes moist  Respirations unlabored  Location of obesity: Mixed Obesity, mostly central     ASSESSMENT:  Brandi is a patient with what seems to be foregut related symptoms of chest pressure. Medical mgmt seems to be adequate for her in regards to control of GERD. Importantly, cardiac and pulmonary issues have been ruled out which is encouraging. I do however think weight loss will significantly improve symptoms. For this reason, she has been referred to medical weight management and has been seen by the dietician today. She can come back and see me in about 6 months or earlier if issues arise. She will also need an EGD, but that could be scheduled at a later time.     Her ability to lose weight is impacted by physical impairment and lack of education on nutrition and dietary needs.    PLAN:    Decrease portion sizes  Dietician visit of education    Programmatic/Healthy Living  Medication:  The patient will begin medication in pursuit of improved medical " status as influenced by body weight. There is a mutual understanding of the goals and risks of this therapy. The patient is in agreement. She will be educated on dosage regimen and possible side effects at her visit.    RTC:    6 months.    Sincerely,    Antony Sheehan MD  Surgery  [phone] (hospital )  [phone] (clinic nurses)                   28-Aug-2022 13:48

## 2022-08-29 ENCOUNTER — DOCUMENTATION ONLY (OUTPATIENT)
Dept: OTHER | Facility: CLINIC | Age: 67
End: 2022-08-29

## 2022-08-29 ENCOUNTER — TELEPHONE (OUTPATIENT)
Dept: FAMILY MEDICINE | Facility: CLINIC | Age: 67
End: 2022-08-29

## 2022-08-29 DIAGNOSIS — M79.7 FIBROMYALGIA: ICD-10-CM

## 2022-08-29 RX ORDER — DESVENLAFAXINE 50 MG/1
50 TABLET, FILM COATED, EXTENDED RELEASE ORAL DAILY
Qty: 90 TABLET | Refills: 0 | Status: CANCELLED | OUTPATIENT
Start: 2022-08-29

## 2022-08-29 RX ORDER — ZOLPIDEM TARTRATE 5 MG/1
5 TABLET ORAL
Qty: 30 TABLET | Refills: 2 | Status: SHIPPED | OUTPATIENT
Start: 2022-08-29 | End: 2022-10-05

## 2022-08-29 RX ORDER — DESVENLAFAXINE 50 MG/1
50 TABLET, FILM COATED, EXTENDED RELEASE ORAL DAILY
Qty: 90 TABLET | Refills: 0 | Status: SHIPPED | OUTPATIENT
Start: 2022-08-29 | End: 2022-10-05

## 2022-08-29 NOTE — TELEPHONE ENCOUNTER
Central Prior Authorization Team - Phone: [phone]     PA Initiation    Medication: desvenlafaxine (PRISTIQ) 50 MG 24 hr tablet- PA INITIATED  Insurance Company:    Pharmacy Filling the Rx: [id number] - [place] - [address]  Filling Pharmacy Phone: [phone]  Filling Pharmacy Fax:    Start Date: 8/29/2022

## 2022-08-30 NOTE — TELEPHONE ENCOUNTER
Central Prior Authorization Team - Phone: [phone]     Received clinical question form via fax from [employer]. Completed and returned via fax. Waiting for determination.

## 2022-08-30 NOTE — TELEPHONE ENCOUNTER
Central Prior Authorization Team - Phone: [phone]     Prior Authorization Approval    Authorization Effective Date: 6/1/2022  Authorization Expiration Date: 8/29/2025  Medication: desvenlafaxine (PRISTIQ) 50 MG 24 hr tablet- PA approved  Approved Dose/Quantity: 90  Reference #:     Insurance Company:    Expected CoPay:       CoPay Card Available:      Foundation Assistance Needed:    Which Pharmacy is filling the prescription (Not needed for infusion/clinic administered): [id number] - [place], [place] - [address] [place] [address]  Pharmacy Notified: Yes  Patient Notified: YesComment:  pharmacy will notify when ready

## 2022-10-04 ENCOUNTER — MYC MEDICAL ADVICE (OUTPATIENT)
Dept: FAMILY MEDICINE | Facility: CLINIC | Age: 67
End: 2022-10-04

## 2022-10-04 ASSESSMENT — ENCOUNTER SYMPTOMS
EYE PAIN: 0
DIARRHEA: 0
SORE THROAT: 0
NERVOUS/ANXIOUS: 0
CONSTIPATION: 0
DYSURIA: 0
ARTHRALGIAS: 1
ABDOMINAL PAIN: 0
FREQUENCY: 0
MYALGIAS: 1
HEADACHES: 0
NAUSEA: 0
COUGH: 0
CHILLS: 0
SHORTNESS OF BREATH: 0
PARESTHESIAS: 0
DIZZINESS: 0
JOINT SWELLING: 0
WEAKNESS: 0
HEMATOCHEZIA: 0
FEVER: 0
HEARTBURN: 0
BREAST MASS: 0
PALPITATIONS: 0
HEMATURIA: 0

## 2022-10-04 ASSESSMENT — ASTHMA QUESTIONNAIRES
QUESTION_4 LAST FOUR WEEKS HOW OFTEN HAVE YOU USED YOUR RESCUE INHALER OR NEBULIZER MEDICATION (SUCH AS ALBUTEROL): NOT AT ALL
ACT_TOTALSCORE: 25
QUESTION_2 LAST FOUR WEEKS HOW OFTEN HAVE YOU HAD SHORTNESS OF BREATH: NOT AT ALL
ACT_TOTALSCORE: 25
QUESTION_3 LAST FOUR WEEKS HOW OFTEN DID YOUR ASTHMA SYMPTOMS (WHEEZING, COUGHING, SHORTNESS OF BREATH, CHEST TIGHTNESS OR PAIN) WAKE YOU UP AT NIGHT OR EARLIER THAN USUAL IN THE MORNING: NOT AT ALL
QUESTION_1 LAST FOUR WEEKS HOW MUCH OF THE TIME DID YOUR ASTHMA KEEP YOU FROM GETTING AS MUCH DONE AT WORK, SCHOOL OR AT HOME: NONE OF THE TIME
QUESTION_5 LAST FOUR WEEKS HOW WOULD YOU RATE YOUR ASTHMA CONTROL: COMPLETELY CONTROLLED

## 2022-10-04 ASSESSMENT — ACTIVITIES OF DAILY LIVING (ADL): CURRENT_FUNCTION: NO ASSISTANCE NEEDED

## 2022-10-05 ENCOUNTER — VIRTUAL VISIT (OUTPATIENT)
Dept: FAMILY MEDICINE | Facility: CLINIC | Age: 67
End: 2022-10-05
Payer: MEDICARE

## 2022-10-05 VITALS — DIASTOLIC BLOOD PRESSURE: 79 MMHG | SYSTOLIC BLOOD PRESSURE: 112 MMHG

## 2022-10-05 DIAGNOSIS — E06.3 CHRONIC LYMPHOCYTIC THYROIDITIS: ICD-10-CM

## 2022-10-05 DIAGNOSIS — E78.1 PURE HYPERGLYCERIDEMIA: ICD-10-CM

## 2022-10-05 DIAGNOSIS — Z00.00 ENCOUNTER FOR ANNUAL WELLNESS VISIT (AWV) IN MEDICARE PATIENT: Primary | ICD-10-CM

## 2022-10-05 DIAGNOSIS — G25.81 RESTLESS LEG SYNDROME: ICD-10-CM

## 2022-10-05 DIAGNOSIS — M79.7 FIBROMYALGIA: ICD-10-CM

## 2022-10-05 DIAGNOSIS — D50.9 IRON DEFICIENCY ANEMIA, UNSPECIFIED IRON DEFICIENCY ANEMIA TYPE: ICD-10-CM

## 2022-10-05 DIAGNOSIS — M79.10 MYALGIA: ICD-10-CM

## 2022-10-05 DIAGNOSIS — M15.9 OSTEOARTHRITIS OF MULTIPLE JOINTS, UNSPECIFIED OSTEOARTHRITIS TYPE: ICD-10-CM

## 2022-10-05 DIAGNOSIS — E78.5 HYPERLIPIDEMIA LDL GOAL <130: ICD-10-CM

## 2022-10-05 DIAGNOSIS — K21.9 GASTROESOPHAGEAL REFLUX DISEASE WITHOUT ESOPHAGITIS: ICD-10-CM

## 2022-10-05 DIAGNOSIS — I10 HYPERTENSION GOAL BP (BLOOD PRESSURE) < 140/90: ICD-10-CM

## 2022-10-05 DIAGNOSIS — M62.830 SPASM OF BACK MUSCLES: ICD-10-CM

## 2022-10-05 DIAGNOSIS — Z78.0 ASYMPTOMATIC MENOPAUSE: ICD-10-CM

## 2022-10-05 DIAGNOSIS — B37.2 INTERTRIGINOUS CANDIDIASIS: ICD-10-CM

## 2022-10-05 DIAGNOSIS — G47.00 INSOMNIA, UNSPECIFIED TYPE: ICD-10-CM

## 2022-10-05 DIAGNOSIS — J45.40 MODERATE PERSISTENT ASTHMA WITHOUT COMPLICATION: ICD-10-CM

## 2022-10-05 PROCEDURE — 99207 PR ANNUAL WELLNESS VISIT, PPS, SUBSEQUENT STAT: CPT | Mod: 95 | Performed by: NURSE PRACTITIONER

## 2022-10-05 PROCEDURE — 99214 OFFICE O/P EST MOD 30 MIN: CPT | Mod: 25 | Performed by: NURSE PRACTITIONER

## 2022-10-05 RX ORDER — FENOFIBRATE 145 MG/1
145 TABLET, COATED ORAL DAILY
Qty: 90 TABLET | Refills: 3 | Status: SHIPPED | OUTPATIENT
Start: 2022-10-05 | End: 2023-12-12

## 2022-10-05 RX ORDER — CYCLOBENZAPRINE HCL 5 MG
5-10 TABLET ORAL
Qty: 90 TABLET | Refills: 3 | Status: SHIPPED | OUTPATIENT
Start: 2022-10-05 | End: 2023-08-08

## 2022-10-05 RX ORDER — LISINOPRIL 10 MG/1
10 TABLET ORAL DAILY
Qty: 90 TABLET | Refills: 3 | Status: SHIPPED | OUTPATIENT
Start: 2022-10-05 | End: 2023-10-30

## 2022-10-05 RX ORDER — DESVENLAFAXINE 100 MG/1
100 TABLET, EXTENDED RELEASE ORAL DAILY
Qty: 90 TABLET | Refills: 3 | Status: SHIPPED | OUTPATIENT
Start: 2022-10-05 | End: 2023-10-30

## 2022-10-05 RX ORDER — ATORVASTATIN CALCIUM 10 MG/1
10 TABLET, FILM COATED ORAL DAILY
Qty: 90 TABLET | Refills: 3 | Status: SHIPPED | OUTPATIENT
Start: 2022-10-05 | End: 2023-12-06

## 2022-10-05 RX ORDER — PRAMIPEXOLE DIHYDROCHLORIDE 0.12 MG/1
TABLET ORAL
Qty: 270 TABLET | Refills: 3 | Status: SHIPPED | OUTPATIENT
Start: 2022-10-05 | End: 2023-10-30

## 2022-10-05 RX ORDER — LEVOTHYROXINE SODIUM 88 UG/1
88 TABLET ORAL DAILY
Qty: 90 TABLET | Refills: 3 | Status: SHIPPED | OUTPATIENT
Start: 2022-10-05 | End: 2023-10-23

## 2022-10-05 RX ORDER — FLUTICASONE PROPIONATE AND SALMETEROL 100; 50 UG/1; UG/1
1 POWDER RESPIRATORY (INHALATION) EVERY 12 HOURS
COMMUNITY
Start: 2022-10-05 | End: 2024-02-15

## 2022-10-05 RX ORDER — ZOLPIDEM TARTRATE 5 MG/1
5 TABLET ORAL
Qty: 30 TABLET | Refills: 5 | Status: SHIPPED | OUTPATIENT
Start: 2022-10-05 | End: 2023-05-04

## 2022-10-05 RX ORDER — FAMOTIDINE 20 MG/1
TABLET, FILM COATED ORAL
Qty: 180 TABLET | Refills: 3 | Status: SHIPPED | OUTPATIENT
Start: 2022-10-05 | End: 2023-12-06

## 2022-10-05 RX ORDER — KETOCONAZOLE 20 MG/G
CREAM TOPICAL 2 TIMES DAILY
Qty: 60 G | Refills: 12 | Status: SHIPPED | OUTPATIENT
Start: 2022-10-05 | End: 2023-10-30

## 2022-10-05 ASSESSMENT — ENCOUNTER SYMPTOMS
HEADACHES: 0
HEARTBURN: 0
NERVOUS/ANXIOUS: 0
PALPITATIONS: 0
NAUSEA: 0
EYE PAIN: 0
CONSTIPATION: 0
DYSURIA: 0
MYALGIAS: 1
HEMATURIA: 0
HEMATOCHEZIA: 0
PARESTHESIAS: 0
JOINT SWELLING: 0
WEAKNESS: 0
ARTHRALGIAS: 1
SHORTNESS OF BREATH: 0
SORE THROAT: 0
CHILLS: 0
COUGH: 0
FREQUENCY: 0
DIARRHEA: 0
FEVER: 0
ABDOMINAL PAIN: 0
DIZZINESS: 0
BREAST MASS: 0

## 2022-10-05 ASSESSMENT — ANXIETY QUESTIONNAIRES
6. BECOMING EASILY ANNOYED OR IRRITABLE: NOT AT ALL
GAD7 TOTAL SCORE: 0
5. BEING SO RESTLESS THAT IT IS HARD TO SIT STILL: NOT AT ALL
3. WORRYING TOO MUCH ABOUT DIFFERENT THINGS: NOT AT ALL
GAD7 TOTAL SCORE: 0
1. FEELING NERVOUS, ANXIOUS, OR ON EDGE: NOT AT ALL
7. FEELING AFRAID AS IF SOMETHING AWFUL MIGHT HAPPEN: NOT AT ALL
2. NOT BEING ABLE TO STOP OR CONTROL WORRYING: NOT AT ALL
IF YOU CHECKED OFF ANY PROBLEMS ON THIS QUESTIONNAIRE, HOW DIFFICULT HAVE THESE PROBLEMS MADE IT FOR YOU TO DO YOUR WORK, TAKE CARE OF THINGS AT HOME, OR GET ALONG WITH OTHER PEOPLE: NOT DIFFICULT AT ALL

## 2022-10-05 ASSESSMENT — ACTIVITIES OF DAILY LIVING (ADL): CURRENT_FUNCTION: NO ASSISTANCE NEEDED

## 2022-10-05 ASSESSMENT — PATIENT HEALTH QUESTIONNAIRE - PHQ9
5. POOR APPETITE OR OVEREATING: NOT AT ALL
SUM OF ALL RESPONSES TO PHQ QUESTIONS 1-9: 6

## 2022-10-05 NOTE — PROGRESS NOTES
"SUBJECTIVE:   Brandi Elkins is a 67 year old who presents for Preventive Visit.      Patient has been advised of split billing requirements and indicates understanding: Yes  Are you in the first 12 months of your Medicare coverage?  No    Healthy Habits:     In general, how would you rate your overall health?  Good    Frequency of exercise:  1 day/week    Duration of exercise:  Less than 15 minutes    Do you usually eat at least 4 servings of fruit and vegetables a day, include whole grains    & fiber and avoid regularly eating high fat or \"junk\" foods?  No    Taking medications regularly:  Yes    Medication side effects:  Muscle aches    Ability to successfully perform activities of daily living:  No assistance needed    Home Safety:  No safety concerns identified    Hearing Impairment:  No hearing concerns    In the past 6 months, have you been bothered by leaking of urine?  No    In general, how would you rate your overall mental or emotional health?  Good      PHQ-2 Total Score: 2    Additional concerns today:  Yes (would llike to increase dosage for pristiq,  wake up with muscle aches, could be due to Fenofibrate? 3. Need order for [facility]xa [facility] at [facility] Recommended by endocrinologist. 4. Recurrent genital yeast overgrowth. Need a different  zole than the us)  Hr mood is improved and she has lost some weight on Pristiq, feels there is more room for improvement and would like to increase dose.    Her GERD is well-controlled on Omeprazole and famotidine.     Her RLS is controlled with Mirapex.    She does have some fatigue.  She has a history of CHUY.     Her fibromyalgia is controlled.  She wakes with muscle aches, wonders if related to fenofibrate.    She has used lotrimin to rash under breasts and in groin, worse when hot.       Do you feel safe in your environment? Yes    Have you ever done Advance Care Planning? (For example, a Health Directive, POLST, or a discussion with a medical provider or your " loved ones about your wishes): Yes, advance care planning is on file.      Fall risk  Fallen 2 or more times in the past year?: No  Any fall with injury in the past year?: No    Cognitive Screening Unable to complete due to virtual visit; need for additional assessment in future face-to-face visit    Do you have sleep apnea, excessive snoring or daytime drowsiness?: yes-cpap for sleep apnea    Reviewed and updated as needed this visit by clinical staff    Allergies  Meds                Reviewed and updated as needed this visit by Provider                   Social History     Tobacco Use     Smoking status: Former Smoker     Packs/day: 0.25     Years: 20.00     Pack years: 5.00     Types: Cigarettes     Start date: 10/1/1971     Quit date: 8/1/2007     Years since quitting: 15.1     Smokeless tobacco: Never Used     Tobacco comment: 2 PACK PER WEEK    Substance Use Topics     Alcohol use: Yes     Alcohol/week: 0.0 standard drinks     Comment: switched to beer 6 pack a week      If you drink alcohol do you typically have >3 drinks per day or >7 drinks per week? No    No flowsheet data found.        Depression and Anxiety Follow-Up    How are you doing with your depression since your last visit? Improved     How are you doing with your anxiety since your last visit?  Improved     Are you having other symptoms that might be associated with depression or anxiety? No    Have you had a significant life event? No     Do you have any concerns with your use of alcohol or other drugs? No    Social History     Tobacco Use     Smoking status: Former Smoker     Packs/day: 0.25     Years: 20.00     Pack years: 5.00     Types: Cigarettes     Start date: 10/1/1971     Quit date: 8/1/2007     Years since quitting: 15.1     Smokeless tobacco: Never Used     Tobacco comment: 2 PACK PER WEEK    Substance Use Topics     Alcohol use: Yes     Alcohol/week: 0.0 standard drinks     Comment: switched to beer 6 pack a week      Drug use: No      PHQ 3/24/2022 5/10/2022 10/5/2022   PHQ-9 Total Score 5 12 6   Q9: Thoughts of better off dead/self-harm past 2 weeks Not at all Not at all Not at all     FILEMON-7 SCORE 6/25/2018 9/3/2020 10/5/2022   Total Score - - -   Total Score - 1 (minimal anxiety) -   Total Score 0 1 0     Last PHQ-9 10/5/2022   1.  Little interest or pleasure in doing things 1   2.  Feeling down, depressed, or hopeless 1   3.  Trouble falling or staying asleep, or sleeping too much 1   4.  Feeling tired or having little energy 3   5.  Poor appetite or overeating 0   6.  Feeling bad about yourself 0   7.  Trouble concentrating 0   8.  Moving slowly or restless 0   Q9: Thoughts of better off dead/self-harm past 2 weeks 0   PHQ-9 Total Score 6   Difficulty at work, home, or with people Not difficult at all     FILEMON-7  10/5/2022   1. Feeling nervous, anxious, or on edge 0   2. Not being able to stop or control worrying 0   3. Worrying too much about different things 0   4. Trouble relaxing 0   5. Being so restless that it is hard to sit still 0   6. Becoming easily annoyed or irritable 0   7. Feeling afraid, as if something awful might happen 0   FILEMON-7 Total Score 0   If you checked any problems, how difficult have they made it for you to do your work, take care of things at home, or get along with other people? Not difficult at all       Suicide Assessment Five-step Evaluation and Treatment (SAFE-T)      Current providers sharing in care for this patient include:   Patient Care Team:  Cherie Brennan NP as PCP - General  William Christy MD as MD (Neurology)  Robbie Flores MD as MD (Internal Medicine)  Rima Long PA-C as Assigned Surgical Provider  Cherie Brennan NP as Assigned PCP  Bloch, Lauren Turner, [facility] as Assigned MTM Pharmacist    The following health maintenance items are reviewed in Epic and correct as of today:  Health Maintenance   Topic Date Due     ANNUAL REVIEW OF HM ORDERS  Never done      INFLUENZA VACCINE (1) 09/01/2022     ASTHMA ACTION PLAN  03/14/2023     ASTHMA CONTROL TEST  04/05/2023     PHQ-9  04/05/2023     BMP  06/20/2023     MEDICARE ANNUAL WELLNESS VISIT  10/05/2023     FALL RISK ASSESSMENT  10/05/2023     MAMMO SCREENING  01/04/2024     COLORECTAL CANCER SCREENING  08/05/2024     DTAP/TDAP/TD IMMUNIZATION (3 - Td or Tdap) 01/15/2025     LIPID  11/02/2026     ADVANCE CARE PLANNING  10/05/2027     DEXA  01/31/2035     HEPATITIS C SCREENING  Completed     DEPRESSION ACTION PLAN  Completed     Pneumococcal Vaccine: 65+ Years  Completed     ZOSTER IMMUNIZATION  Completed     COVID-19 Vaccine  Completed     IPV IMMUNIZATION  Aged Out     MENINGITIS IMMUNIZATION  Aged Out     HEPATITIS B IMMUNIZATION  Aged Out           Breast CA Risk Assessment (FHS-7) 10/27/2021 10/4/2022   Do you have a family history of breast, colon, or ovarian cancer? Yes No / Unknown           Pertinent mammograms are reviewed under the imaging tab.    Review of Systems   Constitutional: Negative for chills and fever.   HENT: Negative for congestion, ear pain, hearing loss and sore throat.    Eyes: Negative for pain and visual disturbance.   Respiratory: Negative for cough and shortness of breath.    Cardiovascular: Negative for chest pain, palpitations and peripheral edema.   Gastrointestinal: Negative for abdominal pain, constipation, diarrhea, heartburn, hematochezia and nausea.   Breasts:  Negative for tenderness, breast mass and discharge.   Genitourinary: Negative for dysuria, frequency, genital sores, hematuria, pelvic pain, urgency, vaginal bleeding and vaginal discharge.   Musculoskeletal: Positive for arthralgias and myalgias. Negative for joint swelling.   Skin: Negative for rash.   Neurological: Negative for dizziness, weakness, headaches and paresthesias.   Psychiatric/Behavioral: Negative for mood changes. The patient is not nervous/anxious.          OBJECTIVE:   /79   LMP  (LMP Unknown)  Estimated  "body mass index is 35.23 kg/m  as calculated from the following:    Height as of 7/11/22: 1.664 m (5' 5.5\").    Weight as of 7/11/22: 97.5 kg (215 lb).  Physical Exam  GENERAL: healthy, alert and no distress  PSYCH: mentation appears normal, affect normal/bright        ASSESSMENT / PLAN:   (Z00.00) Encounter for annual wellness visit (AWV) in Medicare patient  (primary encounter diagnosis)  Comment:   Plan:     (M79.7) Fibromyalgia  Comment: improving  Plan: desvenlafaxine (PRISTIQ) 100 MG 24 hr tablet        Will increase Pristiq to 100 mg.     (M79.10) Myalgia  Comment:   Plan: CK total        Will check CK level.  She can try stopping fenofibrate for one month and see if symptoms resolve.  If no improvement, can try stopping atorvastatin.     (J45.40) Moderate persistent asthma without complication  Comment: well-controlled  Plan: The current medical regimen is effective;  continue present plan and medications.     (E06.3) HASHIMOTO'S THYROIDITIS  Comment: stable  Plan: TSH with free T4 reflex, levothyroxine         (SYNTHROID/LEVOTHROID) 88 MCG tablet        The current medical regimen is effective;  continue present plan and medications.     (M15.9) Osteoarthritis of multiple joints, unspecified osteoarthritis type  Comment:   Plan:     (E78.5) Hyperlipidemia LDL goal <130  Comment:   Plan: Lipid panel reflex to direct LDL Fasting,         Comprehensive metabolic panel (BMP + Alb, Alk         Phos, ALT, AST, Total. Bili, TP), atorvastatin         (LIPITOR) 10 MG tablet        See above.     (I10) Hypertension goal BP (blood pressure) < 140/90  Comment: at goal  Plan: Comprehensive metabolic panel (BMP + Alb, Alk         Phos, ALT, AST, Total. Bili, TP), lisinopril         (ZESTRIL) 10 MG tablet        The current medical regimen is effective;  continue present plan and medications.     (Z78.0) Asymptomatic menopause  Comment:   Plan: DX Hip/Pelvis/Spine            (B37.2) Intertriginous candidiasis  Comment: " "  Plan: ketoconazole (NIZORAL) 2 % external cream        Try using ketoconazole.     (G25.81) Restless leg syndrome  Comment: stable  Plan: pramipexole (MIRAPEX) 0.125 MG tablet        The current medical regimen is effective;  continue present plan and medications.     (K21.9) Gastroesophageal reflux disease without esophagitis  Comment: stable  Plan: omeprazole (PRILOSEC) 20 MG DR capsule,         famotidine (PEPCID) 20 MG tablet        The current medical regimen is effective;  continue present plan and medications.     (E78.1) HYPERTRIGLYCERIDEMIA  Comment:   Plan: fenofibrate (TRICOR) 145 MG tablet        See above.     (G47.00) Insomnia, unspecified type  Comment: stable  Plan: zolpidem (AMBIEN) 5 MG tablet        The current medical regimen is effective;  continue present plan and medications.     (M62.830) Spasm of back muscles  Comment:   Plan: cyclobenzaprine (FLEXERIL) 5 MG tablet        Refills given.     (D50.9) Iron deficiency anemia, unspecified iron deficiency anemia type  Comment:   Plan: CBC with platelets                  COUNSELING:  Reviewed preventive health counseling, as reflected in patient instructions    Estimated body mass index is 35.23 kg/m  as calculated from the following:    Height as of 7/11/22: 1.664 m (5' 5.5\").    Weight as of 7/11/22: 97.5 kg (215 lb).    Weight management plan: Discussed healthy diet and exercise guidelines    She reports that she quit smoking about 15 years ago. Her smoking use included cigarettes. She started smoking about 51 years ago. She has a 5.00 pack-year smoking history. She has never used smokeless tobacco.      Appropriate preventive services were discussed with this patient, including applicable screening as appropriate for cardiovascular disease, diabetes, osteopenia/osteoporosis, and glaucoma.  As appropriate for age/gender, discussed screening for colorectal cancer, prostate cancer, breast cancer, and cervical cancer. Checklist reviewing " preventive services available has been given to the patient.    Reviewed patients plan of care and provided an AVS. The Basic Care Plan (routine screening as documented in Health Maintenance) for Brandi meets the Care Plan requirement. This Care Plan has been established and reviewed with the Patient.    Counseling Resources:  ATP IV Guidelines  Pooled Cohorts Equation Calculator  Breast Cancer Risk Calculator  Breast Cancer: Medication to Reduce Risk  FRAX Risk Assessment  ICSI Preventive Guidelines  Dietary Guidelines for Americans, 2010  [employer]itness[employer]'s MyPlate  ASA Prophylaxis  Lung CA Screening    Cherie Brennan NP  [facility]    Start time: 5:56  Stop: 6:23    Identified Health Risks:

## 2022-10-05 NOTE — TELEPHONE ENCOUNTER
Patient has virtual visit with MARIAMA Brennan CNP.  Visit note updated.    JOSÉ Rachle, RN-BC  [facility]

## 2022-10-18 ENCOUNTER — LAB (OUTPATIENT)
Dept: LAB | Facility: CLINIC | Age: 67
End: 2022-10-18
Payer: MEDICARE

## 2022-10-18 DIAGNOSIS — D50.9 IRON DEFICIENCY ANEMIA, UNSPECIFIED IRON DEFICIENCY ANEMIA TYPE: ICD-10-CM

## 2022-10-18 DIAGNOSIS — E06.3 CHRONIC LYMPHOCYTIC THYROIDITIS: ICD-10-CM

## 2022-10-18 DIAGNOSIS — M79.10 MYALGIA: ICD-10-CM

## 2022-10-18 DIAGNOSIS — E78.5 HYPERLIPIDEMIA LDL GOAL <130: ICD-10-CM

## 2022-10-18 DIAGNOSIS — I10 HYPERTENSION GOAL BP (BLOOD PRESSURE) < 140/90: ICD-10-CM

## 2022-10-18 LAB
ALBUMIN SERPL-MCNC: 3.6 G/DL (ref 3.4–5)
ALP SERPL-CCNC: 40 U/L (ref 40–150)
ALT SERPL W P-5'-P-CCNC: 21 U/L (ref 0–50)
ANION GAP SERPL CALCULATED.3IONS-SCNC: 9 MMOL/L (ref 3–14)
AST SERPL W P-5'-P-CCNC: 21 U/L (ref 0–45)
BILIRUB SERPL-MCNC: 0.4 MG/DL (ref 0.2–1.3)
BUN SERPL-MCNC: 12 MG/DL (ref 7–30)
CALCIUM SERPL-MCNC: 8.6 MG/DL (ref 8.5–10.1)
CHLORIDE BLD-SCNC: 107 MMOL/L (ref 94–109)
CHOLEST SERPL-MCNC: 148 MG/DL
CK SERPL-CCNC: 61 U/L (ref 30–225)
CO2 SERPL-SCNC: 22 MMOL/L (ref 20–32)
CREAT SERPL-MCNC: 0.78 MG/DL (ref 0.52–1.04)
ERYTHROCYTE [DISTWIDTH] IN BLOOD BY AUTOMATED COUNT: 11.8 % (ref 10–15)
FASTING STATUS PATIENT QL REPORTED: YES
GFR SERPL CREATININE-BSD FRML MDRD: 82 ML/MIN/1.73M2
GLUCOSE BLD-MCNC: 100 MG/DL (ref 70–99)
HCT VFR BLD AUTO: 42.8 % (ref 35–47)
HDLC SERPL-MCNC: 46 MG/DL
HGB BLD-MCNC: 14.6 G/DL (ref 11.7–15.7)
LDLC SERPL CALC-MCNC: 63 MG/DL
MCH RBC QN AUTO: 32.9 PG (ref 26.5–33)
MCHC RBC AUTO-ENTMCNC: 34.1 G/DL (ref 31.5–36.5)
MCV RBC AUTO: 96 FL (ref 78–100)
NONHDLC SERPL-MCNC: 102 MG/DL
PLATELET # BLD AUTO: 281 10E3/UL (ref 150–450)
POTASSIUM BLD-SCNC: 4.2 MMOL/L (ref 3.4–5.3)
PROT SERPL-MCNC: 7 G/DL (ref 6.8–8.8)
RBC # BLD AUTO: 4.44 10E6/UL (ref 3.8–5.2)
SODIUM SERPL-SCNC: 138 MMOL/L (ref 133–144)
TRIGL SERPL-MCNC: 193 MG/DL
TSH SERPL DL<=0.005 MIU/L-ACNC: 2.49 MU/L (ref 0.4–4)
WBC # BLD AUTO: 4.8 10E3/UL (ref 4–11)

## 2022-10-18 PROCEDURE — 84443 ASSAY THYROID STIM HORMONE: CPT

## 2022-10-18 PROCEDURE — 80061 LIPID PANEL: CPT

## 2022-10-18 PROCEDURE — 80053 COMPREHEN METABOLIC PANEL: CPT

## 2022-10-18 PROCEDURE — 82550 ASSAY OF CK (CPK): CPT

## 2022-10-18 PROCEDURE — 36415 COLL VENOUS BLD VENIPUNCTURE: CPT

## 2022-10-18 PROCEDURE — 85027 COMPLETE CBC AUTOMATED: CPT

## 2022-11-02 ENCOUNTER — ANCILLARY PROCEDURE (OUTPATIENT)
Dept: BONE DENSITY | Facility: CLINIC | Age: 67
End: 2022-11-02
Attending: NURSE PRACTITIONER
Payer: MEDICARE

## 2022-11-02 DIAGNOSIS — Z78.0 ASYMPTOMATIC MENOPAUSE: ICD-10-CM

## 2022-11-02 PROCEDURE — 77081 DXA BONE DENSITY APPENDICULR: CPT | Mod: XU | Performed by: INTERNAL MEDICINE

## 2022-11-02 PROCEDURE — 77080 DXA BONE DENSITY AXIAL: CPT | Performed by: INTERNAL MEDICINE

## 2022-11-17 ENCOUNTER — TRANSFERRED RECORDS (OUTPATIENT)
Dept: HEALTH INFORMATION MANAGEMENT | Facility: CLINIC | Age: 67
End: 2022-11-17

## 2022-11-17 ENCOUNTER — OFFICE VISIT (OUTPATIENT)
Dept: URGENT CARE | Facility: URGENT CARE | Age: 67
End: 2022-11-17
Payer: MEDICARE

## 2022-11-17 VITALS
HEIGHT: 64 IN | TEMPERATURE: 97 F | OXYGEN SATURATION: 99 % | BODY MASS INDEX: 35.85 KG/M2 | HEART RATE: 83 BPM | WEIGHT: 210 LBS | RESPIRATION RATE: 30 BRPM

## 2022-11-17 DIAGNOSIS — R06.02 SHORTNESS OF BREATH: Primary | ICD-10-CM

## 2022-11-17 DIAGNOSIS — J45.40 MODERATE PERSISTENT ASTHMA WITHOUT COMPLICATION: ICD-10-CM

## 2022-11-17 DIAGNOSIS — J10.1 INFLUENZA A: ICD-10-CM

## 2022-11-17 LAB
FLUAV AG SPEC QL IA: POSITIVE
FLUBV AG SPEC QL IA: NEGATIVE

## 2022-11-17 PROCEDURE — 87804 INFLUENZA ASSAY W/OPTIC: CPT | Performed by: FAMILY MEDICINE

## 2022-11-17 PROCEDURE — U0003 INFECTIOUS AGENT DETECTION BY NUCLEIC ACID (DNA OR RNA); SEVERE ACUTE RESPIRATORY SYNDROME CORONAVIRUS 2 (SARS-COV-2) (CORONAVIRUS DISEASE [COVID-19]), AMPLIFIED PROBE TECHNIQUE, MAKING USE OF HIGH THROUGHPUT TECHNOLOGIES AS DESCRIBED BY CMS-2020-01-R: HCPCS | Performed by: FAMILY MEDICINE

## 2022-11-17 PROCEDURE — 99214 OFFICE O/P EST MOD 30 MIN: CPT | Mod: CS | Performed by: FAMILY MEDICINE

## 2022-11-17 PROCEDURE — U0005 INFEC AGEN DETEC AMPLI PROBE: HCPCS | Performed by: FAMILY MEDICINE

## 2022-11-17 NOTE — PATIENT INSTRUCTIONS
COVID TESTING  Results for the COVID PCR test collected today takes about 24 hours to return.   You can check your result in [employer] - our nurses only call if the tests return positive. If you have reviewed your test results in [facility] before they make a phone call then you likely not be hearing from the nurses.      Please quarantine at this time.     If you are positive for COVID you may be eligible for anti-viral therapy -- your options include but are not limited to:   1) schedule a virtual appointment thru [facility] (for a same day appointment)  2) call your Doctor's office right away  3) return to urgent care (please wear a mask and notify them you are COVID positive and are seeking anti-viral treatment)      If your symptoms get worse: chest pain, shortness of breath, lightheadedness/dizziness -- please seek medical attention right away. Go to the hospital immediately if the symptoms are severe.

## 2022-11-17 NOTE — PROGRESS NOTES
Assessment & Plan     cough  - Influenza A/B antigen  - Symptomatic; Unknown COVID-19 Virus (Coronavirus) by PCR Nose    Influenza A  Moderate Persistent Asthma  Patient is out of window for Tamiflu therapy at this time she is doing well and has not had any fevers her flu vaccine may be benefiting her at this time.  Defered x-ray as suspicion for pneumonia is low.  Her lung exam reveals only very faint end expiratory wheezing without any crackles.  Continue albuterol or nebulizer every 4 hours as needed.  Patient is currently on a prednisone taper which she can continue.  Her doctor also provided her with Augmentin in the event her respiratory symptoms aggravate with or without fever.  Follow-up as needed if symptoms worsen.  Patient did consent to COVID testing here PCR was collected we discussed that should return the next day or so.    Leobardo Zeng MD   [facility]    Subjective     Brandi Elkins is a 67 year old female who presents to clinic today for the following health issues:  Chief Complaint   Patient presents with     Urgent Care     Shortness of breath, saw her Asthma specialist and was given meds but told to be seen for possible RSV, flu and covid (home test neg)      HPI    Also symptoms were about 4 to 5 days ago she has not had any fevers.  Was referred to urgent care after being seen by her pulmonologist earlier today.  She has no lightheadedness.  There were changes to her inhaler medications adding Spiriva along with increasing Advair dose.  Patient did start a prednisone taper at 60 mg as of 3 days ago.  She has nebulizers at home that she can use for relief if needed in addition to her albuterol.  Home COVID test returned negative.    Did receive annual flu shot on 10/6/22    Patient Active Problem List    Diagnosis Date Noted     Low bone density 05/29/2020     Priority: Medium     Facet arthropathy, cervical 04/19/2019     Priority: Medium     Chronic pain 09/10/2018     Priority:  Medium     Major depressive disorder, recurrent episode, mild with anxious distress (H) 01/05/2018     Priority: Medium     Class 2 severe obesity due to excess calories with serious comorbidity and body mass index (BMI) of 35.0 to 35.9 in adult (H) 10/31/2017     Priority: Medium     Major depressive disorder, recurrent episode, moderate  04/21/2017     Priority: Medium     Mixed hyperlipidemia 07/19/2016     Priority: Medium     Psychological factors affecting medical condition 04/08/2016     Priority: Medium     Depression, major, recurrent, in partial remission (H) 04/08/2016     Priority: Medium     Headache above the eye region 02/01/2016     Priority: Medium     Fibromyalgia 09/09/2014     Priority: Medium     Iron deficiency anemia 07/17/2014     Priority: Medium     Problem list name updated by automated process. Provider to review       Anemia 07/14/2014     Priority: Medium     Vitamin D deficiency 07/03/2013     Priority: Medium     Imo Update utility       Patellar tendonitis 01/23/2013     Priority: Medium     Patellofemoral arthritis, right 01/23/2013     Priority: Medium     IMO update changed this record. Please review for accuracy       Hiatal hernia      Priority: Medium     Large, unable to do surgery  GI suggested Dexilant BID and Zantac PRN       Advanced directives, counseling/discussion 10/17/2012     Priority: Medium     Advance Directive received and scanned. Click on Code in the patient header to view. 10/17/2012          Abnormal liver function 04/13/2012     Priority: Medium     Hypertension goal BP (blood pressure) < 140/90 02/25/2011     Priority: Medium     Per provider       Hyperlipidemia LDL goal <130 02/25/2011     Priority: Medium     Restless leg syndrome 10/06/2009     Priority: Medium     ISATU (obstructive sleep apnea)      Priority: Medium     (Problem list name updated by automated process. Provider to review and confirm.)  Diagnosis updated by automated process. Provider to  "review and confirm.       Osteoarthritis of multiple joints      Priority: Medium     knees       Insomnia 08/05/2005     Priority: Medium     Problem list name updated by automated process. Provider to review       Moderate persistent asthma 02/20/2005     Priority: Medium     ALLERGIC RHINITIS  11/15/2004     Priority: Medium     GERD 11/15/2004     Priority: Medium     HASHIMOTO'S THYROIDITIS 11/15/2004     Priority: Medium     Also thyroid nodule  Has seen endocrinology       Disorder of bone and cartilage 11/15/2004     Priority: Medium     Problem list name updated by automated process. Provider to review       HYPERTRIGLYCERIDEMIA 11/15/2004     Priority: Medium       Current Outpatient Medications   Medication     acetaminophen (TYLENOL) 650 MG CR tablet     albuterol (ALBUTEROL) 108 (90 BASE) MCG/ACT Inhaler     atorvastatin (LIPITOR) 10 MG tablet     CALCIUM CITRATE PO     Cetirizine HCl (ZYRTEC PO)     cyclobenzaprine (FLEXERIL) 5 MG tablet     desvenlafaxine (PRISTIQ) 100 MG 24 hr tablet     famotidine (PEPCID) 20 MG tablet     fenofibrate (TRICOR) 145 MG tablet     fluticasone-salmeterol (ADVAIR) 100-50 MCG/ACT inhaler     levothyroxine (SYNTHROID/LEVOTHROID) 88 MCG tablet     lisinopril (ZESTRIL) 10 MG tablet     omeprazole (PRILOSEC) 20 MG DR capsule     pramipexole (MIRAPEX) 0.125 MG tablet     triamcinolone (NASACORT AQ) 55 MCG/ACT nasal inhaler     vitamin D3 (CHOLECALCIFEROL) 50 mcg (2000 units) tablet     zolpidem (AMBIEN) 5 MG tablet     ketoconazole (NIZORAL) 2 % external cream     metroNIDAZOLE (METROCREAM) 0.75 % external cream     UNABLE TO FIND     No current facility-administered medications for this visit.           Objective    Pulse 83   Temp 97  F (36.1  C) (Temporal)   Resp 30   Ht 1.626 m (5' 4\")   Wt 95.3 kg (210 lb)   LMP  (LMP Unknown)   SpO2 99%   BMI 36.05 kg/m    Physical Exam   GEN: NAD, good historian  CV: RRR no m/r/g  Pulm: as above per HPI , non-labored    Results " for orders placed or performed in visit on 11/17/22   Influenza A/B antigen     Status: Abnormal    Specimen: Nasopharyngeal; Swab   Result Value Ref Range    Influenza A antigen Positive (A) Negative    Influenza B antigen Negative Negative    Narrative    Test results must be correlated with clinical data. If necessary, results should be confirmed by a molecular assay or viral culture.                     The use of Dragon/fos4Xation services may have been used to construct the content in this note; any grammatical or spelling errors are non-intentional. Please contact the author of this note directly if you are in need of any clarification.

## 2022-11-18 LAB — SARS-COV-2 RNA RESP QL NAA+PROBE: NEGATIVE

## 2023-01-18 ENCOUNTER — ANCILLARY PROCEDURE (OUTPATIENT)
Dept: MAMMOGRAPHY | Facility: CLINIC | Age: 68
End: 2023-01-18
Attending: NURSE PRACTITIONER
Payer: MEDICARE

## 2023-01-18 DIAGNOSIS — Z12.31 VISIT FOR SCREENING MAMMOGRAM: ICD-10-CM

## 2023-01-18 PROCEDURE — 77067 SCR MAMMO BI INCL CAD: CPT | Mod: 26 | Performed by: RADIOLOGY

## 2023-01-18 PROCEDURE — 77063 BREAST TOMOSYNTHESIS BI: CPT | Mod: 26 | Performed by: RADIOLOGY

## 2023-01-18 PROCEDURE — 77067 SCR MAMMO BI INCL CAD: CPT

## 2023-04-06 ENCOUNTER — TRANSFERRED RECORDS (OUTPATIENT)
Dept: HEALTH INFORMATION MANAGEMENT | Facility: CLINIC | Age: 68
End: 2023-04-06

## 2023-08-08 DIAGNOSIS — M62.830 SPASM OF BACK MUSCLES: ICD-10-CM

## 2023-08-08 RX ORDER — CYCLOBENZAPRINE HCL 5 MG
5-10 TABLET ORAL
Qty: 90 TABLET | Refills: 3 | Status: SHIPPED | OUTPATIENT
Start: 2023-08-08 | End: 2024-01-03

## 2023-08-21 ENCOUNTER — OFFICE VISIT (OUTPATIENT)
Dept: URGENT CARE | Facility: URGENT CARE | Age: 68
End: 2023-08-21
Payer: MEDICARE

## 2023-08-21 VITALS
RESPIRATION RATE: 12 BRPM | DIASTOLIC BLOOD PRESSURE: 82 MMHG | HEART RATE: 87 BPM | TEMPERATURE: 99.3 F | SYSTOLIC BLOOD PRESSURE: 132 MMHG | OXYGEN SATURATION: 96 %

## 2023-08-21 DIAGNOSIS — R42 DIZZINESS: ICD-10-CM

## 2023-08-21 DIAGNOSIS — H81.11 BENIGN PAROXYSMAL POSITIONAL VERTIGO OF RIGHT EAR: Primary | ICD-10-CM

## 2023-08-21 DIAGNOSIS — R27.8 OTHER LACK OF COORDINATION: ICD-10-CM

## 2023-08-21 LAB
ANION GAP SERPL CALCULATED.3IONS-SCNC: 9 MMOL/L (ref 7–15)
BASOPHILS # BLD AUTO: 0 10E3/UL (ref 0–0.2)
BASOPHILS NFR BLD AUTO: 1 %
BUN SERPL-MCNC: 15.8 MG/DL (ref 8–23)
CALCIUM SERPL-MCNC: 10.1 MG/DL (ref 8.8–10.2)
CHLORIDE SERPL-SCNC: 101 MMOL/L (ref 98–107)
CREAT SERPL-MCNC: 0.89 MG/DL (ref 0.51–0.95)
DEPRECATED HCO3 PLAS-SCNC: 29 MMOL/L (ref 22–29)
EOSINOPHIL # BLD AUTO: 0.2 10E3/UL (ref 0–0.7)
EOSINOPHIL NFR BLD AUTO: 4 %
ERYTHROCYTE [DISTWIDTH] IN BLOOD BY AUTOMATED COUNT: 11.8 % (ref 10–15)
GFR SERPL CREATININE-BSD FRML MDRD: 69 ML/MIN/1.73M2
GLUCOSE SERPL-MCNC: 97 MG/DL (ref 70–99)
HCT VFR BLD AUTO: 50.1 % (ref 35–47)
HGB BLD-MCNC: 16.4 G/DL (ref 11.7–15.7)
IMM GRANULOCYTES # BLD: 0 10E3/UL
IMM GRANULOCYTES NFR BLD: 0 %
LYMPHOCYTES # BLD AUTO: 1.6 10E3/UL (ref 0.8–5.3)
LYMPHOCYTES NFR BLD AUTO: 32 %
MCH RBC QN AUTO: 33.1 PG (ref 26.5–33)
MCHC RBC AUTO-ENTMCNC: 32.7 G/DL (ref 31.5–36.5)
MCV RBC AUTO: 101 FL (ref 78–100)
MONOCYTES # BLD AUTO: 0.5 10E3/UL (ref 0–1.3)
MONOCYTES NFR BLD AUTO: 9 %
NEUTROPHILS # BLD AUTO: 2.8 10E3/UL (ref 1.6–8.3)
NEUTROPHILS NFR BLD AUTO: 54 %
NRBC # BLD AUTO: 0 10E3/UL
NRBC BLD AUTO-RTO: 0 /100
PLATELET # BLD AUTO: 306 10E3/UL (ref 150–450)
POTASSIUM SERPL-SCNC: 4.8 MMOL/L (ref 3.4–5.3)
RBC # BLD AUTO: 4.96 10E6/UL (ref 3.8–5.2)
SODIUM SERPL-SCNC: 139 MMOL/L (ref 136–145)
TSH SERPL DL<=0.005 MIU/L-ACNC: 1.75 UIU/ML (ref 0.3–4.2)
WBC # BLD AUTO: 5.1 10E3/UL (ref 4–11)

## 2023-08-21 PROCEDURE — 85025 COMPLETE CBC W/AUTO DIFF WBC: CPT | Performed by: PHYSICIAN ASSISTANT

## 2023-08-21 PROCEDURE — 36415 COLL VENOUS BLD VENIPUNCTURE: CPT | Performed by: PHYSICIAN ASSISTANT

## 2023-08-21 PROCEDURE — 99213 OFFICE O/P EST LOW 20 MIN: CPT | Performed by: PHYSICIAN ASSISTANT

## 2023-08-21 PROCEDURE — 84443 ASSAY THYROID STIM HORMONE: CPT | Performed by: PHYSICIAN ASSISTANT

## 2023-08-21 PROCEDURE — 80048 BASIC METABOLIC PNL TOTAL CA: CPT | Performed by: PHYSICIAN ASSISTANT

## 2023-08-21 RX ORDER — MECLIZINE HYDROCHLORIDE 25 MG/1
25 TABLET ORAL 3 TIMES DAILY PRN
Qty: 21 TABLET | Refills: 0 | Status: SHIPPED | OUTPATIENT
Start: 2023-08-21 | End: 2024-04-10

## 2023-08-21 NOTE — PROGRESS NOTES
"  Benign paroxysmal positional vertigo of right ear  - CBC with platelets and differential  - Basic metabolic panel  (Ca, Cl, CO2, Creat, Gluc, K, Na, BUN)  - TSH with free T4 reflex  - meclizine (ANTIVERT) 25 MG tablet; Take 1 tablet (25 mg) by mouth 3 times daily as needed for dizziness    Dizziness  - Basic metabolic panel  (Ca, Cl, CO2, Creat, Gluc, K, Na, BUN)    Other lack of coordination  - TSH with free T4 reflex     Benign Paroxysmal Positional Vertigo (BPPV): Care Instructions  Overview     Benign paroxysmal positional vertigo, also called BPPV, is an inner ear problem. It causes a spinning or whirling sensation when you move your head. This sensation is called vertigo.  The vertigo usually lasts for less than a minute. People often have vertigo spells for a few days or weeks. Then the vertigo goes away. But it may come back again. The vertigo may also cause unsteadiness, nausea, and vomiting. You may be at risk for falls.  When you move, your inner ear sends messages to the brain. This helps you keep your balance. Vertigo can happen when tiny calcium \"stones\" move into an area of your inner ear called the semicircular canal. This can cause the inner ear to send the wrong message to the brain.  Your doctor may move you in different positions to help your vertigo get better faster. This is called the Epley maneuver. Your doctor may also prescribe exercises for you to do on your own.  Follow-up care is a key part of your treatment and safety. Be sure to make and go to all appointments, and call your doctor if you are having problems. It's also a good idea to know your test results and keep a list of the medicines you take.  How can you care for yourself at home?  Vertigo causes loss of balance and puts you at risk for falling. Be extra careful so that you don't hurt yourself or someone else if you have a sudden attack of vertigo.  Don't drive or ride a bike if you are having vertigo.  Keep floors and " walkways free of clutter so you don't trip.  Avoid heights.  Your doctor may suggest that you do the Epley maneuver at home. Here's how:  Sit on the edge of a bed. Turn your head FDC between looking straight ahead and looking to the side that causes the worst vertigo (45 degrees).  Tilt yourself backward until you are lying on your back. Your head should stay at the 45-degree turn. Hold for 30 seconds. If you have vertigo, stay in this position until it stops.  Turn your head all the way to the other side without lifting it. Your chin should be raised and over your shoulder. Hold for 30 seconds or until your symptoms stop.  Keeping your head in the same position, roll your body the same direction you are facing. You should now be on your side and looking down. Hold for 30 seconds or until your symptoms stop.  Slowly push yourself up to a sitting position.  When should you call for help?   Call 911 anytime you think you may need emergency care. For example, call if:    You passed out (lost consciousness).     You have sudden dizziness that doesn't get better.     You have dizziness along with symptoms of a heart attack. These may include:  Chest pain or pressure, or a strange feeling in the chest.  Sweating.  Shortness of breath.  Nausea or vomiting.  Pain, pressure, or a strange feeling in the back, neck, jaw, or upper belly or in one or both shoulders or arms.  Lightheadedness or sudden weakness.  A fast or irregular heartbeat.     You have symptoms of a stroke. These may include:  Sudden numbness, tingling, weakness, or loss of movement in your face, arm, or leg, especially on only one side of your body.  Sudden vision changes.  Sudden trouble speaking.  Sudden confusion or trouble understanding simple statements.  Sudden problems with walking or balance.  A sudden, severe headache that is different from past headaches.   Call your doctor now or seek immediate medical care if:    You have new or worse nausea  "and vomiting.     You have new symptoms such as a fever, a headache, hearing loss, or ringing in your ears.   Watch closely for changes in your health, and be sure to contact your doctor if:    You are not getting better as expected.   Where can you learn more?  Go to https://www.TiV[employer].net/patiented  Enter P372 in the search box to learn more about \"Benign Paroxysmal Positional Vertigo (BPPV): Care Instructions.\"  Current as of: March 1, 2023               Content Version: 13.7 2006-2023 [employer].   Care instructions adapted under license by your healthcare professional. If you have questions about a medical condition or this instruction, always ask your healthcare professional. [employer] disclaims any warranty or liability for your use of this information.                 Vertigo: The Epley Maneuver (01:48)  Your health professional recommends that you watch this short online health video.  Learn how the Epley maneuver can help you get rid of your vertigo.  Purpose:  Shows the Epley maneuver and explains how canaliths in the inner ear can cause vertigo if they are out of place.  Goal:  The user will learn what the Epley maneuver is and how it is done.      How to watch the video     Scan the QR code   OR Visit the website    https://expressor softwarei.se/r/Tzpvl5routmtm   Current as of: March 1, 2023               Content Version: 13.7 2006-2023 [employer].   Care instructions adapted under license by your healthcare professional. If you have questions about a medical condition or this instruction, always ask your healthcare professional. [employer] disclaims any warranty or liability for your use of this information.               Patient was advised to return to clinic for reevaluation (either UC or PCP) if symptoms do not improve in 5 days. Patient educated on red flag symptoms and asked to go directly to the ED if these symptoms present themselves. "       ERIN Clements [facility]    Subjective   68 year old who presents to clinic today for the following health issues:    Dizziness      HPI     Dizziness  Onset/Duration: 1 week- worse in the morning- No fever, no nausea, no vomit, stopped taking Ambien and muscle relaxer, decrease mirapex, dizziness got worst this morning, took benadryl last night, no vision blurriness    Description:   Do you feel faint: No  Does it feel like the surroundings (bed, room) are moving: YES  Unsteady/off balance: YES  Have you passed out or fallen: No  Intensity: moderate  Progression of Symptoms: same  Accompanying Signs & Symptoms:  Heart palpitations or chest pain: No  Nausea, vomiting: No  Weakness or lack of coordination in arms or legs: No  Vision or speech changes: No  Numbness or tingling: No  Ringing in ears (Tinnitus): No  Hearing Loss: No  History:   Head trauma/concussion history: No  Previous similar symptoms: No  Recent bleeding history: No  Any new medications (BP?): Patient has HTN and has been taking lisinopril   Precipitating factors:   Worse with activity: No  Worse with head movement: No- Seems to be worse in the morning getting out of bed   Alleviating factors:   Does staying in a fixed position give relief: no   Therapies tried and outcome: See above     Review of Systems   Review of Systems   See HPI    Objective    Temp: 99.3  F (37.4  C) Temp src: Oral BP: 132/82 Pulse: 87   Resp: 12 SpO2: 96 %       Physical Exam   Physical Exam  Constitutional:       General: She is not in acute distress.     Appearance: Normal appearance. She is normal weight. She is not ill-appearing, toxic-appearing or diaphoretic.   HENT:      Head: Normocephalic and atraumatic.      Right Ear: Tympanic membrane, ear canal and external ear normal. There is no impacted cerumen.      Left Ear: Tympanic membrane, ear canal and external ear normal. There is no impacted cerumen.   Cardiovascular:      Rate and  Rhythm: Normal rate.      Pulses: Normal pulses.   Pulmonary:      Effort: Pulmonary effort is normal. No respiratory distress.   Neurological:      General: No focal deficit present.      Mental Status: She is alert and oriented to person, place, and time. Mental status is at baseline.      Gait: Gait normal.      Comments: [place]-Hallpike maneuver was positive on the right and negative on the left.   Psychiatric:         Mood and Affect: Mood normal.         Behavior: Behavior normal.         Thought Content: Thought content normal.         Judgment: Judgment normal.

## 2023-10-20 DIAGNOSIS — K21.9 GASTROESOPHAGEAL REFLUX DISEASE WITHOUT ESOPHAGITIS: ICD-10-CM

## 2023-10-20 DIAGNOSIS — E06.3 CHRONIC LYMPHOCYTIC THYROIDITIS: ICD-10-CM

## 2023-10-23 RX ORDER — LEVOTHYROXINE SODIUM 88 UG/1
88 TABLET ORAL DAILY
Qty: 90 TABLET | Refills: 0 | Status: SHIPPED | OUTPATIENT
Start: 2023-10-23 | End: 2024-01-03

## 2023-10-27 DIAGNOSIS — B37.2 INTERTRIGINOUS CANDIDIASIS: ICD-10-CM

## 2023-10-27 DIAGNOSIS — G25.81 RESTLESS LEG SYNDROME: ICD-10-CM

## 2023-10-27 DIAGNOSIS — I10 HYPERTENSION GOAL BP (BLOOD PRESSURE) < 140/90: ICD-10-CM

## 2023-10-27 DIAGNOSIS — M79.7 FIBROMYALGIA: ICD-10-CM

## 2023-10-30 RX ORDER — PRAMIPEXOLE DIHYDROCHLORIDE 0.12 MG/1
TABLET ORAL
Qty: 270 TABLET | Refills: 0 | Status: SHIPPED | OUTPATIENT
Start: 2023-10-30 | End: 2024-01-03

## 2023-10-30 RX ORDER — KETOCONAZOLE 20 MG/G
CREAM TOPICAL 2 TIMES DAILY
Qty: 60 G | Refills: 0 | Status: SHIPPED | OUTPATIENT
Start: 2023-10-30 | End: 2024-01-03

## 2023-10-30 RX ORDER — DESVENLAFAXINE 100 MG/1
100 TABLET, EXTENDED RELEASE ORAL DAILY
Qty: 90 TABLET | Refills: 0 | Status: SHIPPED | OUTPATIENT
Start: 2023-10-30 | End: 2024-01-03

## 2023-10-30 RX ORDER — LISINOPRIL 10 MG/1
10 TABLET ORAL DAILY
Qty: 90 TABLET | Refills: 0 | Status: SHIPPED | OUTPATIENT
Start: 2023-10-30 | End: 2024-01-03

## 2023-11-16 ENCOUNTER — TELEPHONE (OUTPATIENT)
Dept: NEUROSURGERY | Facility: CLINIC | Age: 68
End: 2023-11-16
Payer: MEDICARE

## 2023-11-16 NOTE — TELEPHONE ENCOUNTER
[facility] Call Center    Phone Message    May a detailed message be left on voicemail: yes     Reason for Call: Pt is requesting a follow up with Lor Jones.  She said it's for lower back pain.  Please call her back to schedule.  Thanks.

## 2023-11-17 NOTE — TELEPHONE ENCOUNTER
SPINE PATIENTS - NEW PROTOCOL PREVISIT    RECORDS RECEIVED FROM: Internal    REASON FOR VISIT: LBP and Neck pain/ LOV 2020   Date of Appt: 11/30/23 @ 10:00 am    NOTES (FOR ALL VISITS) STATUS DETAILS   OFFICE NOTE from referring provider Internal 11/16/23 (Phone Note), 1/30/20 Chrissy Jones MD @[facility]-NeuroSurg     OFFICE NOTE from other specialist Internal 10/5/22, 9/9/14 Cherie Brennan NP @[facility]    6/7/21, 3/15/19, 2/1/19 Gia Stroud APRN CNP @[facility]    1/20/17, 1/2/17, 12/19/16 Jeffrey Alanis, PT @[facility]    See Additional Encounters   EMG REPORT Internal [facility]  7/27/17 EMG    MEDICATION LIST Internal    IMAGING  (FOR ALL VISITS)     MRI (HEAD, NECK, SPINE) Internal [facility]  1/17/20 MR Cervical Spine  2/19/19 MR Lumbar Spine  8/4/17 MR Cervical Spine  4/5/16 MR Lumbar Spine  2/8/16 MR Cervical Spine     XRAY (SPINE) *NEUROSURGERY* Internal [facility]  1/30/20 XR Cervical Spine  6/7/19 XR Lumbar Radiofrequency Ablation  4/19/19 XR Cervical Radiofrequency Ablation  1/12/17 XR Cervical Spine  2/1/16 XR Cervical Spine

## 2023-11-18 ENCOUNTER — HEALTH MAINTENANCE LETTER (OUTPATIENT)
Age: 68
End: 2023-11-18

## 2023-11-24 DIAGNOSIS — M54.2 NECK PAIN: ICD-10-CM

## 2023-11-24 DIAGNOSIS — M54.50 LOW BACK PAIN: Primary | ICD-10-CM

## 2023-11-30 ENCOUNTER — PRE VISIT (OUTPATIENT)
Dept: NEUROSURGERY | Facility: CLINIC | Age: 68
End: 2023-11-30

## 2023-11-30 ENCOUNTER — OFFICE VISIT (OUTPATIENT)
Dept: NEUROSURGERY | Facility: CLINIC | Age: 68
End: 2023-11-30
Payer: MEDICARE

## 2023-11-30 ENCOUNTER — ANCILLARY PROCEDURE (OUTPATIENT)
Dept: GENERAL RADIOLOGY | Facility: CLINIC | Age: 68
End: 2023-11-30
Attending: PHYSICIAN ASSISTANT
Payer: MEDICARE

## 2023-11-30 VITALS
HEIGHT: 65 IN | OXYGEN SATURATION: 95 % | RESPIRATION RATE: 16 BRPM | HEART RATE: 94 BPM | WEIGHT: 210 LBS | SYSTOLIC BLOOD PRESSURE: 117 MMHG | BODY MASS INDEX: 34.99 KG/M2 | DIASTOLIC BLOOD PRESSURE: 82 MMHG

## 2023-11-30 DIAGNOSIS — M48.02 SPINAL STENOSIS IN CERVICAL REGION: ICD-10-CM

## 2023-11-30 DIAGNOSIS — M54.12 CERVICAL RADICULOPATHY: Primary | ICD-10-CM

## 2023-11-30 DIAGNOSIS — M54.2 NECK PAIN: ICD-10-CM

## 2023-11-30 DIAGNOSIS — M54.50 LOW BACK PAIN: ICD-10-CM

## 2023-11-30 PROCEDURE — 72082 X-RAY EXAM ENTIRE SPI 2/3 VW: CPT | Performed by: STUDENT IN AN ORGANIZED HEALTH CARE EDUCATION/TRAINING PROGRAM

## 2023-11-30 PROCEDURE — 99417 PROLNG OP E/M EACH 15 MIN: CPT | Performed by: PHYSICIAN ASSISTANT

## 2023-11-30 PROCEDURE — 99205 OFFICE O/P NEW HI 60 MIN: CPT | Performed by: PHYSICIAN ASSISTANT

## 2023-11-30 PROCEDURE — 77073 BONE LENGTH STUDIES: CPT | Performed by: STUDENT IN AN ORGANIZED HEALTH CARE EDUCATION/TRAINING PROGRAM

## 2023-11-30 RX ORDER — FEXOFENADINE HCL 180 MG/1
180 TABLET ORAL EVERY MORNING
COMMUNITY
Start: 2022-10-01

## 2023-11-30 RX ORDER — INHALER, ASSIST DEVICES
SPACER (EA) MISCELLANEOUS
COMMUNITY
Start: 2023-10-24

## 2023-11-30 RX ORDER — AZELASTINE HYDROCHLORIDE 137 UG/1
1 SPRAY, METERED NASAL PRN
COMMUNITY
Start: 2023-06-15

## 2023-11-30 ASSESSMENT — PAIN SCALES - GENERAL: PAINLEVEL: MODERATE PAIN (5)

## 2023-11-30 NOTE — LETTER
11/30/2023       RE: Brandi Stern  [address] [place] [address]       Dear Colleague,    Thank you for referring your patient, Brandi Stern, to the [facility] NEUROSURGERY CLINIC [place] at [facility]. Please see a copy of my visit note below.        Neurosurgery Clinic Note    Chief Complaint: low back pain and neck pain    History of Present Illness:  Brandi Stern is a 68 year old female with a PMH of fibromyalgia, Hashimoto's thyroiditis, OA, RLS, GERD, back spasms, iron def anemia, hiatal hernia, ISATU (wears CPAP) who is presenting for evaluation of low back and neck pain.    Saw  3 years ago and chose to put off surgery.  Her pain now is worse and more frequent.  Low back pain prevents her from walking very far.       She describes a deep aching pain in her neck every day.  She finds it difficult to hold her head up.  ADLs are difficult.  The pain extends upward from her mid neck into the  back of her head--left/center area.  She notes some numbness as well.  Her pain is worse with walking and coughing.  7/10 today with walking in to clinic.  Flexing forward feels better.  She has never tried traction, but thinks this would feel good.  She denies radicular pain down her arms, but she does get spasms/contractions in her right hand and has to manually straighten her fingers out about 3x/week.  She also notes problems with swallowing intermittently.  She feels like there might be residual food in the vallecula.  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  She states she did not have this 3 years ago.      She thinks her back pain is in her low spine/sacral area.  Up until last week, this was primarily on her left.  It is a mild ache, but can occasionally surprise her with a sharp twinge.  She is able to walk half a block okay with the dogs.  She bends over intermittently to  relieve the discomfort in her back.  She denies radicular pain or paresthesias in LE.   She has no b/b concerns.    Patient saw  1/30/2020 and was supposed to stop smoking, have osteoporosis evaluation with Dr. Lawton and return if she wanted to pursue C4-6 ACDF and possible C7/T1 right foraminotomy if having C8 symptoms.  She has not stopped smoking, but has cut down.  She was told she did not need to be on alendronate and continues to take calcium and vitamin D.  She has also worked on weight loss, but is unable to take the medications for this because of SE.  She has lost some weight.      Conservative Treatment:  Tylenol  Stretching  No recent PT - in past had for back and neck.  Worked for back, not the neck.  No traction.   Cervical injections left C3-5 facet 2/14/19; left C3-5 MBB  3/20/19 & 4/4/19; 4/19/19 left C3-5 Radiofrequency ablation - not helpful  Lumbar injections -- L3-5 MBB 5/3/2019; L4-S1 facet inj 4/2019; 6/7/19 left L3-5 Radiofrequency ablation  Lidocaine - neck pain relief  Ibuprofen - unable to take  Tylenol - helpful    Review of Systems   See HPI    Past Medical History:   Diagnosis Date    Allergic rhinitis due to other allergen     Apneas, obstructive sleep     Chronic lymphocytic thyroiditis     COPD (chronic obstructive pulmonary disease) (H)     Depressive disorder     Depressive disorder, not elsewhere classified     Disorder of bone and cartilage, unspecified     Esophageal reflux     Essential hypertension, benign     Generalized osteoarthrosis, unspecified site     knees    HASHIMOTO'S THYROIDITIS 11/15/2004    HASHIMOTO'S THYROIDITIS     HASHIMOTO'S THYROIDITIS     Headache above the eye region     Headache above the eye region     Hiatal hernia     Insomnia, unspecified     Moderate persistent asthma     Nasal congestion     Pure hyperglyceridemia     Scoliosis     Snoring     Tobacco use disorder        Past Surgical History:   Procedure Laterality Date    ABDOMEN SURGERY       GB out    ARTHROSCOPY KNEE RT/LT      left knee    BIOPSY      Colon    CHOLECYSTECTOMY      COLONOSCOPY  2014    Procedure: COLONOSCOPY;  Surgeon: Mau De La Fuente MD;  Location:  GI    ESOPHAGOSCOPY, GASTROSCOPY, DUODENOSCOPY (EGD), COMBINED  2014    Procedure: COMBINED ESOPHAGOSCOPY, GASTROSCOPY, DUODENOSCOPY (EGD), BIOPSY SINGLE OR MULTIPLE;  Surgeon: Mau De La Fuente MD;  Location:  GI    Gall bladder removal      Z TOTAL KNEE ARTHROPLASTY      bilateral       Social History     Socioeconomic History    Marital status: Single   Tobacco Use    Smoking status: Some Days     Packs/day: 0.25     Years: 20.00     Additional pack years: 0.00     Total pack years: 5.00     Types: Cigarettes     Start date: 10/1/1971     Last attempt to quit: 2007     Years since quittin.3    Smokeless tobacco: Never    Tobacco comments:     2 PACK PER WEEK    Substance and Sexual Activity    Alcohol use: Yes     Alcohol/week: 0.0 standard drinks of alcohol     Comment: switched to beer 6 pack a week     Drug use: No    Sexual activity: Never     Partners: Male     Birth control/protection: Abstinence   Other Topics Concern    Parent/sibling w/ CABG, MI or angioplasty before 65F 55M? No   Social History Narrative    Dairy/d 2 servings/d.    Caffeine 1 -2servings/d    Exercise 1 x week walking,problems with knees    Sunscreen used - Yes    Seatbelts used - Yes    Working smoke/CO detectors in the home - Yes    Guns stored in the home - No    Self Breast Exams - Yes-occ    Self Testicular Exam - NA    Eye Exam up to date - no,does every 2yrs    Dental Exam up to date - Yes  Q 6 months    Pap Smear up to date - no    Mammogram up to date -had done today    PSA up to date - NA    Dexa Scan up to date - Yes- 2008    Flex Sig / Colonoscopy up to date - colonoscopy in ,repeat in 10yrs    Immunizations up to date - Yes Td     Abuse: Current or Past(Physical, Sexual or Emotional)- No    Do  you feel safe in your environment - Yes       family history includes Allergies in her father; Arthritis in her father; Asthma in her paternal grandmother; Blood Disease in her father; C.A.D. in her maternal grandfather and maternal grandmother; Cancer in her father and paternal grandmother; Cancer - colorectal in her paternal grandmother; Cardiovascular in her maternal grandfather; Cerebrovascular Disease in her maternal grandmother and paternal grandfather; Circulatory in her maternal grandfather; Coronary Artery Disease in her mother; Diabetes in her paternal grandmother; Eye Disorder in her mother; Genitourinary Problems in her father; Hearing Loss in her maternal grandmother; Hypertension in her mother; Lipids in her mother; Neurologic Disorder in her mother; Osteoporosis in her mother; Respiratory in her maternal grandmother; Thyroid Disease in her sister.       IMAGING   Imaging independently reviewed.    EOS XR 11/30/23 - C3-T1 DDD, multi-level facet joint degeneration. Greatest scoliotic curvature <30 degrees T/L spine.  Hiatal hernia.  SI joints difficult to see through 2/2 overlying tissue.     Findings:  12 rib bearing vertebral bodies and 5 lumbar type vertebral bodies are  identified.  Coronal Deformity: Mild convex left curvature of the thoracic spine  with apex at T10. Moderate convex right curvature of the lumbar spine  apex at L3. No substantial global coronal imbalance.  Sagittal Vertical Axis (A vertical line drawn from the center of C7  (amy line) to the posterosuperior aspect of the S1 on sagittal  plane):  less than 4 cm   Weight bearing axis: (Defined as a line drawn from the center of the  femoral head to the mid aspect of the tibial plafond).      Right: Weight bearing axis crosses the expected location of the  medial tibial spine.       Left: Weight bearing axis crosses expected location of the  central one third of the medial tibial plateau.  Leg length:  (Measured from the top of the  "femoral head to the center  of tibial plafond.  It is assumed joints are in similar degrees of  extension bilaterally.  Significant difference is defined when  discrepancy is greater than 1.5 cm).        No significant leg length discrepancy.  Additional Findings:  Large hiatal hernia. No acute airspace disease. Nonobstructive bowel  gas pattern. Bilateral total knee arthroplasties. Poor penetration  limits evaluation of fine osseous detail. Multilevel degenerative  changes of the spine, greatest at L1 L3.  Impression:  1. Mild convex left curvature of the thoracic spine and Moderate  convex right curvature of the lumbar spine.   2. No  global coronal or sagittal imbalance.  3. Weight bearing axis as detailed above.  4. Large hiatal hernia.    Nicotine Usage:  Yes     Physical Exam   /82 (BP Location: Right arm, Patient Position: Sitting)   Pulse 94   Resp 16   Ht 1.651 m (5' 5\")   Wt 95.3 kg (210 lb)   LMP  (LMP Unknown)   SpO2 95%   BMI 34.95 kg/m      Constitutional: Appears well-developed and well-nourished. Cooperative. No acute distress.   HENT:   Head: Normocephalic and atraumatic.   Eyes: Conjunctivae are normal.  Neck: Neck supple. No tracheal deviation present.  Cardiovascular: Normal rate and regular rhythm.    Pulmonary/Chest: Effort normal and breath sounds normal.  Abdominal: Exhibits no obvious distension.   Musculoskeletal: Able to move all extremities.  No involuntary motor movements. No C/T/L spine tenderness to palpation.  Negative SI joint provocation.  Negative straight leg, Edmundo, piriformis stretch.   Cervical flexion-extension range of motion: limited extension, left extension w/ pain up back of head, relieved with traction  Lumbar flexion/extension range of motion: Good ROM w/o pain  Skin: Skin is warm, dry and intact.   Psychiatric: Normal mood and affect. Speech is normal and behavior is normal.    Neurological:  Alert, NAD, and oriented to person, place, and time.   No " cranial nerve deficit   Gait: steady, symmetrical    Strength (L/R)  5/5 Deltoid  5/5 Bicep  5/5 Wrist Extensor  5/5 Tricep  5/5 Finger Flexion  5/5 Finger Abduction  5/5 Handgrip    5/5 Hip Flexion  5/5 Knee Extension  5/5 Ankle Dorsiflexion  5/5 Extensor Hallucis Longus  5/5 Plantar Flexion  5/5 Foot Eversion  5/5 Foot Inversion    Reflexes (L/R)  2+/2+ Bicep  2+/2+ Brachioradialis  2+/2+ Patellar  2+/2+ Ankle    No Lhermitte's  Spurling's w/ left  C2 type radicular pain  No Papo's   No ankle clonus    Sensation: SILT BUE/BLE    ASSESSMENT:  Brandi Setrn is a 68 year old female with a PMH of fibromyalgia, Hashimoto's thyroiditis, OA, RLS, GERD, back spasms, iron def anemia, hiatal hernia, ISATU (wears CPAP) who is presenting for evaluation of low back and neck pain.    Patient has been offered cervical fusion surgery in the past, but has not stopped smoking.  She continues to endorse pain in her neck that extends up the back of her head and has right hand spasms/contraction.  She continues to endorse trouble with swallowing and has food getting stuck in her throat.  She denies aspiration.      She has not had recent advanced imaging for her cervical spine, which is her most concerning issue that this time.  I will go ahead and update this to help with directing treatment.      PLAN:    CT cervical - surgical planning  MRI cervical - update since her imaging is more than 1 year old  SLP - trouble with food getting stuck w/ swallowing, no aspiration  Smoking cessation - has information for [id number] program, can reach out to PCP for medications -- chantrix or wellbutrin if needed. She will need to be at least 6 weeks nicotine free before she can be considered for any type of fusion surgery.    F/u with me 2-3 days after imaging to discuss findings and additional recommendations, which may include PT w/ traction, additional injections, etc.     I spent 107 minutes spent in patient care, independent review and  interpretation of medical records/imaging, reviewing old records.        Again, thank you for allowing me to participate in the care of your patient.      Sincerely,    Janey Lewis PA-C

## 2023-11-30 NOTE — PATIENT INSTRUCTIONS
Follow up 2-3 days after imaging.  Virtual okay.      Smoking cessation at least 6 weeks prior to being considered for surgery.

## 2023-11-30 NOTE — PROGRESS NOTES
Neurosurgery Clinic Note    Chief Complaint: low back pain and neck pain    History of Present Illness:  Brandi Stern is a 68 year old female with a PMH of fibromyalgia, Hashimoto's thyroiditis, OA, RLS, GERD, back spasms, iron def anemia, hiatal hernia, ISATU (wears CPAP) who is presenting for evaluation of low back and neck pain.    Saw  3 years ago and chose to put off surgery.  Her pain now is worse and more frequent.  Low back pain prevents her from walking very far.       She describes a deep aching pain in her neck every day.  She finds it difficult to hold her head up.  ADLs are difficult.  The pain extends upward from her mid neck into the  back of her head--left/center area.  She notes some numbness as well.  Her pain is worse with walking and coughing.  7/10 today with walking in to clinic.  Flexing forward feels better.  She has never tried traction, but thinks this would feel good.  She denies radicular pain down her arms, but she does get spasms/contractions in her right hand and has to manually straighten her fingers out about 3x/week.  She also notes problems with swallowing intermittently.  She feels like there might be residual food in the vallecula.  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  She states she did not have this 3 years ago.      She thinks her back pain is in her low spine/sacral area.  Up until last week, this was primarily on her left.  It is a mild ache, but can occasionally surprise her with a sharp twinge.  She is able to walk half a block okay with the dogs.  She bends over intermittently to relieve the discomfort in her back.  She denies radicular pain or paresthesias in LE.   She has no b/b concerns.    Patient saw  1/30/2020 and was supposed to stop smoking, have osteoporosis evaluation with Dr. Lawton and return if she wanted to pursue C4-6 ACDF and possible C7/T1 right foraminotomy if having C8  symptoms.  She has not stopped smoking, but has cut down.  She was told she did not need to be on alendronate and continues to take calcium and vitamin D.  She has also worked on weight loss, but is unable to take the medications for this because of SE.  She has lost some weight.      Conservative Treatment:  Tylenol  Stretching  No recent PT - in past had for back and neck.  Worked for back, not the neck.  No traction.   Cervical injections left C3-5 facet 2/14/19; left C3-5 MBB  3/20/19 & 4/4/19; 4/19/19 left C3-5 Radiofrequency ablation - not helpful  Lumbar injections -- L3-5 MBB 5/3/2019; L4-S1 facet inj 4/2019; 6/7/19 left L3-5 Radiofrequency ablation  Lidocaine - neck pain relief  Ibuprofen - unable to take  Tylenol - helpful    Review of Systems   See HPI    Past Medical History:   Diagnosis Date    Allergic rhinitis due to other allergen     Apneas, obstructive sleep     Chronic lymphocytic thyroiditis     COPD (chronic obstructive pulmonary disease) (H)     Depressive disorder     Depressive disorder, not elsewhere classified     Disorder of bone and cartilage, unspecified     Esophageal reflux     Essential hypertension, benign     Generalized osteoarthrosis, unspecified site     knees    HASHIMOTO'S THYROIDITIS 11/15/2004    HASHIMOTO'S THYROIDITIS     HASHIMOTO'S THYROIDITIS     Headache above the eye region     Headache above the eye region     Hiatal hernia     Insomnia, unspecified     Moderate persistent asthma     Nasal congestion     Pure hyperglyceridemia     Scoliosis     Snoring     Tobacco use disorder        Past Surgical History:   Procedure Laterality Date    ABDOMEN SURGERY      GB out    ARTHROSCOPY KNEE RT/LT  2/07    left knee    BIOPSY      Colon    CHOLECYSTECTOMY      COLONOSCOPY  8/5/2014    Procedure: COLONOSCOPY;  Surgeon: Mau De La Fuente MD;  Location:  GI    ESOPHAGOSCOPY, GASTROSCOPY, DUODENOSCOPY (EGD), COMBINED  8/5/2014    Procedure: COMBINED ESOPHAGOSCOPY,  GASTROSCOPY, DUODENOSCOPY (EGD), BIOPSY SINGLE OR MULTIPLE;  Surgeon: Mau De La Fuente MD;  Location:  GI    Gall bladder removal  2011    [facility] TOTAL KNEE ARTHROPLASTY      bilateral       Social History     Socioeconomic History    Marital status: Single   Tobacco Use    Smoking status: Some Days     Packs/day: 0.25     Years: 20.00     Additional pack years: 0.00     Total pack years: 5.00     Types: Cigarettes     Start date: 10/1/1971     Last attempt to quit: 2007     Years since quittin.3    Smokeless tobacco: Never    Tobacco comments:     2 PACK PER WEEK    Substance and Sexual Activity    Alcohol use: Yes     Alcohol/week: 0.0 standard drinks of alcohol     Comment: switched to beer 6 pack a week     Drug use: No    Sexual activity: Never     Partners: Male     Birth control/protection: Abstinence   Other Topics Concern    Parent/sibling w/ CABG, MI or angioplasty before 65F 55M? No   Social History Narrative    Dairy/d 2 servings/d.    Caffeine 1 -2servings/d    Exercise 1 x week walking,problems with knees    Sunscreen used - Yes    Seatbelts used - Yes    Working smoke/CO detectors in the home - Yes    Guns stored in the home - No    Self Breast Exams - Yes-occ    Self Testicular Exam - NA    Eye Exam up to date - no,does every 2yrs    Dental Exam up to date - Yes  Q 6 months    Pap Smear up to date - no    Mammogram up to date -had done today    PSA up to date - NA    Dexa Scan up to date - Yes- 2008    Flex Sig / Colonoscopy up to date - colonoscopy in ,repeat in 10yrs    Immunizations up to date - Yes Td     Abuse: Current or Past(Physical, Sexual or Emotional)- No    Do you feel safe in your environment - Yes       family history includes Allergies in her father; Arthritis in her father; Asthma in her paternal grandmother; Blood Disease in her father; C.A.D. in her maternal grandfather and maternal grandmother; Cancer in her father and paternal grandmother; Cancer - colorectal  in her paternal grandmother; Cardiovascular in her maternal grandfather; Cerebrovascular Disease in her maternal grandmother and paternal grandfather; Circulatory in her maternal grandfather; Coronary Artery Disease in her mother; Diabetes in her paternal grandmother; Eye Disorder in her mother; Genitourinary Problems in her father; Hearing Loss in her maternal grandmother; Hypertension in her mother; Lipids in her mother; Neurologic Disorder in her mother; Osteoporosis in her mother; Respiratory in her maternal grandmother; Thyroid Disease in her sister.       IMAGING   Imaging independently reviewed.    EOS XR 11/30/23 - C3-T1 DDD, multi-level facet joint degeneration. Greatest scoliotic curvature <30 degrees T/L spine.  Hiatal hernia.  SI joints difficult to see through 2/2 overlying tissue.     Findings:  12 rib bearing vertebral bodies and 5 lumbar type vertebral bodies are  identified.  Coronal Deformity: Mild convex left curvature of the thoracic spine  with apex at T10. Moderate convex right curvature of the lumbar spine  apex at L3. No substantial global coronal imbalance.  Sagittal Vertical Axis (A vertical line drawn from the center of C7  (amy line) to the posterosuperior aspect of the S1 on sagittal  plane):  less than 4 cm   Weight bearing axis: (Defined as a line drawn from the center of the  femoral head to the mid aspect of the tibial plafond).      Right: Weight bearing axis crosses the expected location of the  medial tibial spine.       Left: Weight bearing axis crosses expected location of the  central one third of the medial tibial plateau.  Leg length:  (Measured from the top of the femoral head to the center  of tibial plafond.  It is assumed joints are in similar degrees of  extension bilaterally.  Significant difference is defined when  discrepancy is greater than 1.5 cm).        No significant leg length discrepancy.  Additional Findings:  Large hiatal hernia. No acute airspace disease.  "Nonobstructive bowel  gas pattern. Bilateral total knee arthroplasties. Poor penetration  limits evaluation of fine osseous detail. Multilevel degenerative  changes of the spine, greatest at L1 L3.  Impression:  1. Mild convex left curvature of the thoracic spine and Moderate  convex right curvature of the lumbar spine.   2. No  global coronal or sagittal imbalance.  3. Weight bearing axis as detailed above.  4. Large hiatal hernia.    Nicotine Usage:  Yes     Physical Exam   /82 (BP Location: Right arm, Patient Position: Sitting)   Pulse 94   Resp 16   Ht 1.651 m (5' 5\")   Wt 95.3 kg (210 lb)   LMP  (LMP Unknown)   SpO2 95%   BMI 34.95 kg/m      Constitutional: Appears well-developed and well-nourished. Cooperative. No acute distress.   HENT:   Head: Normocephalic and atraumatic.   Eyes: Conjunctivae are normal.  Neck: Neck supple. No tracheal deviation present.  Cardiovascular: Normal rate and regular rhythm.    Pulmonary/Chest: Effort normal and breath sounds normal.  Abdominal: Exhibits no obvious distension.   Musculoskeletal: Able to move all extremities.  No involuntary motor movements. No C/T/L spine tenderness to palpation.  Negative SI joint provocation.  Negative straight leg, Edmundo, piriformis stretch.   Cervical flexion-extension range of motion: limited extension, left extension w/ pain up back of head, relieved with traction  Lumbar flexion/extension range of motion: Good ROM w/o pain  Skin: Skin is warm, dry and intact.   Psychiatric: Normal mood and affect. Speech is normal and behavior is normal.    Neurological:  Alert, NAD, and oriented to person, place, and time.   No cranial nerve deficit   Gait: steady, symmetrical    Strength (L/R)  5/5 Deltoid  5/5 Bicep  5/5 Wrist Extensor  5/5 Tricep  5/5 Finger Flexion  5/5 Finger Abduction  5/5 Handgrip    5/5 Hip Flexion  5/5 Knee Extension  5/5 Ankle Dorsiflexion  5/5 Extensor Hallucis Longus  5/5 Plantar Flexion  5/5 Foot Eversion  5/5 " Foot Inversion    Reflexes (L/R)  2+/2+ Bicep  2+/2+ Brachioradialis  2+/2+ Patellar  2+/2+ Ankle    No Lhermitte's  Spurling's w/ left  C2 type radicular pain  No Papo's   No ankle clonus    Sensation: SILT BUE/BLE    ASSESSMENT:  Brandi Stern is a 68 year old female with a PMH of fibromyalgia, Hashimoto's thyroiditis, OA, RLS, GERD, back spasms, iron def anemia, hiatal hernia, ISATU (wears CPAP) who is presenting for evaluation of low back and neck pain.    Patient has been offered cervical fusion surgery in the past, but has not stopped smoking.  She continues to endorse pain in her neck that extends up the back of her head and has right hand spasms/contraction.  She continues to endorse trouble with swallowing and has food getting stuck in her throat.  She denies aspiration.      She has not had recent advanced imaging for her cervical spine, which is her most concerning issue that this time.  I will go ahead and update this to help with directing treatment.      PLAN:    CT cervical - surgical planning  MRI cervical - update since her imaging is more than 1 year old  SLP - trouble with food getting stuck w/ swallowing, no aspiration  Smoking cessation - has information for [id number] program, can reach out to PCP for medications -- chantrix or wellbutrin if needed. She will need to be at least 6 weeks nicotine free before she can be considered for any type of fusion surgery.    F/u with me 2-3 days after imaging to discuss findings and additional recommendations, which may include PT w/ traction, additional injections, etc.     I spent 107 minutes spent in patient care, independent review and interpretation of medical records/imaging, reviewing old records.      Janey Lewis PA-C  Department of Neurosurgery  Office: [phone]

## 2023-12-01 ENCOUNTER — MYC MEDICAL ADVICE (OUTPATIENT)
Dept: FAMILY MEDICINE | Facility: CLINIC | Age: 68
End: 2023-12-01
Payer: MEDICARE

## 2023-12-04 RX ORDER — VARENICLINE TARTRATE 0.5 (11)-1
KIT ORAL
Qty: 53 TABLET | Refills: 0 | Status: CANCELLED | OUTPATIENT
Start: 2023-12-04

## 2023-12-04 NOTE — TELEPHONE ENCOUNTER
"Caro-    Please advise on patient message below. Last visit with you on 10/5/22. Appt with Neurosurgery on 11/30/23 which stated: \"Smoking cessation - has information for [id number] program, can reach out to PCP for medications -- chantrix or wellbutrin if needed. She will need to be at least 6 weeks nicotine free before she can be considered for any type of fusion surgery.\"    Patient message:  \"Hello, my neck requires surgery and I must quit smoking. I am interested in Chantix. Can t do patches because of sensitivity to the adhesive. What do you think about Nicorette gum?  Thank you, having a tough time with this.\"    Kami Lou RN  [facility]    "

## 2023-12-05 ENCOUNTER — ANCILLARY PROCEDURE (OUTPATIENT)
Dept: CT IMAGING | Facility: CLINIC | Age: 68
End: 2023-12-05
Attending: PHYSICIAN ASSISTANT
Payer: MEDICARE

## 2023-12-05 DIAGNOSIS — M54.12 CERVICAL RADICULOPATHY: ICD-10-CM

## 2023-12-05 DIAGNOSIS — E78.5 HYPERLIPIDEMIA LDL GOAL <130: ICD-10-CM

## 2023-12-05 DIAGNOSIS — G47.00 INSOMNIA, UNSPECIFIED TYPE: ICD-10-CM

## 2023-12-05 DIAGNOSIS — K21.9 GASTROESOPHAGEAL REFLUX DISEASE WITHOUT ESOPHAGITIS: ICD-10-CM

## 2023-12-05 PROCEDURE — 72125 CT NECK SPINE W/O DYE: CPT | Mod: MF | Performed by: RADIOLOGY

## 2023-12-05 PROCEDURE — G1010 CDSM STANSON: HCPCS | Mod: GC | Performed by: RADIOLOGY

## 2023-12-06 RX ORDER — ZOLPIDEM TARTRATE 5 MG/1
5 TABLET ORAL
Qty: 30 TABLET | Refills: 0 | Status: SHIPPED | OUTPATIENT
Start: 2023-12-06 | End: 2024-01-03

## 2023-12-06 RX ORDER — ATORVASTATIN CALCIUM 10 MG/1
10 TABLET, FILM COATED ORAL DAILY
Qty: 30 TABLET | Refills: 0 | Status: SHIPPED | OUTPATIENT
Start: 2023-12-06 | End: 2024-01-02

## 2023-12-06 RX ORDER — FAMOTIDINE 20 MG/1
TABLET, FILM COATED ORAL
Qty: 60 TABLET | Refills: 0 | Status: SHIPPED | OUTPATIENT
Start: 2023-12-06 | End: 2024-01-02

## 2023-12-07 ENCOUNTER — THERAPY VISIT (OUTPATIENT)
Dept: SPEECH THERAPY | Facility: CLINIC | Age: 68
End: 2023-12-07
Attending: PHYSICIAN ASSISTANT
Payer: MEDICARE

## 2023-12-07 DIAGNOSIS — M54.12 CERVICAL RADICULOPATHY: Primary | ICD-10-CM

## 2023-12-07 PROCEDURE — 92526 ORAL FUNCTION THERAPY: CPT | Mod: GN

## 2023-12-07 PROCEDURE — 92610 EVALUATE SWALLOWING FUNCTION: CPT | Mod: GN

## 2023-12-07 NOTE — PROGRESS NOTES
"SPEECH LANGUAGE PATHOLOGY EVALUATION    See electronic medical record for Abuse and Falls Screening details.    Subjective   Presenting condition or subjective complaint: [facility] and realize that there is food at the back of my throat that i need to swallow first. Due to cervical pressure on nerves  Date of onset:   111/30/23 (date of orders)  Relevant medical history: Asthma; Fibromyalgia; High blood pressure; Osteoarthritis; Overweight; Smoking; Vision problems   Dates & types of surgery: 15 years ago knee replacements    Prior diagnostic imaging/testing results: Video fluoroscopic swallow study     Prior therapy history for the same diagnosis, illness or injury: No      Living Environment  Social support: Alone   Help at home: None  Equipment owned: [employer]     Employment: No    Hobbies/Interests: Reading, geneology, cooking    Patient goals for therapy: Nothing    Pain assessment: Pain present  Low back and neck pain related to cervical radiculopathy     Objective     SWALLOW EVALUTION  Dysphagia history: From neurosurgery note on 11/30: \"She describes a deep aching pain in her neck every day.  She finds it difficult to hold her head up.  ADLs are difficult.  The pain extends upward from her mid neck into the  back of her head--left/center area.  She notes some numbness as well.  Her pain is worse with walking and coughing.  7/10 today with walking in to clinic.  Flexing forward feels better.  She has never tried traction, but thinks this would feel good.  She denies radicular pain down her arms, but she does get spasms/contractions in her right hand and has to manually straighten her fingers out about 3x/week.  She also notes problems with swallowing intermittently.  She feels like there might be residual food in the vallecula.  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  She states she did not have this 3 years ago\". Pt " reports she infrequently feels food stuck in her throat that she realizes she needs to swallow before taking another bite.     Current Diet/Method of Nutritional Intake: thin liquids (level 0), regular diet        CLINICAL SWALLOW EVALUATION  Oral Motor Function: generally intact  Dentition: natural dentition  Secretion management: WFL  Mucosal quality: good  Mandibular function: intact  Oral labial function: WFL  Lingual function: WFL  Velar function: intact   Buccal function: intact  Laryngeal function: cough, throat clear, voicing WFL     Level of assist required for feeding: no assistance needed   Textures Trialed:   Clinical Swallow Eval: Thin Liquids  Mode of presentation: cup, self-fed   Volume presented: 2 oz  Preparatory Phase: WFL  Oral Phase: WFL  Pharyngeal phase of swallow: intact   Strategies trialed during procedure: SERENA SLP CLINICAL EVAL STRATEGIES: none    Diagnostic statement: no overt clinical s/sx of aspiration and/or suggested penetration. declined globus sensations.      Clinical Swallow Eval: Purees  Mode of presentation: spoon, self-fed   Volume presented: 3 bites of pudding  Preparatory Phase: WFL  Oral Phase: WFL  Pharyngeal phase of swallow: intact   Strategies trialed during procedure: SERENA SLP CLINICAL EVAL STRATEGIES: none    Diagnostic statement: no overt clinical s/sx of aspiration and/or suggested penetration. declined globus sensations.      Clinical Swallow Eval: Solids  Mode of presentation: self-fed   Volume presented: 3 bites of carlos cracker  Preparatory Phase: WFL  Oral Phase: premature pharyngeal entry  Pharyngeal phase of swallow: coughing/choking, feeling of something stuck in throat   Strategies trialed during procedure: SERENA SLP CLINICAL EVAL STRATEGIES: hard swallow, supgraglottic swallow   Diagnostic statement: x2 delayed coughs observed following completion of all solid trials.  Resolved with cued sip of thin liquid and/or supraglottic swallow       ADDITIONAL EVAL  COMPLETED TODAY : none    ESOPHAGEAL PHASE OF SWALLOW  no observed or reported concerns related to esophageal function  Hx of hiatal hernia, please see chart for notes related to this      SWALLOW ASSESSMENT CLINICAL IMPRESSIONS AND RATIONALE  Diet Consistency Recommendations: thin liquids (level 0), regular diet, moisten dry foods as able     Recommended Feeding/Eating Techniques: small bolus size, slow rate of intake, alternate food and liquid intake, supraglottic swallow, maintain upright sitting position for eating, maintain upright posture during/after eating for 30 minutes, minimize distractions during oral intake   Medication Administration Recommendations: whole as tolerated  Instrumental Assessment Recommendations: reassess via non-instrumental clinical swallow evaluation, with consideration for VFSS (videofluoroscopic swallow study) if pt is not demonstrating improvement with swallowing therapy        Assessment & Plan   CLINICAL IMPRESSIONS   Medical Diagnosis:    Cervical radiculopathy (M54.12) - Primary   Treatment Diagnosis:   minimal to mild oropharyngeal dysphagia   Impression/Assessment: Pt is a 68 year old female with swallow/dysphagia   complaints. The following significant findings have been identified: impaired swallowing, characterized by oral stasis leading to suspected premature pharyngeal spillage and s/sx of aspiration as evidence by  x2 delayed cough observed following completion of all solid trials. Identified deficits interfere with their ability to consume an oral diet as compared to previous level of function.    PLAN OF CARE  Treatment Interventions: Swallowing dysfunction and/or oral function for feeding    Prognosis to achieve stated therapy goals is good   Rehab potential is impacted by: pending medical intervention    Long Term Goals   Goal Identifier: strategies   Goal Description: Patient will independently implement safe swallowing techniques to tolerate a regular diet with  thin liquids without reported globus sensation across one month per patient report to improve safety of PO intake.   Rationale: To maximize safety, ease and/or independence of oral intake;   Target Date: 3/6/2024        Frequency of Treatment:  1-2x follow ups  Duration of Treatment:   3 months    Recommended Referrals to Other Professionals:  none  Education Assessment: Patient;Demonstration;Reading;Listening;Pictures/Video;No Barriers to Learning;    SLP role in OP setting, assessment results, POC     Risks and benefits of evaluation/treatment have been explained.   Patient/Family/caregiver agrees with Plan of Care.     Evaluation Time: Oral/Pharyngeal Swallow Function; 10 minutes       Present: Not applicable     Signing Clinician: SITA Gold    [facility]                                                                                   OUTPATIENT SPEECH LANGUAGE PATHOLOGY    PLAN OF TREATMENT FOR OUTPATIENT REHABILITATION   Patient's Last Name, First Name, KARRISharonFIDELSharon  Brandi Stern YOB: 1955   Provider's Name   [facility]   Medical Record No.  [id number]     Onset Date:  11/30/23 (date of orders) Start of Care Date:  12/7/2023     Medical Diagnosis:     Cervical radiculopathy [M54.12]  - Primary    SLP Treatment Diagnosis:   minimal to mild oropharyngeal dysphagia Plan of Treatment  Frequency/Duration:  1-2x follow ups  /   3 months    Certification date from  12/7/23   To     3/6/24       See note for plan of treatment details and functional goals     SITA Gold                         I CERTIFY THE NEED FOR THESE SERVICES FURNISHED UNDER        THIS PLAN OF TREATMENT AND WHILE UNDER MY CARE .             Physician Signature               Date    X_____________________________________________________                  Referring Provider:  Janey Lewis    Initial Assessment  See Epic Evaluation-

## 2023-12-11 DIAGNOSIS — E78.1 PURE HYPERGLYCERIDEMIA: ICD-10-CM

## 2023-12-12 RX ORDER — FENOFIBRATE 145 MG/1
145 TABLET, COATED ORAL DAILY
Qty: 90 TABLET | Refills: 0 | Status: SHIPPED | OUTPATIENT
Start: 2023-12-12 | End: 2024-01-03

## 2023-12-24 ENCOUNTER — ANCILLARY PROCEDURE (OUTPATIENT)
Dept: MRI IMAGING | Facility: CLINIC | Age: 68
End: 2023-12-24
Attending: PHYSICIAN ASSISTANT
Payer: MEDICARE

## 2023-12-24 DIAGNOSIS — M54.12 CERVICAL RADICULOPATHY: ICD-10-CM

## 2023-12-24 PROCEDURE — 72141 MRI NECK SPINE W/O DYE: CPT | Mod: MF | Performed by: RADIOLOGY

## 2023-12-24 PROCEDURE — G1010 CDSM STANSON: HCPCS | Mod: GC | Performed by: RADIOLOGY

## 2023-12-26 ENCOUNTER — OFFICE VISIT (OUTPATIENT)
Dept: NEUROSURGERY | Facility: CLINIC | Age: 68
End: 2023-12-26
Payer: MEDICARE

## 2023-12-26 VITALS
HEART RATE: 85 BPM | BODY MASS INDEX: 35.2 KG/M2 | DIASTOLIC BLOOD PRESSURE: 77 MMHG | OXYGEN SATURATION: 96 % | WEIGHT: 219 LBS | HEIGHT: 66 IN | RESPIRATION RATE: 16 BRPM | SYSTOLIC BLOOD PRESSURE: 113 MMHG

## 2023-12-26 DIAGNOSIS — M54.2 CERVICALGIA: Primary | ICD-10-CM

## 2023-12-26 DIAGNOSIS — R29.898 LEG WEAKNESS, BILATERAL: ICD-10-CM

## 2023-12-26 PROCEDURE — 99215 OFFICE O/P EST HI 40 MIN: CPT | Performed by: PHYSICIAN ASSISTANT

## 2023-12-26 ASSESSMENT — PAIN SCALES - GENERAL: PAINLEVEL: MILD PAIN (3)

## 2023-12-26 NOTE — PROGRESS NOTES
Neurosurgery Clinic Note    Chief Complaint: low back pain and neck pain    History of Present Illness:  Brandi Stern is a 68 year old female with a PMH of fibromyalgia, Hashimoto's thyroiditis, OA, RLS, GERD, back spasms, iron def anemia, hiatal hernia, ISATU (wears CPAP) who is presenting for evaluation of low back and neck pain.    Saw  3 years ago and chose to put off surgery.  Her pain now is worse and more frequent.  Low back pain prevents her from walking very far.       She describes a deep aching pain in her neck every day.  She finds it difficult to hold her head up.  ADLs are difficult.  The pain extends upward from her mid neck into the  back of her head--left/center area.  She notes some numbness as well.  Her pain is worse with walking and coughing.  7/10 today with walking in to clinic.  Flexing forward feels better.  She has never tried traction, but thinks this would feel good.  She denies radicular pain down her arms, but she does get spasms/contractions in her right hand and has to manually straighten her fingers out about 3x/week.  She also notes problems with swallowing intermittently.  She feels like there might be residual food in the vallecula.  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  She states she did not have this 3 years ago.      She thinks her back pain is in her low spine/sacral area.  Up until last week, this was primarily on her left.  It is a mild ache, but can occasionally surprise her with a sharp twinge.  She is able to walk half a block okay with the dogs.  She bends over intermittently to relieve the discomfort in her back.  She denies radicular pain or paresthesias in LE.   She has no b/b concerns.    Patient saw  1/30/2020 and was supposed to stop smoking, have osteoporosis evaluation with Dr. Lawton and return if she wanted to pursue C4-6 ACDF and possible C7/T1 right foraminotomy if having C8  symptoms.  She has not stopped smoking, but has cut down.  She was told she did not need to be on alendronate and continues to take calcium and vitamin D.  She has also worked on weight loss, but is unable to take the medications for this because of SE.  She has lost some weight.    Conservative Treatment:  Tylenol  Stretching  No recent PT - in past had for back and neck.  Worked for back, not the neck.  No traction.   Cervical injections left C3-5 facet 2/14/19; left C3-5 MBB  3/20/19 & 4/4/19; 4/19/19 left C3-5 Radiofrequency ablation - not helpful past a couple of months, which was partial relief  Lumbar injections -- L3-5 MBB 5/3/2019; L4-S1 facet inj 4/2019; 6/7/19 left L3-5 Radiofrequency ablation  Lidocaine - neck pain relief  Ibuprofen - unable to take  Tylenol arthritis formula bid - helpful    12/26/23 Visit - f/u to review updated imaging.    Regarding her neck pain, this is not as bad during the day, but hurts by dinner time.  Patient having issues with holding her head up by that time.  Pain/deep ache in the left back of her head.  She denies right sided neck pain. This can go up to 8/10. She denies stabbing/shooting pain.  The intensity of her pain changes and it sometimes she has no pain.  Sometimes her right hand spasms and she has pain at this time of the spasm that affects her right hand only.  She has to manually open her fingers when this occurs.  This does not bother her as much as the neck pain.  The spasms do not seem to be connected with head movement.        She has quit smoking and EtOH use as of 12/3/23.  She feels better already.  She is also working on losing weight.  She has stopped eating after dinner (which is usually between 5 and 6 pm).  She is open to doing PT or injections if this might be helpful for her neck pain.      Review of Systems   See HPI    Past Medical History:   Diagnosis Date    Allergic rhinitis due to other allergen     Apneas, obstructive sleep     Chronic  lymphocytic thyroiditis     COPD (chronic obstructive pulmonary disease) (H)     Depressive disorder     Depressive disorder, not elsewhere classified     Disorder of bone and cartilage, unspecified     Esophageal reflux     Essential hypertension, benign     Generalized osteoarthrosis, unspecified site     knees    HASHIMOTO'S THYROIDITIS 11/15/2004    HASHIMOTO'S THYROIDITIS     HASHIMOTO'S THYROIDITIS     Headache above the eye region     Headache above the eye region     Hiatal hernia     Insomnia, unspecified     Moderate persistent asthma     Nasal congestion     Pure hyperglyceridemia     Scoliosis     Snoring     Tobacco use disorder        Past Surgical History:   Procedure Laterality Date    ABDOMEN SURGERY      GB out    ARTHROSCOPY KNEE RT/LT      left knee    BIOPSY      Colon    CHOLECYSTECTOMY      COLONOSCOPY  2014    Procedure: COLONOSCOPY;  Surgeon: Mau De La Fuente MD;  Location:  GI    ESOPHAGOSCOPY, GASTROSCOPY, DUODENOSCOPY (EGD), COMBINED  2014    Procedure: COMBINED ESOPHAGOSCOPY, GASTROSCOPY, DUODENOSCOPY (EGD), BIOPSY SINGLE OR MULTIPLE;  Surgeon: Mau De La Fuente MD;  Location:  GI    Gall bladder removal      ZZC TOTAL KNEE ARTHROPLASTY      bilateral       Social History     Socioeconomic History    Marital status: Single   Tobacco Use    Smoking status: Some Days     Packs/day: 0.25     Years: 20.00     Additional pack years: 0.00     Total pack years: 5.00     Types: Cigarettes     Start date: 10/1/1971     Last attempt to quit: 2007     Years since quittin.3    Smokeless tobacco: Never    Tobacco comments:     2 PACK PER WEEK    Substance and Sexual Activity    Alcohol use: Yes     Alcohol/week: 0.0 standard drinks of alcohol     Comment: switched to beer 6 pack a week     Drug use: No    Sexual activity: Never     Partners: Male     Birth control/protection: Abstinence   Other Topics Concern    Parent/sibling w/ CABG, MI or angioplasty before  65F 55M? No   Social History Narrative    Dairy/d 2 servings/d.    Caffeine 1 -2servings/d    Exercise 1 x week walking,problems with knees    Sunscreen used - Yes    Seatbelts used - Yes    Working smoke/CO detectors in the home - Yes    Guns stored in the home - No    Self Breast Exams - Yes-occ    Self Testicular Exam - NA    Eye Exam up to date - no,does every 2yrs    Dental Exam up to date - Yes  Q 6 months    Pap Smear up to date - no    Mammogram up to date -had done today    PSA up to date - NA    Dexa Scan up to date - Yes- 2008    Flex Sig / Colonoscopy up to date - colonoscopy in 2003,repeat in 10yrs    Immunizations up to date - Yes Td 2008    Abuse: Current or Past(Physical, Sexual or Emotional)- No    Do you feel safe in your environment - Yes       family history includes Allergies in her father; Arthritis in her father; Asthma in her paternal grandmother; Blood Disease in her father; C.A.D. in her maternal grandfather and maternal grandmother; Cancer in her father and paternal grandmother; Cancer - colorectal in her paternal grandmother; Cardiovascular in her maternal grandfather; Cerebrovascular Disease in her maternal grandmother and paternal grandfather; Circulatory in her maternal grandfather; Coronary Artery Disease in her mother; Diabetes in her paternal grandmother; Eye Disorder in her mother; Genitourinary Problems in her father; Hearing Loss in her maternal grandmother; Hypertension in her mother; Lipids in her mother; Neurologic Disorder in her mother; Osteoporosis in her mother; Respiratory in her maternal grandmother; Thyroid Disease in her sister.       IMAGING   Imaging independently reviewed.      MRI cervical 12/24/23 - No significant CCS.  Multi-level mod/sev bilat FS from C3-C7.  Left C6/7 facet joint edema.  Not significantly changed from 2020 study.                Findings:  Trace anterolisthesis of C3 on C4 and trace retrolisthesis of C4 on  C5. Multilevel disc height narrowing  throughout the cervical spine.  Edema at the opposing endplates of C3-4, C5-6, C6-7, also at left C6-7  facet joint. Degenerative endplate signal changes. Spinal cord signal  is normal. Level by level findings are as follows:  C2-C3: Spinal canal and neural foramina are patent.  C3-C4: Mild disc bulge. Bilateral uncinate hypertrophy and facet  hypertrophy. Moderate to severe bilateral foraminal stenoses. Spinal  canal is patent.  C4-C5: Disc osteophyte complex. Facet arthropathy. Severe right and  moderate to severe left neural foraminal stenoses. Mild to moderate  spinal canal stenosis.  C5-C6: Disc osteophyte complex. Facet hypertrophy with moderate to severe neural foraminal stenosis. Mild to moderate spinal canal  stenosis.  C6-C7: Disc osteophyte complex. Facet hypertrophy with moderate to severe neural foraminal stenosis. Mild spinal canal stenosis  C7-T1: Bilateral uncinate hypertrophy with mild bilateral foraminal  narrowing. Spinal canal is patent.  Paraspinous, paravertebral and prevertebral muscles somewhat atrophic.  No mass lesion identified.                                                   IMPRESSION:   1. Multilevel cervical spondylosis with mild to moderate canal  stenosis and moderate to severe neural foraminal stenoses at multiple  levels as described above in detail. No myelopathic cord signal.  Findings are minimally progressed from MRI 1/17/2020.  2. Edema at the left C6-7 facet joint, may represent inflammatory  arthropathy.     CT cervical 12/5/23 -   Findings:  There is trace anterolisthesis of C3 on C4 and trace retrolisthesis of  C4 on C5. No acute fracture or traumatic subluxation. No prevertebral  edema. There is multiple severe disc height narrowing. The findings on  a level by level basis are as follows:  C2-3:  Mild uncinate hypertrophy and left greater than right facet  arthropathy. There is mild left neural foraminal stenosis. No spinal  canal or right neural foraminal  stenosis  C3-4:  Disc bulge, uncinate hypertrophy, and facet arthropathy. There  is moderate bilateral neural foraminal stenosis. No spinal canal  stenosis.  C4-5:  Disc osteophyte complex, uncinate hypertrophy, and facet  arthropathy. There is moderate right and severe left neural foraminal  stenosis. Mild spinal canal stenosis  C5-6:  Disc osteophyte complex, facet hypertrophy, and facet  arthropathy. There is mild to moderate left and moderate to severe  right neural foraminal stenosis. Mild spinal canal stenosis  C6-7:  Disc osteophyte complex, uncinate hypertrophy, and facet  arthropathy. There is moderate to severe left and mild-to-moderate  right neural foraminal stenosis. Mild spinal canal stenosis  C7-T1:  Disc osteophyte complex and facet arthropathy. There is  moderate right and mild-to-moderate left neural foraminal stenosis. No  spinal canal stenosis.  T1-2: Disc osteophyte complex. Severe right and moderate to severe  left neural foraminal stenosis. No significant spinal canal stenosis.  Impression: Stable to slight progression in multilevel cervical  spondylosis compared to 1/17/2020 with multilevel varying levels of  moderate to severe neuroforaminal stenoses and multilevel mild spinal  canal stenosis.      EOS XR 11/30/23 - C3-T1 DDD, multi-level facet joint degeneration. Greatest scoliotic curvature <30 degrees T/L spine.  Hiatal hernia.  SI joints difficult to see through 2/2 overlying tissue.     Findings:  12 rib bearing vertebral bodies and 5 lumbar type vertebral bodies are  identified.  Coronal Deformity: Mild convex left curvature of the thoracic spine  with apex at T10. Moderate convex right curvature of the lumbar spine  apex at L3. No substantial global coronal imbalance.  Sagittal Vertical Axis (A vertical line drawn from the center of C7  (amy line) to the posterosuperior aspect of the S1 on sagittal  plane):  less than 4 cm   Weight bearing axis: (Defined as a line drawn from the  "center of the  femoral head to the mid aspect of the tibial plafond).      Right: Weight bearing axis crosses the expected location of the  medial tibial spine.       Left: Weight bearing axis crosses expected location of the  central one third of the medial tibial plateau.  Leg length:  (Measured from the top of the femoral head to the center  of tibial plafond.  It is assumed joints are in similar degrees of  extension bilaterally.  Significant difference is defined when  discrepancy is greater than 1.5 cm).        No significant leg length discrepancy.  Additional Findings:  Large hiatal hernia. No acute airspace disease. Nonobstructive bowel  gas pattern. Bilateral total knee arthroplasties. Poor penetration  limits evaluation of fine osseous detail. Multilevel degenerative  changes of the spine, greatest at L1 L3.  Impression:  1. Mild convex left curvature of the thoracic spine and Moderate  convex right curvature of the lumbar spine.   2. No  global coronal or sagittal imbalance.  3. Weight bearing axis as detailed above.  4. Large hiatal hernia.    Nicotine Usage:  No - recent cessation on 12/3/23      Physical Exam   /77 (BP Location: Right arm, Patient Position: Sitting)   Pulse 85   Resp 16   Ht 1.676 m (5' 6\")   Wt 99.3 kg (219 lb)   LMP  (LMP Unknown)   SpO2 96%   BMI 35.35 kg/m      Cervical flexion-extension range of motion: limited extension, flexion w/o pain    Neurological:  Alert, NAD, and oriented to person, place, and time.   No cranial nerve deficit   Gait: steady, symmetrical    Strength (L/R)  5/5 Deltoid  5/5 Bicep  5/5 Tricep  5/5 Finger Abduction  5/5 Handgrip (right handed)    4+/4+ Hip Flexion  5/5 Knee Extension  5/5 Ankle Dorsiflexion  5/5 Extensor Hallucis Longus  5/5 Plantar Flexion    Reflexes (L/R)  2+/2+ Bicep  2+/2+ Brachioradialis  2+/2+ Patellar  1+/1+ Ankle    No Lhermitte's  No Spurling  No Papo's   No ankle clonus    Sensation: SILT " BUE/BLE    ASSESSMENT:  Brandi Stern is a 68 year old female with a PMH of fibromyalgia, Hashimoto's thyroiditis, OA, RLS, GERD, back spasms, iron def anemia, hiatal hernia, ISATU (wears CPAP) who is following for low back and neck pain.    Patient has been offered cervical fusion surgery in the past, but has not stopped smoking.  She continues to endorse pain in her neck that extends up the back of her head and has right hand spasms/contraction.  She continues to endorse trouble with swallowing and has food getting stuck in her throat.  She denies aspiration.      Patient follows up today after having updated MRI and CT of cervical spine. There is no significant progression of degenerative disease or FS stenosis since 2020.  No significant CCS at any level.      She does have some mild weakness noted in her hip flexors today.  Mentioned that she would benefit from pool therapy or doing some regular core strengthening exercises to improve her overall balance and LE strength.  Recommend PT for both cervicalgia and LE weakness.     Encourage her to continue with smoking cessation and weight loss.      PLAN:    Referral to PT for cervicalgia and leg weakness.      Follow up if her neck pain does not improve.  Will consider injection at that time.      I spent 57 minutes spent in patient care, independent review and interpretation of medical records/imaging, reviewing old records.      Janey Lewis PA-C  Department of Neurosurgery  Office: [phone]

## 2023-12-26 NOTE — PATIENT INSTRUCTIONS
Referral to PT to work on cervicalgia and leg weakness.    Follow up if neck pain or leg weakness do not improve.

## 2023-12-26 NOTE — LETTER
12/26/2023       RE: Brandi Stern  [address] [place] [address]       Dear Colleague,    Thank you for referring your patient, Brandi Stern, to the [facility] [place] [facility]. Please see a copy of my visit note below.        Neurosurgery Clinic Note    Chief Complaint: low back pain and neck pain    History of Present Illness:  Brandi Stern is a 68 year old female with a PMH of fibromyalgia, Hashimoto's thyroiditis, OA, RLS, GERD, back spasms, iron def anemia, hiatal hernia, ISATU (wears CPAP) who is presenting for evaluation of low back and neck pain.    Saw  3 years ago and chose to put off surgery.  Her pain now is worse and more frequent.  Low back pain prevents her from walking very far.       She describes a deep aching pain in her neck every day.  She finds it difficult to hold her head up.  ADLs are difficult.  The pain extends upward from her mid neck into the  back of her head--left/center area.  She notes some numbness as well.  Her pain is worse with walking and coughing.  7/10 today with walking in to clinic.  Flexing forward feels better.  She has never tried traction, but thinks this would feel good.  She denies radicular pain down her arms, but she does get spasms/contractions in her right hand and has to manually straighten her fingers out about 3x/week.  She also notes problems with swallowing intermittently.  She feels like there might be residual food in the vallecula.  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  She states she did not have this 3 years ago.      She thinks her back pain is in her low spine/sacral area.  Up until last week, this was primarily on her left.  It is a mild ache, but can occasionally surprise her with a sharp twinge.  She is able to walk half a block okay with the dogs.  She bends over intermittently to  relieve the discomfort in her back.  She denies radicular pain or paresthesias in LE.   She has no b/b concerns.    Patient saw  1/30/2020 and was supposed to stop smoking, have osteoporosis evaluation with Dr. Lawton and return if she wanted to pursue C4-6 ACDF and possible C7/T1 right foraminotomy if having C8 symptoms.  She has not stopped smoking, but has cut down.  She was told she did not need to be on alendronate and continues to take calcium and vitamin D.  She has also worked on weight loss, but is unable to take the medications for this because of SE.  She has lost some weight.    Conservative Treatment:  Tylenol  Stretching  No recent PT - in past had for back and neck.  Worked for back, not the neck.  No traction.   Cervical injections left C3-5 facet 2/14/19; left C3-5 MBB  3/20/19 & 4/4/19; 4/19/19 left C3-5 Radiofrequency ablation - not helpful past a couple of months, which was partial relief  Lumbar injections -- L3-5 MBB 5/3/2019; L4-S1 facet inj 4/2019; 6/7/19 left L3-5 Radiofrequency ablation  Lidocaine - neck pain relief  Ibuprofen - unable to take  Tylenol arthritis formula bid - helpful    12/26/23 Visit - f/u to review updated imaging.    Regarding her neck pain, this is not as bad during the day, but hurts by dinner time.  Patient having issues with holding her head up by that time.  Pain/deep ache in the left back of her head.  She denies right sided neck pain. This can go up to 8/10. She denies stabbing/shooting pain.  The intensity of her pain changes and it sometimes she has no pain.  Sometimes her right hand spasms and she has pain at this time of the spasm that affects her right hand only.  She has to manually open her fingers when this occurs.  This does not bother her as much as the neck pain.  The spasms do not seem to be connected with head movement.        She has quit smoking and EtOH use as of 12/3/23.  She feels better already.  She is also working on losing weight.   She has stopped eating after dinner (which is usually between 5 and 6 pm).  She is open to doing PT or injections if this might be helpful for her neck pain.      Review of Systems   See HPI    Past Medical History:   Diagnosis Date    Allergic rhinitis due to other allergen     Apneas, obstructive sleep     Chronic lymphocytic thyroiditis     COPD (chronic obstructive pulmonary disease) (H)     Depressive disorder     Depressive disorder, not elsewhere classified     Disorder of bone and cartilage, unspecified     Esophageal reflux     Essential hypertension, benign     Generalized osteoarthrosis, unspecified site     knees    HASHIMOTO'S THYROIDITIS 11/15/2004    HASHIMOTO'S THYROIDITIS     HASHIMOTO'S THYROIDITIS     Headache above the eye region     Headache above the eye region     Hiatal hernia     Insomnia, unspecified     Moderate persistent asthma     Nasal congestion     Pure hyperglyceridemia     Scoliosis     Snoring     Tobacco use disorder        Past Surgical History:   Procedure Laterality Date    ABDOMEN SURGERY      GB out    ARTHROSCOPY KNEE RT/LT      left knee    BIOPSY      Colon    CHOLECYSTECTOMY      COLONOSCOPY  2014    Procedure: COLONOSCOPY;  Surgeon: Mau De La Fuente MD;  Location:  GI    ESOPHAGOSCOPY, GASTROSCOPY, DUODENOSCOPY (EGD), COMBINED  2014    Procedure: COMBINED ESOPHAGOSCOPY, GASTROSCOPY, DUODENOSCOPY (EGD), BIOPSY SINGLE OR MULTIPLE;  Surgeon: Mau De La Fuente MD;  Location:  GI    Gall bladder removal      [facility] TOTAL KNEE ARTHROPLASTY      bilateral       Social History     Socioeconomic History    Marital status: Single   Tobacco Use    Smoking status: Some Days     Packs/day: 0.25     Years: 20.00     Additional pack years: 0.00     Total pack years: 5.00     Types: Cigarettes     Start date: 10/1/1971     Last attempt to quit: 2007     Years since quittin.3    Smokeless tobacco: Never    Tobacco comments:     2 PACK PER WEEK     Substance and Sexual Activity    Alcohol use: Yes     Alcohol/week: 0.0 standard drinks of alcohol     Comment: switched to beer 6 pack a week     Drug use: No    Sexual activity: Never     Partners: Male     Birth control/protection: Abstinence   Other Topics Concern    Parent/sibling w/ CABG, MI or angioplasty before 65F 55M? No   Social History Narrative    Dairy/d 2 servings/d.    Caffeine 1 -2servings/d    Exercise 1 x week walking,problems with knees    Sunscreen used - Yes    Seatbelts used - Yes    Working smoke/CO detectors in the home - Yes    Guns stored in the home - No    Self Breast Exams - Yes-occ    Self Testicular Exam - NA    Eye Exam up to date - no,does every 2yrs    Dental Exam up to date - Yes  Q 6 months    Pap Smear up to date - no    Mammogram up to date -had done today    PSA up to date - NA    Dexa Scan up to date - Yes- 2008    Flex Sig / Colonoscopy up to date - colonoscopy in 2003,repeat in 10yrs    Immunizations up to date - Yes Td 2008    Abuse: Current or Past(Physical, Sexual or Emotional)- No    Do you feel safe in your environment - Yes       family history includes Allergies in her father; Arthritis in her father; Asthma in her paternal grandmother; Blood Disease in her father; C.A.D. in her maternal grandfather and maternal grandmother; Cancer in her father and paternal grandmother; Cancer - colorectal in her paternal grandmother; Cardiovascular in her maternal grandfather; Cerebrovascular Disease in her maternal grandmother and paternal grandfather; Circulatory in her maternal grandfather; Coronary Artery Disease in her mother; Diabetes in her paternal grandmother; Eye Disorder in her mother; Genitourinary Problems in her father; Hearing Loss in her maternal grandmother; Hypertension in her mother; Lipids in her mother; Neurologic Disorder in her mother; Osteoporosis in her mother; Respiratory in her maternal grandmother; Thyroid Disease in her sister.       IMAGING   Imaging  independently reviewed.      MRI cervical 12/24/23 - No significant CCS.  Multi-level mod/sev bilat FS from C3-C7.  Left C6/7 facet joint edema.  Not significantly changed from 2020 study.                Findings:  Trace anterolisthesis of C3 on C4 and trace retrolisthesis of C4 on  C5. Multilevel disc height narrowing throughout the cervical spine.  Edema at the opposing endplates of C3-4, C5-6, C6-7, also at left C6-7  facet joint. Degenerative endplate signal changes. Spinal cord signal  is normal. Level by level findings are as follows:  C2-C3: Spinal canal and neural foramina are patent.  C3-C4: Mild disc bulge. Bilateral uncinate hypertrophy and facet  hypertrophy. Moderate to severe bilateral foraminal stenoses. Spinal  canal is patent.  C4-C5: Disc osteophyte complex. Facet arthropathy. Severe right and  moderate to severe left neural foraminal stenoses. Mild to moderate  spinal canal stenosis.  C5-C6: Disc osteophyte complex. Facet hypertrophy with moderate to severe neural foraminal stenosis. Mild to moderate spinal canal  stenosis.  C6-C7: Disc osteophyte complex. Facet hypertrophy with moderate to severe neural foraminal stenosis. Mild spinal canal stenosis  C7-T1: Bilateral uncinate hypertrophy with mild bilateral foraminal  narrowing. Spinal canal is patent.  Paraspinous, paravertebral and prevertebral muscles somewhat atrophic.  No mass lesion identified.                                                   IMPRESSION:   1. Multilevel cervical spondylosis with mild to moderate canal  stenosis and moderate to severe neural foraminal stenoses at multiple  levels as described above in detail. No myelopathic cord signal.  Findings are minimally progressed from MRI 1/17/2020.  2. Edema at the left C6-7 facet joint, may represent inflammatory  arthropathy.     CT cervical 12/5/23 -   Findings:  There is trace anterolisthesis of C3 on C4 and trace retrolisthesis of  C4 on C5. No acute fracture or traumatic  subluxation. No prevertebral  edema. There is multiple severe disc height narrowing. The findings on  a level by level basis are as follows:  C2-3:  Mild uncinate hypertrophy and left greater than right facet  arthropathy. There is mild left neural foraminal stenosis. No spinal  canal or right neural foraminal stenosis  C3-4:  Disc bulge, uncinate hypertrophy, and facet arthropathy. There  is moderate bilateral neural foraminal stenosis. No spinal canal  stenosis.  C4-5:  Disc osteophyte complex, uncinate hypertrophy, and facet  arthropathy. There is moderate right and severe left neural foraminal  stenosis. Mild spinal canal stenosis  C5-6:  Disc osteophyte complex, facet hypertrophy, and facet  arthropathy. There is mild to moderate left and moderate to severe  right neural foraminal stenosis. Mild spinal canal stenosis  C6-7:  Disc osteophyte complex, uncinate hypertrophy, and facet  arthropathy. There is moderate to severe left and mild-to-moderate  right neural foraminal stenosis. Mild spinal canal stenosis  C7-T1:  Disc osteophyte complex and facet arthropathy. There is  moderate right and mild-to-moderate left neural foraminal stenosis. No  spinal canal stenosis.  T1-2: Disc osteophyte complex. Severe right and moderate to severe  left neural foraminal stenosis. No significant spinal canal stenosis.  Impression: Stable to slight progression in multilevel cervical  spondylosis compared to 1/17/2020 with multilevel varying levels of  moderate to severe neuroforaminal stenoses and multilevel mild spinal  canal stenosis.      EOS XR 11/30/23 - C3-T1 DDD, multi-level facet joint degeneration. Greatest scoliotic curvature <30 degrees T/L spine.  Hiatal hernia.  SI joints difficult to see through 2/2 overlying tissue.     Findings:  12 rib bearing vertebral bodies and 5 lumbar type vertebral bodies are  identified.  Coronal Deformity: Mild convex left curvature of the thoracic spine  with apex at T10. Moderate  "convex right curvature of the lumbar spine  apex at L3. No substantial global coronal imbalance.  Sagittal Vertical Axis (A vertical line drawn from the center of C7  (amy line) to the posterosuperior aspect of the S1 on sagittal  plane):  less than 4 cm   Weight bearing axis: (Defined as a line drawn from the center of the  femoral head to the mid aspect of the tibial plafond).      Right: Weight bearing axis crosses the expected location of the  medial tibial spine.       Left: Weight bearing axis crosses expected location of the  central one third of the medial tibial plateau.  Leg length:  (Measured from the top of the femoral head to the center  of tibial plafond.  It is assumed joints are in similar degrees of  extension bilaterally.  Significant difference is defined when  discrepancy is greater than 1.5 cm).        No significant leg length discrepancy.  Additional Findings:  Large hiatal hernia. No acute airspace disease. Nonobstructive bowel  gas pattern. Bilateral total knee arthroplasties. Poor penetration  limits evaluation of fine osseous detail. Multilevel degenerative  changes of the spine, greatest at L1 L3.  Impression:  1. Mild convex left curvature of the thoracic spine and Moderate  convex right curvature of the lumbar spine.   2. No  global coronal or sagittal imbalance.  3. Weight bearing axis as detailed above.  4. Large hiatal hernia.    Nicotine Usage:  No - recent cessation on 12/3/23      Physical Exam   /77 (BP Location: Right arm, Patient Position: Sitting)   Pulse 85   Resp 16   Ht 1.676 m (5' 6\")   Wt 99.3 kg (219 lb)   LMP  (LMP Unknown)   SpO2 96%   BMI 35.35 kg/m      Cervical flexion-extension range of motion: limited extension, flexion w/o pain    Neurological:  Alert, NAD, and oriented to person, place, and time.   No cranial nerve deficit   Gait: steady, symmetrical    Strength (L/R)  5/5 Deltoid  5/5 Bicep  5/5 Tricep  5/5 Finger Abduction  5/5 Handgrip (right " handed)    4+/4+ Hip Flexion  5/5 Knee Extension  5/5 Ankle Dorsiflexion  5/5 Extensor Hallucis Longus  5/5 Plantar Flexion    Reflexes (L/R)  2+/2+ Bicep  2+/2+ Brachioradialis  2+/2+ Patellar  1+/1+ Ankle    No Lhermitte's  No Spurling  No Papo's   No ankle clonus    Sensation: SILT BUE/BLE    ASSESSMENT:  Brandi Stern is a 68 year old female with a PMH of fibromyalgia, Hashimoto's thyroiditis, OA, RLS, GERD, back spasms, iron def anemia, hiatal hernia, ISATU (wears CPAP) who is following for low back and neck pain.    Patient has been offered cervical fusion surgery in the past, but has not stopped smoking.  She continues to endorse pain in her neck that extends up the back of her head and has right hand spasms/contraction.  She continues to endorse trouble with swallowing and has food getting stuck in her throat.  She denies aspiration.      Patient follows up today after having updated MRI and CT of cervical spine. There is no significant progression of degenerative disease or FS stenosis since 2020.  No significant CCS at any level.      She does have some mild weakness noted in her hip flexors today.  Mentioned that she would benefit from pool therapy or doing some regular core strengthening exercises to improve her overall balance and LE strength.  Recommend PT for both cervicalgia and LE weakness.     Encourage her to continue with smoking cessation and weight loss.      PLAN:    Referral to PT for cervicalgia and leg weakness.      Follow up if her neck pain does not improve.  Will consider injection at that time.      I spent 57 minutes spent in patient care, independent review and interpretation of medical records/imaging, reviewing old records.            Again, thank you for allowing me to participate in the care of your patient.      Sincerely,    Janey Lewis PA-C

## 2023-12-29 ENCOUNTER — THERAPY VISIT (OUTPATIENT)
Dept: PHYSICAL THERAPY | Facility: REHABILITATION | Age: 68
End: 2023-12-29
Attending: PHYSICIAN ASSISTANT
Payer: MEDICARE

## 2023-12-29 DIAGNOSIS — M54.2 CERVICALGIA: ICD-10-CM

## 2023-12-29 PROCEDURE — 97161 PT EVAL LOW COMPLEX 20 MIN: CPT | Mod: GP

## 2023-12-29 PROCEDURE — 97110 THERAPEUTIC EXERCISES: CPT | Mod: GP

## 2023-12-29 NOTE — PROGRESS NOTES
PHYSICAL THERAPY EVALUATION  Type of Visit: Evaluation    See electronic medical record for Abuse and Falls Screening details.    Subjective       Presenting condition or subjective complaint: (filled out online by patient): Sometimes, infrequently, i go to [facility] and realize that there is food at the back of my throat that i need to swallow first. Due to cervical pressure on nerves    Physical therapy subjective today, 12/29/23:   Presents to PT  for neck pain. Feels heaviness/difficulty holding neck upright. Pain is described as a deep ache along central cervical spine. Years ago had right UE pain, but no longer experiencing. Denies UE weakness.   Endorses stiffness upon rising in the morning. Has had 2 rounds of physical therapy in the past and not sure if they were helpful. Not currently performing any exercises.     2/26/23 Visit with neurology- f/u to review updated imaging.    Regarding her neck pain, this is not as bad during the day, but hurts by dinner time.  Patient having issues with holding her head up by that time.  Pain/deep ache in the left back of her head.  She denies right sided neck pain. This can go up to 8/10. She denies stabbing/shooting pain.  The intensity of her pain changes and it sometimes she has no pain.  Sometimes her right hand spasms and she has pain at this time of the spasm that affects her right hand only.  She has to manually open her fingers when this occurs.  This does not bother her as much as the neck pain.  The spasms do not seem to be connected with head movement.         She has quit smoking and EtOH use as of 12/3/23.  She feels better already.  She is also working on losing weight.  She has stopped eating after dinner (which is usually between 5 and 6 pm).  She is open to doing PT or injections if this might be helpful for her neck pain.      Date of onset: 12/26/23 (order date 12/26/23, onset date years prior)      Relevant medical history: Asthma; Fibromyalgia; High  blood pressure; Osteoarthritis; Overweight; Smoking; Vision problems   Dates & types of surgery: 15 years ago knee replacements    Prior diagnostic imaging/testing results: Video fluoroscopic swallow study       MR CERVICAL SPINE W/O CONTRAST 12/24/2023 9:38 AM     Provided History: Cervical radiculopathy.     ICD-10: Cervical radiculopathy.     Comparison: MRI 1/17/2020     Technique: Sagittal T1-weighted, sagittal T2-weighted, sagittal STIR,  sagittal gradient echo, sagittal diffusion-weighted (with ADC map),  axial T2-weighted, and axial T2* gradient echo images of the cervical  spine were obtained without intravenous contrast.     Findings:  Trace anterolisthesis of C3 on C4 and trace retrolisthesis of C4 on  C5. Multilevel disc height narrowing throughout the cervical spine.  Edema at the opposing endplates of C3-4, C5-6, C6-7, also at left C6-7  facet joint. Degenerative endplate signal changes. Spinal cord signal  is normal. Level by level findings are as follows:     C2-C3: Spinal canal and neural foramina are patent.     C3-C4: Mild disc bulge. Bilateral uncinate hypertrophy and facet  hypertrophy. Moderate to severe bilateral foraminal stenoses. Spinal  canal is patent.     C4-C5: Disc osteophyte complex. Facet arthropathy. Severe right and  moderate to severe left neural foraminal stenoses. Mild to moderate  spinal canal stenosis.     C5-C6: Disc osteophyte complex. Facet hypertrophy with moderate to  severe neural foraminal stenosis. Mild to moderate spinal canal  stenosis.     C6-C7: Disc osteophyte complex. Facet hypertrophy with moderate to  severe neural foraminal stenosis. Mild spinal canal stenosis     C7-T1: Bilateral uncinate hypertrophy with mild bilateral foraminal  narrowing. Spinal canal is patent.     Paraspinous, paravertebral and prevertebral muscles somewhat atrophic.  No mass lesion identified.                                                                      IMPRESSION:   1.  Multilevel cervical spondylosis with mild to moderate canal  stenosis and moderate to severe neural foraminal stenoses at multiple  levels as described above in detail. No myelopathic cord signal.  Findings are minimally progressed from MRI 1/17/2020.  2. Edema at the left C6-7 facet joint, may represent inflammatory  arthropathy.  Prior therapy history for the same diagnosis, illness or injury: No      Prior Level of Function  Transfers: Independent  Ambulation: Independent  ADL: Independent  IADL:     Living Environment  Social support: Alone   Type of home: House   Stairs to enter the home: Yes 4 Is there a railing: Yes   Ramp: No   Stairs inside the home: Yes 30 Is there a railing: Yes   Help at home: None  Equipment owned: [employer]     Employment: No    Hobbies/Interests: Reading, geneology, cooking    Patient goals for therapy: Nothing    Pain assessment: Pain present     Objective   CERVICAL SPINE EVALUATION  PAIN: Pain Level at Rest: 4/10  Pain Level with Use: 7/10  Pain Location: cervical spine  Pain Quality: Aching  Pain Frequency: intermittent  Pain is Worst: nighttime  Pain is Exacerbated By: worsens as the day goes on  Pain is Relieved By: otc medications and rest  Pain Progression: Worsened    INTEGUMENTARY (edema, incisions):   POSTURE: Sitting Posture: Rounded shoulders, Forward head, Thoracic kyphosis increased  GAIT:   Weightbearing Status:   Assistive Device(s):   Gait Deviations:   BALANCE/PROPRIOCEPTION:   WEIGHTBEARING ALIGNMENT:   ROM:   (Degrees) Left AROM Right AROM    Cervical Flexion 48, increased posterior lower cervical spine stiffness    Cervical Extension 18 , stiffness     Cervical Side bend 15, crunching sensation, no pain 22, stiffness    Cervical Rotation 42 crunching sensation, aching 37, stiffness    Cervical Protrusion     Cervical Retraction Significant loss    Thoracic Flexion Kyphotic    Thoracic Extension Significant loss    Thoracic Rotation       Left AROM Left PROM Right  AROM Right PROM   Shoulder Flexion       Shoulder Extension       Shoulder Abduction       Shoulder Adduction       Shoulder IR       Shoulder ER       Shoulder Horiz Abduction       Shoulder Horiz Adduction       Pain:   End Feel:     MYOTOMES: WNL  DTR S:   CORD SIGNS: WNL  DERMATOMES: WNL  NEURAL TENSION:   FLEXIBILITY:  limited pecs, upper trap, levator scapula, scalenes, SCM jyoti    SPECIAL TESTS:   PALPATION:  increased tone throughout upper traps, levator scapula, SCM and scalenes jyoti  SPINAL SEGMENTAL CONCLUSIONS:  hypomobility cervical and thoracic spine, no pain with PA glide assessment      Assessment & Plan   CLINICAL IMPRESSIONS  Medical Diagnosis: M54.2 (ICD-10-CM) - Cervicalgia    Treatment Diagnosis: neck pain and stiffness   Impression/Assessment: Patient is a 68 year old female with neck pain and stiffness complaints.  The following significant findings have been identified: Pain, Decreased ROM/flexibility, Decreased joint mobility, Decreased strength, Impaired muscle performance, Decreased activity tolerance, and Impaired posture. These impairments interfere with their ability to perform self care tasks, recreational activities, and household chores as compared to previous level of function.     Clinical Decision Making (Complexity):  Clinical Presentation: Stable/Uncomplicated  Clinical Presentation Rationale: based on medical and personal factors listed in PT evaluation  Clinical Decision Making (Complexity): Low complexity    PLAN OF CARE  Treatment Interventions:  Modalities: Mechanical Traction  Interventions: Manual Therapy, Neuromuscular Re-education, Therapeutic Activity, Therapeutic Exercise, Self-Care/Home Management    Long Term Goals     PT Goal 1  Goal Identifier: HEP  Goal Description: pt will demonstrate independence and report compliance with HEP to facilitate improved independent symptom mgmt.  Rationale: to maximize safety and independence with performance of ADLs and functional  tasks;to maximize safety and independence with self cares  Target Date: 03/22/24  PT Goal 2  Goal Identifier: neck pain  Goal Description: pt will report worst neck pain at the end of the day less than or equal to 4/10.  Rationale: to maximize safety and independence with performance of ADLs and functional tasks;to maximize safety and independence within the home;to maximize safety and independence within the community;to maximize safety and independence with self cares  Target Date: 03/22/24  PT Goal 3  Goal Identifier: cervical mobility  Goal Description: pt will demonstrate greater than or equal to 28 degrees (18 degrees at evaluation) of cervical extension AROM to promote improved posture.  Rationale: to maximize safety and independence with performance of ADLs and functional tasks;to maximize safety and independence within the home;to maximize safety and independence within the community;to maximize safety and independence with self cares  Target Date: 03/22/24      Frequency of Treatment: 1x/week up to every other week  Duration of Treatment: up to 12 weeks    Recommended Referrals to Other Professionals:   Education Assessment:   Learner/Method: Patient    Risks and benefits of evaluation/treatment have been explained.   Patient/Family/caregiver agrees with Plan of Care.     Evaluation Time:     PT Eval, Low Complexity Minutes (96660): 36       Signing Clinician: Park Leonard PT      [facility]                                                                                   OUTPATIENT PHYSICAL THERAPY      PLAN OF TREATMENT FOR OUTPATIENT REHABILITATION   Patient's Last Name, First Name, JANICE SternBrandi NORIEGA YOB: 1955   Provider's Name   [facility]   Medical Record No.  [id number]     Onset Date: 12/26/23 (order date 12/26/23, onset date years prior)  Start of Care Date: 12/29/23     Medical Diagnosis:  M54.2 (ICD-10-CM) -  Cervicalgia      PT Treatment Diagnosis:  neck pain and stiffness Plan of Treatment  Frequency/Duration: 1x/week up to every other week/ up to 12 weeks    Certification date from 12/29/23 to 03/22/24         See note for plan of treatment details and functional goals     Park Leonard, PT                         I CERTIFY THE NEED FOR THESE SERVICES FURNISHED UNDER        THIS PLAN OF TREATMENT AND WHILE UNDER MY CARE     (Physician attestation of this document indicates review and certification of the therapy plan).              Referring Provider:  Janey Lewis    Initial Assessment  See Epic Evaluation- Start of Care Date: 12/29/23

## 2023-12-31 ASSESSMENT — ASTHMA QUESTIONNAIRES
ACT_TOTALSCORE: 16
ACT_TOTALSCORE: 16
QUESTION_3 LAST FOUR WEEKS HOW OFTEN DID YOUR ASTHMA SYMPTOMS (WHEEZING, COUGHING, SHORTNESS OF BREATH, CHEST TIGHTNESS OR PAIN) WAKE YOU UP AT NIGHT OR EARLIER THAN USUAL IN THE MORNING: NOT AT ALL
QUESTION_2 LAST FOUR WEEKS HOW OFTEN HAVE YOU HAD SHORTNESS OF BREATH: ONCE A DAY
QUESTION_5 LAST FOUR WEEKS HOW WOULD YOU RATE YOUR ASTHMA CONTROL: POORLY CONTROLLED
QUESTION_4 LAST FOUR WEEKS HOW OFTEN HAVE YOU USED YOUR RESCUE INHALER OR NEBULIZER MEDICATION (SUCH AS ALBUTEROL): TWO OR THREE TIMES PER WEEK
QUESTION_1 LAST FOUR WEEKS HOW MUCH OF THE TIME DID YOUR ASTHMA KEEP YOU FROM GETTING AS MUCH DONE AT WORK, SCHOOL OR AT HOME: A LITTLE OF THE TIME

## 2023-12-31 ASSESSMENT — ENCOUNTER SYMPTOMS
COUGH: 0
HEADACHES: 0
FEVER: 0
NAUSEA: 0
BREAST MASS: 0
ARTHRALGIAS: 1
PALPITATIONS: 0
PARESTHESIAS: 0
NERVOUS/ANXIOUS: 0
SORE THROAT: 0
ABDOMINAL PAIN: 0
EYE PAIN: 0
CONSTIPATION: 0
DYSURIA: 0
MYALGIAS: 1
HEMATOCHEZIA: 0
JOINT SWELLING: 0
SHORTNESS OF BREATH: 1
DIARRHEA: 0
HEARTBURN: 0
DIZZINESS: 0
HEMATURIA: 0
CHILLS: 0
WEAKNESS: 0
FREQUENCY: 0

## 2023-12-31 ASSESSMENT — ACTIVITIES OF DAILY LIVING (ADL): CURRENT_FUNCTION: NO ASSISTANCE NEEDED

## 2024-01-03 ENCOUNTER — OFFICE VISIT (OUTPATIENT)
Dept: FAMILY MEDICINE | Facility: CLINIC | Age: 69
End: 2024-01-03
Payer: MEDICARE

## 2024-01-03 ENCOUNTER — LAB (OUTPATIENT)
Dept: FAMILY MEDICINE | Facility: CLINIC | Age: 69
End: 2024-01-03

## 2024-01-03 VITALS
SYSTOLIC BLOOD PRESSURE: 116 MMHG | RESPIRATION RATE: 18 BRPM | BODY MASS INDEX: 35.37 KG/M2 | TEMPERATURE: 97.8 F | WEIGHT: 220.08 LBS | DIASTOLIC BLOOD PRESSURE: 80 MMHG | OXYGEN SATURATION: 96 % | HEIGHT: 66 IN | HEART RATE: 69 BPM

## 2024-01-03 DIAGNOSIS — Z87.891 PERSONAL HISTORY OF TOBACCO USE: ICD-10-CM

## 2024-01-03 DIAGNOSIS — Z12.11 SPECIAL SCREENING FOR MALIGNANT NEOPLASMS, COLON: ICD-10-CM

## 2024-01-03 DIAGNOSIS — F33.1 MAJOR DEPRESSIVE DISORDER, RECURRENT EPISODE, MODERATE WITH ANXIOUS DISTRESS (H): ICD-10-CM

## 2024-01-03 DIAGNOSIS — I10 HYPERTENSION GOAL BP (BLOOD PRESSURE) < 140/90: ICD-10-CM

## 2024-01-03 DIAGNOSIS — E66.01 CLASS 2 SEVERE OBESITY DUE TO EXCESS CALORIES WITH SERIOUS COMORBIDITY AND BODY MASS INDEX (BMI) OF 35.0 TO 35.9 IN ADULT (H): ICD-10-CM

## 2024-01-03 DIAGNOSIS — B37.2 INTERTRIGINOUS CANDIDIASIS: ICD-10-CM

## 2024-01-03 DIAGNOSIS — E06.3 CHRONIC LYMPHOCYTIC THYROIDITIS: ICD-10-CM

## 2024-01-03 DIAGNOSIS — M79.7 FIBROMYALGIA: ICD-10-CM

## 2024-01-03 DIAGNOSIS — R73.09 ELEVATED GLUCOSE: ICD-10-CM

## 2024-01-03 DIAGNOSIS — E66.812 CLASS 2 SEVERE OBESITY DUE TO EXCESS CALORIES WITH SERIOUS COMORBIDITY AND BODY MASS INDEX (BMI) OF 35.0 TO 35.9 IN ADULT (H): ICD-10-CM

## 2024-01-03 DIAGNOSIS — L71.9 ROSACEA: ICD-10-CM

## 2024-01-03 DIAGNOSIS — M62.830 SPASM OF BACK MUSCLES: ICD-10-CM

## 2024-01-03 DIAGNOSIS — G47.00 INSOMNIA, UNSPECIFIED TYPE: ICD-10-CM

## 2024-01-03 DIAGNOSIS — Z00.00 ENCOUNTER FOR MEDICARE ANNUAL WELLNESS EXAM: Primary | ICD-10-CM

## 2024-01-03 DIAGNOSIS — E78.5 HYPERLIPIDEMIA LDL GOAL <130: ICD-10-CM

## 2024-01-03 DIAGNOSIS — E78.1 PURE HYPERGLYCERIDEMIA: ICD-10-CM

## 2024-01-03 DIAGNOSIS — K21.9 GASTROESOPHAGEAL REFLUX DISEASE WITHOUT ESOPHAGITIS: ICD-10-CM

## 2024-01-03 DIAGNOSIS — G25.81 RESTLESS LEG SYNDROME: ICD-10-CM

## 2024-01-03 PROCEDURE — 90677 PCV20 VACCINE IM: CPT | Performed by: NURSE PRACTITIONER

## 2024-01-03 PROCEDURE — 99214 OFFICE O/P EST MOD 30 MIN: CPT | Mod: 25 | Performed by: NURSE PRACTITIONER

## 2024-01-03 PROCEDURE — G0296 VISIT TO DETERM LDCT ELIG: HCPCS | Performed by: NURSE PRACTITIONER

## 2024-01-03 PROCEDURE — G0439 PPPS, SUBSEQ VISIT: HCPCS | Performed by: NURSE PRACTITIONER

## 2024-01-03 PROCEDURE — G0009 ADMIN PNEUMOCOCCAL VACCINE: HCPCS | Performed by: NURSE PRACTITIONER

## 2024-01-03 RX ORDER — PRAMIPEXOLE DIHYDROCHLORIDE 0.12 MG/1
TABLET ORAL
Qty: 270 TABLET | Refills: 3 | Status: SHIPPED | OUTPATIENT
Start: 2024-01-03

## 2024-01-03 RX ORDER — FENOFIBRATE 145 MG/1
145 TABLET, COATED ORAL DAILY
Qty: 90 TABLET | Refills: 3 | Status: SHIPPED | OUTPATIENT
Start: 2024-01-03

## 2024-01-03 RX ORDER — CYCLOBENZAPRINE HCL 5 MG
5-10 TABLET ORAL
Qty: 90 TABLET | Refills: 3 | Status: SHIPPED | OUTPATIENT
Start: 2024-01-03

## 2024-01-03 RX ORDER — LISINOPRIL 10 MG/1
10 TABLET ORAL DAILY
Qty: 90 TABLET | Refills: 3 | Status: SHIPPED | OUTPATIENT
Start: 2024-01-03

## 2024-01-03 RX ORDER — FAMOTIDINE 20 MG/1
TABLET, FILM COATED ORAL
Qty: 180 TABLET | Refills: 3 | Status: SHIPPED | OUTPATIENT
Start: 2024-01-03

## 2024-01-03 RX ORDER — ZOLPIDEM TARTRATE 5 MG/1
5 TABLET ORAL
Qty: 30 TABLET | Refills: 5 | Status: SHIPPED | OUTPATIENT
Start: 2024-01-03 | End: 2024-07-05

## 2024-01-03 RX ORDER — LEVOTHYROXINE SODIUM 88 UG/1
88 TABLET ORAL DAILY
Qty: 90 TABLET | Refills: 3 | Status: SHIPPED | OUTPATIENT
Start: 2024-01-03

## 2024-01-03 RX ORDER — ATORVASTATIN CALCIUM 10 MG/1
10 TABLET, FILM COATED ORAL DAILY
Qty: 90 TABLET | Refills: 3 | Status: SHIPPED | OUTPATIENT
Start: 2024-01-03

## 2024-01-03 RX ORDER — DESVENLAFAXINE 100 MG/1
100 TABLET, EXTENDED RELEASE ORAL DAILY
Qty: 90 TABLET | Refills: 3 | Status: SHIPPED | OUTPATIENT
Start: 2024-01-03

## 2024-01-03 RX ORDER — KETOCONAZOLE 20 MG/G
CREAM TOPICAL 2 TIMES DAILY
Qty: 60 G | Refills: 5 | Status: SHIPPED | OUTPATIENT
Start: 2024-01-03

## 2024-01-03 ASSESSMENT — ENCOUNTER SYMPTOMS
FEVER: 0
HEADACHES: 0
PARESTHESIAS: 0
CHILLS: 0
JOINT SWELLING: 0
CONSTIPATION: 0
NAUSEA: 0
SHORTNESS OF BREATH: 1
HEMATOCHEZIA: 0
NERVOUS/ANXIOUS: 0
DIARRHEA: 0
MYALGIAS: 1
BREAST MASS: 0
DIZZINESS: 0
COUGH: 0
FREQUENCY: 0
ARTHRALGIAS: 1
HEARTBURN: 0
HEMATURIA: 0
WEAKNESS: 0
ABDOMINAL PAIN: 0
PALPITATIONS: 0
SORE THROAT: 0
DYSURIA: 0
EYE PAIN: 0

## 2024-01-03 ASSESSMENT — PAIN SCALES - GENERAL: PAINLEVEL: MODERATE PAIN (5)

## 2024-01-03 ASSESSMENT — PATIENT HEALTH QUESTIONNAIRE - PHQ9
10. IF YOU CHECKED OFF ANY PROBLEMS, HOW DIFFICULT HAVE THESE PROBLEMS MADE IT FOR YOU TO DO YOUR WORK, TAKE CARE OF THINGS AT HOME, OR GET ALONG WITH OTHER PEOPLE: NOT DIFFICULT AT ALL
SUM OF ALL RESPONSES TO PHQ QUESTIONS 1-9: 5
SUM OF ALL RESPONSES TO PHQ QUESTIONS 1-9: 5

## 2024-01-03 ASSESSMENT — ACTIVITIES OF DAILY LIVING (ADL): CURRENT_FUNCTION: NO ASSISTANCE NEEDED

## 2024-01-03 NOTE — PROGRESS NOTES
Lung Cancer Screening Shared Decision Making Visit     Brandi Stern, a 68 year old female, is eligible for lung cancer screening    History   Smoking Status    Former    Packs/day: 0.50    Years: 50.00    Types: Cigarettes    Start date: 10/1/1971    Quit date: 12/3/2023   Smokeless Tobacco    Never       I have discussed with patient the risks and benefits of screening for lung cancer with low-dose CT.     The risks include:    radiation exposure: one low dose chest CT has as much ionizing radiation as about 15 chest x-rays, or 6 months of background radiation living in [place]      false positives: most findings/nodules are NOT cancer, but some might still require additional diagnostic evaluation, including biopsy    over-diagnosis: some slow growing cancers that might never have been clinically significant will be detected and treated unnecessarily     The benefit of early detection of lung cancer is contingent upon adherence to annual screening or more frequent follow up if indicated.     Furthermore, to benefit from screening, Brandi must be willing and able to undergo diagnostic procedures, if indicated. Although no specific guide is available for determining severity of comorbidities, it is reasonable to withhold screening in patients who have greater mortality risk from other diseases.     We did discuss that the best way to prevent lung cancer is to not smoke.    Some patients may value a numeric estimation of lung cancer risk when evaluating if lung cancer screening is right for them, here is one calculator:    ShouldIScreen  The patient was provided with suggestions to help her develop a healthy physical lifestyle.  She is at risk for lack of exercise and has been provided with information to increase physical activity for the benefit of her well-being.  The patient was counseled and encouraged to consider modifying their diet and eating habits. She was provided with information on recommended healthy  diet options.  The patient's PHQ-9 score is consistent with mild depression. She was provided with information regarding depression and was advised to schedule a follow up appointment in 52 weeks to further address this issue.

## 2024-01-03 NOTE — PATIENT INSTRUCTIONS
Lung Cancer Screening   Frequently Asked Questions  If you are at high-risk for lung cancer, getting screened with low-dose computed tomography (LDCT) every year can help save your life. This handout offers answers to some of the most common questions about lung cancer screening. If you have other questions, please call [phone] ([phone]).     What is it?  Lung cancer screening uses special X-ray technology to create an image of your lung tissue. The exam is quick and easy and takes less than 10 seconds. We don t give you any medicine or use any needles. You can eat before and after the exam. You don t need to change your clothes as long as the clothing on your chest doesn t contain metal. But, you do need to be able to hold your breath for at least 6 seconds during the exam.    What is the goal of lung cancer screening?  The goal of lung cancer screening is to save lives. Many times, lung cancer is not found until a person starts having physical symptoms. Lung cancer screening can help detect lung cancer in the earliest stages when it may be easier to treat.    Who should be screened for lung cancer?  We suggest lung cancer screening for anyone who is at high-risk for lung cancer. You are in the high-risk group if you:      are between the ages of 55 and 79, and    have smoked at least 1 pack of cigarettes a day for 20 or more years, and    still smoke or have quit within the past 15 years.    However, if you have a new cough or shortness of breath, you should talk to your doctor before being screened.    Why does it matter if I have symptoms?  Certain symptoms can be a sign that you have a condition in your lungs that should be checked and treated by your doctor. These symptoms include fever, chest pain, a new or changing cough, shortness of breath that you have never felt before, coughing up blood or unexplained weight loss. Having any of these symptoms can greatly affect the results of lung  cancer screening.       Should all smokers get an LDCT lung cancer screening exam?  It depends. Lung cancer screening is for a very specific group of men and women who have a history of heavy smoking over a long period of time (see  Who should be screened for lung cancer  above).  I am in the high-risk group, but have been diagnosed with cancer in the past. Is LDCT lung cancer screening right for me?  In some cases, you should not have LDCT lung screening, such as when your doctor is already following your cancer with CT scan studies. Your doctor will help you decide if LDCT lung screening is right for you.  Do I need to have a screening exam every year?  Yes. If you are in the high-risk group described earlier, you should get an LDCT lung cancer screening exam every year until you are 79, or are no longer willing or able to undergo screening and possible procedures to diagnose and treat lung cancer.  How effective is LDCT at preventing death from lung cancer?  Studies have shown that LDCT lung cancer screening can lower the risk of death from lung cancer by 20 percent in people who are at high-risk.  What are the risks?  There are some risks and limitations of LDCT lung cancer screening. We want to make sure you understand the risks and benefits, so please let us know if you have any questions. Your doctor may want to talk with you more about these risks.    Radiation exposure: As with any exam that uses radiation, there is a very small increased risk of cancer. The amount of radiation in LDCT is small--about the same amount a person would get from a mammogram. Your doctor orders the exam when he or she feels the potential benefits outweigh the risks.    False negatives: No test is perfect, including LDCT. It is possible that you may have a medical condition, including lung cancer, that is not found during your exam. This is called a false negative result.    False positives and more testing: LDCT very often finds  something in the lung that could be cancer, but in fact is not. This is called a false positive result. False positive tests often cause anxiety. To make sure these findings are not cancer, you may need to have more tests. These tests will be done only if you give us permission. Sometimes patients need a treatment that can have side effects, such as a biopsy. For more information on false positives, see  What can I expect from the results?     Findings not related to lung cancer: Your LDCT exam also takes pictures of areas of your body next to your lungs. In a very small number of cases, the CT scan will show an abnormal finding in one of these areas, such as your kidneys, adrenal glands, liver or thyroid. This finding may not be serious, but you may need more tests. Your doctor can help you decide what other tests you may need, if any.  What can I expect from the results?  About 1 out of 4 LDCT exams will find something that may need more tests. Most of the time, these findings are lung nodules. Lung nodules are very small collections of tissue in the lung. These nodules are very common, and the vast majority--more than 97 percent--are not cancer (benign). Most are normal lymph nodes or small areas of scarring from past infections.  But, if a small lung nodule is found to be cancer, the cancer can be cured more than 90 percent of the time. To know if the nodule is cancer, we may need to get more images before your next yearly screening exam. If the nodule has suspicious features (for example, it is large, has an odd shape or grows over time), we will refer you to a specialist for further testing.  Will my doctor also get the results?  Yes. Your doctor will get a copy of your results.  Is it okay to keep smoking now that there s a cancer screening exam?  No. Tobacco is one of the strongest cancer-causing agents. It causes not only lung cancer, but other cancers and cardiovascular (heart) diseases as well. The damage  caused by smoking builds over time. This means that the longer you smoke, the higher your risk of disease. While it is never too late to quit, the sooner you quit, the better.  Where can I find help to quit smoking?  The best way to prevent lung cancer is to stop smoking. If you have already quit smoking, congratulations and keep it up! For help on quitting smoking, please call [employer] at 8-301-QUITNOW (1-670.753.9839) or the American [employer] Society at 1-465.968.2969 to find local resources near you.  One-on-one health coaching:  If you d prefer to work individually with a health care provider on tobacco cessation, we offer:      Medication Therapy Management:  Our specially trained pharmacists work closely with you and your doctor to help you quit smoking.  Call [phone] or [phone] (toll free).    Patient Education   Personalized Prevention Plan  You are due for the preventive services outlined below.  Your care team is available to assist you in scheduling these services.  If you have already completed any of these items, please share that information with your care team to update in your medical record.  Health Maintenance Due   Topic Date Due     LUNG CANCER SCREENING  Never done     Asthma Action Plan - yearly  03/14/2023     Annual Wellness Visit  10/05/2023     Your Health Risk Assessment indicates you feel you are not in good health    A healthy lifestyle helps keep the body fit and the mind alert. It helps protect you from disease, helps you fight disease, and helps prevent chronic disease (disease that doesn't go away) from getting worse. This is important as you get older and begin to notice twinges in muscles and joints and a decline in the strength and stamina you once took for granted. A healthy lifestyle includes good healthcare, good nutrition, weight control, recreation, and regular exercise. Avoid harmful substances and do what you can to keep safe. Another part of a healthy lifestyle  is stay mentally active and socially involved.    Good healthcare   Have a wellness visit every year.   If you have new symptoms, let us know right away. Don't wait until the next checkup.   Take medicines exactly as prescribed and keep your medicines in a safe place. Tell us if your medicine causes problems.   Healthy diet and weight control   Eat 3 or 4 small, nutritious, low-fat, high-fiber meals a day. Include a variety of fruits, vegetables, and whole-grain foods.   Make sure you get enough calcium in your diet. Calcium, vitamin D, and exercise help prevent osteoporosis (bone thinning).   If you live alone, try eating with others when you can. That way you get a good meal and have company while you eat it.   Try to keep a healthy weight. If you eat more calories than your body uses for energy, it will be stored as fat and you will gain weight.     Recreation   Recreation is not limited to sports and team events. It includes any activity that provides relaxation, interest, enjoyment, and exercise. Recreation provides an outlet for physical, mental, and social energy. It can give a sense of worth and achievement. It can help you stay healthy.    Mental Exercise and Social Involvement  Mental and emotional health is as important as physical health. Keep in touch with friends and family. Stay as active as possible. Continue to learn and challenge yourself.   Things you can do to stay mentally active are:  Learn something new, like a foreign language or musical instrument.   Play S[employer] or do crossword puzzles. If you cannot find people to play these games with you at home, you can play them with others on your computer through the Internet.   Join a games club--anything from card games to chess or checkers or lawn bowling.   Start a new hobby.   Go back to school.   Volunteer.   Read.   Keep up with world events.  Learning About Being Physically Active  What is physical activity?     Being physically active means  "doing any kind of activity that gets your body moving.  The types of physical activity that can help you get fit and stay healthy include:  Aerobic or \"cardio\" activities. These make your heart beat faster and make you breathe harder, such as brisk walking, riding a bike, or running. They strengthen your heart and lungs and build up your endurance.  Strength training activities. These make your muscles work against, or \"resist,\" something. Examples include lifting weights or doing push-ups. These activities help tone and strengthen your muscles and bones.  Stretches. These let you move your joints and muscles through their full range of motion. Stretching helps you be more flexible.  Reaching a balance between these three types of physical activity is important because each one contributes to your overall fitness.  What are the benefits of being active?  Being active is one of the best things you can do for your health. It helps you to:  Feel stronger and have more energy to do all the things you like to do.  Focus better at school or work.  Feel, think, and sleep better.  Reach and stay at a healthy weight.  Lose fat and build lean muscle.  Lower your risk for serious health problems, including diabetes, heart attack, high blood pressure, and some cancers.  Keep your heart, lungs, bones, muscles, and joints strong and healthy.  How can you make being active part of your life?  Start slowly. Make it your long-term goal to get at least 30 minutes of exercise on most days of the week. Walking is a good choice. You also may want to do other activities, such as running, swimming, cycling, or playing tennis or team sports.  Pick activities that you like--ones that make your heart beat faster, your muscles stronger, and your muscles and joints more flexible. If you find more than one thing you like doing, do them all. You don't have to do the same thing every day.  Get your heart pumping every day. Any activity that makes " "your heart beat faster and keeps it at that rate for a while counts.  Here are some great ways to get your heart beating faster:  Go for a brisk walk, run, or hike.  Go for a swim or bike ride.  Take an online exercise class or dance.  Play a game of touch football, basketball, or soccer.  Play tennis, pickleball, or racquetball.  Climb stairs.  Even some household chores can be aerobic. Just do them at a faster pace. Raking or mowing the lawn, sweeping the garage, and vacuuming and cleaning your home all can help get your heart rate up.  Strengthen your muscles during the week. You don't have to lift heavy weights or grow big, bulky muscles to get stronger. Doing a few simple activities that make your muscles work against, or \"resist,\" something can help you get stronger. Aim for at least twice a week.  For example, you can:  Do push-ups or sit-ups, which use your own body weight as resistance.  Lift weights or dumbbells or use stretch bands at home or in a gym or community center.  Stretch your muscles often. Stretching will help you as you become more active. It can help you stay flexible and loosen tight muscles. It can also help improve your balance and posture and can be a great way to relax.  Be sure to stretch the muscles you'll be using when you work out. It's best to warm your muscles slightly before you stretch them. Walk or do some other light aerobic activity for a few minutes. Then start stretching.  When you stretch your muscles:  Do it slowly. Stretching is not about going fast or making sudden movements.  Don't push or bounce during a stretch.  Hold each stretch for at least 15 to 30 seconds, if you can. You should feel a stretch in the muscle, but not pain.  Breathe out as you do the stretch. Then breathe in as you hold the stretch. Don't hold your breath.  If you're worried about how more activity might affect your health, have a checkup before you start. Follow any special advice your doctor " "gives you for getting a smart start.  Where can you learn more?  Go to https://www.[employer].net/patiented  Enter W332 in the search box to learn more about \"Learning About Being Physically Active.\"  Current as of: June 6, 2023               Content Version: 13.8    7106-7269 [employer].   Care instructions adapted under license by your healthcare professional. If you have questions about a medical condition or this instruction, always ask your healthcare professional. [employer] disclaims any warranty or liability for your use of this information.      Learning About Dietary Guidelines  What are the Dietary Guidelines for Americans?     Dietary Guidelines for Americans provide tips for eating well and staying healthy. This helps reduce the risk for long-term (chronic) diseases.  These guidelines recommend that you:  Eat and drink the right amount for you. The [place] government's food guide is called MyPlate. It can help you make your own well-balanced eating plan.  Try to balance your eating with your activity. This helps you stay at a healthy weight.  Drink alcohol in moderation, if at all.  Limit foods high in salt, saturated fat, trans fat, and added sugar.  These guidelines are from the [place] Department of Agriculture and the [place] Department of Health and Human Services. They are updated every 5 years.  What is MyPlate?  MyPlate is the [place] government's food guide. It can help you make your own well-balanced eating plan. A balanced eating plan means that you eat enough, but not too much, and that your food gives you the nutrients you need to stay healthy.  MyPlate focuses on eating plenty of whole grains, fruits, and vegetables, and on limiting fat and sugar. It is available online at www.ChooseMyPlate.gov.  How can you get started?  If you're trying to eat healthier, you can slowly change your eating habits over time. You don't have to make big changes all at once. Start by adding one " "or two healthy foods to your meals each day.  Grains  Choose whole-grain breads and cereals and whole-wheat pasta and whole-grain crackers.  Vegetables  Eat a variety of vegetables every day. They have lots of nutrients and are part of a heart-healthy diet.  Fruits  Eat a variety of fruits every day. Fruits contain lots of nutrients. Choose fresh fruit instead of fruit juice.  Protein foods  Choose fish and lean poultry more often. Eat red meat and fried meats less often. Dried beans, tofu, and nuts are also good sources of protein.  Dairy  Choose low-fat or fat-free products from this food group. If you have problems digesting milk, try eating cheese or yogurt instead.  Fats and oils  Limit fats and oils if you're trying to cut calories. Choose healthy fats when you cook. These include canola oil and olive oil.  Where can you learn more?  Go to https://www.[employer].net/patiented  Enter D676 in the search box to learn more about \"Learning About Dietary Guidelines.\"  Current as of: February 28, 2023               Content Version: 13.8    2025-2428 [employer].   Care instructions adapted under license by your healthcare professional. If you have questions about a medical condition or this instruction, always ask your healthcare professional. [employer] disclaims any warranty or liability for your use of this information.      Learning About Depression Screening  What is depression screening?  Depression screening is a way to see if you have depression symptoms. It may be done by a doctor or counselor. It's often part of a routine checkup. That's because your mental health is just as important as your physical health.  Depression is a mental health condition that affects how you feel, think, and act. You may:  Have less energy.  Lose interest in your daily activities.  Feel sad and grouchy for a long time.  Depression is very common. It affects people of all ages.  Many things can lead to " "depression. Some people become depressed after they have a stroke or find out they have a major illness like cancer or heart disease. The death of a loved one or a breakup may lead to depression. It can run in families. Most experts believe that a combination of inherited genes and stressful life events can cause it.  What happens during screening?  You may be asked to fill out a form about your depression symptoms. You and the doctor will discuss your answers. The doctor may ask you more questions to learn more about how you think, act, and feel.  What happens after screening?  If you have symptoms of depression, your doctor will talk to you about your options.  Doctors usually treat depression with medicines or counseling. Often, combining the two works best. Many people don't get help because they think that they'll get over the depression on their own. But people with depression may not get better unless they get treatment.  The cause of depression is not well understood. There may be many factors involved. But if you have depression, it's not your fault.  A serious symptom of depression is thinking about death or suicide. If you or someone you care about talks about this or about feeling hopeless, get help right away.  It's important to know that depression can be treated. Medicine, counseling, and self-care may help.  Where can you learn more?  Go to https://www.[employer].net/patiented  Enter T185 in the search box to learn more about \"Learning About Depression Screening.\"  Current as of: June 25, 2023               Content Version: 13.8    2706-9736 [employer].   Care instructions adapted under license by your healthcare professional. If you have questions about a medical condition or this instruction, always ask your healthcare professional. [employer] disclaims any warranty or liability for your use of this information.         "

## 2024-01-03 NOTE — PROGRESS NOTES
"SUBJECTIVE:   Brandi Elkins is a 68 year old, presenting for the following:  Physical        1/3/2024     1:52 PM   Additional Questions   Roomed by Sarita       Are you in the first 12 months of your Medicare coverage?  No    Healthy Habits:     In general, how would you rate your overall health?  Fair    Frequency of exercise:  1 day/week    Duration of exercise:  Less than 15 minutes    Do you usually eat at least 4 servings of fruit and vegetables a day, include whole grains    & fiber and avoid regularly eating high fat or \"junk\" foods?  No    Taking medications regularly:  Yes    Medication side effects:  None    Ability to successfully perform activities of daily living:  No assistance needed    Home Safety:  No safety concerns identified    Hearing Impairment:  No hearing concerns    In the past 6 months, have you been bothered by leaking of urine?  No    In general, how would you rate your overall mental or emotional health?  Good    Additional concerns today:  Yes    She has been able to quit smoking and drinking alcohol a month ago.    Her blood pressure has been well-controlled on her current medication.    Her mood is stable and she is doing well on her current dose of Pristiq.    Her fibromyalgia overall is stable.    Her GERD is controlled on her current medications.    Her RLS symptoms are controlled on her current dose of medication.     Today's PHQ-9 Score:       1/3/2024     1:46 PM   PHQ-9 SCORE   PHQ-9 Total Score MyChart 5 (Mild depression)   PHQ-9 Total Score 5           Have you ever done Advance Care Planning? (For example, a Health Directive, POLST, or a discussion with a medical provider or your loved ones about your wishes): Yes, advance care planning is on file.      Fall risk  Fallen 2 or more times in the past year?: No  Any fall with injury in the past year?: No    Cognitive Screening   1) Repeat 3 items (Leader, Season, Table)    2) Clock draw: NORMAL  3) 3 item recall: Recalls 3 " objects  Results: 3 items recalled: COGNITIVE IMPAIRMENT LESS LIKELY    Mini-CogTM Copyright MAURO Segal. Licensed by the author for use in [facility]; reprinted with permission (fercho@.[facility]). All rights reserved.      Do you have sleep apnea, excessive snoring or daytime drowsiness? : yes    Reviewed and updated as needed this visit by clinical staff   Tobacco  Allergies  Meds              Reviewed and updated as needed this visit by Provider                 Social History     Tobacco Use    Smoking status: Former     Packs/day: 0.00     Years: 45.00     Additional pack years: 0.00     Total pack years: 0.00     Types: Cigarettes     Start date: 10/1/1971     Quit date: 12/3/2023     Years since quittin.0    Smokeless tobacco: Never    Tobacco comments:     Quit recently for three months   Substance Use Topics    Alcohol use: Not Currently     Comment: switched to beer 6 pack a week              2023    11:59 AM   Alcohol Use   Prescreen: >3 drinks/day or >7 drinks/week? No     Do you have a current opioid prescription? No  Do you use any other controlled substances or medications that are not prescribed by a provider? None              Current providers sharing in care for this patient include:   Patient Care Team:  Cherie Brennan NP as PCP - General  William paredes MD as MD (Neurology)  Robbie Flores MD as MD (Internal Medicine)  Rima Long PA-C as Assigned Surgical Provider  Cherie Brennan NP as Assigned PCP    The following health maintenance items are reviewed in Epic and correct as of today:  Health Maintenance   Topic Date Due    ANNUAL REVIEW OF HM ORDERS  Never done    LUNG CANCER SCREENING  Never done    IPV IMMUNIZATION (2 of 3 - Adult catch-up series) 2008    NICOTINE/TOBACCO CESSATION COUNSELING Q 1 YR  2021    ASTHMA ACTION PLAN  2023    MEDICARE ANNUAL WELLNESS VISIT  10/05/2023    ASTHMA CONTROL TEST  2024    PHQ-9  " 07/03/2024    COLORECTAL CANCER SCREENING  08/05/2024    BMP  08/21/2024    TSH W/FREE T4 REFLEX  08/21/2024    FALL RISK ASSESSMENT  01/03/2025    DTAP/TDAP/TD IMMUNIZATION (3 - Td or Tdap) 01/15/2025    MAMMO SCREENING  01/18/2025    ADVANCE CARE PLANNING  10/05/2027    LIPID  10/18/2027    DEXA  11/02/2037    HEPATITIS C SCREENING  Completed    DEPRESSION ACTION PLAN  Completed    INFLUENZA VACCINE  Completed    Pneumococcal Vaccine: 65+ Years  Completed    ZOSTER IMMUNIZATION  Completed    RSV VACCINE (Pregnancy & 60+)  Completed    COVID-19 Vaccine  Completed    HPV IMMUNIZATION  Aged Out    MENINGITIS IMMUNIZATION  Aged Out    RSV MONOCLONAL ANTIBODY  Aged Out               Review of Systems   Constitutional:  Negative for chills and fever.   HENT:  Negative for congestion, ear pain, hearing loss and sore throat.    Eyes:  Negative for pain and visual disturbance.   Respiratory:  Positive for shortness of breath. Negative for cough.    Cardiovascular:  Negative for chest pain, palpitations and peripheral edema.   Gastrointestinal:  Negative for abdominal pain, constipation, diarrhea, heartburn, hematochezia and nausea.   Breasts:  Negative for tenderness, breast mass and discharge.   Genitourinary:  Negative for dysuria, frequency, genital sores, hematuria, pelvic pain, urgency, vaginal bleeding and vaginal discharge.   Musculoskeletal:  Positive for arthralgias and myalgias. Negative for joint swelling.   Skin:  Negative for rash.   Neurological:  Negative for dizziness, weakness, headaches and paresthesias.   Psychiatric/Behavioral:  Negative for mood changes. The patient is not nervous/anxious.          OBJECTIVE:   /80 (BP Location: Right arm, Patient Position: Sitting, Cuff Size: Adult Regular)   Pulse 69   Temp 97.8  F (36.6  C) (Temporal)   Resp 18   Ht 1.676 m (5' 5.98\")   Wt 99.8 kg (220 lb 1.3 oz)   LMP  (LMP Unknown)   SpO2 96%   Breastfeeding No   BMI 35.54 kg/m   Estimated body " "mass index is 35.54 kg/m  as calculated from the following:    Height as of this encounter: 1.676 m (5' 5.98\").    Weight as of this encounter: 99.8 kg (220 lb 1.3 oz).  Physical Exam  GENERAL: healthy, alert and no distress  EYES: Eyes grossly normal to inspection, PERRL and conjunctivae and sclerae normal  HENT: ear canals and TM's normal, nose and mouth without ulcers or lesions  NECK: no adenopathy, no asymmetry, masses, or scars and thyroid normal to palpation  RESP: lungs clear to auscultation - no rales, rhonchi or wheezes  CV: regular rate and rhythm, normal S1 S2, no S3 or S4, no murmur, click or rub, no peripheral edema and peripheral pulses strong  ABDOMEN: soft, nontender, no hepatosplenomegaly, no masses and bowel sounds normal  MS: no gross musculoskeletal defects noted, no edema  SKIN: no suspicious lesions or rashes  NEURO: Normal strength and tone, mentation intact and speech normal  PSYCH: mentation appears normal, affect normal/bright        ASSESSMENT / PLAN:   (Z00.00) Encounter for Medicare annual wellness exam  (primary encounter diagnosis)  Comment:   Plan:     (M62.830) Spasm of back muscles  Comment: stable  Plan: cyclobenzaprine (FLEXERIL) 5 MG tablet        The current medical regimen is effective;  continue present plan and medications.     (M79.7) Fibromyalgia  Comment: stable  Plan: desvenlafaxine (PRISTIQ) 100 MG 24 hr tablet        The current medical regimen is effective;  continue present plan and medications.     (K21.9) Gastroesophageal reflux disease without esophagitis  Comment: stable  Plan: famotidine (PEPCID) 20 MG tablet, omeprazole         (PRILOSEC) 20 MG DR capsule        The current medical regimen is effective;  continue present plan and medications.     (E78.1) HYPERTRIGLYCERIDEMIA  Comment: stable  Plan: fenofibrate (TRICOR) 145 MG tablet, Lipid panel        reflex to direct LDL Fasting        The current medical regimen is effective;  continue present plan and " medications.     (B37.2) Intertriginous candidiasis  Comment: stable  Plan: ketoconazole (NIZORAL) 2 % external cream        The current medical regimen is effective;  continue present plan and medications.     (E06.3) HASHIMOTO'S THYROIDITIS  Comment: stable  Plan: levothyroxine (SYNTHROID/LEVOTHROID) 88 MCG         tablet        The current medical regimen is effective;  continue present plan and medications.  Labs were checked recently.     (I10) Hypertension goal BP (blood pressure) < 140/90  Comment: at goal  Plan: lisinopril (ZESTRIL) 10 MG tablet        The current medical regimen is effective;  continue present plan and medications.     (L71.9) Rosacea  Comment: stable  Plan: metroNIDAZOLE (METROCREAM) 0.75 % external         cream        The current medical regimen is effective;  continue present plan and medications.     (G25.81) Restless leg syndrome  Comment: stable  Plan: pramipexole (MIRAPEX) 0.125 MG tablet        The current medical regimen is effective;  continue present plan and medications.     (G47.00) Insomnia, unspecified type  Comment: stable  Plan: zolpidem (AMBIEN) 5 MG tablet        The current medical regimen is effective;  continue present plan and medications.     (Z87.891) Personal history of tobacco use  Comment:   Plan: Prof fee: Shared Decision Making for Lung         Cancer Screening, CT Chest Lung Cancer Scrn Low        Dose wo            (E78.5) Hyperlipidemia LDL goal <130  Comment: stable  Plan: Lipid panel reflex to direct LDL Fasting,         atorvastatin (LIPITOR) 10 MG tablet        The current medical regimen is effective;  continue present plan and medications.     (Z12.11) Special screening for malignant neoplasms, colon  Comment:   Plan: COLOGUARD(EXACT SCIENCES)            (R73.09) Elevated glucose  Comment:   Plan: Hemoglobin A1c            (F33.1) Major depressive disorder, recurrent episode, moderate with anxious distress (H)  Comment: stable  Plan: The current  "medical regimen is effective;  continue present plan and medications.     (E66.01,  Z68.35) Class 2 severe obesity due to excess calories with serious comorbidity and body mass index (BMI) of 35.0 to 35.9 in adult (H)  Comment: weight loss may help with better blood pressure control  Plan: She will continue working on weight loss.           COUNSELING:  Reviewed preventive health counseling, as reflected in patient instructions      BMI:   Estimated body mass index is 35.54 kg/m  as calculated from the following:    Height as of this encounter: 1.676 m (5' 5.98\").    Weight as of this encounter: 99.8 kg (220 lb 1.3 oz).         She reports that she quit smoking about 4 weeks ago. Her smoking use included cigarettes. She started smoking about 52 years ago. She has never used smokeless tobacco.      Appropriate preventive services were discussed with this patient, including applicable screening as appropriate for fall prevention, nutrition, physical activity, Tobacco-use cessation, weight loss and cognition.  Checklist reviewing preventive services available has been given to the patient.    Reviewed patients plan of care and provided an AVS. The Basic Care Plan (routine screening as documented in Health Maintenance) for Brandi meets the Care Plan requirement. This Care Plan has been established and reviewed with the Patient.          Cherie Brennan NP  [facility]    Identified Health Risks:    Answers submitted by the patient for this visit:  Patient Health Questionnaire (Submitted on 1/3/2024)  If you checked off any problems, how difficult have these problems made it for you to do your work, take care of things at home, or get along with other people?: Not difficult at all  PHQ9 TOTAL SCORE: 5    "

## 2024-01-08 ENCOUNTER — LAB (OUTPATIENT)
Dept: LAB | Facility: CLINIC | Age: 69
End: 2024-01-08
Payer: MEDICARE

## 2024-01-08 DIAGNOSIS — E78.5 HYPERLIPIDEMIA LDL GOAL <130: ICD-10-CM

## 2024-01-08 DIAGNOSIS — E78.1 PURE HYPERGLYCERIDEMIA: ICD-10-CM

## 2024-01-08 DIAGNOSIS — R73.09 ELEVATED GLUCOSE: ICD-10-CM

## 2024-01-08 LAB
CHOLEST SERPL-MCNC: 149 MG/DL
FASTING STATUS PATIENT QL REPORTED: ABNORMAL
HBA1C MFR BLD: 5.5 % (ref 0–5.6)
HDLC SERPL-MCNC: 45 MG/DL
LDLC SERPL CALC-MCNC: 65 MG/DL
NONHDLC SERPL-MCNC: 104 MG/DL
TRIGL SERPL-MCNC: 193 MG/DL

## 2024-01-08 PROCEDURE — 80061 LIPID PANEL: CPT

## 2024-01-08 PROCEDURE — 36415 COLL VENOUS BLD VENIPUNCTURE: CPT

## 2024-01-08 PROCEDURE — 83036 HEMOGLOBIN GLYCOSYLATED A1C: CPT

## 2024-01-10 ENCOUNTER — THERAPY VISIT (OUTPATIENT)
Dept: PHYSICAL THERAPY | Facility: REHABILITATION | Age: 69
End: 2024-01-10
Payer: MEDICARE

## 2024-01-10 DIAGNOSIS — M54.2 CERVICALGIA: Primary | ICD-10-CM

## 2024-01-10 PROCEDURE — 97110 THERAPEUTIC EXERCISES: CPT | Mod: GP

## 2024-01-10 PROCEDURE — 97140 MANUAL THERAPY 1/> REGIONS: CPT | Mod: GP

## 2024-01-11 ENCOUNTER — ANCILLARY PROCEDURE (OUTPATIENT)
Dept: CT IMAGING | Facility: CLINIC | Age: 69
End: 2024-01-11
Attending: NURSE PRACTITIONER
Payer: MEDICARE

## 2024-01-11 DIAGNOSIS — Z87.891 PERSONAL HISTORY OF TOBACCO USE: ICD-10-CM

## 2024-01-11 LAB — RADIOLOGIST FLAGS: NORMAL

## 2024-01-11 PROCEDURE — 71271 CT THORAX LUNG CANCER SCR C-: CPT | Mod: GC | Performed by: RADIOLOGY

## 2024-01-12 ENCOUNTER — TELEPHONE (OUTPATIENT)
Dept: FAMILY MEDICINE | Facility: CLINIC | Age: 69
End: 2024-01-12
Payer: MEDICARE

## 2024-01-12 ENCOUNTER — MYC MEDICAL ADVICE (OUTPATIENT)
Dept: FAMILY MEDICINE | Facility: CLINIC | Age: 69
End: 2024-01-12
Payer: MEDICARE

## 2024-01-12 DIAGNOSIS — K44.9 LARGE HIATAL HERNIA: ICD-10-CM

## 2024-01-12 DIAGNOSIS — R91.8 PULMONARY NODULES: Primary | ICD-10-CM

## 2024-01-12 NOTE — TELEPHONE ENCOUNTER
Jael from [facility] calling with finding on CT Chest lung scanning screen    2. Significant Incidental Finding(s):  Category S: Yes.  a.  Large hiatal hernia with entirety of the stomach above the  diaphragm. Consider double contrast esophagram and GI consult.    Will route to PCP but in her absence to provider pool as well    Felecia Angulo RN, BSN  [facility]

## 2024-01-15 ENCOUNTER — PATIENT OUTREACH (OUTPATIENT)
Dept: ONCOLOGY | Facility: CLINIC | Age: 69
End: 2024-01-15
Payer: MEDICARE

## 2024-01-15 DIAGNOSIS — K44.9 LARGE HIATAL HERNIA: ICD-10-CM

## 2024-01-15 DIAGNOSIS — R91.8 PULMONARY NODULES: Primary | ICD-10-CM

## 2024-01-15 NOTE — PROGRESS NOTES
New Patient Oncology Nurse Navigator Note     Referring provider: Cherie Brennan NP    Referring Clinic/Organization: [facility]  Referred to: Interventional Pulmonology and Thoracic Surgery  Requested provider (if applicable): First available - did not specify   Referral Received: 01/15/24       Evaluation for :   Diagnosis   R91.8 (ICD-10-CM) - Pulmonary nodules     Diagnosis   K44.9 (ICD-10-CM) - Large hiatal hernia     Clinical History (per Nurse review of records provided):      01/11/2024 CT Chest Lung Cancer Screen (bookmarked) showed:   Findings: [All follow up of nodules are based on ACR guidelines for  lung cancer screening and measurements of each nodule size must be the  mean of the longest axial plane measurement by its perpendicular  measured to the nearest decimal and rounded up to the nearest whole  number. ]  Nodules: 2  :     - 14  mm solid nodule which appears pleural-based in the right upper  lobe on series:  4 image:  30.     - 4  mm solid nodule in the right middle lobe on series:  4 image:   172.     Emphysema: None     Coronary artery calcium: trace     Additional findings: Normal heart size. No pericardial effusion.  Normal caliber of the ascending aorta and main pulmonary artery. No  mediastinal lymphadenopathy. Normal esophagus. Normal thyroid.  No  acute osseous abnormalities. Chronic left seventh and eighth rib  fracture deformities. Soft tissues unremarkable. Large hiatal hernia  with the entirety of the stomach above the diaphragm.                                                                       Impression:   1. ACR Assessment Category (2022):  Lung-RADS Category 0. (Findings  suggestive of an inflammatory or infectious process)       Recommendation:  Lung-RADS Category 0. Recommend low-dose CT in 1-3  months in attempt to document clearing.     2. Significant Incidental Finding(s):  Category S: Yes.  a.  Large hiatal hernia with entirety of the stomach above  the  diaphragm. Consider double contrast esophagram and GI consult.     3. Avoidance of tobacco smoke is strongly advised. Please consider  referral for smoking cessation to [facility] Medication Therapy Management  (MTM) if clinically appropriate.     Clinical Assessment / Barriers to Care (Per Nurse):    Tobacco History    Smoking Status  Former Smoking Start Date  10/1/1971 Quit Date  12/3/2023 Smoking Frequency  0.50 packs/day for 50.00 years (25.00 ttl pk-yrs)     Records Location: [facility]   Records Needed: None  Additional testing needed prior to consult: PFTs  Referral updates and Plan:     Writer called Brandi Elkins to discuss the referral. There was no answer. Left a voicemail to return call to [employer].     LILY HermanN, RN, OCN  [facility] Oncology Nurse Navigator  [phone] / [phone]

## 2024-01-16 NOTE — TELEPHONE ENCOUNTER
RECORDS STATUS - ALL OTHER DIAGNOSIS      RECORDS RECEIVED FROM: [facility], [place] [facility]   DATE RECEIVED:    NOTES STATUS DETAILS   OFFICE NOTE from referring provider Epic 01/03/24: Cherie Brennan NP   OFFICE NOTE from other specialist External: [place] [facility] 04/06/23: Margo Christy, PAC   OPERATIVE REPORT [facility] 02/12/24, 05/24/16: PFT   MEDICATION LIST [facility]    LABS     PATHOLOGY REPORTS     ANYTHING RELATED TO DIAGNOSIS Epic Most recent 01/08/24   IMAGING (NEED IMAGES & REPORT)     CT SCANS PACS 01/11/24: CT Chest   MRI PACS 03/19/21-11/24/13: XR Chest

## 2024-01-21 NOTE — TELEPHONE ENCOUNTER
RECORDS STATUS - ALL OTHER DIAGNOSIS      RECORDS RECEIVED FROM: Epic   DATE RECEIVED:    NOTES STATUS DETAILS   OFFICE NOTE from referring provider Epic Cherie Brennan NP   OPERATIVE REPORT  02/12/24: PFT   MEDICATION LIST [facility]    LABS     PATHOLOGY REPORTS     ANYTHING RELATED TO DIAGNOSIS Epic Most recent 01/08/24   IMAGING (NEED IMAGES & REPORT)     CT SCANS PACS 01/11/24: CT Chest

## 2024-01-24 LAB — NONINV COLON CA DNA+OCC BLD SCRN STL QL: NEGATIVE

## 2024-01-25 ENCOUNTER — ANCILLARY PROCEDURE (OUTPATIENT)
Dept: GENERAL RADIOLOGY | Facility: CLINIC | Age: 69
End: 2024-01-25
Attending: NURSE PRACTITIONER
Payer: MEDICARE

## 2024-01-25 DIAGNOSIS — K44.9 LARGE HIATAL HERNIA: ICD-10-CM

## 2024-01-25 PROCEDURE — 74246 X-RAY XM UPR GI TRC 2CNTRST: CPT | Mod: GC | Performed by: STUDENT IN AN ORGANIZED HEALTH CARE EDUCATION/TRAINING PROGRAM

## 2024-01-30 ENCOUNTER — ANCILLARY PROCEDURE (OUTPATIENT)
Dept: MAMMOGRAPHY | Facility: CLINIC | Age: 69
End: 2024-01-30
Attending: NURSE PRACTITIONER
Payer: MEDICARE

## 2024-01-30 DIAGNOSIS — Z12.31 VISIT FOR SCREENING MAMMOGRAM: ICD-10-CM

## 2024-01-30 PROCEDURE — 77067 SCR MAMMO BI INCL CAD: CPT | Mod: TC | Performed by: RADIOLOGY

## 2024-01-30 PROCEDURE — 77063 BREAST TOMOSYNTHESIS BI: CPT | Mod: TC | Performed by: RADIOLOGY

## 2024-01-31 ENCOUNTER — MYC MEDICAL ADVICE (OUTPATIENT)
Dept: FAMILY MEDICINE | Facility: CLINIC | Age: 69
End: 2024-01-31
Payer: MEDICARE

## 2024-02-07 ENCOUNTER — THERAPY VISIT (OUTPATIENT)
Dept: PHYSICAL THERAPY | Facility: REHABILITATION | Age: 69
End: 2024-02-07
Payer: MEDICARE

## 2024-02-07 DIAGNOSIS — M54.2 CERVICALGIA: Primary | ICD-10-CM

## 2024-02-07 PROCEDURE — 97110 THERAPEUTIC EXERCISES: CPT | Mod: GP

## 2024-02-07 PROCEDURE — 97140 MANUAL THERAPY 1/> REGIONS: CPT | Mod: GP

## 2024-02-12 ENCOUNTER — OFFICE VISIT (OUTPATIENT)
Dept: PULMONOLOGY | Facility: CLINIC | Age: 69
End: 2024-02-12
Payer: MEDICARE

## 2024-02-12 DIAGNOSIS — R91.8 PULMONARY NODULES: ICD-10-CM

## 2024-02-12 PROCEDURE — 99207 PR NO CHARGE LOS: CPT

## 2024-02-12 PROCEDURE — 94375 RESPIRATORY FLOW VOLUME LOOP: CPT | Performed by: INTERNAL MEDICINE

## 2024-02-12 PROCEDURE — 94729 DIFFUSING CAPACITY: CPT | Performed by: INTERNAL MEDICINE

## 2024-02-12 PROCEDURE — 94726 PLETHYSMOGRAPHY LUNG VOLUMES: CPT | Performed by: INTERNAL MEDICINE

## 2024-02-13 ENCOUNTER — PRE VISIT (OUTPATIENT)
Dept: SURGERY | Facility: CLINIC | Age: 69
End: 2024-02-13
Payer: MEDICARE

## 2024-02-13 ENCOUNTER — ONCOLOGY VISIT (OUTPATIENT)
Dept: SURGERY | Facility: CLINIC | Age: 69
End: 2024-02-13
Attending: STUDENT IN AN ORGANIZED HEALTH CARE EDUCATION/TRAINING PROGRAM
Payer: MEDICARE

## 2024-02-13 VITALS
HEIGHT: 63 IN | WEIGHT: 220.6 LBS | DIASTOLIC BLOOD PRESSURE: 77 MMHG | SYSTOLIC BLOOD PRESSURE: 115 MMHG | RESPIRATION RATE: 24 BRPM | OXYGEN SATURATION: 97 % | HEART RATE: 82 BPM | TEMPERATURE: 98.3 F | BODY MASS INDEX: 39.09 KG/M2

## 2024-02-13 DIAGNOSIS — K44.9 LARGE HIATAL HERNIA: ICD-10-CM

## 2024-02-13 LAB
DLCOUNC-%PRED-PRE: 105 %
DLCOUNC-PRE: 20.96 ML/MIN/MMHG
DLCOUNC-PRED: 19.92 ML/MIN/MMHG
ERV-%PRED-PRE: 7 %
ERV-PRE: 0.08 L
ERV-PRED: 1.07 L
EXPTIME-PRE: 5.22 SEC
FEF2575-%PRED-PRE: 61 %
FEF2575-PRE: 1.12 L/SEC
FEF2575-PRED: 1.84 L/SEC
FEFMAX-%PRED-PRE: 97 %
FEFMAX-PRE: 5.75 L/SEC
FEFMAX-PRED: 5.92 L/SEC
FEV1-%PRED-PRE: 64 %
FEV1-PRE: 1.41 L
FEV1FEV6-PRE: 78 %
FEV1FEV6-PRED: 79 %
FEV1FVC-PRE: 78 %
FEV1FVC-PRED: 79 %
FEV1SVC-PRE: 69 %
FEV1SVC-PRED: 66 %
FIFMAX-PRE: 3.91 L/SEC
FRCPLETH-%PRED-PRE: 101 %
FRCPLETH-PRE: 2.86 L
FRCPLETH-PRED: 2.83 L
FVC-%PRED-PRE: 64 %
FVC-PRE: 1.8 L
FVC-PRED: 2.8 L
IC-%PRED-PRE: 91 %
IC-PRE: 1.94 L
IC-PRED: 2.13 L
RVPLETH-%PRED-PRE: 132 %
RVPLETH-PRE: 2.78 L
RVPLETH-PRED: 2.1 L
TLCPLETH-%PRED-PRE: 90 %
TLCPLETH-PRE: 4.8 L
TLCPLETH-PRED: 5.27 L
VA-%PRED-PRE: 75 %
VA-PRE: 3.66 L
VC-%PRED-PRE: 61 %
VC-PRE: 2.02 L
VC-PRED: 3.31 L

## 2024-02-13 PROCEDURE — G0463 HOSPITAL OUTPT CLINIC VISIT: HCPCS | Performed by: STUDENT IN AN ORGANIZED HEALTH CARE EDUCATION/TRAINING PROGRAM

## 2024-02-13 PROCEDURE — 99204 OFFICE O/P NEW MOD 45 MIN: CPT | Performed by: STUDENT IN AN ORGANIZED HEALTH CARE EDUCATION/TRAINING PROGRAM

## 2024-02-13 ASSESSMENT — PAIN SCALES - GENERAL: PAINLEVEL: MODERATE PAIN (5)

## 2024-02-13 NOTE — LETTER
2/13/2024         RE: Brandi Stern  [address] [place] [address]        Dear Colleague,    Thank you for referring your patient, Brandi Stern, to the [facility]. Please see a copy of my visit note below.    THORACIC SURGERY - NEW PATIENT OFFICE VISIT      Dear Cherie Brennan NP,    I saw Ms. Stern at Dr. Brennan s request in consultation for the evaluation and treatment of a hiatal hernia.     HPI  Ms. Brandi Stern is a 68 year old year-old female who presents with symptoms of shortness of breathing on exertion (walking up 1 flight of stairs) and big meals, some regurgitation with lying down after eating, and burping for the last couple of months. She will sometimes has severe abdominal cramping when eating too much. Also has a shocking feeling in epigastric area with a strong cough.  She reports having a hiatal hernia for a long time. 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 Patient will be meeting with pulmonologists in a few weeks.   Previsit Tests   PFTs 2/12/24: FEV1 1.41 (64%), FEV1/FVC 79%, DLCO 105%   Esophogram 1/25/2024: hiatal hernia with intrathoracic stomach, no high grade stricture or mass, spontaneous gastroesophagel reflux.     CT Chest 1/11/2024: Lung-RADS Category 0- inflammatory vs infectious process. Large hiatal hernia with entirety of stomach above diaphragm.   - 14  mm solid nodule which appears pleural-based in the right upper lobe  - 4  mm solid nodule in the right middle lobe     U/S 6/19/20- Small fat-containing umbilical hernia. No evidence of  bowel within the hernia.     PMH  Reviewed    Past Medical History:   Diagnosis Date    Allergic rhinitis due to other allergen     Apneas, obstructive sleep     Chronic lymphocytic thyroiditis     COPD (chronic obstructive pulmonary disease) (H)     Depressive disorder      Depressive disorder, not elsewhere classified     Disorder of bone and cartilage, unspecified     Esophageal reflux     Essential hypertension, benign     Generalized osteoarthrosis, unspecified site     knees    HASHIMOTO'S THYROIDITIS 11/15/2004    HASHIMOTO'S THYROIDITIS     HASHIMOTO'S THYROIDITIS     Headache above the eye region     Headache above the eye region     Hiatal hernia     Insomnia, unspecified     Moderate persistent asthma     Nasal congestion     Pure hyperglyceridemia     Scoliosis     Snoring     Tobacco use disorder       PSH- b/l knee replacements and cholecystectomy (~2014)     ETOH- none, stopped drinking socially 12/1/23   TOB- 45 pack years, quit 12/1/23     Physical examination  BMI 39  A&O, NAD  NLB on RA  Abdomen soft, non distended, non tender   - soft umbilical outpouching   WWP  Moving extremities spontaneously       She lives alone and has 2 dogs    IMPRESSION No diagnosis found.   68 year old year-old female with PMH of GERD, HLD, HTN, Hasimoto's, fibromyalgia, rosacea, restless leg syndrome, and tobacco use who is here for consultation of hiatal hernia repair.     PLAN  I spent a total of 45 minutes with Ms. Stern and , more than 50% of which were spent in counseling, coordination of care, and face-to-face time. I reviewed the plan as follows:  - Plan for surgery in 2-3 months  - Prior to surgery, it would be ideal for patient to decrease weight around abdomen. Ideal BMI <35   - Call in 4 weeks to follow up on weight loss  1) Procedure planned:  Robotic Laparoscopic hiatal hernia repair, gatsrostomy tube, possible Nissen fundoplication. I reviewed the indications, risks, and benefits of the procedure with Ms. Brandi cross.  We discussed the risks that include but are not limited to bleeding, infection, need for reoperation, conversion to laparotomy, potential long-term failure, and death. I explained the anticipated hospital course (2-5 days) and postoperative recovery,  transient dysphagia, transient diarrhea, and permanent postprandial bloating which can be mitigated with proper dietary habits. We discussed the importance of lifetime awareness to limit lifting heavy weights in case of identifying a hiatal hernia..   Necessary Preop Testing: PAC, wt loss    Regional Anesthesia Plan:  local at port sites   Anticoagulation Plan: lovenox in holding and post op   They had all their questions answered and were in agreement with the plan.  I appreciate the opportunity to participate in the care of your patient and will keep you updated.  Sincerely,         Valeri Ivey MD

## 2024-02-13 NOTE — NURSING NOTE
"Oncology Rooming Note    February 13, 2024 1:41 PM   Brandi Stern is a 68 year old female who presents for:    Chief Complaint   Patient presents with    Oncology Clinic Visit     Large Hiatal hernia     Initial Vitals: /77 (BP Location: Right arm, Patient Position: Sitting, Cuff Size: Adult Large)   Pulse 82   Temp 98.3  F (36.8  C) (Oral)   Resp 24   Ht 1.606 m (5' 3.23\")   Wt 100.1 kg (220 lb 9.6 oz)   LMP  (LMP Unknown)   SpO2 97%   BMI 38.80 kg/m   Estimated body mass index is 38.8 kg/m  as calculated from the following:    Height as of this encounter: 1.606 m (5' 3.23\").    Weight as of this encounter: 100.1 kg (220 lb 9.6 oz). Body surface area is 2.11 meters squared.  Moderate Pain (5) Comment: Data Unavailable   No LMP recorded (lmp unknown). Patient is postmenopausal.  Allergies reviewed: Yes  Medications reviewed: Yes    Medications: Medication refills not needed today.  Pharmacy name entered into [employer]:    [facility] - [place], [place] [address]  [id number] - [place], [place] - [address] [facility]    Frailty Screening:   Is the patient here for a new oncology consult visit in cancer care? 1. Yes. Over the past month, have you experienced difficulty or required a caregiver to assist with:   1. Balance, walking or general mobility (including any falls)? NO-back pain but still able to walk   2. Completion of self-care tasks such as bathing, dressing, toileting,  grooming/hygiene?  NO  3. Concentration or memory that affects your daily life?  NO       Clinical concerns: Recently short of breath due to hernia. Also difficulty eating recently. Short of breath after drinking carbonation    Brenda Nae              "

## 2024-02-13 NOTE — PROGRESS NOTES
THORACIC SURGERY - NEW PATIENT OFFICE VISIT      Dear Cherie Brennan NP,    I saw Ms. Stern at Dr. Brennan s request in consultation for the evaluation and treatment of a hiatal hernia.     HPI  Ms. Brandi Stern is a 68 year old year-old female who presents with symptoms of shortness of breathing on exertion (walking up 1 flight of stairs) and big meals, some regurgitation with lying down after eating, and burping for the last couple of months. She will sometimes has severe abdominal cramping when eating too much. Also has a shocking feeling in epigastric area with a strong cough.  She reports having a hiatal hernia for a long time. 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 Patient will be meeting with pulmonologists in a few weeks.   Previsit Tests   PFTs 2/12/24: FEV1 1.41 (64%), FEV1/FVC 79%, DLCO 105%   Esophogram 1/25/2024: hiatal hernia with intrathoracic stomach, no high grade stricture or mass, spontaneous gastroesophagel reflux.     CT Chest 1/11/2024: Lung-RADS Category 0- inflammatory vs infectious process. Large hiatal hernia with entirety of stomach above diaphragm.   - 14  mm solid nodule which appears pleural-based in the right upper lobe  - 4  mm solid nodule in the right middle lobe     U/S 6/19/20- Small fat-containing umbilical hernia. No evidence of  bowel within the hernia.     PM  Reviewed    Past Medical History:   Diagnosis Date    Allergic rhinitis due to other allergen     Apneas, obstructive sleep     Chronic lymphocytic thyroiditis     COPD (chronic obstructive pulmonary disease) (H)     Depressive disorder     Depressive disorder, not elsewhere classified     Disorder of bone and cartilage, unspecified     Esophageal reflux     Essential hypertension, benign     Generalized osteoarthrosis, unspecified site     knees    HASHIMOTO'S THYROIDITIS 11/15/2004     HASHIMOTO'S THYROIDITIS     HASHIMOTO'S THYROIDITIS     Headache above the eye region     Headache above the eye region     Hiatal hernia     Insomnia, unspecified     Moderate persistent asthma     Nasal congestion     Pure hyperglyceridemia     Scoliosis     Snoring     Tobacco use disorder       PSH- b/l knee replacements and cholecystectomy (~2014)     ETOH- none, stopped drinking socially 12/1/23   TOB- 45 pack years, quit 12/1/23     Physical examination  BMI 39  A&O, NAD  NLB on RA  Abdomen soft, non distended, non tender   - soft umbilical outpouching   WWP  Moving extremities spontaneously       She lives alone and has 2 dogs    IMPRESSION No diagnosis found.   68 year old year-old female with PMH of GERD, HLD, HTN, Hasimoto's, fibromyalgia, rosacea, restless leg syndrome, and tobacco use who is here for consultation of hiatal hernia repair.     PLAN  I spent a total of 45 minutes with Ms. Stern and , more than 50% of which were spent in counseling, coordination of care, and face-to-face time. I reviewed the plan as follows:  - Plan for surgery in 2-3 months  - Prior to surgery, it would be ideal for patient to decrease weight around abdomen. Ideal BMI <35   - Call in 4 weeks to follow up on weight loss  1) Procedure planned:  Robotic Laparoscopic hiatal hernia repair, gatsrostomy tube, possible Nissen fundoplication. I reviewed the indications, risks, and benefits of the procedure with Ms. Brandi cross.  We discussed the risks that include but are not limited to bleeding, infection, need for reoperation, conversion to laparotomy, potential long-term failure, and death. I explained the anticipated hospital course (2-5 days) and postoperative recovery, transient dysphagia, transient diarrhea, and permanent postprandial bloating which can be mitigated with proper dietary habits. We discussed the importance of lifetime awareness to limit lifting heavy weights in case of identifying a hiatal hernia..    Necessary Preop Testing: PAC, wt loss    Regional Anesthesia Plan:  local at port sites   Anticoagulation Plan: lovenox in holding and post op   They had all their questions answered and were in agreement with the plan.  I appreciate the opportunity to participate in the care of your patient and will keep you updated.  Sincerely,

## 2024-02-14 ENCOUNTER — PREP FOR PROCEDURE (OUTPATIENT)
Dept: SURGERY | Facility: CLINIC | Age: 69
End: 2024-02-14
Payer: MEDICARE

## 2024-02-14 DIAGNOSIS — K44.9 HIATAL HERNIA: Primary | ICD-10-CM

## 2024-02-14 RX ORDER — ENOXAPARIN SODIUM 100 MG/ML
40 INJECTION SUBCUTANEOUS
OUTPATIENT
Start: 2024-02-14

## 2024-02-15 ENCOUNTER — TELEPHONE (OUTPATIENT)
Dept: SURGERY | Facility: CLINIC | Age: 69
End: 2024-02-15
Payer: MEDICARE

## 2024-02-15 RX ORDER — FLUTICASONE PROPIONATE AND SALMETEROL 250; 50 UG/1; UG/1
POWDER RESPIRATORY (INHALATION)
COMMUNITY
Start: 2023-03-24 | End: 2024-04-10

## 2024-02-15 RX ORDER — VITAMIN B COMPLEX
25 TABLET ORAL EVERY MORNING
COMMUNITY
Start: 2015-01-15

## 2024-02-15 NOTE — TELEPHONE ENCOUNTER
Spoke with patient to schedule procedure with Dr. Ivey   Procedure was scheduled on 4/24 at [facility] OR  Patient will have H&P with PAC    Informed pt of surgery details:  Date:    Wednesday, April 24, 2024  Arrival Time:  5:30 am    Pt is aware surgery start time may change, and someone will reach out with the new arrival time, if so.    Patient is aware a COVID-19 test is needed before their procedure ONLY IF symptomatic.   (Patient is aware Thoracic is no longer requiring COVID-19 test)       Patient is aware a / is needed day of surgery.   Surgery Letter was sent via [facility],     Patient has my direct contact information for any further questions.

## 2024-02-15 NOTE — TELEPHONE ENCOUNTER
FUTURE VISIT INFORMATION        SURGERY INFORMATION:  Date: 3/6/24  Location: [facility] or  Surgeon:  Valeri Ivey MD   Anesthesia Type:  General  Procedure: ROBOT-ASSISTED, LAPAROSCOPIC hiatal hernia repair, gastrostomy tube, possible Nissen fundoplication, esophagogastroscopy   Consult: ov 24     RECORDS REQUESTED FROM:         Primary Care Provider:  Cherie Brennan, NP - [facility]     Pertinent Medical History: ISATU, hypertension     Most recent EKG+ Tracin24

## 2024-02-16 ENCOUNTER — THERAPY VISIT (OUTPATIENT)
Dept: PHYSICAL THERAPY | Facility: REHABILITATION | Age: 69
End: 2024-02-16
Payer: MEDICARE

## 2024-02-16 DIAGNOSIS — M54.2 CERVICALGIA: Primary | ICD-10-CM

## 2024-02-16 PROCEDURE — 97140 MANUAL THERAPY 1/> REGIONS: CPT | Mod: GP

## 2024-02-19 NOTE — PROGRESS NOTES
LUNG NODULE & INTERVENTIONAL PULMONARY CLINIC  [facility], [facility]     Brandi Stern MRN# [id number]   Age: 68 year old YOB: 1955     Reason for Consultation: pulmonary nodule    Requesting Physician: Cherie Brennan NP  [address], [facility] [address]  [place] [address]      Assessment and Plan:    1. 14mm RUL pleural based nodule  Seen on 1/2024 CT Chest, no prior for comparison. Discussed possible etiologies. This is also a common place for scarring.   --Will try to obtain her 2011 CT Chest from [facility] for comparison  --Repeat CT in 3 months from last CT scan if the nodule was not present before.     Cherie Briones MD  Interventional Pulmonology  Department of Pulmonary, Allergy, Critical Care and Sleep Medicine   [facility]           History:     Brandi Stern is a 68 year old female with sig h/o for hiatal hernia, sleep apnea, asthma who is here for pulmonary nodule.     Can usually walk a couple of blocks, with back pain being more limiting than shortness of breath. Can go up a flight of stairs but would be short of breath at the top. She intermittently gets bronchitis and feels she may be coming down with another episode today. She plans on reaching out to her PCP or urgent care for consideration of antibiotics.     She was found to have a large hiatal hernia with plans for repair with Dr. Ivey in April 2024.    Previously followed with [place] [facility] for lung nodules that were stable. 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 In the 1980s, was diagnosed with mycobacterium marinum via skin biopsy and received 3 months of minocycline with resolution of skin lesions.     Currently follows with an allergist for her asthma treatment.     - Personal hx of cancer: None  - Family hx of cancer: No lung cancer  - Tobacco hx: Quit smoking 12/2023, up to 1ppd, smoked for 45 years  - My interpretation of the images relevant for this visit  includes: 14mm RUL nodule   - My interpretation of the PFT's relevant for this visit includes: Nonspecific spirometry pattern         Allergies:      Allergies   Allergen Reactions    Fluconazole Rash    Nsaids Nephrotoxicity    Adhesive [Liquid Adhesive] Dermatitis    Animal Dander     Suture      Non-healing suture line    Vistaril [Hydroxyzine Hcl]      Urinary retention          Medications:     Current Outpatient Medications   Medication Sig    albuterol (ALBUTEROL) 108 (90 BASE) MCG/ACT Inhaler Inhale 1-2 puffs into the lungs every 6 hours as needed for shortness of breath / dyspnea    atorvastatin (LIPITOR) 10 MG tablet Take 1 tablet (10 mg) by mouth daily    Azelastine HCl 137 MCG/SPRAY SOLN 1 SPRAY IN EACH NOSTRIL TWICE A DAY AS NEEDED 30 DAYS    CALCIUM CITRATE PO Take 500 mg by mouth 2 times daily    cyclobenzaprine (FLEXERIL) 5 MG tablet Take 1-2 tablets (5-10 mg) by mouth nightly as needed for muscle spasms    desvenlafaxine (PRISTIQ) 100 MG 24 hr tablet Take 1 tablet (100 mg) by mouth daily    famotidine (PEPCID) 20 MG tablet TAKE 1 TABLET BY MOUTH TWICE A DAY    fenofibrate (TRICOR) 145 MG tablet Take 1 tablet (145 mg) by mouth daily    fexofenadine (ALLEGRA) 180 MG tablet     ketoconazole (NIZORAL) 2 % external cream Apply topically 2 times daily Apply to affected areas under breasts and groin twice daily    levothyroxine (SYNTHROID/LEVOTHROID) 88 MCG tablet Take 1 tablet (88 mcg) by mouth daily    lisinopril (ZESTRIL) 10 MG tablet Take 1 tablet (10 mg) by mouth daily    meclizine (ANTIVERT) 25 MG tablet Take 1 tablet (25 mg) by mouth 3 times daily as needed for dizziness    metroNIDAZOLE (METROCREAM) 0.75 % external cream Apply topically daily To face    omeprazole (PRILOSEC) 20 MG DR capsule TAKE 1 TABLET (20 MG) BY MOUTH 2 TIMES DAILY TAKE 30-60 MINUTES BEFORE A MEAL.    pramipexole (MIRAPEX) 0.125 MG tablet TAKE 2 TABLETS BY MOUTH AT DINNER AND 1 TABLET AT BEDTIME    spacer (OPTICHAMBER MONIQUE)  "holding chamber USE AS DIRECTED WITH INHALERS 30 DAYS    triamcinolone (NASACORT AQ) 55 MCG/ACT nasal inhaler Inhale 2 sprays in both nostrils every day as needed    UNABLE TO FIND MEDICATION NAME: Allergy shots    Vitamin D3 (CHOLECALCIFEROL) 25 mcg (1000 units) tablet     WIXELA INHUB 250-50 MCG/ACT inhaler INHALE 1 PUFF INTO THE LUNGS TWICE A DAY FOR 90 DAYS    zolpidem (AMBIEN) 5 MG tablet Take 1 tablet (5 mg) by mouth nightly as needed for sleep No further refills until appt is scheduled     No current facility-administered medications for this visit.            Physical Exam:   /82 (BP Location: Right arm, Patient Position: Sitting, Cuff Size: Adult Large)   Pulse 107   Temp 99.3  F (37.4  C) (Oral)   Resp 16   Ht 1.619 m (5' 3.74\")   Wt 99.4 kg (219 lb 3.2 oz)   LMP  (LMP Unknown)   SpO2 96%   BMI 37.93 kg/m    Wt Readings from Last 4 Encounters:   02/22/24 99.4 kg (219 lb 3.2 oz)   02/13/24 100.1 kg (220 lb 9.6 oz)   01/03/24 99.8 kg (220 lb 1.3 oz)   12/26/23 99.3 kg (219 lb)     General: No acute distress  Lungs: Nonlabored breathing  Neuro: Answering questions appropriately  Psych: Normal affect     Imaging/Lab Data   All laboratory and imaging data reviewed.           "

## 2024-02-22 ENCOUNTER — PRE VISIT (OUTPATIENT)
Dept: SURGERY | Facility: CLINIC | Age: 69
End: 2024-02-22

## 2024-02-22 ENCOUNTER — PRE VISIT (OUTPATIENT)
Dept: PULMONOLOGY | Facility: CLINIC | Age: 69
End: 2024-02-22
Payer: MEDICARE

## 2024-02-22 ENCOUNTER — ONCOLOGY VISIT (OUTPATIENT)
Dept: PULMONOLOGY | Facility: CLINIC | Age: 69
End: 2024-02-22
Attending: NURSE PRACTITIONER
Payer: MEDICARE

## 2024-02-22 VITALS
TEMPERATURE: 99.3 F | BODY MASS INDEX: 37.42 KG/M2 | RESPIRATION RATE: 16 BRPM | HEIGHT: 64 IN | HEART RATE: 107 BPM | SYSTOLIC BLOOD PRESSURE: 117 MMHG | WEIGHT: 219.2 LBS | OXYGEN SATURATION: 96 % | DIASTOLIC BLOOD PRESSURE: 82 MMHG

## 2024-02-22 DIAGNOSIS — R91.8 PULMONARY NODULES: ICD-10-CM

## 2024-02-22 PROCEDURE — 99204 OFFICE O/P NEW MOD 45 MIN: CPT | Performed by: STUDENT IN AN ORGANIZED HEALTH CARE EDUCATION/TRAINING PROGRAM

## 2024-02-22 PROCEDURE — G0463 HOSPITAL OUTPT CLINIC VISIT: HCPCS | Performed by: STUDENT IN AN ORGANIZED HEALTH CARE EDUCATION/TRAINING PROGRAM

## 2024-02-22 ASSESSMENT — PAIN SCALES - GENERAL: PAINLEVEL: SEVERE PAIN (6)

## 2024-02-22 NOTE — NURSING NOTE
"Oncology Rooming Note    February 22, 2024 1:44 PM   Brandi Stern is a 68 year old female who presents for:    Chief Complaint   Patient presents with    Oncology Clinic Visit     Pulmonary nodules      Initial Vitals: /82 (BP Location: Right arm, Patient Position: Sitting, Cuff Size: Adult Large)   Pulse 107   Temp 99.3  F (37.4  C) (Oral)   Resp 16   Ht 1.619 m (5' 3.74\")   Wt 99.4 kg (219 lb 3.2 oz)   LMP  (LMP Unknown)   SpO2 96%   BMI 37.93 kg/m   Estimated body mass index is 37.93 kg/m  as calculated from the following:    Height as of this encounter: 1.619 m (5' 3.74\").    Weight as of this encounter: 99.4 kg (219 lb 3.2 oz). Body surface area is 2.11 meters squared.  Severe Pain (6) Comment: Data Unavailable   No LMP recorded (lmp unknown). Patient is postmenopausal.  Allergies reviewed: Yes  Medications reviewed: Yes    Medications: Medication refills not needed today.  Pharmacy name entered into [facility]:    [facility]  [facility] - [place], [place] [address]  [id number] - [place], [place] - [address] [facility]    Frailty Screening:   Is the patient here for a new oncology consult visit in cancer care? 1. Yes. Over the past month, have you experienced difficulty or required a caregiver to assist with:   1. Balance, walking or general mobility (including any falls)? NO  2. Completion of self-care tasks such as bathing, dressing, toileting, grooming/hygiene?  NO  3. Concentration or memory that affects your daily life?  NO       Clinical concerns: Patient having respiratory illness; was wondering if able to get medication for it      Brenda Soni"

## 2024-03-01 ENCOUNTER — THERAPY VISIT (OUTPATIENT)
Dept: PHYSICAL THERAPY | Facility: REHABILITATION | Age: 69
End: 2024-03-01
Payer: MEDICARE

## 2024-03-01 DIAGNOSIS — M54.2 CERVICALGIA: Primary | ICD-10-CM

## 2024-03-01 PROCEDURE — 97140 MANUAL THERAPY 1/> REGIONS: CPT | Mod: GP

## 2024-03-06 ENCOUNTER — THERAPY VISIT (OUTPATIENT)
Dept: PHYSICAL THERAPY | Facility: REHABILITATION | Age: 69
End: 2024-03-06
Payer: MEDICARE

## 2024-03-06 DIAGNOSIS — M54.2 CERVICALGIA: Primary | ICD-10-CM

## 2024-03-06 PROCEDURE — 97140 MANUAL THERAPY 1/> REGIONS: CPT | Mod: GP

## 2024-03-06 PROCEDURE — 97110 THERAPEUTIC EXERCISES: CPT | Mod: GP

## 2024-03-12 ENCOUNTER — MYC MEDICAL ADVICE (OUTPATIENT)
Dept: SURGERY | Facility: CLINIC | Age: 69
End: 2024-03-12
Payer: MEDICARE

## 2024-03-25 ENCOUNTER — MYC MEDICAL ADVICE (OUTPATIENT)
Dept: PULMONOLOGY | Facility: CLINIC | Age: 69
End: 2024-03-25
Payer: MEDICARE

## 2024-04-02 DIAGNOSIS — E66.812 CLASS 2 SEVERE OBESITY DUE TO EXCESS CALORIES WITH SERIOUS COMORBIDITY AND BODY MASS INDEX (BMI) OF 35.0 TO 35.9 IN ADULT (H): ICD-10-CM

## 2024-04-02 DIAGNOSIS — K44.9 HIATAL HERNIA: Primary | ICD-10-CM

## 2024-04-02 DIAGNOSIS — E66.01 CLASS 2 SEVERE OBESITY DUE TO EXCESS CALORIES WITH SERIOUS COMORBIDITY AND BODY MASS INDEX (BMI) OF 35.0 TO 35.9 IN ADULT (H): ICD-10-CM

## 2024-04-04 ENCOUNTER — PATIENT OUTREACH (OUTPATIENT)
Dept: SURGERY | Facility: CLINIC | Age: 69
End: 2024-04-04
Payer: MEDICARE

## 2024-04-04 DIAGNOSIS — K44.9 LARGE HIATAL HERNIA: Primary | ICD-10-CM

## 2024-04-04 NOTE — PROGRESS NOTES
[facility]: Thoracic surgery                                   Discussion with Patient:                                                      Preoperative teaching reviewed for upcoming hiatal hernia surgery scheduled for 4/24 with Dr. 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 Pt is familiar with periop and postop care for hernia repair.     Pt lives alone, sister will plan to accompany her on DOS and plan to drive her home at discharge. Has many neighbors/friends as support system also.     Intervention/Education provided during outreach:                                                       We discussed PEG tube placement, chest tube, diet progression, medications prescribed at discharge, and lifting restrictions.     Pt has been struggling with constipation and plans to start taking senna daily.     Pt confirmed PAC appointment and had no additional questions. PEG tube care printed information sent to pt.    Rocio Jaffe RN BSN  Thoracic Surgery RN Care Coordinator  [phone]

## 2024-04-08 ENCOUNTER — ANCILLARY PROCEDURE (OUTPATIENT)
Dept: CT IMAGING | Facility: CLINIC | Age: 69
End: 2024-04-08
Attending: STUDENT IN AN ORGANIZED HEALTH CARE EDUCATION/TRAINING PROGRAM
Payer: MEDICARE

## 2024-04-08 DIAGNOSIS — R91.8 PULMONARY NODULES: ICD-10-CM

## 2024-04-08 PROCEDURE — G1010 CDSM STANSON: HCPCS | Mod: GC | Performed by: RADIOLOGY

## 2024-04-08 PROCEDURE — 71250 CT THORAX DX C-: CPT | Mod: MG | Performed by: RADIOLOGY

## 2024-04-08 NOTE — PROGRESS NOTES
Virtual Visit Details    Type of service:  Video Visit   Video Start Time: 10:09AM  Video End Time:11:20 AM    Originating Location (pt. Location): Home  Distant Location (provider location):  On-site  Platform used for Video Visit: Jane    LUNG NODULE & INTERVENTIONAL PULMONARY CLINIC  [facility]     Brandi Stern MRN# [id number]   Age: 68 year old YOB: 1955     Reason for Consultation: Pulmonary nodule    Requesting Physician: Cherie Briones MD  [address] [facility]  [place] [address]       Assessment and Plan:    1. 14mm RUL pleural based nodule  Seen on 1/2024 CT Chest, no prior for comparison. Discussed possible etiologies. This is also a common place for scarring. Repeat CT Chest 4/2024 shows resolution of RUL nodule and stable 4mm RML nodule.   --No further CT scans needed to followup RUL nodule given resolution.  --Continue lung cancer screening with PCP (due 4/2025), she will discuss with PCP at her next annual visit.     Cherie Briones MD  Interventional Pulmonology  Department of Pulmonary, Allergy, Critical Care and Sleep Medicine   [facility]           History:     Brandi Stern is a 68 year old female with sig h/o for for hiatal hernia, sleep apnea, asthma who is here for pulmonary nodule.      Can usually walk a couple of blocks, with back pain being more limiting than shortness of breath. Can go up a flight of stairs but would be short of breath at the top. She has been feeling more congested this winter due to her allergies.      She was found to have a large hiatal hernia with plans for repair with Dr. Ivey in April 2024. Hopeful her pulmonary symptoms will improve afterwards.      Previously followed with [place] [facility] for lung nodules that were stable. 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 In the 1980s, was diagnosed with mycobacterium marinum via skin biopsy and received 3 months of  minocycline with resolution of skin lesions.      Currently follows with an allergist for her asthma treatment.      - Personal hx of cancer: None  - Family hx of cancer: No lung cancer  - Tobacco hx: Quit smoking 12/2023, up to 1ppd, smoked for 45 years, has smoked a few cigarettes since 12/2023, but thinks she can stay off of them going forward.  - My interpretation of the images relevant for this visit includes: Resolved RUL nodule  - My interpretation of the PFT's relevant for this visit includes: Nonspecific spirometry pattern 2/2024         Allergies:      Allergies   Allergen Reactions    Fluconazole Rash    Nsaids Nephrotoxicity    Adhesive [Liquid Adhesive] Dermatitis    Animal Dander     Suture      Non-healing suture line    Vistaril [Hydroxyzine Hcl]      Urinary retention          Medications:     Current Outpatient Medications   Medication Sig Dispense Refill    albuterol (ALBUTEROL) 108 (90 BASE) MCG/ACT Inhaler Inhale 1-2 puffs into the lungs every 6 hours as needed for shortness of breath / dyspnea 1 Inhaler 1    atorvastatin (LIPITOR) 10 MG tablet Take 1 tablet (10 mg) by mouth daily 90 tablet 3    Azelastine HCl 137 MCG/SPRAY SOLN 1 SPRAY IN EACH NOSTRIL TWICE A DAY AS NEEDED 30 DAYS      CALCIUM CITRATE PO Take 500 mg by mouth 2 times daily      cyclobenzaprine (FLEXERIL) 5 MG tablet Take 1-2 tablets (5-10 mg) by mouth nightly as needed for muscle spasms 90 tablet 3    desvenlafaxine (PRISTIQ) 100 MG 24 hr tablet Take 1 tablet (100 mg) by mouth daily 90 tablet 3    famotidine (PEPCID) 20 MG tablet TAKE 1 TABLET BY MOUTH TWICE A  tablet 3    fenofibrate (TRICOR) 145 MG tablet Take 1 tablet (145 mg) by mouth daily 90 tablet 3    fexofenadine (ALLEGRA) 180 MG tablet       ketoconazole (NIZORAL) 2 % external cream Apply topically 2 times daily Apply to affected areas under breasts and groin twice daily 60 g 5    levothyroxine (SYNTHROID/LEVOTHROID) 88 MCG tablet Take 1 tablet (88 mcg) by mouth  daily 90 tablet 3    lisinopril (ZESTRIL) 10 MG tablet Take 1 tablet (10 mg) by mouth daily 90 tablet 3    meclizine (ANTIVERT) 25 MG tablet Take 1 tablet (25 mg) by mouth 3 times daily as needed for dizziness 21 tablet 0    metroNIDAZOLE (METROCREAM) 0.75 % external cream Apply topically daily To face 45 g 3    omeprazole (PRILOSEC) 20 MG DR capsule TAKE 1 TABLET (20 MG) BY MOUTH 2 TIMES DAILY TAKE 30-60 MINUTES BEFORE A MEAL. 180 capsule 3    pramipexole (MIRAPEX) 0.125 MG tablet TAKE 2 TABLETS BY MOUTH AT DINNER AND 1 TABLET AT BEDTIME 270 tablet 3    spacer (OPTICHAMBER MONIQUE) holding chamber USE AS DIRECTED WITH INHALERS 30 DAYS      triamcinolone (NASACORT AQ) 55 MCG/ACT nasal inhaler Inhale 2 sprays in both nostrils every day as needed 1 Inhaler 7    UNABLE TO FIND MEDICATION NAME: Allergy shots      Vitamin D3 (CHOLECALCIFEROL) 25 mcg (1000 units) tablet       WIXELA INHUB 250-50 MCG/ACT inhaler INHALE 1 PUFF INTO THE LUNGS TWICE A DAY FOR 90 DAYS      zolpidem (AMBIEN) 5 MG tablet Take 1 tablet (5 mg) by mouth nightly as needed for sleep No further refills until appt is scheduled 30 tablet 5     No current facility-administered medications for this visit.            Physical Exam:     EYES: Eyes grossly normal to inspection.    NEURO: Cranial nerves grossly intact.  Mentation and speech appropriate for age.  GENERAL: Healthy, alert and no distress  RESP: No audible wheeze, cough,  PSYCH: Mentation appears normal, affect normal, judgement and insight intact, normal speech and appearance well-groomed.     Imaging/Lab Data   All laboratory and imaging data reviewed.

## 2024-04-09 ENCOUNTER — VIRTUAL VISIT (OUTPATIENT)
Dept: PULMONOLOGY | Facility: CLINIC | Age: 69
End: 2024-04-09
Attending: STUDENT IN AN ORGANIZED HEALTH CARE EDUCATION/TRAINING PROGRAM
Payer: MEDICARE

## 2024-04-09 DIAGNOSIS — R91.8 PULMONARY NODULES: Primary | ICD-10-CM

## 2024-04-09 LAB
ABO/RH(D): NORMAL
ANTIBODY SCREEN: NEGATIVE
SPECIMEN EXPIRATION DATE: NORMAL

## 2024-04-09 PROCEDURE — 99214 OFFICE O/P EST MOD 30 MIN: CPT | Mod: 95 | Performed by: STUDENT IN AN ORGANIZED HEALTH CARE EDUCATION/TRAINING PROGRAM

## 2024-04-09 ASSESSMENT — PAIN SCALES - GENERAL: PAINLEVEL: MODERATE PAIN (4)

## 2024-04-09 NOTE — NURSING NOTE
Is the patient currently in the state of [place]? YES    Visit mode:VIDEO    If the visit is dropped, the patient can be reconnected by: VIDEO VISIT: Text to cell phone:   Telephone Information:   Mobile [phone]       Will anyone else be joining the visit? NO  (If patient encounters technical issues they should call [phone]    How would you like to obtain your AVS? MyChart    Are changes needed to the allergy or medication list? Pt stated no changes to allergies and Pt stated no med changes    Reason for visit: RECHECK    [place] MAYAF

## 2024-04-09 NOTE — LETTER
4/9/2024         RE: Brandi Stern  [address] [place] [address]        Dear Colleague,    Thank you for referring your patient, Brandi Stern, to the [facility] [place]. Please see a copy of my visit note below.    Virtual Visit Details    Type of service:  Video Visit   Video Start Time: 10:09AM  Video End Time:11:20 AM    Originating Location (pt. Location): Home  Distant Location (provider location):  On-site  Platform used for Video Visit: [facility]    LUNG NODULE & INTERVENTIONAL PULMONARY CLINIC  [facility]     Brandi Stern MRN# [id number]   Age: 68 year old YOB: 1955     Reason for Consultation: Pulmonary nodule    Requesting Physician: Cherie Briones MD  [address]  [place] [address]       Assessment and Plan:    1. 14mm RUL pleural based nodule  Seen on 1/2024 CT Chest, no prior for comparison. Discussed possible etiologies. This is also a common place for scarring. Repeat CT Chest 4/2024 shows resolution of RUL nodule and stable 4mm RML nodule.   --No further CT scans needed to followup RUL nodule given resolution.  --Continue lung cancer screening with PCP (due 4/2025), she will discuss with PCP at her next annual visit.     Cherie Briones MD  Interventional Pulmonology  Department of Pulmonary, Allergy, Critical Care and Sleep Medicine   [facility]           History:     Brandi Stern is a 68 year old female with sig h/o for for hiatal hernia, sleep apnea, asthma who is here for pulmonary nodule.      Can usually walk a couple of blocks, with back pain being more limiting than shortness of breath. Can go up a flight of stairs but would be short of breath at the top. She has been feeling more congested this winter due to her allergies.      She was found to have a large hiatal hernia with plans for repair with Dr. Ivey in April 2024. Hopeful her pulmonary symptoms will improve  afterwards.      Previously followed with [place] [facility] for lung nodules that were stable. 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 In the 1980s, was diagnosed with mycobacterium marinum via skin biopsy and received 3 months of minocycline with resolution of skin lesions.      Currently follows with an allergist for her asthma treatment.      - Personal hx of cancer: None  - Family hx of cancer: No lung cancer  - Tobacco hx: Quit smoking 12/2023, up to 1ppd, smoked for 45 years, has smoked a few cigarettes since 12/2023, but thinks she can stay off of them going forward.  - My interpretation of the images relevant for this visit includes: Resolved RUL nodule  - My interpretation of the PFT's relevant for this visit includes: Nonspecific spirometry pattern 2/2024         Allergies:      Allergies   Allergen Reactions     Fluconazole Rash     Nsaids Nephrotoxicity     Adhesive [Liquid Adhesive] Dermatitis     Animal Dander      Suture      Non-healing suture line     Vistaril [Hydroxyzine Hcl]      Urinary retention          Medications:     Current Outpatient Medications   Medication Sig Dispense Refill     albuterol (ALBUTEROL) 108 (90 BASE) MCG/ACT Inhaler Inhale 1-2 puffs into the lungs every 6 hours as needed for shortness of breath / dyspnea 1 Inhaler 1     atorvastatin (LIPITOR) 10 MG tablet Take 1 tablet (10 mg) by mouth daily 90 tablet 3     Azelastine HCl 137 MCG/SPRAY SOLN 1 SPRAY IN EACH NOSTRIL TWICE A DAY AS NEEDED 30 DAYS       CALCIUM CITRATE PO Take 500 mg by mouth 2 times daily       cyclobenzaprine (FLEXERIL) 5 MG tablet Take 1-2 tablets (5-10 mg) by mouth nightly as needed for muscle spasms 90 tablet 3     desvenlafaxine (PRISTIQ) 100 MG 24 hr tablet Take 1 tablet (100 mg) by mouth daily 90 tablet 3     famotidine (PEPCID) 20 MG tablet TAKE 1 TABLET BY MOUTH TWICE A  tablet 3     fenofibrate (TRICOR) 145 MG tablet Take 1 tablet (145 mg) by mouth daily 90 tablet 3      fexofenadine (ALLEGRA) 180 MG tablet        ketoconazole (NIZORAL) 2 % external cream Apply topically 2 times daily Apply to affected areas under breasts and groin twice daily 60 g 5     levothyroxine (SYNTHROID/LEVOTHROID) 88 MCG tablet Take 1 tablet (88 mcg) by mouth daily 90 tablet 3     lisinopril (ZESTRIL) 10 MG tablet Take 1 tablet (10 mg) by mouth daily 90 tablet 3     meclizine (ANTIVERT) 25 MG tablet Take 1 tablet (25 mg) by mouth 3 times daily as needed for dizziness 21 tablet 0     metroNIDAZOLE (METROCREAM) 0.75 % external cream Apply topically daily To face 45 g 3     omeprazole (PRILOSEC) 20 MG DR capsule TAKE 1 TABLET (20 MG) BY MOUTH 2 TIMES DAILY TAKE 30-60 MINUTES BEFORE A MEAL. 180 capsule 3     pramipexole (MIRAPEX) 0.125 MG tablet TAKE 2 TABLETS BY MOUTH AT DINNER AND 1 TABLET AT BEDTIME 270 tablet 3     spacer (OPTICHAMBER MONIQUE) holding chamber USE AS DIRECTED WITH INHALERS 30 DAYS       triamcinolone (NASACORT AQ) 55 MCG/ACT nasal inhaler Inhale 2 sprays in both nostrils every day as needed 1 Inhaler 7     UNABLE TO FIND MEDICATION NAME: Allergy shots       Vitamin D3 (CHOLECALCIFEROL) 25 mcg (1000 units) tablet        WIXELA INHUB 250-50 MCG/ACT inhaler INHALE 1 PUFF INTO THE LUNGS TWICE A DAY FOR 90 DAYS       zolpidem (AMBIEN) 5 MG tablet Take 1 tablet (5 mg) by mouth nightly as needed for sleep No further refills until appt is scheduled 30 tablet 5     No current facility-administered medications for this visit.            Physical Exam:     EYES: Eyes grossly normal to inspection.    NEURO: Cranial nerves grossly intact.  Mentation and speech appropriate for age.  GENERAL: Healthy, alert and no distress  RESP: No audible wheeze, cough,  PSYCH: Mentation appears normal, affect normal, judgement and insight intact, normal speech and appearance well-groomed.     Imaging/Lab Data   All laboratory and imaging data reviewed.       Again, thank you for allowing me to participate in the  care of your patient.        Sincerely,        Cherie Briones MD

## 2024-04-10 ENCOUNTER — LAB (OUTPATIENT)
Dept: LAB | Facility: CLINIC | Age: 69
End: 2024-04-10
Payer: MEDICARE

## 2024-04-10 ENCOUNTER — PRE VISIT (OUTPATIENT)
Dept: SURGERY | Facility: CLINIC | Age: 69
End: 2024-04-10

## 2024-04-10 ENCOUNTER — OFFICE VISIT (OUTPATIENT)
Dept: SURGERY | Facility: CLINIC | Age: 69
End: 2024-04-10
Payer: MEDICARE

## 2024-04-10 VITALS
TEMPERATURE: 98.5 F | HEART RATE: 97 BPM | DIASTOLIC BLOOD PRESSURE: 69 MMHG | SYSTOLIC BLOOD PRESSURE: 106 MMHG | BODY MASS INDEX: 36.99 KG/M2 | HEIGHT: 65 IN | OXYGEN SATURATION: 94 % | WEIGHT: 222 LBS

## 2024-04-10 DIAGNOSIS — Z01.818 PREOP EXAMINATION: Primary | ICD-10-CM

## 2024-04-10 DIAGNOSIS — K44.9 HIATAL HERNIA: ICD-10-CM

## 2024-04-10 DIAGNOSIS — Z01.818 PREOP EXAMINATION: ICD-10-CM

## 2024-04-10 LAB
ALBUMIN SERPL BCG-MCNC: 4.2 G/DL (ref 3.5–5.2)
ALP SERPL-CCNC: 38 U/L (ref 40–150)
ALT SERPL W P-5'-P-CCNC: 14 U/L (ref 0–50)
ANION GAP SERPL CALCULATED.3IONS-SCNC: 10 MMOL/L (ref 7–15)
AST SERPL W P-5'-P-CCNC: 25 U/L (ref 0–45)
BILIRUB SERPL-MCNC: 0.2 MG/DL
BUN SERPL-MCNC: 12.3 MG/DL (ref 8–23)
CALCIUM SERPL-MCNC: 9.2 MG/DL (ref 8.8–10.2)
CHLORIDE SERPL-SCNC: 104 MMOL/L (ref 98–107)
CREAT SERPL-MCNC: 0.9 MG/DL (ref 0.51–0.95)
DEPRECATED HCO3 PLAS-SCNC: 25 MMOL/L (ref 22–29)
EGFRCR SERPLBLD CKD-EPI 2021: 68 ML/MIN/1.73M2
ERYTHROCYTE [DISTWIDTH] IN BLOOD BY AUTOMATED COUNT: 11.8 % (ref 10–15)
GLUCOSE SERPL-MCNC: 109 MG/DL (ref 70–99)
HCT VFR BLD AUTO: 42.3 % (ref 35–47)
HGB BLD-MCNC: 14.5 G/DL (ref 11.7–15.7)
MCH RBC QN AUTO: 33.6 PG (ref 26.5–33)
MCHC RBC AUTO-ENTMCNC: 34.3 G/DL (ref 31.5–36.5)
MCV RBC AUTO: 98 FL (ref 78–100)
PLATELET # BLD AUTO: 293 10E3/UL (ref 150–450)
POTASSIUM SERPL-SCNC: 4.3 MMOL/L (ref 3.4–5.3)
PROT SERPL-MCNC: 7.3 G/DL (ref 6.4–8.3)
RBC # BLD AUTO: 4.31 10E6/UL (ref 3.8–5.2)
SODIUM SERPL-SCNC: 139 MMOL/L (ref 135–145)
WBC # BLD AUTO: 5.4 10E3/UL (ref 4–11)

## 2024-04-10 PROCEDURE — 36415 COLL VENOUS BLD VENIPUNCTURE: CPT | Performed by: PATHOLOGY

## 2024-04-10 PROCEDURE — 99204 OFFICE O/P NEW MOD 45 MIN: CPT | Performed by: CLINICAL NURSE SPECIALIST

## 2024-04-10 PROCEDURE — 85027 COMPLETE CBC AUTOMATED: CPT | Performed by: PATHOLOGY

## 2024-04-10 PROCEDURE — 80053 COMPREHEN METABOLIC PANEL: CPT | Performed by: PATHOLOGY

## 2024-04-10 PROCEDURE — 86900 BLOOD TYPING SEROLOGIC ABO: CPT | Performed by: CLINICAL NURSE SPECIALIST

## 2024-04-10 RX ORDER — SODIUM CHLORIDE, SODIUM LACTATE, POTASSIUM CHLORIDE, CALCIUM CHLORIDE 600; 310; 30; 20 MG/100ML; MG/100ML; MG/100ML; MG/100ML
INJECTION, SOLUTION INTRAVENOUS CONTINUOUS
OUTPATIENT
Start: 2024-04-10

## 2024-04-10 RX ORDER — IPRATROPIUM BROMIDE AND ALBUTEROL SULFATE 2.5; .5 MG/3ML; MG/3ML
3 SOLUTION RESPIRATORY (INHALATION) ONCE
OUTPATIENT
Start: 2024-04-10 | End: 2024-04-10

## 2024-04-10 RX ORDER — FLUTICASONE PROPIONATE AND SALMETEROL 113; 14 UG/1; UG/1
1 POWDER, METERED RESPIRATORY (INHALATION) 2 TIMES DAILY
COMMUNITY
Start: 2022-04-07

## 2024-04-10 RX ORDER — LIDOCAINE 40 MG/G
CREAM TOPICAL
OUTPATIENT
Start: 2024-04-10

## 2024-04-10 ASSESSMENT — COPD QUESTIONNAIRES: COPD: 1

## 2024-04-10 ASSESSMENT — ENCOUNTER SYMPTOMS
DYSRHYTHMIAS: 0
SEIZURES: 0

## 2024-04-10 ASSESSMENT — LIFESTYLE VARIABLES: TOBACCO_USE: 1

## 2024-04-10 ASSESSMENT — PAIN SCALES - GENERAL: PAINLEVEL: MODERATE PAIN (4)

## 2024-04-10 NOTE — H&P
Pre-Operative H & P     CC:  Preoperative exam to assess for increased cardiopulmonary risk while undergoing surgery and anesthesia.    Date of Encounter: 4/10/2024  Primary Care Physician:  Cherie Brennan     Reason for visit:   Encounter Diagnoses   Name Primary?    Preop examination Yes    Hiatal hernia        HPI  Brandi Stern is a 68 year old female who presents for pre-operative H & P in preparation for  Procedure Information       Case: [id number] Date/Time: 04/24/24 0730    Procedure: ROBOT-ASSISTED, LAPAROSCOPIC hiatal hernia repair, gastrostomy tube, possible Nissen fundoplication, esophagogastroscopy (Chest)    Anesthesia type: General    Diagnosis: Hiatal hernia [K44.9]    Pre-op diagnosis: Hiatal hernia [K44.9]    Location:  OR  /  OR    Providers: Valeri Ivey MD          History is obtained from the patient and chart review    Patient who presented with symptoms of shortness of breath with exertion, and after big meals. She reported some regurgitation with lying down after eating, and increased belching for the past several months. She also was having some significant abdominal cramping when eating too much. She had a known hiatal hernia but work up with updated esophagram revealed hiatal hernia with intrathoracic stomach, no high grade stricture or mass, and spontaneous gastroesophagel reflux. She was referred to Dr. Ivey and has been counseled for above procedures.     Her history is otherwise significant for obesity, HLD, HYPERTENSION, previous tobacco use, pulmonary nodules, moderate persistent asthma, ISATU, GERD, Hasimoto's, fibromyalgia, rosacea, restless leg syndrome, and OA with bilateral knee replacements.      Currently follows with an allergist for her asthma treatment and has primary care     Hx of abnormal bleeding or anti-platelet use: Denies    Menstrual history: No LMP recorded (lmp unknown). Patient is postmenopausal.     Past Medical History  Past Medical History:    Diagnosis Date    Allergic rhinitis due to other allergen     Apneas, obstructive sleep     Chronic lymphocytic thyroiditis     COPD (chronic obstructive pulmonary disease) (H)     Depressive disorder     Depressive disorder, not elsewhere classified     Disorder of bone and cartilage, unspecified     Esophageal reflux     Essential hypertension, benign     Generalized osteoarthrosis, unspecified site     knees    HASHIMOTO'S THYROIDITIS 11/15/2004    Headache above the eye region     Hiatal hernia     Insomnia, unspecified     Moderate persistent asthma     Nasal congestion     Positive PPD     treated for one year    Pure hyperglyceridemia     Scoliosis     Snoring     Tobacco use disorder        Past Surgical History  Past Surgical History:   Procedure Laterality Date    ABDOMEN SURGERY      GB out    ARTHROSCOPY KNEE RT/LT  2/07    left knee    BIOPSY      Colon    CHOLECYSTECTOMY      COLONOSCOPY  8/5/2014    Procedure: COLONOSCOPY;  Surgeon: Mau De La Fuente MD;  Location:  GI    ESOPHAGOSCOPY, GASTROSCOPY, DUODENOSCOPY (EGD), COMBINED  8/5/2014    Procedure: COMBINED ESOPHAGOSCOPY, GASTROSCOPY, DUODENOSCOPY (EGD), BIOPSY SINGLE OR MULTIPLE;  Surgeon: Mau De La Fuente MD;  Location:  GI    Gall bladder removal  2011    ZZC TOTAL KNEE ARTHROPLASTY      bilateral       Prior to Admission Medications  Current Outpatient Medications   Medication Sig Dispense Refill    albuterol (ALBUTEROL) 108 (90 BASE) MCG/ACT Inhaler Inhale 1-2 puffs into the lungs every 6 hours as needed for shortness of breath / dyspnea 1 Inhaler 1    atorvastatin (LIPITOR) 10 MG tablet Take 1 tablet (10 mg) by mouth daily (Patient taking differently: Take 10 mg by mouth every evening) 90 tablet 3    Azelastine HCl 137 MCG/SPRAY SOLN Spray 1 spray in nostril as needed      CALCIUM CITRATE PO Take 500 mg by mouth 2 times daily      cyclobenzaprine (FLEXERIL) 5 MG tablet Take 1-2 tablets (5-10 mg) by mouth nightly as needed for  muscle spasms 90 tablet 3    desvenlafaxine (PRISTIQ) 100 MG 24 hr tablet Take 1 tablet (100 mg) by mouth daily (Patient taking differently: Take 100 mg by mouth every morning) 90 tablet 3    famotidine (PEPCID) 20 MG tablet TAKE 1 TABLET BY MOUTH TWICE A DAY (Patient taking differently: Take by mouth 2 times daily TAKE 1 TABLET BY MOUTH TWICE A DAY) 180 tablet 3    fenofibrate (TRICOR) 145 MG tablet Take 1 tablet (145 mg) by mouth daily (Patient taking differently: Take 145 mg by mouth every evening) 90 tablet 3    fexofenadine (ALLEGRA) 180 MG tablet Take 180 mg by mouth every morning      fluticasone-salmeterol (AIRDUO RESPICLICK) 113-14 MCG/ACT inhaler Inhale 1 puff into the lungs 2 times daily      HERBALS Take by mouth 3 times daily      levothyroxine (SYNTHROID/LEVOTHROID) 88 MCG tablet Take 1 tablet (88 mcg) by mouth daily (Patient taking differently: Take 88 mcg by mouth every morning) 90 tablet 3    lisinopril (ZESTRIL) 10 MG tablet Take 1 tablet (10 mg) by mouth daily (Patient taking differently: Take 10 mg by mouth every morning) 90 tablet 3    omeprazole (PRILOSEC) 20 MG DR capsule TAKE 1 TABLET (20 MG) BY MOUTH 2 TIMES DAILY TAKE 30-60 MINUTES BEFORE A MEAL. (Patient taking differently: 20 mg 2 times daily TAKE 1 TABLET (20 MG) BY MOUTH 2 TIMES DAILY TAKE 30-60 MINUTES BEFORE A MEAL.) 180 capsule 3    pramipexole (MIRAPEX) 0.125 MG tablet TAKE 2 TABLETS BY MOUTH AT DINNER AND 1 TABLET AT BEDTIME (Patient taking differently: Take 0.125 mg by mouth 2 times daily as needed TAKE 2 TABLETS BY MOUTH AT DINNER AND 1 TABLET AT BEDTIME) 270 tablet 3    triamcinolone (NASACORT AQ) 55 MCG/ACT nasal inhaler Inhale 2 sprays in both nostrils every day as needed (Patient taking differently: Spray 2 sprays into both nostrils as needed Inhale 2 sprays in both nostrils every day as needed) 1 Inhaler 7    UNABLE TO FIND MEDICATION NAME: Allergy shots Every 1-4 weeks Next dose 4/10/24      Vitamin D3 (CHOLECALCIFEROL) 25  mcg (1000 units) tablet Take 25 mcg by mouth every morning      zolpidem (AMBIEN) 5 MG tablet Take 1 tablet (5 mg) by mouth nightly as needed for sleep No further refills until appt is scheduled 30 tablet 5    ketoconazole (NIZORAL) 2 % external cream Apply topically 2 times daily Apply to affected areas under breasts and groin twice daily 60 g 5    metroNIDAZOLE (METROCREAM) 0.75 % external cream Apply topically daily To face 45 g 3    spacer (OPTICHAMBER MONIQUE) holding chamber USE AS DIRECTED WITH INHALERS 30 DAYS         Allergies  Allergies   Allergen Reactions    Fluconazole Rash    Nsaids Nephrotoxicity    Adhesive [Liquid Adhesive] Dermatitis    Animal Dander     Suture      Non-healing suture line    Vistaril [Hydroxyzine Hcl]      Urinary retention       Social History  Social History     Socioeconomic History    Marital status: Single     Spouse name: Not on file    Number of children: Not on file    Years of education: Not on file    Highest education level: Not on file   Occupational History    Not on file   Tobacco Use    Smoking status: Former     Current packs/day: 0.00     Average packs/day: 0.5 packs/day for 52.2 years (26.1 ttl pk-yrs)     Types: Cigarettes     Start date: 10/1/1971     Quit date: 12/3/2023     Years since quittin.3     Passive exposure: Past    Smokeless tobacco: Never    Tobacco comments:     Quit recently for three months   Vaping Use    Vaping status: Never Used   Substance and Sexual Activity    Alcohol use: Not Currently     Comment: switched to beer 6 pack a week     Drug use: No    Sexual activity: Not Currently     Partners: Male     Birth control/protection: None   Other Topics Concern    Parent/sibling w/ CABG, MI or angioplasty before 65F 55M? No   Social History Narrative    Dairy/d 2 servings/d.    Caffeine 1 -2servings/d    Exercise 1 x week walking,problems with knees    Sunscreen used - Yes    Seatbelts used - Yes    Working smoke/CO detectors in the home -  Yes    Guns stored in the home - No    Self Breast Exams - Yes-occ    Self Testicular Exam - NA    Eye Exam up to date - no,does every 2yrs    Dental Exam up to date - Yes  Q 6 months    Pap Smear up to date - no    Mammogram up to date -had done today    PSA up to date - NA    Dexa Scan up to date - Yes- 2008    Flex Sig / Colonoscopy up to date - colonoscopy in 2003,repeat in 10yrs    Immunizations up to date - Yes Td 2008    Abuse: Current or Past(Physical, Sexual or Emotional)- No    Do you feel safe in your environment - Yes     Social Determinants of Health     Financial Resource Strain: Low Risk  (12/31/2023)    Financial Resource Strain     Within the past 12 months, have you or your family members you live with been unable to get utilities (heat, electricity) when it was really needed?: No   Food Insecurity: Low Risk  (12/31/2023)    Food Insecurity     Within the past 12 months, did you worry that your food would run out before you got money to buy more?: No     Within the past 12 months, did the food you bought just not last and you didn t have money to get more?: No   Transportation Needs: Low Risk  (12/31/2023)    Transportation Needs     Within the past 12 months, has lack of transportation kept you from medical appointments, getting your medicines, non-medical meetings or appointments, work, or from getting things that you need?: No   Physical Activity: Not on file   Stress: Not on file   Social Connections: Not on file   Interpersonal Safety: Low Risk  (1/3/2024)    Interpersonal Safety     Do you feel physically and emotionally safe where you currently live?: Yes     Within the past 12 months, have you been hit, slapped, kicked or otherwise physically hurt by someone?: No     Within the past 12 months, have you been humiliated or emotionally abused in other ways by your partner or ex-partner?: No   Housing Stability: Low Risk  (12/31/2023)    Housing Stability     Do you have housing? : Yes     Are  you worried about losing your housing?: No       Family History  Family History   Problem Relation Age of Onset    Osteoporosis Mother     Hypertension Mother     Eye Disorder Mother         Mac d.,glaucoma, cataracts    Lipids Mother     Neurologic Disorder Mother         Parkinsons    Coronary Artery Disease Mother     Anxiety Disorder Mother     Cancer Father         melanoma on back    Arthritis Father     Blood Disease Father         Hemachromatosis    Genitourinary Problems Father          of renal failure    Allergies Father     Genetic Disorder Father         Hemochromatosis    Anxiety Disorder Sister     Thyroid Disease Sister     Obesity Sister     Genetic Disorder Brother         Hemochromatosis    C.A.D. Maternal Grandmother     Cerebrovascular Disease Maternal Grandmother     Respiratory Maternal Grandmother     Hearing Loss Maternal Grandmother     Coronary Artery Disease Maternal Grandmother     C.A.D. Maternal Grandfather     Cardiovascular Maternal Grandfather     Circulatory Maternal Grandfather     Cancer - colorectal Paternal Grandmother     Diabetes Paternal Grandmother     Cancer Paternal Grandmother         Colon    Asthma Paternal Grandmother     Colon Cancer Paternal Grandmother     Cerebrovascular Disease Paternal Grandfather     Breast Cancer No family hx of     Hyperlipidemia No family hx of     Anesthesia Reaction No family hx of     Clotting Disorder No family hx of     Bleeding Disorder No family hx of        Review of Systems  The complete review of systems is negative other than noted in the HPI or here.   Anesthesia Evaluation   Pt has had prior anesthetic. Type: General and MAC.    History of anesthetic complications  - .  ISATU.    ROS/MED HX  ENT/Pulmonary: Comment: Patient feels that she is having some current problems with her asthma, some attributed to the hernia. She self monitors at home with 02 sat 96-97, today 94%. She has also been cleaning in dmitry areas without a  "mask. She is currently using albuterol inhaler prn, and has not used neb    Environmental/seasonal allergies.   Allergy shots every 2-4 weeks    (+) sleep apnea, uses CPAP,         allergic rhinitis,     tobacco use, Past use,    Moderate Persistent, asthma  Treatment: Inhaled steroids, Nebulizer prn and Inhaler prn,   COPD,           (-) recent URI   Neurologic: Comment: Fibromyalgia  Cervicalgia   (-) no seizures and no CVA   Cardiovascular:     (+) Dyslipidemia hypertension-range: controlled/ -   -  - -           PATEL.                      Previous cardiac testing   Echo: Date: Results:    Stress Test:  Date: 2018 Results:    ECG Reviewed:  Date: 2017 Results:  Sinus rhythm, inferior T wave changes are nonspecific      Cath:  Date: Results:   (-) taking anticoagulants/antiplatelets and arrhythmias   METS/Exercise Tolerance: 3 - Able to walk 1-2 blocks without stopping Comment: Activity is limited by back pain first, some DYSPNEA ON EXERTION. Able to walk 2 blocks, but then would need to stop   Hematologic:     (+)      anemia,       (-) history of blood clots and history of blood transfusion   Musculoskeletal:   (+)  arthritis,             GI/Hepatic: Comment: Diverticular disease    (+) GERD, Symptomatic,    hiatal hernia,              Renal/Genitourinary:    (-) renal disease   Endo:     (+)          thyroid problem, hypothyroidism Hashimotos,    Obesity,       Psychiatric/Substance Use:     (+) psychiatric history depression       Infectious Disease:  - neg infectious disease ROS     Malignancy:  - neg malignancy ROS     Other:  - neg other ROS          /69 (BP Location: Right arm, Patient Position: Sitting, Cuff Size: Adult Regular)   Pulse 97   Temp 98.5  F (36.9  C) (Oral)   Ht 1.651 m (5' 5\")   Wt 100.7 kg (222 lb)   LMP  (LMP Unknown)   SpO2 94%   BMI 36.94 kg/m      Physical Exam   Constitutional: Awake, alert, cooperative, no apparent distress, and appears stated age.  Eyes: Pupils equal, " round and reactive to light, extra ocular muscles intact, left sclera clear, but has redness on right, reported resolving; conjunctiva normal.  HENT: Normocephalic, oral pharynx with moist mucus membranes, good dentition. No goiter appreciated.   Respiratory: Clear to auscultation bilaterally, no crackles, initially a slight wheeze was heard in upper lobes with inspiration. Cleared with coughing. No obvious dyspnea  Cardiovascular: Regular rate and rhythm, normal S1 and S2, and no murmur noted. Carotids +2, no bruits. No edema. Palpable pulses to radial  DP and PT arteries.   GI: Normal bowel sounds, soft, non-distended, non-tender, no masses palpated, no hepatosplenomegaly. Soft, small umbilical hernia, nontender, some purple discoloration. Patient is aware and will continue to monitor.  Lymph/Hematologic: No cervical lymphadenopathy and no supraclavicular lymphadenopathy.  Genitourinary:  Deferred.   Skin: Warm and dry. No rashes at anticipated surgical site.   Musculoskeletal: Limited ROM of neck. There is no redness, warmth, or swelling of the joints. Gross motor strength is normal.    Neurologic: Awake, alert, oriented to name, place and time. Cranial nerves II-XII are grossly intact. Gait is normal.   Neuropsychiatric: Calm, cooperative. Normal affect.     Prior Labs/Diagnostic Studies   All labs and imaging personally reviewed     EKG 2017 Sinus rhythm, inferior T wave changes are nonspecific    Stress echocardiogram 2018    Interpretation Summary  Conclusion: normal exercise stress echocardiogram at a diagnostic level of  peak stress.     The patient experienced no symptoms during stress.  Test terminated due to target HR achieved (89% MPHR).  Normal BP and HR response to exercise.  EKG at baseline and with stress demonstrated no ischemic changes.  At baseline, normal LV function (EF 65%). No regional wall motion  abnormalities.  At peak stress, LV function augments normally. LVEF 75%. No regional  wall  motion abnormalities.  On 2D screening echocardiogram with Doppler, no significant valvular  dysfunction. Ascending aorta normal. On limited views there is a small  anterior pericardial effusion with some pericardial thickening. If clinically  indicated, cardiac MRI could offer better assessment of pericardial disease.    CT Chest 4/8/24     Lungs:   Resolved right apical subpleural opacity now with residual linear  scarring. Unchanged 4 mm solid nodule in the right middle lobe (series  4 image 184).     Airways: Central tracheobronchial tree is clear.  Vessels: Main pulmonary artery and aorta are normal in caliber. Common  origin of the right innominate and left common carotid arteries.     Heart: Heart size is normal without pericardial effusion  Lymph nodes: No suspicious mediastinal or hilar lymphadenopathy.  Thyroid: Imaged thyroid within normal limits.  Esophagus: Large hiatal hernia containing stomach and fat which  creates mass effect on the mediastinum.     Upper abdomen: Evaluation of the upper abdomen is limited and without  contrast. Cholecystectomy. Diffusely hypoattenuating hepatic  parenchyma.     Bones and soft tissues: No suspicious axillary lymphadenopathy or soft  tissue mass. No suspicious osseous lesion. Chronic left rib fracture  deformities.    MR Cervical spine 12/24/2023                                                                     IMPRESSION:   1. Multilevel cervical spondylosis with mild to moderate canal  stenosis and moderate to severe neural foraminal stenoses at multiple  levels as described above in detail. No myelopathic cord signal.  Findings are minimally progressed from MRI 1/17/2020.  2. Edema at the left C6-7 facet joint, may represent inflammatory  arthropathy.         Latest Ref Rng & Units 2/12/2024     2:44 PM   PFT   FVC L 1.80    FEV1 L 1.41    FVC% % 64    FEV1% % 64          The patient's records and results personally reviewed by this provider.     Outside  records reviewed from: [facility] Everywhere    LAB/DIAGNOSTIC STUDIES TODAY:    Lab Results   Component Value Date    WBC 5.4 04/10/2024    WBC 6.4 03/19/2021     Lab Results   Component Value Date    RBC 4.31 04/10/2024    RBC 4.88 03/19/2021     Lab Results   Component Value Date    HGB 14.5 04/10/2024    HGB 16.3 03/19/2021     Lab Results   Component Value Date    HCT 42.3 04/10/2024    HCT 47.6 03/19/2021     Lab Results   Component Value Date    MCV 98 04/10/2024    MCV 98 03/19/2021     Lab Results   Component Value Date    MCH 33.6 04/10/2024    MCH 33.4 03/19/2021     Lab Results   Component Value Date    MCHC 34.3 04/10/2024    MCHC 34.2 03/19/2021     Lab Results   Component Value Date    RDW 11.8 04/10/2024    RDW 11.9 03/19/2021     Lab Results   Component Value Date     04/10/2024     03/19/2021     Last Comprehensive Metabolic Panel:  Sodium   Date Value Ref Range Status   04/10/2024 139 135 - 145 mmol/L Final     Comment:     Reference intervals for this test were updated on 09/26/2023 to more accurately reflect our healthy population. There may be differences in the flagging of prior results with similar values performed with this method. Interpretation of those prior results can be made in the context of the updated reference intervals.    03/19/2021 140 133 - 144 mmol/L Final     Potassium   Date Value Ref Range Status   04/10/2024 4.3 3.4 - 5.3 mmol/L Final   10/18/2022 4.2 3.4 - 5.3 mmol/L Final   03/19/2021 4.3 3.4 - 5.3 mmol/L Final     Chloride   Date Value Ref Range Status   04/10/2024 104 98 - 107 mmol/L Final   10/18/2022 107 94 - 109 mmol/L Final   03/19/2021 108 94 - 109 mmol/L Final     Carbon Dioxide   Date Value Ref Range Status   03/19/2021 27 20 - 32 mmol/L Final     Carbon Dioxide (CO2)   Date Value Ref Range Status   04/10/2024 25 22 - 29 mmol/L Final   10/18/2022 22 20 - 32 mmol/L Final     Anion Gap   Date Value Ref Range Status   04/10/2024 10 7 - 15 mmol/L Final    10/18/2022 9 3 - 14 mmol/L Final   03/19/2021 5 3 - 14 mmol/L Final     Glucose   Date Value Ref Range Status   04/10/2024 109 (H) 70 - 99 mg/dL Final   10/18/2022 100 (H) 70 - 99 mg/dL Final   03/19/2021 106 (H) 70 - 99 mg/dL Final     Urea Nitrogen   Date Value Ref Range Status   04/10/2024 12.3 8.0 - 23.0 mg/dL Final   10/18/2022 12 7 - 30 mg/dL Final   03/19/2021 15 7 - 30 mg/dL Final     Creatinine   Date Value Ref Range Status   04/10/2024 0.90 0.51 - 0.95 mg/dL Final   03/19/2021 0.95 0.52 - 1.04 mg/dL Final     GFR Estimate   Date Value Ref Range Status   04/10/2024 68 >60 mL/min/1.73m2 Final   03/19/2021 62 >60 mL/min/[1.73_m2] Final     Comment:     Non  GFR Calc  Starting 12/18/2018, serum creatinine based estimated GFR (eGFR) will be   calculated using the Chronic Kidney Disease Epidemiology Collaboration   (CKD-EPI) equation.       Calcium   Date Value Ref Range Status   04/10/2024 9.2 8.8 - 10.2 mg/dL Final   03/19/2021 9.4 8.5 - 10.1 mg/dL Final     Bilirubin Total   Date Value Ref Range Status   04/10/2024 0.2 <=1.2 mg/dL Final   06/04/2020 0.4 0.2 - 1.3 mg/dL Final     Alkaline Phosphatase   Date Value Ref Range Status   04/10/2024 38 (L) 40 - 150 U/L Final     Comment:     Reference intervals for this test were updated on 11/14/2023 to more accurately reflect our healthy population. There may be differences in the flagging of prior results with similar values performed with this method. Interpretation of those prior results can be made in the context of the updated reference intervals.   06/04/2020 32 (L) 40 - 150 U/L Final     ALT   Date Value Ref Range Status   04/10/2024 14 0 - 50 U/L Final     Comment:     Reference intervals for this test were updated on 6/12/2023 to more accurately reflect our healthy population. There may be differences in the flagging of prior results with similar values performed with this method. Interpretation of those prior results can be made in the  context of the updated reference intervals.     06/04/2020 20 0 - 50 U/L Final     AST   Date Value Ref Range Status   04/10/2024 25 0 - 45 U/L Final     Comment:     Reference intervals for this test were updated on 6/12/2023 to more accurately reflect our healthy population. There may be differences in the flagging of prior results with similar values performed with this method. Interpretation of those prior results can be made in the context of the updated reference intervals.   06/04/2020 20 0 - 45 U/L Final       Assessment    Brandi Stern is a 68 year old female seen as a PAC referral for risk assessment and optimization for anesthesia.    Plan/Recommendations  Pt will be optimized for the proposed procedure.  See below for details on the assessment, risk, and preoperative recommendations    NEUROLOGY  - No history of TIA/CVA. Febrile seizure at age 1 with no recurrence.   - Fibromyalgia with widespread aching pain   - Cervicalgia  - Chronic low back pain. Flexeril prn   - RLS Mirapex at dinner and HS.  -Post Op delirium risk factors:  Age    ENT  - No current airway concerns.  Will need to be reassessed day of surgery.  Mallampati: I  TM: > 3  Limited neck extension     CARDIAC  HLD. Tricor and atorvastatin at HS. HYPERTENSION. Good control. Will hold lisinopril on DOS. No other cardiac history, symptoms or meds. Stress echocardiogram normal in 2018. Denies chest pain, irregular HR or ankle edema. Some shortness of breath with exertion. Able to walk two blocks but then needs to stop due to pain.   - METS (Metabolic Equivalents)<4    RCRI: 0.9% risk of serious cardiac events    PULMONARY  Environmental/seasonal allergies, receiving allergy shots every 2-4 weeks.   Will take Allegra on DOS. Nasacort prn  Moderate persistent asthma. Patient feels that she is having some current problems with her asthma, partially attributed to the hernia. She self monitors at home with 02 sat 96-97, today 94%. She has also  "been cleaning in dmitry areas without a mask. She is currently using albuterol inhaler prn, and has not used neb recently. She is taking Airduo TWICE DAILY and will take on DOS.   Previously followed with [facility] for lung nodules that were stable. She recently had evaluation by Dr. Briones here in Pulmonary. A repeat CT scan is planned for 3 months.     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 In the 1980s, was diagnosed with mycobacterium marinum via skin biopsy and received 3 months of minocycline with resolution of skin lesions.     ISATU with regular use of CPAP and will bring on DOS    - Tobacco History    History   Smoking Status    Former    Types: Cigarettes   Smokeless Tobacco    Never       GI: GERD Hiatal hernia.   Will take Pepcid and omeprazole on DOS. Occ Maalox for breakthrough  PONV Medium Risk  Total Score: 2           1 AN PONV: Pt is Female    1 AN PONV: Patient is not a current smoker        /RENAL  - Baseline Creatinine  0.90    ENDOCRINE    - BMI: Estimated body mass index is 36.94 kg/m  as calculated from the following:    Height as of this encounter: 1.651 m (5' 5\").    Weight as of this encounter: 100.7 kg (222 lb).  Obesity (BMI >30)  - No history of Diabetes Mellitus  A1c 5.5 on 1/8/24  Hypothyroidism. Will take Synthroid on DOS  Last TSH 1.75    HEME  VTE Low Risk 0.26%             Total Score: 0      Denies personal or family history of blood clots  Denies personal or family history of bleeding disorder  Denies history of blood transfusion     Type and screen drawn today    MSK: Frailty score 0/5  OA s/p bilateral knee replacements    PSYCH  - Depression. Will taek Pristiq on DOS.        Different anesthesia methods/types have been discussed with the patient, but they are aware that the final plan will be decided by the assigned anesthesia provider on the date of service.  Patient was discussed with Dr Herrera.    Regarding current asthma symptoms encouraged " patient to continue to try to optimize asthma by increasing use of albuterol inhaler, at least twice daily, and adding her Duoneb like she does when she is ill. Asked her to consider wearing a mask when doing work in dmitry areas.     She will continue to monitor for a few days but will reach out to primary provider if no improvement. Duoneb ordered for DOS.     The patient is optimized for their procedure. AVS with information on surgery time/arrival time, meds and NPO status given by nursing staff. No further diagnostic testing indicated.      On the day of service:     Prep time: 14 minutes  Visit time: 17 minutes  Documentation time: 15 minutes  ------------------------------------------  Total time: 46 minutes      ELISE Miller CNS  Preoperative Assessment Center  [facility]  Clinic and Surgery Center  Phone: [phone]  Fax: [phone]

## 2024-04-10 NOTE — PATIENT INSTRUCTIONS
Preparing for Your Surgery      Name:  Brandi Stern   MRN:  [id number]   :  1955   Today's Date:  4/10/2024       Arriving for surgery:  Surgery date:  24  Arrival time:  05:30 am      Please come to:      [facility]    [address] [place]  [address]     The [facility] Patient/Visitor Ramp is at [address]. Patients and visitors who self-park will receive the reduced hospital parking rate. If the Patient /Visitor Ramp is full, please follow the signs to the [employer] car park located at the main hospital entrance.       parking is available (24 hours/ 7 days a week)      Discounted parking pass options are available for patients and visitors. They can be purchased at the [employer] desk at the [facility] hospital entrance.     -    Stop at the security desk and they will direct surgery patients to the Surgery Check in and Family Lounge. [phone]        - If you need directions, a wheelchair or an escort please stop at the Information/security desk in the lobby.     What can I eat or drink?  -  You may eat and drink normally up to 8 hours prior to arrival time.  -  You may have clear liquids until 2 hours prior to arrival time.     Examples of clear liquids:  Water  Clear broth  Juices (apple, white grape, white cranberry  and cider) without pulp  Noncarbonated, powder based beverages  (lemonade and Kartik-Aid)  Sodas (Sprite, 7-Up, ginger ale and seltzer)  Coffee or tea (without milk or cream)  Gatorade    -  No Alcohol or cannabis products for at least 24 hours before surgery.     Which medicines can I take?    Hold Aspirin for 7 days before surgery.   Hold Multivitamins for 7 days before surgery.  Hold Supplements for 7 days before surgery.  Hold Ibuprofen (Advil, Motrin) for 1 day(s) before surgery--unless otherwise directed by surgeon.  Hold Naproxen (Aleve) for 4  days before surgery.    -  DO NOT take these medications the day of surgery:  Calcium, lisinopril, Vitamin D3.    -  PLEASE TAKE these medications the day of surgery:  Tylenol; take lipitor, pristiq, pepcid, tricor, levothyroxine, prilosec.  Bring inhalers to hospital if currently using.    How do I prepare myself?  - Please take 2 showers (one the night prior to surgery and one the morning of surgery) using Scrubcare or Hibiclens soap.    Use this soap only from the neck to your toes.     Leave the soap on your skin for one minute--then rinse thoroughly.      You may use your own shampoo and conditioner. No other hair products.   - Please remove all jewelry and body piercings.  - No lotions, deodorants or fragrance.  - No makeup or fingernail polish.   - Bring your ID and insurance card.    -If you use a CPAP machine, please bring the CPAP machine, tubing, and mask to hospital.    -If you have a Deep Brain Stimulator, Spinal Cord Stimulator, or any Neuro Stimulator device---you must bring the remote control to the hospital.      ALL PATIENTS GOING HOME THE SAME DAY OF SURGERY ARE REQUIRED TO HAVE A RESPONSIBLE ADULT TO DRIVE AND BE IN ATTENDANCE WITH THEM FOR 24 HOURS FOLLOWING SURGERY.    Covid testing policy as of 12/06/2022  Your surgeon will notify and schedule you for a COVID test if one is needed before surgery--please direct any questions or COVID symptoms to your surgeon      Questions or Concerns:    - For any questions regarding the day of surgery or your hospital stay, please contact the Pre Admission Nursing Office at [phone].       - If you have health changes between today and your surgery, please call your surgeon.       - For questions after surgery, please call your surgeons office.           Current Visitor Guidelines    You may have 2 visitors in the pre op area.    Visiting hours: 8 a.m. to 8:30 p.m.    Patients confirmed or suspected to have symptoms of COVID 19 or flu:     No visitors  allowed for adult patients.   Children (under age 18) can have 1 named visitor.     People who are sick or showing symptoms of COVID 19 or flu:    Are not allowed to visit patients--we can only make exceptions in special situations.       Please follow these guidelines for your visit:          Please maintain social distance          Masking is optional--however at times you may be asked to wear a mask for the safety of yourself and others     Clean your hands with alcohol hand . Do this when you arrive at and leave the building and patient room,    And again after you touch your mask or anything in the room.     Go directly to and from the room you are visiting.     Stay in the patient s room during your visit. Limit going to other places in the hospital as much as possible     Leave bags and jackets at home or in the car.     For everyone s health, please don t come and go during your visit. That includes for smoking   during your visit.

## 2024-04-14 ENCOUNTER — OFFICE VISIT (OUTPATIENT)
Dept: URGENT CARE | Facility: URGENT CARE | Age: 69
End: 2024-04-14
Payer: MEDICARE

## 2024-04-14 VITALS
TEMPERATURE: 97.8 F | BODY MASS INDEX: 36.65 KG/M2 | DIASTOLIC BLOOD PRESSURE: 86 MMHG | OXYGEN SATURATION: 94 % | WEIGHT: 220 LBS | SYSTOLIC BLOOD PRESSURE: 121 MMHG | HEART RATE: 98 BPM | HEIGHT: 65 IN

## 2024-04-14 DIAGNOSIS — H10.33 ACUTE BACTERIAL CONJUNCTIVITIS OF BOTH EYES: Primary | ICD-10-CM

## 2024-04-14 PROCEDURE — 99213 OFFICE O/P EST LOW 20 MIN: CPT | Performed by: INTERNAL MEDICINE

## 2024-04-14 RX ORDER — OFLOXACIN 3 MG/ML
1-2 SOLUTION/ DROPS OPHTHALMIC
Qty: 10 ML | Refills: 0 | Status: SHIPPED | OUTPATIENT
Start: 2024-04-14 | End: 2024-04-21

## 2024-04-14 RX ORDER — POLYMYXIN B SULFATE AND TRIMETHOPRIM 1; 10000 MG/ML; [USP'U]/ML
1-2 SOLUTION OPHTHALMIC EVERY 4 HOURS
COMMUNITY

## 2024-04-14 ASSESSMENT — ENCOUNTER SYMPTOMS: FEVER: 0

## 2024-04-14 NOTE — PROGRESS NOTES
"ASSESSMENT AND PLAN:      ICD-10-CM    1. Acute bacterial conjunctivitis of both eyes  H10.33 ofloxacin (OCUFLOX) 0.3 % ophthalmic solution     amoxicillin-clavulanate (AUGMENTIN) 875-125 MG tablet            Patient Instructions   Switch to ofloxacin antibiotics drops    See Ophthalmology if not improving tomorrow.    If swelling around eyes worse, then may start oral antibiotics       No follow-ups on file.        Ivory Guardado MD  [facility] URGENT CARE    Subjective     Brandi Stern is a 68 year old who presents for Patient presents with:  Urgent Care  Derm Problem: Pt in clinic to have eval for facial redness and swelling while being treated for Conjunctivitis.    an established patient of [facility].        Onset of symptoms was 2 day(s) ago.  Seen in [place], treatment for pink eye with polytrim  symptoms worsened    Current and Associated symptoms: redness, warmth, swelling, pain, and drainage  Treatment measures tried include: polytrim  Relief from treatment: none      Review of Systems   Constitutional:  Negative for fever.           Objective    /86   Pulse 98   Temp 97.8  F (36.6  C) (Temporal)   Ht 1.651 m (5' 5\")   Wt 99.8 kg (220 lb)   LMP  (LMP Unknown)   SpO2 94%   BMI 36.61 kg/m    Physical Exam  Eyes:      General:         Right eye: Discharge present.         Left eye: Discharge present.     Comments: Purulent discharge  Redness sclera  Eyelids swollen                  "

## 2024-04-14 NOTE — PATIENT INSTRUCTIONS
Switch to ofloxacin antibiotics drops    See Ophthalmology if not improving tomorrow.    If swelling around eyes worse, then may start oral antibiotics

## 2024-04-19 ENCOUNTER — MYC MEDICAL ADVICE (OUTPATIENT)
Dept: SURGERY | Facility: CLINIC | Age: 69
End: 2024-04-19
Payer: MEDICARE

## 2024-04-19 NOTE — TELEPHONE ENCOUNTER
Called pt and left detailed msg explaining arrival time of new surgery date of May 3rd is 5:30 am.    Veronica Pacheco on 4/19/2024 at 10:31 AM

## 2024-04-29 ENCOUNTER — PATIENT OUTREACH (OUTPATIENT)
Dept: SURGERY | Facility: CLINIC | Age: 69
End: 2024-04-29
Payer: MEDICARE

## 2024-04-30 NOTE — PROGRESS NOTES
Discussed per Dr. Ivey that we will be cancelling hernia surgery on 5/3 to allow more time for symptoms of swollen tonsils to resolve. Pt was understanding. Discussed we will work with surgery scheduler to find a new date.     Pt asking about Dr. Ivey's thoughts on umbilical hernia that was mentioned on US report from June 2020. Pt states she notices it bulging out when she coughs and is wondering if this can be repaired at the same time. RNCC will discuss with Dr. Ivey and call or jonatanhart back.    Rocio Jaffe RN BSN  Thoracic Surgery RN Care Coordinator  [phone]

## 2024-05-22 ENCOUNTER — THERAPY VISIT (OUTPATIENT)
Dept: PHYSICAL THERAPY | Facility: REHABILITATION | Age: 69
End: 2024-05-22
Payer: MEDICARE

## 2024-05-22 DIAGNOSIS — M54.2 CERVICALGIA: Primary | ICD-10-CM

## 2024-05-22 PROCEDURE — 97110 THERAPEUTIC EXERCISES: CPT | Mod: GP

## 2024-05-22 PROCEDURE — 97535 SELF CARE MNGMENT TRAINING: CPT | Mod: GP

## 2024-05-22 NOTE — PROGRESS NOTES
[facility]                                                                                   OUTPATIENT PHYSICAL THERAPY    PLAN OF TREATMENT FOR OUTPATIENT REHABILITATION   Patient's Last Name, First Name, Brandi Cao YOB: 1955   Provider's Name   [facility]   Medical Record No.  [id number]     Onset Date: 12/26/23 (order date 12/26/23, onset date years prior)  Start of Care Date: 12/29/23     Medical Diagnosis:  M54.2 (ICD-10-CM) - Cervicalgia      PT Treatment Diagnosis:  neck pain and stiffness Plan of Treatment  Frequency/Duration: 1x/week up to every other week/ up to 12 weeks    Certification date from 03/23/24 to 05/22/24         See note for plan of treatment details and functional goals     Park Leonard PT                         I CERTIFY THE NEED FOR THESE SERVICES FURNISHED UNDER        THIS PLAN OF TREATMENT AND WHILE UNDER MY CARE     (Physician attestation of this document indicates review and certification of the therapy plan).              Referring Provider:  Janey Lewis    Initial Assessment  See Epic Evaluation- Start of Care Date: 12/29/23            DISCHARGE  Reason for Discharge: Patient has met all goals.    Equipment Issued: theraband    Discharge Plan: Patient to continue home program.    Referring Provider:  Janey Lewis       05/22/24 0500   Appointment Info   Signing clinician's name / credentials Park Leonard PT, DPT   Total/Authorized Visits up to 12   Visits Used 7   Medical Diagnosis M54.2 (ICD-10-CM) - Cervicalgia   PT Tx Diagnosis neck pain and stiffness   Quick Adds Certification   Progress Note/Certification   Start of Care Date 12/29/23   Onset of illness/injury or Date of Surgery 12/26/23  (order date 12/26/23, onset date years prior)   Therapy Frequency 1x/week up to every other week   Predicted Duration up to 12 weeks   Certification date from 03/23/24   Certification date to  05/22/24   GOALS   PT Goals 2;3   PT Goal 1   Goal Identifier HEP   Goal Description pt will demonstrate independence and report compliance with HEP to facilitate improved independent symptom mgmt.   Rationale to maximize safety and independence with performance of ADLs and functional tasks;to maximize safety and independence with self cares   Goal Progress met   Target Date 03/22/24   Date Met 05/22/24   PT Goal 2   Goal Identifier neck pain   Goal Description pt will report worst neck pain at the end of the day less than or equal to 4/10.   Rationale to maximize safety and independence with performance of ADLs and functional tasks;to maximize safety and independence within the home;to maximize safety and independence within the community;to maximize safety and independence with self cares   Goal Progress met   Target Date 03/22/24   Date Met 03/03/24   PT Goal 3   Goal Identifier cervical mobility   Goal Description pt will demonstrate greater than or equal to 28 degrees (18 degrees at evaluation) of cervical extension AROM to promote improved posture.   Rationale to maximize safety and independence with performance of ADLs and functional tasks;to maximize safety and independence within the home;to maximize safety and independence within the community;to maximize safety and independence with self cares   Goal Progress met   Target Date 03/22/24   Date Met 03/03/24   Subjective Report   Subjective Report Neck continues to do well. Has a few episodes of pain over the course of the week, but is managing well. Feels improved mobility. No longer having headaches and spasming in right hand is less since evaluation.   Objective Measures   Objective Measures Objective Measure 1;Objective Measure 2;Objective Measure 3;Objective Measure 4   Objective Measure 1   Objective Measure cervical AROM at start of session   Details extension: 30, sidebend right: 34, sidebend left: 30, rotation right: 50, left rotation: 48    Treatment Interventions (PT)   Interventions Therapeutic Procedure/Exercise;Self Care/Home Management   Therapeutic Procedure/Exercise   Therapeutic Procedures: strength, endurance, ROM, flexibility minutes (30887) 10   Ther Proc 1 cervical retraction with rotation   Ther Proc 1 - Details VR   Ther Proc 2 cervica retraction with sidebend   Ther Proc 2 - Details VR   Ther Proc 3 resisted shoulder extension   Ther Proc 3 - Details progressed to L3 TB, good demo   PTRx Ther Proc 1 resisted scapular retraction   PTRx Ther Proc 1 - Details progressed to L3 TB, good demo   Skilled Intervention progressed HEP for neck mobility and pain   Patient Response/Progress tolerated well   Self Care/home Management   ADL/Home Mgmt Training (84660) 15   Self Care 1 flare up mgmt   Self Care 1 - Details discussed plan for managing flare ups   Self Care 2 posture   Self Care 2 - Details discussed importance of ongoing postural awareness epescially while on the phone, computer or reading   Skilled Intervention posture and managing flare-ups independently   Patient Response/Progress receptive   Education   Learner/Method Patient   Plan   Home program ptrx   Comments   Comments Pt has attended 7 physical therapy visits for neck pain and stiffness. Pt is meeting all therapy goals today, demonstrating improved mobility and reporting less pain. Pt appears satisfied with the progress made throughout the course of therapy and motivated to continue with HEP independently. Pt is appropriate for d/c from skilled physical therapy today.   Total Session Time   Timed Code Treatment Minutes 25   Total Treatment Time (sum of timed and untimed services) 25

## 2024-05-23 ENCOUNTER — TELEPHONE (OUTPATIENT)
Dept: PULMONOLOGY | Facility: CLINIC | Age: 69
End: 2024-05-23
Payer: MEDICARE

## 2024-05-23 NOTE — TELEPHONE ENCOUNTER
[facility] Call Center    Phone Message    May a detailed message be left on voicemail: yes     Reason for Call: Other: eform request received from patient asking to [place] [facility] [place] seen in the [facility] [place], please evaluate patient's request and advise her as seen fit thank you     Action Taken: Other: Pulm    Travel Screening: Not Applicable

## 2024-05-28 ENCOUNTER — TELEPHONE (OUTPATIENT)
Dept: SURGERY | Facility: CLINIC | Age: 69
End: 2024-05-28
Payer: MEDICARE

## 2024-05-28 NOTE — TELEPHONE ENCOUNTER
Spoke with patient to schedule procedure with Dr. Ivey   Procedure was scheduled on 7/3 at [facility] OR  Patient will have H&P with PAC    Informed pt of surgery details:  Date:    Wednesday, July 03, 2024  Arrival Time:  9:15 am    Pt is aware surgery start time may change, and someone will reach out with the new arrival time, if so.    Patient is aware a COVID-19 test is needed before their procedure ONLY IF symptomatic.   (Patient is aware Thoracic is no longer requiring COVID-19 test)       Patient is aware a / is needed day of surgery.   Surgery Letter was sent via [employer],     Patient has my direct contact information for any further questions.      Spoke with pt who stated she could not have an early start time due to her ride. Pt decided to take 7/3 2nd case instead of 7/10 1st case. Pt stated she was ok with having surgery before the holiday.

## 2024-05-28 NOTE — TELEPHONE ENCOUNTER
Called and spoke with patient. Patient states she is looking for provider to renew CPAP supplies. Patient currently follows with [facility] but states they are unable to see her until August. Provided update that pulmonary does not order CPAP supplies in [place]. Patient declined sleep referral and will wait for follow-up with [facility].    Jaycob Parks RN

## 2024-05-29 NOTE — TELEPHONE ENCOUNTER
FUTURE VISIT INFORMATION      SURGERY INFORMATION:  Date: 7/3/24  Location: [facility] or  Surgeon:  Valeri Ivey MD   Anesthesia Type:  general  Procedure: ROBOT-ASSISTED, LAPAROSCOPIC hiatal hernia repair, gastrostomy tube, possible Nissen fundoplication esophagogastroscopy   RECORDS REQUESTED FROM:       Primary Care Provider: Cherie Brennan NP - [facility]alth    Pertinent Medical History: hypertension    Most recent PFT's: 2/12/24

## 2024-05-31 DIAGNOSIS — K44.9 HIATAL HERNIA: Primary | ICD-10-CM

## 2024-05-31 DIAGNOSIS — K42.9 UMBILICAL HERNIA: ICD-10-CM

## 2024-05-31 NOTE — TELEPHONE ENCOUNTER
Called and spoke with pt to let her know the surgeon would like to move her surgery back to 7/17. Pt stated as long as she doesn't have a 1st start, she is ok with that. Writer informed pt that for right now, she will be a 1st case until another case is added on this day. Once this happens, she can be put as 2nd. Pt understood and agreed.understood and agreed.    Veronica Pacheco on 5/31/2024 at 2:30 PM

## 2024-06-03 NOTE — TELEPHONE ENCOUNTER
FUTURE VISIT INFORMATION      SURGERY INFORMATION:  Date: 7/17/24  Location: [facility] or  Surgeon:  Valeri Ivey MD   Anesthesia Type:  general  Procedure: ROBOT-ASSISTED, LAPAROSCOPIC hiatal hernia repair, gastrostomy tube, possible Nissen fundoplication esophagogastroscopy     RECORDS REQUESTED FROM:       Primary Care Provider: Cherie Brennan NP - [facility]    Pertinent Medical History: ISATU, hypertension    Most recent PFT's: 2/12/24

## 2024-06-03 NOTE — TELEPHONE ENCOUNTER
REFERRAL INFORMATION:  Referring Provider:    Referring Clinic:    Reason for Visit/Diagnosis: Hiatal Hernia, Umbilical Hernia       FUTURE VISIT INFORMATION:  Appointment Date: 6/18/2024  Appointment Time: 11:30 AM     NOTES RECORD STATUS  DETAILS   OFFICE NOTE from Referring Provider [facility]:  5/31/24 - Referral order  4/30/24 - Nurse note   OFFICE NOTE from Other Specialists [facility]:  4/10/24 - PAC OV with Kianna San, CNS  4/9/24 - PULM OV with Dr. Briones   HOSPITAL DISCHARGE SUMMARY/ ED VISITS  N/A    OPERATIVE REPORT N/A    ENDOSCOPY (EGD)  [facility]:  8/5/14 - EGD   PERTINENT LABS Internal    IMAGING (CT, MRI, US, XR)  [facility]:  4/8/24, 1/11/24 - CT Chest  6/19/20 - US Abdomen

## 2024-06-16 NOTE — PROGRESS NOTES
THORACIC SURGERY FOLLOW UP VISIT      I saw Ms. Brandi Stern in follow-up today. The clinical summary follows:   67 yo F with large hiatal hernia with intermittent symptoms  She was scheduled for surgery twice and canceled in the last 3 motnhs due to other issues such as cough and resp infection.  She is now tentatively booked for 7/17 and is here to review optimization for surgery.  She still has symtpoms of nausea and fullness but this is only with spicy or hot meals.   She is relatively asymptomatic on a daily basis      INTERVAL STUDIES  None   ETOH  TOB  BMI      From a personal perspective, she is here alone     IMPRESSION No diagnosis found.  68 year-old woman with  hiatal hernia     PLAN  I spent 40 min on the date of the encounter in chart review, patient visit, review of tests, documentation and/or discussion with other providers about the issues documented above. I reviewed the plan as follows:  We discussed that she was doing better overall with her resp symtpoms. However, she has gained weight in the last few months. She hasn't been able to follow through with her weight management appopintments.   We discussed that surgical repair of the HH would help her symptoms but to give her the best and most durable results, it would be important to work on weight loss prior to surgery especially since she didn't have debilitating symtpoms.  She understands and agrees.    -weight management referral  -nutrition labs  - gen surg consult pending for umb hernia- consideration for combined surgery- can disucss with her surgeon once the initial consult is done  -discussed that surgery date likely to be moved    All questions were answered and the patient and present family were in agreement with the plan.  I appreciate the opportunity to participate in the care of your patient and will keep you updated.  Sincerely,

## 2024-06-17 ENCOUNTER — LAB (OUTPATIENT)
Dept: LAB | Facility: CLINIC | Age: 69
End: 2024-06-17
Payer: MEDICARE

## 2024-06-17 ENCOUNTER — ONCOLOGY VISIT (OUTPATIENT)
Dept: SURGERY | Facility: CLINIC | Age: 69
End: 2024-06-17
Attending: STUDENT IN AN ORGANIZED HEALTH CARE EDUCATION/TRAINING PROGRAM
Payer: MEDICARE

## 2024-06-17 ENCOUNTER — MYC MEDICAL ADVICE (OUTPATIENT)
Dept: SURGERY | Facility: CLINIC | Age: 69
End: 2024-06-17

## 2024-06-17 VITALS
HEART RATE: 101 BPM | OXYGEN SATURATION: 95 % | WEIGHT: 222.5 LBS | RESPIRATION RATE: 18 BRPM | TEMPERATURE: 99 F | DIASTOLIC BLOOD PRESSURE: 83 MMHG | SYSTOLIC BLOOD PRESSURE: 146 MMHG | BODY MASS INDEX: 37.03 KG/M2

## 2024-06-17 DIAGNOSIS — K44.9 HIATAL HERNIA: ICD-10-CM

## 2024-06-17 DIAGNOSIS — K44.9 HIATAL HERNIA: Primary | ICD-10-CM

## 2024-06-17 LAB
ALBUMIN SERPL BCG-MCNC: 4 G/DL (ref 3.5–5.2)
PREALB SERPL-MCNC: 23.2 MG/DL (ref 20–40)

## 2024-06-17 PROCEDURE — 99000 SPECIMEN HANDLING OFFICE-LAB: CPT | Performed by: PATHOLOGY

## 2024-06-17 PROCEDURE — G0463 HOSPITAL OUTPT CLINIC VISIT: HCPCS | Performed by: STUDENT IN AN ORGANIZED HEALTH CARE EDUCATION/TRAINING PROGRAM

## 2024-06-17 PROCEDURE — 36415 COLL VENOUS BLD VENIPUNCTURE: CPT | Performed by: PATHOLOGY

## 2024-06-17 PROCEDURE — 84134 ASSAY OF PREALBUMIN: CPT | Performed by: CLINICAL NURSE SPECIALIST

## 2024-06-17 PROCEDURE — 99215 OFFICE O/P EST HI 40 MIN: CPT | Performed by: STUDENT IN AN ORGANIZED HEALTH CARE EDUCATION/TRAINING PROGRAM

## 2024-06-17 PROCEDURE — 82040 ASSAY OF SERUM ALBUMIN: CPT | Performed by: PATHOLOGY

## 2024-06-17 RX ORDER — MOMETASONE FUROATE MONOHYDRATE 50 UG/1
2 SPRAY, METERED NASAL
COMMUNITY

## 2024-06-17 ASSESSMENT — PAIN SCALES - GENERAL: PAINLEVEL: NO PAIN (0)

## 2024-06-17 NOTE — LETTER
6/17/2024      Brandi Stern  [address] [place] [address]      Dear Colleague,    Thank you for referring your patient, Brandi Stern, to the [facility]. Please see a copy of my visit note below.    THORACIC SURGERY FOLLOW UP VISIT      I saw Ms. Brandi Stern in follow-up today. The clinical summary follows:   67 yo F with large hiatal hernia with intermittent symptoms  She was scheduled for surgery twice and canceled in the last 3 motnhs due to other issues such as cough and resp infection.  She is now tentatively booked for 7/17 and is here to review optimization for surgery.  She still has symtpoms of nausea and fullness but this is only with spicy or hot meals.   She is relatively asymptomatic on a daily basis      INTERVAL STUDIES  None   ETOH  TOB  BMI      From a personal perspective, she is here alone     IMPRESSION No diagnosis found.  68 year-old woman with  hiatal hernia     PLAN  I spent 40 min on the date of the encounter in chart review, patient visit, review of tests, documentation and/or discussion with other providers about the issues documented above. I reviewed the plan as follows:  We discussed that she was doing better overall with her resp symtpoms. However, she has gained weight in the last few months. She hasn't been able to follow through with her weight management appopintments.   We discussed that surgical repair of the HH would help her symptoms but to give her the best and most durable results, it would be important to work on weight loss prior to surgery especially since she didn't have debilitating symtpoms.  She understands and agrees.    -weight management referral  -nutrition labs  - gen surg consult pending for umb hernia- consideration for combined surgery- can disucss with her surgeon once the initial consult is done  -discussed that surgery date likely to be moved    All questions were answered and the patient and present family  were in agreement with the plan.  I appreciate the opportunity to participate in the care of your patient and will keep you updated.  Sincerely,      Again, thank you for allowing me to participate in the care of your patient.        Sincerely,        Valeri Ivey MD

## 2024-06-17 NOTE — NURSING NOTE
"Oncology Rooming Note    June 17, 2024 8:38 AM   Brandi Stern is a 68 year old female who presents for:    Chief Complaint   Patient presents with    Oncology Clinic Visit     Hiatal hernia     Initial Vitals: BP (!) 146/83 (BP Location: Right arm, Patient Position: Sitting, Cuff Size: Adult Large)   Pulse 101   Temp 99  F (37.2  C) (Oral)   Resp 18   Wt 100.9 kg (222 lb 8 oz)   LMP  (LMP Unknown)   SpO2 95%   BMI 37.03 kg/m   Estimated body mass index is 37.03 kg/m  as calculated from the following:    Height as of 4/14/24: 1.651 m (5' 5\").    Weight as of this encounter: 100.9 kg (222 lb 8 oz). Body surface area is 2.15 meters squared.  No Pain (0) Comment: Data Unavailable   No LMP recorded (lmp unknown). Patient is postmenopausal.  Allergies reviewed: Yes  Medications reviewed: Yes    Medications: Medication refills not needed today.  Pharmacy name entered into [facility]:    [facility] - [place], [place] [address]  [id number] - [place], [place] - [address] [facility]    Frailty Screening:   Is the patient here for a new oncology consult visit in cancer care? 2. No      Clinical concerns: none      Shannon Cox, EMT  6/17/2024            "

## 2024-06-18 ENCOUNTER — PRE VISIT (OUTPATIENT)
Dept: SURGERY | Facility: CLINIC | Age: 69
End: 2024-06-18

## 2024-06-18 ENCOUNTER — OFFICE VISIT (OUTPATIENT)
Dept: SURGERY | Facility: CLINIC | Age: 69
End: 2024-06-18
Attending: CLINICAL NURSE SPECIALIST
Payer: MEDICARE

## 2024-06-18 VITALS
HEIGHT: 65 IN | BODY MASS INDEX: 36.79 KG/M2 | WEIGHT: 220.8 LBS | DIASTOLIC BLOOD PRESSURE: 67 MMHG | SYSTOLIC BLOOD PRESSURE: 127 MMHG | HEART RATE: 108 BPM | OXYGEN SATURATION: 95 %

## 2024-06-18 DIAGNOSIS — K44.9 HIATAL HERNIA: ICD-10-CM

## 2024-06-18 DIAGNOSIS — K42.9 UMBILICAL HERNIA WITHOUT OBSTRUCTION AND WITHOUT GANGRENE: Primary | ICD-10-CM

## 2024-06-18 PROCEDURE — 99214 OFFICE O/P EST MOD 30 MIN: CPT | Performed by: SURGERY

## 2024-06-18 RX ORDER — DOCUSATE SODIUM 100 MG/1
100 CAPSULE, LIQUID FILLED ORAL 2 TIMES DAILY
COMMUNITY

## 2024-06-18 ASSESSMENT — PAIN SCALES - GENERAL: PAINLEVEL: NO PAIN (0)

## 2024-06-18 NOTE — PROGRESS NOTES
Brandi Stern is a 68 year old female with a long  history of an umbilical hernia with the following symptoms of lump.    Obstructive symptoms:  no  Urinary difficulties:  no  Chronic cough: no  Constipation:  no  Current level of activity:  Low    Past medical history, medications, allergies, family history, and social history were reviewed with the patient.    Past Medical History:   Diagnosis Date    Allergic rhinitis due to other allergen     Apneas, obstructive sleep     Chronic lymphocytic thyroiditis     COPD (chronic obstructive pulmonary disease) (H)     Depressive disorder     Depressive disorder, not elsewhere classified     Disorder of bone and cartilage, unspecified     Esophageal reflux     Essential hypertension, benign     Generalized osteoarthrosis, unspecified site     knees    HASHIMOTO'S THYROIDITIS 11/15/2004    Headache above the eye region     Hiatal hernia     Insomnia, unspecified     Moderate persistent asthma     Nasal congestion     Positive PPD     treated for one year    Pure hyperglyceridemia     Scoliosis     Snoring     Tobacco use disorder      Past Surgical History:   Procedure Laterality Date    ABDOMEN SURGERY      GB out    ARTHROSCOPY KNEE RT/LT  2/07    left knee    BIOPSY      Colon    CHOLECYSTECTOMY      COLONOSCOPY  8/5/2014    Procedure: COLONOSCOPY;  Surgeon: Mau De La Fuente MD;  Location:  GI    ESOPHAGOSCOPY, GASTROSCOPY, DUODENOSCOPY (EGD), COMBINED  8/5/2014    Procedure: COMBINED ESOPHAGOSCOPY, GASTROSCOPY, DUODENOSCOPY (EGD), BIOPSY SINGLE OR MULTIPLE;  Surgeon: Mau De La Fuente MD;  Location:  GI    Gall bladder removal  2011    [facility] TOTAL KNEE ARTHROPLASTY      bilateral       ROS: 10 point review of systems negative except noted in HPI   PHYSICAL EXAM  General appearance- healthy, alert, and in no distress.  Skin- Skin color and turgor normal.  No obvious rashes.  Lungs- Respiratory effort unlabored.  Gait- Normal.  BMI 36  Abdomen - soft non  "distended, non tender umbilical hernia    Impression: umbilical hernia in overweight patient. High riskfor recurrence and ronald operative issues.  Recommend: Watchful waiting safe. If decides wants elective repair would need weight loss. Would not combine repair with planned surgery for hiatal hernia.  Will contact us for weight management referral should she wish to proceed.    \"Total time = 30 minutes, spent on the date of encounter doing chart review, history and physical, documentation, patient education, and any further activity as noted above.       "

## 2024-06-18 NOTE — PATIENT INSTRUCTIONS
You met with Dr. Tunde Lisa.      Today's visit instructions:    Watchful waiting of your umbilical hernia. If you decide you would like to proceed with hernia repair at some point in the future, you will need to work on weight loss. Please work with your PCP for a referral to Medical Weight Management if you decide to pursue this in the future.        If you have questions please contact Gia RN or Salome RN during regular clinic hours, Monday through Friday 7:30 AM - 4:00 PM, or you can contact us via [employer] at anytime.       If you have urgent needs after-hours, weekends, or holidays please call the hospital at [phone] and ask to speak with our on-call General Surgery Team.    Appointment [phone]  Nurse Advice (Gia or Salome): [phone]   Surgery Scheduler (Ibis): [phone]  Fax: [phone]

## 2024-06-18 NOTE — LETTER
6/18/2024       RE: Brandi Stern  [address] [place] [address]       Dear Colleague,    Thank you for referring your patient, Brandi Stern, to the [facility]. Please see a copy of my visit note below.    Brandi Stern is a 68 year old female with a long  history of an umbilical hernia with the following symptoms of lump.    Obstructive symptoms:  no  Urinary difficulties:  no  Chronic cough: no  Constipation:  no  Current level of activity:  Low    Past medical history, medications, allergies, family history, and social history were reviewed with the patient.    Past Medical History:   Diagnosis Date    Allergic rhinitis due to other allergen     Apneas, obstructive sleep     Chronic lymphocytic thyroiditis     COPD (chronic obstructive pulmonary disease) (H)     Depressive disorder     Depressive disorder, not elsewhere classified     Disorder of bone and cartilage, unspecified     Esophageal reflux     Essential hypertension, benign     Generalized osteoarthrosis, unspecified site     knees    HASHIMOTO'S THYROIDITIS 11/15/2004    Headache above the eye region     Hiatal hernia     Insomnia, unspecified     Moderate persistent asthma     Nasal congestion     Positive PPD     treated for one year    Pure hyperglyceridemia     Scoliosis     Snoring     Tobacco use disorder      Past Surgical History:   Procedure Laterality Date    ABDOMEN SURGERY      GB out    ARTHROSCOPY KNEE RT/LT  2/07    left knee    BIOPSY      Colon    CHOLECYSTECTOMY      COLONOSCOPY  8/5/2014    Procedure: COLONOSCOPY;  Surgeon: Mau De La Fuente MD;  Location:  GI    ESOPHAGOSCOPY, GASTROSCOPY, DUODENOSCOPY (EGD), COMBINED  8/5/2014    Procedure: COMBINED ESOPHAGOSCOPY, GASTROSCOPY, DUODENOSCOPY (EGD), BIOPSY SINGLE OR MULTIPLE;  Surgeon: Mau De La Fuente MD;  Location:  GI    Gall bladder removal  2011     "ZZC TOTAL KNEE ARTHROPLASTY      bilateral     ROS: 10 point review of systems negative except noted in HPI   PHYSICAL EXAM  General appearance- healthy, alert, and in no distress.  Skin- Skin color and turgor normal.  No obvious rashes.  Lungs- Respiratory effort unlabored.  Gait- Normal.  BMI 36  Abdomen - soft non distended, non tender umbilical hernia    Impression: umbilical hernia in overweight patient. High riskfor recurrence and ronald operative issues.  Recommend: Watchful waiting safe. If decides wants elective repair would need weight loss. Would not combine repair with planned surgery for hiatal hernia.  Will contact us for weight management referral should she wish to proceed.    \"Total time = 30 minutes, spent on the date of encounter doing chart review, history and physical, documentation, patient education, and any further activity as noted above.                 Again, thank you for allowing me to participate in the care of your patient.      Sincerely,    Tunde Lisa MD    "

## 2024-06-18 NOTE — NURSING NOTE
"Chief Complaint   Patient presents with    New Patient     Umbilical hernia       Vitals:    06/18/24 1102   BP: 127/67   BP Location: Left arm   Patient Position: Sitting   Cuff Size: Adult Large   Pulse: 108   SpO2: 95%   Weight: 100.2 kg (220 lb 12.8 oz)   Height: 1.651 m (5' 5\")       Body mass index is 36.74 kg/m .                          Henry Gates, EMT    "

## 2024-06-19 ENCOUNTER — PRE VISIT (OUTPATIENT)
Dept: SURGERY | Facility: CLINIC | Age: 69
End: 2024-06-19

## 2024-07-01 ENCOUNTER — PRE VISIT (OUTPATIENT)
Dept: SURGERY | Facility: CLINIC | Age: 69
End: 2024-07-01

## 2024-07-23 ENCOUNTER — VIRTUAL VISIT (OUTPATIENT)
Dept: ENDOCRINOLOGY | Facility: CLINIC | Age: 69
End: 2024-07-23
Attending: CLINICAL NURSE SPECIALIST
Payer: MEDICARE

## 2024-07-23 VITALS — BODY MASS INDEX: 35.36 KG/M2 | HEIGHT: 66 IN | WEIGHT: 220 LBS

## 2024-07-23 DIAGNOSIS — E66.01 CLASS 2 SEVERE OBESITY DUE TO EXCESS CALORIES WITH SERIOUS COMORBIDITY AND BODY MASS INDEX (BMI) OF 35.0 TO 35.9 IN ADULT (H): ICD-10-CM

## 2024-07-23 DIAGNOSIS — E66.812 CLASS 2 SEVERE OBESITY DUE TO EXCESS CALORIES WITH SERIOUS COMORBIDITY AND BODY MASS INDEX (BMI) OF 35.0 TO 35.9 IN ADULT (H): ICD-10-CM

## 2024-07-23 PROCEDURE — G2211 COMPLEX E/M VISIT ADD ON: HCPCS | Mod: 95 | Performed by: INTERNAL MEDICINE

## 2024-07-23 PROCEDURE — 99204 OFFICE O/P NEW MOD 45 MIN: CPT | Mod: 95 | Performed by: INTERNAL MEDICINE

## 2024-07-23 ASSESSMENT — PAIN SCALES - GENERAL: PAINLEVEL: SEVERE PAIN (6)

## 2024-07-23 NOTE — PROGRESS NOTES
Virtual Visit Details    Type of service:  Video Visit     Originating Location (pt. Location): Home    Distant Location (provider location):  Off-site  Platform used for Video Visit: Jane

## 2024-07-23 NOTE — PATIENT INSTRUCTIONS
CONTRAVE (bupropion and naltrexone)    We are considering starting Contrave. Contrave is specifically prescribed for obesity. It is a combination of two medications, Naltrexone and Bupropione (Wellbutrin). The Bupropion helps lessen appetite and the Naltrexone works by blocking certain receptors in the brain and curbing cravings.      For some of our patients the medication works right away. Other patients don't feel much of a change but find they've lost weight. Like all weight loss medications, Contrave works best when you help it work. This means:  1. Having less tempting high calorie (fattening) food around the house or office. (For people with strong cravings this is very important.)   2. Staying away from situations or people that may trigger your cravings .   3. Eating out only one time or less each week.  4. Eating your meals at a table with the TV or computer off.    WARNING: This medication blocks the action of opioid type pain medications. If you routinely take any medication like Codeine, Oxycontin, Percocet, Morphine, Dilaudid or Methodone, do not take this until you have talked with weight management staff.     FYI- If you are planning on having an elective surgery, you should start titrating yourself off Contrave 4 weeks prior to surgery. This would entail decreasing the same way you started the medication, by taking one tablet less per week for 4 weeks, until you are able to stop the medication. If you need to stop it quicker, you can take 1 tablet in the morning and 1 tablet in the evening for 2 weeks, and then stop the medication. Please don't hesitate to call if you have any questions regarding this.    Dosing as follows:  Week 1- 1 tablet in the morning  Week 2- 1 tablet in the morning and 1 tablet at bedtime  Week 3- 2 tablets in the morning and 1 tablet at bedtime  Week 4 and thereafter- 2 tablets in the morning and 2 tablets at bedtime    Side-effects: The most common side effect include:  nausea; constipation; headache; vomiting; dizziness; diarrhea; trouble sleeping; and dry mouth.     See MTM PharmD in 2 month(s)  See MD or PA in 4 month(s)    If you have any questions, please do not hesitate to call Weight management clinic at [phone] or [phone].  If you need to fax, please fax to [phone].    Sincerely,    Robbie Flores MD  Endocrinology
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
## 2024-07-23 NOTE — NURSING NOTE
Current patient location: [address] [place] [address]    Is the patient currently in the state of [place]? YES    Visit mode:VIDEO    If the visit is dropped, the patient can be reconnected by: VIDEO VISIT: Text to cell phone:   Telephone Information:   Mobile [phone]       Will anyone else be joining the visit? NO  (If patient encounters technical issues they should call [phone])    How would you like to obtain your AVS? MyChart    Are changes needed to the allergy or medication list? No, Pt stated no changes to allergies, and Pt stated no med changes    Are refills needed on medications prescribed by this physician? NO    Reason for visit: Consult (JEFE)    Debbi RUSS
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
## 2024-07-30 ENCOUNTER — TELEPHONE (OUTPATIENT)
Dept: ENDOCRINOLOGY | Facility: CLINIC | Age: 69
End: 2024-07-30
Payer: MEDICARE

## 2024-07-30 NOTE — TELEPHONE ENCOUNTER
Left Voicemail (1st Attempt) and Sent Mychart (1st Attempt) for the patient to call back and schedule the following:    Appointment type: RET [facility]  Provider: Dr Avalos or PA  Return date: around 11/23/24  Specialty phone number: [phone]  Additional appointment(s) needed: n/a  Additonal Notes: n/a

## 2024-10-23 ENCOUNTER — VIRTUAL VISIT (OUTPATIENT)
Dept: PHARMACY | Facility: CLINIC | Age: 69
End: 2024-10-23
Attending: INTERNAL MEDICINE
Payer: MEDICARE

## 2024-10-23 VITALS — WEIGHT: 203 LBS | HEIGHT: 65 IN | BODY MASS INDEX: 33.82 KG/M2

## 2024-10-23 DIAGNOSIS — E66.01 CLASS 2 SEVERE OBESITY DUE TO EXCESS CALORIES WITH SERIOUS COMORBIDITY AND BODY MASS INDEX (BMI) OF 35.0 TO 35.9 IN ADULT (H): Primary | ICD-10-CM

## 2024-10-23 DIAGNOSIS — J30.1 SEASONAL ALLERGIC RHINITIS DUE TO POLLEN: ICD-10-CM

## 2024-10-23 DIAGNOSIS — E66.812 CLASS 2 SEVERE OBESITY DUE TO EXCESS CALORIES WITH SERIOUS COMORBIDITY AND BODY MASS INDEX (BMI) OF 35.0 TO 35.9 IN ADULT (H): Primary | ICD-10-CM

## 2024-10-23 DIAGNOSIS — M79.7 FIBROMYALGIA: ICD-10-CM

## 2024-10-23 DIAGNOSIS — M15.9 OSTEOARTHRITIS OF MULTIPLE JOINTS, UNSPECIFIED OSTEOARTHRITIS TYPE: ICD-10-CM

## 2024-10-23 DIAGNOSIS — G25.81 RESTLESS LEG SYNDROME: ICD-10-CM

## 2024-10-23 DIAGNOSIS — G47.00 INSOMNIA, UNSPECIFIED TYPE: ICD-10-CM

## 2024-10-23 DIAGNOSIS — J45.40 MODERATE PERSISTENT ASTHMA WITHOUT COMPLICATION: ICD-10-CM

## 2024-10-23 DIAGNOSIS — K21.9 GASTROESOPHAGEAL REFLUX DISEASE WITHOUT ESOPHAGITIS: ICD-10-CM

## 2024-10-23 DIAGNOSIS — Z72.0 TOBACCO ABUSE: ICD-10-CM

## 2024-10-23 DIAGNOSIS — F41.9 ANXIETY: ICD-10-CM

## 2024-10-23 DIAGNOSIS — E78.2 MIXED HYPERLIPIDEMIA: ICD-10-CM

## 2024-10-23 DIAGNOSIS — L71.9 ROSACEA: ICD-10-CM

## 2024-10-23 DIAGNOSIS — E06.3 CHRONIC LYMPHOCYTIC THYROIDITIS: ICD-10-CM

## 2024-10-23 DIAGNOSIS — F33.0 MAJOR DEPRESSIVE DISORDER, RECURRENT EPISODE, MILD WITH ANXIOUS DISTRESS (H): ICD-10-CM

## 2024-10-23 DIAGNOSIS — Z78.9 TAKES DIETARY SUPPLEMENTS: ICD-10-CM

## 2024-10-23 RX ORDER — SENNOSIDES 8.6 MG
1300 CAPSULE ORAL 2 TIMES DAILY
COMMUNITY

## 2024-10-23 RX ORDER — UBIDECARENONE 100 MG
100 CAPSULE ORAL DAILY
COMMUNITY

## 2024-10-23 ASSESSMENT — PAIN SCALES - GENERAL: PAINLEVEL_OUTOF10: MODERATE PAIN (4)

## 2024-10-23 NOTE — Clinical Note
Amarjit Avalos - for my learning, this patient was on Fosamax per her preference from 8171-0967 then was stopped 2020 dexa shows osteopenia. 2022 shows -2.6 at radius but all others normal or osteopenia category. She needs repeat dexa anyhow but wondering your thoughts on if that does indicate her for bisphosphonate therapy?

## 2024-10-23 NOTE — PATIENT INSTRUCTIONS
"Recommendations from today's MTM visit:                                                       Continue current regimen for now  Follow-up with primary care provider in January as planned  Repeat yearly labs, DEXA, etc.     Follow-up: Return in about 5 months (around 3/23/2025) for Medication Therapy Management Pharmacist Visit.    It was great speaking with you today.  I value your experience and would be very thankful for your time in providing feedback in our clinic survey. In the next few days, you may receive an email or text message from [employer] with a link to a survey related to your  clinical pharmacist.\"     To schedule another MTM appointment, please call the clinic directly or you may call the MTM scheduling line at [phone] or toll-free at [phone].     My Clinical Pharmacist's contact information:                                                      Please feel free to contact me with any questions or concerns you have.      Lauren Bloch, PharmD  Medication Therapy Management Pharmacist   [facility] Management [place]             "

## 2024-10-23 NOTE — NURSING NOTE
Current patient location: home    Is the patient currently in the state of [place]? YES    Visit mode: telephone    If the visit is dropped, the patient can be reconnected by: call  Telephone Information:   Mobile [phone]       Will anyone else be joining the visit? NO  (If patient encounters technical issues they should call [phone] :[phone])    Are changes needed to the allergy or medication list? Pt stated no changes to allergies and Pt stated no med changes    Are refills needed on medications prescribed by this physician? NO    Rooming Documentation:  Not applicable      Reason for visit: Medication Therapy Management    Wt other than 24 hrs:    Pain more than one location:  no  Helena RUSS

## 2024-10-23 NOTE — PROGRESS NOTES
Medication Therapy Management (MTM) Encounter    ASSESSMENT:                            Medication Adherence/Access: No issues identified.    Weight Management   Progressing well, to continue current regimen without change.  Patient is progressing towards the goal needed for hernia repair.    Smoking cessation:    Congratulated patient regarding her progress for abstaining from smoking generally.  Encouraged abstaining from alcohol during this time of quitting smoking as it appears this is beneficial for her to be successful.     GERD    Stable.    Osteoarthritis:   Patient to follow-up with orthopedic specialist as planned.    Anxiety/Depression/Fibromyalgia:   Stable.     Hyperlipidemia   LDL at goal of less than 100. Trigs within goal.    Asthma/Allergies:   Stable.  Defer to allergy specialist.    Insomnia:   Stable.     Hypothyroidism:   Last TSH within normal limits but due for repeat.    Hypertension   Stable. Blood pressure at goal less than 130 over 80 mmHg.    Rosacea:   Stable.      Restless Leg Syndrome:   Stable.     Supplements   Last DEXA 2022 does show T score < -2.5 at distal radius. Due for repeat DEXA as of now. Do wonder if bisphosphonate therapy is indicated based of last DEXA, will reach out to Endocrinology for their thoughts.     PLAN:                            Continue current regimen for now  Follow-up with primary care provider in January as planned  Repeat yearly labs, DEXA, etc.   Pharmacist to follow-up with endocrinology regarding thoughts of last DEXA.     Follow-up: Return in about 5 months (around 3/23/2025) for Medication Therapy Management Pharmacist Visit.    SUBJECTIVE/OBJECTIVE:                          Brandi Stern is a 69 year old female seen for an initial visit. She was referred to me from Dr. Robbie Flores.      Reason for visit: comprehensive review of medications, Contrave follow up.    Allergies/ADRs: Reviewed in chart  Past Medical History: Reviewed in  "chart  Tobacco: She reports that she has been smoking cigarettes. She started smoking about 53 years ago. She has a 26.1 pack-year smoking history. She has been exposed to tobacco smoke. She uses smokeless tobacco.Nicotine/Tobacco Cessation Plan  Reports she had stopped smoking for months, then smoked yesterday when she had a glass of wine, that is a trigger for her  Alcohol: not currently using often, drink alcohol for first time in 2 months yesterday   Caffeine: 2 cups coffee/day    Medication Adherence/Access: Patient uses pill box(es).  Patient takes medications 2 time(s) per day.     Weight Management   Contrave (naltrexone/bupropion) 8-90 mg tablet: 2 tablets twice daily    Patient reports the following medication side effects: diarrhea, increased gas but then found her getting constipated. She was taking senna but now no longer needing this, does still take docusate twice daily. Having bowel movement every day. She has been able to quit smoking since starting Contrave. Goal BMI < 30 for hernia repair, pr Thoracic Surgery, Dr. Ivey.   Nutrition/Eating Habits: she does find that she is hungry insatiably during the day and other days limited appetite.  She does feel she is getting 2-3 meals per day. Portions smaller. Eating more salads. If hungry after supper: beef jerky + 1 cub cheese.   Exercise/Activity: reports activity level changed when knees replaced, since then feels she is more sedentary. Used to hike, bike and canoe and had to stop all of this due to pain.     Initial Consult Weight: 220 lb      Current weight today: 203 lbs 0 oz  Cumulative Weight Loss: -17 lb, -7.7% from baseline    Wt Readings from Last 4 Encounters:   10/23/24 203 lb (92.1 kg)   07/23/24 220 lb (99.8 kg)   06/18/24 220 lb 12.8 oz (100.2 kg)   06/17/24 222 lb 8 oz (100.9 kg)     Estimated body mass index is 33.78 kg/m  as calculated from the following:    Height as of this encounter: 5' 5\" (1.651 m).    Weight as of this encounter: " 203 lb (92.1 kg).    Smoking cessation:    Contrave (naltrexone/bupropion) 8-90 mg tablet: 2 tablets twice daily    Started smoking in teens. Stopped smoking in 6 years then took extracurricular course in wine/ so then  started smoking again. Now again has been quitting smoking the last 2 months other than yesterday. Had book club yesterday and had glass of wine which prompted smoking again. Now feels she has to remain from drinking if going to continue to quit smoking.      GERD    Omeprazole 20 mg twice daily  Famotidine 20 mg twice daily     Patient reports no current symptoms.   Patient feels that current regimen is effective.  Has HH.   Does have food trigger like coffee or spicy foods so tries to steer clear or will eat food with coffee.        Osteoarthritis:   Acetaminophen 1300 mg twice daily   Cyclobenzaprine 5-10 mg as needed, typically 10 mg nightly       Deals with chronic knee pain, both knees replacements. Reports patellas in wrong place. She also is dealing with chronic back pain, lower back. Sometimes she can't stand more than 15-20 minutes before has to sit.  Takes cyclobenzaprine typically at bedtime. No NSAIDs due to historical creatinine elevations when was using NSAIDs.      Mental Health   Anxiety, Depression, and Fibromyalgia  Pristiq 100 mg daily.   Patient reports no current medication side effects.  At times she is waking up in the morning and feels her whole body hurts but does also report that the Pristiq has been helpful for her mood and generalized fatigue.  Fatigue is still ongoing but improved.         Hyperlipidemia   Atorvastatin 10mg daily  Fenofibrate 145mg once daily.       Patient reports no significant myalgias or other side effects.  I previously thought that the combination of the regimen was affecting muscle pain tenderness weakness so she did do a experiment with her PCP stopping 1 agent for a month then restarting that agent and then going up the other agent for  a month to see if there was a difference and she did not note a significant difference so therefore the 2 medications were continued.       Asthma/Allergies:   Airduo 1 puff twice daily - not using   Allegra 180 mg daily   Nasacort 2 spray each nostril as needed      Follows with [facility]. Still getting allergy shots from them, once monthly.   Feels that breathing has improved with quitting smoking the last months. No current breathing concerns.   Patient reports the following symptoms: none.  Asthma Action Plan on file: NO    Insomnia:   Zolpidem 5 mg nightly as needed   Melatonin 5 mg nightly as needed      Has ipad at night setting, trying to turn off within 3-4 hours before bed but not always successful. She will do 5 mg over higher doses because higher doses of melatonin she feels sluggish the next day. Reports that longstanding issues of sleep. Does feel like generally getting good sleep. Will wake up once for bathroom but generally able to go back to sleep regularly.   Medications tried/failed: eszopiclone: ineffective.      Hypothyroidism:   levothyroxine 88 mcg daily.      Patient is having the following symptoms: none.     Hypertension   Lisinopril 10 mg daily   Patient reports no current medication side effects  Patient does not self-monitor blood pressure.         Rosacea:   Metronidazole .75% cram      Helpful, no issues.      Restless Leg Syndrome:   Pramipexole 0.125 mg tablet: 2 tablets with dinner daily and 1 tablet bedtime as needed     Feels that this dose is working well for her, not often using bedtime dose. Feels like RLS symptoms are so intense if not taking medications that she has to physically get up. Reports hemochromatosis carrier.     Supplements   Multivitamin daily   Calcium twice daily   Vitamin D 1000 international unit(s) daily     No reported issues at this time.   Was on alendronate from 9012-7209, ~1 year, then was stopped as was told she did not need it given her DEXA  "results at that time. Has remained on calcium. Family history of osteoporosis. No history of fractures. No long term prednisone use reported.        Today's Vitals: Ht 5' 5\" (1.651 m)   Wt 203 lb (92.1 kg)   LMP  (LMP Unknown)   BMI 33.78 kg/m    ----------------      I spent 45 minutes with this patient today. All changes were made via collaborative practice agreement with Dr. Robbie Flores. A copy of the visit note was provided to the patient's provider(s).    A summary of these recommendations was sent via [facility].    Lauren Bloch, PharmD, BCACP   Medication Therapy Management Pharmacist   [facility]    Telemedicine Visit Details  The patient's medications can be safely assessed via a telemedicine encounter.  Type of service:  Telephone visit  Originating Location (pt. Location): Home    Distant Location (provider location):  Off-site  Start Time:  1:38 PM  End Time:  2:23 PM     Medication Therapy Recommendations  Insomnia   1 Current Medication: eszopiclone (LUNESTA) 3 MG tablet (Discontinued)   Current Medication Sig: Take 1 tablet (3 mg) by mouth At Bedtime   Rationale: More effective medication available - Ineffective medication - Effectiveness   Recommendation: Change Medication - doxepin 3 MG tablet   Status: No Longer Relevant   Identified Date: 6/15/2022 Completed Date: 10/23/2024            "

## 2024-11-15 DIAGNOSIS — E66.812 CLASS 2 SEVERE OBESITY DUE TO EXCESS CALORIES WITH SERIOUS COMORBIDITY AND BODY MASS INDEX (BMI) OF 35.0 TO 35.9 IN ADULT (H): ICD-10-CM

## 2024-11-15 DIAGNOSIS — E66.01 CLASS 2 SEVERE OBESITY DUE TO EXCESS CALORIES WITH SERIOUS COMORBIDITY AND BODY MASS INDEX (BMI) OF 35.0 TO 35.9 IN ADULT (H): ICD-10-CM

## 2024-11-19 RX ORDER — NALTREXONE HYDROCHLORIDE AND BUPROPION HYDROCHLORIDE 8; 90 MG/1; MG/1
2 TABLET, EXTENDED RELEASE ORAL 2 TIMES DAILY
Qty: 120 TABLET | Refills: 1 | Status: SHIPPED | OUTPATIENT
Start: 2024-11-19

## 2024-11-19 NOTE — TELEPHONE ENCOUNTER
CONTRAVE ER 8-90 MG TABLET    Sig: Week 1- 1 tablet in the morning Week 2- 1 tablet in the morning and 1 tablet at bedtime Week 3- 2 tablets in the morning and 1 tablet at bedtime Week 4 and thereafter- 2 tablets in the morning and 2 tablets at bedtime    Last Written Prescription Date:  7/23/24  Last Fill Quantity: 180,   # refills: 1  Last Office Visit : 7/23/24  Future Office visit:  1/7/25    Routing refill request to provider for review/approval because:   Appts in place, last SIG is taper.Sig: Week 1- 1 tablet in the morning Week 2- 1 tablet in the morning and 1 tablet at bedtime Week 3- 2 tablets in the morning and 1 tablet at bedtime Week 4 and thereafter- 2 tablets in the morning and 2 tablets at bedtime

## 2024-12-18 DIAGNOSIS — K21.9 GASTROESOPHAGEAL REFLUX DISEASE WITHOUT ESOPHAGITIS: ICD-10-CM

## 2024-12-18 DIAGNOSIS — M62.830 SPASM OF BACK MUSCLES: ICD-10-CM

## 2024-12-20 NOTE — TELEPHONE ENCOUNTER
Patient calls today requesting a status update on her medication refills. Patient's pharmacy told patient to call and check the status. Please update and call patient once refill have been sent to pharmacy.

## 2024-12-22 RX ORDER — CYCLOBENZAPRINE HCL 5 MG
5-10 TABLET ORAL
Qty: 90 TABLET | Refills: 0 | Status: SHIPPED | OUTPATIENT
Start: 2024-12-22

## 2024-12-23 NOTE — TELEPHONE ENCOUNTER
Called a spoke to patient, patient was already aware that her medications was ready for pickup.     Corie Leung VF

## 2025-01-07 ENCOUNTER — VIRTUAL VISIT (OUTPATIENT)
Dept: ENDOCRINOLOGY | Facility: CLINIC | Age: 70
End: 2025-01-07
Payer: MEDICARE

## 2025-01-07 VITALS — BODY MASS INDEX: 31.82 KG/M2 | HEIGHT: 66 IN | WEIGHT: 198 LBS

## 2025-01-07 DIAGNOSIS — E66.812 CLASS 2 SEVERE OBESITY DUE TO EXCESS CALORIES WITH SERIOUS COMORBIDITY AND BODY MASS INDEX (BMI) OF 35.0 TO 35.9 IN ADULT (H): Primary | ICD-10-CM

## 2025-01-07 DIAGNOSIS — E66.01 CLASS 2 SEVERE OBESITY DUE TO EXCESS CALORIES WITH SERIOUS COMORBIDITY AND BODY MASS INDEX (BMI) OF 35.0 TO 35.9 IN ADULT (H): Primary | ICD-10-CM

## 2025-01-07 PROCEDURE — 98006 SYNCH AUDIO-VIDEO EST MOD 30: CPT | Performed by: INTERNAL MEDICINE

## 2025-01-07 PROCEDURE — G2211 COMPLEX E/M VISIT ADD ON: HCPCS | Performed by: INTERNAL MEDICINE

## 2025-01-07 ASSESSMENT — PAIN SCALES - GENERAL: PAINLEVEL_OUTOF10: MODERATE PAIN (5)

## 2025-01-07 NOTE — PROGRESS NOTES
Return Medical Weight Management Note     Brandi Stern  MRN:  [id number]  :  1955  YUSEF:  2025    Dear Cherie Brennan NP,    I had the pleasure of seeing your patient Brandi Stern. She is a 69 year old female who I am continuing to see for treatment of obesity related to: hypertension, large hiatal hernia, sleep apnea, asthma, GERD         2024    12:45 PM   --   I have the following health issues associated with obesity High Blood Pressure    High Cholesterol    Sleep Apnea    GERD (Reflux)    Asthma    Osteoarthritis (joint disease)    Hypothyroidism   I have the following symptoms associated with obesity Knee Pain    Back Pain    Fatigue   Started gaining weight around 28 years old but was very active at that time. Started to gain more weight after having bilateral knee replacement when she was 53 years old.     Previous medication trials  phentermine in  -- insomnia  topriamate in  -- brain fog, cognitive issue, fatigue  naltrexone in  -- stopped due to fatigue    Eating habit: like spicy food, like to explore various food, drinks wine a couple of drink per day (now reduce it)  Boredom eating, do both cooking and eating out    INTERVAL HISTORY:  Initial visit  and seen MTM 10/2024.    Started Contrave () 2024. Currently on 2 pills twice a day.  Lost 22 lbs which is about 10% of TBW. Now weight has been stuck at 200 lbs.  Concern about the cost of medication that has gone up.   Some has hand shake, flatulence and constipation.  She is interested in compounded semaglutide instead of Contrave due to cost.  She is now using  -- [employer]    CURRENT WEIGHT:   198 lbs 0 oz    Initial Weight (lbs): 220 lbs  Last Visits Weight: 99.8 kg (220 lb)  Cumulative weight loss (lbs): 22  Weight Loss Percentage: 10%        2025     4:42 PM   Changes and Difficulties   I have made the following changes to my diet since my last visit: No eating after supper. No  snacks between meals.eating more vegetables   With regards to my diet, I am still struggling with: Hunger. I love rich food, butter , cheese   I have made the following changes to my activity/exercise since my last visit: Has not changed due to back pain.   With regards to my activity/exercise, I am still struggling with: Pain. Not able to walk dogs.       VITALS:  Wt 89.8 kg (198 lb)   LMP  (LMP Unknown)   BMI 32.95 kg/m      MEDICATIONS:   Current Outpatient Medications   Medication Sig Dispense Refill    acetaminophen (TYLENOL) 650 MG CR tablet Take 1,300 mg by mouth 2 times daily.      albuterol (ALBUTEROL) 108 (90 BASE) MCG/ACT Inhaler Inhale 1-2 puffs into the lungs every 6 hours as needed for shortness of breath / dyspnea 1 Inhaler 1    atorvastatin (LIPITOR) 10 MG tablet Take 1 tablet (10 mg) by mouth daily 90 tablet 3    Azelastine HCl 137 MCG/SPRAY SOLN Spray 1 spray in nostril as needed (Patient not taking: Reported on 10/23/2024)      CALCIUM CITRATE PO Take 500 mg by mouth 2 times daily      co-enzyme Q-10 100 MG CAPS capsule Take 100 mg by mouth daily.      cyclobenzaprine (FLEXERIL) 5 MG tablet TAKE 1-2 TABLETS (5-10 MG) BY MOUTH NIGHTLY AS NEEDED FOR MUSCLE SPASMS 90 tablet 0    desvenlafaxine (PRISTIQ) 100 MG 24 hr tablet Take 1 tablet (100 mg) by mouth daily (Patient taking differently: Take 100 mg by mouth every morning) 90 tablet 3    docusate sodium (COLACE) 100 MG capsule Take 100 mg by mouth 2 times daily Once or twice a day      famotidine (PEPCID) 20 MG tablet TAKE 1 TABLET BY MOUTH TWICE A  tablet 3    fenofibrate (TRICOR) 145 MG tablet Take 1 tablet (145 mg) by mouth daily 90 tablet 3    fexofenadine (ALLEGRA) 180 MG tablet Take 180 mg by mouth every morning      fluticasone-salmeterol (AIRDUO RESPICLICK) 113-14 MCG/ACT inhaler Inhale 1 puff into the lungs 2 times daily      ketoconazole (NIZORAL) 2 % external cream Apply topically 2 times daily Apply to affected areas under  breasts and groin twice daily (Patient not taking: Reported on 10/23/2024) 60 g 5    levothyroxine (SYNTHROID/LEVOTHROID) 88 MCG tablet TAKE 1 TABLET BY MOUTH EVERY DAY 90 tablet 0    lisinopril (ZESTRIL) 10 MG tablet Take 1 tablet (10 mg) by mouth daily 90 tablet 3    metroNIDAZOLE (METROCREAM) 0.75 % external cream Apply topically daily To face (Patient not taking: Reported on 10/23/2024) 45 g 3    Multiple Vitamins-Minerals (MULTIVITAMIN WOMEN 50+) TABS Take 1 tablet by mouth daily.      naltrexone-bupropion (CONTRAVE) 8-90 MG per 12 hr tablet Take 2 tablets by mouth 2 times daily. 120 tablet 1    omeprazole (PRILOSEC) 20 MG DR capsule TAKE 1 TABLET (20 MG) BY MOUTH 2 TIMES DAILY TAKE 30-60 MINUTES BEFORE A MEAL. 180 capsule 0    pramipexole (MIRAPEX) 0.125 MG tablet TAKE 2 TABLETS BY MOUTH AT DINNER AND 1 TABLET AT BEDTIME 270 tablet 3    triamcinolone (NASACORT AQ) 55 MCG/ACT nasal inhaler Inhale 2 sprays in both nostrils every day as needed 1 Inhaler 7    UNABLE TO FIND MEDICATION NAME: Allergy shots Every 1-4 weeks Next dose 4/10/24      Vitamin D3 (CHOLECALCIFEROL) 25 mcg (1000 units) tablet Take 25 mcg by mouth every morning      zolpidem (AMBIEN) 5 MG tablet Take 1 tablet (5 mg) by mouth nightly as needed for sleep 30 tablet 5           1/5/2025     4:42 PM   Weight Loss Medication History Reviewed With Patient   Which weight loss medications are you currently taking on a regular basis? Contrave (naltrexone/bupropion)   Are you having any side effects from the weight loss medication that we have prescribed you? Yes   If you are having side effects please describe: Flatus, flatulence, hands shaky sometimes, constipation. Balance a little off. No falls. Taking 3 colace per day and 1 senna at HS. It works.       ASSESSMENT:   Brandi Stern is a 69 year old female who I am continuing to see for treatment of obesity related to: hypertension, large hiatal hernia, sleep apnea, asthma, GERD     Started Contrave  (8/90) 7/2024. Currently on 2 pills twice a day.  Lost 22 lbs which is about 10% of TBW.  Would like to change medication due to cost of Contrave.  Interested in compounded semaglutide.   Insurance is not covering Wegovy or Zepbound    PLAN:   Start compounded semaglutide 0.25 mg  Can finish up Contrave until running out  Continue with diet and exercise    FOLLOW-UP:    See MTM PharmD in 1 month(s)  See MD or PA in 4-6 month(s).    Joined the call at 1/7/2025, 10:13:48 am.  Left the call at 1/7/2025, 10:24:01 am.  You were on the call for 10 minutes 12 seconds .    Sincerely,    Robbie Flores MD

## 2025-01-07 NOTE — PATIENT INSTRUCTIONS
Start compounded semaglutide 0.25 mg  Can finish up Contrave    See MTM PharmD in 1 month(s)  See MD or PA in 4-6 month(s)    If you have any questions, please do not hesitate to call Weight management clinic at [phone] or [phone].  If you need to fax, please fax to [phone].    Sincerely,    Robbie Flores MD  Endocrinology

## 2025-01-07 NOTE — NURSING NOTE
Current patient location: [address] [place] [address]    Is the patient currently in the state of [place]? YES    Visit mode:VIDEO    If the visit is dropped, the patient can be reconnected by:VIDEO VISIT: Text to cell phone:   Telephone Information:   Mobile [phone]       Will anyone else be joining the visit? NO  (If patient encounters technical issues they should call [phone]    Are changes needed to the allergy or medication list? No    Are refills needed on medications prescribed by this physician? Discuss with provider    Rooming Documentation:  Questionnaire(s) completed    Reason for visit: RECHJUDY RUSS

## 2025-01-08 SDOH — HEALTH STABILITY: PHYSICAL HEALTH: ON AVERAGE, HOW MANY DAYS PER WEEK DO YOU ENGAGE IN MODERATE TO STRENUOUS EXERCISE (LIKE A BRISK WALK)?: 1 DAY

## 2025-01-08 SDOH — HEALTH STABILITY: PHYSICAL HEALTH: ON AVERAGE, HOW MANY MINUTES DO YOU ENGAGE IN EXERCISE AT THIS LEVEL?: 10 MIN

## 2025-01-08 ASSESSMENT — ASTHMA QUESTIONNAIRES
QUESTION_1 LAST FOUR WEEKS HOW MUCH OF THE TIME DID YOUR ASTHMA KEEP YOU FROM GETTING AS MUCH DONE AT WORK, SCHOOL OR AT HOME: NONE OF THE TIME
QUESTION_5 LAST FOUR WEEKS HOW WOULD YOU RATE YOUR ASTHMA CONTROL: WELL CONTROLLED
ACT_TOTALSCORE: 24
QUESTION_4 LAST FOUR WEEKS HOW OFTEN HAVE YOU USED YOUR RESCUE INHALER OR NEBULIZER MEDICATION (SUCH AS ALBUTEROL): NOT AT ALL
QUESTION_3 LAST FOUR WEEKS HOW OFTEN DID YOUR ASTHMA SYMPTOMS (WHEEZING, COUGHING, SHORTNESS OF BREATH, CHEST TIGHTNESS OR PAIN) WAKE YOU UP AT NIGHT OR EARLIER THAN USUAL IN THE MORNING: NOT AT ALL
QUESTION_2 LAST FOUR WEEKS HOW OFTEN HAVE YOU HAD SHORTNESS OF BREATH: NOT AT ALL
ACT_TOTALSCORE: 24

## 2025-01-08 ASSESSMENT — SOCIAL DETERMINANTS OF HEALTH (SDOH): HOW OFTEN DO YOU GET TOGETHER WITH FRIENDS OR RELATIVES?: ONCE A WEEK

## 2025-01-13 ENCOUNTER — OFFICE VISIT (OUTPATIENT)
Dept: FAMILY MEDICINE | Facility: CLINIC | Age: 70
End: 2025-01-13
Attending: NURSE PRACTITIONER
Payer: MEDICARE

## 2025-01-13 ENCOUNTER — TELEPHONE (OUTPATIENT)
Dept: ENDOCRINOLOGY | Facility: CLINIC | Age: 70
End: 2025-01-13

## 2025-01-13 VITALS
DIASTOLIC BLOOD PRESSURE: 74 MMHG | SYSTOLIC BLOOD PRESSURE: 118 MMHG | HEART RATE: 91 BPM | OXYGEN SATURATION: 95 % | TEMPERATURE: 97.2 F | RESPIRATION RATE: 20 BRPM | HEIGHT: 66 IN | WEIGHT: 199.6 LBS | BODY MASS INDEX: 32.08 KG/M2

## 2025-01-13 DIAGNOSIS — E06.3 CHRONIC LYMPHOCYTIC THYROIDITIS: ICD-10-CM

## 2025-01-13 DIAGNOSIS — Z87.891 PERSONAL HISTORY OF TOBACCO USE: ICD-10-CM

## 2025-01-13 DIAGNOSIS — G25.81 RESTLESS LEG SYNDROME: ICD-10-CM

## 2025-01-13 DIAGNOSIS — F33.1 MAJOR DEPRESSIVE DISORDER, RECURRENT EPISODE, MODERATE WITH ANXIOUS DISTRESS (H): ICD-10-CM

## 2025-01-13 DIAGNOSIS — E78.2 MIXED HYPERLIPIDEMIA: ICD-10-CM

## 2025-01-13 DIAGNOSIS — I10 HYPERTENSION GOAL BP (BLOOD PRESSURE) < 140/90: ICD-10-CM

## 2025-01-13 DIAGNOSIS — K21.9 GASTROESOPHAGEAL REFLUX DISEASE WITHOUT ESOPHAGITIS: ICD-10-CM

## 2025-01-13 DIAGNOSIS — M79.7 FIBROMYALGIA: ICD-10-CM

## 2025-01-13 DIAGNOSIS — J30.1 ALLERGIC RHINITIS DUE TO POLLEN, UNSPECIFIED SEASONALITY: ICD-10-CM

## 2025-01-13 DIAGNOSIS — G47.00 INSOMNIA, UNSPECIFIED TYPE: ICD-10-CM

## 2025-01-13 DIAGNOSIS — M62.830 SPASM OF BACK MUSCLES: ICD-10-CM

## 2025-01-13 DIAGNOSIS — Z12.83 SKIN EXAM FOR MALIGNANT NEOPLASM: ICD-10-CM

## 2025-01-13 DIAGNOSIS — R73.09 ELEVATED GLUCOSE: ICD-10-CM

## 2025-01-13 DIAGNOSIS — E78.1 PURE HYPERGLYCERIDEMIA: ICD-10-CM

## 2025-01-13 DIAGNOSIS — Z00.00 ENCOUNTER FOR MEDICARE ANNUAL WELLNESS EXAM: Primary | ICD-10-CM

## 2025-01-13 PROCEDURE — G2211 COMPLEX E/M VISIT ADD ON: HCPCS | Performed by: NURSE PRACTITIONER

## 2025-01-13 PROCEDURE — 99214 OFFICE O/P EST MOD 30 MIN: CPT | Mod: 25 | Performed by: NURSE PRACTITIONER

## 2025-01-13 PROCEDURE — G0439 PPPS, SUBSEQ VISIT: HCPCS | Performed by: NURSE PRACTITIONER

## 2025-01-13 PROCEDURE — G0296 VISIT TO DETERM LDCT ELIG: HCPCS | Performed by: NURSE PRACTITIONER

## 2025-01-13 RX ORDER — LISINOPRIL 10 MG/1
10 TABLET ORAL DAILY
Qty: 90 TABLET | Refills: 4 | Status: SHIPPED | OUTPATIENT
Start: 2025-01-13

## 2025-01-13 RX ORDER — ZOLPIDEM TARTRATE 5 MG/1
5 TABLET ORAL
Qty: 30 TABLET | Refills: 5 | Status: SHIPPED | OUTPATIENT
Start: 2025-01-13

## 2025-01-13 RX ORDER — CYCLOBENZAPRINE HCL 5 MG
5-10 TABLET ORAL
Qty: 90 TABLET | Refills: 4 | Status: SHIPPED | OUTPATIENT
Start: 2025-01-13

## 2025-01-13 RX ORDER — PRAMIPEXOLE DIHYDROCHLORIDE 0.12 MG/1
TABLET ORAL
Qty: 270 TABLET | Refills: 4 | Status: SHIPPED | OUTPATIENT
Start: 2025-01-13

## 2025-01-13 RX ORDER — TRIAMCINOLONE ACETONIDE 55 UG/1
2 SPRAY, METERED NASAL DAILY
Qty: 48 G | Refills: 4 | Status: SHIPPED | OUTPATIENT
Start: 2025-01-13

## 2025-01-13 RX ORDER — DESVENLAFAXINE 100 MG/1
100 TABLET, EXTENDED RELEASE ORAL DAILY
Qty: 90 TABLET | Refills: 4 | Status: SHIPPED | OUTPATIENT
Start: 2025-01-13

## 2025-01-13 RX ORDER — LEVOTHYROXINE SODIUM 88 UG/1
88 TABLET ORAL DAILY
Qty: 90 TABLET | Refills: 4 | Status: SHIPPED | OUTPATIENT
Start: 2025-01-13

## 2025-01-13 RX ORDER — FENOFIBRATE 145 MG/1
145 TABLET, COATED ORAL DAILY
Qty: 90 TABLET | Refills: 3 | Status: SHIPPED | OUTPATIENT
Start: 2025-01-13

## 2025-01-13 ASSESSMENT — PATIENT HEALTH QUESTIONNAIRE - PHQ9
10. IF YOU CHECKED OFF ANY PROBLEMS, HOW DIFFICULT HAVE THESE PROBLEMS MADE IT FOR YOU TO DO YOUR WORK, TAKE CARE OF THINGS AT HOME, OR GET ALONG WITH OTHER PEOPLE: SOMEWHAT DIFFICULT
SUM OF ALL RESPONSES TO PHQ QUESTIONS 1-9: 5
SUM OF ALL RESPONSES TO PHQ QUESTIONS 1-9: 5

## 2025-01-13 ASSESSMENT — PAIN SCALES - GENERAL: PAINLEVEL_OUTOF10: MODERATE PAIN (4)

## 2025-01-13 NOTE — PATIENT INSTRUCTIONS
Patient Education   Preventive Care Advice   This is general advice given by our system to help you stay healthy. However, your care team may have specific advice just for you. Please talk to your care team about your preventive care needs.  Nutrition  Eat 5 or more servings of fruits and vegetables each day.  Try wheat bread, brown rice and whole grain pasta (instead of white bread, rice, and pasta).  Get enough calcium and vitamin D. Check the label on foods and aim for 100% of the RDA (recommended daily allowance).  Lifestyle  Exercise at least 150 minutes each week  (30 minutes a day, 5 days a week).  Do muscle strengthening activities 2 days a week. These help control your weight and prevent disease.  No smoking.  Wear sunscreen to prevent skin cancer.  Have a dental exam and cleaning every 6 months.  Yearly exams  See your health care team every year to talk about:  Any changes in your health.  Any medicines your care team has prescribed.  Preventive care, family planning, and ways to prevent chronic diseases.  Shots (vaccines)   HPV shots (up to age 26), if you've never had them before.  Hepatitis B shots (up to age 59), if you've never had them before.  COVID-19 shot: Get this shot when it's due.  Flu shot: Get a flu shot every year.  Tetanus shot: Get a tetanus shot every 10 years.  Pneumococcal, hepatitis A, and RSV shots: Ask your care team if you need these based on your risk.  Shingles shot (for age 50 and up)  General health tests  Diabetes screening:  Starting at age 35, Get screened for diabetes at least every 3 years.  If you are younger than age 35, ask your care team if you should be screened for diabetes.  Cholesterol test: At age 39, start having a cholesterol test every 5 years, or more often if advised.  Bone density scan (DEXA): At age 50, ask your care team if you should have this scan for osteoporosis (brittle bones).  Hepatitis C: Get tested at least once in your life.  STIs (sexually  transmitted infections)  Before age 24: Ask your care team if you should be screened for STIs.  After age 24: Get screened for STIs if you're at risk. You are at risk for STIs (including HIV) if:  You are sexually active with more than one person.  You don't use condoms every time.  You or a partner was diagnosed with a sexually transmitted infection.  If you are at risk for HIV, ask about PrEP medicine to prevent HIV.  Get tested for HIV at least once in your life, whether you are at risk for HIV or not.  Cancer screening tests  Cervical cancer screening: If you have a cervix, begin getting regular cervical cancer screening tests starting at age 21.  Breast cancer scan (mammogram): If you've ever had breasts, begin having regular mammograms starting at age 40. This is a scan to check for breast cancer.  Colon cancer screening: It is important to start screening for colon cancer at age 45.  Have a colonoscopy test every 10 years (or more often if you're at risk) Or, ask your provider about stool tests like a FIT test every year or Cologuard test every 3 years.  To learn more about your testing options, visit:   .  For help making a decision, visit:   https://bit.ly/kn65130.  Prostate cancer screening test: If you have a prostate, ask your care team if a prostate cancer screening test (PSA) at age 55 is right for you.  Lung cancer screening: If you are a current or former smoker ages 50 to 80, ask your care team if ongoing lung cancer screenings are right for you.  For informational purposes only. Not to replace the advice of your health care provider. Copyright   2023 [facility]. All rights reserved. Clinically reviewed by the [facility]. [employer] [id number] - REV 01/24.  Preventing Falls: Care Instructions  Injuries and health problems such as trouble walking or poor eyesight can increase your risk of falling. So can some medicines. But there are things you can do to help  "prevent falls. You can exercise to get stronger. You can also arrange your home to make it safer.    Talk to your doctor about the medicines you take. Ask if any of them increase the risk of falls and whether they can be changed or stopped.   Try to exercise regularly. It can help improve your strength and balance. This can help lower your risk of falling.         Practice fall safety and prevention.   Wear low-heeled shoes that fit well and give your feet good support. Talk to your doctor if you have foot problems that make this hard.  Carry a cellphone or wear a medical alert device that you can use to call for help.  Use stepladders instead of chairs to reach high objects. Don't climb if you're at risk for falls. Ask for help, if needed.  Wear the correct eyeglasses, if you need them.        Make your home safer.   Remove rugs, cords, clutter, and furniture from walkways.  Keep your house well lit. Use night-lights in hallways and bathrooms.  Install and use sturdy handrails on stairways.  Wear nonskid footwear, even inside. Don't walk barefoot or in socks without shoes.        Be safe outside.   Use handrails, curb cuts, and ramps whenever possible.  Keep your hands free by using a shoulder bag or backpack.  Try to walk in well-lit areas. Watch out for uneven ground, changes in pavement, and debris.  Be careful in the winter. Walk on the grass or gravel when sidewalks are slippery. Use de-icer on steps and walkways. Add non-slip devices to shoes.    Put grab bars and nonskid mats in your shower or tub and near the toilet. Try to use a shower chair or bath bench when bathing.   Get into a tub or shower by putting in your weaker leg first. Get out with your strong side first. Have a phone or medical alert device in the bathroom with you.   Where can you learn more?  Go to https://www.[employer].net/patiented  Enter G117 in the search box to learn more about \"Preventing Falls: Care Instructions.\"  Current as of: " July 31, 2024  Content Version: 14.3    2024 [employer].   Care instructions adapted under license by your healthcare professional. If you have questions about a medical condition or this instruction, always ask your healthcare professional. [employer] disclaims any warranty or liability for your use of this information.    Learning About Sleeping Well  What does sleeping well mean?     Sleeping well means getting enough sleep to feel good and stay healthy. How much sleep is enough varies among people.  The number of hours you sleep and how you feel when you wake up are both important. If you do not feel refreshed, you probably need more sleep. Another sign of not getting enough sleep is feeling tired during the day.  Experts recommend that adults get at least 7 or more hours of sleep per day. Children and older adults need more sleep.  Why is getting enough sleep important?  Getting enough quality sleep is a basic part of good health. When your sleep suffers, your physical health, mood, and your thoughts can suffer too. You may find yourself feeling more grumpy or stressed. Not getting enough sleep also can lead to serious problems, including injury, accidents, anxiety, and depression.  What might cause poor sleeping?  Many things can cause sleep problems, including:  Changes to your sleep schedule.  Stress. Stress can be caused by fear about a single event, such as giving a speech. Or you may have ongoing stress, such as worry about work or school.  Depression, anxiety, and other mental or emotional conditions.  Changes in your sleep habits or surroundings. This includes changes that happen where you sleep, such as noise, light, or sleeping in a different bed. It also includes changes in your sleep pattern, such as having jet lag or working a late shift.  Health problems, such as pain, breathing problems, and restless legs syndrome.  Lack of regular exercise.  Using alcohol, nicotine, or  "caffeine before bed.  How can you help yourself?  Here are some tips that may help you sleep more soundly and wake up feeling more refreshed.  Your sleeping area   Use your bedroom only for sleeping and sex. A bit of light reading may help you fall asleep. But if it doesn't, do your reading elsewhere in the house. Try not to use your TV, computer, smartphone, or tablet while you are in bed.  Be sure your bed is big enough to stretch out comfortably, especially if you have a sleep partner.  Keep your bedroom quiet, dark, and cool. Use curtains, blinds, or a sleep mask to block out light. To block out noise, use earplugs, soothing music, or a \"white noise\" machine.  Your evening and bedtime routine   Create a relaxing bedtime routine. You might want to take a warm shower or bath, or listen to soothing music.  Go to bed at the same time every night. And get up at the same time every morning, even if you feel tired.  What to avoid   Limit caffeine (coffee, tea, caffeinated sodas) during the day, and don't have any for at least 6 hours before bedtime.  Avoid drinking alcohol before bedtime. Alcohol can cause you to wake up more often during the night.  Try not to smoke or use tobacco, especially in the evening. Nicotine can keep you awake.  Limit naps during the day, especially close to bedtime.  Avoid lying in bed awake for too long. If you can't fall asleep or if you wake up in the middle of the night and can't get back to sleep within about 20 minutes, get out of bed and go to another room until you feel sleepy.  Avoid taking medicine right before bed that may keep you awake or make you feel hyper or energized. Your doctor can tell you if your medicine may do this and if you can take it earlier in the day.  If you can't sleep   Imagine yourself in a peaceful, pleasant scene. Focus on the details and feelings of being in a place that is relaxing.  Get up and do a quiet or boring activity until you feel sleepy.  Avoid " "drinking any liquids before going to bed to help prevent waking up often to use the bathroom.  Where can you learn more?  Go to https://www.[employer].net/patiented  Enter J942 in the search box to learn more about \"Learning About Sleeping Well.\"  Current as of: July 31, 2024  Content Version: 14.3    2024 [employer].   Care instructions adapted under license by your healthcare professional. If you have questions about a medical condition or this instruction, always ask your healthcare professional. [employer] disclaims any warranty or liability for your use of this information.    Learning About Depression Screening  What is depression screening?  Depression screening is a way to see if you have depression symptoms. It may be done by a doctor or counselor. It's often part of a routine checkup. That's because your mental health is just as important as your physical health.  Depression is a mental health condition that affects how you feel, think, and act. You may:  Have less energy.  Lose interest in your daily activities.  Feel sad and grouchy for a long time.  Depression is very common. It affects people of all ages.  Many things can lead to depression. Some people become depressed after they have a stroke or find out they have a major illness like cancer or heart disease. The death of a loved one or a breakup may lead to depression. It can run in families. Most experts believe that a combination of inherited genes and stressful life events can cause it.  What happens during screening?  You may be asked to fill out a form about your depression symptoms. You and the doctor will discuss your answers. The doctor may ask you more questions to learn more about how you think, act, and feel.  What happens after screening?  If you have symptoms of depression, your doctor will talk to you about your options.  Doctors usually treat depression with medicines or counseling. Often, combining the two " "works best. Many people don't get help because they think that they'll get over the depression on their own. But people with depression may not get better unless they get treatment.  The cause of depression is not well understood. There may be many factors involved. But if you have depression, it's not your fault.  A serious symptom of depression is thinking about death or suicide. If you or someone you care about talks about this or about feeling hopeless, get help right away.  It's important to know that depression can be treated. Medicine, counseling, and self-care may help.  Where can you learn more?  Go to https://www.[employer].net/patiented  Enter T185 in the search box to learn more about \"Learning About Depression Screening.\"  Current as of: July 31, 2024  Content Version: 14.3    2024 [employer].   Care instructions adapted under license by your healthcare professional. If you have questions about a medical condition or this instruction, always ask your healthcare professional. [employer] disclaims any warranty or liability for your use of this information.       Lung Cancer Screening   Frequently Asked Questions  If you are at high-risk for lung cancer, getting screened with low-dose computed tomography (LDCT) every year can help save your life. This handout offers answers to some of the most common questions about lung cancer screening. If you have other questions, please call [phone]-[facility] ([phone]).     What is it?  Lung cancer screening uses special X-ray technology to create an image of your lung tissue. The exam is quick and easy and takes less than 10 seconds. We don t give you any medicine or use any needles. You can eat before and after the exam. You don t need to change your clothes as long as the clothing on your chest doesn t contain metal. But, you do need to be able to hold your breath for at least 6 seconds during the exam.    What is the goal of lung " cancer screening?  The goal of lung cancer screening is to save lives. Many times, lung cancer is not found until a person starts having physical symptoms. Lung cancer screening can help detect lung cancer in the earliest stages when it may be easier to treat.    Who should be screened for lung cancer?  We suggest lung cancer screening for anyone who is at high-risk for lung cancer. You are in the high-risk group if you:      are between the ages of 55 and 79, and    have smoked at least 1 pack of cigarettes a day for 20 or more years, and    still smoke or have quit within the past 15 years.    However, if you have a new cough or shortness of breath, you should talk to your doctor before being screened.    Why does it matter if I have symptoms?  Certain symptoms can be a sign that you have a condition in your lungs that should be checked and treated by your doctor. These symptoms include fever, chest pain, a new or changing cough, shortness of breath that you have never felt before, coughing up blood or unexplained weight loss. Having any of these symptoms can greatly affect the results of lung cancer screening.       Should all smokers get an LDCT lung cancer screening exam?  It depends. Lung cancer screening is for a very specific group of men and women who have a history of heavy smoking over a long period of time (see  Who should be screened for lung cancer  above).  I am in the high-risk group, but have been diagnosed with cancer in the past. Is LDCT lung cancer screening right for me?  In some cases, you should not have LDCT lung screening, such as when your doctor is already following your cancer with CT scan studies. Your doctor will help you decide if LDCT lung screening is right for you.  Do I need to have a screening exam every year?  Yes. If you are in the high-risk group described earlier, you should get an LDCT lung cancer screening exam every year until you are 79, or are no longer willing or able to  undergo screening and possible procedures to diagnose and treat lung cancer.  How effective is LDCT at preventing death from lung cancer?  Studies have shown that LDCT lung cancer screening can lower the risk of death from lung cancer by 20 percent in people who are at high-risk.  What are the risks?  There are some risks and limitations of LDCT lung cancer screening. We want to make sure you understand the risks and benefits, so please let us know if you have any questions. Your doctor may want to talk with you more about these risks.    Radiation exposure: As with any exam that uses radiation, there is a very small increased risk of cancer. The amount of radiation in LDCT is small--about the same amount a person would get from a mammogram. Your doctor orders the exam when he or she feels the potential benefits outweigh the risks.    False negatives: No test is perfect, including LDCT. It is possible that you may have a medical condition, including lung cancer, that is not found during your exam. This is called a false negative result.    False positives and more testing: LDCT very often finds something in the lung that could be cancer, but in fact is not. This is called a false positive result. False positive tests often cause anxiety. To make sure these findings are not cancer, you may need to have more tests. These tests will be done only if you give us permission. Sometimes patients need a treatment that can have side effects, such as a biopsy. For more information on false positives, see  What can I expect from the results?     Findings not related to lung cancer: Your LDCT exam also takes pictures of areas of your body next to your lungs. In a very small number of cases, the CT scan will show an abnormal finding in one of these areas, such as your kidneys, adrenal glands, liver or thyroid. This finding may not be serious, but you may need more tests. Your doctor can help you decide what other tests you may  need, if any.  What can I expect from the results?  About 1 out of 4 LDCT exams will find something that may need more tests. Most of the time, these findings are lung nodules. Lung nodules are very small collections of tissue in the lung. These nodules are very common, and the vast majority--more than 97 percent--are not cancer (benign). Most are normal lymph nodes or small areas of scarring from past infections.  But, if a small lung nodule is found to be cancer, the cancer can be cured more than 90 percent of the time. To know if the nodule is cancer, we may need to get more images before your next yearly screening exam. If the nodule has suspicious features (for example, it is large, has an odd shape or grows over time), we will refer you to a specialist for further testing.  Will my doctor also get the results?  Yes. Your doctor will get a copy of your results.  Is it okay to keep smoking now that there s a cancer screening exam?  No. Tobacco is one of the strongest cancer-causing agents. It causes not only lung cancer, but other cancers and cardiovascular (heart) diseases as well. The damage caused by smoking builds over time. This means that the longer you smoke, the higher your risk of disease. While it is never too late to quit, the sooner you quit, the better.  Where can I find help to quit smoking?  The best way to prevent lung cancer is to stop smoking. If you have already quit smoking, congratulations and keep it up! For help on quitting smoking, please call [employer] at 7-670-QUIT-NOW (1-172.908.1967) or the American [employer] Society at 1-136.311.9777 to find local resources near you.  One-on-one health coaching:  If you d prefer to work individually with a health care provider on tobacco cessation, we offer:      Medication Therapy Management:  Our specially trained pharmacists work closely with you and your doctor to help you quit smoking.  Call [phone] or [phone] (toll free).

## 2025-01-13 NOTE — PROGRESS NOTES
Lung Cancer Screening Shared Decision Making Visit     Brandi Stern, a 69 year old female, is eligible for lung cancer screening    History   Smoking Status    Some Days    Types: Cigarettes   Smokeless Tobacco    Current       I have discussed with patient the risks and benefits of screening for lung cancer with low-dose CT.     The risks include:    radiation exposure: one low dose chest CT has as much ionizing radiation as about 15 chest x-rays, or 6 months of background radiation living in [place]      false positives: most findings/nodules are NOT cancer, but some might still require additional diagnostic evaluation, including biopsy    over-diagnosis: some slow growing cancers that might never have been clinically significant will be detected and treated unnecessarily     The benefit of early detection of lung cancer is contingent upon adherence to annual screening or more frequent follow up if indicated.     Furthermore, to benefit from screening, Brandi must be willing and able to undergo diagnostic procedures, if indicated. Although no specific guide is available for determining severity of comorbidities, it is reasonable to withhold screening in patients who have greater mortality risk from other diseases.     We did discuss that the best way to prevent lung cancer is to not smoke.    Some patients may value a numeric estimation of lung cancer risk when evaluating if lung cancer screening is right for them, here is one calculator:    ShouldIScreen  Answers submitted by the patient for this visit:  Patient Health Questionnaire (Submitted on 1/13/2025)  If you checked off any problems, how difficult have these problems made it for you to do your work, take care of things at home, or get along with other people?: Somewhat difficult  PHQ9 TOTAL SCORE: 5

## 2025-01-13 NOTE — PROGRESS NOTES
Preventive Care Visit  [facility]  Cherie Brennan, NP, Nurse Practitioner - Family  Jan 13, 2025      Assessment & Plan     (Z00.00) Encounter for Medicare annual wellness exam  (primary encounter diagnosis)  Comment:   Plan:     (E06.3) Chronic lymphocytic thyroiditis  Comment: stable  Plan: TSH WITH FREE T4 REFLEX        The current medical regimen is effective;  continue present plan and medications.     (E78.2) Mixed hyperlipidemia  Comment: stable  Plan: Lipid panel reflex to direct LDL Fasting        The current medical regimen is effective;  continue present plan and medications.     (R73.09) Elevated glucose  Comment:   Plan: Hemoglobin A1c            (M62.830) Spasm of back muscles  Comment: stable  Plan: cyclobenzaprine (FLEXERIL) 5 MG tablet        The current medical regimen is effective;  continue present plan and medications.     (M79.7) Fibromyalgia  Comment: stable  Plan: desvenlafaxine (PRISTIQ) 100 MG 24 hr tablet        The current medical regimen is effective;  continue present plan and medications.     (E78.1) HYPERTRIGLYCERIDEMIA  Comment: stable  Plan: fenofibrate (TRICOR) 145 MG tablet        The current medical regimen is effective;  continue present plan and medications.     (E06.3) HASHIMOTO'S THYROIDITIS  Comment: stable  Plan: levothyroxine (SYNTHROID/LEVOTHROID) 88 MCG         tablet        The current medical regimen is effective;  continue present plan and medications.     (I10) Hypertension goal BP (blood pressure) < 140/90  Comment: at goal  Plan: Comprehensive metabolic panel (BMP + Alb, Alk         Phos, ALT, AST, Total. Bili, TP), lisinopril         (ZESTRIL) 10 MG tablet        The current medical regimen is effective;  continue present plan and medications.     (K21.9) Gastroesophageal reflux disease without esophagitis  Comment: stable  Plan: omeprazole (PRILOSEC) 20 MG DR capsule        The current medical regimen is effective;  continue  "present plan and medications.     (G25.81) Restless leg syndrome  Comment: stable  Plan: pramipexole (MIRAPEX) 0.125 MG tablet        The current medical regimen is effective;  continue present plan and medications.     (G47.00) Insomnia, unspecified type  Comment: stable  Plan: zolpidem (AMBIEN) 5 MG tablet        The current medical regimen is effective;  continue present plan and medications.     (Z87.891) Personal history of tobacco use  Comment:   Plan: Prof fee: Shared Decision Making for Lung         Cancer Screening, CT Chest Lung Cancer Scrn Low        Dose wo            (J30.1) Allergic rhinitis due to pollen, unspecified seasonality  Comment: stable  Plan: triamcinolone (NASACORT) 55 MCG/ACT nasal         aerosol        The current medical regimen is effective;  continue present plan and medications.     (Z12.83) Skin exam for malignant neoplasm  Comment:   Plan: Adult Dermatology  Referral            (F33.1) Major depressive disorder, recurrent episode, moderate with anxious distress (H)  Comment: stable  Plan: The current medical regimen is effective;  continue present plan and medications.     The longitudinal plan of care for the diagnosis(es)/condition(s) as documented were addressed during this visit. Due to the added complexity in care, I will continue to support Brandi Elkins in the subsequent management and with ongoing continuity of care.        BMI  Estimated body mass index is 32.69 kg/m  as calculated from the following:    Height as of this encounter: 1.664 m (5' 5.52\").    Weight as of this encounter: 90.5 kg (199 lb 9.6 oz).       Counseling  Appropriate preventive services were addressed with this patient via screening, questionnaire, or discussion as appropriate for fall prevention, nutrition, physical activity, Tobacco-use cessation, social engagement, weight loss and cognition.  Checklist reviewing preventive services available has been given to the patient.  Reviewed patient's " diet, addressing concerns and/or questions.   She is at risk for lack of exercise and has been provided with information to increase physical activity for the benefit of her well-being.   Discussed possible causes of fatigue. The patient's PHQ-9 score is consistent with mild depression. She was provided with information regarding depression.           Subjective   Brandi Elkins is a 69 year old, presenting for the following:  Annual Visit (Pt stated that she will like it renew her handicap parking sign. /Pt is taking 300mg colace a day, and she don't want to take that every day she wants to know what can she take to replace that. )        1/13/2025     1:52 PM   Additional Questions   Roomed by Corie Leung   Accompanied by Self           HPI          Health Care Directive  Patient has a Health Care Directive on file  Advance care planning document is on file and is current.      1/8/2025   General Health   How would you rate your overall physical health? Good   Feel stress (tense, anxious, or unable to sleep) Not at all         1/8/2025   Nutrition   Diet: Regular (no restrictions)         1/8/2025   Exercise   Days per week of moderate/strenous exercise 1 day   Average minutes spent exercising at this level 10 min   (!) EXERCISE CONCERN      1/8/2025   Social Factors   Frequency of gathering with friends or relatives Once a week   Worry food won't last until get money to buy more No    No   Food not last or not have enough money for food? No    No   Do you have housing? (Housing is defined as stable permanent housing and does not include staying ouside in a car, in a tent, in an abandoned building, in an overnight shelter, or couch-surfing.) Yes    Yes   Are you worried about losing your housing? No    No   Lack of transportation? No    No   Unable to get utilities (heat,electricity)? No    No       Multiple values from one day are sorted in reverse-chronological order         1/13/2025   Fall Risk   Gait Speed Test  (Document in seconds) 3.99   Gait Speed Test Interpretation Less than or equal to 5.00 seconds - PASS          2025   Activities of Daily Living- Home Safety   Needs help with the following daily activites None of the above   Safety concerns in the home None of the above         2025   Dental   Dentist two times every year? Yes         2025   Hearing Screening   Hearing concerns? None of the above         2025   Driving Risk Screening   Patient/family members have concerns about driving No         2025   General Alertness/Fatigue Screening   Have you been more tired than usual lately? (!) YES         2025   Urinary Incontinence Screening   Bothered by leaking urine in past 6 months No          Today's PHQ-9 Score:       2025     7:17 AM   PHQ-9 SCORE   PHQ-9 Total Score MyChart 5 (Mild depression)   PHQ-9 Total Score 5        Patient-reported         2025   Substance Use   If I could quit smoking, I would Completely agree   I want to quit somking, worry about health affects Completely agree   Willing to make a plan to quit smoking Completely agree   Willing to cut down before quitting Completely agree   Alcohol more than 3/day or more than 7/wk No   Do you have a current opioid prescription? No   How severe/bad is pain from 1 to 10? 4/10   Do you use any other substances recreationally? No     Social History     Tobacco Use    Smoking status: Some Days     Current packs/day: 0.00     Average packs/day: 0.5 packs/day for 52.2 years (26.1 ttl pk-yrs)     Types: Cigarettes     Start date: 10/1/1971     Last attempt to quit: 12/3/2023     Years since quittin.1     Passive exposure: Past    Smokeless tobacco: Current   Vaping Use    Vaping status: Never Used   Substance Use Topics    Alcohol use: Yes     Comment: switched to beer 6 pack a week     Drug use: No           2024   LAST FHS-7 RESULTS   1st degree relative breast or ovarian cancer No   Any relative bilateral breast  cancer No   Any male have breast cancer No   Any ONE woman have BOTH breast AND ovarian cancer No   Any woman with breast cancer before 50yrs No   2 or more relatives with breast AND/OR ovarian cancer No   2 or more relatives with breast AND/OR bowel cancer No            ASCVD Risk   The 10-year ASCVD risk score (Dale YOST, et al., 2019) is: 17.8%    Values used to calculate the score:      Age: 69 years      Sex: Female      Is Non- : No      Diabetic: No      Tobacco smoker: Yes      Systolic Blood Pressure: 118 mmHg      Is BP treated: Yes      HDL Cholesterol: 45 mg/dL      Total Cholesterol: 149 mg/dL            Reviewed and updated as needed this visit by Provider                      Current providers sharing in care for this patient include:  Patient Care Team:  Cherie Brennan NP as PCP - General  William Christy MD as MD (Neurology)  Robbie Flores MD as MD (Internal Medicine)  Cherie Brennan NP as Assigned PCP  Janey Lewis PA-C as Assigned Neuroscience Provider  Valeri Ivey MD as Assigned Surgical Provider  Cherie Briones MD as Assigned Pulmonology Provider  Tunde Lisa MD as MD (Surgery)  Maryjo Herr APRN CNS as Clinical Nurse Specialist (Cardiovascular & Thoracic Surgery)  Robbie Flores MD as Assigned Endocrinology Provider  Bloch, Lauren Turner, [facility] as Assigned MTM Pharmacist    The following health maintenance items are reviewed in Epic and correct as of today:  Health Maintenance   Topic Date Due    ASTHMA ACTION PLAN  03/14/2023    TSH W/FREE T4 REFLEX  08/21/2024    ANNUAL REVIEW OF HM ORDERS  01/03/2025    LIPID  01/08/2025    MEDICARE ANNUAL WELLNESS VISIT  01/03/2025    DTAP/TDAP/TD IMMUNIZATION (3 - Td or Tdap) 01/15/2025    LUNG CANCER SCREENING  04/08/2025    BMP  04/10/2025    ASTHMA CONTROL TEST  07/13/2025    PHQ-9  07/13/2025    NICOTINE/TOBACCO CESSATION COUNSELING Q 1 YR  10/23/2025    FALL  "RISK ASSESSMENT  01/13/2026    MAMMO SCREENING  01/30/2026    COLORECTAL CANCER SCREENING  01/15/2027    GLUCOSE  04/10/2027    ADVANCE CARE PLANNING  01/03/2029    DEXA  11/02/2037    HEPATITIS C SCREENING  Completed    DEPRESSION ACTION PLAN  Completed    INFLUENZA VACCINE  Completed    Pneumococcal Vaccine: 50+ Years  Completed    ZOSTER IMMUNIZATION  Completed    RSV VACCINE  Completed    COVID-19 Vaccine  Completed    HPV IMMUNIZATION  Aged Out    MENINGITIS IMMUNIZATION  Aged Out    RSV MONOCLONAL ANTIBODY  Aged Out            Objective    Exam  /74   Pulse 91   Temp 97.2  F (36.2  C) (Temporal)   Resp 20   Ht 1.664 m (5' 5.52\")   Wt 90.5 kg (199 lb 9.6 oz)   LMP  (LMP Unknown)   SpO2 95%   BMI 32.69 kg/m     Estimated body mass index is 32.69 kg/m  as calculated from the following:    Height as of this encounter: 1.664 m (5' 5.52\").    Weight as of this encounter: 90.5 kg (199 lb 9.6 oz).    Physical Exam  GENERAL: alert and no distress  EYES: Eyes grossly normal to inspection, PERRL and conjunctivae and sclerae normal  HENT: ear canals and TM's normal, nose and mouth without ulcers or lesions  NECK: no adenopathy, no asymmetry, masses, or scars  RESP: lungs clear to auscultation - no rales, rhonchi or wheezes  CV: regular rate and rhythm, normal S1 S2, no S3 or S4, no murmur, click or rub, no peripheral edema  ABDOMEN: soft, nontender, no hepatosplenomegaly, no masses and bowel sounds normal  MS: no gross musculoskeletal defects noted, no edema  SKIN: no suspicious lesions or rashes  NEURO: Normal strength and tone, mentation intact and speech normal  PSYCH: mentation appears normal, affect normal/bright         1/13/2025   Mini Cog   Clock Draw Score 2 Normal   3 Item Recall 3 objects recalled   Mini Cog Total Score 5              Signed Electronically by: Cherie Brennan NP    Answers submitted by the patient for this visit:  Patient Health Questionnaire (Submitted on 1/13/2025)  If you " checked off any problems, how difficult have these problems made it for you to do your work, take care of things at home, or get along with other people?: Somewhat difficult  PHQ9 TOTAL SCORE: 5

## 2025-01-13 NOTE — TELEPHONE ENCOUNTER
Left Voicemail (1st Attempt) and Sent Mychart (1st Attempt) for the patient to call back and schedule the following:    Appointment type: [facility]  Provider: Dr Avalos  Return date: June/July 2025  Specialty phone number: [phone]  Additional appointment(s) needed: n/a  Additonal Notes: n/a

## 2025-01-22 ENCOUNTER — LAB (OUTPATIENT)
Dept: LAB | Facility: CLINIC | Age: 70
End: 2025-01-22
Payer: MEDICARE

## 2025-01-22 DIAGNOSIS — E06.3 CHRONIC LYMPHOCYTIC THYROIDITIS: ICD-10-CM

## 2025-01-22 DIAGNOSIS — I10 HYPERTENSION GOAL BP (BLOOD PRESSURE) < 140/90: ICD-10-CM

## 2025-01-22 DIAGNOSIS — E78.2 MIXED HYPERLIPIDEMIA: ICD-10-CM

## 2025-01-22 DIAGNOSIS — R73.09 ELEVATED GLUCOSE: ICD-10-CM

## 2025-01-22 LAB
EST. AVERAGE GLUCOSE BLD GHB EST-MCNC: 105 MG/DL
HBA1C MFR BLD: 5.3 % (ref 0–5.6)

## 2025-01-22 PROCEDURE — 80061 LIPID PANEL: CPT

## 2025-01-22 PROCEDURE — 83036 HEMOGLOBIN GLYCOSYLATED A1C: CPT

## 2025-01-22 PROCEDURE — 84443 ASSAY THYROID STIM HORMONE: CPT

## 2025-01-22 PROCEDURE — 80053 COMPREHEN METABOLIC PANEL: CPT

## 2025-01-22 PROCEDURE — 36415 COLL VENOUS BLD VENIPUNCTURE: CPT

## 2025-01-23 LAB
ALBUMIN SERPL BCG-MCNC: 4.2 G/DL (ref 3.5–5.2)
ALP SERPL-CCNC: 42 U/L (ref 40–150)
ALT SERPL W P-5'-P-CCNC: 15 U/L (ref 0–50)
ANION GAP SERPL CALCULATED.3IONS-SCNC: 8 MMOL/L (ref 7–15)
AST SERPL W P-5'-P-CCNC: 26 U/L (ref 0–45)
BILIRUB SERPL-MCNC: 0.4 MG/DL
BUN SERPL-MCNC: 12.2 MG/DL (ref 8–23)
CALCIUM SERPL-MCNC: 9.8 MG/DL (ref 8.8–10.4)
CHLORIDE SERPL-SCNC: 103 MMOL/L (ref 98–107)
CHOLEST SERPL-MCNC: 176 MG/DL
CREAT SERPL-MCNC: 0.99 MG/DL (ref 0.51–0.95)
EGFRCR SERPLBLD CKD-EPI 2021: 61 ML/MIN/1.73M2
FASTING STATUS PATIENT QL REPORTED: YES
FASTING STATUS PATIENT QL REPORTED: YES
GLUCOSE SERPL-MCNC: 94 MG/DL (ref 70–99)
HCO3 SERPL-SCNC: 28 MMOL/L (ref 22–29)
HDLC SERPL-MCNC: 44 MG/DL
LDLC SERPL CALC-MCNC: 80 MG/DL
NONHDLC SERPL-MCNC: 132 MG/DL
POTASSIUM SERPL-SCNC: 4.3 MMOL/L (ref 3.4–5.3)
PROT SERPL-MCNC: 7.2 G/DL (ref 6.4–8.3)
SODIUM SERPL-SCNC: 139 MMOL/L (ref 135–145)
TRIGL SERPL-MCNC: 258 MG/DL
TSH SERPL DL<=0.005 MIU/L-ACNC: 1.98 UIU/ML (ref 0.3–4.2)

## 2025-03-05 ENCOUNTER — MYC MEDICAL ADVICE (OUTPATIENT)
Dept: ENDOCRINOLOGY | Facility: CLINIC | Age: 70
End: 2025-03-05
Payer: MEDICARE

## 2025-03-05 DIAGNOSIS — E66.01 CLASS 2 SEVERE OBESITY DUE TO EXCESS CALORIES WITH SERIOUS COMORBIDITY AND BODY MASS INDEX (BMI) OF 35.0 TO 35.9 IN ADULT (H): ICD-10-CM

## 2025-03-05 DIAGNOSIS — E66.812 CLASS 2 SEVERE OBESITY DUE TO EXCESS CALORIES WITH SERIOUS COMORBIDITY AND BODY MASS INDEX (BMI) OF 35.0 TO 35.9 IN ADULT (H): ICD-10-CM

## 2025-03-13 DIAGNOSIS — E78.1 PURE HYPERGLYCERIDEMIA: ICD-10-CM

## 2025-03-13 RX ORDER — FENOFIBRATE 145 MG/1
145 TABLET, COATED ORAL DAILY
Qty: 90 TABLET | Refills: 3 | OUTPATIENT
Start: 2025-03-13

## 2025-03-27 NOTE — TELEPHONE ENCOUNTER
Patient confirmed scheduled appointment:  Date: 8/19  Time: 12:45  Visit type: RTN VV  Provider: Robbie  Location: VV  Testing/imaging: NA  Additional notes: NA

## 2025-04-02 DIAGNOSIS — E66.01 CLASS 2 SEVERE OBESITY DUE TO EXCESS CALORIES WITH SERIOUS COMORBIDITY AND BODY MASS INDEX (BMI) OF 35.0 TO 35.9 IN ADULT (H): Primary | ICD-10-CM

## 2025-04-02 DIAGNOSIS — E66.812 CLASS 2 SEVERE OBESITY DUE TO EXCESS CALORIES WITH SERIOUS COMORBIDITY AND BODY MASS INDEX (BMI) OF 35.0 TO 35.9 IN ADULT (H): Primary | ICD-10-CM

## 2025-04-03 ENCOUNTER — TRANSFERRED RECORDS (OUTPATIENT)
Dept: HEALTH INFORMATION MANAGEMENT | Facility: CLINIC | Age: 70
End: 2025-04-03
Payer: MEDICARE

## 2025-04-09 DIAGNOSIS — E78.5 HYPERLIPIDEMIA LDL GOAL <130: ICD-10-CM

## 2025-04-09 RX ORDER — ATORVASTATIN CALCIUM 10 MG/1
10 TABLET, FILM COATED ORAL DAILY
Qty: 90 TABLET | Refills: 0 | Status: SHIPPED | OUTPATIENT
Start: 2025-04-09

## 2025-04-15 ENCOUNTER — HOSPITAL ENCOUNTER (OUTPATIENT)
Dept: CT IMAGING | Facility: HOSPITAL | Age: 70
Discharge: HOME | End: 2025-04-15
Attending: NURSE PRACTITIONER | Admitting: NURSE PRACTITIONER
Payer: MEDICARE

## 2025-04-15 DIAGNOSIS — Z87.891 PERSONAL HISTORY OF TOBACCO USE: ICD-10-CM

## 2025-04-15 PROCEDURE — 71271 CT THORAX LUNG CANCER SCR C-: CPT

## 2025-05-12 ENCOUNTER — RESULTS FOLLOW-UP (OUTPATIENT)
Dept: FAMILY MEDICINE | Facility: CLINIC | Age: 70
End: 2025-05-12

## 2025-05-21 ENCOUNTER — VIRTUAL VISIT (OUTPATIENT)
Dept: PHARMACY | Facility: CLINIC | Age: 70
End: 2025-05-21
Attending: INTERNAL MEDICINE
Payer: MEDICARE

## 2025-05-21 DIAGNOSIS — K21.9 GASTROESOPHAGEAL REFLUX DISEASE WITHOUT ESOPHAGITIS: ICD-10-CM

## 2025-05-21 DIAGNOSIS — E66.01 CLASS 2 SEVERE OBESITY DUE TO EXCESS CALORIES WITH SERIOUS COMORBIDITY AND BODY MASS INDEX (BMI) OF 35.0 TO 35.9 IN ADULT (H): Primary | ICD-10-CM

## 2025-05-21 DIAGNOSIS — E66.812 CLASS 2 SEVERE OBESITY DUE TO EXCESS CALORIES WITH SERIOUS COMORBIDITY AND BODY MASS INDEX (BMI) OF 35.0 TO 35.9 IN ADULT (H): Primary | ICD-10-CM

## 2025-05-21 NOTE — NURSING NOTE
Current patient location: [place]    Is the patient currently in the state of [place]? YES    Visit mode: VIDEO    If the visit is dropped, the patient can be reconnected by:VIDEO VISIT: Text to cell phone:   Telephone Information:   Mobile [phone]       Will anyone else be joining the visit? NO  (If patient encounters technical issues they should call [phone] :[phone])    Are changes needed to the allergy or medication list? No    Are refills needed on medications prescribed by this physician? NO    Rooming Documentation:  Questionnaire(s) completed    Reason for visit: RECHECK    Kami RUSS

## 2025-05-21 NOTE — PROGRESS NOTES
"Virtual Visit Details    Type of service:  Telephone Visit   Phone call duration: *** minutes   Originating Location (pt. Location): {patient location:036259::\"Home\"}  {PROVIDER LOCATION On-site should be selected for visits conducted from your clinic location or adjoining [facility] hospital, academic office, or other nearby [facility] building. Off-site should be selected for all other provider locations, including home:[id number]}  Distant Location (provider location):  {virtual location provider:[id number]}  Telephone visit completed due to {audio only reason:318780}    " The patient has been re-examined and I agree with the above assessment or I updated with my findings.

## 2025-05-21 NOTE — PROGRESS NOTES
Medication Therapy Management (MTM) Encounter    ASSESSMENT:                            Medication Adherence/Access: No issues identified.    Weight Management   Discussed next steps since Wegovy was taken off the FDA shortage list. The FDA has enforced the policy that compounding of semaglutide as of April 22, 2025 at [facility] compounding facilities, such as [facility]. Discussed plan of either Zepbound or Wegovy since patient wishes to pay OOP for injectable for Weight Management. Discussed similar costs with extending dosing. There is evidence of Zepbound being more effective for weight loss and potentially less impact on skeletal muscle loss, particularly in those 65+ years old. Patient wishing for now to transition to Wegovy as she tolerates well. Thoroughly discussed dietary, lifestyle, and behavioral modifications, prioritizing areas that the patient identified as most conducive to meaningful change - mainly prioritizing protein and plant forward diet. As patient has medicare she cannot use savings card, so will send RX to [email or web address] to get reduced $499 cost.     GERD:   Stable.     PLAN:                            Pharmacist sending Wegovy 1.7 mg RX to [email or web address].  Can trial extending dosing to every 10-11 days to see how you tolerate and how effective this is to lower the financial burden and keep to similar cost of compound semaglutide.   Pharmacy will reach out to you via text message to start process/next steps. Create an account with [email or web address] and then they can mail you the RX to your home in refrigerated box.      Follow-up: Return in about 5 months (around 10/21/2025) for Medication Therapy Management Pharmacist Visit. Comprehensive review of medications at follow up     SUBJECTIVE/OBJECTIVE:                          Brandi Stern is a 69 year old female seen for a follow-up visit.       Reason for visit: Weight Management follow  "up.    Allergies/ADRs: Reviewed in chart  Past Medical History: Reviewed in chart  Tobacco: She reports that she has been smoking cigarettes. She started smoking about 53 years ago. She has a 26.1 pack-year smoking history. She has been exposed to tobacco smoke. She uses smokeless tobacco.Nicotine/Tobacco Cessation Plan  Information offered: Patient not interested at this time    Alcohol: not currently using    Medication Adherence/Access: no issues reported.    Weight Management   Compound semaglutide 1.5 mg (30 units) once weekly     Follow[facility] with [facility].   Patient reports the following medication side effects: none. Take colace 100 mg twice daily and that seems to be maintaining good bowel movement.   Referral to Weight Management: Goal BMI < 30 for hernia repair, pr Thoracic Surgery, Dr. Ivey.   Nutrition/Eating Habits: She does feel she is getting 2-3 meals per day. Portions smaller. Eating more salads. Trying to ensure to have protein at each meal. If hungry after supper: beef jerky + 1 cub cheese. Drinking 64+ oz water daily. Enjoying vegetables more.   Exercise/Activity: reports activity level changed when knees replaced, since then feels she is more sedentary. Does feel like she is moving well, but reports balance is something that has worsened in the years. Monitors closely.     Initial Consult Weight: 220 lb      Current weight today: 190 lb   Cumulative Weight Loss: -30 lb, -13.6% from baseline    Wt Readings from Last 4 Encounters:   02/28/25 195 lb (88.5 kg)   01/13/25 199 lb 9.6 oz (90.5 kg)   01/07/25 198 lb (89.8 kg)   10/23/24 203 lb (92.1 kg)     Estimated body mass index is 31.94 kg/m  as calculated from the following:    Height as of 2/28/25: 5' 5.51\" (1.664 m).    Weight as of 2/28/25: 195 lb (88.5 kg).    GERD    Omeprazole 20 mg twice daily  Famotidine 20 mg twice daily     Patient reports no current symptoms.   Patient feels that " current regimen is effective.  Has HH. Has contacted team for hernia repair next steps.   Does have food trigger like coffee or spicy foods so tries to steer clear or will eat food with coffee.        ----------------  I spent 20 minutes with this patient today. All changes were made via collaborative practice agreement with Robbie Flores.     A summary of these recommendations was sent via [employer].    Lauren Bloch, PharmD, BCACP   Medication Therapy Management Pharmacist   [facility]    Telemedicine Visit Details  The patient's medications can be safely assessed via a telemedicine encounter.  Type of service:  Telephone visit  Originating Location (pt. Location): Home    Distant Location (provider location):  Off-site  Start Time: 2:10 PM  End Time: 2:30 PM     Medication Therapy Recommendations  Class 2 severe obesity due to excess calories with serious comorbidity and body mass index (BMI) of 35.0 to 35.9 in adult (H)   1 Current Medication: COMPOUNDED NON-CONTROLLED SUBSTANCE (CMPD RX) - PHARMACY TO MIX COMPOUNDED MEDICATION   Current Medication Sig: Compounded semaglutide 1.5 mg (30 units) subcutaneous injection once weekly   Rationale: Medication product not available - Adherence - Adherence   Recommendation: Change Medication - Wegovy 1.7 MG/0.75ML Soaj   Status: Accepted per CPA   Identified Date: 5/21/2025 Completed Date: 5/21/2025

## 2025-05-21 NOTE — PATIENT INSTRUCTIONS
"Recommendations from today's MTM visit:                                                       Pharmacist sending Wegovy 1.7 mg RX to [facility].  Can trial extending dosing to every 10-11 days to see how you tolerate and how effective this is to lower the financial burden and keep to similar cost of compound semaglutide.   Pharmacy will reach out to you via text message to start process/next steps. Create an account with [facility] and then they can mail you the RX to your home in refrigerated box.      Follow-up: Return in about 5 months (around 10/21/2025) for Medication Therapy Management Pharmacist Visit.    It was great speaking with you today.  I value your experience and would be very thankful for your time in providing feedback in our clinic survey. In the next few days, you may receive an email or text message from [employer] with a link to a survey related to your  clinical pharmacist.\"     To schedule another MTM appointment, please call the clinic directly or you may call the MTM scheduling line at [phone] or toll-free at [phone].     My Clinical Pharmacist's contact information:                                                      Please feel free to contact me with any questions or concerns you have.      Lauren Bloch, PharmD  Medication Therapy Management Pharmacist   [facility] [place]             "

## 2025-05-26 NOTE — PROGRESS NOTES
"Patient Name: Brandi Stern       YOB: 1955      Medical Record Number: [id number]  Diagnosis:     Chronic pain  Cervicalgia  Lumbago  Visit Info  Length of Visit: 35  Arrived: On Time    Exercise/Activity  Education Instruction:  Postural Training/Anatomy  Pacing/Energy Conservation  Home Program  Self Care  Activity:  Therapeutic Exercise 20':  Aerobic Conditioning (*see \"Strength/Functional Actitivities Record for details of exercises performed)  Bike x 9'  [place] walk x 7' -  mindful walking cues  Stretching:  Upper Ext  bilateral, Lower Ext  Bilateral  Performed stretch routine with decreased need for cues  Postural Training:  walking focus    Neuro re-ed 12': ed on neutral spine with focus on dynamic movements  Diaphragm breathing for walking and dynamic  Ed on sequence of 'breathe, stabilize, move' concept with functional applications  Supine decompression - not today. Pt had to leave for work.   Notes: Tolerated session well again today. Continues to slowly progress toward goals.    Demonstrates/Verbalizes Technique:   4 (1= poor technique-difficulty performing exercises,significant cues required; 5= good technique-performs exercises without cues)  Body Awareness: 3, 4 (1=low awareness; 5=high awareness)  Posture/Stability: 3 (1= poor posture, stability; 5= good posture, stability)    Motivational Level:    Ask questions, Eager to learn and Cooperative  Participation:  Full    Patient Satisfaction:  satisfied    Response to Teaching:    cooperative  Factors that affect learning: None    Plan of Care  Pt doing well with her HEP.  She may continue on her own at this time.  She has been able to perform her HEP well and verbalizes understanding of chronic pain PT principles.   Therapist: Jeffrey Alanis PT                 Date: 1/20/2017                " Continuous Pulse Oximetry/High Suspicion of COVID-19

## 2025-05-30 ENCOUNTER — TRANSFERRED RECORDS (OUTPATIENT)
Dept: HEALTH INFORMATION MANAGEMENT | Facility: CLINIC | Age: 70
End: 2025-05-30
Payer: MEDICARE

## 2025-07-05 DIAGNOSIS — E78.5 HYPERLIPIDEMIA LDL GOAL <130: ICD-10-CM

## 2025-07-05 RX ORDER — ATORVASTATIN CALCIUM 10 MG/1
10 TABLET, FILM COATED ORAL DAILY
Qty: 90 TABLET | Refills: 1 | Status: SHIPPED | OUTPATIENT
Start: 2025-07-05

## 2025-07-07 ENCOUNTER — TRANSFERRED RECORDS (OUTPATIENT)
Dept: HEALTH INFORMATION MANAGEMENT | Facility: CLINIC | Age: 70
End: 2025-07-07
Payer: MEDICARE

## 2025-07-23 ENCOUNTER — OFFICE VISIT (OUTPATIENT)
Dept: PHYSICAL MEDICINE AND REHAB | Facility: CLINIC | Age: 70
End: 2025-07-23
Payer: MEDICARE

## 2025-07-23 VITALS
DIASTOLIC BLOOD PRESSURE: 75 MMHG | HEIGHT: 66 IN | WEIGHT: 180 LBS | BODY MASS INDEX: 28.93 KG/M2 | SYSTOLIC BLOOD PRESSURE: 118 MMHG | HEART RATE: 84 BPM

## 2025-07-23 DIAGNOSIS — G89.29 CHRONIC BILATERAL LOW BACK PAIN WITHOUT SCIATICA: Primary | ICD-10-CM

## 2025-07-23 DIAGNOSIS — M54.50 CHRONIC BILATERAL LOW BACK PAIN WITHOUT SCIATICA: Primary | ICD-10-CM

## 2025-07-23 DIAGNOSIS — M51.360 DEGENERATION OF INTERVERTEBRAL DISC OF LUMBAR REGION WITH DISCOGENIC BACK PAIN: ICD-10-CM

## 2025-07-23 ASSESSMENT — PAIN SCALES - GENERAL: PAINLEVEL_OUTOF10: MILD PAIN (3)

## 2025-07-23 NOTE — PATIENT INSTRUCTIONS
~Spine Center Scheduling #[phone].  ~Please call our [facility] Spine Nurse Navigation #[phone] with any questions or concerns about your treatment plan, if symptoms worsen and you would like to be seen urgently, or if you have problems controlling bladder and bowel function.  ~For any future flareups or new symptoms, recommend follow-up in clinic or contact the nurse navigator line.  ~Please note that any My Chart messages may take multiple days for a response due to the high volume of patients seen in clinic.  Anything sent Thursday night or after will be answered the following week when able, as Amanda Valladares CNP does not work in clinic on Fridays.   ~Amanda Valladares CNP is at the [facility] on Tuesdays, otherwise primarily at the [facility].       ~You have been referred for Physical Therapy to [facility]. They will call you to schedule an appointment.       Scheduling phone number is [phone] for [facility] [place], [place], or [place] location.  If you have not heard from the scheduling office within 2 business days, please call [phone] for ALL other locations.     Discussed the importance of core strengthening, ROM, stretching exercises and how each of these entities is important in decreasing pain and improving long term spine health.  The purpose of physical therapy is to teach you an individualized home exercise program.  These exercises need to be performed every day in order to decrease pain and prevent future occurrences of pain.          Imaging has been ordered. Radiology will call you to schedule. Please call below if you do not hear from them in the next couple of days.   [facility]iology Scheduling  Please call [phone] to schedule your image(s) (select option #1). There are 3 different locations near, see below.   There are imaging options for other locations as well, the imaging schedulers can  help with other locations within [place].     [facility]  [address] [address]    [facility]  [address]   [facility] [address]    [address] [address]

## 2025-07-23 NOTE — LETTER
7/23/2025      Brandi Stern  [address] [place] [address]      Dear Colleague,    Thank you for referring your patient, Brandi Stern, to the [facility]. Please see a copy of my visit note below.    ASSESSMENT: Brandi Stern is a 69 year old female who presents for consultation at the request of PCP Cherie Brennan, with a past medical history significant for hypertension, moderate persistent asthma, ISATU, Hashimoto's thyroiditis, hyperlipidemia, vitamin D deficiency, obesity, major depression, anemia, insomnia, restless leg syndrome, headache, GERD, fibromyalgia, chronic pain who presents today for new patient evaluation of:    - Chronic bilateral low back pain with some increased pain with facet mediated pain however also pain with standing and walking consistent with spinal stenosis neurogenic claudication    Patient is neurologically intact on exam. No myelopathic or red flag symptoms.         7/18/2025     7:41 PM   OSWESTRY DISABILITY INDEX   Count 10    Sum 15    Oswestry Score (%) 30 %        Patient-reported            Diagnoses and all orders for this visit:  Chronic bilateral low back pain without sciatica  -     MR Lumbar Spine w/o Contrast; Future  -     Physical Therapy  Referral; Future  Degeneration of intervertebral disc of lumbar region with discogenic back pain  -     MR Lumbar Spine w/o Contrast; Future  -     Physical Therapy  Referral; Future    PLAN:  Reviewed spine anatomy and disease process. Discussed diagnosis and treatment options with the patient today. A shared decision making model was used.  The patient's values and choices were respected. The following represents what was discussed and decided upon by the provider and the patient.      -DIAGNOSTIC TESTS:  Images were personally reviewed and interpreted and explained to patient today using spine model.   -- Order lumbar spine MRI to further evaluate for possible  intervention.  -- Cervical spine MRI 12/24/2023 with multilevel trace spondylolisthesis.  Multilevel spondylosis with mild to moderate spinal canal stenosis with moderate to severe bilateral foraminal stenosis at multiple levels.  No cord signal abnormality.    -PHYSICAL THERAPY: Referral to physical therapy placed to address home exercises for core strengthening and nerve glides.  She does have a home PT program from prior as well which she is going to readdress on her own.  Discussed the importance of core strengthening, ROM, stretching exercises with the patient and how each of these entities is important in decreasing pain.  Explained to the patient that the purpose of physical therapy is to teach the patient a home exercise program.  These exercises need to be performed every day in order to decrease pain and prevent future occurrences of pain.        -MEDICATIONS: No changes in medications.    -INTERVENTIONS: Consider lumbar MEGHAN versus potential medial branch block pending imaging review.  Discussed risks and benefits of injections with patient today.    -PATIENT EDUCATION:  Total time of 46 minutes, on the day of service, spent with the patient, reviewing the chart, placing orders, and documenting.     -FOLLOW-UP:   Follow-up [facility] message for imaging results and possible injection options.    Advised patient to call the Spine Center if symptoms worsen or you have problems controlling bladder and bowel function.   ______________________________________________________________________    SUBJECTIVE:  HPI:  Brandi Stern  Is a 69 year old female who presents today for new patient evaluation of low back pain bilateral low back pain right greater than left has been intermittent in the past but worsening over the last year.  She does report that pain is a 3/10 up to an 8 at its worst, a 1 at its best.  Overall she is typically fairly comfortable with sitting however sitting for too long over an hour or so does  cause spasms in her back.  Her biggest concern however is with walking she has increased back pain at about 100 feet and typically feels like she has to sit down and when she does sit down her pain does completely go away until she starts moving again.  She otherwise denies any radiating lower extremity pain.  Denies numbness or tingling sensations in lower extremities.  Denies lower extremity weakness, denies any recent trips or falls or balance changes.  Denies bowel or bladder loss control.    -Patient was evaluated by neurosurgery 12/26/2023 for chronic neck and back pain. Patient saw  1/30/2020 and was supposed to stop smoking, have osteoporosis evaluation with Dr. Lawton and return if she wanted to pursue C4-6 ACDF and possible C7/T1 right foraminotomy if having C8 symptoms. She had not stopped smoking at that time. She has quit smoking and EtOH use as of 12/3/23.     -Treatment to Date:   Physical therapy in the past, worked for back pain, did not for neck pain.  No traction.    Left C3-5 facet joint injection 2/14/2019  Left C3-5 MBB 3/20/2019 and 4/4/2019  Left C3-5  2019-50% relief for a couple of months only.    -Medications:  Cyclobenzaprine  Acetaminophen  Lidocaine neck pain relief  Ibuprofen unable to take    Current Outpatient Medications   Medication Sig Dispense Refill     acetaminophen (TYLENOL) 650 MG CR tablet Take 1,300 mg by mouth 2 times daily.       cyclobenzaprine (FLEXERIL) 5 MG tablet Take 1-2 tablets (5-10 mg) by mouth nightly as needed for muscle spasms. 90 tablet 4     albuterol (ALBUTEROL) 108 (90 BASE) MCG/ACT Inhaler Inhale 1-2 puffs into the lungs every 6 hours as needed for shortness of breath / dyspnea 1 Inhaler 1     atorvastatin (LIPITOR) 10 MG tablet TAKE 1 TABLET (10 MG) BY MOUTH DAILY. 90 tablet 1     Azelastine HCl 137 MCG/SPRAY SOLN Spray 1 spray in nostril as needed (Patient not taking: Reported on 1/13/2025)       CALCIUM CITRATE PO Take 500 mg by mouth 2  times daily       co-enzyme Q-10 100 MG CAPS capsule Take 100 mg by mouth daily.       COMPOUNDED NON-CONTROLLED SUBSTANCE (CMPD RX) - PHARMACY TO MIX COMPOUNDED MEDICATION Compounded semaglutide 1.5 mg (30 units) subcutaneous injection once weekly 2 mL 0     COMPOUNDED NON-CONTROLLED SUBSTANCE (CMPD RX) - PHARMACY TO MIX COMPOUNDED MEDICATION Compounded semaglutide 0.5 mg subcutaneous injection once weekly 4 each 2     desvenlafaxine (PRISTIQ) 100 MG 24 hr tablet Take 1 tablet (100 mg) by mouth daily. 90 tablet 4     docusate sodium (COLACE) 100 MG capsule Take 100 mg by mouth 2 times daily Once or twice a day       famotidine (PEPCID) 20 MG tablet TAKE 1 TABLET BY MOUTH TWICE A  tablet 3     fenofibrate (TRICOR) 145 MG tablet Take 1 tablet (145 mg) by mouth daily. 90 tablet 3     fexofenadine (ALLEGRA) 180 MG tablet Take 180 mg by mouth every morning       fluticasone-salmeterol (AIRDUO RESPICLICK) 113-14 MCG/ACT inhaler Inhale 1 puff into the lungs 2 times daily       ketoconazole (NIZORAL) 2 % external cream Apply topically 2 times daily Apply to affected areas under breasts and groin twice daily 60 g 5     levothyroxine (SYNTHROID/LEVOTHROID) 88 MCG tablet Take 1 tablet (88 mcg) by mouth daily. 90 tablet 4     lisinopril (ZESTRIL) 10 MG tablet Take 1 tablet (10 mg) by mouth daily. 90 tablet 4     metroNIDAZOLE (METROCREAM) 0.75 % external cream Apply topically daily To face (Patient not taking: Reported on 1/13/2025) 45 g 3     Multiple Vitamins-Minerals (MULTIVITAMIN WOMEN 50+) TABS Take 1 tablet by mouth daily.       omeprazole (PRILOSEC) 20 MG DR capsule TAKE 1 TABLET (20 MG) BY MOUTH 2 TIMES DAILY TAKE 30-60 MINUTES BEFORE A MEAL. 180 capsule 4     pramipexole (MIRAPEX) 0.125 MG tablet TAKE 2 TABLETS BY MOUTH AT DINNER AND 1 TABLET AT BEDTIME 270 tablet 4     Semaglutide-Weight Management (WEGOVY) 1.7 MG/0.75ML pen Inject 1.7 mg subcutaneously once a week. Cell: [phone] 3 mL 2     triamcinolone  (NASACORT) 55 MCG/ACT nasal aerosol Spray 2 sprays into both nostrils daily. 48 g 4     UNABLE TO FIND MEDICATION NAME: Allergy shots Every 1-4 weeks Next dose 4/10/24       Vitamin D3 (CHOLECALCIFEROL) 25 mcg (1000 units) tablet Take 25 mcg by mouth every morning       zolpidem (AMBIEN) 5 MG tablet Take 1 tablet (5 mg) by mouth nightly as needed for sleep. 30 tablet 5     No current facility-administered medications for this visit.       Allergies   Allergen Reactions     Fluconazole Rash     Nsaids Nephrotoxicity     Adhesive [Liquid Adhesive] Dermatitis     Animal Dander      Suture      Non-healing suture line     Vistaril [Hydroxyzine Hcl]      Urinary retention       Past Medical History:   Diagnosis Date     Allergic rhinitis due to other allergen      Apneas, obstructive sleep      Chronic lymphocytic thyroiditis      COPD (chronic obstructive pulmonary disease) (H)      Depressive disorder      Depressive disorder, not elsewhere classified      Disorder of bone and cartilage, unspecified      Esophageal reflux      Essential hypertension, benign      Generalized osteoarthrosis, unspecified site     knees     HASHIMOTO'S THYROIDITIS 11/15/2004     Headache above the eye region      Hiatal hernia      Insomnia, unspecified      Moderate persistent asthma      Nasal congestion      Positive PPD     treated for one year     Pure hyperglyceridemia      Scoliosis      Snoring      Tobacco use disorder         Patient Active Problem List   Diagnosis     Seasonal allergic rhinitis due to pollen     GERD     HASHIMOTO'S THYROIDITIS     Disorder of bone and cartilage     HYPERTRIGLYCERIDEMIA     Moderate persistent asthma     Insomnia     Osteoarthritis of multiple joints     ISATU (obstructive sleep apnea)     Restless leg syndrome     Hypertension goal BP (blood pressure) < 140/90     Hyperlipidemia LDL goal <130     Abnormal liver function     Hiatal hernia     Patellar tendonitis     Patellofemoral arthritis, right      Vitamin D deficiency     Anemia     Iron deficiency anemia     Fibromyalgia     Headache above the eye region     Psychological factors affecting medical condition     Depression, major, recurrent, in partial remission     Mixed hyperlipidemia     Major depressive disorder, recurrent episode, moderate      Class 2 severe obesity due to excess calories with serious comorbidity and body mass index (BMI) of 35.0 to 35.9 in adult (H)     Major depressive disorder, recurrent episode, mild with anxious distress     Chronic pain     Facet arthropathy, cervical     Low bone density     Diverticular disease of colon     Dysphagia     Gastroesophageal reflux disease       Past Surgical History:   Procedure Laterality Date     ABDOMEN SURGERY      GB out     ARTHROSCOPY KNEE RT/LT      left knee     BIOPSY      Colon     CHOLECYSTECTOMY       COLONOSCOPY  2014    Procedure: COLONOSCOPY;  Surgeon: Mau De La Fuente MD;  Location:  GI     ESOPHAGOSCOPY, GASTROSCOPY, DUODENOSCOPY (EGD), COMBINED  2014    Procedure: COMBINED ESOPHAGOSCOPY, GASTROSCOPY, DUODENOSCOPY (EGD), BIOPSY SINGLE OR MULTIPLE;  Surgeon: Mau De La Fuente MD;  Location:  GI     Gall bladder removal       ZZC TOTAL KNEE ARTHROPLASTY      bilateral       Family History   Problem Relation Age of Onset     Osteoporosis Mother      Hypertension Mother      Eye Disorder Mother         Mac d.,glaucoma, cataracts     Lipids Mother      Neurologic Disorder Mother         Parkinsons     Coronary Artery Disease Mother      Anxiety Disorder Mother      Hyperlipidemia Mother      Cancer Father         melanoma on back     Arthritis Father      Blood Disease Father         Hemachromatosis     Genitourinary Problems Father          of renal failure     Allergies Father      Genetic Disorder Father         Hemochromatosis     Anxiety Disorder Sister      Obesity Sister      Genetic Disorder Brother         Hemochromatosis     C.A.D.  "Maternal Grandmother      Cerebrovascular Disease Maternal Grandmother      Respiratory Maternal Grandmother      Hearing Loss Maternal Grandmother      Coronary Artery Disease Maternal Grandmother      C.A.D. Maternal Grandfather      Cardiovascular Maternal Grandfather      Circulatory Maternal Grandfather      Cancer - colorectal Paternal Grandmother      Diabetes Paternal Grandmother      Cancer Paternal Grandmother         Colon     Asthma Paternal Grandmother      Colon Cancer Paternal Grandmother      Cerebrovascular Disease Paternal Grandfather      Breast Cancer No family hx of      Anesthesia Reaction No family hx of      Clotting Disorder No family hx of      Bleeding Disorder No family hx of      Thyroid Disease No family hx of        Reviewed past medical, surgical, and family history with patient found on new patient intake packet located in EMR Media tab.     SOCIAL HX: Patient is retired BSN.  Patient does report occasional tobacco use, does report occasional alcohol use per denies history heavy drinker, denies recreational drug use.      ROS: Positive for joint pain, muscle pain, muscle fatigue, insomnia, excessive tiredness, wheezing, constipation, reflux, changes in vision, difficulty swallowing.  Specifically negative for bowel/bladder dysfunction, balance changes, headache, dizziness, foot drop, fevers, chills, appetite changes, nausea/vomiting, unexplained weight loss. Otherwise 13 systems reviewed are negative. Please see the patient's intake questionnaire from today for details.    OBJECTIVE:  /75   Pulse 84   Ht 5' 5.5\" (1.664 m)   Wt 180 lb (81.6 kg)   LMP  (LMP Unknown)   BMI 29.50 kg/m      PHYSICAL EXAMINATION:    --CONSTITUTIONAL:  Vital signs as above.  No acute distress.  The patient is well nourished and well groomed.  --PSYCHIATRIC:  Appropriate mood and affect. The patient is awake, alert, oriented to person, place, time and answering questions appropriately with clear " speech.    --SKIN:  Skin over the face, bilateral lower extremities, and posterior torso is clean, dry, intact without rashes.    --RESPIRATORY: Normal rhythm and effort. No abnormal accessory muscle breathing patterns noted.   --STANDING EXAMINATION:  Normal lumbar lordosis noted, no lateral shift.  --MUSCULOSKELETAL: Range of motion is not limited in lumbar flexion, mild increased pain with lumbar extension and lateral rotation simultaneously bilaterally.  No tenderness to palpation lumbar spine. Straight leg raising is negative to radicular pain. Sciatic notch non-tender.  --GROSS MOTOR: Gait is non-antalgic. Easily arises from a seated position.   --LOWER EXTREMITY MOTOR TESTING:  Plantar flexion left 5/5, right 5/5   Dorsiflexion left 5/5, right 5/5   Knee flexion left 5/5, right 5/5  Knee extension left 5/5, right 5/5   Hip flexion left 5/5, right 5/5  --HIPS: Full range of motion bilaterally.   --NEUROLOGICAL:  2/4 patellar, medial hamstring, and achilles reflexes bilaterally.  Sensation to light touch is intact in the bilateral L4, L5, and S1 dermatomes.   --VASCULAR:  Bilateral lower extremities are warm.     RESULTS: [facility] medical records from [facility] were reviewed today.           Again, thank you for allowing me to participate in the care of your patient.        Sincerely,        Amanda Valladares CNP    Electronically signed

## 2025-07-23 NOTE — PROGRESS NOTES
ASSESSMENT: Brandi Stern is a 69 year old female who presents for consultation at the request of PCP Cherie Brennan, with a past medical history significant for hypertension, moderate persistent asthma, ISATU, Hashimoto's thyroiditis, hyperlipidemia, vitamin D deficiency, obesity, major depression, anemia, insomnia, restless leg syndrome, headache, GERD, fibromyalgia, chronic pain who presents today for new patient evaluation of:    - Chronic bilateral low back pain with some increased pain with facet mediated pain however also pain with standing and walking consistent with spinal stenosis neurogenic claudication    Patient is neurologically intact on exam. No myelopathic or red flag symptoms.         7/18/2025     7:41 PM   OSWESTRY DISABILITY INDEX   Count 10    Sum 15    Oswestry Score (%) 30 %        Patient-reported            Diagnoses and all orders for this visit:  Chronic bilateral low back pain without sciatica  -     MR Lumbar Spine w/o Contrast; Future  -     Physical Therapy  Referral; Future  Degeneration of intervertebral disc of lumbar region with discogenic back pain  -     MR Lumbar Spine w/o Contrast; Future  -     Physical Therapy  Referral; Future    PLAN:  Reviewed spine anatomy and disease process. Discussed diagnosis and treatment options with the patient today. A shared decision making model was used.  The patient's values and choices were respected. The following represents what was discussed and decided upon by the provider and the patient.      -DIAGNOSTIC TESTS:  Images were personally reviewed and interpreted and explained to patient today using spine model.   -- Order lumbar spine MRI to further evaluate for possible intervention.  -- Cervical spine MRI 12/24/2023 with multilevel trace spondylolisthesis.  Multilevel spondylosis with mild to moderate spinal canal stenosis with moderate to severe bilateral foraminal stenosis at multiple levels.  No cord signal  abnormality.    -PHYSICAL THERAPY: Referral to physical therapy placed to address home exercises for core strengthening and nerve glides.  She does have a home PT program from prior as well which she is going to readdress on her own.  Discussed the importance of core strengthening, ROM, stretching exercises with the patient and how each of these entities is important in decreasing pain.  Explained to the patient that the purpose of physical therapy is to teach the patient a home exercise program.  These exercises need to be performed every day in order to decrease pain and prevent future occurrences of pain.        -MEDICATIONS: No changes in medications.    -INTERVENTIONS: Consider lumbar MEGHAN versus potential medial branch block pending imaging review.  Discussed risks and benefits of injections with patient today.    -PATIENT EDUCATION:  Total time of 46 minutes, on the day of service, spent with the patient, reviewing the chart, placing orders, and documenting.     -FOLLOW-UP:   Follow-up [employer] message for imaging results and possible injection options.    Advised patient to call the [facility] if symptoms worsen or you have problems controlling bladder and bowel function.   ______________________________________________________________________    SUBJECTIVE:  HPI:  Brandi Stern  Is a 69 year old female who presents today for new patient evaluation of low back pain bilateral low back pain right greater than left has been intermittent in the past but worsening over the last year.  She does report that pain is a 3/10 up to an 8 at its worst, a 1 at its best.  Overall she is typically fairly comfortable with sitting however sitting for too long over an hour or so does cause spasms in her back.  Her biggest concern however is with walking she has increased back pain at about 100 feet and typically feels like she has to sit down and when she does sit down her pain does completely go away until she starts moving  again.  She otherwise denies any radiating lower extremity pain.  Denies numbness or tingling sensations in lower extremities.  Denies lower extremity weakness, denies any recent trips or falls or balance changes.  Denies bowel or bladder loss control.    -Patient was evaluated by neurosurgery 12/26/2023 for chronic neck and back pain. Patient saw  1/30/2020 and was supposed to stop smoking, have osteoporosis evaluation with Dr. Lawton and return if she wanted to pursue C4-6 ACDF and possible C7/T1 right foraminotomy if having C8 symptoms. She had not stopped smoking at that time. She has quit smoking and EtOH use as of 12/3/23.     -Treatment to Date:   Physical therapy in the past, worked for back pain, did not for neck pain.  No traction.    Left C3-5 facet joint injection 2/14/2019  Left C3-5 MBB 3/20/2019 and 4/4/2019  Left C3-5  2019-50% relief for a couple of months only.    -Medications:  Cyclobenzaprine  Acetaminophen  Lidocaine neck pain relief  Ibuprofen unable to take    Current Outpatient Medications   Medication Sig Dispense Refill    acetaminophen (TYLENOL) 650 MG CR tablet Take 1,300 mg by mouth 2 times daily.      cyclobenzaprine (FLEXERIL) 5 MG tablet Take 1-2 tablets (5-10 mg) by mouth nightly as needed for muscle spasms. 90 tablet 4    albuterol (ALBUTEROL) 108 (90 BASE) MCG/ACT Inhaler Inhale 1-2 puffs into the lungs every 6 hours as needed for shortness of breath / dyspnea 1 Inhaler 1    atorvastatin (LIPITOR) 10 MG tablet TAKE 1 TABLET (10 MG) BY MOUTH DAILY. 90 tablet 1    Azelastine HCl 137 MCG/SPRAY SOLN Spray 1 spray in nostril as needed (Patient not taking: Reported on 1/13/2025)      CALCIUM CITRATE PO Take 500 mg by mouth 2 times daily      co-enzyme Q-10 100 MG CAPS capsule Take 100 mg by mouth daily.      COMPOUNDED NON-CONTROLLED SUBSTANCE (CMPD RX) - PHARMACY TO MIX COMPOUNDED MEDICATION Compounded semaglutide 1.5 mg (30 units) subcutaneous injection once weekly 2 mL  0    COMPOUNDED NON-CONTROLLED SUBSTANCE (CMPD RX) - PHARMACY TO MIX COMPOUNDED MEDICATION Compounded semaglutide 0.5 mg subcutaneous injection once weekly 4 each 2    desvenlafaxine (PRISTIQ) 100 MG 24 hr tablet Take 1 tablet (100 mg) by mouth daily. 90 tablet 4    docusate sodium (COLACE) 100 MG capsule Take 100 mg by mouth 2 times daily Once or twice a day      famotidine (PEPCID) 20 MG tablet TAKE 1 TABLET BY MOUTH TWICE A  tablet 3    fenofibrate (TRICOR) 145 MG tablet Take 1 tablet (145 mg) by mouth daily. 90 tablet 3    fexofenadine (ALLEGRA) 180 MG tablet Take 180 mg by mouth every morning      fluticasone-salmeterol (AIRDUO RESPICLICK) 113-14 MCG/ACT inhaler Inhale 1 puff into the lungs 2 times daily      ketoconazole (NIZORAL) 2 % external cream Apply topically 2 times daily Apply to affected areas under breasts and groin twice daily 60 g 5    levothyroxine (SYNTHROID/LEVOTHROID) 88 MCG tablet Take 1 tablet (88 mcg) by mouth daily. 90 tablet 4    lisinopril (ZESTRIL) 10 MG tablet Take 1 tablet (10 mg) by mouth daily. 90 tablet 4    metroNIDAZOLE (METROCREAM) 0.75 % external cream Apply topically daily To face (Patient not taking: Reported on 1/13/2025) 45 g 3    Multiple Vitamins-Minerals (MULTIVITAMIN WOMEN 50+) TABS Take 1 tablet by mouth daily.      omeprazole (PRILOSEC) 20 MG DR capsule TAKE 1 TABLET (20 MG) BY MOUTH 2 TIMES DAILY TAKE 30-60 MINUTES BEFORE A MEAL. 180 capsule 4    pramipexole (MIRAPEX) 0.125 MG tablet TAKE 2 TABLETS BY MOUTH AT DINNER AND 1 TABLET AT BEDTIME 270 tablet 4    Semaglutide-Weight Management (WEGOVY) 1.7 MG/0.75ML pen Inject 1.7 mg subcutaneously once a week. Cell: [phone] 3 mL 2    triamcinolone (NASACORT) 55 MCG/ACT nasal aerosol Spray 2 sprays into both nostrils daily. 48 g 4    UNABLE TO FIND MEDICATION NAME: Allergy shots Every 1-4 weeks Next dose 4/10/24      Vitamin D3 (CHOLECALCIFEROL) 25 mcg (1000 units) tablet Take 25 mcg by mouth every morning       zolpidem (AMBIEN) 5 MG tablet Take 1 tablet (5 mg) by mouth nightly as needed for sleep. 30 tablet 5     No current facility-administered medications for this visit.       Allergies   Allergen Reactions    Fluconazole Rash    Nsaids Nephrotoxicity    Adhesive [Liquid Adhesive] Dermatitis    Animal Dander     Suture      Non-healing suture line    Vistaril [Hydroxyzine Hcl]      Urinary retention       Past Medical History:   Diagnosis Date    Allergic rhinitis due to other allergen     Apneas, obstructive sleep     Chronic lymphocytic thyroiditis     COPD (chronic obstructive pulmonary disease) (H)     Depressive disorder     Depressive disorder, not elsewhere classified     Disorder of bone and cartilage, unspecified     Esophageal reflux     Essential hypertension, benign     Generalized osteoarthrosis, unspecified site     knees    HASHIMOTO'S THYROIDITIS 11/15/2004    Headache above the eye region     Hiatal hernia     Insomnia, unspecified     Moderate persistent asthma     Nasal congestion     Positive PPD     treated for one year    Pure hyperglyceridemia     Scoliosis     Snoring     Tobacco use disorder         Patient Active Problem List   Diagnosis    Seasonal allergic rhinitis due to pollen    GERD    HASHIMOTO'S THYROIDITIS    Disorder of bone and cartilage    HYPERTRIGLYCERIDEMIA    Moderate persistent asthma    Insomnia    Osteoarthritis of multiple joints    ISATU (obstructive sleep apnea)    Restless leg syndrome    Hypertension goal BP (blood pressure) < 140/90    Hyperlipidemia LDL goal <130    Abnormal liver function    Hiatal hernia    Patellar tendonitis    Patellofemoral arthritis, right    Vitamin D deficiency    Anemia    Iron deficiency anemia    Fibromyalgia    Headache above the eye region    Psychological factors affecting medical condition    Depression, major, recurrent, in partial remission    Mixed hyperlipidemia    Major depressive disorder, recurrent episode, moderate     Class 2  severe obesity due to excess calories with serious comorbidity and body mass index (BMI) of 35.0 to 35.9 in adult (H)    Major depressive disorder, recurrent episode, mild with anxious distress    Chronic pain    Facet arthropathy, cervical    Low bone density    Diverticular disease of colon    Dysphagia    Gastroesophageal reflux disease       Past Surgical History:   Procedure Laterality Date    ABDOMEN SURGERY      GB out    ARTHROSCOPY KNEE RT/LT      left knee    BIOPSY      Colon    CHOLECYSTECTOMY      COLONOSCOPY  2014    Procedure: COLONOSCOPY;  Surgeon: Mau De La Fuente MD;  Location:  GI    ESOPHAGOSCOPY, GASTROSCOPY, DUODENOSCOPY (EGD), COMBINED  2014    Procedure: COMBINED ESOPHAGOSCOPY, GASTROSCOPY, DUODENOSCOPY (EGD), BIOPSY SINGLE OR MULTIPLE;  Surgeon: Mau De La Fuente MD;  Location:  GI    Gall bladder removal      Z TOTAL KNEE ARTHROPLASTY      bilateral       Family History   Problem Relation Age of Onset    Osteoporosis Mother     Hypertension Mother     Eye Disorder Mother         Mac d.,glaucoma, cataracts    Lipids Mother     Neurologic Disorder Mother         Parkinsons    Coronary Artery Disease Mother     Anxiety Disorder Mother     Hyperlipidemia Mother     Cancer Father         melanoma on back    Arthritis Father     Blood Disease Father         Hemachromatosis    Genitourinary Problems Father          of renal failure    Allergies Father     Genetic Disorder Father         Hemochromatosis    Anxiety Disorder Sister     Obesity Sister     Genetic Disorder Brother         Hemochromatosis    C.A.D. Maternal Grandmother     Cerebrovascular Disease Maternal Grandmother     Respiratory Maternal Grandmother     Hearing Loss Maternal Grandmother     Coronary Artery Disease Maternal Grandmother     C.A.D. Maternal Grandfather     Cardiovascular Maternal Grandfather     Circulatory Maternal Grandfather     Cancer - colorectal Paternal Grandmother     Diabetes  "Paternal Grandmother     Cancer Paternal Grandmother         Colon    Asthma Paternal Grandmother     Colon Cancer Paternal Grandmother     Cerebrovascular Disease Paternal Grandfather     Breast Cancer No family hx of     Anesthesia Reaction No family hx of     Clotting Disorder No family hx of     Bleeding Disorder No family hx of     Thyroid Disease No family hx of        Reviewed past medical, surgical, and family history with patient found on new patient intake packet located in EMR Media tab.     SOCIAL HX: Patient is retired BSN.  Patient does report occasional tobacco use, does report occasional alcohol use per denies history heavy drinker, denies recreational drug use.      ROS: Positive for joint pain, muscle pain, muscle fatigue, insomnia, excessive tiredness, wheezing, constipation, reflux, changes in vision, difficulty swallowing.  Specifically negative for bowel/bladder dysfunction, balance changes, headache, dizziness, foot drop, fevers, chills, appetite changes, nausea/vomiting, unexplained weight loss. Otherwise 13 systems reviewed are negative. Please see the patient's intake questionnaire from today for details.    OBJECTIVE:  /75   Pulse 84   Ht 5' 5.5\" (1.664 m)   Wt 180 lb (81.6 kg)   LMP  (LMP Unknown)   BMI 29.50 kg/m      PHYSICAL EXAMINATION:    --CONSTITUTIONAL:  Vital signs as above.  No acute distress.  The patient is well nourished and well groomed.  --PSYCHIATRIC:  Appropriate mood and affect. The patient is awake, alert, oriented to person, place, time and answering questions appropriately with clear speech.    --SKIN:  Skin over the face, bilateral lower extremities, and posterior torso is clean, dry, intact without rashes.    --RESPIRATORY: Normal rhythm and effort. No abnormal accessory muscle breathing patterns noted.   --STANDING EXAMINATION:  Normal lumbar lordosis noted, no lateral shift.  --MUSCULOSKELETAL: Range of motion is not limited in lumbar flexion, mild " increased pain with lumbar extension and lateral rotation simultaneously bilaterally.  No tenderness to palpation lumbar spine. Straight leg raising is negative to radicular pain. Sciatic notch non-tender.  --GROSS MOTOR: Gait is non-antalgic. Easily arises from a seated position.   --LOWER EXTREMITY MOTOR TESTING:  Plantar flexion left 5/5, right 5/5   Dorsiflexion left 5/5, right 5/5   Knee flexion left 5/5, right 5/5  Knee extension left 5/5, right 5/5   Hip flexion left 5/5, right 5/5  --HIPS: Full range of motion bilaterally.   --NEUROLOGICAL:  2/4 patellar, medial hamstring, and achilles reflexes bilaterally.  Sensation to light touch is intact in the bilateral L4, L5, and S1 dermatomes.   --VASCULAR:  Bilateral lower extremities are warm.     RESULTS: Prior medical records from [facility] and Care Everywhere were reviewed today.

## 2025-08-01 ENCOUNTER — HOSPITAL ENCOUNTER (OUTPATIENT)
Dept: MRI IMAGING | Facility: HOSPITAL | Age: 70
Discharge: HOME | End: 2025-08-01
Attending: NURSE PRACTITIONER | Admitting: NURSE PRACTITIONER
Payer: MEDICARE

## 2025-08-01 DIAGNOSIS — M51.360 DEGENERATION OF INTERVERTEBRAL DISC OF LUMBAR REGION WITH DISCOGENIC BACK PAIN: ICD-10-CM

## 2025-08-01 DIAGNOSIS — G89.29 CHRONIC BILATERAL LOW BACK PAIN WITHOUT SCIATICA: ICD-10-CM

## 2025-08-01 DIAGNOSIS — M54.50 CHRONIC BILATERAL LOW BACK PAIN WITHOUT SCIATICA: ICD-10-CM

## 2025-08-01 PROCEDURE — 72148 MRI LUMBAR SPINE W/O DYE: CPT

## 2025-08-04 ENCOUNTER — RESULTS FOLLOW-UP (OUTPATIENT)
Dept: PHYSICAL MEDICINE AND REHAB | Facility: CLINIC | Age: 70
End: 2025-08-04
Payer: MEDICARE

## 2025-08-04 DIAGNOSIS — M54.16 LUMBAR RADICULOPATHY: Primary | ICD-10-CM

## 2025-08-10 ASSESSMENT — PATIENT HEALTH QUESTIONNAIRE - PHQ9: SUM OF ALL RESPONSES TO PHQ QUESTIONS 1-9: 5

## 2025-08-11 ASSESSMENT — ASTHMA QUESTIONNAIRES
QUESTION_3 LAST FOUR WEEKS HOW OFTEN DID YOUR ASTHMA SYMPTOMS (WHEEZING, COUGHING, SHORTNESS OF BREATH, CHEST TIGHTNESS OR PAIN) WAKE YOU UP AT NIGHT OR EARLIER THAN USUAL IN THE MORNING: NOT AT ALL
QUESTION_2 LAST FOUR WEEKS HOW OFTEN HAVE YOU HAD SHORTNESS OF BREATH: NOT AT ALL
ACT_TOTALSCORE: 25
QUESTION_4 LAST FOUR WEEKS HOW OFTEN HAVE YOU USED YOUR RESCUE INHALER OR NEBULIZER MEDICATION (SUCH AS ALBUTEROL): NOT AT ALL
QUESTION_5 LAST FOUR WEEKS HOW WOULD YOU RATE YOUR ASTHMA CONTROL: COMPLETELY CONTROLLED
QUESTION_1 LAST FOUR WEEKS HOW MUCH OF THE TIME DID YOUR ASTHMA KEEP YOU FROM GETTING AS MUCH DONE AT WORK, SCHOOL OR AT HOME: NONE OF THE TIME

## 2025-08-11 ASSESSMENT — PATIENT HEALTH QUESTIONNAIRE - PHQ9
SUM OF ALL RESPONSES TO PHQ QUESTIONS 1-9: 5
10. IF YOU CHECKED OFF ANY PROBLEMS, HOW DIFFICULT HAVE THESE PROBLEMS MADE IT FOR YOU TO DO YOUR WORK, TAKE CARE OF THINGS AT HOME, OR GET ALONG WITH OTHER PEOPLE: SOMEWHAT DIFFICULT

## 2025-08-12 ENCOUNTER — OFFICE VISIT (OUTPATIENT)
Dept: FAMILY MEDICINE | Facility: CLINIC | Age: 70
End: 2025-08-12
Payer: MEDICARE

## 2025-08-12 VITALS
RESPIRATION RATE: 19 BRPM | WEIGHT: 184.4 LBS | OXYGEN SATURATION: 96 % | DIASTOLIC BLOOD PRESSURE: 66 MMHG | TEMPERATURE: 97.7 F | HEIGHT: 66 IN | BODY MASS INDEX: 29.63 KG/M2 | SYSTOLIC BLOOD PRESSURE: 100 MMHG | HEART RATE: 95 BPM

## 2025-08-12 DIAGNOSIS — H25.9 SENILE CATARACT, UNSPECIFIED AGE-RELATED CATARACT TYPE, UNSPECIFIED LATERALITY: ICD-10-CM

## 2025-08-12 DIAGNOSIS — E06.3 CHRONIC LYMPHOCYTIC THYROIDITIS: ICD-10-CM

## 2025-08-12 DIAGNOSIS — I10 HYPERTENSION GOAL BP (BLOOD PRESSURE) < 140/90: ICD-10-CM

## 2025-08-12 DIAGNOSIS — K21.9 GASTROESOPHAGEAL REFLUX DISEASE WITHOUT ESOPHAGITIS: ICD-10-CM

## 2025-08-12 DIAGNOSIS — F33.1 MAJOR DEPRESSIVE DISORDER, RECURRENT EPISODE, MODERATE WITH ANXIOUS DISTRESS (H): ICD-10-CM

## 2025-08-12 DIAGNOSIS — J45.40 MODERATE PERSISTENT ASTHMA WITHOUT COMPLICATION: ICD-10-CM

## 2025-08-12 DIAGNOSIS — G47.33 OSA (OBSTRUCTIVE SLEEP APNEA): ICD-10-CM

## 2025-08-12 DIAGNOSIS — J30.1 SEASONAL ALLERGIC RHINITIS DUE TO POLLEN: ICD-10-CM

## 2025-08-12 DIAGNOSIS — E78.5 HYPERLIPIDEMIA LDL GOAL <130: ICD-10-CM

## 2025-08-12 DIAGNOSIS — Z01.818 PREOP GENERAL PHYSICAL EXAM: Primary | ICD-10-CM

## 2025-08-12 PROCEDURE — 99214 OFFICE O/P EST MOD 30 MIN: CPT

## 2025-08-12 PROCEDURE — 1126F AMNT PAIN NOTED NONE PRSNT: CPT

## 2025-08-12 PROCEDURE — 3074F SYST BP LT 130 MM HG: CPT

## 2025-08-12 PROCEDURE — 3078F DIAST BP <80 MM HG: CPT

## 2025-08-12 ASSESSMENT — PAIN SCALES - GENERAL: PAINLEVEL_OUTOF10: NO PAIN (0)

## 2025-08-19 ENCOUNTER — VIRTUAL VISIT (OUTPATIENT)
Dept: ENDOCRINOLOGY | Facility: CLINIC | Age: 70
End: 2025-08-19
Payer: MEDICARE

## 2025-08-19 VITALS — WEIGHT: 184 LBS | BODY MASS INDEX: 30.15 KG/M2

## 2025-08-19 DIAGNOSIS — E66.01 CLASS 2 SEVERE OBESITY DUE TO EXCESS CALORIES WITH SERIOUS COMORBIDITY AND BODY MASS INDEX (BMI) OF 35.0 TO 35.9 IN ADULT (H): Primary | ICD-10-CM

## 2025-08-19 DIAGNOSIS — E66.812 CLASS 2 SEVERE OBESITY DUE TO EXCESS CALORIES WITH SERIOUS COMORBIDITY AND BODY MASS INDEX (BMI) OF 35.0 TO 35.9 IN ADULT (H): Primary | ICD-10-CM

## 2025-08-19 PROCEDURE — 98006 SYNCH AUDIO-VIDEO EST MOD 30: CPT | Performed by: INTERNAL MEDICINE

## 2025-08-19 PROCEDURE — G2211 COMPLEX E/M VISIT ADD ON: HCPCS | Performed by: INTERNAL MEDICINE

## 2025-08-20 ENCOUNTER — OFFICE VISIT (OUTPATIENT)
Dept: PODIATRY | Facility: CLINIC | Age: 70
End: 2025-08-20
Payer: MEDICARE

## 2025-08-20 ENCOUNTER — TELEPHONE (OUTPATIENT)
Dept: ENDOCRINOLOGY | Facility: CLINIC | Age: 70
End: 2025-08-20

## 2025-08-20 DIAGNOSIS — M21.6X1 PRONATION DEFORMITY OF BOTH FEET: ICD-10-CM

## 2025-08-20 DIAGNOSIS — M20.5X1 HALLUX LIMITUS, RIGHT: Primary | ICD-10-CM

## 2025-08-20 DIAGNOSIS — L84 CALLUS BETWEEN TOES: ICD-10-CM

## 2025-08-20 DIAGNOSIS — M79.671 PAIN IN RIGHT FOOT: ICD-10-CM

## 2025-08-20 DIAGNOSIS — M21.6X2 PRONATION DEFORMITY OF BOTH FEET: ICD-10-CM

## 2025-08-20 PROCEDURE — 99203 OFFICE O/P NEW LOW 30 MIN: CPT | Performed by: PODIATRIST

## 2025-08-25 ENCOUNTER — VIRTUAL VISIT (OUTPATIENT)
Dept: FAMILY MEDICINE | Facility: CLINIC | Age: 70
End: 2025-08-25
Payer: MEDICARE

## 2025-08-25 DIAGNOSIS — G47.00 INSOMNIA, UNSPECIFIED TYPE: Primary | ICD-10-CM

## 2025-08-25 PROCEDURE — 98006 SYNCH AUDIO-VIDEO EST MOD 30: CPT | Performed by: NURSE PRACTITIONER

## 2025-08-25 RX ORDER — TRAZODONE HYDROCHLORIDE 100 MG/1
100-150 TABLET ORAL AT BEDTIME
Qty: 135 TABLET | Refills: 3 | Status: SHIPPED | OUTPATIENT
Start: 2025-08-25

## 2025-08-26 ENCOUNTER — THERAPY VISIT (OUTPATIENT)
Dept: PHYSICAL THERAPY | Facility: REHABILITATION | Age: 70
End: 2025-08-26
Attending: NURSE PRACTITIONER
Payer: MEDICARE

## 2025-08-26 DIAGNOSIS — M54.50 CHRONIC BILATERAL LOW BACK PAIN WITHOUT SCIATICA: ICD-10-CM

## 2025-08-26 DIAGNOSIS — G89.29 CHRONIC BILATERAL LOW BACK PAIN WITHOUT SCIATICA: ICD-10-CM

## 2025-08-26 DIAGNOSIS — M51.360 DEGENERATION OF INTERVERTEBRAL DISC OF LUMBAR REGION WITH DISCOGENIC BACK PAIN: ICD-10-CM

## 2025-08-26 PROCEDURE — 97110 THERAPEUTIC EXERCISES: CPT | Mod: GP | Performed by: PHYSICAL THERAPIST

## 2025-08-26 PROCEDURE — 97161 PT EVAL LOW COMPLEX 20 MIN: CPT | Mod: GP | Performed by: PHYSICAL THERAPIST

## 2025-08-31 ENCOUNTER — MYC MEDICAL ADVICE (OUTPATIENT)
Dept: FAMILY MEDICINE | Facility: CLINIC | Age: 70
End: 2025-08-31
Payer: MEDICARE

## 2025-08-31 DIAGNOSIS — G47.00 INSOMNIA, UNSPECIFIED TYPE: Primary | ICD-10-CM

## 2025-09-03 RX ORDER — RAMELTEON 8 MG/1
8 TABLET ORAL AT BEDTIME
Qty: 30 TABLET | Refills: 5 | Status: SHIPPED | OUTPATIENT
Start: 2025-09-03